# Patient Record
Sex: MALE | Race: WHITE | NOT HISPANIC OR LATINO | Employment: OTHER | URBAN - METROPOLITAN AREA
[De-identification: names, ages, dates, MRNs, and addresses within clinical notes are randomized per-mention and may not be internally consistent; named-entity substitution may affect disease eponyms.]

---

## 2017-01-26 ENCOUNTER — OFFICE VISIT (OUTPATIENT)
Dept: ENDOCRINOLOGY | Facility: CLINIC | Age: 67
End: 2017-01-26
Payer: COMMERCIAL

## 2017-01-26 VITALS
HEIGHT: 71 IN | BODY MASS INDEX: 26.48 KG/M2 | SYSTOLIC BLOOD PRESSURE: 118 MMHG | DIASTOLIC BLOOD PRESSURE: 73 MMHG | HEART RATE: 78 BPM | WEIGHT: 189.13 LBS

## 2017-01-26 DIAGNOSIS — M81.0 OSTEOPOROSIS, UNSPECIFIED: Primary | ICD-10-CM

## 2017-01-26 DIAGNOSIS — E78.00 PURE HYPERCHOLESTEROLEMIA: ICD-10-CM

## 2017-01-26 DIAGNOSIS — N52.9 ERECTILE DYSFUNCTION, UNSPECIFIED ERECTILE DYSFUNCTION TYPE: ICD-10-CM

## 2017-01-26 DIAGNOSIS — R82.994 HYPERCALCIURIA: ICD-10-CM

## 2017-01-26 PROCEDURE — 99214 OFFICE O/P EST MOD 30 MIN: CPT | Mod: S$GLB,,, | Performed by: INTERNAL MEDICINE

## 2017-01-26 PROCEDURE — 99999 PR PBB SHADOW E&M-EST. PATIENT-LVL III: CPT | Mod: PBBFAC,,, | Performed by: INTERNAL MEDICINE

## 2017-01-26 PROCEDURE — 1159F MED LIST DOCD IN RCRD: CPT | Mod: S$GLB,,, | Performed by: INTERNAL MEDICINE

## 2017-01-26 NOTE — PROGRESS NOTES
Subjective:       Patient ID: Jean Carlson is a 66 y.o. male.    Chief Complaint: Osteoporosis    HPI Comments: Mr. Carlson is presenting as follow-up for osteoporosis.  Last seen by Dr. Mejia in 2014.      Previous urine studies noted for hypercalciuria    Had been on Fosamax 2005- 2012    Boxer fracture in 2014 after list visit. Had been repairing light and fallen causing fracture. No other previous fractures  The patient exercises regularly lifting weights about 4-5 times a week and doing aerobic exercise as well with treadmill (3x per week)    No significant back pain or bone pain.      Dyslipidemia on lipitor 20mg. Lipid panel from 12/2016 at [facility]     ED, has gotten worse over the past several years.  Also endorses low libido Denies significant fatigue. Had tried viagra, provided some relief.         Review of Systems   Constitutional: Negative for unexpected weight change.   Eyes: Negative for visual disturbance.   Respiratory: Negative for shortness of breath.    Cardiovascular: Negative for chest pain.   Gastrointestinal: Negative for abdominal pain.   Genitourinary:        Erectile dysfunction   Musculoskeletal: Negative for myalgias.   Skin: Negative for wound.   Neurological: Negative for headaches.   Hematological: Does not bruise/bleed easily.   Psychiatric/Behavioral: Negative for sleep disturbance.       Objective:      Physical Exam   Constitutional: He is oriented to person, place, and time. He appears well-developed and well-nourished.   HENT:   Head: Normocephalic.   Eyes: EOM are normal.   Neck: Normal range of motion. No thyromegaly present.   Cardiovascular: Normal rate.    No murmur heard.  Pulmonary/Chest: Effort normal.   Abdominal: Soft.   Musculoskeletal: Normal range of motion.   Neurological: He is alert and oriented to person, place, and time.   Skin: Skin is warm and dry.   Psychiatric: He has a normal mood and affect.   Vitals reviewed.      Assessment:       1.  Osteoporosis, unspecified    2. Pure hypercholesterolemia    3. Hypercalciuria    4. Erectile dysfunction, unspecified erectile dysfunction type        Plan:       1. Osteoporosis likely etiology being idiopathic hypercalcuria. Previously on bisphosphonate for 7 years until 2012. Last DXA 2014 how 3% reduction in total hip but 18% increase in lumbar spine density. Plan to repeat DXA  2. Continue lipitor 20mg. Lipids at goal  3. Idiopathic, has previously tried thiazide. Will recheck 24 urine calcium, creatinine  4. Check total testosterone. Level within normal range in 2005    Discussed with Dr. Roberto Pollock MD

## 2017-01-26 NOTE — MR AVS SNAPSHOT
[facility]  [address]  [facility] [address]  Phone: [phone]  Fax: [phone]                  Jean Carlson   2017 8:00 AM   Office Visit    Description:  Male : 1950   Provider:  Pipe Pollock MD   Department:  [facility]           Reason for Visit     Osteoporosis           Diagnoses this Visit        Comments    Osteoporosis, unspecified    -  Primary     Pure hypercholesterolemia         Hypercalciuria         Erectile dysfunction, unspecified erectile dysfunction type                To Do List           Future Appointments        Provider Department Dept Phone    2017 9:30 AM LAB, APPOINTMENT [place] [facility] [phone]    2017 2:20 PM [facility], DEXA1 [facility] [phone]    3/17/2017 10:00 AM LAB, APPOINTMENT [facility] [phone]      Goals (5 Years of Data)     None      Follow-Up and Disposition     Follow-up and Disposition History      [facility]     [facility] -  Assistance  Registered nurses in the [facility] provide clinical advisement, health education, appointment booking, and other advisory services.  Call for this free service at [phone].             Medications           Message regarding Medications     Verify the changes and/or additions to your medication regime listed below are the same as discussed with your clinician today.  If any of these changes or additions are incorrect, please notify your healthcare provider.             Verify that the below list of medications is an accurate representation of the medications you are currently taking.  If none reported, the list may be blank. If incorrect, please contact your healthcare provider. Carry this list with you in case of emergency.           Current Medications     aspirin 81 MG Chew Take 81 mg by mouth once daily.    atorvastatin  "(LIPITOR) 20 MG tablet Take 1 tablet (20 mg total) by mouth once daily.    co-enzyme Q-10 30 mg capsule Take 30 mg by mouth once daily.     fish oil-omega-3 fatty acids 300-1,000 mg capsule Take 2 g by mouth once daily.    lutein-zeaxanthin 25-5 mg Cap Take by mouth once daily.     multivitamin capsule Take 1 capsule by mouth once daily.    omega-3 acid ethyl esters (LOVAZA) 1 gram capsule Take 1 capsule (1 g total) by mouth once daily.    resveratrol 100 mg Cap Take by mouth once daily.           Clinical Reference Information           Vital Signs - Last Recorded  Most recent update: 1/26/2017  8:22 AM by Nahomy Oh RN    BP Pulse Ht Wt BMI    118/73 78 5' 11" (1.803 m) 85.8 kg (189 lb 2.5 oz) 26.38 kg/m2      Blood Pressure          Most Recent Value    BP  118/73      Allergies as of 1/26/2017     No Known Allergies      Immunizations Administered on Date of Encounter - 1/26/2017     None      Orders Placed During Today's Visit     Future Labs/Procedures Expected by Expires    DXA Bone Density Spine And Hip_Axial Skeleton  1/26/2017 1/26/2018    Testosterone  1/26/2017 3/27/2018    CALCIUM, TIMED URINE  As directed 1/26/2018    Creatinine, urine, timed  As directed 1/26/2018      Instructions    Take Vit D 2000 IU daily       "

## 2017-01-27 ENCOUNTER — HOSPITAL ENCOUNTER (OUTPATIENT)
Dept: RADIOLOGY | Facility: CLINIC | Age: 67
Discharge: HOME | End: 2017-01-27
Attending: INTERNAL MEDICINE
Payer: COMMERCIAL

## 2017-01-27 DIAGNOSIS — M81.0 OSTEOPOROSIS, UNSPECIFIED: ICD-10-CM

## 2017-01-27 PROCEDURE — 77080 DXA BONE DENSITY AXIAL: CPT | Mod: 26,,, | Performed by: INTERNAL MEDICINE

## 2017-01-27 PROCEDURE — 77080 DXA BONE DENSITY AXIAL: CPT | Mod: TC

## 2017-01-28 NOTE — PROGRESS NOTES
I  Guerrero Mejia have personally taken the history and examined this patient and agree with the fellow's note.

## 2017-01-31 ENCOUNTER — LAB VISIT (OUTPATIENT)
Dept: LAB | Facility: HOSPITAL | Age: 67
End: 2017-01-31
Attending: INTERNAL MEDICINE
Payer: COMMERCIAL

## 2017-01-31 DIAGNOSIS — R82.994 HYPERCALCIURIA: ICD-10-CM

## 2017-01-31 PROCEDURE — 82570 ASSAY OF URINE CREATININE: CPT

## 2017-01-31 PROCEDURE — 82340 ASSAY OF CALCIUM IN URINE: CPT

## 2017-02-01 LAB
CALCIUM 24H UR-MRATE: 14 MG/HR
CALCIUM UR-MCNC: 13.7 MG/DL
CALCIUM URINE (MG/SPEC): 343 MG/SPEC
CREAT 24H UR-MRATE: 86.5 MG/HR
CREAT UR-MCNC: 83 MG/DL
CREATININE, URINE (MG/SPEC): 2075 MG/SPEC
URINE COLLECTION DURATION: 24 HR
URINE COLLECTION DURATION: 24 HR
URINE VOLUME: 2500 ML
URINE VOLUME: 2500 ML

## 2017-03-17 ENCOUNTER — LAB VISIT (OUTPATIENT)
Dept: LAB | Facility: HOSPITAL | Age: 67
End: 2017-03-17
Attending: FAMILY MEDICINE
Payer: COMMERCIAL

## 2017-03-17 DIAGNOSIS — E78.5 HYPERLIPIDEMIA: ICD-10-CM

## 2017-03-17 DIAGNOSIS — E78.5 HYPERLIPIDEMIA, UNSPECIFIED HYPERLIPIDEMIA TYPE: ICD-10-CM

## 2017-03-17 LAB
ALT SERPL W/O P-5'-P-CCNC: 39 U/L
AST SERPL-CCNC: 32 U/L
CHOLEST/HDLC SERPL: 2.6 {RATIO}
HDL/CHOLESTEROL RATIO: 38.2 %
HDLC SERPL-MCNC: 144 MG/DL
HDLC SERPL-MCNC: 55 MG/DL
LDLC SERPL CALC-MCNC: 72.2 MG/DL
NONHDLC SERPL-MCNC: 89 MG/DL
TRIGL SERPL-MCNC: 84 MG/DL

## 2017-03-17 PROCEDURE — 36415 COLL VENOUS BLD VENIPUNCTURE: CPT

## 2017-03-17 PROCEDURE — 84450 TRANSFERASE (AST) (SGOT): CPT

## 2017-03-17 PROCEDURE — 84460 ALANINE AMINO (ALT) (SGPT): CPT

## 2017-03-17 PROCEDURE — 80061 LIPID PANEL: CPT

## 2017-03-21 ENCOUNTER — LAB VISIT (OUTPATIENT)
Dept: LAB | Facility: HOSPITAL | Age: 67
End: 2017-03-21
Attending: FAMILY MEDICINE
Payer: COMMERCIAL

## 2017-03-21 ENCOUNTER — OFFICE VISIT (OUTPATIENT)
Dept: INTERNAL MEDICINE | Facility: CLINIC | Age: 67
End: 2017-03-21
Payer: COMMERCIAL

## 2017-03-21 VITALS
TEMPERATURE: 99 F | DIASTOLIC BLOOD PRESSURE: 60 MMHG | OXYGEN SATURATION: 99 % | HEIGHT: 71 IN | SYSTOLIC BLOOD PRESSURE: 110 MMHG | HEART RATE: 65 BPM | WEIGHT: 187.19 LBS | BODY MASS INDEX: 26.21 KG/M2

## 2017-03-21 DIAGNOSIS — T14.8XXA BRUISING: ICD-10-CM

## 2017-03-21 DIAGNOSIS — E78.5 HYPERLIPIDEMIA, UNSPECIFIED HYPERLIPIDEMIA TYPE: ICD-10-CM

## 2017-03-21 DIAGNOSIS — R22.32 PALMAR NODULE, LEFT: Primary | ICD-10-CM

## 2017-03-21 DIAGNOSIS — M81.0 OSTEOPOROSIS, UNSPECIFIED: ICD-10-CM

## 2017-03-21 DIAGNOSIS — Z82.49 FAMILY HISTORY OF CORONARY ARTERY DISEASE: ICD-10-CM

## 2017-03-21 DIAGNOSIS — N52.9 ERECTILE DYSFUNCTION, UNSPECIFIED ERECTILE DYSFUNCTION TYPE: ICD-10-CM

## 2017-03-21 DIAGNOSIS — L98.9 SKIN LESION: ICD-10-CM

## 2017-03-21 LAB
BASOPHILS # BLD AUTO: 0.01 K/UL
BASOPHILS NFR BLD: 0.2 %
DIFFERENTIAL METHOD: ABNORMAL
EOSINOPHIL # BLD AUTO: 0.1 K/UL
EOSINOPHIL NFR BLD: 2.4 %
ERYTHROCYTE [DISTWIDTH] IN BLOOD BY AUTOMATED COUNT: 12.4 %
HCT VFR BLD AUTO: 42.2 %
HGB BLD-MCNC: 14.2 G/DL
LYMPHOCYTES # BLD AUTO: 0.7 K/UL
LYMPHOCYTES NFR BLD: 17.7 %
MCH RBC QN AUTO: 31.1 PG
MCHC RBC AUTO-ENTMCNC: 33.6 %
MCV RBC AUTO: 92 FL
MONOCYTES # BLD AUTO: 0.4 K/UL
MONOCYTES NFR BLD: 9.7 %
NEUTROPHILS # BLD AUTO: 2.9 K/UL
NEUTROPHILS NFR BLD: 70 %
PLATELET # BLD AUTO: 139 K/UL
PMV BLD AUTO: 11.7 FL
RBC # BLD AUTO: 4.57 M/UL
WBC # BLD AUTO: 4.13 K/UL

## 2017-03-21 PROCEDURE — 99999 PR PBB SHADOW E&M-EST. PATIENT-LVL III: CPT | Mod: PBBFAC,,, | Performed by: FAMILY MEDICINE

## 2017-03-21 PROCEDURE — 1160F RVW MEDS BY RX/DR IN RCRD: CPT | Mod: S$GLB,,, | Performed by: FAMILY MEDICINE

## 2017-03-21 PROCEDURE — 99214 OFFICE O/P EST MOD 30 MIN: CPT | Mod: S$GLB,,, | Performed by: FAMILY MEDICINE

## 2017-03-21 PROCEDURE — 36415 COLL VENOUS BLD VENIPUNCTURE: CPT

## 2017-03-21 PROCEDURE — 85025 COMPLETE CBC W/AUTO DIFF WBC: CPT

## 2017-03-21 RX ORDER — TADALAFIL 20 MG/1
20 TABLET ORAL DAILY PRN
Qty: 6 TABLET | Refills: 11 | Status: SHIPPED | OUTPATIENT
Start: 2017-03-21 | End: 2018-01-02

## 2017-03-21 NOTE — PROGRESS NOTES
Subjective:       Patient ID: Jean Carlson is a 66 y.o. male.    Chief Complaint: Follow-up    HPI  Review of Systems   Constitutional: Negative for chills, fatigue and fever.   HENT: Negative for congestion and trouble swallowing.    Eyes: Negative for redness.   Respiratory: Negative for cough, chest tightness and shortness of breath.    Cardiovascular: Negative for chest pain, palpitations and leg swelling.   Gastrointestinal: Negative for abdominal pain and blood in stool.   Genitourinary: Negative for hematuria.   Musculoskeletal: Negative for arthralgias, back pain, gait problem, joint swelling, myalgias and neck pain.   Skin: Negative for color change and rash.        Lesion back   Neurological: Negative for tremors, speech difficulty, weakness, numbness and headaches.   Hematological: Negative for adenopathy. Bruises/bleeds easily.   Psychiatric/Behavioral: Negative for behavioral problems, confusion and sleep disturbance. The patient is not nervous/anxious.        Objective:      Physical Exam   Constitutional: He is oriented to person, place, and time. He appears well-developed and well-nourished. No distress.   Neck: Neck supple.   Pulmonary/Chest: Effort normal.   Musculoskeletal: He exhibits no edema.        Hands:       Right lower leg: He exhibits no edema.        Left lower leg: He exhibits no edema.   Neurological: He is alert and oriented to person, place, and time.   Skin: Skin is warm and dry. No rash noted.   Psychiatric: He has a normal mood and affect. His behavior is normal. Judgment and thought content normal.   Nursing note and vitals reviewed.      Assessment:       1. Palmar nodule, left    2. Skin lesion    3. Bruising    4. Family history of coronary artery disease    5. Erectile dysfunction, unspecified erectile dysfunction type    6. Hyperlipidemia, unspecified hyperlipidemia type    7. Osteoporosis, unspecified        Plan:   Jean was seen today for follow-up.    Diagnoses and  all orders for this visit:    Palmar nodule, left    Skin lesion  -     Ambulatory referral to Dermatology    Bruising  -     CBC auto differential; Future    Family history of coronary artery disease  Comments:  mom  at 64, dad at 67 - both of MI and within 3 hours of one another    Erectile dysfunction, unspecified erectile dysfunction type    Hyperlipidemia, unspecified hyperlipidemia type    Osteoporosis, unspecified    Other orders  -     Tdap Vaccine; Future  -     tadalafil (CIALIS) 20 MG Tab; Take 1 tablet (20 mg total) by mouth daily as needed.      See meds, orders, follow up, routing and instructions sections of encounter.  HISTORY OF PRESENT ILLNESS:  This is a gentleman in with multiple complaints   today.  He is in for followup.  He has a history of osteoporosis.  He did take a   bisphosphonate in the past.  He was seen recently by Endocrinology.    Reports a mole to the right infrascapular area and also some nodularity to the   left hand.    Varicose veins, lower extremity, left greater than right.    Reported some bruising to the forearms.    Discussed diet, weight loss, exercise.  He exercises on a very regular basis and   consistent.  Discussed solar keratosis and solar purpura.    He has a small lesion to the infrascapular area on the right and a larger lesion   to the left low back consistent with SKs, referred to Dermatology for   treatment.    Continue his excellent exercise program.    ED trial of Cialis.    Left hand, possible early palmar contracture.  No loss of range of motion.    Notify me for any progression.      BRIGETTE/HN  dd: 2017 14:37:32 (CDT)  td: 2017 06:15:23 (CDT)  Doc ID   #[id number]  Job ID #[id number]    CC:

## 2017-03-21 NOTE — MR AVS SNAPSHOT
[facility]  [address]  [facility] [address]  Phone: [phone]  Fax: [phone]                  Jean Carlson   3/21/2017 1:20 PM   Office Visit    Description:  Male : 1950   Provider:  Papi Gallardo MD   Department:  [facility]           Reason for Visit     Follow-up           Diagnoses this Visit        Comments    Palmar nodule, left    -  Primary     Skin lesion         Bruising         Family history of coronary artery disease     mom  at 64, dad at 67 - both of MI and within 3 hours of one another    Erectile dysfunction, unspecified erectile dysfunction type         Hyperlipidemia, unspecified hyperlipidemia type         Osteoporosis, unspecified                To Do List           Goals (5 Years of Data)     None      Follow-Up and Disposition     Return in about 6 months (around 2017) for annual physical exam.    Follow-up and Disposition History       These Medications        Disp Refills Start End    tadalafil (CIALIS) 20 MG Tab 6 tablet 11 3/21/2017     Take 1 tablet (20 mg total) by mouth daily as needed. - Oral    Pharmacy: [facility] [address] Ph #: [phone]         [facility]     [facility] Care [facility] -  Assistance  Registered nurses in the [facility] provide clinical advisement, health education, appointment booking, and other advisory services.  Call for this free service at [phone].             Medications           Message regarding Medications     Verify the changes and/or additions to your medication regime listed below are the same as discussed with your clinician today.  If any of these changes or additions are incorrect, please notify your healthcare provider.        START taking these NEW medications        Refills    tadalafil (CIALIS) 20 MG Tab 11    Sig: Take 1 tablet (20 mg total) by mouth daily as  "needed.    Class: Normal    Route: Oral           Verify that the below list of medications is an accurate representation of the medications you are currently taking.  If none reported, the list may be blank. If incorrect, please contact your healthcare provider. Carry this list with you in case of emergency.           Current Medications     aspirin 81 MG Chew Take 81 mg by mouth once daily.    atorvastatin (LIPITOR) 20 MG tablet Take 1 tablet (20 mg total) by mouth once daily.    co-enzyme Q-10 30 mg capsule Take 30 mg by mouth once daily.     fish oil-omega-3 fatty acids 300-1,000 mg capsule Take 2 g by mouth once daily.    lutein-zeaxanthin 25-5 mg Cap Take by mouth once daily.     multivitamin capsule Take 1 capsule by mouth once daily.    omega-3 acid ethyl esters (LOVAZA) 1 gram capsule Take 1 capsule (1 g total) by mouth once daily.    resveratrol 100 mg Cap Take by mouth once daily.    tadalafil (CIALIS) 20 MG Tab Take 1 tablet (20 mg total) by mouth daily as needed.           Clinical Reference Information           Your Vitals Were     BP Pulse Temp Height Weight SpO2    110/60 65 98.6 °F (37 °C) 5' 11" (1.803 m) 84.9 kg (187 lb 2.7 oz) 99%    BMI                26.11 kg/m2          Blood Pressure          Most Recent Value    BP  110/60      Allergies as of 3/21/2017     No Known Allergies      Immunizations Administered on Date of Encounter - 3/21/2017     None      Orders Placed During Today's Visit      Normal Orders This Visit    Ambulatory referral to Dermatology     Future Labs/Procedures Expected by Expires    CBC auto differential  3/21/2017 3/21/2018    Tdap Vaccine  As directed 3/21/2018      Language Assistance Services     ATTENTION: Language assistance services are available, free of charge. Please call [phone].      ATENCIÓN: Si habla vipin, tiene a yeung disposición servicios gratuitos de asistencia lingüística. Llame al [phone].     JULIA Ý: N?u b?n nói Ti?ng Vi?t, có các d?ch " v? h? tr? ngôn ng? mi?n phí dành cho b?n. G?i s? [phone].         [facility] complies with applicable Federal civil rights laws and does not discriminate on the basis of race, color, national origin, age, disability, or sex.

## 2017-03-29 ENCOUNTER — TELEPHONE (OUTPATIENT)
Dept: INTERNAL MEDICINE | Facility: CLINIC | Age: 67
End: 2017-03-29

## 2017-03-29 DIAGNOSIS — R79.89 ABNORMAL COMPLETE BLOOD COUNT: Primary | ICD-10-CM

## 2017-05-03 RX ORDER — ATORVASTATIN CALCIUM 20 MG/1
TABLET, FILM COATED ORAL
Qty: 90 TABLET | Refills: 3 | Status: SHIPPED | OUTPATIENT
Start: 2017-05-03 | End: 2018-05-18

## 2017-05-10 ENCOUNTER — OFFICE VISIT (OUTPATIENT)
Dept: FAMILY MEDICINE | Facility: CLINIC | Age: 67
End: 2017-05-10
Payer: MEDICARE

## 2017-05-10 VITALS
TEMPERATURE: 98 F | BODY MASS INDEX: 26.19 KG/M2 | SYSTOLIC BLOOD PRESSURE: 128 MMHG | DIASTOLIC BLOOD PRESSURE: 72 MMHG | RESPIRATION RATE: 18 BRPM | HEART RATE: 72 BPM | WEIGHT: 187.06 LBS | OXYGEN SATURATION: 97 % | HEIGHT: 71 IN

## 2017-05-10 DIAGNOSIS — N41.0 ACUTE PROSTATITIS: Primary | ICD-10-CM

## 2017-05-10 DIAGNOSIS — L98.9 SKIN LESION OF BACK: ICD-10-CM

## 2017-05-10 PROCEDURE — 99213 OFFICE O/P EST LOW 20 MIN: CPT | Mod: PBBFAC,PN | Performed by: FAMILY MEDICINE

## 2017-05-10 PROCEDURE — 1160F RVW MEDS BY RX/DR IN RCRD: CPT | Mod: ,,, | Performed by: FAMILY MEDICINE

## 2017-05-10 PROCEDURE — 99214 OFFICE O/P EST MOD 30 MIN: CPT | Mod: S$PBB,,, | Performed by: FAMILY MEDICINE

## 2017-05-10 PROCEDURE — 99999 PR PBB SHADOW E&M-EST. PATIENT-LVL III: CPT | Mod: PBBFAC,,, | Performed by: FAMILY MEDICINE

## 2017-05-10 RX ORDER — CIPROFLOXACIN 500 MG/1
500 TABLET ORAL 2 TIMES DAILY
Qty: 20 TABLET | Refills: 0 | Status: SHIPPED | OUTPATIENT
Start: 2017-05-10 | End: 2017-05-20

## 2017-05-10 NOTE — PROGRESS NOTES
HPI:  Jean Carlson is a 67 y.o. year old male that  presents with complaint of dysuria, urgency today, and fatigue since for 2 weeks.He occasionally has to apply pressure to his perinium to improve the pain. He denies any trauam relating to the pain. He has not had this pain before this.   Chief Complaint   Patient presents with    Urinary Tract Infection    Fatigue     2 weeks   .     HPI    Past Medical History:   Diagnosis Date    GERD (gastroesophageal reflux disease)     Osteoporosis     Prostate disease      Social History     Social History    Marital status:      Spouse name: N/A    Number of children: N/A    Years of education: N/A     Occupational History     [employer]     Social History Main Topics    Smoking status: Never Smoker    Smokeless tobacco: Never Used    Alcohol use 1.8 oz/week     3 Glasses of wine per week      Comment: 3, 5 oz glasses a week    Drug use: No    Sexual activity: Yes     Partners: Female     Other Topics Concern    Not on file     Social History Narrative     [employer]. 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     Past Surgical History:   Procedure Laterality Date    back sx      lower    PROSTATE SURGERY      SPINE SURGERY      TONSILLECTOMY      TRANSURETHRAL RESECTION OF PROSTATE       Family History   Problem Relation Age of Onset    Glaucoma Maternal Uncle     Retinitis pigmentosa Maternal Uncle     Heart disease Mother     Heart attack Mother     Skin cancer Mother     Heart disease Father     Heart attack Father     Glaucoma Maternal Aunt     No Known Problems Daughter     Celiac disease Neg Hx     Cirrhosis Neg Hx     Colon cancer Neg Hx     Colon polyps Neg Hx     Crohn's disease Neg Hx     Cystic fibrosis Neg Hx     Esophageal cancer Neg Hx     Hemochromatosis Neg Hx     Inflammatory bowel disease Neg Hx     Irritable bowel syndrome Neg Hx     Liver cancer Neg Hx     Liver disease Neg Hx     Rectal cancer Neg Hx      "Stomach cancer Neg Hx     Ulcerative colitis Neg Hx     Lane's disease Neg Hx     Amblyopia Neg Hx     Blindness Neg Hx     Cataracts Neg Hx     Macular degeneration Neg Hx     Retinal detachment Neg Hx     Strabismus Neg Hx            Review of Systems  General ROS: negative for chills, fever or weight loss  Psychological ROS: negative for hallucination, depression or suicidal ideation  Ophthalmic ROS: negative for blurry vision, photophobia or eye pain  ENT ROS: negative for epistaxis, sore throat or rhinorrhea  Respiratory ROS: no cough, shortness of breath, or wheezing  Cardiovascular ROS: no chest pain or dyspnea on exertion  Gastrointestinal ROS: no abdominal pain, change in bowel habits, or black/ bloody stools  Genito-Urinary ROS: no dysuria, trouble voiding, or hematuria, pos pain when he stands for long periods of time,   Musculoskeletal ROS: negative for gait disturbance or muscular weakness  Neurological ROS: no syncope or seizures; no ataxia  Dermatological ROS: negative for pruritis, rash and jaundice      Physical Exam:  /72 (BP Location: Left arm, Patient Position: Sitting, BP Method: Manual)   Pulse 72   Temp 98.2 °F (36.8 °C) (Oral)   Resp 18   Ht 5' 11" (1.803 m)   Wt 84.8 kg (187 lb 1 oz)   SpO2 97%   BMI 26.09 kg/m²   General appearance: alert, cooperative, no distress  Constitutional:Oriented to person, place, and time.appears well-developed and well-nourished.  HEENT: Normocephalic, atraumatic, neck symmetrical, no nasal discharge, TM - clear bilaterally   Eyes: conjunctivae/corneas clear, PERRL, EOM's intact  Lungs: clear to auscultation bilaterally, no dullness to percussion bilaterally  Heart: regular rate and rhythm without rub; no displacement of the PMI   Abdomen: soft, non-tender; bowel sounds normoactive; no organomegaly  : no erythema noted, pain with manipulation of the left spermatic cords , nno swelling  Extremities: extremities symmetric; no clubbing, " cyanosis, or edema  Integument: Skin color, texture, turgor normal; no rashes; hair distrubution normal  Neurologic: Alert and oriented X 3, normal strength, normal coordination and gait  Psychiatric: no pressured speech; normal affect; no evidence of impaired cognition   Physical Exam  LABS:    Complete Blood Count  Lab Results   Component Value Date    RBC 4.57 (L) 03/21/2017    HGB 14.2 03/21/2017    HCT 42.2 03/21/2017    MCV 92 03/21/2017    MCH 31.1 (H) 03/21/2017    MCHC 33.6 03/21/2017    RDW 12.4 03/21/2017     (L) 03/21/2017    MPV 11.7 03/21/2017    GRAN 2.9 03/21/2017    GRAN 70.0 03/21/2017    LYMPH 0.7 (L) 03/21/2017    LYMPH 17.7 (L) 03/21/2017    MONO 0.4 03/21/2017    MONO 9.7 03/21/2017    EOS 0.1 03/21/2017    BASO 0.01 03/21/2017    EOSINOPHIL 2.4 03/21/2017    BASOPHIL 0.2 03/21/2017    DIFFMETHOD Automated 03/21/2017       Comprehensive Metabolic Panel  Lab Results   Component Value Date    GLU 99 09/23/2016    BUN 17 09/23/2016    CREATININE 0.9 09/23/2016     09/23/2016    K 4.3 09/23/2016     09/23/2016    PROT 6.4 09/23/2016    ALBUMIN 3.8 09/23/2016    BILITOT 0.9 09/23/2016    AST 32 03/17/2017    ALKPHOS 95 09/23/2016    CO2 28 09/23/2016    ALT 39 03/17/2017    ANIONGAP 6 (L) 09/23/2016    EGFRNONAA >60.0 09/23/2016    ESTGFRAFRICA >60.0 09/23/2016       LIPID  No components found for: LIPIDPANEL    TSH  Lab Results   Component Value Date    TSH 3.718 09/23/2016         Assessment:    ICD-10-CM ICD-9-CM    1. Acute prostatitis N41.0 601.0 ciprofloxacin HCl (CIPRO) 500 MG tablet   2. Skin lesion of back L98.9 709.9          Plan:  Will refer to Dermatology for a skin assessment of skin lesions.  Return in about 1 week (around 5/17/2017).          Lili Bingham MD

## 2017-05-10 NOTE — MR AVS SNAPSHOT
[address]  [place] [address]  Phone: [phone]  Fax: [phone]                  Jean Carlson   5/10/2017 1:40 PM   Office Visit    Description:  Male : 1950   Provider:  Lili Bingham MD   Department:  [facility]           Reason for Visit     Urinary Tract Infection     Fatigue           Diagnoses this Visit        Comments    Acute prostatitis    -  Primary            To Do List           Future Appointments        Provider Department Dept Phone    2017 10:20 AM Lisa Harris MD [place] - Dermatology [phone]    2017 10:00 AM LAB, APPOINTMENT [facility] [phone]      Goals (5 Years of Data)     None       These Medications        Disp Refills Start End    ciprofloxacin HCl (CIPRO) 500 MG tablet 20 tablet 0 5/10/2017 2017    Take 1 tablet (500 mg total) by mouth 2 (two) times daily. - Oral    Pharmacy: [facility] #[id number] - [place] - [address] [facility] Ph #: [phone]         [facility] On Call     [facility] Care Line -  Assistance  Unless otherwise directed by your provider, please contact [facility], our nurse care line that is available for  assistance.     Registered nurses in the [facility] provide: appointment scheduling, clinical advisement, health education, and other advisory services.  Call: [phone] (toll free)               Medications           Message regarding Medications     Verify the changes and/or additions to your medication regime listed below are the same as discussed with your clinician today.  If any of these changes or additions are incorrect, please notify your healthcare provider.        START taking these NEW medications        Refills    ciprofloxacin HCl (CIPRO) 500 MG tablet 0    Sig: Take 1 tablet (500 mg total) by mouth 2 (two) times daily.    Class: Normal    Route:  "Oral           Verify that the below list of medications is an accurate representation of the medications you are currently taking.  If none reported, the list may be blank. If incorrect, please contact your healthcare provider. Carry this list with you in case of emergency.           Current Medications     aspirin 81 MG Chew Take 81 mg by mouth once daily.    atorvastatin (LIPITOR) 20 MG tablet TAKE 1 TABLET EVERY DAY    ciprofloxacin HCl (CIPRO) 500 MG tablet Take 1 tablet (500 mg total) by mouth 2 (two) times daily.    co-enzyme Q-10 30 mg capsule Take 30 mg by mouth once daily.     fish oil-omega-3 fatty acids 300-1,000 mg capsule Take 2 g by mouth once daily.    lutein-zeaxanthin 25-5 mg Cap Take by mouth once daily.     multivitamin capsule Take 1 capsule by mouth once daily.    omega-3 acid ethyl esters (LOVAZA) 1 gram capsule Take 1 capsule (1 g total) by mouth once daily.    resveratrol 100 mg Cap Take by mouth once daily.    tadalafil (CIALIS) 20 MG Tab Take 1 tablet (20 mg total) by mouth daily as needed.           Clinical Reference Information           Your Vitals Were     BP Pulse Temp Resp Height Weight    128/72 (BP Location: Left arm, Patient Position: Sitting, BP Method: Manual) 72 98.2 °F (36.8 °C) (Oral) 18 5' 11" (1.803 m) 84.8 kg (187 lb 1 oz)    SpO2 BMI             97% 26.09 kg/m2         Blood Pressure          Most Recent Value    BP  128/72      Allergies as of 5/10/2017     No Known Allergies      Immunizations Administered on Date of Encounter - 5/10/2017     None      Language Assistance Services     ATTENTION: Language assistance services are available, free of charge. Please call [phone].      ATENCIÓN: Si habla vipin, tiene a yeung disposición servicios gratuitos de asistencia lingüística. Llame al [phone].     CHÚ Ý: N?u b?n nói Ti?ng Vi?t, có các d?ch v? h? tr? ngôn ng? mi?n phí dành cho b?n. G?i s? [phone].         [facility] complies with " applicable Federal civil rights laws and does not discriminate on the basis of race, color, national origin, age, disability, or sex.

## 2017-05-11 ENCOUNTER — INITIAL CONSULT (OUTPATIENT)
Dept: DERMATOLOGY | Facility: CLINIC | Age: 67
End: 2017-05-11
Payer: MEDICARE

## 2017-05-11 VITALS — WEIGHT: 187 LBS | BODY MASS INDEX: 26.08 KG/M2

## 2017-05-11 DIAGNOSIS — D48.5 NEOPLASM OF UNCERTAIN BEHAVIOR OF SKIN: Primary | ICD-10-CM

## 2017-05-11 DIAGNOSIS — L85.3 XEROSIS CUTIS: ICD-10-CM

## 2017-05-11 DIAGNOSIS — D18.00 ANGIOMA: ICD-10-CM

## 2017-05-11 DIAGNOSIS — D22.9 NEVUS OF MULTIPLE SITES: ICD-10-CM

## 2017-05-11 DIAGNOSIS — L82.1 SK (SEBORRHEIC KERATOSIS): ICD-10-CM

## 2017-05-11 DIAGNOSIS — L82.1 SEBORRHEIC KERATOSES: ICD-10-CM

## 2017-05-11 PROCEDURE — 1160F RVW MEDS BY RX/DR IN RCRD: CPT | Mod: ,,, | Performed by: DERMATOLOGY

## 2017-05-11 PROCEDURE — 11301 SHAVE SKIN LESION 0.6-1.0 CM: CPT | Mod: S$PBB,,, | Performed by: DERMATOLOGY

## 2017-05-11 PROCEDURE — 11301 SHAVE SKIN LESION 0.6-1.0 CM: CPT | Mod: PBBFAC,PO | Performed by: DERMATOLOGY

## 2017-05-11 PROCEDURE — 99203 OFFICE O/P NEW LOW 30 MIN: CPT | Mod: 25,S$PBB,, | Performed by: DERMATOLOGY

## 2017-05-11 PROCEDURE — 99213 OFFICE O/P EST LOW 20 MIN: CPT | Mod: PBBFAC,25,PO | Performed by: DERMATOLOGY

## 2017-05-11 PROCEDURE — 88305 TISSUE EXAM BY PATHOLOGIST: CPT | Performed by: PATHOLOGY

## 2017-05-11 PROCEDURE — 99999 PR PBB SHADOW E&M-EST. PATIENT-LVL III: CPT | Mod: PBBFAC,,, | Performed by: DERMATOLOGY

## 2017-05-11 NOTE — PROGRESS NOTES
Subjective:       Patient ID:  Jean Carlson is a 67 y.o. male who presents for   Chief Complaint   Patient presents with    Mole     HPI Comments: History of Present Illness: The patient presents with chief complaint of lesion.  Location: back  Duration: months  Signs/Symptoms: irritated    Prior treatments: none      Mole         Review of Systems   Constitutional: Negative for fever.   Skin: Negative for itching and rash.   Hematologic/Lymphatic: Does not bruise/bleed easily.        Objective:    Physical Exam   Constitutional: He appears well-developed and well-nourished. No distress.   Neurological: He is alert and oriented to person, place, and time. He is not disoriented.   Psychiatric: He has a normal mood and affect.   Skin:   Areas Examined (abnormalities noted in diagram):   Scalp / Hair Palpated and Inspected  Head / Face Inspection Performed  Neck Inspection Performed  Chest / Axilla Inspection Performed  Abdomen Inspection Performed  Genitals / Buttocks / Groin Inspection Performed  Back Inspection Performed  RUE Inspected  LUE Inspection Performed  RLE Inspected  LLE Inspection Performed  Nails and Digits Inspection Performed              Diagram Legend        Pigmented verrucoid papule/plaque c/w seborrheic keratosis        Flesh colored to evenly pigmented papule c/w intradermal nevus       See annotation      Assessment / Plan:      Pathology Orders:      Normal Orders This Visit    Tissue Specimen To Pathology, Dermatology     Questions:    Directional Terms:  Other(comment)    Clinical information:  irritated sk    Specific Site:  right back        Neoplasm of uncertain behavior of skin  -     Tissue Specimen To Pathology, Dermatology  Shave removal procedure note:  Right back  Shave removal performed after verbal consent including risk of infection, scar, recurrence, need for additional treatment of site. Area prepped with alcohol, anesthetized 1% lidocaine with epinephrine. Lesional  "tissue shaved. Lesion defect size 8mm No complications. Dressing applied. Wound care explained.      Seborrheic keratoses  reassurance  Brochure provided      Nevus of multiple sites  The "ABCD" rules to observe pigmented lesions were reviewed.      Angiomas  reassurance      Xerosis cutis  Am lactin lotion after baths             Return in about 1 year (around 5/11/2018).  "

## 2017-05-11 NOTE — MR AVS SNAPSHOT
[place] [facility]   [facility]  [place] [address]  Phone: [phone]  Fax: [phone]                  Jean Carlson   2017 10:20 AM   Initial consult    Description:  Male : 1950   Provider:  Lisa Harris MD   Department:  [place] [facility]           Reason for Visit     Mole           Diagnoses this Visit        Comments    Neoplasm of uncertain behavior of skin    -  Primary     Seborrheic keratoses         Nevus of multiple sites         Angioma         Xerosis cutis                To Do List           Future Appointments        Provider Department Dept Phone    2017 10:00 AM LAB, APPOINTMENT [facility] [phone]      Goals (5 Years of Data)     None      Follow-Up and Disposition     Return in about 1 year (around 2018).      [facility]     [facility] Nurse Care Line -  Assistance  Unless otherwise directed by your provider, please contact [facility], our nurse care line that is available for  assistance.     Registered nurses in the [facility] provide: appointment scheduling, clinical advisement, health education, and other advisory services.  Call: [phone] (toll free)               Medications           Message regarding Medications     Verify the changes and/or additions to your medication regime listed below are the same as discussed with your clinician today.  If any of these changes or additions are incorrect, please notify your healthcare provider.             Verify that the below list of medications is an accurate representation of the medications you are currently taking.  If none reported, the list may be blank. If incorrect, please contact your healthcare provider. Carry this list with you in case of emergency.           Current Medications     aspirin 81 MG Chew Take 81 mg by mouth once daily.    atorvastatin (LIPITOR) 20 MG tablet TAKE 1 TABLET EVERY DAY     ciprofloxacin HCl (CIPRO) 500 MG tablet Take 1 tablet (500 mg total) by mouth 2 (two) times daily.    co-enzyme Q-10 30 mg capsule Take 30 mg by mouth once daily.     fish oil-omega-3 fatty acids 300-1,000 mg capsule Take 2 g by mouth once daily.    lutein-zeaxanthin 25-5 mg Cap Take by mouth once daily.     multivitamin capsule Take 1 capsule by mouth once daily.    omega-3 acid ethyl esters (LOVAZA) 1 gram capsule Take 1 capsule (1 g total) by mouth once daily.    resveratrol 100 mg Cap Take by mouth once daily.    tadalafil (CIALIS) 20 MG Tab Take 1 tablet (20 mg total) by mouth daily as needed.           Clinical Reference Information           Your Vitals Were     Weight BMI             84.8 kg (187 lb) 26.08 kg/m2         Allergies as of 5/11/2017     No Known Allergies      Immunizations Administered on Date of Encounter - 5/11/2017     None      Orders Placed During Today's Visit      Normal Orders This Visit    Tissue Specimen To Pathology, Dermatology       Language Assistance Services     ATTENTION: Language assistance services are available, free of charge. Please call [phone].      ATENCIÓN: Si feng lew, tiene a yeung disposición servicios gratuitos de asistencia lingüística. Llame al [phone].     JULIA Ý: N?u b?n nói Ti?ng Vi?t, có các d?ch v? h? tr? ngôn ng? mi?n phí dành cho b?n. G?i s? [phone].         [place] [facility] complies with applicable Federal civil rights laws and does not discriminate on the basis of race, color, national origin, age, disability, or sex.

## 2017-05-11 NOTE — LETTER
May 11, 2017      Papi Gallardo MD  [address]  [facility] [address]           [place] [facility]  [address] [facility]  [place] [address]  Phone: [phone]  Fax: [phone]          Patient: Jean Carlson   MR Number: [id number]   YOB: 1950   Date of Visit: 5/11/2017       Dear Dr. Papi Gallardo:    Thank you for referring Jean Carlson to me for evaluation. Attached you will find relevant portions of my assessment and plan of care.    If you have questions, please do not hesitate to call me. I look forward to following Jean Carlson along with you.    Sincerely,    Lisa Harris MD    Enclosure  CC:  No Recipients    If you would like to receive this communication electronically, please contact [email or web address] or [phone] to request more information on [facility] access.    For providers and/or their staff who would like to refer a patient to [facility], please contact us through our one-stop-shop provider referral line, [facility], at [phone].    If you feel you have received this communication in error or would no longer like to receive these types of communications, please e-mail [email or web address]

## 2017-09-26 ENCOUNTER — LAB VISIT (OUTPATIENT)
Dept: LAB | Facility: HOSPITAL | Age: 67
End: 2017-09-26
Attending: FAMILY MEDICINE
Payer: MEDICARE

## 2017-09-26 DIAGNOSIS — R79.89 ABNORMAL COMPLETE BLOOD COUNT: ICD-10-CM

## 2017-09-26 LAB
ERYTHROCYTE [DISTWIDTH] IN BLOOD BY AUTOMATED COUNT: 12.7 %
HCT VFR BLD AUTO: 43.9 %
HGB BLD-MCNC: 14.5 G/DL
MCH RBC QN AUTO: 30.6 PG
MCHC RBC AUTO-ENTMCNC: 33 G/DL
MCV RBC AUTO: 93 FL
PLATELET # BLD AUTO: 163 K/UL
PMV BLD AUTO: 11.6 FL
RBC # BLD AUTO: 4.74 M/UL
WBC # BLD AUTO: 4.55 K/UL

## 2017-09-26 PROCEDURE — 36415 COLL VENOUS BLD VENIPUNCTURE: CPT

## 2017-09-26 PROCEDURE — 85027 COMPLETE CBC AUTOMATED: CPT

## 2017-10-17 ENCOUNTER — OFFICE VISIT (OUTPATIENT)
Dept: OPTOMETRY | Facility: CLINIC | Age: 67
End: 2017-10-17
Payer: MEDICARE

## 2017-10-17 DIAGNOSIS — H25.13 NUCLEAR SCLEROSIS, BILATERAL: Primary | ICD-10-CM

## 2017-10-17 PROCEDURE — 99999 PR PBB SHADOW E&M-EST. PATIENT-LVL II: CPT | Mod: PBBFAC,,, | Performed by: OPTOMETRIST

## 2017-10-17 PROCEDURE — 92015 DETERMINE REFRACTIVE STATE: CPT | Mod: ,,, | Performed by: OPTOMETRIST

## 2017-10-17 PROCEDURE — 92014 COMPRE OPH EXAM EST PT 1/>: CPT | Mod: S$PBB,,, | Performed by: OPTOMETRIST

## 2017-10-17 PROCEDURE — 99212 OFFICE O/P EST SF 10 MIN: CPT | Mod: PBBFAC,PO | Performed by: OPTOMETRIST

## 2017-10-17 NOTE — PROGRESS NOTES
"HPI     DLS: 12/6/2016  Pt va in the right eye is not sharp, everything seems cloudy. Pt states at   night halos are around light along with "star burst" effect  Pt states harder time seeing at night.  +Floaters OS   Denies eye pain, migraines, flashes     No gtts     Hx CAT OU   Hx Ocular Migraines     Last edited by Ryan Archibald, OD on 10/17/2017  2:29 PM. (History)        ROS     Negative for: Constitutional, Gastrointestinal, Neurological, Skin,   Genitourinary, Musculoskeletal, HENT, Endocrine, Cardiovascular, Eyes,   Respiratory, Psychiatric, Allergic/Imm, Heme/Lymph    Last edited by Ryan Archibald, OD on 10/17/2017  2:29 PM. (History)        Assessment /Plan     For exam results, see Encounter Report.    Nuclear sclerosis, bilateral      1. Cat OU--pt wishes surgery  2. Ocular migraine hx    PLAN:    Surgical consult               "

## 2017-10-27 ENCOUNTER — IMMUNIZATION (OUTPATIENT)
Dept: INTERNAL MEDICINE | Facility: CLINIC | Age: 67
End: 2017-10-27
Payer: MEDICARE

## 2017-10-27 ENCOUNTER — LAB VISIT (OUTPATIENT)
Dept: LAB | Facility: HOSPITAL | Age: 67
End: 2017-10-27
Attending: FAMILY MEDICINE
Payer: MEDICARE

## 2017-10-27 ENCOUNTER — OFFICE VISIT (OUTPATIENT)
Dept: INTERNAL MEDICINE | Facility: CLINIC | Age: 67
End: 2017-10-27
Attending: FAMILY MEDICINE
Payer: MEDICARE

## 2017-10-27 VITALS
WEIGHT: 190.5 LBS | SYSTOLIC BLOOD PRESSURE: 120 MMHG | HEIGHT: 71 IN | DIASTOLIC BLOOD PRESSURE: 72 MMHG | HEART RATE: 66 BPM | BODY MASS INDEX: 26.67 KG/M2

## 2017-10-27 DIAGNOSIS — E78.5 HYPERLIPIDEMIA, UNSPECIFIED HYPERLIPIDEMIA TYPE: ICD-10-CM

## 2017-10-27 DIAGNOSIS — M81.0 OSTEOPOROSIS, UNSPECIFIED OSTEOPOROSIS TYPE, UNSPECIFIED PATHOLOGICAL FRACTURE PRESENCE: ICD-10-CM

## 2017-10-27 DIAGNOSIS — Z00.00 ANNUAL PHYSICAL EXAM: Primary | ICD-10-CM

## 2017-10-27 DIAGNOSIS — Z00.00 ANNUAL PHYSICAL EXAM: ICD-10-CM

## 2017-10-27 DIAGNOSIS — Z12.5 PROSTATE CANCER SCREENING: ICD-10-CM

## 2017-10-27 LAB
ALBUMIN SERPL BCP-MCNC: 3.8 G/DL
ALP SERPL-CCNC: 83 U/L
ALT SERPL W/O P-5'-P-CCNC: 51 U/L
ANION GAP SERPL CALC-SCNC: 7 MMOL/L
AST SERPL-CCNC: 34 U/L
BILIRUB SERPL-MCNC: 0.5 MG/DL
BUN SERPL-MCNC: 19 MG/DL
CALCIUM SERPL-MCNC: 9.6 MG/DL
CHLORIDE SERPL-SCNC: 106 MMOL/L
CO2 SERPL-SCNC: 28 MMOL/L
COMPLEXED PSA SERPL-MCNC: 2.1 NG/ML
CREAT SERPL-MCNC: 1 MG/DL
EST. GFR  (AFRICAN AMERICAN): >60 ML/MIN/1.73 M^2
EST. GFR  (NON AFRICAN AMERICAN): >60 ML/MIN/1.73 M^2
GLUCOSE SERPL-MCNC: 97 MG/DL
POTASSIUM SERPL-SCNC: 4.8 MMOL/L
PROT SERPL-MCNC: 6.8 G/DL
SODIUM SERPL-SCNC: 141 MMOL/L

## 2017-10-27 PROCEDURE — 99213 OFFICE O/P EST LOW 20 MIN: CPT | Mod: PBBFAC | Performed by: FAMILY MEDICINE

## 2017-10-27 PROCEDURE — 99214 OFFICE O/P EST MOD 30 MIN: CPT | Mod: S$PBB,,, | Performed by: FAMILY MEDICINE

## 2017-10-27 PROCEDURE — 36415 COLL VENOUS BLD VENIPUNCTURE: CPT

## 2017-10-27 PROCEDURE — G0009 ADMIN PNEUMOCOCCAL VACCINE: HCPCS | Mod: PBBFAC

## 2017-10-27 PROCEDURE — 80053 COMPREHEN METABOLIC PANEL: CPT

## 2017-10-27 PROCEDURE — 99999 PR PBB SHADOW E&M-EST. PATIENT-LVL III: CPT | Mod: PBBFAC,,, | Performed by: FAMILY MEDICINE

## 2017-10-27 PROCEDURE — 84153 ASSAY OF PSA TOTAL: CPT

## 2017-10-27 PROCEDURE — G0008 ADMIN INFLUENZA VIRUS VAC: HCPCS | Mod: PBBFAC

## 2017-10-27 PROCEDURE — 90670 PCV13 VACCINE IM: CPT | Mod: PBBFAC

## 2017-10-27 NOTE — PROGRESS NOTES
Subjective:       Patient ID: Jean Carlson is a 67 y.o. male.    Chief Complaint: Annual Exam    Established patient for an annual wellness check/physical exam. No specific complaints, please see ROS.  Established patient follows up for management of chronic medical illnesses without complaints today. Please see ROS for interval problems and complaints.        Review of Systems   Constitutional: Negative for chills, fatigue and fever.   HENT: Negative for congestion and trouble swallowing.    Eyes: Negative for redness.   Respiratory: Negative for cough, chest tightness and shortness of breath.    Cardiovascular: Negative for chest pain, palpitations and leg swelling.   Gastrointestinal: Negative for abdominal pain and blood in stool.   Genitourinary: Negative for hematuria.   Musculoskeletal: Negative for arthralgias, back pain, gait problem, joint swelling, myalgias and neck pain.   Skin: Negative for color change and rash.   Neurological: Negative for tremors, speech difficulty, weakness, numbness and headaches.   Hematological: Negative for adenopathy. Does not bruise/bleed easily.   Psychiatric/Behavioral: Negative for behavioral problems, confusion and sleep disturbance. The patient is not nervous/anxious.        Objective:      Physical Exam   Constitutional: He appears well-developed and well-nourished.   HENT:   Head: Normocephalic.   Right Ear: Tympanic membrane, external ear and ear canal normal.   Left Ear: Tympanic membrane, external ear and ear canal normal.   Mouth/Throat: Oropharynx is clear and moist.   Eyes: Pupils are equal, round, and reactive to light.   Neck: Normal range of motion. Neck supple. Carotid bruit is not present. No thyromegaly present.   Cardiovascular: Normal rate, regular rhythm, normal heart sounds and intact distal pulses.  Exam reveals no gallop and no friction rub.    No murmur heard.  Pulmonary/Chest: Effort normal and breath sounds normal.   Abdominal: Soft. Bowel  sounds are normal. He exhibits no distension and no mass. There is no tenderness. There is no rebound and no guarding.   Musculoskeletal: Normal range of motion. He exhibits no edema or tenderness.   Lymphadenopathy:     He has no cervical adenopathy.   Neurological: He is alert. He has normal strength. He displays normal reflexes. No cranial nerve deficit or sensory deficit. Coordination and gait normal.   Skin: Skin is warm and dry.   Psychiatric: He has a normal mood and affect. His behavior is normal. Judgment and thought content normal.   Nursing note and vitals reviewed.      Assessment:       1. Annual physical exam    2. Hyperlipidemia, unspecified hyperlipidemia type    3. Prostate cancer screening    4. Osteoporosis, unspecified osteoporosis type, unspecified pathological fracture presence        Plan:   Jean was seen today for annual exam.    Diagnoses and all orders for this visit:    Annual physical exam  -     Comprehensive metabolic panel; Future  -     PSA, Screening; Future    Hyperlipidemia, unspecified hyperlipidemia type  -     Comprehensive metabolic panel; Future    Prostate cancer screening  -     PSA, Screening; Future    Osteoporosis, unspecified osteoporosis type, unspecified pathological fracture presence    Other orders  -     Pneumococcal Conjugate Vaccine (13 Valent) (IM)      See meds, orders, follow up, routing and instructions sections of encounter.  Annual physical examination.  The patient is retired.  No longer get [employer]   annual physical examinations.  He does exercise on a regular basis.  Discussed   weight lifting.  His health maintenance was reviewed.  He has had some things   from the mid year.  He will be due for cholesterol in the next six months or so.    We will get a CMP, PSA today and his annual shot.      BRIGETTE/HN  dd: 10/27/2017 15:05:15 (CDT)  td: 10/28/2017 04:38:25 (CDT)  Doc ID   #[id number]  Job ID #[id number]    CC:

## 2017-11-09 ENCOUNTER — TELEPHONE (OUTPATIENT)
Dept: INTERNAL MEDICINE | Facility: CLINIC | Age: 67
End: 2017-11-09

## 2017-11-09 DIAGNOSIS — R94.5 ABNORMAL RESULTS OF LIVER FUNCTION STUDIES: ICD-10-CM

## 2017-11-09 DIAGNOSIS — E78.5 HYPERLIPIDEMIA, UNSPECIFIED HYPERLIPIDEMIA TYPE: Primary | ICD-10-CM

## 2017-11-09 NOTE — TELEPHONE ENCOUNTER
Called patient and discussed labs and or test results. Patient expressed understanding and had the opportunity to ask questions. Any questions were answered. See meds, orders, follow up and instructions sections of encounter.    Specific issues include:  ALT elevated  Has had mild transaminase elevation in the past - no ETOH, on lipitor, no sx    rechk in 3 months

## 2018-01-02 ENCOUNTER — OFFICE VISIT (OUTPATIENT)
Dept: OPHTHALMOLOGY | Facility: CLINIC | Age: 68
End: 2018-01-02
Attending: OPHTHALMOLOGY
Payer: MEDICARE

## 2018-01-02 DIAGNOSIS — H25.13 NUCLEAR SCLEROSIS, BILATERAL: Primary | ICD-10-CM

## 2018-01-02 PROCEDURE — 92136 OPHTHALMIC BIOMETRY: CPT | Mod: PBBFAC,RT | Performed by: OPHTHALMOLOGY

## 2018-01-02 PROCEDURE — 99212 OFFICE O/P EST SF 10 MIN: CPT | Mod: PBBFAC,25 | Performed by: OPHTHALMOLOGY

## 2018-01-02 PROCEDURE — 99999 PR PBB SHADOW E&M-EST. PATIENT-LVL II: CPT | Mod: PBBFAC,,, | Performed by: OPHTHALMOLOGY

## 2018-01-02 PROCEDURE — 92014 COMPRE OPH EXAM EST PT 1/>: CPT | Mod: S$PBB,,, | Performed by: OPHTHALMOLOGY

## 2018-01-02 RX ORDER — TETRACAINE HYDROCHLORIDE 5 MG/ML
1 SOLUTION OPHTHALMIC
Status: CANCELLED | OUTPATIENT
Start: 2018-01-02

## 2018-01-02 RX ORDER — PHENYLEPHRINE HYDROCHLORIDE 25 MG/ML
1 SOLUTION/ DROPS OPHTHALMIC
Status: CANCELLED | OUTPATIENT
Start: 2018-01-02

## 2018-01-02 RX ORDER — MOXIFLOXACIN 5 MG/ML
1 SOLUTION/ DROPS OPHTHALMIC
Status: CANCELLED | OUTPATIENT
Start: 2018-01-02

## 2018-01-02 RX ORDER — TROPICAMIDE 10 MG/ML
1 SOLUTION/ DROPS OPHTHALMIC
Status: CANCELLED | OUTPATIENT
Start: 2018-01-02

## 2018-01-02 NOTE — PROGRESS NOTES
HPI     Referred by Dr. Archibald    Patient states his vision has been getting progressively worse over the   past year. His is c/o halos and glare with ongoing. headlights.     Last edited by Clarita Webb on 1/2/2018  1:24 PM. (History)            Assessment /Plan     For exam results, see Encounter Report.    Nuclear sclerosis, bilateral  -     IOL Master - MOD 26 - OD- Right eye      Visually Significant Cataract: Patient reports decreased vision consistent with the clinical amount of lenticular opacity, which reaches the level of visual significance and affects activities of daily living. Risks, benefits, and alternatives to cataract surgery were discussed and the consent reviewed. IOL options were discussed, including ATIOLs and the associated side effects and additional patient cost associated with them.   IOL Selections:   Right eye  IOL: EAG788 16.0, 15.5, 16.5 at 90     Left eye  IOL: [id number] 15.5, 15.0, 16.0 vs 16.0 at 95    Pt wishes to have right eye done first.  Catalys Parameters:  Right Eye:   KINZA:  12mm   ?Need to shar patient sitting up?: Yes  Capsulotomy: Scanned Capsule   rdGrdrrdarddrderd:rd rd3rd Arcuate:  Toric Shar: at  Axis: 90   Incisions: OFF  Left Eye:   KINZA:  12mm   ?Need to shar patient sitting up?: Yes  Capsulotomy: Scanned Capsule  rdGrdrrdarddrderd:rd rd3rd Arcuate: Toric Shar: at  Axis: 95   Incisions:  OFF

## 2018-01-03 ENCOUNTER — TELEPHONE (OUTPATIENT)
Dept: OPHTHALMOLOGY | Facility: CLINIC | Age: 68
End: 2018-01-03

## 2018-01-05 ENCOUNTER — TELEPHONE (OUTPATIENT)
Dept: OPHTHALMOLOGY | Facility: CLINIC | Age: 68
End: 2018-01-05

## 2018-01-05 ENCOUNTER — PATIENT MESSAGE (OUTPATIENT)
Dept: OPHTHALMOLOGY | Facility: CLINIC | Age: 68
End: 2018-01-05

## 2018-01-10 ENCOUNTER — TELEPHONE (OUTPATIENT)
Dept: OPHTHALMOLOGY | Facility: CLINIC | Age: 68
End: 2018-01-10

## 2018-01-10 DIAGNOSIS — H25.11 NUCLEAR SCLEROTIC CATARACT OF RIGHT EYE: Primary | ICD-10-CM

## 2018-02-07 ENCOUNTER — TELEPHONE (OUTPATIENT)
Dept: OPHTHALMOLOGY | Facility: CLINIC | Age: 68
End: 2018-02-07

## 2018-02-07 DIAGNOSIS — H25.12 NUCLEAR SCLEROTIC CATARACT OF LEFT EYE: Primary | ICD-10-CM

## 2018-02-12 ENCOUNTER — LAB VISIT (OUTPATIENT)
Dept: LAB | Facility: HOSPITAL | Age: 68
End: 2018-02-12
Attending: FAMILY MEDICINE
Payer: MEDICARE

## 2018-02-12 DIAGNOSIS — R94.5 ABNORMAL RESULTS OF LIVER FUNCTION STUDIES: ICD-10-CM

## 2018-02-12 DIAGNOSIS — E78.5 HYPERLIPIDEMIA, UNSPECIFIED HYPERLIPIDEMIA TYPE: ICD-10-CM

## 2018-02-12 LAB
ALBUMIN SERPL BCP-MCNC: 3.7 G/DL
ALP SERPL-CCNC: 82 U/L
ALT SERPL W/O P-5'-P-CCNC: 27 U/L
AST SERPL-CCNC: 23 U/L
BILIRUB DIRECT SERPL-MCNC: 0.2 MG/DL
BILIRUB SERPL-MCNC: 0.6 MG/DL
PROT SERPL-MCNC: 6.8 G/DL

## 2018-02-12 PROCEDURE — 36415 COLL VENOUS BLD VENIPUNCTURE: CPT

## 2018-02-12 PROCEDURE — 80076 HEPATIC FUNCTION PANEL: CPT

## 2018-02-26 ENCOUNTER — TELEPHONE (OUTPATIENT)
Dept: OPHTHALMOLOGY | Facility: CLINIC | Age: 68
End: 2018-02-26

## 2018-02-26 NOTE — TELEPHONE ENCOUNTER
Spoke with Volodymyr and she stated she sent the script in a month ago. She states she sees patient in the email. She resent the script and instructed me to let patient know he can call them back in about an hour. Informed patient and patient understands.

## 2018-02-26 NOTE — TELEPHONE ENCOUNTER
----- Message from Dalia Merritt sent at 2/26/2018 10:34 AM CST -----  Contact: Jean  Mr. Carlson needs information about the drops that he needs for his surgery that is scheduled next week. He can be reached at [phone]

## 2018-02-26 NOTE — TELEPHONE ENCOUNTER
Spoke with Tevin ferreira [facility] and he stated he does have the script for the smart drops. The prescription will be filled.

## 2018-02-26 NOTE — TELEPHONE ENCOUNTER
----- Message from Karen Peralta sent at 2/26/2018 11:39 AM CST -----  Contact: Mare([facility])  Mare called to get Rx order for eye drops.     [facility]  Pharmacy in [place], [place]  Located in: [facility]  Address: [address] [place] [address]  Hours: Open · Closes 6:30PM  Phone: [phone]

## 2018-03-09 ENCOUNTER — TELEPHONE (OUTPATIENT)
Dept: OPHTHALMOLOGY | Facility: CLINIC | Age: 68
End: 2018-03-09

## 2018-03-09 NOTE — TELEPHONE ENCOUNTER
Spoke with the patient and gave surgery arrival time 7:30am, also do not eat anything after 9:00pm the night before surgery. Patient may have water, gatorade, or powerade from 9:00pm til you leave your home the morning of surgery.

## 2018-03-12 ENCOUNTER — ANESTHESIA EVENT (OUTPATIENT)
Dept: SURGERY | Facility: OTHER | Age: 68
End: 2018-03-12
Payer: MEDICARE

## 2018-03-12 ENCOUNTER — ANESTHESIA (OUTPATIENT)
Dept: SURGERY | Facility: OTHER | Age: 68
End: 2018-03-12
Payer: MEDICARE

## 2018-03-12 ENCOUNTER — HOSPITAL ENCOUNTER (OUTPATIENT)
Facility: OTHER | Age: 68
Discharge: HOME | End: 2018-03-12
Attending: OPHTHALMOLOGY | Admitting: OPHTHALMOLOGY
Payer: MEDICARE

## 2018-03-12 ENCOUNTER — SURGERY (OUTPATIENT)
Age: 68
End: 2018-03-12

## 2018-03-12 VITALS
OXYGEN SATURATION: 97 % | TEMPERATURE: 98 F | WEIGHT: 187 LBS | SYSTOLIC BLOOD PRESSURE: 115 MMHG | HEIGHT: 71 IN | HEART RATE: 69 BPM | BODY MASS INDEX: 26.18 KG/M2 | RESPIRATION RATE: 16 BRPM | DIASTOLIC BLOOD PRESSURE: 68 MMHG

## 2018-03-12 DIAGNOSIS — H25.13 NUCLEAR SCLEROSIS, BILATERAL: ICD-10-CM

## 2018-03-12 PROCEDURE — 36000707: Performed by: OPHTHALMOLOGY

## 2018-03-12 PROCEDURE — 66984 XCAPSL CTRC RMVL W/O ECP: CPT | Mod: RT,,, | Performed by: OPHTHALMOLOGY

## 2018-03-12 PROCEDURE — 66999 UNLISTED PX ANT SEGMENT EYE: CPT | Mod: ,,, | Performed by: OPHTHALMOLOGY

## 2018-03-12 PROCEDURE — 25000003 PHARM REV CODE 250: Performed by: NURSE ANESTHETIST, CERTIFIED REGISTERED

## 2018-03-12 PROCEDURE — 36000706: Performed by: OPHTHALMOLOGY

## 2018-03-12 PROCEDURE — 37000008 HC ANESTHESIA 1ST 15 MINUTES: Performed by: OPHTHALMOLOGY

## 2018-03-12 PROCEDURE — A4216 STERILE WATER/SALINE, 10 ML: HCPCS | Performed by: NURSE ANESTHETIST, CERTIFIED REGISTERED

## 2018-03-12 PROCEDURE — V2787 ASTIGMATISM-CORRECT FUNCTION: HCPCS | Performed by: OPHTHALMOLOGY

## 2018-03-12 PROCEDURE — 71000015 HC POSTOP RECOV 1ST HR: Performed by: OPHTHALMOLOGY

## 2018-03-12 PROCEDURE — 63600175 PHARM REV CODE 636 W HCPCS: Performed by: NURSE ANESTHETIST, CERTIFIED REGISTERED

## 2018-03-12 PROCEDURE — 25000003 PHARM REV CODE 250: Performed by: OPHTHALMOLOGY

## 2018-03-12 PROCEDURE — 37000009 HC ANESTHESIA EA ADD 15 MINS: Performed by: OPHTHALMOLOGY

## 2018-03-12 DEVICE — IMPLANTABLE DEVICE: Type: IMPLANTABLE DEVICE | Site: EYE | Status: FUNCTIONAL

## 2018-03-12 RX ORDER — PROPARACAINE HYDROCHLORIDE 5 MG/ML
1 SOLUTION/ DROPS OPHTHALMIC
Status: DISCONTINUED | OUTPATIENT
Start: 2018-03-12 | End: 2018-03-12 | Stop reason: HOSPADM

## 2018-03-12 RX ORDER — TETRACAINE HYDROCHLORIDE 5 MG/ML
SOLUTION OPHTHALMIC
Status: DISCONTINUED | OUTPATIENT
Start: 2018-03-12 | End: 2018-03-12 | Stop reason: HOSPADM

## 2018-03-12 RX ORDER — ACETAMINOPHEN 325 MG/1
650 TABLET ORAL EVERY 4 HOURS PRN
Status: DISCONTINUED | OUTPATIENT
Start: 2018-03-12 | End: 2018-03-12 | Stop reason: HOSPADM

## 2018-03-12 RX ORDER — SODIUM CHLORIDE 0.9 % (FLUSH) 0.9 %
SYRINGE (ML) INJECTION
Status: DISCONTINUED | OUTPATIENT
Start: 2018-03-12 | End: 2018-03-12

## 2018-03-12 RX ORDER — TETRACAINE HYDROCHLORIDE 5 MG/ML
1 SOLUTION OPHTHALMIC
Status: COMPLETED | OUTPATIENT
Start: 2018-03-12 | End: 2018-03-12

## 2018-03-12 RX ORDER — PHENYLEPHRINE HYDROCHLORIDE 100 MG/ML
SOLUTION/ DROPS OPHTHALMIC
Status: DISCONTINUED | OUTPATIENT
Start: 2018-03-12 | End: 2018-03-12 | Stop reason: HOSPADM

## 2018-03-12 RX ORDER — MOXIFLOXACIN 5 MG/ML
1 SOLUTION/ DROPS OPHTHALMIC
Status: COMPLETED | OUTPATIENT
Start: 2018-03-12 | End: 2018-03-12

## 2018-03-12 RX ORDER — TROPICAMIDE 10 MG/ML
1 SOLUTION/ DROPS OPHTHALMIC
Status: COMPLETED | OUTPATIENT
Start: 2018-03-12 | End: 2018-03-12

## 2018-03-12 RX ORDER — MOXIFLOXACIN 5 MG/ML
SOLUTION/ DROPS OPHTHALMIC
Status: DISCONTINUED | OUTPATIENT
Start: 2018-03-12 | End: 2018-03-12 | Stop reason: HOSPADM

## 2018-03-12 RX ORDER — MIDAZOLAM HYDROCHLORIDE 1 MG/ML
INJECTION INTRAMUSCULAR; INTRAVENOUS
Status: DISCONTINUED | OUTPATIENT
Start: 2018-03-12 | End: 2018-03-12

## 2018-03-12 RX ORDER — PHENYLEPHRINE HYDROCHLORIDE 25 MG/ML
1 SOLUTION/ DROPS OPHTHALMIC
Status: COMPLETED | OUTPATIENT
Start: 2018-03-12 | End: 2018-03-12

## 2018-03-12 RX ADMIN — MIDAZOLAM HYDROCHLORIDE 2 MG: 1 INJECTION, SOLUTION INTRAMUSCULAR; INTRAVENOUS at 09:03

## 2018-03-12 RX ADMIN — MOXIFLOXACIN HYDROCHLORIDE 1 DROP: 5 SOLUTION/ DROPS OPHTHALMIC at 07:03

## 2018-03-12 RX ADMIN — MOXIFLOXACIN HYDROCHLORIDE 1 DROP: 5 SOLUTION/ DROPS OPHTHALMIC at 09:03

## 2018-03-12 RX ADMIN — PHENYLEPHRINE HYDROCHLORIDE 1 DROP: 25 SOLUTION/ DROPS OPHTHALMIC at 08:03

## 2018-03-12 RX ADMIN — MOXIFLOXACIN HYDROCHLORIDE 1 DROP: 5 SOLUTION/ DROPS OPHTHALMIC at 08:03

## 2018-03-12 RX ADMIN — TETRACAINE HYDROCHLORIDE 1 DROP: 5 SOLUTION OPHTHALMIC at 07:03

## 2018-03-12 RX ADMIN — SODIUM CHONDROITIN SULFATE / SODIUM HYALURONATE 2 ML: 0.55-0.5 INJECTION INTRAOCULAR at 09:03

## 2018-03-12 RX ADMIN — TROPICAMIDE 1 DROP: 10 SOLUTION/ DROPS OPHTHALMIC at 07:03

## 2018-03-12 RX ADMIN — TETRACAINE HYDROCHLORIDE 1 DROP: 5 SOLUTION OPHTHALMIC at 08:03

## 2018-03-12 RX ADMIN — PHENYLEPHRINE HYDROCHLORIDE 1 DROP: 25 SOLUTION/ DROPS OPHTHALMIC at 07:03

## 2018-03-12 RX ADMIN — MOXIFLOXACIN HYDROCHLORIDE 1 DROP: 5 SOLUTION/ DROPS OPHTHALMIC at 10:03

## 2018-03-12 RX ADMIN — TROPICAMIDE 1 DROP: 10 SOLUTION/ DROPS OPHTHALMIC at 08:03

## 2018-03-12 RX ADMIN — SODIUM CHLORIDE 10 ML: 9 INJECTION, SOLUTION INTRAMUSCULAR; INTRAVENOUS; SUBCUTANEOUS at 09:03

## 2018-03-12 RX ADMIN — PHENYLEPHRINE HYDROCHLORIDE 1 DROP: 100 SOLUTION/ DROPS OPHTHALMIC at 08:03

## 2018-03-12 RX ADMIN — TETRACAINE HYDROCHLORIDE 1 DROP: 5 SOLUTION OPHTHALMIC at 09:03

## 2018-03-12 RX ADMIN — BALANCED SALT SOLUTION ENRICHED WITH BICARBONATE, DEXTROSE, AND GLUTATHIONE 5 ML: KIT at 08:03

## 2018-03-12 RX ADMIN — BALANCED SALT SOLUTION ENRICHED WITH BICARBONATE, DEXTROSE, AND GLUTATHIONE 500 ML: KIT at 09:03

## 2018-03-12 NOTE — TRANSFER OF CARE
"Anesthesia Transfer of Care Note    Patient: Jean Carlson    Procedure(s) Performed: Procedure(s) (LRB):  PHACOEMULSIFICATION-ASPIRATION-CATARACT (Right)  INSERTION-INTRAOCULAR LENS (IOL) (Right)    Patient location: [facility]    Anesthesia Type: MAC    Transport from OR: Transported from OR on room air with adequate spontaneous ventilation    Post pain: adequate analgesia    Post assessment: no apparent anesthetic complications    Post vital signs: stable    Level of consciousness: awake    Nausea/Vomiting: no nausea/vomiting    Complications: none    Transfer of care protocol was followed      Last vitals:   Visit Vitals  BP (!) 150/74 (BP Location: Right arm, Patient Position: Lying)   Pulse 65   Temp 36.6 °C (97.8 °F) (Oral)   Resp 16   Ht 5' 11" (1.803 m)   Wt 84.8 kg (187 lb)   SpO2 100%   BMI 26.08 kg/m²     "

## 2018-03-12 NOTE — OP NOTE
SURGEON:  Laurie Finn M.D.    PREOPERATIVE DIAGNOSIS:    Nuclear Sclerotic Cataract Right Eye    POSTOPERATIVE DIAGNOSIS:    Nuclear Sclerotic Cataract Right Eye    PROCEDURES:    Phacoemulsification with  intraocular lens, Right eye (97989)  With Femtosecond LASER assist    DATE OF SURGERY: 03/12/2018    IMPLANT: [id number] 16.0    ANESTHESIA:  MAC with topical Lidocaine    COMPLICATIONS:  None    ESTIMATED BLOOD LOSS: None    SPECIMENS: None    INDICATIONS:    The patient has a history of painless progressive visual loss and  difficulty with activities of daily living secondary to cataract formation.  After a thorough discussion of the risks, benefits, and alternatives to cataract surgery, including, but not limited to, the rare risks of infection, retinal detachment, hemorrhage, need for additional surgery, loss of vision, and even loss of the eye, the patient voices understanding and desires to proceed.    DESCRIPTION OF PROCEDURE:    After verification of consent and marking of the operative eye, the patient was positioned under the femtosecond LASER. Topical anesthetic drops were administered. A surgical timeout was initiated with verification of patient identifiers and the laser surgical plan. The eye was docked securely and the laser portion of the cataract procedure was carried out without complication.  The patient was returned to the pre-operative area to await the intraocular surgical portion of the cataract procedure.  The patients IOL calculations were reviewed, and the lens selection confirmed.   After verification and marking of the proper eye in the preop holding area, the patient was brought to the operating room in supine position where the eye was prepped and draped in standard sterile fashion with 5% Betadine and a lid speculum placed in the eye.   Topical 4% Lidocaine was used in addition to the preoperative anesthesia and the procedure was begun by the creation of a paracentesis incision through  which viscoelastic was used to fill the anterior chamber.  Next, a keratome blade was used to create a triplanar temporal clear corneal incision and a cystotome and Utrata forceps used to fashion a continuous curvilinear capsulorrhexis.  Hydrodissection was carried out using the Cummings hydrodissection cannula and the nucleus was found to be mobile.  Phacoemulsification of the nucleus was carried out using a quick chop technique, and all remaining epinuclear and cortical material was removed.  The eye was then reformed with Viscoelastic and the  intraocular lens was implanted into the capsular bag.  All remaining viscoelastics were removed from the eye and at the end of the case the pupil was round, the lens was well-centered within the capsular bag and all wounds were found to be water tight.  Drops of Vigamox and Pred Forte were instilled and a shield was placed over the eye. The patient will follow up with Dr. Finn in the morning.

## 2018-03-12 NOTE — DISCHARGE SUMMARY
Outcome: Successful outpatient ophthalmic surgical procedure  Preprinted Instructions given to patient.  Regular diet.  Activity: No restrictions  Meds: see Med Rec  Condition: stable  Follow up: 1 day with Dr Finn  Disposition: Home  Diagnosis: s/p eye surgery

## 2018-03-12 NOTE — ANESTHESIA PREPROCEDURE EVALUATION
03/12/2018  Jean Carlson is a 67 y.o., male.    Anesthesia Evaluation    I have reviewed the Patient Summary Reports.    I have reviewed the Nursing Notes.   I have reviewed the Medications.     Review of Systems  Anesthesia Hx:  No problems with previous Anesthesia    Social:  Social Alcohol Use, Non-Smoker    Hematology/Oncology:  Hematology Normal   Oncology Normal     EENT/Dental:EENT/Dental Normal   Cardiovascular:  Cardiovascular Normal Exercise tolerance: good     Pulmonary:  Pulmonary Normal    Renal/:  Renal/ Normal     Hepatic/GI:   GERD, well controlled    Musculoskeletal:  Musculoskeletal Normal    Neurological:   Headaches    Endocrine:  Endocrine Normal    Dermatological:  Skin Normal    Psych:  Psychiatric Normal           Physical Exam  General:  Well nourished    Airway/Jaw/Neck:  Airway Findings: Mouth Opening: Normal Tongue: Normal  General Airway Assessment: Adult, Good  Mallampati: I  TM Distance: Normal, at least 6 cm  Jaw/Neck Findings:  Neck ROM: Normal ROM      Dental:  Dental Findings: In tact, molar caps        Mental Status:  Mental Status Findings:  Cooperative, Alert and Oriented         Anesthesia Plan  Type of Anesthesia, risks & benefits discussed:  Anesthesia Type:  MAC  Patient's Preference:   Intra-op Monitoring Plan: standard ASA monitors  Intra-op Monitoring Plan Comments:   Post Op Pain Control Plan: per primary service following discharge from PACU  Post Op Pain Control Plan Comments:   Induction:    Beta Blocker:         Informed Consent: Patient understands risks and agrees with Anesthesia plan.  Questions answered. Anesthesia consent signed with patient.  ASA Score: 2     Day of Surgery Review of History & Physical:    H&P update referred to the surgeon.         Ready For Surgery From Anesthesia Perspective.

## 2018-03-12 NOTE — PLAN OF CARE
Jean ERIN Woodalbertina has met all discharge criteria from Phase II. Vital Signs are stable, ambulating  without difficulty. Discharge instructions given, patient verbalized understanding. Discharged from facility via wheelchair in stable condition.

## 2018-03-12 NOTE — ANESTHESIA POSTPROCEDURE EVALUATION
"Anesthesia Post Evaluation    Patient: Jean Carlson    Procedure(s) Performed: Procedure(s) (LRB):  PHACOEMULSIFICATION-ASPIRATION-CATARACT (Right)  INSERTION-INTRAOCULAR LENS (IOL) (Right)    Final Anesthesia Type: MAC  Patient location during evaluation: [facility]  Patient participation: Yes- Able to Participate  Level of consciousness: awake and alert  Post-procedure vital signs: reviewed and stable  Pain management: adequate  Airway patency: patent  PONV status at discharge: No PONV  Anesthetic complications: no      Cardiovascular status: blood pressure returned to baseline  Respiratory status: unassisted  Hydration status: euvolemic  Follow-up not needed.        Visit Vitals  BP (!) 150/74 (BP Location: Right arm, Patient Position: Lying)   Pulse 65   Temp 36.6 °C (97.8 °F) (Oral)   Resp 16   Ht 5' 11" (1.803 m)   Wt 84.8 kg (187 lb)   SpO2 100%   BMI 26.08 kg/m²       Pain/Alberto Score: Presence of Pain: denies (3/12/2018  7:52 AM)      "

## 2018-03-13 ENCOUNTER — OFFICE VISIT (OUTPATIENT)
Dept: OPHTHALMOLOGY | Facility: CLINIC | Age: 68
End: 2018-03-13
Attending: OPHTHALMOLOGY
Payer: MEDICARE

## 2018-03-13 DIAGNOSIS — H25.13 NUCLEAR SCLEROSIS, BILATERAL: ICD-10-CM

## 2018-03-13 DIAGNOSIS — Z98.890 POST-OPERATIVE STATE: Primary | ICD-10-CM

## 2018-03-13 PROCEDURE — 99024 POSTOP FOLLOW-UP VISIT: CPT | Mod: POP,,, | Performed by: OPHTHALMOLOGY

## 2018-03-13 PROCEDURE — 99212 OFFICE O/P EST SF 10 MIN: CPT | Mod: PBBFAC | Performed by: OPHTHALMOLOGY

## 2018-03-13 PROCEDURE — 99999 PR PBB SHADOW E&M-EST. PATIENT-LVL II: CPT | Mod: PBBFAC,,, | Performed by: OPHTHALMOLOGY

## 2018-03-13 NOTE — PROGRESS NOTES
HPI     1 day post op OD 3/12/18    PMB TID OD    Pt here for 1 day post op OD. Pt states some photophobia OD.      Last edited by Taylor De Leon MA on 3/13/2018  8:33 AM.   (History)            Assessment /Plan     For exam results, see Encounter Report.    Post-operative state    Nuclear sclerosis, bilateral      Slit lamp exam:  L/L: nl  K: clear, wound sealed  AC: 1+ cell  Lens: IOL centered and stable    POD1 s/p Phaco/IOL  Appropriate precautions and post op medications reviewed.  Patient instructed to call or come in if symptoms of redness, decreased vision, or pain are experienced.

## 2018-03-20 ENCOUNTER — OFFICE VISIT (OUTPATIENT)
Dept: OPHTHALMOLOGY | Facility: CLINIC | Age: 68
End: 2018-03-20
Attending: OPHTHALMOLOGY
Payer: MEDICARE

## 2018-03-20 DIAGNOSIS — H25.11 NUCLEAR SCLEROTIC CATARACT OF RIGHT EYE: ICD-10-CM

## 2018-03-20 DIAGNOSIS — H25.12 NUCLEAR SCLEROSIS OF LEFT EYE: ICD-10-CM

## 2018-03-20 DIAGNOSIS — Z98.890 POST-OPERATIVE STATE: Primary | ICD-10-CM

## 2018-03-20 PROCEDURE — 99212 OFFICE O/P EST SF 10 MIN: CPT | Mod: PBBFAC | Performed by: OPHTHALMOLOGY

## 2018-03-20 PROCEDURE — 92136 OPHTHALMIC BIOMETRY: CPT | Mod: PBBFAC,LT | Performed by: OPHTHALMOLOGY

## 2018-03-20 PROCEDURE — 99024 POSTOP FOLLOW-UP VISIT: CPT | Mod: POP,,, | Performed by: OPHTHALMOLOGY

## 2018-03-20 PROCEDURE — 99999 PR PBB SHADOW E&M-EST. PATIENT-LVL II: CPT | Mod: PBBFAC,,, | Performed by: OPHTHALMOLOGY

## 2018-03-20 RX ORDER — MOXIFLOXACIN 5 MG/ML
1 SOLUTION/ DROPS OPHTHALMIC
Status: CANCELLED | OUTPATIENT
Start: 2018-03-20

## 2018-03-20 RX ORDER — TETRACAINE HYDROCHLORIDE 5 MG/ML
1 SOLUTION OPHTHALMIC
Status: CANCELLED | OUTPATIENT
Start: 2018-03-20

## 2018-03-20 RX ORDER — PHENYLEPHRINE HYDROCHLORIDE 25 MG/ML
1 SOLUTION/ DROPS OPHTHALMIC
Status: CANCELLED | OUTPATIENT
Start: 2018-03-20

## 2018-03-20 RX ORDER — TROPICAMIDE 10 MG/ML
1 SOLUTION/ DROPS OPHTHALMIC
Status: CANCELLED | OUTPATIENT
Start: 2018-03-20

## 2018-03-20 NOTE — PROGRESS NOTES
HPI     1 week PO PC IOL  OD 3/12/18    PMB TID OD    Pt states seeing halos and streaks of light at certain times.       Last edited by Priscilla Padilla on 3/20/2018  9:52 AM. (History)            Assessment /Plan     For exam results, see Encounter Report.    Post-operative state    Nuclear sclerotic cataract of right eye    Nuclear sclerosis of left eye  -     IOL Master - MOD 26 - OS - Left eye      Slit lamp exam:  L/L: nl  K: clear, wound sealed  AC: 1+ cell  Lens: IOL centered and stable    POD1 s/p Phaco/IOL  Appropriate precautions and post op medications reviewed.  Patient instructed to call or come in if symptoms of redness, decreased vision, or pain are experienced.       Left eye  IOL: [id number] 16.0  at 95 (target -0.50)      Catalys Parameters:  Right Eye:    (Right eye  IOL: [id number] 16.0, 15.5, 16.5 at 90) 20/25    Left Eye:   KINZA:  12mm   ?Need to shar patient sitting up?: Yes  Capsulotomy: Scanned Capsule  rdGrdrrdarddrderd:rd rd3rd Arcuate: Toric Shar: at  Axis: 95   Incisions:  OFF

## 2018-04-12 ENCOUNTER — TELEPHONE (OUTPATIENT)
Dept: OPHTHALMOLOGY | Facility: CLINIC | Age: 68
End: 2018-04-12

## 2018-04-13 ENCOUNTER — TELEPHONE (OUTPATIENT)
Dept: OPHTHALMOLOGY | Facility: CLINIC | Age: 68
End: 2018-04-13

## 2018-04-16 ENCOUNTER — ANESTHESIA (OUTPATIENT)
Dept: SURGERY | Facility: OTHER | Age: 68
End: 2018-04-16
Payer: MEDICARE

## 2018-04-16 ENCOUNTER — HOSPITAL ENCOUNTER (OUTPATIENT)
Facility: OTHER | Age: 68
Discharge: HOME | End: 2018-04-16
Attending: OPHTHALMOLOGY | Admitting: OPHTHALMOLOGY
Payer: MEDICARE

## 2018-04-16 ENCOUNTER — SURGERY (OUTPATIENT)
Age: 68
End: 2018-04-16

## 2018-04-16 ENCOUNTER — ANESTHESIA EVENT (OUTPATIENT)
Dept: SURGERY | Facility: OTHER | Age: 68
End: 2018-04-16
Payer: MEDICARE

## 2018-04-16 VITALS
DIASTOLIC BLOOD PRESSURE: 71 MMHG | HEART RATE: 65 BPM | TEMPERATURE: 98 F | BODY MASS INDEX: 25.9 KG/M2 | WEIGHT: 185 LBS | OXYGEN SATURATION: 99 % | RESPIRATION RATE: 16 BRPM | HEIGHT: 71 IN | SYSTOLIC BLOOD PRESSURE: 140 MMHG

## 2018-04-16 DIAGNOSIS — Z98.890 POST-OPERATIVE STATE: ICD-10-CM

## 2018-04-16 DIAGNOSIS — H25.12 NUCLEAR SCLEROSIS OF LEFT EYE: ICD-10-CM

## 2018-04-16 DIAGNOSIS — H25.12 NUCLEAR SCLEROTIC CATARACT OF LEFT EYE: Primary | ICD-10-CM

## 2018-04-16 PROCEDURE — 37000009 HC ANESTHESIA EA ADD 15 MINS: Performed by: OPHTHALMOLOGY

## 2018-04-16 PROCEDURE — 71000015 HC POSTOP RECOV 1ST HR: Performed by: OPHTHALMOLOGY

## 2018-04-16 PROCEDURE — 63600175 PHARM REV CODE 636 W HCPCS: Performed by: NURSE ANESTHETIST, CERTIFIED REGISTERED

## 2018-04-16 PROCEDURE — V2787 ASTIGMATISM-CORRECT FUNCTION: HCPCS | Performed by: OPHTHALMOLOGY

## 2018-04-16 PROCEDURE — 36000706: Performed by: OPHTHALMOLOGY

## 2018-04-16 PROCEDURE — 66984 XCAPSL CTRC RMVL W/O ECP: CPT | Mod: 79,LT,, | Performed by: OPHTHALMOLOGY

## 2018-04-16 PROCEDURE — 66999 UNLISTED PX ANT SEGMENT EYE: CPT | Mod: ,,, | Performed by: OPHTHALMOLOGY

## 2018-04-16 PROCEDURE — 25000003 PHARM REV CODE 250: Performed by: OPHTHALMOLOGY

## 2018-04-16 PROCEDURE — 36000707: Performed by: OPHTHALMOLOGY

## 2018-04-16 PROCEDURE — 37000008 HC ANESTHESIA 1ST 15 MINUTES: Performed by: OPHTHALMOLOGY

## 2018-04-16 DEVICE — IMPLANTABLE DEVICE: Type: IMPLANTABLE DEVICE | Site: EYE | Status: FUNCTIONAL

## 2018-04-16 RX ORDER — ONDANSETRON 2 MG/ML
4 INJECTION INTRAMUSCULAR; INTRAVENOUS DAILY PRN
Status: DISCONTINUED | OUTPATIENT
Start: 2018-04-16 | End: 2018-04-16 | Stop reason: HOSPADM

## 2018-04-16 RX ORDER — TETRACAINE HYDROCHLORIDE 5 MG/ML
1 SOLUTION OPHTHALMIC
Status: DISCONTINUED | OUTPATIENT
Start: 2018-04-16 | End: 2018-04-16 | Stop reason: HOSPADM

## 2018-04-16 RX ORDER — ACETAMINOPHEN 325 MG/1
650 TABLET ORAL EVERY 4 HOURS PRN
Status: DISCONTINUED | OUTPATIENT
Start: 2018-04-16 | End: 2018-04-16 | Stop reason: HOSPADM

## 2018-04-16 RX ORDER — TETRACAINE HYDROCHLORIDE 5 MG/ML
SOLUTION OPHTHALMIC
Status: DISCONTINUED | OUTPATIENT
Start: 2018-04-16 | End: 2018-04-16 | Stop reason: HOSPADM

## 2018-04-16 RX ORDER — PHENYLEPHRINE HYDROCHLORIDE 25 MG/ML
1 SOLUTION/ DROPS OPHTHALMIC
Status: DISCONTINUED | OUTPATIENT
Start: 2018-04-16 | End: 2018-04-16 | Stop reason: HOSPADM

## 2018-04-16 RX ORDER — SODIUM CHLORIDE 0.9 % (FLUSH) 0.9 %
3 SYRINGE (ML) INJECTION
Status: DISCONTINUED | OUTPATIENT
Start: 2018-04-16 | End: 2018-04-16 | Stop reason: HOSPADM

## 2018-04-16 RX ORDER — LIDOCAINE HYDROCHLORIDE 40 MG/ML
INJECTION, SOLUTION RETROBULBAR
Status: DISCONTINUED | OUTPATIENT
Start: 2018-04-16 | End: 2018-04-16 | Stop reason: HOSPADM

## 2018-04-16 RX ORDER — MIDAZOLAM HYDROCHLORIDE 1 MG/ML
INJECTION INTRAMUSCULAR; INTRAVENOUS
Status: DISCONTINUED | OUTPATIENT
Start: 2018-04-16 | End: 2018-04-16

## 2018-04-16 RX ORDER — MOXIFLOXACIN 5 MG/ML
1 SOLUTION/ DROPS OPHTHALMIC
Status: COMPLETED | OUTPATIENT
Start: 2018-04-16 | End: 2018-04-16

## 2018-04-16 RX ORDER — MOXIFLOXACIN 5 MG/ML
SOLUTION/ DROPS OPHTHALMIC
Status: DISCONTINUED | OUTPATIENT
Start: 2018-04-16 | End: 2018-04-16 | Stop reason: HOSPADM

## 2018-04-16 RX ORDER — HYDROMORPHONE HYDROCHLORIDE 2 MG/ML
0.4 INJECTION, SOLUTION INTRAMUSCULAR; INTRAVENOUS; SUBCUTANEOUS EVERY 5 MIN PRN
Status: DISCONTINUED | OUTPATIENT
Start: 2018-04-16 | End: 2018-04-16 | Stop reason: HOSPADM

## 2018-04-16 RX ORDER — MEPERIDINE HYDROCHLORIDE 50 MG/ML
12.5 INJECTION INTRAMUSCULAR; INTRAVENOUS; SUBCUTANEOUS ONCE AS NEEDED
Status: DISCONTINUED | OUTPATIENT
Start: 2018-04-16 | End: 2018-04-16 | Stop reason: HOSPADM

## 2018-04-16 RX ORDER — FENTANYL CITRATE 50 UG/ML
25 INJECTION, SOLUTION INTRAMUSCULAR; INTRAVENOUS EVERY 5 MIN PRN
Status: DISCONTINUED | OUTPATIENT
Start: 2018-04-16 | End: 2018-04-16 | Stop reason: HOSPADM

## 2018-04-16 RX ORDER — PHENYLEPHRINE HYDROCHLORIDE 100 MG/ML
SOLUTION/ DROPS OPHTHALMIC
Status: DISCONTINUED | OUTPATIENT
Start: 2018-04-16 | End: 2018-04-16 | Stop reason: HOSPADM

## 2018-04-16 RX ORDER — OXYCODONE HYDROCHLORIDE 5 MG/1
5 TABLET ORAL
Status: DISCONTINUED | OUTPATIENT
Start: 2018-04-16 | End: 2018-04-16 | Stop reason: HOSPADM

## 2018-04-16 RX ORDER — PROPARACAINE HYDROCHLORIDE 5 MG/ML
1 SOLUTION/ DROPS OPHTHALMIC
Status: DISCONTINUED | OUTPATIENT
Start: 2018-04-16 | End: 2018-04-16 | Stop reason: HOSPADM

## 2018-04-16 RX ORDER — MOXIFLOXACIN 5 MG/ML
1 SOLUTION/ DROPS OPHTHALMIC
Status: DISCONTINUED | OUTPATIENT
Start: 2018-04-16 | End: 2018-04-16 | Stop reason: HOSPADM

## 2018-04-16 RX ORDER — TROPICAMIDE 10 MG/ML
1 SOLUTION/ DROPS OPHTHALMIC
Status: DISCONTINUED | OUTPATIENT
Start: 2018-04-16 | End: 2018-04-16 | Stop reason: HOSPADM

## 2018-04-16 RX ADMIN — MOXIFLOXACIN HYDROCHLORIDE 1 DROP: 5 SOLUTION/ DROPS OPHTHALMIC at 09:04

## 2018-04-16 RX ADMIN — MOXIFLOXACIN HYDROCHLORIDE 1 DROP: 5 SOLUTION/ DROPS OPHTHALMIC at 08:04

## 2018-04-16 RX ADMIN — PHENYLEPHRINE HYDROCHLORIDE 1 DROP: 100 SOLUTION/ DROPS OPHTHALMIC at 08:04

## 2018-04-16 RX ADMIN — TETRACAINE HYDROCHLORIDE 1 DROP: 5 SOLUTION OPHTHALMIC at 07:04

## 2018-04-16 RX ADMIN — BALANCED SALT SOLUTION ENRICHED WITH BICARBONATE, DEXTROSE, AND GLUTATHIONE 5 ML: KIT at 07:04

## 2018-04-16 RX ADMIN — MIDAZOLAM HYDROCHLORIDE 2 MG: 1 INJECTION, SOLUTION INTRAMUSCULAR; INTRAVENOUS at 08:04

## 2018-04-16 RX ADMIN — PHENYLEPHRINE HYDROCHLORIDE 1 DROP: 25 SOLUTION/ DROPS OPHTHALMIC at 07:04

## 2018-04-16 RX ADMIN — LIDOCAINE HYDROCHLORIDE 1 DROP: 40 INJECTION, SOLUTION RETROBULBAR; TOPICAL at 08:04

## 2018-04-16 RX ADMIN — TROPICAMIDE 1 DROP: 10 SOLUTION/ DROPS OPHTHALMIC at 07:04

## 2018-04-16 RX ADMIN — MIDAZOLAM HYDROCHLORIDE 1 MG: 1 INJECTION, SOLUTION INTRAMUSCULAR; INTRAVENOUS at 08:04

## 2018-04-16 RX ADMIN — SODIUM CHONDROITIN SULFATE / SODIUM HYALURONATE 2 ML: 0.55-0.5 INJECTION INTRAOCULAR at 08:04

## 2018-04-16 RX ADMIN — TETRACAINE HYDROCHLORIDE 1 DROP: 5 SOLUTION OPHTHALMIC at 08:04

## 2018-04-16 RX ADMIN — BALANCED SALT SOLUTION ENRICHED WITH BICARBONATE, DEXTROSE, AND GLUTATHIONE 500 ML: KIT at 08:04

## 2018-04-16 RX ADMIN — MOXIFLOXACIN HYDROCHLORIDE 1 DROP: 5 SOLUTION/ DROPS OPHTHALMIC at 07:04

## 2018-04-16 NOTE — ANESTHESIA POSTPROCEDURE EVALUATION
"Anesthesia Post Evaluation    Patient: Jean Carlson    Procedure(s) Performed: Procedure(s) (LRB):  PHACOEMULSIFICATION-ASPIRATION-CATARACT (Left)  INSERTION-INTRAOCULAR LENS (IOL) (Left)    Final Anesthesia Type: MAC  Patient location during evaluation: [facility]  Patient participation: Yes- Able to Participate  Level of consciousness: awake and alert  Post-procedure vital signs: reviewed and stable  Pain management: adequate  Airway patency: patent  PONV status at discharge: No PONV  Anesthetic complications: no      Cardiovascular status: blood pressure returned to baseline  Respiratory status: unassisted, spontaneous ventilation and room air  Hydration status: euvolemic  Follow-up not needed.        Visit Vitals  BP (!) 142/72 (BP Location: Left arm, Patient Position: Sitting)   Pulse (!) 56   Temp 36.5 °C (97.7 °F) (Oral)   Resp 16   Ht 5' 11" (1.803 m)   Wt 83.9 kg (185 lb)   SpO2 100%   BMI 25.80 kg/m²       Pain/Alberto Score: Presence of Pain: denismael (4/16/2018  7:09 AM)      "

## 2018-04-16 NOTE — ANESTHESIA PREPROCEDURE EVALUATION
04/16/2018  Jean Carlson is a 67 y.o., male.    Pre-op Assessment    I have reviewed the Patient Summary Reports.     I have reviewed the Nursing Notes.   I have reviewed the Medications.     Review of Systems  Anesthesia Hx:  No problems with previous Anesthesia    Social:  Social Alcohol Use, Non-Smoker    Hematology/Oncology:  Hematology Normal   Oncology Normal     EENT/Dental:EENT/Dental Normal   Cardiovascular:  Cardiovascular Normal Exercise tolerance: good     Pulmonary:  Pulmonary Normal    Renal/:  Renal/ Normal     Hepatic/GI:   GERD, well controlled    Musculoskeletal:  Musculoskeletal Normal    Neurological:   Headaches    Endocrine:  Endocrine Normal    Dermatological:  Skin Normal    Psych:  Psychiatric Normal           Physical Exam  General:  Well nourished    Airway/Jaw/Neck:  Airway Findings: Mouth Opening: Normal Tongue: Normal  General Airway Assessment: Adult, Good  Mallampati: I  TM Distance: Normal, at least 6 cm  Jaw/Neck Findings:  Neck ROM: Normal ROM      Dental:  Dental Findings: In tact, molar caps        Mental Status:  Mental Status Findings:  Cooperative, Alert and Oriented         Anesthesia Plan  Type of Anesthesia, risks & benefits discussed:  Anesthesia Type:  MAC  Patient's Preference:   Intra-op Monitoring Plan: standard ASA monitors  Intra-op Monitoring Plan Comments:   Post Op Pain Control Plan: per primary service following discharge from PACU  Post Op Pain Control Plan Comments:   Induction:    Beta Blocker:         Informed Consent: Patient understands risks and agrees with Anesthesia plan.  Questions answered. Anesthesia consent signed with patient.  ASA Score: 2     Day of Surgery Review of History & Physical:    H&P update referred to the surgeon.         Ready For Surgery From Anesthesia Perspective.

## 2018-04-16 NOTE — H&P (VIEW-ONLY)
HPI     1 week PO PC IOL  OD 3/12/18    PMB TID OD    Pt states seeing halos and streaks of light at certain times.       Last edited by Priscilla Padilla on 3/20/2018  9:52 AM. (History)            Assessment /Plan     For exam results, see Encounter Report.    Post-operative state    Nuclear sclerotic cataract of right eye    Nuclear sclerosis of left eye  -     IOL Master - MOD 26 - OS - Left eye      Slit lamp exam:  L/L: nl  K: clear, wound sealed  AC: 1+ cell  Lens: IOL centered and stable    POD1 s/p Phaco/IOL  Appropriate precautions and post op medications reviewed.  Patient instructed to call or come in if symptoms of redness, decreased vision, or pain are experienced.       Left eye  IOL: [id number] 16.0  at 95 (target -0.50)      Catalys Parameters:  Right Eye:    (Right eye  IOL: [id number] 16.0, 15.5, 16.5 at 90) 20/25    Left Eye:   KINZA:  12mm   ?Need to shar patient sitting up?: Yes  Capsulotomy: Scanned Capsule  stGstrstastdstest:st st1st Arcuate: Toric Shar: at  Axis: 95   Incisions:  OFF

## 2018-04-16 NOTE — DISCHARGE INSTRUCTIONS
Laurie Finn MD  [facility]  Department of Ophthalmology      AFTER: Cataract Surgery:  Relax at home and DO NOT exert yourself for the rest of the day.  Plan to see Dr. Finn tomorrow at the eye clinic:   [facility]  [place] [address]    Refer to attached eye drop instruction sheet     Precautions:  DO NOT rub your eye.  You may resume moderate activity the day after surgery.  Wear protective sunglasses during the day and a shield at night for 1(one) week.  If you have pain, redness and decreased vision, call Dr. Finn (or the on-call doctor after hours) @[phone].            Home Care Instructions for Eye Surgeries    1. ACTIVITY:  Limit your activity today. Relax at home and DO NOT exert yourself for the rest of the day. Increase activity gradually. You may return to work or school as directed by your physician.    2. DIET:  Drink plenty of fluids. Resume your normal diet unless instructed otherwise.    3. PAIN:  Expect a moderate amount of pain. If a prescription for pain is not sent home with you, you may take your commonly used pain reliever as directed. If this is not sufficient, call your physician. You may resume any other prescription medication unless otherwise directed by your physician.     Discuss any problem with your physician as soon as it arises. Do not Delay.      EMERGENCY- If you are unable to contact your physician, please go to the nearest Emergency Room.       Anesthesia: Monitored Anesthesia Care (MAC)    Anesthesia Safety  · Have an adult family member or friend drive you home after the procedure.  · For the first 24 hours after your surgery:  · Do not drive or use heavy equipment.  · Do not make important decisions or sign documents.  · Avoid alcohol.  · Have someone stay with you, if possible. They can watch for problems and help keep you safe.

## 2018-04-16 NOTE — OP NOTE
SURGEON:  Laurie Finn M.D.    PREOPERATIVE DIAGNOSIS:    Nuclear Sclerotic Cataract Left Eye    POSTOPERATIVE DIAGNOSIS:    Nuclear Sclerotic Cataract Left Eye    PROCEDURES:    Phacoemulsification with  intraocular lens, Left eye (00664)  With Femtosecond LASER assist    DATE OF SURGERY: 04/16/2018    IMPLANT: [id number] 16.0    ANESTHESIA:  MAC with topical Lidocaine    COMPLICATIONS:  None    ESTIMATED BLOOD LOSS: None    SPECIMENS: None    INDICATIONS:    The patient has a history of painless progressive visual loss and  difficulty with activities of daily living secondary to cataract formation.  After a thorough discussion of the risks, benefits, and alternatives to cataract surgery, including, but not limited to, the rare risks of infection, retinal detachment, hemorrhage, need for additional surgery, loss of vision, and even loss of the eye, the patient voices understanding and desires to proceed.    DESCRIPTION OF PROCEDURE:    After verification of consent and marking of the operative eye, the patient was positioned under the femtosecond LASER. Topical anesthetic drops were administered. A surgical timeout was initiated with verification of patient identifiers and the laser surgical plan. The eye was docked securely and the laser portion of the cataract procedure was carried out without complication.  The patient was returned to the pre-operative area to await the intraocular surgical portion of the cataract procedure.  The patients IOL calculations were reviewed, and the lens selection confirmed.   After verification and marking of the proper eye in the preop holding area, the patient was brought to the operating room in supine position where the eye was prepped and draped in standard sterile fashion with 5% Betadine and a lid speculum placed in the eye.   Topical 4% Lidocaine was used in addition to the preoperative anesthesia and the procedure was begun by the creation of a paracentesis incision through  which viscoelastic was used to fill the anterior chamber.  Next, a keratome blade was used to create a triplanar temporal clear corneal incision and a cystotome and Utrata forceps used to fashion a continuous curvilinear capsulorrhexis.  Hydrodissection was carried out using the Cummings hydrodissection cannula and the nucleus was found to be mobile.  Phacoemulsification of the nucleus was carried out using a quick chop technique, and all remaining epinuclear and cortical material was removed.  The eye was then reformed with Viscoelastic and the  intraocular lens was implanted into the capsular bag.  All remaining viscoelastics were removed from the eye and at the end of the case the pupil was round, the lens was well-centered within the capsular bag and all wounds were found to be water tight.  Drops of Vigamox and Pred Forte were instilled and a shield was placed over the eye. The patient will follow up with Dr. Finn in the morning.

## 2018-04-17 ENCOUNTER — OFFICE VISIT (OUTPATIENT)
Dept: OPHTHALMOLOGY | Facility: CLINIC | Age: 68
End: 2018-04-17
Attending: OPHTHALMOLOGY
Payer: MEDICARE

## 2018-04-17 DIAGNOSIS — H25.12 NUCLEAR SCLEROSIS OF LEFT EYE: ICD-10-CM

## 2018-04-17 DIAGNOSIS — Z98.890 POST-OPERATIVE STATE: Primary | ICD-10-CM

## 2018-04-17 PROCEDURE — 99024 POSTOP FOLLOW-UP VISIT: CPT | Mod: POP,,, | Performed by: OPHTHALMOLOGY

## 2018-04-17 PROCEDURE — 99212 OFFICE O/P EST SF 10 MIN: CPT | Mod: PBBFAC | Performed by: OPHTHALMOLOGY

## 2018-04-17 PROCEDURE — 99999 PR PBB SHADOW E&M-EST. PATIENT-LVL II: CPT | Mod: PBBFAC,,, | Performed by: OPHTHALMOLOGY

## 2018-04-17 NOTE — PROGRESS NOTES
HPI     Post-op Evaluation    Additional comments: 1 day check.            Comments   POD 1 CE with Symfony IOL OS 04/16/2018    Pt states he is doing well, but still unable to read clearly.  Arms length   ok.     PGB tid OS        Last edited by Joanne Eugene on 4/17/2018  8:59 AM. (History)            Assessment /Plan     For exam results, see Encounter Report.    Post-operative state    Nuclear sclerosis of left eye      Slit lamp exam:  L/L: nl  K: clear, wound sealed  AC: 1+ cell  Lens: IOL centered and stable    POD1 s/p Phaco/IOL  Appropriate precautions and post op medications reviewed.  Patient instructed to call or come in if symptoms of redness, decreased vision, or pain are experienced.

## 2018-05-18 ENCOUNTER — OFFICE VISIT (OUTPATIENT)
Dept: OPTOMETRY | Facility: CLINIC | Age: 68
End: 2018-05-18
Payer: MEDICARE

## 2018-05-18 DIAGNOSIS — Z98.42 S/P BILATERAL CATARACT EXTRACTION: Primary | ICD-10-CM

## 2018-05-18 DIAGNOSIS — Z98.41 S/P BILATERAL CATARACT EXTRACTION: Primary | ICD-10-CM

## 2018-05-18 PROCEDURE — 99999 PR PBB SHADOW E&M-EST. PATIENT-LVL II: CPT | Mod: PBBFAC,,, | Performed by: OPTOMETRIST

## 2018-05-18 PROCEDURE — 99212 OFFICE O/P EST SF 10 MIN: CPT | Mod: PBBFAC | Performed by: OPTOMETRIST

## 2018-05-18 PROCEDURE — 99024 POSTOP FOLLOW-UP VISIT: CPT | Mod: POP,,, | Performed by: OPTOMETRIST

## 2018-05-18 PROCEDURE — 92134 CPTRZ OPH DX IMG PST SGM RTA: CPT | Mod: PBBFAC | Performed by: OPTOMETRIST

## 2018-05-18 NOTE — PROGRESS NOTES
HPI     Post-op Evaluation    Additional comments: 1 month phaco OS           Comments   Last eye exam was 4/17/18 with Dr. Finn.  Patient states no vision complaints. Noticed a floater OD 1 month after   cataract sx.    03/12/2018 IMPLANT: [id number] 16.0 OD  04/16/2018 IMPLANT: [id number] 16.0 OS       Last edited by Leelee Ruiz on 5/18/2018 10:19 AM. (History)            Assessment /Plan     For exam results, see Encounter Report.    S/P bilateral cataract extraction  -     OCT- Retina            1.  Pt doing well.  Bifocal rx given--optional.  Eye health normal OU.  Return care to Dr. Archibald.

## 2018-08-21 ENCOUNTER — OFFICE VISIT (OUTPATIENT)
Dept: INTERNAL MEDICINE | Facility: CLINIC | Age: 68
End: 2018-08-21
Attending: FAMILY MEDICINE
Payer: MEDICARE

## 2018-08-21 VITALS
HEIGHT: 71 IN | DIASTOLIC BLOOD PRESSURE: 66 MMHG | HEART RATE: 76 BPM | SYSTOLIC BLOOD PRESSURE: 112 MMHG | BODY MASS INDEX: 26.3 KG/M2 | WEIGHT: 187.88 LBS | OXYGEN SATURATION: 98 %

## 2018-08-21 DIAGNOSIS — K21.9 GASTROESOPHAGEAL REFLUX DISEASE, ESOPHAGITIS PRESENCE NOT SPECIFIED: ICD-10-CM

## 2018-08-21 DIAGNOSIS — E78.5 HYPERLIPIDEMIA, UNSPECIFIED HYPERLIPIDEMIA TYPE: ICD-10-CM

## 2018-08-21 DIAGNOSIS — M79.671 RIGHT FOOT PAIN: ICD-10-CM

## 2018-08-21 DIAGNOSIS — M54.5 LOW BACK PAIN, UNSPECIFIED BACK PAIN LATERALITY, UNSPECIFIED CHRONICITY, WITH SCIATICA PRESENCE UNSPECIFIED: Primary | ICD-10-CM

## 2018-08-21 PROCEDURE — 99214 OFFICE O/P EST MOD 30 MIN: CPT | Mod: S$PBB,,, | Performed by: FAMILY MEDICINE

## 2018-08-21 PROCEDURE — 99999 PR PBB SHADOW E&M-EST. PATIENT-LVL III: CPT | Mod: PBBFAC,,, | Performed by: FAMILY MEDICINE

## 2018-08-21 PROCEDURE — 99213 OFFICE O/P EST LOW 20 MIN: CPT | Mod: PBBFAC | Performed by: FAMILY MEDICINE

## 2018-08-21 RX ORDER — CYCLOBENZAPRINE HCL 10 MG
10 TABLET ORAL 3 TIMES DAILY PRN
Qty: 45 TABLET | Refills: 0 | Status: SHIPPED | OUTPATIENT
Start: 2018-08-21 | End: 2018-10-25

## 2018-08-21 RX ORDER — METHYLPREDNISOLONE 4 MG/1
TABLET ORAL
Qty: 1 PACKAGE | Refills: 0 | Status: ON HOLD | OUTPATIENT
Start: 2018-08-21 | End: 2018-09-01 | Stop reason: HOSPADM

## 2018-08-21 NOTE — PROGRESS NOTES
Subjective:       Patient ID: Jean Carlson is a 68 y.o. male.    Chief Complaint: Low-back Pain    HPI  Review of Systems   Constitutional: Negative for chills, fatigue, fever and unexpected weight change.   HENT: Negative for congestion and trouble swallowing.    Eyes: Negative for redness and visual disturbance.   Respiratory: Negative for cough, chest tightness and shortness of breath.    Cardiovascular: Negative for chest pain, palpitations and leg swelling.   Gastrointestinal: Negative for abdominal pain and blood in stool.   Genitourinary: Negative for difficulty urinating and hematuria.   Musculoskeletal: Positive for arthralgias, back pain and gait problem. Negative for joint swelling, myalgias and neck pain.   Skin: Negative for color change and rash.   Neurological: Negative for tremors, speech difficulty, weakness, numbness and headaches.   Hematological: Negative for adenopathy. Does not bruise/bleed easily.   Psychiatric/Behavioral: Negative for behavioral problems, confusion and sleep disturbance. The patient is not nervous/anxious.        Objective:      Physical Exam   Constitutional: He is oriented to person, place, and time. He appears well-developed and well-nourished. No distress.   Neck: Neck supple.   Pulmonary/Chest: Effort normal.   Abdominal: There is no tenderness. There is no rebound and no CVA tenderness.   Musculoskeletal: He exhibits no edema.        Right hip: He exhibits normal range of motion and normal strength.        Left hip: He exhibits normal range of motion and normal strength.        Thoracic back: He exhibits normal range of motion and no tenderness.        Lumbar back: He exhibits pain. He exhibits normal range of motion, no tenderness and no spasm.        Right lower leg: He exhibits no edema.        Left lower leg: He exhibits no edema.        Right foot: There is tenderness. There is normal range of motion, normal capillary refill and no deformity.   Neurological:  He is alert and oriented to person, place, and time. He has normal strength. No sensory deficit. He displays a negative Romberg sign. Coordination and gait normal.   Reflex Scores:       Patellar reflexes are 0 on the right side and 0 on the left side.       Achilles reflexes are 0 on the right side and 0 on the left side.  Negative SLR.   Skin: Skin is warm and dry. No rash noted.   Psychiatric: He has a normal mood and affect. His behavior is normal. Judgment and thought content normal.   Nursing note and vitals reviewed.      Assessment:       1. Low back pain, unspecified back pain laterality, unspecified chronicity, with sciatica presence unspecified    2. Right foot pain    3. Gastroesophageal reflux disease, esophagitis presence not specified    4. Hyperlipidemia, unspecified hyperlipidemia type        Plan:   Jean was seen today for low-back pain.    Diagnoses and all orders for this visit:    Low back pain, unspecified back pain laterality, unspecified chronicity, with sciatica presence unspecified    Right foot pain    Gastroesophageal reflux disease, esophagitis presence not specified    Hyperlipidemia, unspecified hyperlipidemia type  Comments:  stopped meds    Other orders  -     methylPREDNISolone (MEDROL DOSEPACK) 4 mg tablet; use as directed  -     cyclobenzaprine (FLEXERIL) 10 MG tablet; Take 1 tablet (10 mg total) by mouth 3 (three) times daily as needed for Muscle spasms.  -     ranitidine (ZANTAC) 300 MG tablet; Take 1 tablet (300 mg total) by mouth every evening.      See meds, orders, follow up, routing and instructions sections of encounter.  A 68-year-old established male patient with multiple complaints.  He started   talking about his back, three weeks lifting something, it is right of middle.    No radiation.  He did have a prior laminectomy many years ago.  He is not having   numbness, tingling, weakness in the extremities, fever, chills, constitutional   complaints, incontinence.    He  also has foot pain, right mid foot, worse with jogging or walking and better   with rest, not present today.  No injuries noted.  He had a history of plantar   fasciitis in the distant past.  He does not relate this symptom to his back pain   as this is more chronic and the back pain is more acute.    He reports chest pain.  He describes this as some sort of a swallowing issue,   points to his esophagus.  He states this has been present long-term, occurs   randomly, but never with exercise or exertion.  He does not have any dysphagia   or odynophagia.  He had a cardiac workup in the past including an Agatston score   of 30 in 2014 and a stress echocardiogram that was unremarkable.  He saw Dr. Giraldo in 2016 when we had a discussion concerning lipids.  He states he has   subsequently discontinued those.    He did have a family history of coronary artery disease.  He does exercise on a   regular basis without difficulty.  He had an MRI scan of the back in January 2014.    RECOMMENDATIONS:  The patient declined my offer to get a dobutamine   echocardiogram (unable to walk at this time).  He stated he would follow up in   the next week and discuss this further.  We do not think that this is cardiac   related; however, I did explain to him that we never ignore these symptoms.    Medrol Dosepak, Flexeril and follow up in one week.  Further recommendations to   follow.      BRIGETTE/HN  dd: 08/21/2018 17:51:41 (CDT)  td: 08/22/2018 03:51:33 (CDT)  Doc ID   #[id number]  Job ID #[id number]    CC:

## 2018-08-28 ENCOUNTER — HOSPITAL ENCOUNTER (OUTPATIENT)
Facility: HOSPITAL | Age: 68
Discharge: HOME | End: 2018-09-01
Attending: EMERGENCY MEDICINE | Admitting: EMERGENCY MEDICINE
Payer: MEDICARE

## 2018-08-28 ENCOUNTER — NURSE TRIAGE (OUTPATIENT)
Dept: ADMINISTRATIVE | Facility: CLINIC | Age: 68
End: 2018-08-28

## 2018-08-28 ENCOUNTER — TELEPHONE (OUTPATIENT)
Dept: INTERNAL MEDICINE | Facility: CLINIC | Age: 68
End: 2018-08-28

## 2018-08-28 DIAGNOSIS — M48.062 SPINAL STENOSIS OF LUMBAR REGION WITH NEUROGENIC CLAUDICATION: ICD-10-CM

## 2018-08-28 DIAGNOSIS — S34.109A: ICD-10-CM

## 2018-08-28 DIAGNOSIS — M54.9 INTRACTABLE BACK PAIN: Primary | ICD-10-CM

## 2018-08-28 DIAGNOSIS — K21.9 GASTROESOPHAGEAL REFLUX DISEASE WITHOUT ESOPHAGITIS: ICD-10-CM

## 2018-08-28 PROBLEM — E78.5 HYPERLIPIDEMIA: Chronic | Status: ACTIVE | Noted: 2017-03-21

## 2018-08-28 LAB
ALBUMIN SERPL BCP-MCNC: 3.6 G/DL
ALP SERPL-CCNC: 76 U/L
ALT SERPL W/O P-5'-P-CCNC: 23 U/L
ANION GAP SERPL CALC-SCNC: 7 MMOL/L
AST SERPL-CCNC: 16 U/L
BASOPHILS # BLD AUTO: 0.01 K/UL
BASOPHILS NFR BLD: 0.2 %
BILIRUB SERPL-MCNC: 0.5 MG/DL
BUN SERPL-MCNC: 22 MG/DL
CALCIUM SERPL-MCNC: 9.5 MG/DL
CHLORIDE SERPL-SCNC: 104 MMOL/L
CO2 SERPL-SCNC: 27 MMOL/L
CREAT SERPL-MCNC: 1 MG/DL
CRP SERPL-MCNC: 0.8 MG/L
DIFFERENTIAL METHOD: ABNORMAL
EOSINOPHIL # BLD AUTO: 0.1 K/UL
EOSINOPHIL NFR BLD: 1.1 %
ERYTHROCYTE [DISTWIDTH] IN BLOOD BY AUTOMATED COUNT: 12.1 %
ERYTHROCYTE [SEDIMENTATION RATE] IN BLOOD BY WESTERGREN METHOD: 2 MM/HR
EST. GFR  (AFRICAN AMERICAN): >60 ML/MIN/1.73 M^2
EST. GFR  (NON AFRICAN AMERICAN): >60 ML/MIN/1.73 M^2
GLUCOSE SERPL-MCNC: 111 MG/DL
HCT VFR BLD AUTO: 47 %
HGB BLD-MCNC: 15.8 G/DL
IMM GRANULOCYTES # BLD AUTO: 0.03 K/UL
IMM GRANULOCYTES NFR BLD AUTO: 0.5 %
LYMPHOCYTES # BLD AUTO: 0.7 K/UL
LYMPHOCYTES NFR BLD: 11.1 %
MCH RBC QN AUTO: 31 PG
MCHC RBC AUTO-ENTMCNC: 33.6 G/DL
MCV RBC AUTO: 92 FL
MONOCYTES # BLD AUTO: 0.6 K/UL
MONOCYTES NFR BLD: 10.1 %
NEUTROPHILS # BLD AUTO: 4.8 K/UL
NEUTROPHILS NFR BLD: 77 %
NRBC BLD-RTO: 0 /100 WBC
PLATELET # BLD AUTO: 162 K/UL
PMV BLD AUTO: 10.6 FL
POTASSIUM SERPL-SCNC: 4.3 MMOL/L
PROT SERPL-MCNC: 6.3 G/DL
RBC # BLD AUTO: 5.09 M/UL
SODIUM SERPL-SCNC: 138 MMOL/L
WBC # BLD AUTO: 6.23 K/UL

## 2018-08-28 PROCEDURE — 85025 COMPLETE CBC W/AUTO DIFF WBC: CPT

## 2018-08-28 PROCEDURE — 96376 TX/PRO/DX INJ SAME DRUG ADON: CPT

## 2018-08-28 PROCEDURE — 25000003 PHARM REV CODE 250: Performed by: PHYSICIAN ASSISTANT

## 2018-08-28 PROCEDURE — 85652 RBC SED RATE AUTOMATED: CPT

## 2018-08-28 PROCEDURE — 99220 PR INITIAL OBSERVATION CARE,LEVL III: CPT | Mod: ,,, | Performed by: PHYSICIAN ASSISTANT

## 2018-08-28 PROCEDURE — G0378 HOSPITAL OBSERVATION PER HR: HCPCS

## 2018-08-28 PROCEDURE — 99285 EMERGENCY DEPT VISIT HI MDM: CPT | Mod: ,,, | Performed by: EMERGENCY MEDICINE

## 2018-08-28 PROCEDURE — 63600175 PHARM REV CODE 636 W HCPCS: Performed by: EMERGENCY MEDICINE

## 2018-08-28 PROCEDURE — 63600175 PHARM REV CODE 636 W HCPCS: Performed by: STUDENT IN AN ORGANIZED HEALTH CARE EDUCATION/TRAINING PROGRAM

## 2018-08-28 PROCEDURE — 96374 THER/PROPH/DIAG INJ IV PUSH: CPT

## 2018-08-28 PROCEDURE — 99285 EMERGENCY DEPT VISIT HI MDM: CPT | Mod: 25

## 2018-08-28 PROCEDURE — 80053 COMPREHEN METABOLIC PANEL: CPT

## 2018-08-28 PROCEDURE — 86140 C-REACTIVE PROTEIN: CPT

## 2018-08-28 PROCEDURE — 96375 TX/PRO/DX INJ NEW DRUG ADDON: CPT

## 2018-08-28 RX ORDER — ONDANSETRON 8 MG/1
8 TABLET, ORALLY DISINTEGRATING ORAL EVERY 8 HOURS PRN
Status: DISCONTINUED | OUTPATIENT
Start: 2018-08-28 | End: 2018-09-01 | Stop reason: HOSPADM

## 2018-08-28 RX ORDER — HYDROMORPHONE HYDROCHLORIDE 1 MG/ML
0.5 INJECTION, SOLUTION INTRAMUSCULAR; INTRAVENOUS; SUBCUTANEOUS
Status: COMPLETED | OUTPATIENT
Start: 2018-08-28 | End: 2018-08-28

## 2018-08-28 RX ORDER — HYDROMORPHONE HYDROCHLORIDE 1 MG/ML
0.5 INJECTION, SOLUTION INTRAMUSCULAR; INTRAVENOUS; SUBCUTANEOUS
Status: DISCONTINUED | OUTPATIENT
Start: 2018-08-28 | End: 2018-08-28

## 2018-08-28 RX ORDER — GLUCAGON 1 MG
1 KIT INJECTION
Status: DISCONTINUED | OUTPATIENT
Start: 2018-08-28 | End: 2018-09-01 | Stop reason: HOSPADM

## 2018-08-28 RX ORDER — FAMOTIDINE 20 MG/1
20 TABLET, FILM COATED ORAL 2 TIMES DAILY
Status: DISCONTINUED | OUTPATIENT
Start: 2018-08-28 | End: 2018-09-01 | Stop reason: HOSPADM

## 2018-08-28 RX ORDER — IBUPROFEN 200 MG
16 TABLET ORAL
Status: DISCONTINUED | OUTPATIENT
Start: 2018-08-28 | End: 2018-09-01 | Stop reason: HOSPADM

## 2018-08-28 RX ORDER — ACETAMINOPHEN 325 MG/1
650 TABLET ORAL EVERY 4 HOURS PRN
Status: DISCONTINUED | OUTPATIENT
Start: 2018-08-28 | End: 2018-09-01 | Stop reason: HOSPADM

## 2018-08-28 RX ORDER — RAMELTEON 8 MG/1
8 TABLET ORAL NIGHTLY PRN
Status: DISCONTINUED | OUTPATIENT
Start: 2018-08-28 | End: 2018-09-01 | Stop reason: HOSPADM

## 2018-08-28 RX ORDER — ONDANSETRON 2 MG/ML
4 INJECTION INTRAMUSCULAR; INTRAVENOUS
Status: COMPLETED | OUTPATIENT
Start: 2018-08-28 | End: 2018-08-28

## 2018-08-28 RX ORDER — KETOROLAC TROMETHAMINE 30 MG/ML
15 INJECTION, SOLUTION INTRAMUSCULAR; INTRAVENOUS ONCE
Status: COMPLETED | OUTPATIENT
Start: 2018-08-28 | End: 2018-08-28

## 2018-08-28 RX ORDER — PROMETHAZINE HYDROCHLORIDE 25 MG/1
25 TABLET ORAL EVERY 6 HOURS PRN
Status: DISCONTINUED | OUTPATIENT
Start: 2018-08-28 | End: 2018-09-01 | Stop reason: HOSPADM

## 2018-08-28 RX ORDER — IBUPROFEN 200 MG
24 TABLET ORAL
Status: DISCONTINUED | OUTPATIENT
Start: 2018-08-28 | End: 2018-09-01 | Stop reason: HOSPADM

## 2018-08-28 RX ORDER — MORPHINE SULFATE 4 MG/ML
4 INJECTION, SOLUTION INTRAMUSCULAR; INTRAVENOUS EVERY 4 HOURS PRN
Status: DISCONTINUED | OUTPATIENT
Start: 2018-08-28 | End: 2018-08-29

## 2018-08-28 RX ORDER — LIDOCAINE 50 MG/G
1 PATCH TOPICAL DAILY
Status: DISCONTINUED | OUTPATIENT
Start: 2018-08-29 | End: 2018-09-01 | Stop reason: HOSPADM

## 2018-08-28 RX ORDER — NAPROXEN SODIUM 220 MG/1
81 TABLET, FILM COATED ORAL DAILY
Status: DISCONTINUED | OUTPATIENT
Start: 2018-08-29 | End: 2018-09-01 | Stop reason: HOSPADM

## 2018-08-28 RX ORDER — SODIUM CHLORIDE 0.9 % (FLUSH) 0.9 %
5 SYRINGE (ML) INJECTION
Status: DISCONTINUED | OUTPATIENT
Start: 2018-08-28 | End: 2018-09-01 | Stop reason: HOSPADM

## 2018-08-28 RX ORDER — IPRATROPIUM BROMIDE AND ALBUTEROL SULFATE 2.5; .5 MG/3ML; MG/3ML
3 SOLUTION RESPIRATORY (INHALATION) EVERY 4 HOURS PRN
Status: DISCONTINUED | OUTPATIENT
Start: 2018-08-28 | End: 2018-09-01 | Stop reason: HOSPADM

## 2018-08-28 RX ORDER — KETOROLAC TROMETHAMINE 30 MG/ML
15 INJECTION, SOLUTION INTRAMUSCULAR; INTRAVENOUS EVERY 6 HOURS PRN
Status: DISPENSED | OUTPATIENT
Start: 2018-08-28 | End: 2018-08-31

## 2018-08-28 RX ORDER — CYCLOBENZAPRINE HCL 5 MG
10 TABLET ORAL 3 TIMES DAILY PRN
Status: DISCONTINUED | OUTPATIENT
Start: 2018-08-28 | End: 2018-09-01 | Stop reason: HOSPADM

## 2018-08-28 RX ORDER — AMOXICILLIN 250 MG
1 CAPSULE ORAL 2 TIMES DAILY
Status: DISCONTINUED | OUTPATIENT
Start: 2018-08-28 | End: 2018-08-29

## 2018-08-28 RX ADMIN — STANDARDIZED SENNA CONCENTRATE AND DOCUSATE SODIUM 1 TABLET: 8.6; 5 TABLET, FILM COATED ORAL at 11:08

## 2018-08-28 RX ADMIN — HYDROMORPHONE HYDROCHLORIDE 0.5 MG: 1 INJECTION, SOLUTION INTRAMUSCULAR; INTRAVENOUS; SUBCUTANEOUS at 08:08

## 2018-08-28 RX ADMIN — ONDANSETRON 4 MG: 2 INJECTION INTRAMUSCULAR; INTRAVENOUS at 11:08

## 2018-08-28 RX ADMIN — HYDROMORPHONE HYDROCHLORIDE 0.5 MG: 1 INJECTION, SOLUTION INTRAMUSCULAR; INTRAVENOUS; SUBCUTANEOUS at 02:08

## 2018-08-28 RX ADMIN — HYDROMORPHONE HYDROCHLORIDE 0.5 MG: 1 INJECTION, SOLUTION INTRAMUSCULAR; INTRAVENOUS; SUBCUTANEOUS at 11:08

## 2018-08-28 RX ADMIN — KETOROLAC TROMETHAMINE 15 MG: 30 INJECTION, SOLUTION INTRAMUSCULAR at 10:08

## 2018-08-28 NOTE — ED NOTES
Hourly rounding completed on patient. Plan of care discussed and patient has no complaints or questions. Pt is in bed awake and alert.    Respirations are even and unlabored. Pt denies chest pain or SOB. Pt has no complaints at this time.     The bed is in low, locked position with side rails upx2. Call light is in reach, and patient is oriented to call light use. Pt states they will call nurse if they need assistance.

## 2018-08-28 NOTE — ED NOTES
Hourly rounding completed on patient. Plan of care discussed and patient has no complaints or questions. Pt is in bed awake and alert. Pt is resting comfortably and is in no acute distress.     Respirations are even and unlabored. Pt denies chest pain or SOB. Pt has no complaints at this time.     The bed is in low, locked position with side rails upx2. Call light is in reach, and patient is oriented to call light use. Pt states they will call nurse if they need assistance.

## 2018-08-28 NOTE — ED PROVIDER NOTES
Encounter Date: 8/28/2018       History     Chief Complaint   Patient presents with    Back Pain     injured back 3 weeks ago; been on steroids and muscle relaxers; woke up this morning to go to the MD and was unable to ambulate without difficulty and extreme pain; denies numbness/tingling in BLE     This is a 69 yo Male presented to Ed with a severe lower back pain, he is not able to ambulate due to severity of pain while standing. He injured his back 3 weeks ago while he was moving some furniture. He started to take ibuprofen and cyclobenzaprine for two weeks but thinks it was not alleviating his pain. He saw his PCP and was prescribed Medrol. Patient states his symptoms got worse after starting the steroids and he had to use walker to ambulate. He denies numbness in LE, denies urinary/ rectal incontinence. No shooting pain down to legs. He has a localized pain over lowe back spine, mildly tender. No sensory or motor deficit was found in BLE, DTRs WNR. Afebril, no systemic symptoms present.   Bone densitometry from 2017 showed Osteopenia, approaching osteoporosis of the femoral neck and lumbar spine.          Review of patient's allergies indicates:  No Known Allergies  Past Medical History:   Diagnosis Date    GERD (gastroesophageal reflux disease)     Osteoporosis     Prostate disease      Past Surgical History:   Procedure Laterality Date    back sx      lower    CATARACT EXTRACTION W/  INTRAOCULAR LENS IMPLANT Bilateral     Dr Finn/Ana IOL     PROSTATE SURGERY      SPINE SURGERY      TONSILLECTOMY      TRANSURETHRAL RESECTION OF PROSTATE       Family History   Problem Relation Age of Onset    Glaucoma Maternal Uncle     Retinitis pigmentosa Maternal Uncle     Heart disease Mother     Heart attack Mother     Skin cancer Mother     Heart disease Father     Heart attack Father     Glaucoma Maternal Aunt     No Known Problems Daughter     Celiac disease Neg Hx     Cirrhosis Neg Hx      Colon cancer Neg Hx     Colon polyps Neg Hx     Crohn's disease Neg Hx     Cystic fibrosis Neg Hx     Esophageal cancer Neg Hx     Hemochromatosis Neg Hx     Inflammatory bowel disease Neg Hx     Irritable bowel syndrome Neg Hx     Liver cancer Neg Hx     Liver disease Neg Hx     Rectal cancer Neg Hx     Stomach cancer Neg Hx     Ulcerative colitis Neg Hx     Lane's disease Neg Hx     Amblyopia Neg Hx     Blindness Neg Hx     Cataracts Neg Hx     Macular degeneration Neg Hx     Retinal detachment Neg Hx     Strabismus Neg Hx      Social History     Tobacco Use    Smoking status: Never Smoker    Smokeless tobacco: Never Used   Substance Use Topics    Alcohol use: Yes     Alcohol/week: 1.8 oz     Types: 3 Glasses of wine per week     Comment: 3, 5 oz glasses a week    Drug use: No     Review of Systems   Constitutional: Positive for activity change. Negative for appetite change, chills, diaphoresis, fatigue and fever.   Respiratory: Negative for cough and shortness of breath.    Cardiovascular: Negative for chest pain and leg swelling.   Gastrointestinal: Negative for abdominal pain.   Genitourinary: Negative for difficulty urinating.   Musculoskeletal: Positive for back pain.   Skin: Negative for color change and wound.   Neurological: Negative for numbness.       Physical Exam     Initial Vitals [08/28/18 0935]   BP Pulse Resp Temp SpO2   (!) 144/75 90 18 98.2 °F (36.8 °C) 100 %      MAP       --         Physical Exam    Nursing note and vitals reviewed.  Constitutional: He appears well-developed and well-nourished. He is not diaphoretic. No distress.   Neck: Normal range of motion. No JVD present.   Cardiovascular: Normal rate, regular rhythm, normal heart sounds and intact distal pulses.   Pulmonary/Chest: Breath sounds normal. No respiratory distress.   Abdominal: Soft. He exhibits no distension. There is no tenderness.   Musculoskeletal: Normal range of motion. He exhibits no edema.         Lumbar back: He exhibits tenderness, bony tenderness and pain.   Local tenderness on lumbar spine along with a bulge lower part of tenderness site, no erythema   Neurological: He is alert and oriented to person, place, and time. He has normal strength. No cranial nerve deficit or sensory deficit. He displays no Babinski's sign on the right side. He displays no Babinski's sign on the left side.   Reflex Scores:       Patellar reflexes are 1+ on the right side and 1+ on the left side.       Achilles reflexes are 1+ on the right side and 1+ on the left side.  SLR test positive bilaterally, worse in Right leg         ED Course   Procedures  Labs Reviewed   CBC W/ AUTO DIFFERENTIAL - Abnormal; Notable for the following components:       Result Value    Lymph # 0.7 (*)     Gran% 77.0 (*)     Lymph% 11.1 (*)     All other components within normal limits   COMPREHENSIVE METABOLIC PANEL - Abnormal; Notable for the following components:    Glucose 111 (*)     Anion Gap 7 (*)     All other components within normal limits   C-REACTIVE PROTEIN   SEDIMENTATION RATE          Imaging Results          X-Ray Lumbar Spine Ap And Lateral (Final result)  Result time 08/28/18 16:43:33    Final result by Michaela White MD (08/28/18 16:43:33)                 Impression:      As above.      Electronically signed by: Michaela White MD  Date:    08/28/2018  Time:    16:43             Narrative:    EXAMINATION:  XR LUMBAR SPINE AP AND LATERAL    CLINICAL HISTORY:  standing films if possible, if not sitting, if not flat;    TECHNIQUE:  AP, lateral and spot images were performed of the lumbar spine.    COMPARISON:  None    FINDINGS:  The images are technically suboptimal due to overlying densities.  There appear to be 5 non rib-bearing thoracic vertebral bodies with a transitional lumbosacral segment.  There is a mild lumbar dextroscoliosis.  Lumbar vertebral body heights are satisfactorily maintained.  No obvious significant  spondylolisthesis is seen.  There is moderate disc space narrowing at L4-5 and at the disc space between L5 and the lumbosacral segment.  There are small marginal osteophytes throughout the lumbar spine.                               MRI Lumbar Spine Without Contrast (Final result)  Result time 08/28/18 17:13:57    Final result by Hans Elizalde MD (08/28/18 17:13:57)                 Impression:      Mild circumferential disc bulge at L3-L4 with new, superimposed right paracentral extrusion with superior migration resulting in severe spinal canal stenosis.  Additional degenerative changes detailed above.  Status post L4-L5 laminectomy.    Electronically signed by resident: Leopoldo Ching  Date:    08/28/2018  Time:    13:03    Electronically signed by: Hans Elizalde MD  Date:    08/28/2018  Time:    17:13             Narrative:    EXAMINATION:  MRI LUMBAR SPINE WITHOUT CONTRAST    CLINICAL HISTORY:  Low back pain, <6wks, no red flags, no prior management Unspecified injury to unspecified level of lumbar spinal cord, initial encounter    TECHNIQUE:  Multiplanar, multisequence MR images were acquired from the thoracolumbar junction to the sacrum without the administration of contrast.    COMPARISON:  MRI lumbar spine without contrast dated 01/17/2014    FINDINGS:  Lumbar spine alignment and vertebral body heights are maintained.  There is normal bone marrow signal.  The spinal cord terminus lies at L1.  The prevertebral and paraspinal soft tissues demonstrate no acute abnormality.    Degenerative findings will be discussed on a level by level basis.    L1-L2: There is no focal disc herniation, spinal canal stenosis or neural foraminal narrowing.    L2-L3: There is mild disc desiccation with posterior annular fissuring.  There is mild bilateral facet arthrosis with trace left effusion and ligamentum flavum buckling.  There is no significant spinal canal stenosis or neural foraminal narrowing.    L3-L4: There is disc  desiccation with mild circumferential disc bulge with new, superimposed right paracentral disc extrusion with superior migration.  This in combination with mild facet hypertrophy and flavum thickening results in severe spinal canal and right lateral recess stenosis.  There is mild bilateral neural foraminal narrowing.    L4-L5: There are postoperative changes of partial laminectomy.  There is disc desiccation and loss of vertebral body height with mild circumferential disc bulge.  This in combination with mild bilateral facet hypertrophy results in mild bilateral neural foraminal narrowing.  There is no significant spinal canal stenosis.    L5-S1: There is mild disc desiccation without significant focal herniation, spinal canal stenosis, or neural foraminal narrowing.                              X-Rays: Other Radiology Reports: Preliminary read on the MRI of the lumbar spine shows a protrusion of the disc between L3-L4 with retropulsion and narrowing of the spinal canal     Medical Decision Making:   Initial Assessment:   Recent injury to lower back, not responding to pain medication, muscle relaxant, and steroid, getting worse. No neurological deficit. Local tenderness and bulge over lumbar spine.  Differential Diagnosis:   Lmubar spine fracture  Lumbar discitis    Clinical Tests:   Lab Tests: Ordered and Reviewed  Radiological Study: Ordered and Reviewed              Attending Attestation:   Physician Attestation Statement for Resident:  As the supervising MD  I agree with the above history. -:   As the supervising MD I agree with the above PE.    As the supervising MD I agree with the above treatment, course, plan, and disposition.  I have reviewed and agree with the residents interpretation of the following: lab data, x-rays and CT scans.            Attending ED Notes:   Patient presenting to the ED because the complains of severe lower back pain that has been hurting him as the days have gone on despite  medication including what sounds like a Medrol Dosepak patient denies any neurologic changes just severe lower back pain there is some mild swelling in the lower portion of the lumbar area.  We have decided to get the blood work on this patient to control his discomfort and we are going to get an MRI.     Preliminary report of the MRI shows a probable disc retropulsion at L3-L4 with narrowing of the spinal canal will consult the spine consultants and in this case it is orthopedics in must be mentioned that the patient is neurologically intact at this time             Clinical Impression:   The primary encounter diagnosis was Intractable back pain. Diagnoses of Injury of lumbar spine and Gastroesophageal reflux disease without esophagitis were also pertinent to this visit.      Disposition:   Disposition: Placed in Observation  Condition: Serious                        Elissa Leiva MD  Resident  08/30/18 1436       Goldy Rodriguez MD  08/30/18 0942

## 2018-08-28 NOTE — TELEPHONE ENCOUNTER
Reason for Disposition   Sounds like a life-threatening emergency to the triager    Protocols used: ST BACK PAIN-A-[facility]        Pt states that he has been having back pain and saw PCP for this but this AM pt states he is physically unable to get out of his bed pain is so severe. ED advised via ambulance. Pt states he would like to know if Dr Fritz could order test for him, informed pt that ambulance is only able to bring him to ED. Pt still would like to speak to PCP, please call pt at [phone] or [phone] to advise.

## 2018-08-28 NOTE — TELEPHONE ENCOUNTER
----- Message from Faith Adorno sent at 8/28/2018  8:35 AM CDT -----  Contact: Spouse [phone]      ----- Message -----  From: Peter Rangel  Sent: 8/28/2018   8:06 AM  To: Paulina Turpin A Staff    Wife calling stating patient is in a lot of back pain can not walk, wants to know what the Dr recommend, does have an appt scheduled for today at 9:00 am and wants to still see Dr, requesting for a call back asap please. Spouse [phone]    Please call an advise  Thank you

## 2018-08-28 NOTE — TELEPHONE ENCOUNTER
Call returned - he is in ED now - pain worsened over the past 2 days, and just had MRI - awaiting disposition. MRI  Reviewed, appears to be an extruded disc, report pending.

## 2018-08-28 NOTE — ED NOTES
Hourly rounding completed on patient. Plan of care discussed and patient has no complaints or questions. Pt is in bed awake and alert.    Pt is resting comfortably and is in no acute distress. Respirations are even and unlabored. Pt denies chest pain or SOB.     The bed is in low, locked position with side rails upx2. Call light is in reach, and patient is oriented to call light use. Pt states they will call nurse if they need assistance.

## 2018-08-29 ENCOUNTER — TELEPHONE (OUTPATIENT)
Dept: INTERNAL MEDICINE | Facility: CLINIC | Age: 68
End: 2018-08-29

## 2018-08-29 LAB
ALBUMIN SERPL BCP-MCNC: 3.6 G/DL
ALP SERPL-CCNC: 76 U/L
ALT SERPL W/O P-5'-P-CCNC: 21 U/L
ANION GAP SERPL CALC-SCNC: 6 MMOL/L
AST SERPL-CCNC: 16 U/L
BASOPHILS # BLD AUTO: 0.01 K/UL
BASOPHILS NFR BLD: 0.2 %
BILIRUB SERPL-MCNC: 0.6 MG/DL
BUN SERPL-MCNC: 18 MG/DL
CALCIUM SERPL-MCNC: 9.1 MG/DL
CHLORIDE SERPL-SCNC: 104 MMOL/L
CO2 SERPL-SCNC: 29 MMOL/L
CREAT SERPL-MCNC: 0.9 MG/DL
DIFFERENTIAL METHOD: ABNORMAL
EOSINOPHIL # BLD AUTO: 0.1 K/UL
EOSINOPHIL NFR BLD: 2.6 %
ERYTHROCYTE [DISTWIDTH] IN BLOOD BY AUTOMATED COUNT: 12.3 %
EST. GFR  (AFRICAN AMERICAN): >60 ML/MIN/1.73 M^2
EST. GFR  (NON AFRICAN AMERICAN): >60 ML/MIN/1.73 M^2
ESTIMATED AVG GLUCOSE: 103 MG/DL
GLUCOSE SERPL-MCNC: 97 MG/DL
HBA1C MFR BLD HPLC: 5.2 %
HCT VFR BLD AUTO: 46.4 %
HGB BLD-MCNC: 15.5 G/DL
IMM GRANULOCYTES # BLD AUTO: 0.02 K/UL
IMM GRANULOCYTES NFR BLD AUTO: 0.4 %
LYMPHOCYTES # BLD AUTO: 0.9 K/UL
LYMPHOCYTES NFR BLD: 16.1 %
MAGNESIUM SERPL-MCNC: 2.4 MG/DL
MCH RBC QN AUTO: 31.1 PG
MCHC RBC AUTO-ENTMCNC: 33.4 G/DL
MCV RBC AUTO: 93 FL
MONOCYTES # BLD AUTO: 0.5 K/UL
MONOCYTES NFR BLD: 9.6 %
NEUTROPHILS # BLD AUTO: 3.8 K/UL
NEUTROPHILS NFR BLD: 71.1 %
NRBC BLD-RTO: 0 /100 WBC
PHOSPHATE SERPL-MCNC: 3.8 MG/DL
PLATELET # BLD AUTO: 168 K/UL
PMV BLD AUTO: 11.1 FL
POTASSIUM SERPL-SCNC: 4.6 MMOL/L
PROT SERPL-MCNC: 6.2 G/DL
RBC # BLD AUTO: 4.99 M/UL
SODIUM SERPL-SCNC: 139 MMOL/L
WBC # BLD AUTO: 5.39 K/UL

## 2018-08-29 PROCEDURE — 84100 ASSAY OF PHOSPHORUS: CPT

## 2018-08-29 PROCEDURE — G0378 HOSPITAL OBSERVATION PER HR: HCPCS

## 2018-08-29 PROCEDURE — 25000003 PHARM REV CODE 250: Performed by: PHYSICIAN ASSISTANT

## 2018-08-29 PROCEDURE — 36415 COLL VENOUS BLD VENIPUNCTURE: CPT

## 2018-08-29 PROCEDURE — G8979 MOBILITY GOAL STATUS: HCPCS | Mod: CH

## 2018-08-29 PROCEDURE — 97161 PT EVAL LOW COMPLEX 20 MIN: CPT

## 2018-08-29 PROCEDURE — 83036 HEMOGLOBIN GLYCOSYLATED A1C: CPT

## 2018-08-29 PROCEDURE — G8978 MOBILITY CURRENT STATUS: HCPCS | Mod: CH

## 2018-08-29 PROCEDURE — 83735 ASSAY OF MAGNESIUM: CPT

## 2018-08-29 PROCEDURE — 85025 COMPLETE CBC W/AUTO DIFF WBC: CPT

## 2018-08-29 PROCEDURE — G8980 MOBILITY D/C STATUS: HCPCS | Mod: CH

## 2018-08-29 PROCEDURE — 99226 PR SUBSEQUENT OBSERVATION CARE,LEVEL III: CPT | Mod: ,,, | Performed by: PHYSICIAN ASSISTANT

## 2018-08-29 PROCEDURE — 80053 COMPREHEN METABOLIC PANEL: CPT

## 2018-08-29 PROCEDURE — 63600175 PHARM REV CODE 636 W HCPCS: Performed by: PHYSICIAN ASSISTANT

## 2018-08-29 RX ORDER — OXYCODONE AND ACETAMINOPHEN 5; 325 MG/1; MG/1
1 TABLET ORAL EVERY 4 HOURS PRN
Status: DISCONTINUED | OUTPATIENT
Start: 2018-08-29 | End: 2018-09-01 | Stop reason: HOSPADM

## 2018-08-29 RX ORDER — POLYETHYLENE GLYCOL 3350 17 G/17G
17 POWDER, FOR SOLUTION ORAL 2 TIMES DAILY
Status: DISCONTINUED | OUTPATIENT
Start: 2018-08-29 | End: 2018-09-01 | Stop reason: HOSPADM

## 2018-08-29 RX ORDER — OXYCODONE AND ACETAMINOPHEN 10; 325 MG/1; MG/1
1 TABLET ORAL EVERY 4 HOURS PRN
Status: DISCONTINUED | OUTPATIENT
Start: 2018-08-29 | End: 2018-09-01 | Stop reason: HOSPADM

## 2018-08-29 RX ORDER — MORPHINE SULFATE 4 MG/ML
4 INJECTION, SOLUTION INTRAMUSCULAR; INTRAVENOUS EVERY 4 HOURS PRN
Status: DISCONTINUED | OUTPATIENT
Start: 2018-08-29 | End: 2018-08-30

## 2018-08-29 RX ORDER — GABAPENTIN 300 MG/1
300 CAPSULE ORAL NIGHTLY
Status: DISCONTINUED | OUTPATIENT
Start: 2018-08-29 | End: 2018-09-01 | Stop reason: HOSPADM

## 2018-08-29 RX ORDER — AMOXICILLIN 250 MG
2 CAPSULE ORAL 2 TIMES DAILY
Status: DISCONTINUED | OUTPATIENT
Start: 2018-08-29 | End: 2018-09-01 | Stop reason: HOSPADM

## 2018-08-29 RX ADMIN — FAMOTIDINE 20 MG: 20 TABLET ORAL at 09:08

## 2018-08-29 RX ADMIN — POLYETHYLENE GLYCOL 3350 17 G: 17 POWDER, FOR SOLUTION ORAL at 08:08

## 2018-08-29 RX ADMIN — MORPHINE SULFATE 4 MG: 4 INJECTION, SOLUTION INTRAMUSCULAR; INTRAVENOUS at 04:08

## 2018-08-29 RX ADMIN — Medication 1 CAPSULE: at 09:08

## 2018-08-29 RX ADMIN — FAMOTIDINE 20 MG: 20 TABLET ORAL at 12:08

## 2018-08-29 RX ADMIN — POLYETHYLENE GLYCOL 3350 17 G: 17 POWDER, FOR SOLUTION ORAL at 09:08

## 2018-08-29 RX ADMIN — CYCLOBENZAPRINE HYDROCHLORIDE 10 MG: 5 TABLET, FILM COATED ORAL at 08:08

## 2018-08-29 RX ADMIN — LIDOCAINE 1 PATCH: 50 PATCH CUTANEOUS at 09:08

## 2018-08-29 RX ADMIN — SENNOSIDES AND DOCUSATE SODIUM 2 TABLET: 8.6; 5 TABLET ORAL at 08:08

## 2018-08-29 RX ADMIN — GABAPENTIN 300 MG: 300 CAPSULE ORAL at 08:08

## 2018-08-29 RX ADMIN — SENNOSIDES AND DOCUSATE SODIUM 2 TABLET: 8.6; 5 TABLET ORAL at 09:08

## 2018-08-29 RX ADMIN — ASPIRIN 81 MG CHEWABLE TABLET 81 MG: 81 TABLET CHEWABLE at 09:08

## 2018-08-29 RX ADMIN — FAMOTIDINE 20 MG: 20 TABLET ORAL at 08:08

## 2018-08-29 RX ADMIN — KETOROLAC TROMETHAMINE 15 MG: 30 INJECTION, SOLUTION INTRAMUSCULAR at 09:08

## 2018-08-29 NOTE — HOSPITAL COURSE
Jean Carlson was placed in observation for intractable lower back pain. MRI lumbar spine with mild circumferential disc bulge at L3-L4 with new, superimposed right paracentral extrusion with superior migration resulting in severe spinal canal stenosis. Orthopedic surgery consulted and recommended outpatient follow-up/conservative therapy. PT/OT recommended outpatient. Patient requested re-consult from orthopedic surgery. Case discussed with Dr. Canseco and Dr. Beckham multiple times; pt to be scheduled for surgical intervention 9/6/18 as an outpatient. He is agreeable to discharge.

## 2018-08-29 NOTE — PLAN OF CARE
Problem: Physical Therapy Goal  Goal: Physical Therapy Goal  Outcome: Outcome(s) achieved Date Met: 08/29/18  PT evaluation completed. No further acute PT services needed; appropriate for OP PT services post completion of services.    Valerie Dos Santos, PT,  DPT  8/29/2018

## 2018-08-29 NOTE — SUBJECTIVE & OBJECTIVE
Interval History: Patient with changes since admission, currently reports new shooting pain that originates in back and radiates down into R thigh. Patient able to ambulate, but only for short distances. He reports pain is improved with lying flat but aggravated with with prolonged ambulation and sitting. Orthopedic surgery paged x2.      Review of Systems   Constitutional: Positive for activity change (unable to ambulate). Negative for chills, fatigue and fever.   HENT: Negative for congestion and rhinorrhea.    Eyes: Negative for pain and visual disturbance.   Respiratory: Negative for chest tightness and shortness of breath.    Cardiovascular: Negative for chest pain and leg swelling.   Gastrointestinal: Negative for abdominal pain, constipation, diarrhea and nausea.   Genitourinary: Negative for dysuria, flank pain, frequency and hematuria.   Musculoskeletal: Positive for back pain and gait problem (improved).   Skin: Negative for pallor.   Neurological: Negative for weakness and numbness.        Denies urinary/bowel incontinence, Denies saddle anesthesia   - shooting pain into right thigh      Objective:     Vital Signs (Most Recent):  Temp: 97.3 °F (36.3 °C) (08/29/18 1653)  Pulse: 66 (08/29/18 1653)  Resp: 18 (08/29/18 1653)  BP: (!) 166/77 (08/29/18 1653)  SpO2: 98 % (08/29/18 1653) Vital Signs (24h Range):  Temp:  [97.3 °F (36.3 °C)-98.6 °F (37 °C)] 97.3 °F (36.3 °C)  Pulse:  [66-85] 66  Resp:  [16-20] 18  SpO2:  [96 %-99 %] 98 %  BP: (119-168)/(63-79) 166/77     Weight: 81.9 kg (180 lb 8 oz)  Body mass index is 25.17 kg/m².  No intake or output data in the 24 hours ending 08/29/18 1733   Physical Exam   Constitutional: He is oriented to person, place, and time. He appears well-developed and well-nourished. No distress.   HENT:   Head: Normocephalic and atraumatic.   Eyes: Conjunctivae and EOM are normal. Pupils are equal, round, and reactive to light.   Neck: Normal range of motion. Neck supple.    Cardiovascular: Normal rate, regular rhythm, normal heart sounds and intact distal pulses.   No murmur heard.  Pulmonary/Chest: Effort normal and breath sounds normal. No respiratory distress.   Abdominal: Soft. Bowel sounds are normal. There is no tenderness. There is no guarding.   Musculoskeletal:   No lumbar TTP or perispinal tenderness, no bony step offs, no sensory deficits, no focal neuro deficits, straight leg raise neg BL, pt able to stand and ambulate, unable to flex or extend hips    Neurological: He is alert and oriented to person, place, and time. No sensory deficit.   Skin: Skin is warm and dry. Capillary refill takes less than 2 seconds.   Psychiatric: He has a normal mood and affect. His behavior is normal.   Nursing note and vitals reviewed.      Significant Labs: All pertinent labs within the past 24 hours have been reviewed.    Significant Imaging: I have reviewed all pertinent imaging results/findings within the past 24 hours.

## 2018-08-29 NOTE — TELEPHONE ENCOUNTER
----- Message from Kathy Ernandez sent at 8/29/2018  2:08 PM CDT -----  Contact: Patient [phone]  The patient is in the hospital and he wants to get a recommendation for a surgeon.    Thank you

## 2018-08-29 NOTE — SUBJECTIVE & OBJECTIVE
Past Medical History:   Diagnosis Date    GERD (gastroesophageal reflux disease)     Osteoporosis     Prostate disease        Past Surgical History:   Procedure Laterality Date    back sx      lower    CATARACT EXTRACTION W/  INTRAOCULAR LENS IMPLANT Bilateral     Dr Finn/Ana IOL     PROSTATE SURGERY      SPINE SURGERY      TONSILLECTOMY      TRANSURETHRAL RESECTION OF PROSTATE         Review of patient's allergies indicates:  No Known Allergies    No current facility-administered medications on file prior to encounter.      Current Outpatient Medications on File Prior to Encounter   Medication Sig    aspirin 81 MG Chew Take 81 mg by mouth once daily.    co-enzyme Q-10 30 mg capsule Take 30 mg by mouth once daily.     cyclobenzaprine (FLEXERIL) 10 MG tablet Take 1 tablet (10 mg total) by mouth 3 (three) times daily as needed for Muscle spasms.    fish oil-omega-3 fatty acids 300-1,000 mg capsule Take 2 g by mouth once daily.    lutein-zeaxanthin 25-5 mg Cap Take by mouth once daily.     methylPREDNISolone (MEDROL DOSEPACK) 4 mg tablet use as directed    multivitamin capsule Take 1 capsule by mouth once daily.    ranitidine (ZANTAC) 300 MG tablet Take 1 tablet (300 mg total) by mouth every evening.    resveratrol 100 mg Cap Take by mouth once daily.    omega-3 acid ethyl esters (LOVAZA) 1 gram capsule Take 1 capsule (1 g total) by mouth once daily.     Family History     Problem Relation (Age of Onset)    Glaucoma Maternal Uncle, Maternal Aunt    Heart attack Mother, Father    Heart disease Mother, Father    No Known Problems Daughter    Retinitis pigmentosa Maternal Uncle    Skin cancer Mother        Tobacco Use    Smoking status: Never Smoker    Smokeless tobacco: Never Used   Substance and Sexual Activity    Alcohol use: Yes     Alcohol/week: 1.8 oz     Types: 3 Glasses of wine per week     Comment: 3, 5 oz glasses a week    Drug use: No    Sexual activity: Yes     Partners: Female      Review of Systems   Constitutional: Positive for activity change (unable to ambulate). Negative for chills, fatigue and fever.   HENT: Negative for congestion and rhinorrhea.    Eyes: Negative for pain and visual disturbance.   Respiratory: Negative for chest tightness and shortness of breath.    Cardiovascular: Negative for chest pain and leg swelling.   Gastrointestinal: Negative for abdominal pain, constipation, diarrhea and nausea.   Genitourinary: Negative for dysuria, flank pain, frequency and hematuria.   Musculoskeletal: Positive for back pain and gait problem.   Skin: Negative for pallor.   Neurological: Negative for weakness and numbness.        Denies urinary/bowel incontinence, Denies saddle anesthesia      Objective:     Vital Signs (Most Recent):  Temp: 98.2 °F (36.8 °C) (08/28/18 0935)  Pulse: 79 (08/28/18 2202)  Resp: 18 (08/28/18 0935)  BP: 137/67 (08/28/18 2202)  SpO2: 96 % (08/28/18 2202) Vital Signs (24h Range):  Temp:  [98.2 °F (36.8 °C)] 98.2 °F (36.8 °C)  Pulse:  [66-93] 79  Resp:  [18] 18  SpO2:  [96 %-100 %] 96 %  BP: (127-170)/(63-83) 137/67     Weight: 82.6 kg (182 lb)  Body mass index is 25.38 kg/m².    Physical Exam   Constitutional: He is oriented to person, place, and time. He appears well-developed and well-nourished. No distress.   HENT:   Head: Normocephalic and atraumatic.   Eyes: Conjunctivae and EOM are normal. Pupils are equal, round, and reactive to light.   Neck: Normal range of motion. Neck supple.   Cardiovascular: Normal rate, regular rhythm, normal heart sounds and intact distal pulses.   No murmur heard.  Pulmonary/Chest: Effort normal and breath sounds normal. No respiratory distress.   Abdominal: Soft. Bowel sounds are normal. There is no tenderness. There is no guarding.   Musculoskeletal:   No lumbar TTP or perispinal tenderness, no bony step offs, no sensory deficits, no focal neuro deficits, straight leg raise neg BL, pt able to stand and ambulate, unable to  flex or extend hips    Neurological: He is alert and oriented to person, place, and time. No sensory deficit.   Skin: Skin is warm and dry. Capillary refill takes less than 2 seconds.   Psychiatric: He has a normal mood and affect. His behavior is normal.   Nursing note and vitals reviewed.        CRANIAL NERVES     CN III, IV, VI   Pupils are equal, round, and reactive to light.  Extraocular motions are normal.        Significant Labs:   BMP:   Recent Labs   Lab  08/28/18   1020   GLU  111*   NA  138   K  4.3   CL  104   CO2  27   BUN  22   CREATININE  1.0   CALCIUM  9.5     CBC:   Recent Labs   Lab  08/28/18   1020   WBC  6.23   HGB  15.8   HCT  47.0   PLT  162     CMP:   Recent Labs   Lab  08/28/18   1020   NA  138   K  4.3   CL  104   CO2  27   GLU  111*   BUN  22   CREATININE  1.0   CALCIUM  9.5   PROT  6.3   ALBUMIN  3.6   BILITOT  0.5   ALKPHOS  76   AST  16   ALT  23   ANIONGAP  7*   EGFRNONAA  >60.0     Urine Culture: No results for input(s): LABURIN in the last 48 hours.  Urine Studies: No results for input(s): COLORU, APPEARANCEUA, PHUR, SPECGRAV, PROTEINUA, GLUCUA, KETONESU, BILIRUBINUA, OCCULTUA, NITRITE, UROBILINOGEN, LEUKOCYTESUR, RBCUA, WBCUA, BACTERIA, SQUAMEPITHEL, HYALINECASTS in the last 48 hours.    Invalid input(s): WRIGHTSUR  All pertinent labs within the past 24 hours have been reviewed.    Significant Imaging: I have reviewed all pertinent imaging results/findings within the past 24 hours.

## 2018-08-29 NOTE — HPI
Jean Carlson is a 68 y.o. male retired  here for initial evaluation of severe low back pain. The pain has been present for 3 weeks. Did a lot of moving 3 weeks ago, then developed slowly progressive back pain. Back pain has been severe enough to start using walker over past 2 days. The patient describes the pain as sharp.  The pain is worse with activities and improved by lying. There is no associated numbness and tingling. There is no subjective weakness. Prior treatments have included ibuprofen, tylenol, and oral steroids by PCP, but no PT. Of note had L4-5 discectomy about 15 years ago for radiculopathy that improved postop.     The Patient denies myelopathic symptoms such as handwriting changes or difficulty with buttons/coins/keys. Denies perineal paresthesias, bowel/bladder dysfunction.

## 2018-08-29 NOTE — ASSESSMENT & PLAN NOTE
Mild circumferential disc bulge at L3-L4, severe spinal canal stenosis   Pt is a 68 y.o. M with osteopenia c/o x3 week Hx of intractable lower back pain, not responsive to NSAID'S, steroids, or flexeril, MRI study remarkable for severe spinal canal stenosis    Improved back pain however, pt now complains of R-sided shooting pains into thigh-- ortho paged  - Orthopaedics consulted in ER.  They do not recommend surgery at this time--- will discuss new findings  - Toradol 15 mg Q4H PRN, morphine 4 mg Q4H PRN, Lidocaine patch  - Continue home flexeril 10 mg TID  - PT consulted-- outpatient PT/OT  - MRI lumbar spine with mild circumferential disc bulge at L3-L4 with new, superimposed right paracentral extrusion with superior migration resulting in severe spinal canal stenosis  - neuro checks q4h

## 2018-08-29 NOTE — ASSESSMENT & PLAN NOTE
- Pt is a 68 y.o. M with osteopenia c/o x3 week Hx of irretractable lower back pain, not responsive to NSAID'S, steroids, or flexeril, MRI study remarkable for severe spinal canal stenosis  - Orthopaedics consulted in ER.  They do not recommend surgery at this time.  - Toradol 15 mg Q4H PRN, morphine 4 mg Q4H PRN, Lidocaine patch  - Continue home flexeril 10 mg TID  - PT consult

## 2018-08-29 NOTE — PROGRESS NOTES
Pt c/o sharp pain to lower back that radiate down the R hip to mid lateral thigh. Pt wanted me to inform MD of symptoms. MD notified and PRN medication administered as ordered. Will continue to monitor.

## 2018-08-29 NOTE — HPI
Mr.Kirk Carlson is a 68 y.o. M with osteopenia, laminectomy (15 yrs ago) who presents w/ x3 week Hx of lower back pain that has been progressively worsening. Onset was sudden after lifting furniture at home. He took muscle relaxants and NSAIDS for the first two weeks with some pain relief.  However, pain was not resolved so he went to his PCP's office last week.  He was started on steroids at that time.  He states that since starting the steroids, his pain has been worse.  He has had to borrow a walker from his mother-in-law to assist with ambulation.  Today his pain was so excruciating that he was unable to ambulate at all, which prompted he to come into the the ED for evaluation. He denies any other Sx; no radiation of pain, urinary/bowel incontinence, numbness/tingling, weakness, fever, CP, SOB.     ED workup remarkable for moderate disc space narrowing at L4-5 on plain films, MRI - mild circumferential disc bulge at L3-L4 with new superimposed right paracentral extrusion with superior migration resulting in severe spinal canal stenosis, CBC CMP Sed rate WNL.

## 2018-08-29 NOTE — PLAN OF CARE
Problem: Patient Care Overview  Goal: Plan of Care Review  Outcome: Ongoing (interventions implemented as appropriate)  Pt remains free of falls and injury. Pt ambulated from stretcher in hallway to bed in room without difficulty. Pt makes statement of no pain when he reached floor. Pt informed of medications prescribed to him for pain control. Pt has not as of yet asked for analgesic. Bed low and locked, Call light within reach.

## 2018-08-29 NOTE — PROGRESS NOTES
[facility] Hospital Medicine  Progress Note    Patient Name: Jean Carlson  MRN: [id number]  Patient Class: OP- Observation   Admission Date: 8/28/2018  Length of Stay: 0 days  Attending Physician: Leelee Canseco MD  Primary Care Provider: Papi Fritz MD    [facility] Medicine Team: [facility]OSP MED E Tara Josue PA-C    Subjective:     Principal Problem:Intractable back pain    HPI:  Mr.Kirk Carlson is a 68 y.o. M with osteopenia, laminectomy (15 yrs ago) who presents w/ x3 week Hx of lower back pain that has been progressively worsening. Onset was sudden after lifting furniture at home. He took muscle relaxants and NSAIDS for the first two weeks with some pain relief.  However, pain was not resolved so he went to his PCP's office last week.  He was started on steroids at that time.  He states that since starting the steroids, his pain has been worse.  He has had to borrow a walker from his mother-in-law to assist with ambulation.  Today his pain was so excruciating that he was unable to ambulate at all, which prompted he to come into the the ED for evaluation. He denies any other Sx; no radiation of pain, urinary/bowel incontinence, numbness/tingling, weakness, fever, CP, SOB.     ED workup remarkable for moderate disc space narrowing at L4-5 on plain films, MRI - mild circumferential disc bulge at L3-L4 with new superimposed right paracentral extrusion with superior migration resulting in severe spinal canal stenosis, CBC CMP Sed rate WNL.    Hospital Course:  Jean Carlson was placed in observation for intractable lower back pain. MRI lumbar spine with mild circumferential disc bulge at L3-L4 with new, superimposed right paracentral extrusion with superior migration resulting in severe spinal canal stenosis. Orthopedic surgery consulted and recommended outpatient follow-up. PT/OT consulted, awaiting recs.     Interval History: Patient with changes since admission, currently  reports new shooting pain that originates in back and radiates down into R thigh. Patient able to ambulate, but only for short distances. He reports pain is improved with lying flat but aggravated with with prolonged ambulation and sitting. Orthopedic surgery paged x2.      Review of Systems   Constitutional: Positive for activity change (unable to ambulate). Negative for chills, fatigue and fever.   HENT: Negative for congestion and rhinorrhea.    Eyes: Negative for pain and visual disturbance.   Respiratory: Negative for chest tightness and shortness of breath.    Cardiovascular: Negative for chest pain and leg swelling.   Gastrointestinal: Negative for abdominal pain, constipation, diarrhea and nausea.   Genitourinary: Negative for dysuria, flank pain, frequency and hematuria.   Musculoskeletal: Positive for back pain and gait problem (improved).   Skin: Negative for pallor.   Neurological: Negative for weakness and numbness.        Denies urinary/bowel incontinence, Denies saddle anesthesia   - shooting pain into right thigh      Objective:     Vital Signs (Most Recent):  Temp: 97.3 °F (36.3 °C) (08/29/18 1653)  Pulse: 66 (08/29/18 1653)  Resp: 18 (08/29/18 1653)  BP: (!) 166/77 (08/29/18 1653)  SpO2: 98 % (08/29/18 1653) Vital Signs (24h Range):  Temp:  [97.3 °F (36.3 °C)-98.6 °F (37 °C)] 97.3 °F (36.3 °C)  Pulse:  [66-85] 66  Resp:  [16-20] 18  SpO2:  [96 %-99 %] 98 %  BP: (119-168)/(63-79) 166/77     Weight: 81.9 kg (180 lb 8 oz)  Body mass index is 25.17 kg/m².  No intake or output data in the 24 hours ending 08/29/18 1733   Physical Exam   Constitutional: He is oriented to person, place, and time. He appears well-developed and well-nourished. No distress.   HENT:   Head: Normocephalic and atraumatic.   Eyes: Conjunctivae and EOM are normal. Pupils are equal, round, and reactive to light.   Neck: Normal range of motion. Neck supple.   Cardiovascular: Normal rate, regular rhythm, normal heart sounds and  intact distal pulses.   No murmur heard.  Pulmonary/Chest: Effort normal and breath sounds normal. No respiratory distress.   Abdominal: Soft. Bowel sounds are normal. There is no tenderness. There is no guarding.   Musculoskeletal:   No lumbar TTP or perispinal tenderness, no bony step offs, no sensory deficits, no focal neuro deficits, straight leg raise neg BL, pt able to stand and ambulate, unable to flex or extend hips    Neurological: He is alert and oriented to person, place, and time. No sensory deficit.   Skin: Skin is warm and dry. Capillary refill takes less than 2 seconds.   Psychiatric: He has a normal mood and affect. His behavior is normal.   Nursing note and vitals reviewed.      Significant Labs: All pertinent labs within the past 24 hours have been reviewed.    Significant Imaging: I have reviewed all pertinent imaging results/findings within the past 24 hours.    Assessment/Plan:      * Intractable back pain    Mild circumferential disc bulge at L3-L4, severe spinal canal stenosis   Pt is a 68 y.o. M with osteopenia c/o x3 week Hx of intractable lower back pain, not responsive to NSAID'S, steroids, or flexeril, MRI study remarkable for severe spinal canal stenosis    Improved back pain however, pt now complains of R-sided shooting pains into thigh-- ortho paged  - Orthopaedics consulted in ER.  They do not recommend surgery at this time--- will discuss new findings  - Toradol 15 mg Q4H PRN, morphine 4 mg Q4H PRN, Lidocaine patch  - Continue home flexeril 10 mg TID  - PT consulted-- outpatient PT/OT  - MRI lumbar spine with mild circumferential disc bulge at L3-L4 with new, superimposed right paracentral extrusion with superior migration resulting in severe spinal canal stenosis  - neuro checks q4h        GERD (gastroesophageal reflux disease)    - Chronic and stable  - Pepcid 20 mg BID        Hyperlipidemia    - Continue fish oil.          VTE Risk Mitigation (From admission, onward)         Ordered     Place DANIEL hose  Until discontinued      08/28/18 2036     Place sequential compression device  Until discontinued      08/28/18 2036     IP VTE LOW RISK PATIENT  Once      08/28/18 2036              Tara Josue PA-C  Department of Hospital Medicine   [facility]

## 2018-08-29 NOTE — PT/OT/SLP EVAL
"Physical Therapy Evaluation and Discharge Note    Patient Name:  Jean Carlson   MRN:  [id number]    Recommendations:     Discharge Recommendations:  outpatient PT   Discharge Equipment Recommendations: none   Barriers to discharge: None    Assessment:     Jean Carlson is a 68 y.o. male admitted with a medical diagnosis of Intractable back pain. During today's session, pt able to mobilize and ambulate safely community distance with minimal impairment; reports current pain regimen with positive effect.  At this time, patient is functioning at their prior level of function and does not require further acute PT services.     Recent Surgery: * No surgery found *      Plan:     During this hospitalization, patient does not require further acute PT services.  Please re-consult if situation changes.      Subjective     Chief Complaint: pain in lower back  Patient/Family Comments/goals: "I couldn't walk before coming in here"   Pain/Comfort:  · Pain Rating 1: 4/10  · Location - Orientation 1: lower  · Location 1: back  · Pain Addressed 1: Distraction, Reposition  · Pain Rating Post-Intervention 1: 4/10    Patients cultural, spiritual, Sabianism conflicts given the current situation:      Living Environment:  Patient History:  · Home environment: pt lives with wife in one story home c/ 3 KEVIN  · Assistance provided:  Wife; pt provides assistance for mother in law  · Bathroom set up:  Tub/shower    Previous Level of Mobilty  · Transfers: (I)  · Gait: (I) prior to current incident; immediately prior to entering hospital pt required RW for mobility    Additional Roles and Hx of Patient  · Working: retired  · Driving: yes  · Hobbies:being active  · Hearing or Vision Deficit: none  · Hx of Falls: none    DME: RW (from mother in law)  - Bold implies equipment being used    Objective:     Communicated with nsg prior to session.  Patient found supine in bed upon PT entry to room found with:       General Precautions: Standard,  "    Orthopedic Precautions:N/A   Braces: N/A       Exams:  Cognitive Exam  Patient is oriented to Person, Place, Time and Situation and follows 100% of multi-step commands    Fine Motor Coordination    -       Intact   Postural Exam Patient presented with the following abnormalities:    -       No postural abnormalities identified   Sensation    -       Intact   Skin Integrity/Edema     -       Skin integrity: Visible skin intact   R LE ROM WFL   R LE Strength  WFL   L LE ROM WFL   L LE Strength  WFL       Functional Mobility  Bed Mobility  Scooting towards EOB: supervision  Supine to Sit: supervision   Sit to Supine: supervision   Transfers Scooting along EOB:  Supervision  Sit to Stand:  supervision with no AD for 1 trials  *Slowed to perform   Gait Gait Distance: 400 ft  Assistive Device Used: none  Assistance Level: stand by assistance  Description: slowed allan with wide base of support and noticeable arm swing to assist with stability         Balance   Static Sitting stand by assistance   Dynamic Sitting stand by assistance   Static Standing stand by assistance   Dynamic Standing stand by assistance       AM-PAC 6 CLICK MOBILITY  Total Score:24       Therapeutic Activities and Exercises:   PT educated pt on the following  - role of PT  - PT POC (including frequency and duration while in hospital)  - discharge recommendation (OP PT) and equipment needs (none)  - level of assistance currently req (1 person supervision)and safety precautions with nsg staff )  All questions and concerns answered and addressed. White board updated with pertinent information. Nsg notified.       AM-PAC 6 CLICK MOBILITY  Total Score:24     Patient left supine with all lines intact, call button in reach and nsg notified.    GOALS:   Multidisciplinary Problems     Physical Therapy Goals     Not on file                History:     Past Medical History:   Diagnosis Date    GERD (gastroesophageal reflux disease)     Osteoporosis      Prostate disease        Past Surgical History:   Procedure Laterality Date    back sx      lower    CATARACT EXTRACTION W/  INTRAOCULAR LENS IMPLANT Bilateral     Dr Finn/Symfony IOL     PROSTATE SURGERY      SPINE SURGERY      TONSILLECTOMY      TRANSURETHRAL RESECTION OF PROSTATE         Clinical Decision Making:     History  Co-morbidities and personal factors that may impact the plan of care Examination  Body Structures and Functions, activity limitations and participation restrictions that may impact the plan of care Clinical Presentation   Decision Making/ Complexity Score   Co-morbidities:   [] Time since onset of injury / illness / exacerbation  [x] Status of current condition  []Patient's cognitive status and safety concerns    [] Multiple Medical Problems (see med hx)  Personal Factors:   [] Patient's age  [] Prior Level of function   [] Patient's home situation (environment and family support)  [] Patient's level of motivation  [] Expected progression of patient      HISTORY:(criteria)    [] 06317 - no personal factors/history    [x] 18415 - has 1-2 personal factor/comorbidity     [] 34181 - has >3 personal factor/comorbidity     Body Regions:  [] Objective examination findings  [] Head     []  Neck  [x] Trunk   [] Upper Extremity  [x] Lower Extremity    Body Systems:  [] For communication ability, affect, cognition, language, and learning style: the assessment of the ability to make needs known, consciousness, orientation (person, place, and time), expected emotional /behavioral responses, and learning preferences (eg, learning barriers, education  needs)  [x] For the neuromuscular system: a general assessment of gross coordinated movement (eg, balance, gait, locomotion, transfers, and transitions) and motor function  (motor control and motor learning)  [] For the musculoskeletal system: the assessment of gross symmetry, gross range of motion, gross strength, height, and weight  [] For the  integumentary system: the assessment of pliability(texture), presence of scar formation, skin color, and skin integrity  [] For cardiovascular/pulmonary system: the assessment of heart rate, respiratory rate, blood pressure, and edema     Activity limitations:    [] Patient's cognitive status and saf ety concerns          [x] Status of current condition      [] Weight bearing restriction  [] Cardiopulmunary Restriction    Participation Restrictions:   [] Goals and goal agreement with the patient     [] Rehab potential (prognosis) and probable outcome      Examination of Body System: (criteria)    [] 89379 - addressing 1-2 elements    [x] 73825 - addressing a total of 3 or more elements     [] 51531 -  Addressing a total of 4 or more elements         Clinical Presentation: (criteria)  Stable - 87641     On examination of body system using standardized tests and measures patient presents with 3 or more elements from any of the following: body structures and functions, activity limitations, and/or participation restrictions.  Leading to a clinical presentation that is considered stable and/or uncomplicated                              Clinical Decision Making  (Eval Complexity):  Low- 71845     Time Tracking:     PT Received On: 08/29/18  PT Start Time: 0830     PT Stop Time: 0845  PT Total Time (min): 15 min     Billable Minutes: Evaluation 15      Valerie Dos Santos, PT  08/29/2018

## 2018-08-29 NOTE — PLAN OF CARE
08/29/18 0910   Discharge Assessment   Assessment Type Discharge Planning Assessment   Confirmed/corrected address and phone number on facesheet? Yes   Assessment information obtained from? Patient   Expected Length of Stay (days) 1   Communicated expected length of stay with patient/caregiver yes   Prior to hospitilization cognitive status: Alert/Oriented   Prior to hospitalization functional status: Independent   Current cognitive status: Alert/Oriented   Current Functional Status: Independent   Lives With spouse  (Neha Yoon ([phone]))   Able to Return to Prior Arrangements yes   Is patient able to care for self after discharge? Yes   Patient's perception of discharge disposition home or selfcare   Readmission Within The Last 30 Days no previous admission in last 30 days   Patient currently being followed by outpatient case management? No   Patient currently receives any other outside agency services? No   Equipment Currently Used at Home none   Do you have any problems affording any of your prescribed medications? No   Is the patient taking medications as prescribed? yes   Does the patient have transportation home? Yes   Transportation Available family or friend will provide  (spouse)   Does the patient receive services at the [facility]? No   Discharge Plan A Home with family   Discharge Plan B Home Health   Patient/Family In Agreement With Plan yes     Dx: intractable back pain  Consult: ortho surg & PT/OT  Pharm: [facility]  Hosp f/u appt: Dr. Dustin Montesinos (neuro-surg) on 9/13/18 at 1500    Patient stated that he would like to started out patient rehab at [facility] in [place]. Patient's face sheet, ambulatory referral to out pt PT/OT, H&P, ortho note, & [facility] [phone].

## 2018-08-29 NOTE — H&P
[facility] Hospital Medicine  History & Physical    Patient Name: Jean Carlson  MRN: [id number]  Admission Date: 8/28/2018  Attending Physician: Leelee Canseco MD   Primary Care Provider: Papi Fritz MD    [facility] Medicine Team: [facility] [place] Leonila Nelson PA-C     Patient information was obtained from patient, past medical records and ER records.     Subjective:     Principal Problem:Intractable back pain    Chief Complaint:   Chief Complaint   Patient presents with    Back Pain     injured back 3 weeks ago; been on steroids and muscle relaxers; woke up this morning to go to the MD and was unable to ambulate without difficulty and extreme pain; denies numbness/tingling in BLE        HPI: Mr.Kirk Carlson is a 68 y.o. M with osteopenia, laminectomy (15 yrs ago) who presents w/ x3 week Hx of lower back pain that has been progressively worsening. Onset was sudden after lifting furniture at home. He took muscle relaxants and NSAIDS for the first two weeks with some pain relief.  However, pain was not resolved so he went to his PCP's office last week.  He was started on steroids at that time.  He states that since starting the steroids, his pain has been worse.  He has had to borrow a walker from his mother-in-law to assist with ambulation.  Today his pain was so excruciating that he was unable to ambulate at all, which prompted he to come into the the ED for evaluation. He denies any other Sx; no radiation of pain, urinary/bowel incontinence, numbness/tingling, weakness, fever, CP, SOB.     ED workup remarkable for moderate disc space narrowing at L4-5 on plain films, MRI - mild circumferential disc bulge at L3-L4 with new superimposed right paracentral extrusion with superior migration resulting in severe spinal canal stenosis, CBC CMP Sed rate WNL.    Past Medical History:   Diagnosis Date    GERD (gastroesophageal reflux disease)     Osteoporosis     Prostate disease         Past Surgical History:   Procedure Laterality Date    back sx      lower    CATARACT EXTRACTION W/  INTRAOCULAR LENS IMPLANT Bilateral     Dr Finn/Symfony IOL     PROSTATE SURGERY      SPINE SURGERY      TONSILLECTOMY      TRANSURETHRAL RESECTION OF PROSTATE         Review of patient's allergies indicates:  No Known Allergies    No current facility-administered medications on file prior to encounter.      Current Outpatient Medications on File Prior to Encounter   Medication Sig    aspirin 81 MG Chew Take 81 mg by mouth once daily.    co-enzyme Q-10 30 mg capsule Take 30 mg by mouth once daily.     cyclobenzaprine (FLEXERIL) 10 MG tablet Take 1 tablet (10 mg total) by mouth 3 (three) times daily as needed for Muscle spasms.    fish oil-omega-3 fatty acids 300-1,000 mg capsule Take 2 g by mouth once daily.    lutein-zeaxanthin 25-5 mg Cap Take by mouth once daily.     methylPREDNISolone (MEDROL DOSEPACK) 4 mg tablet use as directed    multivitamin capsule Take 1 capsule by mouth once daily.    ranitidine (ZANTAC) 300 MG tablet Take 1 tablet (300 mg total) by mouth every evening.    resveratrol 100 mg Cap Take by mouth once daily.    omega-3 acid ethyl esters (LOVAZA) 1 gram capsule Take 1 capsule (1 g total) by mouth once daily.     Family History     Problem Relation (Age of Onset)    Glaucoma Maternal Uncle, Maternal Aunt    Heart attack Mother, Father    Heart disease Mother, Father    No Known Problems Daughter    Retinitis pigmentosa Maternal Uncle    Skin cancer Mother        Tobacco Use    Smoking status: Never Smoker    Smokeless tobacco: Never Used   Substance and Sexual Activity    Alcohol use: Yes     Alcohol/week: 1.8 oz     Types: 3 Glasses of wine per week     Comment: 3, 5 oz glasses a week    Drug use: No    Sexual activity: Yes     Partners: Female     Review of Systems   Constitutional: Positive for activity change (unable to ambulate). Negative for chills, fatigue and  fever.   HENT: Negative for congestion and rhinorrhea.    Eyes: Negative for pain and visual disturbance.   Respiratory: Negative for chest tightness and shortness of breath.    Cardiovascular: Negative for chest pain and leg swelling.   Gastrointestinal: Negative for abdominal pain, constipation, diarrhea and nausea.   Genitourinary: Negative for dysuria, flank pain, frequency and hematuria.   Musculoskeletal: Positive for back pain and gait problem.   Skin: Negative for pallor.   Neurological: Negative for weakness and numbness.        Denies urinary/bowel incontinence, Denies saddle anesthesia      Objective:     Vital Signs (Most Recent):  Temp: 98.2 °F (36.8 °C) (08/28/18 0935)  Pulse: 79 (08/28/18 2202)  Resp: 18 (08/28/18 0935)  BP: 137/67 (08/28/18 2202)  SpO2: 96 % (08/28/18 2202) Vital Signs (24h Range):  Temp:  [98.2 °F (36.8 °C)] 98.2 °F (36.8 °C)  Pulse:  [66-93] 79  Resp:  [18] 18  SpO2:  [96 %-100 %] 96 %  BP: (127-170)/(63-83) 137/67     Weight: 82.6 kg (182 lb)  Body mass index is 25.38 kg/m².    Physical Exam   Constitutional: He is oriented to person, place, and time. He appears well-developed and well-nourished. No distress.   HENT:   Head: Normocephalic and atraumatic.   Eyes: Conjunctivae and EOM are normal. Pupils are equal, round, and reactive to light.   Neck: Normal range of motion. Neck supple.   Cardiovascular: Normal rate, regular rhythm, normal heart sounds and intact distal pulses.   No murmur heard.  Pulmonary/Chest: Effort normal and breath sounds normal. No respiratory distress.   Abdominal: Soft. Bowel sounds are normal. There is no tenderness. There is no guarding.   Musculoskeletal:   No lumbar TTP or perispinal tenderness, no bony step offs, no sensory deficits, no focal neuro deficits, straight leg raise neg BL, pt able to stand and ambulate, unable to flex or extend hips    Neurological: He is alert and oriented to person, place, and time. No sensory deficit.   Skin: Skin is  warm and dry. Capillary refill takes less than 2 seconds.   Psychiatric: He has a normal mood and affect. His behavior is normal.   Nursing note and vitals reviewed.        CRANIAL NERVES     CN III, IV, VI   Pupils are equal, round, and reactive to light.  Extraocular motions are normal.        Significant Labs:   BMP:   Recent Labs   Lab  08/28/18   1020   GLU  111*   NA  138   K  4.3   CL  104   CO2  27   BUN  22   CREATININE  1.0   CALCIUM  9.5     CBC:   Recent Labs   Lab  08/28/18   1020   WBC  6.23   HGB  15.8   HCT  47.0   PLT  162     CMP:   Recent Labs   Lab  08/28/18   1020   NA  138   K  4.3   CL  104   CO2  27   GLU  111*   BUN  22   CREATININE  1.0   CALCIUM  9.5   PROT  6.3   ALBUMIN  3.6   BILITOT  0.5   ALKPHOS  76   AST  16   ALT  23   ANIONGAP  7*   EGFRNONAA  >60.0     Urine Culture: No results for input(s): LABURIN in the last 48 hours.  Urine Studies: No results for input(s): COLORU, APPEARANCEUA, PHUR, SPECGRAV, PROTEINUA, GLUCUA, KETONESU, BILIRUBINUA, OCCULTUA, NITRITE, UROBILINOGEN, LEUKOCYTESUR, RBCUA, WBCUA, BACTERIA, SQUAMEPITHEL, HYALINECASTS in the last 48 hours.    Invalid input(s): WRIGHTSUR  All pertinent labs within the past 24 hours have been reviewed.    Significant Imaging: I have reviewed all pertinent imaging results/findings within the past 24 hours.    Assessment/Plan:     * Intractable back pain    - Pt is a 68 y.o. M with osteopenia c/o x3 week Hx of irretractable lower back pain, not responsive to NSAID'S, steroids, or flexeril, MRI study remarkable for severe spinal canal stenosis  - Orthopaedics consulted in ER.  They do not recommend surgery at this time.  - Toradol 15 mg Q4H PRN, morphine 4 mg Q4H PRN, Lidocaine patch  - Continue home flexeril 10 mg TID  - PT consult        GERD (gastroesophageal reflux disease)    - Chronic and stable  - Pepcid 20 mg BID        Hyperlipidemia    - Continue fish oil.          VTE Risk Mitigation (From admission, onward)        Ordered      Place DANIEL hose  Until discontinued      08/28/18 2036     Place sequential compression device  Until discontinued      08/28/18 2036     IP VTE LOW RISK PATIENT  Once      08/28/18 2036             Leonila Nelson PA-C  Department of Hospital Medicine   [facility]

## 2018-08-29 NOTE — SUBJECTIVE & OBJECTIVE
Past Medical History:   Diagnosis Date    GERD (gastroesophageal reflux disease)     Osteoporosis     Prostate disease        Past Surgical History:   Procedure Laterality Date    back sx      lower    CATARACT EXTRACTION W/  INTRAOCULAR LENS IMPLANT Bilateral     Dr Finn/Symjoyce IOL     PROSTATE SURGERY      SPINE SURGERY      TONSILLECTOMY      TRANSURETHRAL RESECTION OF PROSTATE         Review of patient's allergies indicates:  No Known Allergies    No current facility-administered medications for this encounter.      Current Outpatient Medications   Medication Sig    aspirin 81 MG Chew Take 81 mg by mouth once daily.    co-enzyme Q-10 30 mg capsule Take 30 mg by mouth once daily.     cyclobenzaprine (FLEXERIL) 10 MG tablet Take 1 tablet (10 mg total) by mouth 3 (three) times daily as needed for Muscle spasms.    fish oil-omega-3 fatty acids 300-1,000 mg capsule Take 2 g by mouth once daily.    lutein-zeaxanthin 25-5 mg Cap Take by mouth once daily.     methylPREDNISolone (MEDROL DOSEPACK) 4 mg tablet use as directed    multivitamin capsule Take 1 capsule by mouth once daily.    ranitidine (ZANTAC) 300 MG tablet Take 1 tablet (300 mg total) by mouth every evening.    resveratrol 100 mg Cap Take by mouth once daily.    omega-3 acid ethyl esters (LOVAZA) 1 gram capsule Take 1 capsule (1 g total) by mouth once daily.     Family History     Problem Relation (Age of Onset)    Glaucoma Maternal Uncle, Maternal Aunt    Heart attack Mother, Father    Heart disease Mother, Father    No Known Problems Daughter    Retinitis pigmentosa Maternal Uncle    Skin cancer Mother        Tobacco Use    Smoking status: Never Smoker    Smokeless tobacco: Never Used   Substance and Sexual Activity    Alcohol use: Yes     Alcohol/week: 1.8 oz     Types: 3 Glasses of wine per week     Comment: 3, 5 oz glasses a week    Drug use: No    Sexual activity: Yes     Partners: Female     ROS   ROS: denies chest pain,  shortness of breath, nausea, vomiting, numbness or tingling of extremities    Objective:     Vital Signs (Most Recent):  Temp: 98.2 °F (36.8 °C) (08/28/18 0935)  Pulse: 70 (08/28/18 2002)  Resp: 18 (08/28/18 0935)  BP: 133/69 (08/28/18 2002)  SpO2: 98 % (08/28/18 2002) Vital Signs (24h Range):  Temp:  [98.2 °F (36.8 °C)] 98.2 °F (36.8 °C)  Pulse:  [66-93] 70  Resp:  [18] 18  SpO2:  [97 %-100 %] 98 %  BP: (127-170)/(63-83) 133/69     Weight: 82.6 kg (182 lb)     Body mass index is 25.38 kg/m².    No intake or output data in the 24 hours ending 08/28/18 2102    Ortho/SPM Exam   General: The patient is a 68 y.o. male in no apparent distress, the patient is orientatied to person, place and time.   Psych: Normal mood and affect  HEENT: Vision grossly intact, hearing intact to the spoken word.  Lungs: Respirations unlabored.  Gait: Normal station and gait  Skin: Dorsal lumbar skin negative for rashes, lesions, hairy patches and surgical scars.  Range of motion: Lumbar range of motion is acceptable. There is no lumbar tenderness to palpation.  Spinal Balance: Global saggital and coronal spinal balance acceptable, no significant for scoliosis and kyphosis.  Musculoskeletal: No pain with the range of motion of the bilateral hips. No trochanteric tenderness to palpation.  Vascular: Bilateral lower extremities warm and well perfused, Dorsalis pedis pulses 2+ bilaterally.  Neurological: Normal strength and tone in all major motor groups in the bilateral upper and lower extremities. Normal sensation to light touch in the L2-S1 dermatomes bilaterally.  Deep tendon reflexes symmetric in the bilateral lower extremities.  Negative Babinski bilaterally.    Significant Labs:   CBC:   Recent Labs   Lab  08/28/18   1020   WBC  6.23   HGB  15.8   HCT  47.0   PLT  162     CMP:   Recent Labs   Lab  08/28/18   1020   NA  138   K  4.3   CL  104   CO2  27   GLU  111*   BUN  22   CREATININE  1.0   CALCIUM  9.5   PROT  6.3   ALBUMIN  3.6    BILITOT  0.5   ALKPHOS  76   AST  16   ALT  23   ANIONGAP  7*   EGFRNONAA  >60.0     All pertinent labs within the past 24 hours have been reviewed.    Significant Imaging:  X-ray lumbar spine: no acute fractures, dislocations or osseous lesions  MRI lumbar spine: right paracentral L3-4 disc herniation, loss of L4-5 disc height

## 2018-08-29 NOTE — ASSESSMENT & PLAN NOTE
Jean Carlson is a 69 yo male with low back pain    - Imaging revealing L3-4 right paracentral disk herniation which is unlikely to be source of pain given lack of neuro symptoms  - WBAT BLE   - PT/OT  - NSAIDs, consider muscle relaxants  - No acute orthopedic intervention  - Please call with questions  - Follow-up in clinic after discharge

## 2018-08-29 NOTE — CONSULTS
[facility]  Orthopedics  Consult Note    Patient Name: Jean Carlson  MRN: [id number]  Admission Date: 8/28/2018  Hospital Length of Stay: 0 days  Attending Provider: Leelee Canseco MD  Primary Care Provider: Papi Fritz MD  Inpatient consult to Orthopedics  Consult performed by: William Johnson MD  Consult ordered by: Elissa Leiva MD        Subjective:     Principal Problem:Intractable back pain    Chief Complaint:   Chief Complaint   Patient presents with    Back Pain     injured back 3 weeks ago; been on steroids and muscle relaxers; woke up this morning to go to the MD and was unable to ambulate without difficulty and extreme pain; denies numbness/tingling in BLE        HPI: Jean Carlson is a 68 y.o. male retired  here for initial evaluation of severe low back pain. The pain has been present for 3 weeks. Did a lot of moving 3 weeks ago, then developed slowly progressive back pain. Back pain has been severe enough to start using walker over past 2 days. The patient describes the pain as sharp.  The pain is worse with activities and improved by lying. There is no associated numbness and tingling. There is no subjective weakness. Prior treatments have included ibuprofen, tylenol, and oral steroids by PCP, but no PT. Of note had L4-5 discectomy about 15 years ago for radiculopathy that improved postop.     The Patient denies myelopathic symptoms such as handwriting changes or difficulty with buttons/coins/keys. Denies perineal paresthesias, bowel/bladder dysfunction.      Past Medical History:   Diagnosis Date    GERD (gastroesophageal reflux disease)     Osteoporosis     Prostate disease        Past Surgical History:   Procedure Laterality Date    back sx      lower    CATARACT EXTRACTION W/  INTRAOCULAR LENS IMPLANT Bilateral     Dr Finn/Symfony IOL     PROSTATE SURGERY      SPINE SURGERY      TONSILLECTOMY      TRANSURETHRAL RESECTION OF PROSTATE          Review of patient's allergies indicates:  No Known Allergies    No current facility-administered medications for this encounter.      Current Outpatient Medications   Medication Sig    aspirin 81 MG Chew Take 81 mg by mouth once daily.    co-enzyme Q-10 30 mg capsule Take 30 mg by mouth once daily.     cyclobenzaprine (FLEXERIL) 10 MG tablet Take 1 tablet (10 mg total) by mouth 3 (three) times daily as needed for Muscle spasms.    fish oil-omega-3 fatty acids 300-1,000 mg capsule Take 2 g by mouth once daily.    lutein-zeaxanthin 25-5 mg Cap Take by mouth once daily.     methylPREDNISolone (MEDROL DOSEPACK) 4 mg tablet use as directed    multivitamin capsule Take 1 capsule by mouth once daily.    ranitidine (ZANTAC) 300 MG tablet Take 1 tablet (300 mg total) by mouth every evening.    resveratrol 100 mg Cap Take by mouth once daily.    omega-3 acid ethyl esters (LOVAZA) 1 gram capsule Take 1 capsule (1 g total) by mouth once daily.     Family History     Problem Relation (Age of Onset)    Glaucoma Maternal Uncle, Maternal Aunt    Heart attack Mother, Father    Heart disease Mother, Father    No Known Problems Daughter    Retinitis pigmentosa Maternal Uncle    Skin cancer Mother        Tobacco Use    Smoking status: Never Smoker    Smokeless tobacco: Never Used   Substance and Sexual Activity    Alcohol use: Yes     Alcohol/week: 1.8 oz     Types: 3 Glasses of wine per week     Comment: 3, 5 oz glasses a week    Drug use: No    Sexual activity: Yes     Partners: Female     ROS   ROS: denies chest pain, shortness of breath, nausea, vomiting, numbness or tingling of extremities    Objective:     Vital Signs (Most Recent):  Temp: 98.2 °F (36.8 °C) (08/28/18 0935)  Pulse: 70 (08/28/18 2002)  Resp: 18 (08/28/18 0935)  BP: 133/69 (08/28/18 2002)  SpO2: 98 % (08/28/18 2002) Vital Signs (24h Range):  Temp:  [98.2 °F (36.8 °C)] 98.2 °F (36.8 °C)  Pulse:  [66-93] 70  Resp:  [18] 18  SpO2:  [97 %-100 %] 98  %  BP: (127-170)/(63-83) 133/69     Weight: 82.6 kg (182 lb)     Body mass index is 25.38 kg/m².    No intake or output data in the 24 hours ending 08/28/18 2102    Ortho/SPM Exam   General: The patient is a 68 y.o. male in no apparent distress, the patient is orientatied to person, place and time.   Psych: Normal mood and affect  HEENT: Vision grossly intact, hearing intact to the spoken word.  Lungs: Respirations unlabored.  Gait: Normal station and gait  Skin: Dorsal lumbar skin negative for rashes, lesions, hairy patches and surgical scars.  Range of motion: Lumbar range of motion is acceptable. There is no lumbar tenderness to palpation.  Spinal Balance: Global saggital and coronal spinal balance acceptable, no significant for scoliosis and kyphosis.  Musculoskeletal: No pain with the range of motion of the bilateral hips. No trochanteric tenderness to palpation.  Vascular: Bilateral lower extremities warm and well perfused, Dorsalis pedis pulses 2+ bilaterally.  Neurological: Normal strength and tone in all major motor groups in the bilateral upper and lower extremities. Normal sensation to light touch in the L2-S1 dermatomes bilaterally.  Deep tendon reflexes symmetric in the bilateral lower extremities.  Negative Babinski bilaterally.    Significant Labs:   CBC:   Recent Labs   Lab  08/28/18   1020   WBC  6.23   HGB  15.8   HCT  47.0   PLT  162     CMP:   Recent Labs   Lab  08/28/18   1020   NA  138   K  4.3   CL  104   CO2  27   GLU  111*   BUN  22   CREATININE  1.0   CALCIUM  9.5   PROT  6.3   ALBUMIN  3.6   BILITOT  0.5   ALKPHOS  76   AST  16   ALT  23   ANIONGAP  7*   EGFRNONAA  >60.0     All pertinent labs within the past 24 hours have been reviewed.    Significant Imaging:  X-ray lumbar spine: no acute fractures, dislocations or osseous lesions  MRI lumbar spine: right paracentral L3-4 disc herniation, loss of L4-5 disc height    Assessment/Plan:     * Intractable back pain    Jean Carlson is a  67 yo male with low back pain    - Imaging revealing L3-4 right paracentral disk herniation which is unlikely to be source of pain given lack of neuro symptoms  - WBAT BLE   - PT/OT  - NSAIDs, consider muscle relaxants  - No acute orthopedic intervention  - Please call with questions  - Follow-up in clinic after discharge            William Johnson MD  Orthopedics  [facility]

## 2018-08-30 DIAGNOSIS — M51.36 DDD (DEGENERATIVE DISC DISEASE), LUMBAR: Primary | ICD-10-CM

## 2018-08-30 LAB
ALBUMIN SERPL BCP-MCNC: 3.7 G/DL
ALP SERPL-CCNC: 81 U/L
ALT SERPL W/O P-5'-P-CCNC: 22 U/L
ANION GAP SERPL CALC-SCNC: 7 MMOL/L
AST SERPL-CCNC: 20 U/L
BASOPHILS # BLD AUTO: 0.02 K/UL
BASOPHILS NFR BLD: 0.3 %
BILIRUB SERPL-MCNC: 0.9 MG/DL
BUN SERPL-MCNC: 19 MG/DL
CALCIUM SERPL-MCNC: 9.4 MG/DL
CHLORIDE SERPL-SCNC: 103 MMOL/L
CO2 SERPL-SCNC: 29 MMOL/L
CREAT SERPL-MCNC: 0.9 MG/DL
DIFFERENTIAL METHOD: ABNORMAL
EOSINOPHIL # BLD AUTO: 0.2 K/UL
EOSINOPHIL NFR BLD: 1.9 %
ERYTHROCYTE [DISTWIDTH] IN BLOOD BY AUTOMATED COUNT: 12 %
EST. GFR  (AFRICAN AMERICAN): >60 ML/MIN/1.73 M^2
EST. GFR  (NON AFRICAN AMERICAN): >60 ML/MIN/1.73 M^2
GLUCOSE SERPL-MCNC: 93 MG/DL
HCT VFR BLD AUTO: 47.6 %
HGB BLD-MCNC: 15.7 G/DL
IMM GRANULOCYTES # BLD AUTO: 0.02 K/UL
IMM GRANULOCYTES NFR BLD AUTO: 0.3 %
LYMPHOCYTES # BLD AUTO: 1 K/UL
LYMPHOCYTES NFR BLD: 12.6 %
MCH RBC QN AUTO: 30.5 PG
MCHC RBC AUTO-ENTMCNC: 33 G/DL
MCV RBC AUTO: 93 FL
MONOCYTES # BLD AUTO: 0.7 K/UL
MONOCYTES NFR BLD: 9.1 %
NEUTROPHILS # BLD AUTO: 6 K/UL
NEUTROPHILS NFR BLD: 75.8 %
NRBC BLD-RTO: 0 /100 WBC
PLATELET # BLD AUTO: 176 K/UL
PMV BLD AUTO: 11.2 FL
POTASSIUM SERPL-SCNC: 4.8 MMOL/L
PROT SERPL-MCNC: 6.5 G/DL
RBC # BLD AUTO: 5.14 M/UL
SODIUM SERPL-SCNC: 139 MMOL/L
WBC # BLD AUTO: 7.84 K/UL

## 2018-08-30 PROCEDURE — 80053 COMPREHEN METABOLIC PANEL: CPT

## 2018-08-30 PROCEDURE — 97165 OT EVAL LOW COMPLEX 30 MIN: CPT

## 2018-08-30 PROCEDURE — 25000003 PHARM REV CODE 250: Performed by: PHYSICIAN ASSISTANT

## 2018-08-30 PROCEDURE — G8988 SELF CARE GOAL STATUS: HCPCS | Mod: CH

## 2018-08-30 PROCEDURE — G8987 SELF CARE CURRENT STATUS: HCPCS | Mod: CH

## 2018-08-30 PROCEDURE — 85025 COMPLETE CBC W/AUTO DIFF WBC: CPT

## 2018-08-30 PROCEDURE — 36415 COLL VENOUS BLD VENIPUNCTURE: CPT

## 2018-08-30 PROCEDURE — G0378 HOSPITAL OBSERVATION PER HR: HCPCS

## 2018-08-30 PROCEDURE — 99214 OFFICE O/P EST MOD 30 MIN: CPT | Mod: ,,, | Performed by: ORTHOPAEDIC SURGERY

## 2018-08-30 PROCEDURE — 99226 PR SUBSEQUENT OBSERVATION CARE,LEVEL III: CPT | Mod: ,,, | Performed by: PHYSICIAN ASSISTANT

## 2018-08-30 RX ORDER — MORPHINE SULFATE 4 MG/ML
4 INJECTION, SOLUTION INTRAMUSCULAR; INTRAVENOUS EVERY 6 HOURS PRN
Status: DISCONTINUED | OUTPATIENT
Start: 2018-08-30 | End: 2018-09-01 | Stop reason: HOSPADM

## 2018-08-30 RX ADMIN — FAMOTIDINE 20 MG: 20 TABLET ORAL at 10:08

## 2018-08-30 RX ADMIN — ASPIRIN 81 MG CHEWABLE TABLET 81 MG: 81 TABLET CHEWABLE at 10:08

## 2018-08-30 RX ADMIN — OXYCODONE HYDROCHLORIDE AND ACETAMINOPHEN 1 TABLET: 10; 325 TABLET ORAL at 10:08

## 2018-08-30 RX ADMIN — SENNOSIDES AND DOCUSATE SODIUM 2 TABLET: 8.6; 5 TABLET ORAL at 08:08

## 2018-08-30 RX ADMIN — Medication 1 CAPSULE: at 10:08

## 2018-08-30 RX ADMIN — SENNOSIDES AND DOCUSATE SODIUM 2 TABLET: 8.6; 5 TABLET ORAL at 10:08

## 2018-08-30 RX ADMIN — OXYCODONE HYDROCHLORIDE AND ACETAMINOPHEN 1 TABLET: 5; 325 TABLET ORAL at 08:08

## 2018-08-30 RX ADMIN — FAMOTIDINE 20 MG: 20 TABLET ORAL at 08:08

## 2018-08-30 RX ADMIN — POLYETHYLENE GLYCOL 3350 17 G: 17 POWDER, FOR SOLUTION ORAL at 08:08

## 2018-08-30 RX ADMIN — OXYCODONE HYDROCHLORIDE AND ACETAMINOPHEN 1 TABLET: 5; 325 TABLET ORAL at 03:08

## 2018-08-30 RX ADMIN — GABAPENTIN 300 MG: 300 CAPSULE ORAL at 08:08

## 2018-08-30 RX ADMIN — OXYCODONE HYDROCHLORIDE AND ACETAMINOPHEN 1 TABLET: 10; 325 TABLET ORAL at 12:08

## 2018-08-30 RX ADMIN — LIDOCAINE 1 PATCH: 50 PATCH CUTANEOUS at 10:08

## 2018-08-30 RX ADMIN — OXYCODONE HYDROCHLORIDE AND ACETAMINOPHEN 1 TABLET: 10; 325 TABLET ORAL at 04:08

## 2018-08-30 RX ADMIN — POLYETHYLENE GLYCOL 3350 17 G: 17 POWDER, FOR SOLUTION ORAL at 10:08

## 2018-08-30 NOTE — PROGRESS NOTES
"[facility]  Orthopedics  Progress Note    Patient Name: Jean Carlson  MRN: [id number]  Admission Date: 8/28/2018  Hospital Length of Stay: 0 days  Attending Provider: Leelee Canseco MD  Primary Care Provider: Papi Fritz MD    Subjective:     Principal Problem:Intractable back pain    Principal Orthopedic Problem:  Low back and right leg pain    Interval History: Continued right low back and leg pain. Consistent with radicular nerve pain from right L3/4 disc herniation.     Review of patient's allergies indicates:  No Known Allergies    Current Facility-Administered Medications   Medication    acetaminophen tablet 650 mg    albuterol-ipratropium 2.5 mg-0.5 mg/3 mL nebulizer solution 3 mL    aspirin chewable tablet 81 mg    cyclobenzaprine tablet 10 mg    dextrose 50% injection 12.5 g    dextrose 50% injection 25 g    famotidine tablet 20 mg    gabapentin capsule 300 mg    glucagon (human recombinant) injection 1 mg    glucose chewable tablet 16 g    glucose chewable tablet 24 g    ketorolac injection 15 mg    lidocaine 5 % patch 1 patch    morphine injection 4 mg    omega-3 fatty acids-fish oil 340-1,000 mg capsule 1 capsule    ondansetron disintegrating tablet 8 mg    oxyCODONE-acetaminophen  mg per tablet 1 tablet    oxyCODONE-acetaminophen 5-325 mg per tablet 1 tablet    polyethylene glycol packet 17 g    promethazine tablet 25 mg    ramelteon tablet 8 mg    senna-docusate 8.6-50 mg per tablet 2 tablet    sodium chloride 0.9% flush 5 mL     Objective:     Vital Signs (Most Recent):  Temp: 98.1 °F (36.7 °C) (08/30/18 1136)  Pulse: 79 (08/30/18 1136)  Resp: 17 (08/30/18 1136)  BP: 124/71 (08/30/18 1136)  SpO2: (!) 92 % (08/30/18 1136) Vital Signs (24h Range):  Temp:  [97.3 °F (36.3 °C)-98.7 °F (37.1 °C)] 98.1 °F (36.7 °C)  Pulse:  [66-88] 79  Resp:  [17-20] 17  SpO2:  [92 %-99 %] 92 %  BP: (124-176)/(70-80) 124/71     Weight: 83 kg (183 lb)  Height: 5' 11" " (180.3 cm)  Body mass index is 25.52 kg/m².    No intake or output data in the 24 hours ending 08/30/18 1621    Ortho/SPM Exam  Physical Exam:  NAD, A/O x 3.  No focal motor or sensory deficits noted.      Significant Labs: All pertinent labs within the past 24 hours have been reviewed.    Significant Imaging: I have reviewed and interpreted all pertinent imaging results/findings.    Assessment/Plan:     * Intractable back pain    Jean Carlson is a 69 yo male with low back pain consistent with L3/4 disc herniation.    - Will consult IR for possible right L3/4 TFESI. Continue pain control per primary. NPO midnight              Jared Donald MD  Orthopedics  [facility]

## 2018-08-30 NOTE — TELEPHONE ENCOUNTER
Gave him some recs - still in hospital awaiting injection trial. Under care of Dr. Beckham - good choice

## 2018-08-30 NOTE — PLAN OF CARE
Patient's face sheet, ambulatory referral to out pt PT/OT, H&P, ortho note, & MRI results faxed to Dr. Kayla Lowe (f) [phone] per patient's request. Dr. Lowe' nurse to call the patient with appointment date & time. Will continue to follow.

## 2018-08-30 NOTE — ASSESSMENT & PLAN NOTE
Jean Carlson is a 69 yo male with low back pain consistent with L3/4 disc herniation.    - Will consult IR for possible right L3/4 TFESI. Continue pain control per primary. NPO midnight

## 2018-08-30 NOTE — PLAN OF CARE
Problem: Patient Care Overview  Goal: Plan of Care Review  Outcome: Ongoing (interventions implemented as appropriate)  Pt remains free of falls and injury. Pt provided with PRN analgesic for back pain with minimal results. Bed low and locked, Call light within reach.

## 2018-08-30 NOTE — PLAN OF CARE
Problem: Occupational Therapy Goal  Goal: Occupational Therapy Goal  Outcome: Outcome(s) achieved Date Met: 08/30/18  Evaluation complete and pt without skilled OT services.  DC OT  Mckenna Felix, OT  8/30/2018

## 2018-08-30 NOTE — PLAN OF CARE
Patient off the floor having an x-ray done when CM rounded. CM was informed that records fax[facility] to [facility] was not received. Patient's face sheet, ambulatory referral to out pt PT/OT, H&P, ortho note, & MRI results faxed to [facility] (f) [phone]. Will continue to follow.

## 2018-08-30 NOTE — PLAN OF CARE
Problem: Patient Care Overview  Goal: Plan of Care Review  Outcome: Ongoing (interventions implemented as appropriate)   08/30/18 1634   Coping/Psychosocial   Plan Of Care Reviewed With patient       Problem: Infection, Risk/Actual (Adult)  Goal: Identify Related Risk Factors and Signs and Symptoms  Related risk factors and signs and symptoms are identified upon initiation of Human Response Clinical Practice Guideline (CPG)  Outcome: Ongoing (interventions implemented as appropriate)   08/30/18 1634   Infection, Risk/Actual   Related Risk Factors (Infection, Risk/Actual) prolonged hospitalization;exposure to microbes   Signs and Symptoms (Infection, Risk/Actual) body temperature changes;weakness;pain;cultures positive       Problem: Fall Risk (Adult)  Goal: Identify Related Risk Factors and Signs and Symptoms  Related risk factors and signs and symptoms are identified upon initiation of Human Response Clinical Practice Guideline (CPG)  Outcome: Ongoing (interventions implemented as appropriate)   08/30/18 1634   Fall Risk   Related Risk Factors (Fall Risk) gait/mobility problems;history of falls;neuro disease/injury   Signs and Symptoms (Fall Risk) presence of risk factors

## 2018-08-30 NOTE — SUBJECTIVE & OBJECTIVE
"Principal Problem:Intractable back pain    Principal Orthopedic Problem:  Low back and right leg pain    Interval History: Continued right low back and leg pain. Consistent with radicular nerve pain from right L3/4 disc herniation.     Review of patient's allergies indicates:  No Known Allergies    Current Facility-Administered Medications   Medication    acetaminophen tablet 650 mg    albuterol-ipratropium 2.5 mg-0.5 mg/3 mL nebulizer solution 3 mL    aspirin chewable tablet 81 mg    cyclobenzaprine tablet 10 mg    dextrose 50% injection 12.5 g    dextrose 50% injection 25 g    famotidine tablet 20 mg    gabapentin capsule 300 mg    glucagon (human recombinant) injection 1 mg    glucose chewable tablet 16 g    glucose chewable tablet 24 g    ketorolac injection 15 mg    lidocaine 5 % patch 1 patch    morphine injection 4 mg    omega-3 fatty acids-fish oil 340-1,000 mg capsule 1 capsule    ondansetron disintegrating tablet 8 mg    oxyCODONE-acetaminophen  mg per tablet 1 tablet    oxyCODONE-acetaminophen 5-325 mg per tablet 1 tablet    polyethylene glycol packet 17 g    promethazine tablet 25 mg    ramelteon tablet 8 mg    senna-docusate 8.6-50 mg per tablet 2 tablet    sodium chloride 0.9% flush 5 mL     Objective:     Vital Signs (Most Recent):  Temp: 98.1 °F (36.7 °C) (08/30/18 1136)  Pulse: 79 (08/30/18 1136)  Resp: 17 (08/30/18 1136)  BP: 124/71 (08/30/18 1136)  SpO2: (!) 92 % (08/30/18 1136) Vital Signs (24h Range):  Temp:  [97.3 °F (36.3 °C)-98.7 °F (37.1 °C)] 98.1 °F (36.7 °C)  Pulse:  [66-88] 79  Resp:  [17-20] 17  SpO2:  [92 %-99 %] 92 %  BP: (124-176)/(70-80) 124/71     Weight: 83 kg (183 lb)  Height: 5' 11" (180.3 cm)  Body mass index is 25.52 kg/m².    No intake or output data in the 24 hours ending 08/30/18 1621    Ortho/SPM Exam  Physical Exam:  NAD, A/O x 3.  No focal motor or sensory deficits noted.      Significant Labs: All pertinent labs within the past 24 hours have " been reviewed.    Significant Imaging: I have reviewed and interpreted all pertinent imaging results/findings.

## 2018-08-31 ENCOUNTER — TELEPHONE (OUTPATIENT)
Dept: ORTHOPEDICS | Facility: CLINIC | Age: 68
End: 2018-08-31

## 2018-08-31 PROBLEM — M48.061 SPINAL STENOSIS OF LUMBAR REGION: Status: ACTIVE | Noted: 2018-08-31

## 2018-08-31 LAB
ALBUMIN SERPL BCP-MCNC: 3.6 G/DL
ALP SERPL-CCNC: 79 U/L
ALT SERPL W/O P-5'-P-CCNC: 21 U/L
ANION GAP SERPL CALC-SCNC: 7 MMOL/L
AST SERPL-CCNC: 22 U/L
BASOPHILS # BLD AUTO: 0.01 K/UL
BASOPHILS NFR BLD: 0.2 %
BILIRUB SERPL-MCNC: 1.1 MG/DL
BUN SERPL-MCNC: 18 MG/DL
CALCIUM SERPL-MCNC: 9.5 MG/DL
CHLORIDE SERPL-SCNC: 103 MMOL/L
CO2 SERPL-SCNC: 29 MMOL/L
CREAT SERPL-MCNC: 0.9 MG/DL
DIFFERENTIAL METHOD: ABNORMAL
EOSINOPHIL # BLD AUTO: 0.3 K/UL
EOSINOPHIL NFR BLD: 5.1 %
ERYTHROCYTE [DISTWIDTH] IN BLOOD BY AUTOMATED COUNT: 12.4 %
EST. GFR  (AFRICAN AMERICAN): >60 ML/MIN/1.73 M^2
EST. GFR  (NON AFRICAN AMERICAN): >60 ML/MIN/1.73 M^2
GLUCOSE SERPL-MCNC: 105 MG/DL
HCT VFR BLD AUTO: 46.5 %
HGB BLD-MCNC: 15.6 G/DL
IMM GRANULOCYTES # BLD AUTO: 0.02 K/UL
IMM GRANULOCYTES NFR BLD AUTO: 0.4 %
LYMPHOCYTES # BLD AUTO: 0.7 K/UL
LYMPHOCYTES NFR BLD: 12.9 %
MCH RBC QN AUTO: 31 PG
MCHC RBC AUTO-ENTMCNC: 33.5 G/DL
MCV RBC AUTO: 92 FL
MONOCYTES # BLD AUTO: 0.7 K/UL
MONOCYTES NFR BLD: 11.8 %
NEUTROPHILS # BLD AUTO: 3.8 K/UL
NEUTROPHILS NFR BLD: 69.6 %
NRBC BLD-RTO: 0 /100 WBC
PLATELET # BLD AUTO: 167 K/UL
PMV BLD AUTO: 11.3 FL
POTASSIUM SERPL-SCNC: 4.7 MMOL/L
PROT SERPL-MCNC: 6.3 G/DL
RBC # BLD AUTO: 5.04 M/UL
SODIUM SERPL-SCNC: 139 MMOL/L
WBC # BLD AUTO: 5.5 K/UL

## 2018-08-31 PROCEDURE — 25500020 PHARM REV CODE 255: Performed by: HOSPITALIST

## 2018-08-31 PROCEDURE — 63600175 PHARM REV CODE 636 W HCPCS: Performed by: RADIOLOGY

## 2018-08-31 PROCEDURE — 85025 COMPLETE CBC W/AUTO DIFF WBC: CPT

## 2018-08-31 PROCEDURE — 25000003 PHARM REV CODE 250: Performed by: PHYSICIAN ASSISTANT

## 2018-08-31 PROCEDURE — G0378 HOSPITAL OBSERVATION PER HR: HCPCS

## 2018-08-31 PROCEDURE — 99226 PR SUBSEQUENT OBSERVATION CARE,LEVEL III: CPT | Mod: ,,, | Performed by: PHYSICIAN ASSISTANT

## 2018-08-31 PROCEDURE — 36415 COLL VENOUS BLD VENIPUNCTURE: CPT

## 2018-08-31 PROCEDURE — 80053 COMPREHEN METABOLIC PANEL: CPT

## 2018-08-31 RX ORDER — METHYLPREDNISOLONE ACETATE 40 MG/ML
80 INJECTION, SUSPENSION INTRA-ARTICULAR; INTRALESIONAL; INTRAMUSCULAR; SOFT TISSUE ONCE
Status: COMPLETED | OUTPATIENT
Start: 2018-08-31 | End: 2018-08-31

## 2018-08-31 RX ADMIN — LIDOCAINE 1 PATCH: 50 PATCH CUTANEOUS at 09:08

## 2018-08-31 RX ADMIN — FAMOTIDINE 20 MG: 20 TABLET ORAL at 09:08

## 2018-08-31 RX ADMIN — METHYLPREDNISOLONE ACETATE 80 MG: 40 INJECTION, SUSPENSION INTRA-ARTICULAR; INTRALESIONAL; INTRAMUSCULAR; SOFT TISSUE at 05:08

## 2018-08-31 RX ADMIN — OXYCODONE HYDROCHLORIDE AND ACETAMINOPHEN 1 TABLET: 5; 325 TABLET ORAL at 09:08

## 2018-08-31 RX ADMIN — OXYCODONE HYDROCHLORIDE AND ACETAMINOPHEN 1 TABLET: 10; 325 TABLET ORAL at 03:08

## 2018-08-31 RX ADMIN — GABAPENTIN 300 MG: 300 CAPSULE ORAL at 09:08

## 2018-08-31 RX ADMIN — IOHEXOL 2 ML: 180 INJECTION INTRAVENOUS at 05:08

## 2018-08-31 RX ADMIN — SENNOSIDES AND DOCUSATE SODIUM 2 TABLET: 8.6; 5 TABLET ORAL at 09:08

## 2018-08-31 NOTE — NURSING
Paged [facility] related to patient requesting to have a meal tray and stating that his test was cancelled for today. Awaiting return call.

## 2018-08-31 NOTE — PLAN OF CARE
Patient resting quietly in bed when CM rounded. DEON informed the patient of records sent to [facility] & Dr. Kayla Lowe. Patient stated that he has an appointment with Dr. Lowe on 9/10/18 at 1045 to get a second opinion regarding surgery. CM informed patient of appointment scheduled with Dr. Dustin Montesinos on 9/13/18 at 1500. Patient stated that he wanted to keep both appointments. Patient stated that he his scheduled to have steroid injections today. Patient verbalized understanding that surgery will not be done during this hospitalization. Will continue to follow.

## 2018-08-31 NOTE — PROGRESS NOTES
[facility] Hospital Medicine  Progress Note    Patient Name: Jean Carlson  MRN: [id number]  Patient Class: OP- Observation   Admission Date: 8/28/2018  Length of Stay: 0 days  Attending Physician: Leelee Canseco MD  Primary Care Provider: Papi Fritz MD    [facility] Medicine Team: [facility] Tara Josue PA-C    Subjective:     Principal Problem:Intractable back pain    HPI:  Mr.Kirk Carlson is a 68 y.o. M with osteopenia, laminectomy (15 yrs ago) who presents w/ x3 week Hx of lower back pain that has been progressively worsening. Onset was sudden after lifting furniture at home. He took muscle relaxants and NSAIDS for the first two weeks with some pain relief.  However, pain was not resolved so he went to his PCP's office last week.  He was started on steroids at that time.  He states that since starting the steroids, his pain has been worse.  He has had to borrow a walker from his mother-in-law to assist with ambulation.  Today his pain was so excruciating that he was unable to ambulate at all, which prompted he to come into the the ED for evaluation. He denies any other Sx; no radiation of pain, urinary/bowel incontinence, numbness/tingling, weakness, fever, CP, SOB.     ED workup remarkable for moderate disc space narrowing at L4-5 on plain films, MRI - mild circumferential disc bulge at L3-L4 with new superimposed right paracentral extrusion with superior migration resulting in severe spinal canal stenosis, CBC CMP Sed rate WNL.    Hospital Course:  Jean Carlson was placed in observation for intractable lower back pain. MRI lumbar spine with mild circumferential disc bulge at L3-L4 with new, superimposed right paracentral extrusion with superior migration resulting in severe spinal canal stenosis. Orthopedic surgery consulted and recommended outpatient follow-up/conservative therapy. PT/OT recommended outpatient PT/OT.     Interval History: Pt resting in bed with  family at bedside. This morning pt reported an unwitnessed fall to his right knee secondary to weakness, denies hitting his head. XRAY imaging ordered.    He reports having a lengthy discussion with orthopedic surgeon, Dr. Beckham. Pt states that he would NOT like to proceed with the injections and would like to have surgery completed prior to his discharge. He states that since his symptoms have changed (now reporting sharp pain radiating into thigh), he does not want to be discharged as he is a very active person who likes to hike, etc. He would be agreeable to the injection only if Dr. Beckham does not have room for his surgery. He is again requesting to speak to him. Staff made aware.    Review of Systems   Constitutional: Positive for activity change (unable to ambulate). Negative for chills, fatigue and fever.   HENT: Negative for congestion and rhinorrhea.    Eyes: Negative for pain and visual disturbance.   Respiratory: Negative for chest tightness and shortness of breath.    Cardiovascular: Negative for chest pain and leg swelling.   Gastrointestinal: Negative for abdominal pain, constipation, diarrhea and nausea.   Genitourinary: Negative for dysuria, flank pain, frequency and hematuria.   Musculoskeletal: Positive for back pain and gait problem (improved).   Skin: Negative for pallor.   Neurological: Negative for weakness and numbness.        Denies urinary/bowel incontinence, Denies saddle anesthesia   - shooting pain into right thigh      Objective:     Vital Signs (Most Recent):  Temp: 97.5 °F (36.4 °C) (08/30/18 2045)  Pulse: 87 (08/30/18 2045)  Resp: 16 (08/30/18 2045)  BP: (!) 142/76 (08/30/18 2045)  SpO2: 98 % (08/30/18 2045) Vital Signs (24h Range):  Temp:  [97.4 °F (36.3 °C)-98.7 °F (37.1 °C)] 97.5 °F (36.4 °C)  Pulse:  [66-88] 87  Resp:  [16-20] 16  SpO2:  [92 %-99 %] 98 %  BP: (124-176)/(71-80) 142/76     Weight: 83 kg (183 lb)  Body mass index is 25.52 kg/m².    Intake/Output Summary (Last  24 hours) at 8/30/2018 2127  Last data filed at 8/30/2018 1330  Gross per 24 hour   Intake 480 ml   Output --   Net 480 ml      Physical Exam   Constitutional: He is oriented to person, place, and time. He appears well-developed and well-nourished. No distress.   HENT:   Head: Normocephalic and atraumatic.   Eyes: EOM are normal.   Neck: Normal range of motion. Neck supple.   Cardiovascular: Normal rate.   No murmur heard.  Pulmonary/Chest: Effort normal and breath sounds normal. No respiratory distress.   Abdominal: Soft. He exhibits no distension.   Musculoskeletal:   Pt seen multiple times ambulating independently, without difficulty   Neurological: He is alert and oriented to person, place, and time. No sensory deficit.   Skin: Skin is warm and dry. Capillary refill takes less than 2 seconds.   Psychiatric: He has a normal mood and affect. His behavior is normal.   Nursing note and vitals reviewed.      Significant Labs: All pertinent labs within the past 24 hours have been reviewed.    Significant Imaging: I have reviewed all pertinent imaging results/findings within the past 24 hours.    Assessment/Plan:      * Intractable back pain    Mild circumferential disc bulge at L3-L4, severe spinal canal stenosis   Pt is a 68 y.o. M with osteopenia c/o x3 week Hx of intractable lower back pain, not responsive to NSAID'S, steroids, or flexeril, MRI study remarkable for severe spinal canal stenosis    Pt agreed to steroid injection with IR however later refused stating that he wants surgery completed prior to dc  - unwitnessed fall to R knee reported by pt this AM-- XRAY with no conventional radiographic evidence of recent fracture  - Improved back pain however, pt now complains of R-sided shooting pains into thigh  - Orthopaedics consulted in ER.  They do not recommend surgery at this time--- will discuss new findings  - Toradol 15 mg Q4H PRN, Lidocaine patch, PRN pain medications   - Continue home flexeril 10 mg  TID  - PT consulted-- outpatient PT/OT  - MRI lumbar spine with mild circumferential disc bulge at L3-L4 with new, superimposed right paracentral extrusion with superior migration resulting in severe spinal canal stenosis  - neuro checks q4h        GERD (gastroesophageal reflux disease)    - Chronic and stable  - Pepcid 20 mg BID        Hyperlipidemia    - Continue fish oil.          VTE Risk Mitigation (From admission, onward)        Ordered     Place DANIEL hose  Until discontinued      08/28/18 2036     Place sequential compression device  Until discontinued      08/28/18 2036     IP VTE LOW RISK PATIENT  Once      08/28/18 2036              Tara Josue PA-C  Department of Hospital Medicine   [facility]

## 2018-08-31 NOTE — TELEPHONE ENCOUNTER
----- Message from Tracey Avila sent at 8/31/2018  7:45 AM CDT -----  Contact: Pt  Name of Who is Calling:MALIKA PAPPAS [[id number]]    What is the request in detail: Patient states he would like a call back from the staff regards to injection in back and patient states his legs are getting worst Please contact to further discuss and advise    Can the clinic reply by [facility]:No    What Number to Call Back if not in [facility]: [phone]

## 2018-08-31 NOTE — PROCEDURES
Radiology Post-Procedure Note    Pre Op Diagnosis: L3-4 disc extrusion    Post Op Diagnosis: Same    Procedure: Lumbar interlaminal and TF SOLOMON    Procedure performed by: Dennis Slade MD; Ray Horner (resident)      Written Informed Consent Obtained: Yes    Specimen Removed: NO    Estimated Blood Loss: Minimal    Findings: Contrast injection and fluoroscopy demonstrating appropriate positioning of the needles within the epidural space    Level injected: L2-L3 interlaminar, right L4-5 transforaminal  Needle used: 22 gauge  Dose:  80 mg Dexamethasone   4 mL Lidocaine 1% MPF    Patient tolerated procedure well.    Ray Horner  Radiology PGY-5

## 2018-08-31 NOTE — ASSESSMENT & PLAN NOTE
Mild circumferential disc bulge at L3-L4, severe spinal canal stenosis   Pt is a 68 y.o. M with osteopenia c/o x3 week Hx of intractable lower back pain, not responsive to NSAID'S, steroids, or flexeril, MRI study remarkable for severe spinal canal stenosis    Pt agreed to steroid injection with IR however later refused stating that he wants surgery completed prior to dc--- ortho to discuss   - IR consulted   - unwitnessed fall to R knee reported by pt this AM-- XRAY with no conventional radiographic evidence of recent fracture  - Improved back pain however, pt now complains of R-sided shooting pains into thigh  - Orthopaedics consulted in ER.  They do not recommend surgery at this time--- will discuss new findings  - Toradol 15 mg Q4H PRN, Lidocaine patch, PRN pain medications   - Continue home flexeril 10 mg TID  - PT consulted-- outpatient PT/OT  - MRI lumbar spine with mild circumferential disc bulge at L3-L4 with new, superimposed right paracentral extrusion with superior migration resulting in severe spinal canal stenosis  - neuro checks q4h

## 2018-08-31 NOTE — PLAN OF CARE
Problem: Patient Care Overview  Goal: Plan of Care Review  Outcome: Ongoing (interventions implemented as appropriate)   08/31/18 1608   Coping/Psychosocial   Plan Of Care Reviewed With patient       Problem: Fall Risk (Adult)  Goal: Identify Related Risk Factors and Signs and Symptoms  Related risk factors and signs and symptoms are identified upon initiation of Human Response Clinical Practice Guideline (CPG)  Outcome: Ongoing (interventions implemented as appropriate)   08/31/18 1608   Fall Risk   Related Risk Factors (Fall Risk) history of falls;neuro disease/injury   Signs and Symptoms (Fall Risk) presence of risk factors

## 2018-08-31 NOTE — SUBJECTIVE & OBJECTIVE
"Interval History: Patient reporting not wanting to have injection done if that will prevent him from surgery. If he chooses to move forward with the injection, he states that he will stay in the hospital to "see if it works" and if it doesn't then he wants the surgery prior to discharge. Discussed outpatient follow-up with pt who does not seem agreeable. Notified staff of pt's requests.     He feels that his symptoms have rapidly progressed since admit.    Review of Systems   Constitutional: Positive for activity change (unable to ambulate). Negative for chills, fatigue and fever.   HENT: Negative for congestion and rhinorrhea.    Eyes: Negative for pain and visual disturbance.   Respiratory: Negative for chest tightness and shortness of breath.    Cardiovascular: Negative for chest pain and leg swelling.   Gastrointestinal: Negative for abdominal pain, constipation, diarrhea and nausea.   Genitourinary: Negative for dysuria, flank pain, frequency and hematuria.   Musculoskeletal: Positive for back pain and gait problem (improved).   Skin: Negative for pallor.   Neurological: Negative for weakness and numbness.        Denies urinary/bowel incontinence, Denies saddle anesthesia   - shooting pain into right thigh      Objective:     Vital Signs (Most Recent):  Temp: 97.2 °F (36.2 °C) (08/31/18 0831)  Pulse: 88 (08/31/18 0831)  Resp: 20 (08/31/18 0831)  BP: (!) 160/74 (08/31/18 0831)  SpO2: 97 % (08/31/18 0831) Vital Signs (24h Range):  Temp:  [97.2 °F (36.2 °C)-98.7 °F (37.1 °C)] 97.2 °F (36.2 °C)  Pulse:  [56-88] 88  Resp:  [16-20] 20  SpO2:  [92 %-98 %] 97 %  BP: (123-176)/(61-80) 160/74     Weight: 83 kg (183 lb)  Body mass index is 25.52 kg/m².    Intake/Output Summary (Last 24 hours) at 8/31/2018 0928  Last data filed at 8/30/2018 1330  Gross per 24 hour   Intake 480 ml   Output --   Net 480 ml      Physical Exam   Constitutional: He is oriented to person, place, and time. He appears well-developed and " well-nourished. No distress.   HENT:   Head: Normocephalic and atraumatic.   Eyes: EOM are normal.   Neck: Normal range of motion. Neck supple.   Cardiovascular: Normal rate.   No murmur heard.  Pulmonary/Chest: Effort normal and breath sounds normal. No respiratory distress.   Abdominal: Soft. He exhibits no distension.   Musculoskeletal:   MOTOR EXAM:   Strength:   RIGHT:  Shoulder: Abd: 5/5  Elbow: Flex 5/5; Ext 5/5  : equal bilaterally  Hip: Flex: 4/5  Knee: Ext: 4/5  Ankle: DF: 4/5, PF: 5/5, EHL: 4/5    LEFT:  Shoulder: Abd: 5/5  Elbow: Flex 5/5; Ext 5/5  Hip: Flex: 5/5  Knee: Ext: 5/5  Ankle: DF: 5/5; PF: 5/5, EHL: 5/5   Neurological: He is alert and oriented to person, place, and time. No sensory deficit.   Skin: Skin is warm and dry. Capillary refill takes less than 2 seconds.   Psychiatric: He has a normal mood and affect. His behavior is normal.   Nursing note and vitals reviewed.      Significant Labs: All pertinent labs within the past 24 hours have been reviewed.    Significant Imaging: I have reviewed all pertinent imaging results/findings within the past 24 hours.

## 2018-08-31 NOTE — CONSULTS
IR consult note    IR consulted for SOLOMON for right L3-4 paramedian disc extrusion in patient with intractable back pain.    Will try to perform SOLOMON today schedule permitting.    Plan would be for midline L3-4 and right TF L4-5 injections.    Order placed    Ray Horner  Radiology PGY-5

## 2018-08-31 NOTE — H&P
Inpatient Radiology Pre-procedure Note    History of Present Illness:  Jean Carlson is a 68 y.o. male with L3-4 disc herniation who presents for SOLOMON.  Admission H&P reviewed.  Past Medical History:   Diagnosis Date    GERD (gastroesophageal reflux disease)     Osteoporosis     Prostate disease      Past Surgical History:   Procedure Laterality Date    back sx      lower    CATARACT EXTRACTION W/  INTRAOCULAR LENS IMPLANT Bilateral     Dr Finn/Ana IOL     PROSTATE SURGERY      SPINE SURGERY      TONSILLECTOMY      TRANSURETHRAL RESECTION OF PROSTATE         Review of Systems:   As documented in primary team H&P    Home Meds:   Prior to Admission medications    Medication Sig Start Date End Date Taking? Authorizing Provider   aspirin 81 MG Chew Take 81 mg by mouth once daily.   Yes Historical Provider, MD   co-enzyme Q-10 30 mg capsule Take 30 mg by mouth once daily.    Yes Historical Provider, MD   cyclobenzaprine (FLEXERIL) 10 MG tablet Take 1 tablet (10 mg total) by mouth 3 (three) times daily as needed for Muscle spasms. 8/21/18  Yes Papi Gallardo MD   fish oil-omega-3 fatty acids 300-1,000 mg capsule Take 2 g by mouth once daily.   Yes Historical Provider, MD   lutein-zeaxanthin 25-5 mg Cap Take by mouth once daily.    Yes Historical Provider, MD   methylPREDNISolone (MEDROL DOSEPACK) 4 mg tablet use as directed 8/21/18  Yes Papi Gallardo MD   multivitamin capsule Take 1 capsule by mouth once daily.   Yes Historical Provider, MD   ranitidine (ZANTAC) 300 MG tablet Take 1 tablet (300 mg total) by mouth every evening. 8/21/18  Yes Papi Gallardo MD   resveratrol 100 mg Cap Take by mouth once daily.   Yes Historical Provider, MD   omega-3 acid ethyl esters (LOVAZA) 1 gram capsule Take 1 capsule (1 g total) by mouth once daily. 10/7/16 3/9/18  Homeyar NICKIE Giraldo MD     Scheduled Meds:    aspirin  81 mg Oral Daily    famotidine  20 mg Oral BID    gabapentin  300 mg Oral QHS     lidocaine  1 patch Transdermal Daily    omega-3 fatty acids-fish oil  1 capsule Oral Daily    polyethylene glycol  17 g Oral BID    senna-docusate 8.6-50 mg  2 tablet Oral BID     Continuous Infusions:   PRN Meds:acetaminophen, albuterol-ipratropium, cyclobenzaprine, dextrose 50%, dextrose 50%, glucagon (human recombinant), glucose, glucose, ketorolac, morphine, ondansetron, oxyCODONE-acetaminophen, oxyCODONE-acetaminophen, promethazine, ramelteon, sodium chloride 0.9%  Anticoagulants/Antiplatelets: aspirin    Allergies: Review of patient's allergies indicates:  No Known Allergies  Sedation Hx: have not been any systemic reactions    Labs:  No results for input(s): INR in the last 168 hours.    Invalid input(s):  PT,  PTT    Recent Labs   Lab  08/31/18   0533   WBC  5.50   HGB  15.6   HCT  46.5   MCV  92   PLT  167      Recent Labs   Lab  08/29/18   0411   08/31/18   0533   GLU  97   < >  105   NA  139   < >  139   K  4.6   < >  4.7   CL  104   < >  103   CO2  29   < >  29   BUN  18   < >  18   CREATININE  0.9   < >  0.9   CALCIUM  9.1   < >  9.5   MG  2.4   --    --    ALT  21   < >  21   AST  16   < >  22   ALBUMIN  3.6   < >  3.6   BILITOT  0.6   < >  1.1*    < > = values in this interval not displayed.         Vitals:  Temp: 98.4 °F (36.9 °C) (08/31/18 1551)  Pulse: 78 (08/31/18 1551)  Resp: 20 (08/31/18 1551)  BP: 129/68 (08/31/18 1551)  SpO2: 97 % (08/31/18 1551)     Physical Exam:  ASA: 2  Mallampati: na    General: no acute distress  Mental Status: alert and oriented to person, place and time  HEENT: normocephalic, atraumatic  Chest: unlabored breathing  Heart: regular heart rate  Abdomen: nondistended  Extremity: moves all extremities    Plan: L3-4 interlaminar SOLOMON, right L4-5 TF SOLOMON  Sedation Plan: local    .Ray Horner  Radiology PGY-5

## 2018-08-31 NOTE — ASSESSMENT & PLAN NOTE
Mild circumferential disc bulge at L3-L4, severe spinal canal stenosis   Pt is a 68 y.o. M with osteopenia c/o x3 week Hx of intractable lower back pain, not responsive to NSAID'S, steroids, or flexeril, MRI study remarkable for severe spinal canal stenosis    Pt agreed to steroid injection with IR however later refused stating that he wants surgery completed prior to dc  - unwitnessed fall to R knee reported by pt this AM-- XRAY with no conventional radiographic evidence of recent fracture  - Improved back pain however, pt now complains of R-sided shooting pains into thigh  - Orthopaedics consulted in ER.  They do not recommend surgery at this time--- will discuss new findings  - Toradol 15 mg Q4H PRN, Lidocaine patch, PRN pain medications   - Continue home flexeril 10 mg TID  - PT consulted-- outpatient PT/OT  - MRI lumbar spine with mild circumferential disc bulge at L3-L4 with new, superimposed right paracentral extrusion with superior migration resulting in severe spinal canal stenosis  - neuro checks q4h

## 2018-08-31 NOTE — SUBJECTIVE & OBJECTIVE
Interval History: Pt resting in bed with family at bedside. This morning pt reported an unwitnessed fall to his right knee secondary to weakness, denies hitting his head. XRAY imaging ordered.    He reports having a lengthy discussion with orthopedic surgeon, Dr. Beckham. Pt states that he would NOT like to proceed with the injections and would like to have surgery completed prior to his discharge. He states that since his symptoms have changed (now reporting sharp pain radiating into thigh), he does not want to be discharged as he is a very active person who likes to hike, etc. He would be agreeable to the injection only if Dr. Beckham does not have room for his surgery. He is again requesting to speak to him. Staff made aware.    Review of Systems   Constitutional: Positive for activity change (unable to ambulate). Negative for chills, fatigue and fever.   HENT: Negative for congestion and rhinorrhea.    Eyes: Negative for pain and visual disturbance.   Respiratory: Negative for chest tightness and shortness of breath.    Cardiovascular: Negative for chest pain and leg swelling.   Gastrointestinal: Negative for abdominal pain, constipation, diarrhea and nausea.   Genitourinary: Negative for dysuria, flank pain, frequency and hematuria.   Musculoskeletal: Positive for back pain and gait problem (improved).   Skin: Negative for pallor.   Neurological: Negative for weakness and numbness.        Denies urinary/bowel incontinence, Denies saddle anesthesia   - shooting pain into right thigh      Objective:     Vital Signs (Most Recent):  Temp: 97.5 °F (36.4 °C) (08/30/18 2045)  Pulse: 87 (08/30/18 2045)  Resp: 16 (08/30/18 2045)  BP: (!) 142/76 (08/30/18 2045)  SpO2: 98 % (08/30/18 2045) Vital Signs (24h Range):  Temp:  [97.4 °F (36.3 °C)-98.7 °F (37.1 °C)] 97.5 °F (36.4 °C)  Pulse:  [66-88] 87  Resp:  [16-20] 16  SpO2:  [92 %-99 %] 98 %  BP: (124-176)/(71-80) 142/76     Weight: 83 kg (183 lb)  Body mass index is  25.52 kg/m².    Intake/Output Summary (Last 24 hours) at 8/30/2018 2127  Last data filed at 8/30/2018 1330  Gross per 24 hour   Intake 480 ml   Output --   Net 480 ml      Physical Exam   Constitutional: He is oriented to person, place, and time. He appears well-developed and well-nourished. No distress.   HENT:   Head: Normocephalic and atraumatic.   Eyes: EOM are normal.   Neck: Normal range of motion. Neck supple.   Cardiovascular: Normal rate.   No murmur heard.  Pulmonary/Chest: Effort normal and breath sounds normal. No respiratory distress.   Abdominal: Soft. He exhibits no distension.   Musculoskeletal:   Pt seen multiple times ambulating independently, without difficulty   Neurological: He is alert and oriented to person, place, and time. No sensory deficit.   Skin: Skin is warm and dry. Capillary refill takes less than 2 seconds.   Psychiatric: He has a normal mood and affect. His behavior is normal.   Nursing note and vitals reviewed.      Significant Labs: All pertinent labs within the past 24 hours have been reviewed.    Significant Imaging: I have reviewed all pertinent imaging results/findings within the past 24 hours.

## 2018-08-31 NOTE — PROGRESS NOTES
[facility] Hospital Medicine  Progress Note    Patient Name: Jean Carlson  MRN: [id number]  Patient Class: OP- Observation   Admission Date: 8/28/2018  Length of Stay: 0 days  Attending Physician: Leelee Canseco MD  Primary Care Provider: Papi Fritz MD    [facility] Team: [facility] Tara Josue PA-C    Subjective:     Principal Problem:Intractable back pain    HPI:  Mr.Kirk Carlson is a 68 y.o. M with osteopenia, laminectomy (15 yrs ago) who presents w/ x3 week Hx of lower back pain that has been progressively worsening. Onset was sudden after lifting furniture at home. He took muscle relaxants and NSAIDS for the first two weeks with some pain relief.  However, pain was not resolved so he went to his PCP's office last week.  He was started on steroids at that time.  He states that since starting the steroids, his pain has been worse.  He has had to borrow a walker from his mother-in-law to assist with ambulation.  Today his pain was so excruciating that he was unable to ambulate at all, which prompted he to come into the the ED for evaluation. He denies any other Sx; no radiation of pain, urinary/bowel incontinence, numbness/tingling, weakness, fever, CP, SOB.     ED workup remarkable for moderate disc space narrowing at L4-5 on plain films, MRI - mild circumferential disc bulge at L3-L4 with new superimposed right paracentral extrusion with superior migration resulting in severe spinal canal stenosis, CBC CMP Sed rate WNL.    Hospital Course:  Jean Carlson was placed in observation for intractable lower back pain. MRI lumbar spine with mild circumferential disc bulge at L3-L4 with new, superimposed right paracentral extrusion with superior migration resulting in severe spinal canal stenosis. Orthopedic surgery consulted and recommended outpatient follow-up/conservative therapy. PT/OT recommended outpatient. Patient requested re-consult from orthopedic surgery,  "Dr. Beckham.     Interval History: Patient reporting not wanting to have injection done if that will prevent him from surgery. If he chooses to move forward with the injection, he states that he will stay in the hospital to "see if it works" and if it doesn't then he wants the surgery prior to discharge. Discussed outpatient follow-up with pt who does not seem agreeable. Notified staff of pt's requests.     He feels that his symptoms have rapidly progressed since admit.    Review of Systems   Constitutional: Positive for activity change (unable to ambulate). Negative for chills, fatigue and fever.   HENT: Negative for congestion and rhinorrhea.    Eyes: Negative for pain and visual disturbance.   Respiratory: Negative for chest tightness and shortness of breath.    Cardiovascular: Negative for chest pain and leg swelling.   Gastrointestinal: Negative for abdominal pain, constipation, diarrhea and nausea.   Genitourinary: Negative for dysuria, flank pain, frequency and hematuria.   Musculoskeletal: Positive for back pain and gait problem (improved).   Skin: Negative for pallor.   Neurological: Negative for weakness and numbness.        Denies urinary/bowel incontinence, Denies saddle anesthesia   - shooting pain into right thigh      Objective:     Vital Signs (Most Recent):  Temp: 97.2 °F (36.2 °C) (08/31/18 0831)  Pulse: 88 (08/31/18 0831)  Resp: 20 (08/31/18 0831)  BP: (!) 160/74 (08/31/18 0831)  SpO2: 97 % (08/31/18 0831) Vital Signs (24h Range):  Temp:  [97.2 °F (36.2 °C)-98.7 °F (37.1 °C)] 97.2 °F (36.2 °C)  Pulse:  [56-88] 88  Resp:  [16-20] 20  SpO2:  [92 %-98 %] 97 %  BP: (123-176)/(61-80) 160/74     Weight: 83 kg (183 lb)  Body mass index is 25.52 kg/m².    Intake/Output Summary (Last 24 hours) at 8/31/2018 0928  Last data filed at 8/30/2018 1330  Gross per 24 hour   Intake 480 ml   Output --   Net 480 ml      Physical Exam   Constitutional: He is oriented to person, place, and time. He appears " well-developed and well-nourished. No distress.   HENT:   Head: Normocephalic and atraumatic.   Eyes: EOM are normal.   Neck: Normal range of motion. Neck supple.   Cardiovascular: Normal rate.   No murmur heard.  Pulmonary/Chest: Effort normal and breath sounds normal. No respiratory distress.   Abdominal: Soft. He exhibits no distension.   Musculoskeletal:   MOTOR EXAM:   Strength:   RIGHT:  Shoulder: Abd: 5/5  Elbow: Flex 5/5; Ext 5/5  : equal bilaterally  Hip: Flex: 4/5  Knee: Ext: 4/5  Ankle: DF: 4/5, PF: 5/5, EHL: 4/5    LEFT:  Shoulder: Abd: 5/5  Elbow: Flex 5/5; Ext 5/5  Hip: Flex: 5/5  Knee: Ext: 5/5  Ankle: DF: 5/5; PF: 5/5, EHL: 5/5   Neurological: He is alert and oriented to person, place, and time. No sensory deficit.   Skin: Skin is warm and dry. Capillary refill takes less than 2 seconds.   Psychiatric: He has a normal mood and affect. His behavior is normal.   Nursing note and vitals reviewed.      Significant Labs: All pertinent labs within the past 24 hours have been reviewed.    Significant Imaging: I have reviewed all pertinent imaging results/findings within the past 24 hours.    Assessment/Plan:      * Intractable back pain    Mild circumferential disc bulge at L3-L4, severe spinal canal stenosis   Pt is a 68 y.o. M with osteopenia c/o x3 week Hx of intractable lower back pain, not responsive to NSAID'S, steroids, or flexeril, MRI study remarkable for severe spinal canal stenosis    Pt agreed to steroid injection with IR however later refused stating that he wants surgery completed prior to dc--- ortho to discuss   - IR consulted   - unwitnessed fall to R knee reported by pt this AM-- XRAY with no conventional radiographic evidence of recent fracture  - Improved back pain however, pt now complains of R-sided shooting pains into thigh  - Orthopaedics consulted in ER.  They do not recommend surgery at this time--- will discuss new findings  - Toradol 15 mg Q4H PRN, Lidocaine patch, PRN pain  medications   - Continue home flexeril 10 mg TID  - PT consulted-- outpatient PT/OT  - MRI lumbar spine with mild circumferential disc bulge at L3-L4 with new, superimposed right paracentral extrusion with superior migration resulting in severe spinal canal stenosis  - neuro checks q4h        GERD (gastroesophageal reflux disease)    - Chronic and stable  - Pepcid 20 mg BID        Hyperlipidemia    - Continue fish oil.          VTE Risk Mitigation (From admission, onward)        Ordered     Place DANIEL hose  Until discontinued      08/28/18 2036     Place sequential compression device  Until discontinued      08/28/18 2036     IP VTE LOW RISK PATIENT  Once      08/28/18 2036              Tara Josue PA-C  Department of Hospital Medicine   [facility]

## 2018-09-01 VITALS
WEIGHT: 183 LBS | RESPIRATION RATE: 20 BRPM | DIASTOLIC BLOOD PRESSURE: 73 MMHG | BODY MASS INDEX: 25.62 KG/M2 | HEART RATE: 94 BPM | SYSTOLIC BLOOD PRESSURE: 145 MMHG | HEIGHT: 71 IN | OXYGEN SATURATION: 98 % | TEMPERATURE: 99 F

## 2018-09-01 LAB
ALBUMIN SERPL BCP-MCNC: 3.6 G/DL
ALP SERPL-CCNC: 86 U/L
ALT SERPL W/O P-5'-P-CCNC: 20 U/L
ANION GAP SERPL CALC-SCNC: 6 MMOL/L
AST SERPL-CCNC: 18 U/L
BASOPHILS # BLD AUTO: 0.01 K/UL
BASOPHILS NFR BLD: 0.1 %
BILIRUB SERPL-MCNC: 1.2 MG/DL
BUN SERPL-MCNC: 20 MG/DL
CALCIUM SERPL-MCNC: 9.6 MG/DL
CHLORIDE SERPL-SCNC: 103 MMOL/L
CO2 SERPL-SCNC: 28 MMOL/L
CREAT SERPL-MCNC: 1 MG/DL
DIFFERENTIAL METHOD: ABNORMAL
EOSINOPHIL # BLD AUTO: 0.1 K/UL
EOSINOPHIL NFR BLD: 1 %
ERYTHROCYTE [DISTWIDTH] IN BLOOD BY AUTOMATED COUNT: 12.2 %
EST. GFR  (AFRICAN AMERICAN): >60 ML/MIN/1.73 M^2
EST. GFR  (NON AFRICAN AMERICAN): >60 ML/MIN/1.73 M^2
GLUCOSE SERPL-MCNC: 126 MG/DL
HCT VFR BLD AUTO: 47.7 %
HGB BLD-MCNC: 16 G/DL
IMM GRANULOCYTES # BLD AUTO: 0.03 K/UL
IMM GRANULOCYTES NFR BLD AUTO: 0.4 %
LYMPHOCYTES # BLD AUTO: 0.6 K/UL
LYMPHOCYTES NFR BLD: 7.5 %
MCH RBC QN AUTO: 30.8 PG
MCHC RBC AUTO-ENTMCNC: 33.5 G/DL
MCV RBC AUTO: 92 FL
MONOCYTES # BLD AUTO: 0.4 K/UL
MONOCYTES NFR BLD: 5.3 %
NEUTROPHILS # BLD AUTO: 7 K/UL
NEUTROPHILS NFR BLD: 85.7 %
NRBC BLD-RTO: 0 /100 WBC
PLATELET # BLD AUTO: 171 K/UL
PMV BLD AUTO: 10.8 FL
POTASSIUM SERPL-SCNC: 5.1 MMOL/L
PROT SERPL-MCNC: 6.4 G/DL
RBC # BLD AUTO: 5.19 M/UL
SODIUM SERPL-SCNC: 137 MMOL/L
WBC # BLD AUTO: 8.12 K/UL

## 2018-09-01 PROCEDURE — 25000003 PHARM REV CODE 250: Performed by: PHYSICIAN ASSISTANT

## 2018-09-01 PROCEDURE — 36415 COLL VENOUS BLD VENIPUNCTURE: CPT

## 2018-09-01 PROCEDURE — 85025 COMPLETE CBC W/AUTO DIFF WBC: CPT

## 2018-09-01 PROCEDURE — 99217 PR OBSERVATION CARE DISCHARGE: CPT | Mod: ,,, | Performed by: PHYSICIAN ASSISTANT

## 2018-09-01 PROCEDURE — 80053 COMPREHEN METABOLIC PANEL: CPT

## 2018-09-01 PROCEDURE — G0378 HOSPITAL OBSERVATION PER HR: HCPCS

## 2018-09-01 RX ORDER — POLYETHYLENE GLYCOL 3350 17 G/17G
17 POWDER, FOR SOLUTION ORAL 2 TIMES DAILY
Qty: 60 EACH | Refills: 0 | Status: SHIPPED | OUTPATIENT
Start: 2018-09-01 | End: 2018-10-01

## 2018-09-01 RX ORDER — GABAPENTIN 300 MG/1
300 CAPSULE ORAL NIGHTLY
Qty: 30 CAPSULE | Refills: 11 | Status: SHIPPED | OUTPATIENT
Start: 2018-09-01 | End: 2018-10-25

## 2018-09-01 RX ORDER — OXYCODONE AND ACETAMINOPHEN 10; 325 MG/1; MG/1
TABLET ORAL
Qty: 30 TABLET | Refills: 0 | Status: SHIPPED | OUTPATIENT
Start: 2018-09-01 | End: 2018-10-25

## 2018-09-01 RX ORDER — LIDOCAINE 50 MG/G
1 PATCH TOPICAL DAILY
Qty: 30 PATCH | Refills: 0 | Status: SHIPPED | OUTPATIENT
Start: 2018-09-02 | End: 2018-09-03 | Stop reason: SDUPTHER

## 2018-09-01 RX ADMIN — OXYCODONE HYDROCHLORIDE AND ACETAMINOPHEN 1 TABLET: 10; 325 TABLET ORAL at 11:09

## 2018-09-01 RX ADMIN — ASPIRIN 81 MG CHEWABLE TABLET 81 MG: 81 TABLET CHEWABLE at 09:09

## 2018-09-01 RX ADMIN — LIDOCAINE 1 PATCH: 50 PATCH CUTANEOUS at 09:09

## 2018-09-01 RX ADMIN — FAMOTIDINE 20 MG: 20 TABLET ORAL at 09:09

## 2018-09-01 RX ADMIN — SENNOSIDES AND DOCUSATE SODIUM 2 TABLET: 8.6; 5 TABLET ORAL at 09:09

## 2018-09-01 RX ADMIN — POLYETHYLENE GLYCOL 3350 17 G: 17 POWDER, FOR SOLUTION ORAL at 09:09

## 2018-09-01 RX ADMIN — Medication 1 CAPSULE: at 09:09

## 2018-09-01 NOTE — ASSESSMENT & PLAN NOTE
Mild circumferential disc bulge at L3-L4, severe spinal canal stenosis   Pt is a 68 y.o. M with osteopenia c/o x3 week Hx of intractable lower back pain, not responsive to NSAID'S, steroids, or flexeril, MRI study remarkable for severe spinal canal stenosis    Pt agreed to steroid injection with IR however later refused stating that he wants surgery completed prior to dc--- ortho to discuss   - IR consulted and completed   - unwitnessed fall to R knee reported by pt this AM-- XRAY with no conventional radiographic evidence of recent fracture  - Improved back pain however, pt now complains of R-sided shooting pains into thigh  - Orthopaedics consulted in ER.  They do not recommend surgery at this time--- will discuss new findings  - Toradol 15 mg Q4H PRN, Lidocaine patch, PRN pain medications   - Continue home flexeril 10 mg TID  - PT consulted-- outpatient PT/OT  - MRI lumbar spine with mild circumferential disc bulge at L3-L4 with new, superimposed right paracentral extrusion with superior migration resulting in severe spinal canal stenosis  - neuro checks q4h

## 2018-09-01 NOTE — PLAN OF CARE
Problem: Patient Care Overview  Goal: Plan of Care Review  Outcome: Ongoing (interventions implemented as appropriate)  POC reviewed with patient, denies pain at the moment, vital signs stable, fall and safety precautions maintained, call light within reach, will cont to monitor.

## 2018-09-02 ENCOUNTER — PATIENT MESSAGE (OUTPATIENT)
Dept: ORTHOPEDICS | Facility: CLINIC | Age: 68
End: 2018-09-02

## 2018-09-02 ENCOUNTER — PATIENT MESSAGE (OUTPATIENT)
Dept: INTERNAL MEDICINE | Facility: CLINIC | Age: 68
End: 2018-09-02

## 2018-09-03 ENCOUNTER — PATIENT MESSAGE (OUTPATIENT)
Dept: INTERNAL MEDICINE | Facility: CLINIC | Age: 68
End: 2018-09-03

## 2018-09-03 RX ORDER — LIDOCAINE 50 MG/G
1 PATCH TOPICAL DAILY
Qty: 30 PATCH | Refills: 0 | Status: SHIPPED | OUTPATIENT
Start: 2018-09-03 | End: 2018-10-03

## 2018-09-05 ENCOUNTER — OFFICE VISIT (OUTPATIENT)
Dept: ORTHOPEDICS | Facility: CLINIC | Age: 68
End: 2018-09-05
Payer: MEDICARE

## 2018-09-05 DIAGNOSIS — M54.16 LUMBAR RADICULOPATHY: Primary | ICD-10-CM

## 2018-09-05 PROCEDURE — 99212 OFFICE O/P EST SF 10 MIN: CPT | Mod: PBBFAC | Performed by: ORTHOPAEDIC SURGERY

## 2018-09-05 PROCEDURE — 99212 OFFICE O/P EST SF 10 MIN: CPT | Mod: S$PBB,,, | Performed by: ORTHOPAEDIC SURGERY

## 2018-09-05 PROCEDURE — 99999 PR PBB SHADOW E&M-EST. PATIENT-LVL II: CPT | Mod: PBBFAC,,, | Performed by: ORTHOPAEDIC SURGERY

## 2018-09-05 NOTE — PLAN OF CARE
09/05/18 0815   Final Note   Assessment Type Final Discharge Note     Patient discharged home with no needs on 9/1/18.

## 2018-09-05 NOTE — DISCHARGE SUMMARY
[facility] Hospital Medicine  Discharge Summary      Patient Name: Jean Carlson  MRN: [id number]  Admission Date: 8/28/2018  Hospital Length of Stay: 0 days  Discharge Date and Time: 9/1/2018 12:52 PM  Attending Physician: Leelee Canseco MD   Discharging Provider: Tara Josue PA-C  Primary Care Provider: Papi Fritz MD  [facility] Medicine Team: [facility] E Tara Josue PA-C    HPI:   Mr.Kirk Carlson is a 68 y.o. M with osteopenia, laminectomy (15 yrs ago) who presents w/ x3 week Hx of lower back pain that has been progressively worsening. Onset was sudden after lifting furniture at home. He took muscle relaxants and NSAIDS for the first two weeks with some pain relief.  However, pain was not resolved so he went to his PCP's office last week.  He was started on steroids at that time.  He states that since starting the steroids, his pain has been worse.  He has had to borrow a walker from his mother-in-law to assist with ambulation.  Today his pain was so excruciating that he was unable to ambulate at all, which prompted he to come into the the ED for evaluation. He denies any other Sx; no radiation of pain, urinary/bowel incontinence, numbness/tingling, weakness, fever, CP, SOB.     ED workup remarkable for moderate disc space narrowing at L4-5 on plain films, MRI - mild circumferential disc bulge at L3-L4 with new superimposed right paracentral extrusion with superior migration resulting in severe spinal canal stenosis, CBC CMP Sed rate WNL.    * No surgery found *      Hospital Course:   Jean Carlson was placed in observation for intractable lower back pain. MRI lumbar spine with mild circumferential disc bulge at L3-L4 with new, superimposed right paracentral extrusion with superior migration resulting in severe spinal canal stenosis. Orthopedic surgery consulted and recommended outpatient follow-up/conservative therapy. PT/OT recommended outpatient. Patient  requested re-consult from orthopedic surgery. Case discussed with Dr. Canseco and Dr. Beckham multiple times; pt to be scheduled for surgical intervention 9/6/18 as an outpatient. He is agreeable to discharge.     Consults:   Consults (From admission, onward)        Status Ordering Provider     Inpatient consult to Interventional Radiology  Once     Provider:  (Not yet assigned)    Completed SHARON GUTHRIE     Inpatient consult to Orthopedic Surgery  Once     Provider:  (Not yet assigned)    Completed DENIS VILLAVICENCIO     Inpatient consult to Orthopedics  Once     Provider:  (Not yet assigned)    Completed JONATHAN VILLAVICENCIO          * Intractable back pain    Mild circumferential disc bulge at L3-L4, severe spinal canal stenosis   Pt is a 68 y.o. M with osteopenia c/o x3 week Hx of intractable lower back pain, not responsive to NSAID'S, steroids, or flexeril, MRI study remarkable for severe spinal canal stenosis    Pt agreed to steroid injection with IR however later refused stating that he wants surgery completed prior to dc--- ortho to discuss   - IR consulted and completed   - unwitnessed fall to R knee reported by pt this AM-- XRAY with no conventional radiographic evidence of recent fracture  - Improved back pain however, pt now complains of R-sided shooting pains into thigh  - Orthopaedics consulted in ER.  They do not recommend surgery at this time--- will discuss new findings  - Toradol 15 mg Q4H PRN, Lidocaine patch, PRN pain medications   - Continue home flexeril 10 mg TID  - PT consulted-- outpatient PT/OT  - MRI lumbar spine with mild circumferential disc bulge at L3-L4 with new, superimposed right paracentral extrusion with superior migration resulting in severe spinal canal stenosis  - neuro checks q4h        Hyperlipidemia    - Continue fish oil.          Final Active Diagnoses:    Diagnosis Date Noted POA    PRINCIPAL PROBLEM:  Intractable back pain [M54.9] 08/28/2018 Yes    Spinal stenosis of  lumbar region [M48.061] 08/31/2018 Yes    GERD (gastroesophageal reflux disease) [K21.9]  Yes     Chronic    Hyperlipidemia [E78.5] 03/21/2017 Yes     Chronic      Problems Resolved During this Admission:       Discharged Condition: stable    Disposition: Home or Self Care    Follow Up:  Follow-up Information     Dustin Montesinos MD On 9/13/2018.    Specialty:  Neurosurgery  Why:  at 3:00pm; neuro-surgery  Contact information:  [address]  [facility] [address]  [phone]             Kayla Lowe MD On 9/10/2018.    Specialty:  Neurosurgery  Why:  at 10:45 AM  Contact information:  [address]  [address]  [place] [address]  [phone]                 Patient Instructions:      Ambulatory Referral to Physical/Occupational Therapy   Referral Priority: Routine Referral Type: Physical Medicine   Referral Reason: Specialty Services Required   Requested Specialty: Physical Therapy   Number of Visits Requested: 1     Diet Adult Regular     Activity as tolerated       Significant Diagnostic Studies: Labs: All labs within the past 24 hours have been reviewed    Pending Diagnostic Studies:     None         Medications:  Reconciled Home Medications:      Medication List      START taking these medications    gabapentin 300 MG capsule  Commonly known as:  NEURONTIN  Take 1 capsule (300 mg total) by mouth every evening.     oxyCODONE-acetaminophen  mg per tablet  Commonly known as:  PERCOCET  Take 0.5 or 1 tablet every 4-6 hours for pain     polyethylene glycol 17 gram Pwpk  Commonly known as:  GLYCOLAX  Take 17 g by mouth 2 (two) times daily.        CONTINUE taking these medications    aspirin 81 MG Chew  Take 81 mg by mouth once daily.     co-enzyme Q-10 30 mg capsule  Take 30 mg by mouth once daily.     cyclobenzaprine 10 MG tablet  Commonly known as:  FLEXERIL  Take 1 tablet (10 mg total) by mouth 3 (three) times daily as needed for Muscle spasms.     fish  oil-omega-3 fatty acids 300-1,000 mg capsule  Take 2 g by mouth once daily.     lutein-zeaxanthin 25-5 mg Cap  Take by mouth once daily.     multivitamin capsule  Take 1 capsule by mouth once daily.     omega-3 acid ethyl esters 1 gram capsule  Commonly known as:  LOVAZA  Take 1 capsule (1 g total) by mouth once daily.     ranitidine 300 MG tablet  Commonly known as:  ZANTAC  Take 1 tablet (300 mg total) by mouth every evening.     resveratrol 100 mg Cap  Take by mouth once daily.        STOP taking these medications    methylPREDNISolone 4 mg tablet  Commonly known as:  MEDROL DOSEPACK            Indwelling Lines/Drains at time of discharge:   Lines/Drains/Airways          None          Time spent on the discharge of patient: >30 minutes  Patient was seen and examined on the date of discharge and determined to be suitable for discharge.         Tara Josue PA-C  Department of Hospital Medicine  [facility]

## 2018-09-07 NOTE — PROGRESS NOTES
The patient returns for follow up.    He has lumbar radiculopathy and we had been discussing surgery.    He has decided to have surgery with Dr. Ko.    I wish him the best.    I spent 15 minutes with the patient of which greater than 1/2 the time was devoted to counciling the patient regarding treatment options.

## 2018-09-10 ENCOUNTER — DOCUMENTATION ONLY (OUTPATIENT)
Dept: INTERNAL MEDICINE | Facility: CLINIC | Age: 68
End: 2018-09-10

## 2018-10-15 DIAGNOSIS — E78.5 HYPERLIPIDEMIA, UNSPECIFIED HYPERLIPIDEMIA TYPE: Primary | ICD-10-CM

## 2018-10-18 ENCOUNTER — IMMUNIZATION (OUTPATIENT)
Dept: INTERNAL MEDICINE | Facility: CLINIC | Age: 68
End: 2018-10-18
Payer: MEDICARE

## 2018-10-18 ENCOUNTER — HOSPITAL ENCOUNTER (OUTPATIENT)
Dept: RADIOLOGY | Facility: HOSPITAL | Age: 68
Discharge: HOME | End: 2018-10-18
Attending: FAMILY MEDICINE
Payer: MEDICARE

## 2018-10-18 ENCOUNTER — OFFICE VISIT (OUTPATIENT)
Dept: INTERNAL MEDICINE | Facility: CLINIC | Age: 68
End: 2018-10-18
Attending: FAMILY MEDICINE
Payer: MEDICARE

## 2018-10-18 VITALS
TEMPERATURE: 98 F | BODY MASS INDEX: 25.85 KG/M2 | SYSTOLIC BLOOD PRESSURE: 120 MMHG | WEIGHT: 184.63 LBS | DIASTOLIC BLOOD PRESSURE: 78 MMHG | OXYGEN SATURATION: 99 % | HEIGHT: 71 IN | HEART RATE: 65 BPM

## 2018-10-18 DIAGNOSIS — M76.811 ANTERIOR TIBIALIS TENDINITIS OF RIGHT LOWER EXTREMITY: ICD-10-CM

## 2018-10-18 DIAGNOSIS — M79.671 RIGHT FOOT PAIN: ICD-10-CM

## 2018-10-18 DIAGNOSIS — M51.26 HNP (HERNIATED NUCLEUS PULPOSUS), LUMBAR: Primary | ICD-10-CM

## 2018-10-18 DIAGNOSIS — Z82.49 FAMILY HISTORY OF CORONARY ARTERY DISEASE: ICD-10-CM

## 2018-10-18 DIAGNOSIS — K21.9 GASTROESOPHAGEAL REFLUX DISEASE WITHOUT ESOPHAGITIS: Chronic | ICD-10-CM

## 2018-10-18 DIAGNOSIS — Z12.5 PROSTATE CANCER SCREENING: ICD-10-CM

## 2018-10-18 DIAGNOSIS — Z98.890 H/O LUMBOSACRAL SPINE SURGERY: ICD-10-CM

## 2018-10-18 DIAGNOSIS — E78.5 HYPERLIPIDEMIA, UNSPECIFIED HYPERLIPIDEMIA TYPE: ICD-10-CM

## 2018-10-18 PROBLEM — H25.12 NUCLEAR SCLEROTIC CATARACT OF LEFT EYE: Status: RESOLVED | Noted: 2018-04-16 | Resolved: 2018-10-18

## 2018-10-18 PROCEDURE — 99215 OFFICE O/P EST HI 40 MIN: CPT | Mod: PBBFAC | Performed by: FAMILY MEDICINE

## 2018-10-18 PROCEDURE — 73630 X-RAY EXAM OF FOOT: CPT | Mod: TC,RT

## 2018-10-18 PROCEDURE — 90662 IIV NO PRSV INCREASED AG IM: CPT | Mod: PBBFAC

## 2018-10-18 PROCEDURE — 73610 X-RAY EXAM OF ANKLE: CPT | Mod: TC,RT

## 2018-10-18 PROCEDURE — 73610 X-RAY EXAM OF ANKLE: CPT | Mod: 26,RT,, | Performed by: RADIOLOGY

## 2018-10-18 PROCEDURE — 99999 PR PBB SHADOW E&M-EST. PATIENT-LVL V: CPT | Mod: PBBFAC,,, | Performed by: FAMILY MEDICINE

## 2018-10-18 PROCEDURE — G0009 ADMIN PNEUMOCOCCAL VACCINE: HCPCS | Mod: PBBFAC

## 2018-10-18 PROCEDURE — 73630 X-RAY EXAM OF FOOT: CPT | Mod: 26,RT,, | Performed by: RADIOLOGY

## 2018-10-18 PROCEDURE — 99214 OFFICE O/P EST MOD 30 MIN: CPT | Mod: S$PBB,,, | Performed by: FAMILY MEDICINE

## 2018-10-18 NOTE — ADDENDUM NOTE
Addended by: RAMBO MARTINEZ on: 10/18/2018 11:56 AM     Modules accepted: Orders, Level of Service

## 2018-10-18 NOTE — PROGRESS NOTES
Subjective:       Patient ID: Jean Carlson is a 68 y.o. male.    Chief Complaint: Ankle Pain (right foot and leg pain) and Abdominal Pain (right below breast bone)    HPI  Review of Systems   Constitutional: Negative for chills, fatigue, fever and unexpected weight change.   HENT: Negative for congestion and trouble swallowing.    Eyes: Negative for redness and visual disturbance.   Respiratory: Negative for cough, chest tightness and shortness of breath.    Cardiovascular: Negative for chest pain, palpitations and leg swelling.   Gastrointestinal: Negative for abdominal pain and blood in stool.   Genitourinary: Negative for difficulty urinating and hematuria.   Musculoskeletal: Positive for arthralgias and gait problem. Negative for back pain, joint swelling, myalgias and neck pain.   Skin: Negative for color change and rash.   Neurological: Positive for weakness. Negative for tremors, speech difficulty, numbness and headaches.   Hematological: Negative for adenopathy. Does not bruise/bleed easily.   Psychiatric/Behavioral: Negative for behavioral problems, confusion and sleep disturbance. The patient is not nervous/anxious.        Objective:      Physical Exam   Constitutional: He is oriented to person, place, and time. He appears well-developed and well-nourished. No distress.   Neck: Neck supple.   Pulmonary/Chest: Effort normal.   Musculoskeletal: He exhibits no edema.        Right ankle: No tenderness. No lateral malleolus and no medial malleolus tenderness found. Achilles tendon normal.        Right lower leg: He exhibits no edema.        Left lower leg: He exhibits no edema.        Right foot: There is tenderness. There is normal range of motion, no bony tenderness, no swelling and no deformity.        Feet:    Neurological: He is alert and oriented to person, place, and time. He exhibits normal muscle tone. GCS eye subscore is 4. GCS verbal subscore is 5. GCS motor subscore is 6.   Neg SLR   Skin: Skin  is warm and dry. No rash noted.   Psychiatric: He has a normal mood and affect. His behavior is normal. Judgment and thought content normal.   Nursing note and vitals reviewed.      Assessment:       1. HNP (herniated nucleus pulposus), lumbar    2. H/O lumbosacral spine surgery    3. Anterior tibialis tendinitis of right lower extremity    4. Right foot pain    5. Hyperlipidemia, unspecified hyperlipidemia type    6. Gastroesophageal reflux disease without esophagitis    7. Prostate cancer screening    8. Family history of coronary artery disease        Plan:   Jean was seen today for ankle pain and abdominal pain.    Diagnoses and all orders for this visit:    HNP (herniated nucleus pulposus), lumbar    H/O lumbosacral spine surgery  Comments:   and again recently due to recurrent HNP    Anterior tibialis tendinitis of right lower extremity  -     X-Ray Foot Complete Right; Future  -     X-Ray Ankle Complete Right; Future  -     Cancel: Ambulatory Referral to Physical/Occupational Therapy  -     Ambulatory Referral to Physical/Occupational Therapy  -     MRI Foot (Hindfoot) Right Without Contrast; Future    Right foot pain  -     MRI Foot (Hindfoot) Right Without Contrast; Future    Hyperlipidemia, unspecified hyperlipidemia type    Gastroesophageal reflux disease without esophagitis  -     Ambulatory referral to Gastroenterology    Prostate cancer screening  -     PSA, Screening; Future    Family history of coronary artery disease  Comments:  Mom age 64, Dad age 67 -  on same day. Pat Aunt early 70's.      See meds, orders, follow up, routing and instructions sections of encounter.  A 68-year-old established male patient with multiple complaints, primarily   recovering from back surgery.  States his leg is still weak to the thigh.  He is   going to physical therapy at [facility].    He has indigestion chronically.  No definite dysphagia.  Symptoms are worse at   night.  He has no exertional chest pain,  dyspnea, syncope or near syncope.  He   had a stress echocardiogram in 2014, which was negative and an Agatston score of   30, same year.  He had been on lipid medication until recently, states that he   felt that that was causing some side effects --? GI.  He discontinued as of   several months ago.  He states a family history of coronary artery disease.  He   does exercise on a regular basis up to the point of his recent back surgery.    Foot pain, right, insertion of anterior tibialis.  It is worse nocturnally.    This was not prohibiting exercise in the past.  It does not seem to correlate   with his back issue.    PHYSICAL EXAMINATION:  He has no abnormality noted to the foot, possible   tenderness at the distal anterior tibialis.  There is no deformity.  Range of   motion is full.  No erythema.    He has no history of gout, podagra or previous uric acid.  This comment was   prompted on his question about gout as a possibility.    RECOMMENDATIONS:  His foot pain has been present for four or five months,   although it occurs nocturnally, it has been persistent.  I did suggest an MRI,   x-rays, PT consult, 4-6 week followup.    GI consult.  His symptoms do not sound cardiac.  Should his symptoms change then   I would suggest further evaluation.    Keep physical therapy and followup appointments with his neurosurgical staff.      BRIGETTE/HN  dd: 10/18/2018 13:10:54 (CDT)  td: 10/19/2018 06:12:25 (CDT)  Doc ID   #[id number]  Job ID #[id number]    CC:

## 2018-10-22 ENCOUNTER — HOSPITAL ENCOUNTER (OUTPATIENT)
Dept: RADIOLOGY | Facility: HOSPITAL | Age: 68
Discharge: HOME | End: 2018-10-22
Attending: FAMILY MEDICINE
Payer: MEDICARE

## 2018-10-22 ENCOUNTER — TELEPHONE (OUTPATIENT)
Dept: INTERNAL MEDICINE | Facility: CLINIC | Age: 68
End: 2018-10-22

## 2018-10-22 DIAGNOSIS — M79.671 RIGHT FOOT PAIN: ICD-10-CM

## 2018-10-22 DIAGNOSIS — M76.811 ANTERIOR TIBIALIS TENDINITIS OF RIGHT LOWER EXTREMITY: ICD-10-CM

## 2018-10-22 PROCEDURE — 73718 MRI LOWER EXTREMITY W/O DYE: CPT | Mod: TC,RT

## 2018-10-22 PROCEDURE — 73718 MRI LOWER EXTREMITY W/O DYE: CPT | Mod: 26,RT,, | Performed by: RADIOLOGY

## 2018-10-24 ENCOUNTER — TELEPHONE (OUTPATIENT)
Dept: INTERNAL MEDICINE | Facility: CLINIC | Age: 68
End: 2018-10-24

## 2018-10-24 DIAGNOSIS — M76.811 ANTERIOR TIBIALIS TENDINITIS OF RIGHT LOWER EXTREMITY: Primary | ICD-10-CM

## 2018-10-24 DIAGNOSIS — E78.5 HYPERLIPIDEMIA, UNSPECIFIED HYPERLIPIDEMIA TYPE: ICD-10-CM

## 2018-10-24 DIAGNOSIS — R97.20 RISING PSA LEVEL: ICD-10-CM

## 2018-10-24 RX ORDER — ATORVASTATIN CALCIUM 20 MG/1
20 TABLET, FILM COATED ORAL DAILY
Qty: 90 TABLET | Refills: 3 | Status: SHIPPED | OUTPATIENT
Start: 2018-10-24 | End: 2019-11-12 | Stop reason: SDUPTHER

## 2018-10-24 RX ORDER — OMEGA-3-ACID ETHYL ESTERS 1 G/1
1 CAPSULE, LIQUID FILLED ORAL DAILY
Qty: 90 CAPSULE | Refills: 3 | Status: SHIPPED | OUTPATIENT
Start: 2018-10-24 | End: 2019-05-14 | Stop reason: SDUPTHER

## 2018-10-24 NOTE — TELEPHONE ENCOUNTER
Called patient and discussed labs and or test results. Patient expressed understanding and had the opportunity to ask questions. Any questions were answered. See meds, orders, follow up and instructions sections of encounter.    Specific issues include:  MRI -   Lipids - resume med  PSA -  referral

## 2018-10-25 ENCOUNTER — OFFICE VISIT (OUTPATIENT)
Dept: UROLOGY | Facility: CLINIC | Age: 68
End: 2018-10-25
Attending: FAMILY MEDICINE
Payer: MEDICARE

## 2018-10-25 VITALS
DIASTOLIC BLOOD PRESSURE: 77 MMHG | WEIGHT: 183 LBS | BODY MASS INDEX: 25.62 KG/M2 | HEIGHT: 71 IN | SYSTOLIC BLOOD PRESSURE: 112 MMHG | HEART RATE: 79 BPM

## 2018-10-25 DIAGNOSIS — E78.00 PURE HYPERCHOLESTEROLEMIA: Chronic | ICD-10-CM

## 2018-10-25 DIAGNOSIS — N52.01 ERECTILE DYSFUNCTION DUE TO ARTERIAL INSUFFICIENCY: ICD-10-CM

## 2018-10-25 DIAGNOSIS — N13.8 BPH WITH URINARY OBSTRUCTION: Primary | ICD-10-CM

## 2018-10-25 DIAGNOSIS — N40.1 BPH WITH URINARY OBSTRUCTION: Primary | ICD-10-CM

## 2018-10-25 PROBLEM — Z98.890 H/O LUMBOSACRAL SPINE SURGERY: Status: RESOLVED | Noted: 2018-10-18 | Resolved: 2018-10-25

## 2018-10-25 PROCEDURE — 99999 PR PBB SHADOW E&M-EST. PATIENT-LVL III: CPT | Mod: PBBFAC,,, | Performed by: UROLOGY

## 2018-10-25 PROCEDURE — 99213 OFFICE O/P EST LOW 20 MIN: CPT | Mod: PBBFAC | Performed by: UROLOGY

## 2018-10-25 PROCEDURE — 99204 OFFICE O/P NEW MOD 45 MIN: CPT | Mod: S$PBB,,, | Performed by: UROLOGY

## 2018-10-25 RX ORDER — TAMSULOSIN HYDROCHLORIDE 0.4 MG/1
0.4 CAPSULE ORAL DAILY
Qty: 30 CAPSULE | Refills: 11 | Status: SHIPPED | OUTPATIENT
Start: 2018-10-25 | End: 2019-10-28 | Stop reason: SDUPTHER

## 2018-10-25 NOTE — PROGRESS NOTES
CHIEF COMPLAINT:    Mr. Carlson is a 68 y.o. male presenting for a consultation at the request of Dr. Gallardo. Patient presents with an elevated PSA velocity.    PRESENTING ILLNESS:    Jean Carlson is a 68 y.o. male who had a PSA drawn by his PCP.  There was concern about the velocity.    He is s/p a TUIP at [employer].  He has nocturia x 1.  No hematuria.  No dysuria.  + frequency.  He'd like to try an alpha blocker.    He also c/o ED.  This has been present for > 1 year.  He's tried and failed Viagra.  His  T is normal.  He has an Rx for Cialis at home.    REVIEW OF SYSTEMS:    Jean Carlson denies headache, blurred vision, fever, nausea, vomiting, chills, abdominal pain, bleeding per rectum, cough, SOB, recent loss of consciousness, recent mental status changes, seizures, dizziness, or upper or lower extremity weakness.    JUAN M  1. 1  2. 2  3. 3  4. 3  5. 3      PATIENT HISTORY:    Past Medical History:   Diagnosis Date    Family history of coronary artery disease 10/7/2016    Mom age 64, Dad age 67 -  on same day. Pat Aunt early 70's.   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    Osteoporosis     Prostate disease        Past Surgical History:   Procedure Laterality Date    back sx      lower    CATARACT EXTRACTION W/  INTRAOCULAR LENS IMPLANT Bilateral     Dr Finn/[facility] IOL     EGD (ESOPHAGOGASTRODUODENOSCOPY) N/A 2013    Performed [facility] Miguel [facility] (4TH FLR)    INSERTION-INTRAOCULAR LENS (IOL) Left 2018    [facility]    INSERTION-INTRAOCULAR LENS (IOL) Right 3/12/2018    [facility] at [facility] OR    PHACOEMULSIFICATION-ASPIRATION-CATARACT Left 2018    [facility] OR    PHACOEMULSIFICATION-ASPIRATION-CATARACT Right 3/12/2018    [facility] OR    PROSTATE SURGERY       REPAIR-HERNIA-UMBILICAL N/A 10/1/2015    Performed by Dennis Lawson Jr., [facility] at [facility] OR [address] [place] [address]    SPINE SURGERY      TONSILLECTOMY      TRANSURETHRAL RESECTION OF PROSTATE         Family History   Problem Relation Age of Onset    Glaucoma Maternal Uncle     Retinitis pigmentosa Maternal Uncle     Heart disease Mother     Heart attack Mother     Skin cancer Mother     Heart disease Father     Heart attack Father     Glaucoma Maternal Aunt     No Known Problems Daughter     Celiac disease Neg Hx     Cirrhosis Neg Hx     Colon cancer Neg Hx     Colon polyps Neg Hx     Crohn's disease Neg Hx     Cystic fibrosis Neg Hx     Esophageal cancer Neg Hx     Hemochromatosis Neg Hx     Inflammatory bowel disease Neg Hx     Irritable bowel syndrome Neg Hx     Liver cancer Neg Hx     Liver disease Neg Hx     Rectal cancer Neg Hx     Stomach cancer Neg Hx     Ulcerative colitis Neg Hx     Lane's disease Neg Hx     Amblyopia Neg Hx     Blindness Neg Hx     Cataracts Neg Hx     Macular degeneration Neg Hx     Retinal detachment Neg Hx     Strabismus Neg Hx        Social History     Socioeconomic History    Marital status:      Spouse name: Not on file    Number of children: Not on file    Years of education: Not on file    Highest education level: Not on file   Social Needs    Financial resource strain: Not on file    Food insecurity - worry: Not on file    Food insecurity - inability: Not on file    Transportation needs - medical: Not on file    Transportation needs - non-medical: Not on file   Occupational History     Employer: [employer]   Tobacco Use    Smoking status: Never Smoker    Smokeless tobacco: Never Used   Substance and Sexual Activity    Alcohol use: Yes     Alcohol/week: 1.8 oz     Types: 3 Glasses of wine per week     Comment: 3, 5 oz glasses a week    Drug use: No    Sexual activity: Yes     Partners: Female   Other Topics Concern    Not on file   Social  History Narrative     [employer]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    Medications:    Current Outpatient Medications:     aspirin 81 MG Chew, Take 81 mg by mouth once daily., Disp: , Rfl:     atorvastatin (LIPITOR) 20 MG tablet, Take 1 tablet (20 mg total) by mouth once daily., Disp: 90 tablet, Rfl: 3    co-enzyme Q-10 30 mg capsule, Take 30 mg by mouth once daily. , Disp: , Rfl:     fish oil-omega-3 fatty acids 300-1,000 mg capsule, Take 2 g by mouth once daily., Disp: , Rfl:     lutein-zeaxanthin 25-5 mg Cap, Take by mouth once daily. , Disp: , Rfl:     multivitamin capsule, Take 1 capsule by mouth once daily., Disp: , Rfl:     omega-3 acid ethyl esters (LOVAZA) 1 gram capsule, Take 1 capsule (1 g total) by mouth once daily., Disp: 90 capsule, Rfl: 3    ranitidine (ZANTAC) 300 MG tablet, Take 1 tablet (300 mg total) by mouth every evening., Disp: 90 tablet, Rfl: 3    resveratrol 100 mg Cap, Take by mouth once daily., Disp: , Rfl:     PHYSICAL EXAMINATION:    The patient generally appears in good health, is appropriately interactive, and is in no apparent distress.     Eyes: anicteric sclerae, moist conjunctivae; no lid-lag; PERRLA     HENT: Atraumatic; oropharynx clear with moist mucous membranes and no mucosal ulcerations;normal hard and soft palate.  No evidence of lymphadenopathy.    Neck: Trachea midline.  No thyromegaly.    Musculoskeletal: No abnormal gait.    Skin: No lesions.    Mental: Cooperative with normal affect.  Is oriented to time, place, and person.    Neuro: Grossly intact.    Chest: Normal inspiratory effort.   No accessory muscles.  No audible wheezes.  Respirations symmetric on inspiration and expiration.    Heart: Regular rhythm.      Abdomen:  Soft, non-tender. No masses or organomegaly. Bladder is not palpable. No evidence of flank discomfort. No evidence of inguinal hernia.    Genitourinary: The penis is circumcised with no  evidence of plaques or induration. The urethral meatus is normal. The testes, epididymides, and cord structures are normal in size and contour bilaterally. The scrotum is normal in size and contour.    Normal anal sphincter tone. No rectal mass.    The prostate is 30 g. Normal landmarks. Lateral sulci. Median furrow intact.  No nodularity or induration. Seminal vesicles are normal.    Extremities: No clubbing, cyanosis, or edema      LABS:      Lab Results   Component Value Date    PSA 3.0 10/18/2018    PSA 2.1 10/27/2017    PSA 2.2 09/23/2016       IMPRESSION:    Encounter Diagnoses   Name Primary?    BPH with urinary obstruction Yes    Erectile dysfunction due to arterial insufficiency     Pure hypercholesterolemia          PLAN:    1. Discussed options for his ED.  He'd like to try the Cialis he has at home.  2. Discussed options for his LUTS.  He'd like flomax. Side effects discussed.  A new Rx was given   3. Went over his PSA.  Recommend we recheck it in 6 months to calculate a PSA velocity.    4. RTC 6 months.    Copy to: Paulina

## 2018-10-25 NOTE — LETTER
October 25, 2018      Papi Gallardo MD  [address]  [facility] [address]           [facility] 4th [facility]  [address]  [facility] [address]  Phone: [phone]          Patient: Jean Carlson   MR Number: [id number]   YOB: 1950   Date of Visit: 10/25/2018       Dear Dr. Papi Gallardo:    Thank you for referring Jean Carlson to me for evaluation. Attached you will find relevant portions of my assessment and plan of care.    If you have questions, please do not hesitate to call me. I look forward to following Jean Carlson along with you.    Sincerely,    Manan Ny MD    Enclosure  CC:  No Recipients    If you would like to receive this communication electronically, please contact [email or web address] or [phone] to request more information on [employer] access.    For providers and/or their staff who would like to refer a patient to [facility], please contact us through our one-stop-shop provider referral line, [facility], at [phone].    If you feel you have received this communication in error or would no longer like to receive these types of communications, please e-mail [email or web address]

## 2018-10-26 ENCOUNTER — OFFICE VISIT (OUTPATIENT)
Dept: PODIATRY | Facility: CLINIC | Age: 68
End: 2018-10-26
Payer: MEDICARE

## 2018-10-26 VITALS
WEIGHT: 187.19 LBS | HEART RATE: 61 BPM | DIASTOLIC BLOOD PRESSURE: 77 MMHG | HEIGHT: 71 IN | SYSTOLIC BLOOD PRESSURE: 123 MMHG | BODY MASS INDEX: 26.21 KG/M2

## 2018-10-26 DIAGNOSIS — M66.869 NONTRAUMATIC TEAR OF TENDON OF TIBIALIS ANTERIOR: Primary | ICD-10-CM

## 2018-10-26 PROCEDURE — 99203 OFFICE O/P NEW LOW 30 MIN: CPT | Mod: S$PBB,,, | Performed by: PODIATRIST

## 2018-10-26 PROCEDURE — 99214 OFFICE O/P EST MOD 30 MIN: CPT | Mod: PBBFAC,PN | Performed by: PODIATRIST

## 2018-10-26 PROCEDURE — 99999 PR PBB SHADOW E&M-EST. PATIENT-LVL IV: CPT | Mod: PBBFAC,,, | Performed by: PODIATRIST

## 2018-10-26 NOTE — PROGRESS NOTES
Subjective:      Patient ID: Jean Carlson is a 68 y.o. male.    Chief Complaint: Foot Pain    Presenting with right foot pain. It started about 4 months ago. He is retired and does work out a lot. He tells me it might be coming from overused and working out too much. He is complaining of pain in the morning specially between 4 to 6am. Very mild pain upon ambulation. No pain at this moment. He was seen by PCP who ordered Xray and MRI. MRI says 1.  Partial tear/tendinosis of the tibialis anterior tendon, corresponding to the region of the patient's pain. 2.  Mild peroneal longus and brevis tendinosis.    He is also recovering back surgery. He is going through physical therapy for both his foot and back. He started physical therapy for the foot 10/24th. Tells me he had mild pain upon ambulation and he is pointing level of ankle where TA tendon courses. Denies other symptoms.     Review of Systems   Constitution: Negative for decreased appetite, fever, weakness and malaise/fatigue.   HENT: Negative for congestion.    Cardiovascular: Negative for chest pain and leg swelling.   Respiratory: Negative for cough and shortness of breath.    Skin: Negative for color change, nail changes and rash.   Musculoskeletal: Positive for myalgias and stiffness. Negative for arthritis, joint pain, joint swelling and muscle weakness.   Gastrointestinal: Negative for bloating, abdominal pain, nausea and vomiting.   Neurological: Negative for headaches and numbness.   Psychiatric/Behavioral: Negative for altered mental status.     Vitals:    10/26/18 1010   BP: 123/77   Pulse: 61           Objective:      Physical Exam   Constitutional: He is oriented to person, place, and time. He appears well-developed and well-nourished. No distress.   Musculoskeletal: He exhibits edema and tenderness. He exhibits no deformity.   Neurological: He is alert and oriented to person, place, and time.   Skin: Skin is warm. Capillary refill takes 2 to 3  seconds.   Psychiatric: He has a normal mood and affect.     Vascular: Distal DP/PT pulses palpable 2/4 b/l. CRT < 3 sec to tips of toes. No edema noted to b/l LE. No vericosities noted to b/l LEs. Hair growth present b/l LE, warm to touch b/l LE    Dermatologic: No open lesions, lacerations or wounds. Nails are normal R 1-5 and L 1-5. Interdigital spaces clean, dry and intact b/l. No erythema, rubor, calor noted to b/l LE    Musculoskeletal: No equinus noted b/l ankles with < 10 deg DF noted b/l. MMT 5/5 in DF/PF/Inv/Ev resistance with no reproduction of pain in any direction. Passive range of motion of ankle and pedal joints is painless b/l. No calf tenderness b/l LE, Compartments soft/compressible b/l.   R foot/ankle: tenderness at the level of ankle joint along the TA tendon. No pain at the insertion (MC), no pain upon inversion, eversion, supination, pronation. 5/5 MMT, TA tendon intact. No defect of the tendon.    Neurological: Light touch, proprioception, and sharp/dull sensation are all intact b/l. Protective threshold with the [place]-Wienstein monofilament is intact b/l. Vibratory sensation intact b/l.         Assessment:       Encounter Diagnosis   Name Primary?    Nontraumatic tear of tendon of tibialis anterior - Right Foot Yes         Plan:       Jean was seen today for foot pain.    Diagnoses and all orders for this visit:    Nontraumatic tear of tendon of tibialis anterior - Right Foot      I counseled the patient on his conditions, their implications and medical management.    -67 y/o male with R TA partial tear/TA tendonosis    -MRI/xray reviewed with pt  -c/w physical therapy  -no complete tear, it is longitudinal partial tear, will start with physical therapy  -WBAT  -avoid any strenuous exercise  -The nature of the condition, options for management, as well as potential risks and complications were discussed in detail with patient. Patient was amenable to my recommendations and left my office  fully informed and will follow up as instructed or sooner if necessary.    -f/u 1 month

## 2018-11-07 ENCOUNTER — PATIENT MESSAGE (OUTPATIENT)
Dept: INTERNAL MEDICINE | Facility: CLINIC | Age: 68
End: 2018-11-07

## 2018-11-07 ENCOUNTER — PES CALL (OUTPATIENT)
Dept: ADMINISTRATIVE | Facility: CLINIC | Age: 68
End: 2018-11-07

## 2018-11-13 ENCOUNTER — TELEPHONE (OUTPATIENT)
Dept: PODIATRY | Facility: CLINIC | Age: 68
End: 2018-11-13

## 2018-11-29 ENCOUNTER — OFFICE VISIT (OUTPATIENT)
Dept: ORTHOPEDICS | Facility: CLINIC | Age: 68
End: 2018-11-29
Payer: MEDICARE

## 2018-11-29 VITALS
SYSTOLIC BLOOD PRESSURE: 149 MMHG | HEIGHT: 71 IN | WEIGHT: 194 LBS | DIASTOLIC BLOOD PRESSURE: 79 MMHG | HEART RATE: 65 BPM | BODY MASS INDEX: 27.16 KG/M2

## 2018-11-29 DIAGNOSIS — M76.811 ANTERIOR TIBIAL TENDONITIS, RIGHT: Primary | ICD-10-CM

## 2018-11-29 PROCEDURE — 99213 OFFICE O/P EST LOW 20 MIN: CPT | Mod: S$PBB,,, | Performed by: ORTHOPAEDIC SURGERY

## 2018-11-29 PROCEDURE — 99213 OFFICE O/P EST LOW 20 MIN: CPT | Mod: PBBFAC | Performed by: ORTHOPAEDIC SURGERY

## 2018-11-29 PROCEDURE — 99999 PR PBB SHADOW E&M-EST. PATIENT-LVL III: CPT | Mod: PBBFAC,,, | Performed by: ORTHOPAEDIC SURGERY

## 2018-11-29 NOTE — PROGRESS NOTES
DATE: 2018  PATIENT: Jean Carlson    CHIEF COMPLAINT: R foot pain    HPI:  68M with h/o recent lumbar discectomy presents for evaluation of R foot pain.  Located anterior and medial midfoot.  4 months duration.  No injury.  Thinks it may have started from doing too much incline on treadmill.  Described as shooting, stabbing, ache.  5/10 severity.  Slightly improved in severity since onset.  Worsened by walking.  Improved by rest.  No history of similar symptoms.  Some paresthesias on bottom of foot.  Pain wakes him up at night.  Prior treatment has included PT, shoe inserts.    PAST MEDICAL/SURGICAL HISTORY:  Past Medical History:   Diagnosis Date    Family history of coronary artery disease 10/7/2016    Mom age 64, Dad age 67 -  on same day. Pat Aunt early 70's.   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    Osteoporosis     Prostate disease      Past Surgical History:   Procedure Laterality Date    back sx      lower    CATARACT EXTRACTION W/  INTRAOCULAR LENS IMPLANT Bilateral     Dr Finn/[facility]OL     EGD (ESOPHAGOGASTRODUODENOSCOPY) N/A 2013    [facility])    INSERTION-INTRAOCULAR LENS (IOL) Left 2018    [facility]    INSERTION-INTRAOCULAR LENS (IOL) Right 3/12/2018    [facility]    PHACOEMULSIFICATION-ASPIRATION-CATARACT Left 2018    [facility]    PHACOEMULSIFICATION-ASPIRATION-CATARACT Right 3/12/2018    [facility]    PROSTATE SURGERY      REPAIR-HERNIA-UMBILICAL N/A 10/1/2015    [facility]    SPINE SURGERY      TONSILLECTOMY      TRANSURETHRAL RESECTION OF PROSTATE         Family History:   Family History   Problem Relation Age of Onset    Glaucoma Maternal Uncle      Retinitis pigmentosa Maternal Uncle     Heart disease Mother     Heart attack Mother     Skin cancer Mother     Heart disease Father     Heart attack Father     Glaucoma Maternal Aunt     No Known Problems Daughter     Celiac disease Neg Hx     Cirrhosis Neg Hx     Colon cancer Neg Hx     Colon polyps Neg Hx     Crohn's disease Neg Hx     Cystic fibrosis Neg Hx     Esophageal cancer Neg Hx     Hemochromatosis Neg Hx     Inflammatory bowel disease Neg Hx     Irritable bowel syndrome Neg Hx     Liver cancer Neg Hx     Liver disease Neg Hx     Rectal cancer Neg Hx     Stomach cancer Neg Hx     Ulcerative colitis Neg Hx     Lane's disease Neg Hx     Amblyopia Neg Hx     Blindness Neg Hx     Cataracts Neg Hx     Macular degeneration Neg Hx     Retinal detachment Neg Hx     Strabismus Neg Hx        Social History:   Social History     Socioeconomic History    Marital status:      Spouse name: Not on file    Number of children: Not on file    Years of education: Not on file    Highest education level: Not on file   Social Needs    Financial resource strain: Not on file    Food insecurity - worry: Not on file    Food insecurity - inability: Not on file    Transportation needs - medical: Not on file    Transportation needs - non-medical: Not on file   Occupational History     Employer: [employer]   Tobacco Use    Smoking status: Never Smoker    Smokeless tobacco: Never Used   Substance and Sexual Activity    Alcohol use: Yes     Alcohol/week: 1.8 oz     Types: 3 Glasses of wine per week     Comment: 3, 5 oz glasses a week    Drug use: No    Sexual activity: Yes     Partners: Female   Other Topics Concern    Not on file   Social History Narrative     [employer]. 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 , Disp: , Rfl:     fish oil-omega-3 fatty acids 300-1,000 mg capsule, Take 2 g by mouth once daily., Disp: , Rfl:     lutein-zeaxanthin 25-5 mg Cap, Take by mouth once daily. , Disp: , Rfl:     multivitamin capsule, Take 1 capsule by mouth once daily., Disp: , Rfl:     omega-3 acid ethyl esters (LOVAZA) 1 gram capsule, Take 1 capsule (1 g total) by mouth once daily., Disp: 90 capsule, Rfl: 3    ranitidine (ZANTAC) 300 MG tablet, Take 1 tablet (300 mg total) by mouth every evening., Disp: 90 tablet, Rfl: 3    resveratrol 100 mg Cap, Take by mouth once daily., Disp: , Rfl:     tamsulosin (FLOMAX) 0.4 mg Cap, Take 1 capsule (0.4 mg total) by mouth once daily., Disp: 30 capsule, Rfl: 11    Allergies: Review of patient's allergies indicates:  No Known Allergies    REVIEW OF SYSTEMS:  Constitutional: negative for fevers  Musculoskeletal: negative for paresthesias    PHYSICAL EXAMINATION:    BP (!) 149/79   Pulse 65   Ht 5' 11" (1.803 m)   Wt 88 kg (194 lb 0.1 oz)   BMI 27.06 kg/m²     Vitals:  Vital signs stable.  General: No acute distress.  Cardio: Regular rate.  Chest: No increased work of breathing.     MSK:  RLE:   Skin intact  No deformity  No ecchymoses  Mild swelling at distal tib ant  TTP mild over distal tib ant  Dorsiflexes to neutral  Some pain with resisted dorsiflexion and inversion  SILT T/SP/DP/[place]/Sa  Motor intact T/SP/DP  Brisk cap refill  Warm well perfused extremities  DP palpable    IMAGING:     XR R ankle negative for fx, dislocation, obvious malalignment noting NWB films.    MRI R ankle showing  tendinosis of distal tib ant tendon.    ASSESSMENT/PLAN:    1. Anterior tibial tendonitis, right         I have personally taken the history and examined this patient and agree with the residents note as stated above.    At this point I recommend a period of " immobilization in a fracture boot over the next 6 weeks or so.  If his symptoms cool down then I would recommend resuming physical therapy focused on his right anterior tibial tendon. He may continue with the physical therapy for his low back and right leg weakness.    Follow-up in 6 weeks

## 2018-12-04 ENCOUNTER — DOCUMENTATION ONLY (OUTPATIENT)
Dept: INTERNAL MEDICINE | Facility: CLINIC | Age: 68
End: 2018-12-04

## 2019-01-10 ENCOUNTER — OFFICE VISIT (OUTPATIENT)
Dept: ORTHOPEDICS | Facility: CLINIC | Age: 69
End: 2019-01-10
Payer: MEDICARE

## 2019-01-10 VITALS
HEART RATE: 60 BPM | HEIGHT: 71 IN | WEIGHT: 194 LBS | BODY MASS INDEX: 27.16 KG/M2 | DIASTOLIC BLOOD PRESSURE: 69 MMHG | SYSTOLIC BLOOD PRESSURE: 110 MMHG

## 2019-01-10 DIAGNOSIS — M76.811 ANTERIOR TIBIAL TENDONITIS, RIGHT: Primary | ICD-10-CM

## 2019-01-10 PROCEDURE — 99213 PR OFFICE/OUTPT VISIT, EST, LEVL III, 20-29 MIN: ICD-10-PCS | Mod: S$PBB,,, | Performed by: ORTHOPAEDIC SURGERY

## 2019-01-10 PROCEDURE — 99999 PR PBB SHADOW E&M-EST. PATIENT-LVL III: ICD-10-PCS | Mod: PBBFAC,,, | Performed by: ORTHOPAEDIC SURGERY

## 2019-01-10 PROCEDURE — 99999 PR PBB SHADOW E&M-EST. PATIENT-LVL III: CPT | Mod: PBBFAC,,, | Performed by: ORTHOPAEDIC SURGERY

## 2019-01-10 PROCEDURE — 99213 OFFICE O/P EST LOW 20 MIN: CPT | Mod: S$PBB,,, | Performed by: ORTHOPAEDIC SURGERY

## 2019-01-10 PROCEDURE — 99213 OFFICE O/P EST LOW 20 MIN: CPT | Mod: PBBFAC | Performed by: ORTHOPAEDIC SURGERY

## 2019-04-23 ENCOUNTER — PES CALL (OUTPATIENT)
Dept: ADMINISTRATIVE | Facility: CLINIC | Age: 69
End: 2019-04-23

## 2019-04-30 ENCOUNTER — OFFICE VISIT (OUTPATIENT)
Dept: INTERNAL MEDICINE | Facility: CLINIC | Age: 69
End: 2019-04-30
Payer: MEDICARE

## 2019-04-30 VITALS
SYSTOLIC BLOOD PRESSURE: 116 MMHG | HEART RATE: 80 BPM | HEIGHT: 70 IN | BODY MASS INDEX: 26.99 KG/M2 | DIASTOLIC BLOOD PRESSURE: 60 MMHG | WEIGHT: 188.5 LBS

## 2019-04-30 DIAGNOSIS — N13.8 BPH WITH URINARY OBSTRUCTION: ICD-10-CM

## 2019-04-30 DIAGNOSIS — N40.1 BPH WITH URINARY OBSTRUCTION: ICD-10-CM

## 2019-04-30 DIAGNOSIS — Z00.00 ENCOUNTER FOR PREVENTIVE HEALTH EXAMINATION: Primary | ICD-10-CM

## 2019-04-30 DIAGNOSIS — E78.00 PURE HYPERCHOLESTEROLEMIA: Chronic | ICD-10-CM

## 2019-04-30 DIAGNOSIS — I70.0 ATHEROSCLEROSIS OF AORTA: ICD-10-CM

## 2019-04-30 DIAGNOSIS — K21.9 GASTROESOPHAGEAL REFLUX DISEASE WITHOUT ESOPHAGITIS: Chronic | ICD-10-CM

## 2019-04-30 DIAGNOSIS — M81.0 OSTEOPOROSIS, UNSPECIFIED OSTEOPOROSIS TYPE, UNSPECIFIED PATHOLOGICAL FRACTURE PRESENCE: ICD-10-CM

## 2019-04-30 DIAGNOSIS — M48.062 SPINAL STENOSIS OF LUMBAR REGION WITH NEUROGENIC CLAUDICATION: ICD-10-CM

## 2019-04-30 DIAGNOSIS — Z12.11 SCREENING FOR COLON CANCER: ICD-10-CM

## 2019-04-30 DIAGNOSIS — R91.1 PULMONARY NODULE: ICD-10-CM

## 2019-04-30 PROCEDURE — G0439 PR MEDICARE ANNUAL WELLNESS SUBSEQUENT VISIT: ICD-10-PCS | Mod: S$GLB,,, | Performed by: NURSE PRACTITIONER

## 2019-04-30 PROCEDURE — 99214 OFFICE O/P EST MOD 30 MIN: CPT | Mod: PBBFAC | Performed by: NURSE PRACTITIONER

## 2019-04-30 PROCEDURE — G0439 PPPS, SUBSEQ VISIT: HCPCS | Mod: S$GLB,,, | Performed by: NURSE PRACTITIONER

## 2019-04-30 PROCEDURE — 99999 PR PBB SHADOW E&M-EST. PATIENT-LVL IV: CPT | Mod: PBBFAC,,, | Performed by: NURSE PRACTITIONER

## 2019-04-30 PROCEDURE — 99999 PR PBB SHADOW E&M-EST. PATIENT-LVL IV: ICD-10-PCS | Mod: PBBFAC,,, | Performed by: NURSE PRACTITIONER

## 2019-04-30 NOTE — PATIENT INSTRUCTIONS
Colonoscopy ordered  New shingles vaccine (shingrix) at pharmacy        Counseling and Referral of Other Preventative  (Italic type indicates deductible and co-insurance are waived)    Patient Name: Jean Carlson  Today's Date: 4/30/2019    Health Maintenance       Date Due Completion Date    Colonoscopy 12/29/2018 12/29/2008    Lipid Panel 10/18/2023 10/18/2018    TETANUS VACCINE 03/21/2027 3/21/2017        No orders of the defined types were placed in this encounter.    The following information is provided to all patients.  This information is to help you find resources for any of the problems found today that may be affecting your health:                Living healthy guide: www.[facility].[place].[facility]      Understanding Diabetes: www.diabetes.org      Eating healthy: www.cdc.gov/healthyweight      CDC home safety checklist: www.cdc.gov/steadi/patient.html      Agency on Aging: www.goea.[place].[facility]      Alcoholics anonymous (AA): www.aa.org      Physical Activity: www.[place].nih.gov/oj1azut      Tobacco use: www.quitwithusla.org

## 2019-04-30 NOTE — PROGRESS NOTES
I offered to discuss end of life issues, including information on how to make advance directives that the patient could use to name someone who would make medical decisions on their behalf if they became too ill to make themselves.    ___Patient declined  _X_Patient has advance directives in order.  Encouraged patient to bring in a copy to place on file.

## 2019-04-30 NOTE — PROGRESS NOTES
"Jean Carlson presented for a  Medicare AWV and comprehensive Health Risk Assessment today. The following components were reviewed and updated:    · Medical history  · Family History  · Social history  · Allergies and Current Medications  · Health Risk Assessment  · Health Maintenance  · Care Team     ** See Completed Assessments for Annual Wellness Visit within the encounter summary.**       The following assessments were completed:  · Living Situation  · CAGE  · Depression Screening  · Timed Get Up and Go  · Whisper Test  · Cognitive Function Screening  · Nutrition Screening  · ADL Screening  · PAQ Screening        Vitals:    04/30/19 1306   BP: 116/60   BP Location: Right arm   Patient Position: Sitting   BP Method: Large (Manual)   Pulse: 80   Weight: 85.5 kg (188 lb 7.9 oz)   Height: 5' 10" (1.778 m)     Body mass index is 27.05 kg/m².  Physical Exam   Constitutional: He is oriented to person, place, and time. He appears well-developed and well-nourished. No distress.   HENT:   Head: Normocephalic and atraumatic.   Eyes: No scleral icterus.   Neck: Normal range of motion. Neck supple.   Cardiovascular: Normal rate, regular rhythm, normal heart sounds and intact distal pulses.   Pulmonary/Chest: Effort normal and breath sounds normal. No respiratory distress.   Abdominal: He exhibits no distension.   Musculoskeletal: Normal range of motion. He exhibits no edema.   Neurological: He is alert and oriented to person, place, and time.   Skin: Skin is warm and dry. He is not diaphoretic.   Psychiatric: He has a normal mood and affect. His behavior is normal.       Diagnoses and health risks identified today and associated recommendations/orders:    1. Encounter for preventive health examination  Assessments completed  Preventive health recommendations reviewed  Colonoscopy orders placed  Card given to patient to bring to pharmacy for shingrix    2. Atherosclerosis of aorta  Stable. Seen on CT from " 05/06/16  Controlled with current medical therapy: atorvastatin and asa 81mg and fish oil  Followed by PCP.     3. Pure hypercholesterolemia  Stable.   Controlled with current medical therapy: atorvastatin, asa 81mg, and fish oil  Followed by PCP.     4. Pulmonary nodule  Stable.   0.4 cm pulmonary nodule adjacent to the left major fissure is stable compared to 9/2014. Per Fleischner Society guidelines such stability this period of time for nodule of this size favors a benign etiology and no followup is necessary.    Followed by PCP.     5. Spinal stenosis of lumbar region with neurogenic claudication  Stable.   Hx of back surgeries x2  Followed by PCP.     6. Gastroesophageal reflux disease without esophagitis  Stable.   Controlled with current medical therapy: zantac 300mg nightly  Followed by PCP.     7. BPH with urinary obstruction  Stable.   Controlled with current medical therapy: tamsulosin 0.4mg  Followed by PCP and urology.     8. Osteoporosis, unspecified osteoporosis type, unspecified pathological fracture presence  Stable.   Per patient on a holiday from medications  Followed by PCP.     9. Screening for colon cancer  - Case request GI: COLONOSCOPY      Provided Jean with a 5-10 year written screening schedule and personal prevention plan. Recommendations were developed using the USPSTF age appropriate recommendations. Education, counseling, and referrals were provided as needed. After Visit Summary printed and given to patient which includes a list of additional screenings\tests needed.    Follow up in about 6 months (around 10/30/2019) for a routine visit with your primary care provider or sooner if problems arise. .    Lucero Gonzalez NP

## 2019-05-14 RX ORDER — OMEGA-3-ACID ETHYL ESTERS 1 G/1
1 CAPSULE, LIQUID FILLED ORAL DAILY
Qty: 90 CAPSULE | Refills: 0 | Status: SHIPPED | OUTPATIENT
Start: 2019-05-14 | End: 2019-05-16 | Stop reason: SDUPTHER

## 2019-05-16 ENCOUNTER — OFFICE VISIT (OUTPATIENT)
Dept: INTERNAL MEDICINE | Facility: CLINIC | Age: 69
End: 2019-05-16
Payer: MEDICARE

## 2019-05-16 VITALS
BODY MASS INDEX: 26.74 KG/M2 | WEIGHT: 186.75 LBS | SYSTOLIC BLOOD PRESSURE: 110 MMHG | DIASTOLIC BLOOD PRESSURE: 68 MMHG | HEART RATE: 74 BPM | HEIGHT: 70 IN

## 2019-05-16 DIAGNOSIS — K40.90 LEFT INGUINAL HERNIA: Primary | ICD-10-CM

## 2019-05-16 PROBLEM — I70.0 ATHEROSCLEROSIS OF AORTA: Chronic | Status: ACTIVE | Noted: 2019-04-30

## 2019-05-16 PROBLEM — M76.811 ANTERIOR TIBIALIS TENDINITIS OF RIGHT LOWER EXTREMITY: Status: RESOLVED | Noted: 2018-10-18 | Resolved: 2019-05-16

## 2019-05-16 PROBLEM — H25.13 NUCLEAR SCLEROSIS, BILATERAL: Status: RESOLVED | Noted: 2018-03-12 | Resolved: 2019-05-16

## 2019-05-16 PROBLEM — N40.1 BPH WITH URINARY OBSTRUCTION: Chronic | Status: ACTIVE | Noted: 2018-10-25

## 2019-05-16 PROBLEM — N13.8 BPH WITH URINARY OBSTRUCTION: Chronic | Status: ACTIVE | Noted: 2018-10-25

## 2019-05-16 PROBLEM — M54.9 INTRACTABLE BACK PAIN: Status: RESOLVED | Noted: 2018-08-28 | Resolved: 2019-05-16

## 2019-05-16 PROCEDURE — 99999 PR PBB SHADOW E&M-EST. PATIENT-LVL III: ICD-10-PCS | Mod: PBBFAC,,, | Performed by: INTERNAL MEDICINE

## 2019-05-16 PROCEDURE — 99999 PR PBB SHADOW E&M-EST. PATIENT-LVL III: CPT | Mod: PBBFAC,,, | Performed by: INTERNAL MEDICINE

## 2019-05-16 PROCEDURE — 99213 OFFICE O/P EST LOW 20 MIN: CPT | Mod: PBBFAC | Performed by: INTERNAL MEDICINE

## 2019-05-16 PROCEDURE — 99213 OFFICE O/P EST LOW 20 MIN: CPT | Mod: S$PBB,,, | Performed by: INTERNAL MEDICINE

## 2019-05-16 PROCEDURE — 99213 PR OFFICE/OUTPT VISIT, EST, LEVL III, 20-29 MIN: ICD-10-PCS | Mod: S$PBB,,, | Performed by: INTERNAL MEDICINE

## 2019-05-16 RX ORDER — OMEGA-3-ACID ETHYL ESTERS 1 G/1
2 CAPSULE, LIQUID FILLED ORAL 2 TIMES DAILY
COMMUNITY
End: 2019-08-02

## 2019-05-16 NOTE — PROGRESS NOTES
"Jean Carlson presents today to urgent care for:  Abdominal Pain (L lower quad Hernia ? x wednesday )      Abdominal Pain   This is a new problem. The current episode started in the past 7 days. The onset quality is sudden. The problem occurs daily. The problem has been waxing and waning. The pain is located in the LLQ. The pain is mild. The quality of the pain is aching. The abdominal pain does not radiate. Pertinent negatives include no constipation, nausea or vomiting. Associated symptoms comments: Noticing a "lump" in left inguinal area after working out.  . The pain is aggravated by certain positions. Relieved by: it goes away on it's on. His past medical history is significant for abdominal surgery (umbilical hernia repair 2015).       Past medical, social, family and surgical history was reviewed and updated today as needed. See encounter for details.     Review of Systems   Constitutional: Negative.    Respiratory: Negative.    Cardiovascular: Negative.    Gastrointestinal: Positive for abdominal pain. Negative for constipation, nausea and vomiting.   Genitourinary: Negative.        Vitals:    05/16/19 1016   BP: 110/68   Pulse: 74   Body mass index is 26.79 kg/m².   Physical Exam   Constitutional: He appears well-developed and well-nourished.   Cardiovascular: Normal rate and regular rhythm.   Pulmonary/Chest: Effort normal and breath sounds normal.   Abdominal: Soft. Bowel sounds are normal. He exhibits no distension and no mass. There is no tenderness. There is no rebound and no guarding. A hernia (left direct inquinal area. easily palpable and reduceable. ) is present.        Assessment/plan:   1. Left inguinal hernia  - Ambulatory Referral to General Surgery  Pt. Information provided.   "

## 2019-05-16 NOTE — PATIENT INSTRUCTIONS
Hernia (Adult)    A hernia can happen when there is a weakness or defect in the wall of the abdomen or groin. Intestines or nearby tissues may move from their usual location and push through the weakness in the wall. This can cause a hernia (bulge) you may see or feel.  Causes and Risk Factors   A hernia may be present at birth. Or it may be caused by the wear and tear of daily living. Certain factors can make a hernia more likely. These can include:  · Heavy lifting  · Straining, whether from lifting, movement, or constipation  · Chronic cough  · Injury to the abdominal wall  · Excess weight  · Pregnancy  · Prior surgery  · Older age  · Family history of hernia  Symptoms  Symptoms of a hernia may come on suddenly. Or they may appear slowly over time. Some common symptoms include:  · Bulge in the groin area, around the navel, or in the scrotum (the bulge may get bigger when you stand and go away when you lie down)  · Pain or pressure around the bulge  · Pain during activities such as lifting, coughing, or sneezing  · A feeling of weakness or pressure in the groin  · Pain or swelling in the scrotum  Types of hernias  There are different types of hernia. The type you have depends on its location:  · Inguinal: This type is in the groin or scrotum. It is more common in men.  · Femoral: This type is in the groin, upper thigh (where the leg bends), or labia. It is more common in women.  · Ventral: This type is in the abdominal wall.  · Umbilical: This type occurs around the navel (belly button).  · Incisional: This type occurs at the site of a previous surgery.  The condition of the hernia can help determine how urgently it needs to be treated.  · Reducible: It goes back in by itself, or it can be pushed back in.  · Irreducible: It cant be pushed back in.  · Incarcerated/Strangulated: The intestine is trapped (incarcerated). If this happens, you wont be able to push the bulge back in. If the incarcerated hernia isnt  treated, it may become strangulated. This means the area loses blood supply and the tissue may die. This requires emergency surgery! Treatment is needed right away!  In most cases, a hernia will not heal on its own. Surgery is usually needed to repair the defect in the abdominal wall or groin. Youll be told more about surgery, if needed.  If your symptoms are not severe, treatment may sometimes be delayed. In such cases, regular follow-up visits with the provider will be needed. Youll be asked to keep track of your symptoms and to watch for signs of more serious problems. You may also be given guidelines similar to the home care instructions below.  Home Care  To help keep a hernia from getting worse, you may be advised to:  · Avoid heavy lifting and straining as directed.  · Take steps to prevent constipation, such as eating more fiber and drinking more water. This may help reduce straining that can occur when having a bowel movement. Reducing straining may help keep your symptoms from getting worse.  · Maintain a healthy weight or lose excess weight. This can help reduce strain on abdominal muscles and tissues.  · Stop smoking. This can help prevent coughing that may also strain abdominal muscles and tissues.  Follow-up care  Follow up with your healthcare provider, or as directed. If imaging tests were done, they will be reviewed a doctor. You will be told the results and any new findings that may affect your care.  When to seek medical advice  Call your healthcare provider right away if any of these occur:  · Hernia hardens, swells, or grows larger  · Hernia can no longer be pushed back in  · Pain moves to the lower right abdomen (just below the waistline), or spreads to the back  Call 911  Call 911 right away if any of these occur:  · Nausea and vomiting  · Severe pain, redness, or tenderness in the area near the hernia  · Pain worsens quickly and doesnt get better  · Inability to have a bowel movement or  pass gas  · Fever of 100.4°F (38°C) or higher  · Trouble breathing  · Fainting  · Rapid heart rate  · Vomiting blood  · Large amounts of blood in stool  Date Last Reviewed: 6/9/2015  © 6392-7347 [employer]. [address] [place] [address], [place] [address]. All rights reserved. This information is not intended as a substitute for professional medical care. Always follow your healthcare professional's instructions.

## 2019-05-20 ENCOUNTER — HOSPITAL ENCOUNTER (OUTPATIENT)
Dept: CARDIOLOGY | Facility: CLINIC | Age: 69
Discharge: HOME | End: 2019-05-20
Payer: MEDICARE

## 2019-05-20 ENCOUNTER — OFFICE VISIT (OUTPATIENT)
Dept: SURGERY | Facility: CLINIC | Age: 69
End: 2019-05-20
Payer: MEDICARE

## 2019-05-20 VITALS
SYSTOLIC BLOOD PRESSURE: 125 MMHG | HEIGHT: 70 IN | BODY MASS INDEX: 27.27 KG/M2 | TEMPERATURE: 98 F | DIASTOLIC BLOOD PRESSURE: 68 MMHG | HEART RATE: 73 BPM | WEIGHT: 190.5 LBS

## 2019-05-20 DIAGNOSIS — K40.90 LEFT INGUINAL HERNIA: ICD-10-CM

## 2019-05-20 DIAGNOSIS — K40.90 LEFT INGUINAL HERNIA: Primary | ICD-10-CM

## 2019-05-20 PROCEDURE — 93010 ELECTROCARDIOGRAM REPORT: CPT | Mod: S$PBB,,, | Performed by: INTERNAL MEDICINE

## 2019-05-20 PROCEDURE — 99213 OFFICE O/P EST LOW 20 MIN: CPT | Mod: PBBFAC,25 | Performed by: SURGERY

## 2019-05-20 PROCEDURE — 93010 EKG 12-LEAD: ICD-10-PCS | Mod: S$PBB,,, | Performed by: INTERNAL MEDICINE

## 2019-05-20 PROCEDURE — 93005 ELECTROCARDIOGRAM TRACING: CPT | Mod: PBBFAC | Performed by: INTERNAL MEDICINE

## 2019-05-20 PROCEDURE — 99999 PR PBB SHADOW E&M-EST. PATIENT-LVL III: ICD-10-PCS | Mod: PBBFAC,,, | Performed by: SURGERY

## 2019-05-20 PROCEDURE — 99203 OFFICE O/P NEW LOW 30 MIN: CPT | Mod: S$PBB,,, | Performed by: SURGERY

## 2019-05-20 PROCEDURE — 99203 PR OFFICE/OUTPT VISIT, NEW, LEVL III, 30-44 MIN: ICD-10-PCS | Mod: S$PBB,,, | Performed by: SURGERY

## 2019-05-20 PROCEDURE — 99999 PR PBB SHADOW E&M-EST. PATIENT-LVL III: CPT | Mod: PBBFAC,,, | Performed by: SURGERY

## 2019-05-20 RX ORDER — LIDOCAINE HYDROCHLORIDE 10 MG/ML
1 INJECTION, SOLUTION EPIDURAL; INFILTRATION; INTRACAUDAL; PERINEURAL ONCE
Status: CANCELLED | OUTPATIENT
Start: 2019-05-20 | End: 2019-05-20

## 2019-05-20 NOTE — LETTER
May 20, 2019      LEON Ramos II, MD  [address]  [place] [address]           [facility] General Surgery  [address]  [facility] [address]  Phone: [phone]          Patient: Jean Carlson   MR Number: [id number]   YOB: 1950   Date of Visit: 5/20/2019       Dear Dr. LEON Ramos II:    Thank you for referring Jean Carlson to me for evaluation. Attached you will find relevant portions of my assessment and plan of care.    If you have questions, please do not hesitate to call me. I look forward to following Jean Carlson along with you.    Sincerely,    Dennis Lawson Jr., MD    Enclosure  CC:  No Recipients    If you would like to receive this communication electronically, please contact [email or web address] or [phone] to request more information on [employer] access.    For providers and/or their staff who would like to refer a patient to [facility], please contact us through our one-stop-shop provider referral line, [facility], at [phone].    If you feel you have received this communication in error or would no longer like to receive these types of communications, please e-mail [email or web address]

## 2019-05-20 NOTE — PROGRESS NOTES
History & Physical    SUBJECTIVE:     History of Present Illness:  Patient is a 69 y.o. male healthy, presents with symptomatic LIH. Noticed a few weeks ago at the gym, getting worse while at the gym. Had an umbo fixed with mesh by Dr. Lawson several years ago, did very well.     No chief complaint on file.      Review of patient's allergies indicates:  No Known Allergies    Current Outpatient Medications   Medication Sig Dispense Refill    aspirin 81 MG Chew Take 81 mg by mouth once daily.      atorvastatin (LIPITOR) 20 MG tablet Take 1 tablet (20 mg total) by mouth once daily. 90 tablet 3    multivitamin capsule Take 1 capsule by mouth once daily.      omega-3 acid ethyl esters (LOVAZA) 1 gram capsule Take 2 g by mouth 2 (two) times daily.      ranitidine (ZANTAC) 300 MG tablet Take 1 tablet (300 mg total) by mouth every evening. 90 tablet 3    tamsulosin (FLOMAX) 0.4 mg Cap Take 1 capsule (0.4 mg total) by mouth once daily. 30 capsule 11     No current facility-administered medications for this visit.        Past Medical History:   Diagnosis Date    Anterior tibialis tendinitis of right lower extremity 10/18/2018    Family history of coronary artery disease 10/7/2016    Mom age 64, Dad age 67 -  on same day. Pat Aunt early 70's.   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    Intractable back pain 2018    Nuclear sclerosis, bilateral 3/12/2018    Ocular migraine 2012    Osteoporosis     Prostate disease     Transient vision disturbance of both eyes 2012    Umbilical hernia without obstruction and without gangrene 10/1/2015     Past Surgical History:   Procedure Laterality Date    back sx      lower    CATARACT EXTRACTION W/  INTRAOCULAR LENS IMPLANT Bilateral     Dr Finn/Symfony IOL     EGD (ESOPHAGOGASTRODUODENOSCOPY) N/A 2013    [facility] ([facility]    INSERTION-INTRAOCULAR LENS (IOL) Left 4/16/2018    [facility] OR    INSERTION-INTRAOCULAR LENS (IOL) Right 3/12/2018    [facility]    PHACOEMULSIFICATION-ASPIRATION-CATARACT Left 4/16/2018    [facility]    PHACOEMULSIFICATION-ASPIRATION-CATARACT Right 3/12/2018    [facility] OR    PROSTATE SURGERY      REPAIR-HERNIA-UMBILICAL N/A 10/1/2015    [facility] OR 2ND FLR    SPINE SURGERY      TONSILLECTOMY      TRANSURETHRAL RESECTION OF PROSTATE       Family History   Problem Relation Age of Onset    Glaucoma Maternal Uncle     Retinitis pigmentosa Maternal Uncle     Heart disease Mother     Heart attack Mother     Skin cancer Mother     Heart disease Father     Heart attack Father     Glaucoma Maternal Aunt     No Known Problems Daughter      Social History     Tobacco Use    Smoking status: Never Smoker    Smokeless tobacco: Never Used   Substance Use Topics    Alcohol use: Yes     Alcohol/week: 1.8 oz     Types: 3 Glasses of wine per week    Drug use: No        Review of Systems:  Review of Systems   Constitutional: Negative for activity change, appetite change, chills and fever.   HENT: Negative for congestion and trouble swallowing.    Eyes: Negative for visual disturbance.   Respiratory: Negative for cough and shortness of breath.    Cardiovascular: Negative for chest pain and leg swelling.   Gastrointestinal: Negative for abdominal distention, abdominal pain, constipation, diarrhea, nausea and vomiting.   Endocrine: Negative for cold intolerance and heat intolerance.   Genitourinary: Negative for difficulty urinating and dysuria.   Musculoskeletal: Negative for arthralgias and back pain.   Skin: Negative for rash and wound.   Neurological: Negative for dizziness and headaches.   Psychiatric/Behavioral: Negative for agitation and  "behavioral problems.       OBJECTIVE:     Vital Signs (Most Recent)  Temp: 98.1 °F (36.7 °C) (05/20/19 1449)  Pulse: 73 (05/20/19 1449)  BP: 125/68 (05/20/19 1449)  5' 10" (1.778 m)  86.4 kg (190 lb 7.6 oz)     Physical Exam:  Physical Exam   Constitutional: He is oriented to person, place, and time. He appears well-developed and well-nourished. No distress.   Cardiovascular: Normal rate and regular rhythm. Exam reveals no gallop and no friction rub.   No murmur heard.  Pulmonary/Chest: Effort normal and breath sounds normal. No respiratory distress. He has no wheezes. He has no rales.   Abdominal: Soft. Bowel sounds are normal. He exhibits no distension. There is no tenderness. There is no rebound.   Small LIH   Musculoskeletal: Normal range of motion. He exhibits no edema.   Neurological: He is alert and oriented to person, place, and time.   Skin: Skin is warm and dry.   Psychiatric: He has a normal mood and affect. His behavior is normal.           ASSESSMENT/PLAN:     68yo male with symptomatic LIH    -Going to [place] June 27th    PLAN:Plan     Lap repair left inguinal hernia with mesh, eval right  Consent obtained  Will proceed after his trip    I have personally taken the history and examined this patient and agree with the resident's note as stated above.         Dennis Lawson MD           "

## 2019-06-02 ENCOUNTER — PATIENT MESSAGE (OUTPATIENT)
Dept: ENDOSCOPY | Facility: HOSPITAL | Age: 69
End: 2019-06-02

## 2019-06-03 DIAGNOSIS — Z12.11 SPECIAL SCREENING FOR MALIGNANT NEOPLASMS, COLON: Primary | ICD-10-CM

## 2019-06-03 RX ORDER — POLYETHYLENE GLYCOL 3350, SODIUM SULFATE ANHYDROUS, SODIUM BICARBONATE, SODIUM CHLORIDE, POTASSIUM CHLORIDE 236; 22.74; 6.74; 5.86; 2.97 G/4L; G/4L; G/4L; G/4L; G/4L
4 POWDER, FOR SOLUTION ORAL ONCE
Qty: 4000 ML | Refills: 0 | Status: SHIPPED | OUTPATIENT
Start: 2019-06-03 | End: 2019-06-03

## 2019-06-05 ENCOUNTER — ANESTHESIA (OUTPATIENT)
Dept: ENDOSCOPY | Facility: HOSPITAL | Age: 69
End: 2019-06-05
Payer: MEDICARE

## 2019-06-05 ENCOUNTER — ANESTHESIA EVENT (OUTPATIENT)
Dept: ENDOSCOPY | Facility: HOSPITAL | Age: 69
End: 2019-06-05
Payer: MEDICARE

## 2019-06-05 ENCOUNTER — HOSPITAL ENCOUNTER (OUTPATIENT)
Facility: HOSPITAL | Age: 69
Discharge: HOME | End: 2019-06-05
Attending: COLON & RECTAL SURGERY | Admitting: COLON & RECTAL SURGERY
Payer: MEDICARE

## 2019-06-05 VITALS
SYSTOLIC BLOOD PRESSURE: 119 MMHG | HEART RATE: 66 BPM | TEMPERATURE: 98 F | WEIGHT: 181 LBS | HEIGHT: 71 IN | OXYGEN SATURATION: 98 % | DIASTOLIC BLOOD PRESSURE: 56 MMHG | RESPIRATION RATE: 16 BRPM | BODY MASS INDEX: 25.34 KG/M2

## 2019-06-05 DIAGNOSIS — K40.90 LEFT INGUINAL HERNIA: ICD-10-CM

## 2019-06-05 DIAGNOSIS — Z12.11 SCREENING FOR COLON CANCER: ICD-10-CM

## 2019-06-05 PROCEDURE — G0121 COLON CA SCRN NOT HI RSK IND: ICD-10-PCS | Mod: ,,, | Performed by: COLON & RECTAL SURGERY

## 2019-06-05 PROCEDURE — 25000003 PHARM REV CODE 250: Performed by: NURSE ANESTHETIST, CERTIFIED REGISTERED

## 2019-06-05 PROCEDURE — E9220 PRA ENDO ANESTHESIA: HCPCS | Mod: ,,, | Performed by: NURSE ANESTHETIST, CERTIFIED REGISTERED

## 2019-06-05 PROCEDURE — 37000009 HC ANESTHESIA EA ADD 15 MINS: Performed by: COLON & RECTAL SURGERY

## 2019-06-05 PROCEDURE — G0121 COLON CA SCRN NOT HI RSK IND: HCPCS | Mod: ,,, | Performed by: COLON & RECTAL SURGERY

## 2019-06-05 PROCEDURE — G0121 COLON CA SCRN NOT HI RSK IND: HCPCS | Performed by: COLON & RECTAL SURGERY

## 2019-06-05 PROCEDURE — E9220 PRA ENDO ANESTHESIA: ICD-10-PCS | Mod: ,,, | Performed by: NURSE ANESTHETIST, CERTIFIED REGISTERED

## 2019-06-05 PROCEDURE — 37000008 HC ANESTHESIA 1ST 15 MINUTES: Performed by: COLON & RECTAL SURGERY

## 2019-06-05 PROCEDURE — 25000003 PHARM REV CODE 250: Performed by: NURSE PRACTITIONER

## 2019-06-05 PROCEDURE — 63600175 PHARM REV CODE 636 W HCPCS: Performed by: NURSE ANESTHETIST, CERTIFIED REGISTERED

## 2019-06-05 RX ORDER — LIDOCAINE HYDROCHLORIDE 10 MG/ML
1 INJECTION, SOLUTION EPIDURAL; INFILTRATION; INTRACAUDAL; PERINEURAL ONCE
Status: DISCONTINUED | OUTPATIENT
Start: 2019-06-05 | End: 2019-06-05 | Stop reason: HOSPADM

## 2019-06-05 RX ORDER — SODIUM CHLORIDE 9 MG/ML
INJECTION, SOLUTION INTRAVENOUS CONTINUOUS
Status: DISCONTINUED | OUTPATIENT
Start: 2019-06-05 | End: 2020-06-18

## 2019-06-05 RX ORDER — SODIUM CHLORIDE 0.9 % (FLUSH) 0.9 %
10 SYRINGE (ML) INJECTION
Status: DISCONTINUED | OUTPATIENT
Start: 2019-06-05 | End: 2020-06-18

## 2019-06-05 RX ORDER — CEFAZOLIN SODIUM 1 G/3ML
2 INJECTION, POWDER, FOR SOLUTION INTRAMUSCULAR; INTRAVENOUS
Status: DISCONTINUED | OUTPATIENT
Start: 2019-06-05 | End: 2019-06-05 | Stop reason: HOSPADM

## 2019-06-05 RX ORDER — PROPOFOL 10 MG/ML
VIAL (ML) INTRAVENOUS CONTINUOUS PRN
Status: DISCONTINUED | OUTPATIENT
Start: 2019-06-05 | End: 2019-06-05

## 2019-06-05 RX ORDER — PROPOFOL 10 MG/ML
VIAL (ML) INTRAVENOUS
Status: DISCONTINUED | OUTPATIENT
Start: 2019-06-05 | End: 2019-06-05

## 2019-06-05 RX ORDER — GLYCOPYRROLATE 0.2 MG/ML
INJECTION INTRAMUSCULAR; INTRAVENOUS
Status: DISCONTINUED | OUTPATIENT
Start: 2019-06-05 | End: 2019-06-05

## 2019-06-05 RX ORDER — LIDOCAINE HCL/PF 100 MG/5ML
SYRINGE (ML) INTRAVENOUS
Status: DISCONTINUED | OUTPATIENT
Start: 2019-06-05 | End: 2019-06-05

## 2019-06-05 RX ADMIN — PROPOFOL 100 MG: 10 INJECTION, EMULSION INTRAVENOUS at 11:06

## 2019-06-05 RX ADMIN — GLYCOPYRROLATE 0.2 MG: 0.2 INJECTION, SOLUTION INTRAMUSCULAR; INTRAVENOUS at 11:06

## 2019-06-05 RX ADMIN — SODIUM CHLORIDE: 0.9 INJECTION, SOLUTION INTRAVENOUS at 11:06

## 2019-06-05 RX ADMIN — PROPOFOL 150 MCG/KG/MIN: 10 INJECTION, EMULSION INTRAVENOUS at 11:06

## 2019-06-05 RX ADMIN — LIDOCAINE HYDROCHLORIDE 50 MG: 20 INJECTION, SOLUTION INTRAVENOUS at 11:06

## 2019-06-05 NOTE — TRANSFER OF CARE
"Anesthesia Transfer of Care Note    Patient: Jean Carlson    Procedure(s) Performed: Procedure(s) (LRB):  COLONOSCOPY (N/A)    Patient location: PACU    Anesthesia Type: general    Transport from OR: Transported from OR on room air with adequate spontaneous ventilation    Post pain: adequate analgesia    Post assessment: no apparent anesthetic complications and tolerated procedure well    Post vital signs: stable    Level of consciousness: sedated and responds to stimulation    Nausea/Vomiting: no nausea/vomiting    Complications: none    Transfer of care protocol was followed      Last vitals:   Visit Vitals  BP (!) 100/55   Pulse 75   Temp 36.6 °C (97.9 °F)   Resp 16   Ht 5' 11" (1.803 m)   Wt 82.1 kg (181 lb)   SpO2 98%   BMI 25.24 kg/m²     "

## 2019-06-05 NOTE — H&P
Endoscopy H&P    Procedure : Colonoscopy      asymptomatic screening exam      Past Medical History:   Diagnosis Date    Anterior tibialis tendinitis of right lower extremity 10/18/2018    Family history of coronary artery disease 10/7/2016    Mom age 64, Dad age 67 -  on same day. Pat Aunt early 70's.   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    Intractable back pain 2018    Nuclear sclerosis, bilateral 3/12/2018    Ocular migraine 2012    Osteoporosis     Prostate disease     Transient vision disturbance of both eyes 2012    Umbilical hernia without obstruction and without gangrene 10/1/2015       Family History   Problem Relation Age of Onset    Glaucoma Maternal Uncle     Retinitis pigmentosa Maternal Uncle     Heart disease Mother     Heart attack Mother     Skin cancer Mother     Heart disease Father     Heart attack Father     Glaucoma Maternal Aunt     No Known Problems Daughter        Social History     Socioeconomic History    Marital status:      Spouse name: Not on file    Number of children: Not on file    Years of education: Not on file    Highest education level: Not on file   Occupational History     Employer: [employer]   Social Needs    Financial resource strain: Not on file    Food insecurity:     Worry: Not on file     Inability: Not on file    Transportation needs:     Medical: Not on file     Non-medical: Not on file   Tobacco Use    Smoking status: Never Smoker    Smokeless tobacco: Never Used   Substance and Sexual Activity    Alcohol use: Yes     Alcohol/week: 1.8 oz     Types: 3 Glasses of wine per week    Drug use: No    Sexual activity: Yes     Partners: Female   Lifestyle    Physical activity:     Days per week: Not on file     Minutes per session: Not on file     Stress: Not on file   Relationships    Social connections:     Talks on phone: Not on file     Gets together: Not on file     Attends Hinduism service: Not on file     Active member of club or organization: Not on file     Attends meetings of clubs or organizations: Not on file     Relationship status: Not on file   Other Topics Concern    Not on file   Social History Narrative     [employer]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       Review of Systems:  Respiratory ROS: no cough, shortness of breath, or wheezing  Cardiovascular ROS: no chest pain or dyspnea on exertion  Gastrointestinal ROS: no abdominal pain, change in bowel habits, or black or bloody stools  Musculoskeletal ROS: negative  Neurological ROS: no TIA or stroke symptoms        Physical Exam:  General: no distress  Head: normocephalic  Neck: supple, symmetrical, trachea midline  Lungs:  clear to auscultation bilaterally and normal respiratory effort  Heart: regular rate and rhythm, S1, S2 normal, no murmur, rub or gallop  Abdomen: soft, non-tender non-distented; bowel sounds normal; no masses,  no organomegaly  Extremities: no cyanosis or edema, or clubbing       Deep Sedation: Mallampati Score II (hard and soft palate, upper portion of tonsils anduvula visible)    II    Assessment and Plan:  Proceed with Colonoscopy

## 2019-06-05 NOTE — ANESTHESIA PREPROCEDURE EVALUATION
06/05/2019  Jean Carlson is a 69 y.o., male.    Anesthesia Evaluation    I have reviewed the Patient Summary Reports.     I have reviewed the Medications.     Review of Systems  Anesthesia Hx:  No problems with previous Anesthesia  History of prior surgery of interest to airway management or planning: Denies Family Hx of Anesthesia complications.   Denies Personal Hx of Anesthesia complications.   Social:  Non-Smoker    Hematology/Oncology:  Hematology Normal   Oncology Normal     EENT/Dental:EENT/Dental Normal   Cardiovascular:  Cardiovascular Normal Exercise tolerance: good     Pulmonary:  Pulmonary Normal    Renal/:  Renal/ Normal     Hepatic/GI:   GERD    Musculoskeletal:  Musculoskeletal Normal    Neurological:   Headaches    Endocrine:  Endocrine Normal    Dermatological:  Skin Normal    Psych:  Psychiatric Normal           Physical Exam  General:  Well nourished    Airway/Jaw/Neck:  AIRWAY FINDINGS: Normal       Dental:  DENTAL FINDINGS: Normal   Chest/Lungs:  Chest/Lungs Clear    Heart/Vascular:  Heart Findings: Normal       Mental Status:  Mental Status Findings: Normal        Anesthesia Plan  Type of Anesthesia, risks & benefits discussed:  Anesthesia Type:  general  Patient's Preference:   Intra-op Monitoring Plan: standard ASA monitors  Intra-op Monitoring Plan Comments:   Post Op Pain Control Plan:   Post Op Pain Control Plan Comments:   Induction:   IV  Beta Blocker:  Patient is not currently on a Beta-Blocker (No further documentation required).       Informed Consent: Patient understands risks and agrees with Anesthesia plan.  Questions answered. Anesthesia consent signed with patient.  ASA Score: 2     Day of Surgery Review of History & Physical:    H&P update referred to the provider.         Ready For Surgery From Anesthesia Perspective.

## 2019-06-05 NOTE — [employer] PATIENT INSTRUCTIONS
Discharge Summary/Instructions after an Endoscopic Procedure  Patient Name: Jean Carlson  Patient MRN: [id number]  Patient YOB: 1950 Wednesday, June 05, 2019  Mauro Smallwood MD  RESTRICTIONS:  During your procedure today, you received medications for sedation.  These   medications may affect your judgment, balance and coordination.  Therefore,   for 24 hours, you have the following restrictions:   - DO NOT drive a car, operate machinery, make legal/financial decisions,   sign important papers or drink alcohol.    ACTIVITY:  Today: no heavy lifting, straining or running due to procedural   sedation/anesthesia.  The following day: return to full activity including work.  DIET:  Eat and drink normally unless instructed otherwise.     TREATMENT FOR COMMON SIDE EFFECTS:  - Mild abdominal pain, nausea, belching, bloating or excessive gas:  rest,   eat lightly and use a heating pad.  - Sore Throat: treat with throat lozenges and/or gargle with warm salt   water.  - Because air was used during the procedure, expelling large amounts of air   from your rectum or belching is normal.  - If a bowel prep was taken, you may not have a bowel movement for 1-3 days.    This is normal.  SYMPTOMS TO WATCH FOR AND REPORT TO YOUR PHYSICIAN:  1. Abdominal pain or bloating, other than gas cramps.  2. Chest pain.  3. Back pain.  4. Signs of infection such as: chills or fever occurring within 24 hours   after the procedure.  5. Rectal bleeding, which would show as bright red, maroon, or black stools.   (A tablespoon of blood from the rectum is not serious, especially if   hemorrhoids are present.)  6. Vomiting.  7. Weakness or dizziness.  GO DIRECTLY TO THE NEAREST EMERGENCY ROOM IF YOU HAVE ANY OF THE FOLLOWING:      Difficulty breathing              Chills and/or fever over 101 F   Persistent vomiting and/or vomiting blood   Severe abdominal pain   Severe chest pain   Black, tarry stools   Bleeding- more than one  tablespoon   Any other symptom or condition that you feel may need urgent attention  Your doctor recommends these additional instructions:  If any biopsies were taken, your doctors clinic will contact you in 1 to 2   weeks with any results.  - Discharge patient to home (ambulatory).   - Resume previous diet indefinitely.   - Continue present medications.   - Repeat colonoscopy in 8 years for screening purposes.  For questions, problems or results please call your physician - Mauro Smallwood MD at Work:  [phone].  [facility] [place], EMERGENCY ROOM PHONE NUMBER: [phone]  IF A COMPLICATION OR EMERGENCY SITUATION ARISES AND YOU ARE UNABLE TO REACH   YOUR PHYSICIAN - GO DIRECTLY TO THE EMERGENCY ROOM.  Mauro Smallwood MD  6/5/2019 12:26:41 PM  This report has been verified and signed electronically.  [employer]

## 2019-06-07 NOTE — ANESTHESIA POSTPROCEDURE EVALUATION
Anesthesia Post Evaluation    Patient: Jean Carlson    Procedure(s) Performed: Procedure(s) (LRB):  COLONOSCOPY (N/A)    Final Anesthesia Type: general  Patient location during evaluation: GI PACU  Patient participation: Yes- Able to Participate  Level of consciousness: awake and alert  Post-procedure vital signs: reviewed and stable  Pain management: adequate  Airway patency: patent  PONV status at discharge: No PONV  Anesthetic complications: no      Cardiovascular status: stable  Respiratory status: unassisted and spontaneous ventilation  Hydration status: euvolemic  Follow-up not needed.          Vitals Value Taken Time   /56 6/5/2019 12:54 PM   Temp 36.6 °C (97.9 °F) 6/5/2019 12:27 PM   Pulse 66 6/5/2019 12:54 PM   Resp 16 6/5/2019 12:54 PM   SpO2 98 % 6/5/2019 12:54 PM         Event Time     Out of Recovery 13:02:42          Pain/Alberto Score: No data recorded

## 2019-06-12 ENCOUNTER — TELEPHONE (OUTPATIENT)
Dept: ENDOSCOPY | Facility: HOSPITAL | Age: 69
End: 2019-06-12

## 2019-06-21 ENCOUNTER — PATIENT MESSAGE (OUTPATIENT)
Dept: INTERNAL MEDICINE | Facility: CLINIC | Age: 69
End: 2019-06-21

## 2019-06-21 RX ORDER — ONDANSETRON 4 MG/1
4-8 TABLET, FILM COATED ORAL EVERY 8 HOURS PRN
Qty: 30 TABLET | Refills: 0 | Status: SHIPPED | OUTPATIENT
Start: 2019-06-21 | End: 2019-07-11

## 2019-06-21 RX ORDER — SCOLOPAMINE TRANSDERMAL SYSTEM 1 MG/1
1 PATCH, EXTENDED RELEASE TRANSDERMAL
Qty: 8 PATCH | Refills: 1 | Status: SHIPPED | OUTPATIENT
Start: 2019-06-21 | End: 2019-08-02

## 2019-07-08 ENCOUNTER — TELEPHONE (OUTPATIENT)
Dept: SURGERY | Facility: CLINIC | Age: 69
End: 2019-07-08

## 2019-07-08 ENCOUNTER — ANESTHESIA EVENT (OUTPATIENT)
Dept: SURGERY | Facility: HOSPITAL | Age: 69
End: 2019-07-08
Payer: MEDICARE

## 2019-07-08 NOTE — ANESTHESIA PREPROCEDURE EVALUATION
"[facility]  Anesthesia Pre-Operative Evaluation         Patient Name: Jean aCrlson  YOB: 1950  MRN: [id number]    SUBJECTIVE:     Pre-operative evaluation for Procedure(s) (LRB):  REPAIR, HERNIA, INGUINAL, LAPAROSCOPIC, LEFT w/ mesh (Left)     07/08/2019    Jean Carlson is a 69 y.o. male w/ a significant PMHx of osteoporosis, ED, GERD, BPH, spinal stenosis of the lumbar region. He had an eyelid abrassion during a recent anesthesia event at [facility]. Recently while working out, pt noticing a "lump" in left inguinal area after working out. Of note, he had an umbilical hernia repair in 2015.     Patient now presents for the above procedure(s).      LDA: None documented.       Prev airway:   Placement Date: 10/01/15; Placement Time: 0758; Method of Intubation: Direct laryngoscopy; Inserted by: Anesthesia Resident; Airway Device: Endotracheal Tube; Mask Ventilation: Easy; Intubated: Postinduction; Blade: Radha #3; Airway Device Size: 8.0; Style: Cuffed; Cuff Inflation: Minimal occlusive pressure; Placement Verified By: Auscultation, Capnometry; Grade: Grade II; Complicating Factors: None; Intubation Findings: Positive EtCO2, Bilateral breath sounds, Atraumatic/Condition of teeth unchanged;  Depth of Insertion: 24; Securment: Lips; Complications: None; Breath Sounds: Equal Bilateral; Insertion Attempts: 1; Removal Date: 10/01/15;  Removal Time: 0913    Drips: None documented.      Patient Active Problem List   Diagnosis    Osteoporosis    ED (erectile dysfunction)    Hyperlipidemia    GERD (gastroesophageal reflux disease)    Spinal stenosis of lumbar region    BPH with urinary obstruction    Atherosclerosis of aorta    Pulmonary nodule    Screening for colon cancer    Left inguinal hernia       Review of patient's allergies indicates:  No Known Allergies    Current Inpatient Medications:      Current Facility-Administered Medications on File Prior to Encounter "   Medication Dose Route Frequency Provider Last Rate Last Dose    0.9%  NaCl infusion   Intravenous Continuous Jennifer Boston NP        sodium chloride 0.9% flush 10 mL  10 mL Intravenous PRN Jennifer Boston NP         Current Outpatient Medications on File Prior to Encounter   Medication Sig Dispense Refill    aspirin 81 MG Chew Take 81 mg by mouth once daily.      atorvastatin (LIPITOR) 20 MG tablet Take 1 tablet (20 mg total) by mouth once daily. 90 tablet 3    multivitamin capsule Take 1 capsule by mouth once daily.      omega-3 acid ethyl esters (LOVAZA) 1 gram capsule Take 2 g by mouth 2 (two) times daily.      ranitidine (ZANTAC) 300 MG tablet Take 1 tablet (300 mg total) by mouth every evening. 90 tablet 3    tamsulosin (FLOMAX) 0.4 mg Cap Take 1 capsule (0.4 mg total) by mouth once daily. 30 capsule 11       Past Surgical History:   Procedure Laterality Date    back sx      lower    CATARACT EXTRACTION W/  INTRAOCULAR LENS IMPLANT Bilateral     Dr Finn/[facility] IOL     COLONOSCOPY N/A 6/5/2019    [facility] by [facility]wood [facility] (4TH FLR)    EGD (ESOPHAGOGASTRODUODENOSCOPY) N/A 4/12/2013    Performed by Miguel Harris MD at [facility] (4TH FLR)    INSERTION-INTRAOCULAR LENS (IOL) Left 4/16/2018    [facility]    INSERTION-INTRAOCULAR LENS (IOL) Right 3/12/2018    [facility] OR    PHACOEMULSIFICATION-ASPIRATION-CATARACT Left 4/16/2018    [facility] OR    PHACOEMULSIFICATION-ASPIRATION-CATARACT Right 3/12/2018    [facility] OR    PROSTATE SURGERY      REPAIR-HERNIA-UMBILICAL N/A 10/1/2015    Performed by Dennis Lawson Jr[facility] at [facility] OR 2ND FLR    SPINE SURGERY      TONSILLECTOMY      TRANSURETHRAL RESECTION OF PROSTATE         Social History     Socioeconomic History    Marital status:      Spouse name: Not on file    Number of children: Not on  file    Years of education: Not on file    Highest education level: Not on file   Occupational History     Employer: [employer]   Social Needs    Financial resource strain: Not on file    Food insecurity:     Worry: Not on file     Inability: Not on file    Transportation needs:     Medical: Not on file     Non-medical: Not on file   Tobacco Use    Smoking status: Never Smoker    Smokeless tobacco: Never Used   Substance and Sexual Activity    Alcohol use: Yes     Alcohol/week: 1.8 oz     Types: 3 Glasses of wine per week    Drug use: No    Sexual activity: Yes     Partners: Female   Lifestyle    Physical activity:     Days per week: Not on file     Minutes per session: Not on file    Stress: Not on file   Relationships    Social connections:     Talks on phone: Not on file     Gets together: Not on file     Attends Jain service: Not on file     Active member of club or organization: Not on file     Attends meetings of clubs or organizations: Not on file     Relationship status: Not on file   Other Topics Concern    Not on file   Social History Narrative     [employer]. 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    EKG:     Vent. Rate : 060 BPM     Atrial Rate : 060 BPM     P-R Int : 166 ms          QRS Dur : 098 ms      QT Int : 400 ms       P-R-T Axes : 055 025 042 degrees     QTc Int : 400 ms    Normal sinus rhythm  Normal ECG  When compared  with ECG of 07-OCT-2016 08:28,  No significant change was found  Confirmed by KONG TYLER MD ([id number]) on 5/21/2019 8:54:10 AM    Referred By: ANDER ORDONEZ           Confirmed By:KONG TYLER MD    2D ECHO:  8/15/14 stress echo    EKG Conclusions:    1. The EKG portion of this study is negative for ischemia at a moderate workload, and peak heart rate of 153 bpm (97% of predicted).   2. Exercise capacity is above average.   3. Blood pressure response to exercise was normal (Presenting BP: 124/72 Peak BP: 160/59).   4. No significant arrhythmias were present.   5. There were no symptoms of chest discomfort or significant dyspnea throughout the protocol.   6. The Duke treadmill score was 8 suggesting a low probability for future cardiovascular events.    CONCLUSIONS     1 - Concentric remodeling.     2 - Normal left ventricular systolic function (EF 60-65%).     3 - Normal left ventricular diastolic function.     4 - Normal right ventricular systolic function .     No evidence of stress induced myocardial ischemia.     This document has been electronically    SIGNED BY: Amanda Sanders MD On: 08/15/2014 11:37    ASSESSMENT/PLAN:                                                                                                                      07/08/2019  Jean Carlson is a 69 y.o., male.    Anesthesia Evaluation    I have reviewed the Patient Summary Reports.    I have reviewed the Nursing Notes.   I have reviewed the Medications.     Review of Systems  Anesthesia Hx:  History of prior surgery of interest to airway management or planning: Denies Family Hx of Anesthesia complications.   Denies Personal Hx of Anesthesia complications.          Anesthesia Plan  Type of Anesthesia, risks & benefits discussed:  Anesthesia Type:  general  Patient's Preference:   Intra-op Monitoring Plan: standard ASA monitors  Intra-op Monitoring Plan Comments:   Post Op Pain Control Plan: per primary service following discharge  from PACU and multimodal analgesia  Post Op Pain Control Plan Comments:   Induction:   IV  Beta Blocker:  Patient is not currently on a Beta-Blocker (No further documentation required).       Informed Consent: Patient understands risks and agrees with Anesthesia plan.  Questions answered. Anesthesia consent signed with patient.  ASA Score: 2     Day of Surgery Review of History & Physical:    H&P update referred to the surgeon.         Ready For Surgery From Anesthesia Perspective.

## 2019-07-09 ENCOUNTER — HOSPITAL ENCOUNTER (OUTPATIENT)
Facility: HOSPITAL | Age: 69
Discharge: HOME | End: 2019-07-09
Attending: SURGERY | Admitting: SURGERY
Payer: MEDICARE

## 2019-07-09 ENCOUNTER — ANESTHESIA (OUTPATIENT)
Dept: SURGERY | Facility: HOSPITAL | Age: 69
End: 2019-07-09
Payer: MEDICARE

## 2019-07-09 VITALS
WEIGHT: 183 LBS | BODY MASS INDEX: 24.79 KG/M2 | DIASTOLIC BLOOD PRESSURE: 78 MMHG | SYSTOLIC BLOOD PRESSURE: 150 MMHG | HEART RATE: 72 BPM | RESPIRATION RATE: 25 BRPM | HEIGHT: 72 IN | OXYGEN SATURATION: 100 % | TEMPERATURE: 98 F

## 2019-07-09 DIAGNOSIS — K40.90 INGUINAL HERNIA OF LEFT SIDE WITHOUT OBSTRUCTION OR GANGRENE: Primary | ICD-10-CM

## 2019-07-09 PROCEDURE — 27201423 OPTIME MED/SURG SUP & DEVICES STERILE SUPPLY: Performed by: SURGERY

## 2019-07-09 PROCEDURE — 25000003 PHARM REV CODE 250: Performed by: STUDENT IN AN ORGANIZED HEALTH CARE EDUCATION/TRAINING PROGRAM

## 2019-07-09 PROCEDURE — C1781 MESH (IMPLANTABLE): HCPCS | Performed by: SURGERY

## 2019-07-09 PROCEDURE — C1727 CATH, BAL TIS DIS, NON-VAS: HCPCS | Performed by: SURGERY

## 2019-07-09 PROCEDURE — 63600175 PHARM REV CODE 636 W HCPCS: Performed by: GENERAL PRACTICE

## 2019-07-09 PROCEDURE — 36000708 HC OR TIME LEV III 1ST 15 MIN: Performed by: SURGERY

## 2019-07-09 PROCEDURE — 37000009 HC ANESTHESIA EA ADD 15 MINS: Performed by: SURGERY

## 2019-07-09 PROCEDURE — 71000015 HC POSTOP RECOV 1ST HR: Performed by: SURGERY

## 2019-07-09 PROCEDURE — 71000016 HC POSTOP RECOV ADDL HR: Performed by: SURGERY

## 2019-07-09 PROCEDURE — 37000008 HC ANESTHESIA 1ST 15 MINUTES: Performed by: SURGERY

## 2019-07-09 PROCEDURE — 63600175 PHARM REV CODE 636 W HCPCS: Performed by: STUDENT IN AN ORGANIZED HEALTH CARE EDUCATION/TRAINING PROGRAM

## 2019-07-09 PROCEDURE — 49650 PR LAP,INGUINAL HERNIA REPR,INITIAL: ICD-10-PCS | Mod: LT,GC,, | Performed by: SURGERY

## 2019-07-09 PROCEDURE — 49650 LAP ING HERNIA REPAIR INIT: CPT | Mod: LT,GC,, | Performed by: SURGERY

## 2019-07-09 PROCEDURE — 36000709 HC OR TIME LEV III EA ADD 15 MIN: Performed by: SURGERY

## 2019-07-09 PROCEDURE — D9220A PRA ANESTHESIA: ICD-10-PCS | Mod: ,,, | Performed by: ANESTHESIOLOGY

## 2019-07-09 PROCEDURE — 71000044 HC DOSC ROUTINE RECOVERY FIRST HOUR: Performed by: SURGERY

## 2019-07-09 PROCEDURE — 25000003 PHARM REV CODE 250: Performed by: NURSE PRACTITIONER

## 2019-07-09 PROCEDURE — D9220A PRA ANESTHESIA: Mod: ,,, | Performed by: ANESTHESIOLOGY

## 2019-07-09 PROCEDURE — 25000003 PHARM REV CODE 250: Performed by: SURGERY

## 2019-07-09 PROCEDURE — 25000003 PHARM REV CODE 250: Performed by: GENERAL PRACTICE

## 2019-07-09 DEVICE — MESH 3DMAX LT L LG 10.3X15.7CM: Type: IMPLANTABLE DEVICE | Site: GROIN | Status: FUNCTIONAL

## 2019-07-09 RX ORDER — SUCCINYLCHOLINE CHLORIDE 20 MG/ML
INJECTION INTRAMUSCULAR; INTRAVENOUS
Status: DISCONTINUED | OUTPATIENT
Start: 2019-07-09 | End: 2019-07-09

## 2019-07-09 RX ORDER — SODIUM CHLORIDE 9 MG/ML
INJECTION, SOLUTION INTRAVENOUS CONTINUOUS
Status: DISCONTINUED | OUTPATIENT
Start: 2019-07-09 | End: 2019-07-09 | Stop reason: HOSPADM

## 2019-07-09 RX ORDER — SODIUM CHLORIDE 0.9 % (FLUSH) 0.9 %
10 SYRINGE (ML) INJECTION
Status: DISCONTINUED | OUTPATIENT
Start: 2019-07-09 | End: 2019-07-09 | Stop reason: HOSPADM

## 2019-07-09 RX ORDER — CEFAZOLIN SODIUM 1 G/3ML
2 INJECTION, POWDER, FOR SOLUTION INTRAMUSCULAR; INTRAVENOUS
Status: COMPLETED | OUTPATIENT
Start: 2019-07-09 | End: 2019-07-09

## 2019-07-09 RX ORDER — OXYCODONE AND ACETAMINOPHEN 5; 325 MG/1; MG/1
1 TABLET ORAL ONCE
Status: COMPLETED | OUTPATIENT
Start: 2019-07-09 | End: 2019-07-09

## 2019-07-09 RX ORDER — BACITRACIN 50000 [IU]/1
INJECTION, POWDER, FOR SOLUTION INTRAMUSCULAR
Status: DISCONTINUED | OUTPATIENT
Start: 2019-07-09 | End: 2019-07-09 | Stop reason: HOSPADM

## 2019-07-09 RX ORDER — MIDAZOLAM HYDROCHLORIDE 1 MG/ML
INJECTION, SOLUTION INTRAMUSCULAR; INTRAVENOUS
Status: DISCONTINUED | OUTPATIENT
Start: 2019-07-09 | End: 2019-07-09

## 2019-07-09 RX ORDER — OXYCODONE AND ACETAMINOPHEN 5; 325 MG/1; MG/1
1 TABLET ORAL EVERY 4 HOURS PRN
Qty: 31 TABLET | Refills: 0 | Status: SHIPPED | OUTPATIENT
Start: 2019-07-09 | End: 2019-07-11

## 2019-07-09 RX ORDER — PROPOFOL 10 MG/ML
VIAL (ML) INTRAVENOUS
Status: DISCONTINUED | OUTPATIENT
Start: 2019-07-09 | End: 2019-07-09

## 2019-07-09 RX ORDER — ONDANSETRON 2 MG/ML
INJECTION INTRAMUSCULAR; INTRAVENOUS
Status: DISCONTINUED | OUTPATIENT
Start: 2019-07-09 | End: 2019-07-09

## 2019-07-09 RX ORDER — PHENYLEPHRINE HYDROCHLORIDE 10 MG/ML
INJECTION INTRAVENOUS
Status: DISCONTINUED | OUTPATIENT
Start: 2019-07-09 | End: 2019-07-09

## 2019-07-09 RX ORDER — ROCURONIUM BROMIDE 10 MG/ML
INJECTION, SOLUTION INTRAVENOUS
Status: DISCONTINUED | OUTPATIENT
Start: 2019-07-09 | End: 2019-07-09

## 2019-07-09 RX ORDER — GLYCOPYRROLATE 0.2 MG/ML
INJECTION INTRAMUSCULAR; INTRAVENOUS
Status: DISCONTINUED | OUTPATIENT
Start: 2019-07-09 | End: 2019-07-09

## 2019-07-09 RX ORDER — LIDOCAINE HYDROCHLORIDE AND EPINEPHRINE 10; 10 MG/ML; UG/ML
INJECTION, SOLUTION INFILTRATION; PERINEURAL
Status: DISCONTINUED | OUTPATIENT
Start: 2019-07-09 | End: 2019-07-09 | Stop reason: HOSPADM

## 2019-07-09 RX ORDER — KETAMINE HYDROCHLORIDE 10 MG/ML
INJECTION, SOLUTION INTRAMUSCULAR; INTRAVENOUS
Status: DISCONTINUED | OUTPATIENT
Start: 2019-07-09 | End: 2019-07-09

## 2019-07-09 RX ORDER — KETAMINE HCL IN 0.9 % NACL 50 MG/5 ML
SYRINGE (ML) INTRAVENOUS
Status: DISCONTINUED | OUTPATIENT
Start: 2019-07-09 | End: 2019-07-09

## 2019-07-09 RX ORDER — BUPIVACAINE HYDROCHLORIDE 2.5 MG/ML
INJECTION, SOLUTION EPIDURAL; INFILTRATION; INTRACAUDAL
Status: DISCONTINUED | OUTPATIENT
Start: 2019-07-09 | End: 2019-07-09 | Stop reason: HOSPADM

## 2019-07-09 RX ORDER — LIDOCAINE HCL/PF 100 MG/5ML
SYRINGE (ML) INTRAVENOUS
Status: DISCONTINUED | OUTPATIENT
Start: 2019-07-09 | End: 2019-07-09

## 2019-07-09 RX ORDER — FENTANYL CITRATE 50 UG/ML
INJECTION, SOLUTION INTRAMUSCULAR; INTRAVENOUS
Status: DISCONTINUED | OUTPATIENT
Start: 2019-07-09 | End: 2019-07-09

## 2019-07-09 RX ORDER — FENTANYL CITRATE 50 UG/ML
25 INJECTION, SOLUTION INTRAMUSCULAR; INTRAVENOUS EVERY 5 MIN PRN
Status: DISCONTINUED | OUTPATIENT
Start: 2019-07-09 | End: 2019-07-09 | Stop reason: HOSPADM

## 2019-07-09 RX ORDER — NEOSTIGMINE METHYLSULFATE 1 MG/ML
INJECTION, SOLUTION INTRAVENOUS
Status: DISCONTINUED | OUTPATIENT
Start: 2019-07-09 | End: 2019-07-09

## 2019-07-09 RX ORDER — ONDANSETRON 2 MG/ML
4 INJECTION INTRAMUSCULAR; INTRAVENOUS ONCE AS NEEDED
Status: DISCONTINUED | OUTPATIENT
Start: 2019-07-09 | End: 2019-07-09 | Stop reason: HOSPADM

## 2019-07-09 RX ORDER — OXYCODONE AND ACETAMINOPHEN 5; 325 MG/1; MG/1
1 TABLET ORAL ONCE AS NEEDED
Status: COMPLETED | OUTPATIENT
Start: 2019-07-09 | End: 2019-07-09

## 2019-07-09 RX ADMIN — ROCURONIUM BROMIDE 10 MG: 10 INJECTION, SOLUTION INTRAVENOUS at 11:07

## 2019-07-09 RX ADMIN — PROPOFOL 50 MG: 10 INJECTION, EMULSION INTRAVENOUS at 12:07

## 2019-07-09 RX ADMIN — GLYCOPYRROLATE 0.5 MG: 0.2 INJECTION, SOLUTION INTRAMUSCULAR; INTRAVENOUS at 12:07

## 2019-07-09 RX ADMIN — SODIUM CHLORIDE, SODIUM GLUCONATE, SODIUM ACETATE, POTASSIUM CHLORIDE, MAGNESIUM CHLORIDE, SODIUM PHOSPHATE, DIBASIC, AND POTASSIUM PHOSPHATE: .53; .5; .37; .037; .03; .012; .00082 INJECTION, SOLUTION INTRAVENOUS at 12:07

## 2019-07-09 RX ADMIN — Medication 20 MG: at 11:07

## 2019-07-09 RX ADMIN — ROCURONIUM BROMIDE 40 MG: 10 INJECTION, SOLUTION INTRAVENOUS at 11:07

## 2019-07-09 RX ADMIN — PHENYLEPHRINE HYDROCHLORIDE 0.1 MG: 10 INJECTION INTRAVENOUS at 11:07

## 2019-07-09 RX ADMIN — ONDANSETRON 4 MG: 2 INJECTION INTRAMUSCULAR; INTRAVENOUS at 12:07

## 2019-07-09 RX ADMIN — FENTANYL CITRATE 100 MCG: 50 INJECTION, SOLUTION INTRAMUSCULAR; INTRAVENOUS at 11:07

## 2019-07-09 RX ADMIN — SUCCINYLCHOLINE CHLORIDE 120 MG: 20 INJECTION, SOLUTION INTRAMUSCULAR; INTRAVENOUS at 11:07

## 2019-07-09 RX ADMIN — OXYCODONE HYDROCHLORIDE AND ACETAMINOPHEN 1 TABLET: 5; 325 TABLET ORAL at 06:07

## 2019-07-09 RX ADMIN — MIDAZOLAM HYDROCHLORIDE 2 MG: 1 INJECTION, SOLUTION INTRAMUSCULAR; INTRAVENOUS at 11:07

## 2019-07-09 RX ADMIN — SODIUM CHLORIDE: 0.9 INJECTION, SOLUTION INTRAVENOUS at 10:07

## 2019-07-09 RX ADMIN — KETAMINE HYDROCHLORIDE 20 MG: 10 INJECTION, SOLUTION INTRAMUSCULAR; INTRAVENOUS at 12:07

## 2019-07-09 RX ADMIN — ROCURONIUM BROMIDE 20 MG: 10 INJECTION, SOLUTION INTRAVENOUS at 11:07

## 2019-07-09 RX ADMIN — PROPOFOL 150 MG: 10 INJECTION, EMULSION INTRAVENOUS at 11:07

## 2019-07-09 RX ADMIN — OXYCODONE HYDROCHLORIDE AND ACETAMINOPHEN 1 TABLET: 5; 325 TABLET ORAL at 01:07

## 2019-07-09 RX ADMIN — NEOSTIGMINE METHYLSULFATE 5 MG: 1 INJECTION INTRAVENOUS at 12:07

## 2019-07-09 RX ADMIN — LIDOCAINE HYDROCHLORIDE 100 MG: 20 INJECTION, SOLUTION INTRAVENOUS at 11:07

## 2019-07-09 RX ADMIN — SODIUM CHLORIDE 1000 ML: 0.9 INJECTION, SOLUTION INTRAVENOUS at 02:07

## 2019-07-09 RX ADMIN — CEFAZOLIN 2 G: 330 INJECTION, POWDER, FOR SOLUTION INTRAMUSCULAR; INTRAVENOUS at 11:07

## 2019-07-09 NOTE — PROGRESS NOTES
Dr. Bella aware pt voided ~150ml clear bloody urine/no clots noted, pt w c/o incisional pain. Tolerated apple juice, turkey sandwich. Percocet given.

## 2019-07-09 NOTE — BRIEF OP NOTE
[facility]  Brief Operative Note    SUMMARY     Surgery Date: 7/9/2019     Surgeon(s) and Role:     * Dennis Lawson Jr., MD - Primary     * Bobby Bella MD - Resident - Assisting    Pre-op Diagnosis:  Left inguinal hernia [K40.90]    Post-op Diagnosis:  Post-Op Diagnosis Codes:     * Left inguinal hernia [K40.90]    Procedure(s) (LRB):  REPAIR, HERNIA, INGUINAL, LAPAROSCOPIC, LEFT w/ mesh (Left)    Anesthesia: General    Description of the findings of the procedure: Laparoscopic LIH repair. Direct hernia identified. No contralateral defect.    Estimated Blood Loss: 5 mL    Total IV Fluids: Per Anesthesia         Specimens: None

## 2019-07-09 NOTE — H&P
History & Physical    SUBJECTIVE:     History of Present Illness:  Patient is a 69 y.o. male healthy, presents with symptomatic LIH. Noticed a few weeks ago at the gym, getting worse while at the gym. Had an umbo fixed with mesh by Dr. Lawson several years ago, did very well.     No interval change    No chief complaint on file.      Review of patient's allergies indicates:  No Known Allergies    No current facility-administered medications for this encounter.      Facility-Administered Medications Ordered in Other Encounters   Medication Dose Route Frequency Provider Last Rate Last Dose    0.9%  NaCl infusion   Intravenous Continuous Jennifer Boston NP        sodium chloride 0.9% flush 10 mL  10 mL Intravenous PRN Jennifer Boston NP           Past Medical History:   Diagnosis Date    Anterior tibialis tendinitis of right lower extremity 10/18/2018    Family history of coronary artery disease 10/7/2016    Mom age 64, Dad age 67 -  on same day. Pat Aunt early 70's.   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    Intractable back pain 2018    Nuclear sclerosis, bilateral 3/12/2018    Ocular migraine 2012    Osteoporosis     Prostate disease     Transient vision disturbance of both eyes 2012    Umbilical hernia without obstruction and without gangrene 10/1/2015     Past Surgical History:   Procedure Laterality Date    back sx      lower    CATARACT EXTRACTION W/  INTRAOCULAR LENS IMPLANT Bilateral     Dr Finn/Symfony IOL     COLONOSCOPY N/A 2019    Performed by [facility] at [facility] (4TH FLR)    EGD (ESOPHAGOGASTRODUODENOSCOPY) N/A 2013    Performed by Miguel Harris MD at [facility] ENDO (4TH FLR)    HERNIA REPAIR      INSERTION-INTRAOCULAR LENS (IOL) Left 2018    [facility]    INSERTION-INTRAOCULAR LENS (IOL) Right 3/12/2018     [facility]    PHACOEMULSIFICATION-ASPIRATION-CATARACT Left 4/16/2018    [facility]formed by [facility] at [facility]    PHACOEMULSIFICATION-ASPIRATION-CATARACT Right 3/12/2018    [facility] by [facility] at [facility] OR    PROSTATE SURGERY      REPAIR-HERNIA-UMBILICAL N/A 10/1/2015    [facility] 2ND FLR    SPINE SURGERY      TONSILLECTOMY      TRANSURETHRAL RESECTION OF PROSTATE       Family History   Problem Relation Age of Onset    Glaucoma Maternal Uncle     Retinitis pigmentosa Maternal Uncle     Heart disease Mother     Heart attack Mother     Skin cancer Mother     Heart disease Father     Heart attack Father     Glaucoma Maternal Aunt     No Known Problems Daughter      Social History     Tobacco Use    Smoking status: Never Smoker    Smokeless tobacco: Never Used   Substance Use Topics    Alcohol use: Yes     Alcohol/week: 1.8 oz     Types: 3 Glasses of wine per week     Comment: rarely    Drug use: No        Review of Systems:  Review of Systems   Constitutional: Negative for activity change, appetite change, chills and fever.   HENT: Negative for congestion and trouble swallowing.    Eyes: Negative for visual disturbance.   Respiratory: Negative for cough and shortness of breath.    Cardiovascular: Negative for chest pain and leg swelling.   Gastrointestinal: Negative for abdominal distention, abdominal pain, constipation, diarrhea, nausea and vomiting.   Endocrine: Negative for cold intolerance and heat intolerance.   Genitourinary: Negative for difficulty urinating and dysuria.   Musculoskeletal: Negative for arthralgias and back pain.   Skin: Negative for rash and wound.   Neurological: Negative for dizziness and headaches.   Psychiatric/Behavioral: Negative for agitation and behavioral problems.       OBJECTIVE:     Vital Signs (Most Recent)  Temp: 98 °F (36.7 °C) (07/09/19 0759)  Pulse: 76 (07/09/19 0759)  Resp:  "18 (07/09/19 0759)  BP: 125/65 (07/09/19 0759)  SpO2: 100 % (07/09/19 0759)  5' 11.5" (1.816 m)  83 kg (183 lb)     Physical Exam:  Physical Exam   Constitutional: He is oriented to person, place, and time. He appears well-developed and well-nourished. No distress.   Cardiovascular: Normal rate and regular rhythm. Exam reveals no gallop and no friction rub.   No murmur heard.  Pulmonary/Chest: Effort normal and breath sounds normal. No respiratory distress. He has no wheezes. He has no rales.   Abdominal: Soft. Bowel sounds are normal. He exhibits no distension. There is no tenderness. There is no rebound.   Small LIH   Musculoskeletal: Normal range of motion. He exhibits no edema.   Neurological: He is alert and oriented to person, place, and time.   Skin: Skin is warm and dry.   Psychiatric: He has a normal mood and affect. His behavior is normal.           ASSESSMENT/PLAN:     68yo male with symptomatic LIH    -Going to [place] June 27th    PLAN:Plan     Lap repair left inguinal hernia with mesh, eval right  Consent obtained  Will proceed after his trip    I have personally taken the history and examined this patient and agree with the resident's note as stated above.         Dennis Lawson MD           "

## 2019-07-09 NOTE — TRANSFER OF CARE
"Anesthesia Transfer of Care Note    Patient: Jean Carlson    Procedure(s) Performed: Procedure(s) (LRB):  REPAIR, HERNIA, INGUINAL, LAPAROSCOPIC, LEFT w/ mesh (Left)    Patient location: PACU    Anesthesia Type: general    Transport from OR: Transported from OR on 6-10 L/min O2 by face mask with adequate spontaneous ventilation    Post pain: adequate analgesia    Post assessment: no apparent anesthetic complications    Post vital signs: stable    Level of consciousness: awake    Nausea/Vomiting: no nausea/vomiting    Complications: none    Transfer of care protocol was followed      Last vitals:   Visit Vitals  /65 (BP Location: Left arm, Patient Position: Lying)   Pulse 76   Temp 36.7 °C (98 °F) (Oral)   Resp 18   Ht 5' 11.5" (1.816 m)   Wt 83 kg (183 lb)   SpO2 100%   BMI 25.17 kg/m²     "

## 2019-07-09 NOTE — PROGRESS NOTES
Voided again-clear bloody urine w/clots, pt c/o pain with voiding, pt shivering. Dr. Bella aware-will assess asap.

## 2019-07-09 NOTE — OP NOTE
DATE OF PROCEDURE: 07/09/2019    SERVICE: General Surgery.     Surgeon(s) and Role:     * Dennis Lawson Jr., MD - Primary     * Bobby Bella MD - Resident - Assisting    PREOPERATIVE DIAGNOSIS: Left inguinal hernia.     POSTOPERATIVE DIAGNOSIS: Left direct inguinal hernia.     PROCEDURE: Laparoscopic repair of left direct inguinal hernia with mesh and   evaluation of the right side.     ANESTHESIA: General endotracheal and local.     DESCRIPTION OF PROCEDURE: The patient was taken to the Operating Room, placed under general anesthesia, and prepped and draped in sterile fashion. At this time, an infraumbilical incision was made after infiltrating with local anesthetic. This was carried down to the linea alba with electrocautery and blunt dissection. An incision was made in the anterior fascia, just to the right  of the linea alba. At this time, the rectus muscle was then swept laterally and elevated, and a dissecting balloon trocar was placed in the retrorectus space. At this time, under direct visualization, the balloon was insufflated, creating the retrorectus space without difficulty. The trocar was then inserted into this retrorectus space, and the balloon was insufflated and the dissecting balloon was removed. Once this was complete, further ports were placed in the midline, 5 mm in nature, one 1 fingerbreadth above the pubic symphysis and one between the suprapubic port and the infraumbilical port. These were placed under direct visualization, after infiltrating with local anesthetic. Once the ports were placed, we turned our attention to the left side.  We swept the peritoneum down laterally, beginning at the anterior superior iliac spine and continuing this dissection medially towards the cord. The cord was elevated. There was no indirect hernia present. The cord itself was skeletonized and inspected, no signs of any abnormalities.  At this time, we continued dissection medially towards the direct  space. There was a small-to-moderate sized direct hernia present. This was reduced, and we cleared Jeremiah's ligament medially as well. Once we had completed dissection on the left side, we turned our attention to the right side, evaluated this, and again we swept the peritoneum down laterally and inspected the cord. The peritoneal reflection was at the base of the cord. There were no signs of an indirect hernia. We inspected the direct space. Again, no signs of a direct hernia. Once we had determined there was no hernia on the right side, we proceeded with placement of mesh on the left. A piece of large 3DMAX mesh was placed into the retrorectus space on the left side. Medially, it was brought to midline and anteriorly to the first port. It was then tacked with two tacks to Jeremiah's ligament medially, one to the rectus muscle anteriorly and then two tacks laterally above the ASIS. At this time, the mesh was inspected, and it was in very good position covering all defects. The sac had been dissected back to the umbilicus and was well out of the way of the mesh repair. At this time under direct visualization, the air was evacuated from the retrorectus space. The ports were then removed. The retrorectus space was then infiltrated with further 20 mL of local anesthetic and at this time, the infraumbilical port was removed. The anterior fascia was closed with 0 Vicryl suture in figure-of-eight fashion, closing the fascia of this incision; and the port sites were closed with 4-0 Monocryl in subcuticular fashion. Mastisol and Steri-Strips were then placed. The patient was allowed to awake from general anesthesia and transferred to bed for transport to Recovery.     COMPLICATIONS: None.     SPONGE COUNT: Correct.     BLOOD LOSS: 15 mL.     FLUIDS: Per Anesthesia.     BLOOD GIVEN: None.     DRAINS: None.     SPECIMENS: None.     CONDITION OF PATIENT: Good.     Dr. Lawson was present for the entire procedure.

## 2019-07-10 ENCOUNTER — TELEPHONE (OUTPATIENT)
Dept: SURGERY | Facility: CLINIC | Age: 69
End: 2019-07-10

## 2019-07-10 NOTE — ANESTHESIA POSTPROCEDURE EVALUATION
Anesthesia Post Evaluation    Patient: Jean Carlson    Procedure(s) Performed: Procedure(s) (LRB):  REPAIR, HERNIA, INGUINAL, LAPAROSCOPIC, LEFT w/ mesh (Left)    Final Anesthesia Type: general  Patient location during evaluation: PACU  Patient participation: Yes- Able to Participate  Level of consciousness: awake and alert and oriented  Post-procedure vital signs: reviewed and stable  Pain management: adequate  Airway patency: patent  PONV status at discharge: No PONV  Anesthetic complications: no      Cardiovascular status: hemodynamically stable  Respiratory status: unassisted  Hydration status: euvolemic  Follow-up not needed.          Vitals Value Taken Time   /78 7/9/2019  6:32 PM   Temp 36.9 °C (98.4 °F) 7/9/2019  2:30 PM   Pulse 78 7/9/2019  6:51 PM   Resp 20 7/9/2019  6:51 PM   SpO2 98 % 7/9/2019  6:51 PM   Vitals shown include unvalidated device data.      No case tracking events are documented in the log.      Pain/Alberto Score: Pain Rating Prior to Med Admin: 7 (7/9/2019  6:22 PM)  Pain Rating Post Med Admin: 3 (7/9/2019  2:30 PM)

## 2019-07-10 NOTE — TELEPHONE ENCOUNTER
----- Message from Jose Rashid sent at 7/10/2019 10:32 AM CDT -----  Contact: pt  Needs Advice    Reason for call: Pt calling to give Dr. Lawson an update. The pt states his condition has gotten a lot better. Pt states the blood has decreased quite a bit.        Communication Preference: [phone]     Additional Information:

## 2019-07-10 NOTE — TELEPHONE ENCOUNTER
Returned pt phone call.  He states he is doing better with the blood decreasing.    Pt will call if not clear by Friday.

## 2019-07-10 NOTE — TELEPHONE ENCOUNTER
Pt calling with an update stating that the blood clots have become larger, almost having trouble urinating.      Discussed with Dr. Lawson and he recommends pt following up with urology.  Pt informed of urology appt tomorrow 7/11 @ 1293.   Pt agrees with date/time.  Also stressed the importance of fluid intake.  Pt to report to the ED if symptoms worsen and if he has trouble urinating.

## 2019-07-11 ENCOUNTER — HOSPITAL ENCOUNTER (OUTPATIENT)
Dept: UROLOGY | Facility: HOSPITAL | Age: 69
Discharge: HOME | End: 2019-07-11
Attending: UROLOGY
Payer: MEDICARE

## 2019-07-11 ENCOUNTER — OFFICE VISIT (OUTPATIENT)
Dept: UROLOGY | Facility: CLINIC | Age: 69
End: 2019-07-11
Payer: MEDICARE

## 2019-07-11 VITALS
DIASTOLIC BLOOD PRESSURE: 81 MMHG | TEMPERATURE: 98 F | HEART RATE: 73 BPM | BODY MASS INDEX: 25.5 KG/M2 | RESPIRATION RATE: 18 BRPM | HEIGHT: 71 IN | SYSTOLIC BLOOD PRESSURE: 150 MMHG | WEIGHT: 182.13 LBS

## 2019-07-11 VITALS
HEART RATE: 75 BPM | BODY MASS INDEX: 25.74 KG/M2 | HEIGHT: 71 IN | SYSTOLIC BLOOD PRESSURE: 117 MMHG | WEIGHT: 183.88 LBS | DIASTOLIC BLOOD PRESSURE: 71 MMHG

## 2019-07-11 DIAGNOSIS — R39.198 DIFFICULTY URINATING: ICD-10-CM

## 2019-07-11 DIAGNOSIS — I86.8 PROSTATE VARICES: Primary | ICD-10-CM

## 2019-07-11 DIAGNOSIS — R31.0 GROSS HEMATURIA: ICD-10-CM

## 2019-07-11 DIAGNOSIS — R31.0 HEMATURIA, GROSS: ICD-10-CM

## 2019-07-11 DIAGNOSIS — N13.8 BPH WITH OBSTRUCTION/LOWER URINARY TRACT SYMPTOMS: ICD-10-CM

## 2019-07-11 DIAGNOSIS — N40.1 BPH WITH OBSTRUCTION/LOWER URINARY TRACT SYMPTOMS: ICD-10-CM

## 2019-07-11 DIAGNOSIS — R31.0 GROSS HEMATURIA: Primary | ICD-10-CM

## 2019-07-11 PROCEDURE — 99214 PR OFFICE/OUTPT VISIT, EST, LEVL IV, 30-39 MIN: ICD-10-PCS | Mod: S$PBB,25,, | Performed by: PHYSICIAN ASSISTANT

## 2019-07-11 PROCEDURE — 99213 OFFICE O/P EST LOW 20 MIN: CPT | Mod: PBBFAC,25 | Performed by: PHYSICIAN ASSISTANT

## 2019-07-11 PROCEDURE — 52000 CYSTOURETHROSCOPY: CPT

## 2019-07-11 PROCEDURE — 52000 CYSTOURETHROSCOPY: CPT | Mod: ,,, | Performed by: UROLOGY

## 2019-07-11 PROCEDURE — 99999 PR PBB SHADOW E&M-EST. PATIENT-LVL III: ICD-10-PCS | Mod: PBBFAC,,, | Performed by: PHYSICIAN ASSISTANT

## 2019-07-11 PROCEDURE — 99999 PR PBB SHADOW E&M-EST. PATIENT-LVL III: CPT | Mod: PBBFAC,,, | Performed by: PHYSICIAN ASSISTANT

## 2019-07-11 PROCEDURE — 99214 OFFICE O/P EST MOD 30 MIN: CPT | Mod: S$PBB,25,, | Performed by: PHYSICIAN ASSISTANT

## 2019-07-11 PROCEDURE — 51798 US URINE CAPACITY MEASURE: CPT | Mod: PBBFAC | Performed by: PHYSICIAN ASSISTANT

## 2019-07-11 PROCEDURE — 52000 PR CYSTOURETHROSCOPY: ICD-10-PCS | Mod: ,,, | Performed by: UROLOGY

## 2019-07-11 RX ORDER — DUTASTERIDE 0.5 MG/1
0.5 CAPSULE, LIQUID FILLED ORAL DAILY
Qty: 30 CAPSULE | Refills: 11 | Status: SHIPPED | OUTPATIENT
Start: 2019-07-11 | End: 2020-06-18

## 2019-07-11 RX ORDER — CIPROFLOXACIN 500 MG/1
500 TABLET ORAL 2 TIMES DAILY
Qty: 14 TABLET | Refills: 0 | Status: SHIPPED | OUTPATIENT
Start: 2019-07-11 | End: 2019-07-18 | Stop reason: SDUPTHER

## 2019-07-11 RX ORDER — DOXYCYCLINE HYCLATE 100 MG
100 TABLET ORAL ONCE
Status: CANCELLED | OUTPATIENT
Start: 2019-07-11 | End: 2019-07-11

## 2019-07-11 RX ORDER — LIDOCAINE HYDROCHLORIDE 20 MG/ML
JELLY TOPICAL ONCE
Status: COMPLETED | OUTPATIENT
Start: 2019-07-11 | End: 2019-07-11

## 2019-07-11 RX ORDER — DOXYCYCLINE HYCLATE 100 MG
100 TABLET ORAL ONCE
Status: COMPLETED | OUTPATIENT
Start: 2019-07-11 | End: 2019-07-11

## 2019-07-11 RX ORDER — LIDOCAINE HYDROCHLORIDE 20 MG/ML
JELLY TOPICAL ONCE
Status: CANCELLED | OUTPATIENT
Start: 2019-07-11 | End: 2019-07-11

## 2019-07-11 RX ADMIN — LIDOCAINE HYDROCHLORIDE: 20 JELLY TOPICAL at 11:07

## 2019-07-11 RX ADMIN — Medication 100 MG: at 11:07

## 2019-07-11 NOTE — PATIENT INSTRUCTIONS
What to Expect After a Cystoscopy  For the next 24-48 hours, you may feel a mild burning when you urinate. This burning is normal and expected. Drink plenty of water to dilute the urine to help relieve the burning sensation. You may also see a small amount of blood in your urine after the procedure.    Unless you are already taking antibiotics, you may be given an antibiotic after the test to prevent infection.    Signs and Symptoms to Report  Call the [facility] at [phone] if you develop any of the following:  · Fever of 101 degrees or higher  · Chills or persistent bleeding  · Inability to urinate

## 2019-07-11 NOTE — PROGRESS NOTES
CHIEF COMPLAINT:    Mr. Carlson is a 69 y.o. male presenting for gross hematuria.    PRESENTING ILLNESS:    Jean Carlson is a 69 y.o. male with a PMH of BPH who presents for gross hematuria.    Patient had the following procedure on 7/9/19: Laparoscopic repair of left direct inguinal hernia with mesh and evaluation of the right side.      After the procedure patient was noted urinating bloody urine with clots.  Also had dysuria.  Patient reports receiving a lot of fluids to attempt to clear his hematuria prior to his discarge.    He reports bright red blood since the operation.  It reports that he is passing clots which have become progressively larger.  It is getting to the point where he is having difficulty to urinate.  He has never experienced gross hematuria prior to his operation.    He denies dysuria.  He reports straining to void.  He believes he is emptying his bladder.    He is having to constantly drink water in order to void.  When he gets up in the morning he has to strain to void.  He denies abnormal urinary frequency.  He denies bladder pressure or pain.      He is taking flomax.      He reports having a TUIP over 20 years ago here at [facility].  He is taking flomax for BPH. Also has a history of ED managed with [facility].  He has seen Dr. Ny in the past.       He is a nonsmoker. No personal or family history of  malignancy. No history of radiation or chemo.      REVIEW OF SYSTEMS:    Constitutional: Negative for fever and chills.   HENT: Negative for hearing loss.   Eyes: Negative for visual disturbance.   Respiratory: Negative for shortness of breath.   Cardiovascular: Negative for chest pain.   Gastrointestinal: Negative for vomiting, and constipation.   Genitourinary: See HPI  Neurological: Negative for dizziness.   Hematological: Does not bruise/bleed easily.   Psychiatric/Behavioral: Negative for confusion.       PATIENT HISTORY:    Past Medical History:   Diagnosis Date    Anterior  tibialis tendinitis of right lower extremity 10/18/2018    Family history of coronary artery disease 10/7/2016    Mom age 64, Dad age 67 -  on same day. Pat Aunt early 70's.   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    Intractable back pain 2018    Nuclear sclerosis, bilateral 3/12/2018    Ocular migraine 2012    Osteoporosis     Prostate disease     Transient vision disturbance of both eyes 2012    Umbilical hernia without obstruction and without gangrene 10/1/2015       Past Surgical History:   Procedure Laterality Date    back sx      lower    CATARACT EXTRACTION W/  INTRAOCULAR LENS IMPLANT Bilateral     Dr Finn/Symfony IOL     COLONOSCOPY N/A 2019    Performed by [facility]wood [facility] at [facility] ENDO (4TH FLR)    EGD (ESOPHAGOGASTRODUODENOSCOPY) N/A 2013    Performed by Miguel Harris MD at [facility] ENDO (4TH FLR)    HERNIA REPAIR      INSERTION-INTRAOCULAR LENS (IOL) Left 2018    [facility] at [facility] OR    INSERTION-INTRAOCULAR LENS (IOL) Right 3/12/2018    [facility]formed by [facility] at [facility] OR    PHACOEMULSIFICATION-ASPIRATION-CATARACT Left 2018    [facility]formed by [facility] at [facility] OR    PHACOEMULSIFICATION-ASPIRATION-CATARACT Right 3/12/2018    [facility]formed by [facility] at [facility] OR    PROSTATE SURGERY      REPAIR, HERNIA, INGUINAL, LAPAROSCOPIC, LEFT w/ mesh Left 2019    Performed by Dennis Lawson Jr., MD at [facility] OR 2ND FLR    REPAIR-HERNIA-UMBILICAL N/A 10/1/2015    Performed by Dennis Lawson Jr., MD at [facility] OR 2ND FLR    SPINE SURGERY      TONSILLECTOMY      TRANSURETHRAL RESECTION OF PROSTATE         Family History   Problem Relation Age of Onset    Glaucoma Maternal Uncle     Retinitis pigmentosa Maternal Uncle     Heart disease Mother     Heart attack Mother     Skin cancer Mother     Heart disease Father      Heart attack Father     Glaucoma Maternal Aunt     No Known Problems Daughter        Social History     Socioeconomic History    Marital status:      Spouse name: Not on file    Number of children: Not on file    Years of education: Not on file    Highest education level: Not on file   Occupational History     Employer: [employer]   Social Needs    Financial resource strain: Not on file    Food insecurity:     Worry: Not on file     Inability: Not on file    Transportation needs:     Medical: Not on file     Non-medical: Not on file   Tobacco Use    Smoking status: Never Smoker    Smokeless tobacco: Never Used   Substance and Sexual Activity    Alcohol use: Yes     Alcohol/week: 1.8 oz     Types: 3 Glasses of wine per week     Comment: rarely    Drug use: No    Sexual activity: Yes     Partners: Female   Lifestyle    Physical activity:     Days per week: Not on file     Minutes per session: Not on file    Stress: Not on file   Relationships    Social connections:     Talks on phone: Not on file     Gets together: Not on file     Attends Restorationist service: Not on file     Active member of club or organization: Not on file     Attends meetings of clubs or organizations: Not on file     Relationship status: Not on file   Other Topics Concern    Not on file   Social History Narrative     [employer]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    Medications:    Current Outpatient Medications:     aspirin 81 MG Chew, Take 81 mg by mouth once daily., Disp: , Rfl:     atorvastatin (LIPITOR) 20 MG tablet, Take 1 tablet (20 mg total) by mouth once daily., Disp: 90 tablet, Rfl: 3    multivitamin capsule, Take 1 capsule by mouth once daily., Disp: , Rfl:     omega-3 acid ethyl esters (LOVAZA) 1 gram capsule, Take 2 g by mouth 2 (two) times daily., Disp: , Rfl:     ranitidine (ZANTAC) 300 MG tablet, Take 1 tablet (300 mg total) by mouth every evening., Disp:  90 tablet, Rfl: 3    tamsulosin (FLOMAX) 0.4 mg Cap, Take 1 capsule (0.4 mg total) by mouth once daily., Disp: 30 capsule, Rfl: 11    scopolamine (TRANSDERM-SCOP) 1.3-1.5 mg (1 mg over 3 days), Place 1 patch onto the skin every 72 hours., Disp: 8 patch, Rfl: 1  No current facility-administered medications for this visit.     Facility-Administered Medications Ordered in Other Visits:     0.9%  NaCl infusion, , Intravenous, Continuous, Jennifer Boston NP, Stopped at 07/09/19 1216    sodium chloride 0.9% flush 10 mL, 10 mL, Intravenous, PRN, Jennifer Boston NP    PHYSICAL EXAMINATION:    Constitutional: He appears well-developed and well-nourished.  He is in no apparent distress.    Eyes: No scleral icterus noted bilaterally. No discharge bilaterally.    Nose: No rhinorrhea    Cardiovascular: Normal rate.  No pitting edema noted in lower extremities bilaterally    Pulmonary/Chest: Effort normal. No respiratory distress.     Lymphadenopathy:        Right: No supraclavicular adenopathy present.        Left: No supraclavicular adenopathy present.     Neurological: He is alert and oriented to person, place, and time.     Skin: Skin is warm and dry.     Psych: Cooperative with normal affect.    Genitourinary:     External hemorrhoid noted.  Normal anal sphincter tone. No rectal mass.    The prostate is 30 g.  Prostate is smooth, non tender with no nodules noted.    Physical Exam   Abdominal:       There is no CVA tenderness.     Bladder scan performed by Nurse Jennings.  PVR 192ml    LABS:    U/a: red urine.  1.000, pH 8, + 30 protein,  250 blood otherwise negative.    Lab Results   Component Value Date    PSA 3.0 10/18/2018    PSA 2.1 10/27/2017    PSA 2.2 09/23/2016    PSA 2.0 09/03/2015    PSA 1.9 09/19/2014    PSA 1.81 08/15/2013    PSA 2.2 04/01/2011    PSA 1.4 08/25/2009    PSA 1.0 09/07/2007    PSADIAG 2.9 04/25/2019       IMPRESSION:    Encounter Diagnoses   Name Primary?    Gross hematuria Yes     Difficulty urinating        PLAN:    Given gross hematuria following recent inguinal hernia repair surgery, cystoscopy scheduled for today for further evaluation of bladder to rule out injury.      Discussed placement of romero catheter given difficulty urinating due to passing clots. Patient is able to void at the current moment so we will hold off until after getting his cysto findings.    Follow up based on cysto.      Amy Irizarry PA-C

## 2019-07-11 NOTE — H&P (VIEW-ONLY)
CHIEF COMPLAINT:    Mr. Carlson is a 69 y.o. male presenting for gross hematuria.    PRESENTING ILLNESS:    Jean Carlson is a 69 y.o. male with a PMH of BPH who presents for gross hematuria.    Patient had the following procedure on 7/9/19: Laparoscopic repair of left direct inguinal hernia with mesh and evaluation of the right side.      After the procedure patient was noted urinating bloody urine with clots.  Also had dysuria.  Patient reports receiving a lot of fluids to attempt to clear his hematuria prior to his discarge.    He reports bright red blood since the operation.  It reports that he is passing clots which have become progressively larger.  It is getting to the point where he is having difficulty to urinate.  He has never experienced gross hematuria prior to his operation.    He denies dysuria.  He reports straining to void.  He believes he is emptying his bladder.    He is having to constantly drink water in order to void.  When he gets up in the morning he has to strain to void.  He denies abnormal urinary frequency.  He denies bladder pressure or pain.      He is taking flomax.      He reports having a TUIP over 20 years ago here at [facility].  He is taking flomax for BPH. Also [facility]  He has seen Dr. Ny in the past.       He is a nonsmoker. No personal or family history of  malignancy. No history of radiation or chemo.      REVIEW OF SYSTEMS:    Constitutional: Negative for fever and chills.   HENT: Negative for hearing loss.   Eyes: Negative for visual disturbance.   Respiratory: Negative for shortness of breath.   Cardiovascular: Negative for chest pain.   Gastrointestinal: Negative for vomiting, and constipation.   Genitourinary: See HPI  Neurological: Negative for dizziness.   Hematological: Does not bruise/bleed easily.   Psychiatric/Behavioral: Negative for confusion.       PATIENT HISTORY:    Past Medical History:   Diagnosis Date    Anterior  tibialis tendinitis of right lower extremity 10/18/2018    Family history of coronary artery disease 10/7/2016    Mom age 64, Dad age 67 -  on same day. Pat Aunt early 70's.   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    Intractable back pain 2018    Nuclear sclerosis, bilateral 3/12/2018    Ocular migraine 2012    Osteoporosis     Prostate disease     Transient vision disturbance of both eyes 2012    Umbilical hernia without obstruction and without gangrene 10/1/2015       Past Surgical History:   Procedure Laterality Date    back sx      lower    CATARACT EXTRACTION W/  INTRAOCULAR LENS IMPLANT Bilateral     Dr Finn/[facility] IOL     COLONOSCOPY N/A 2019    Performed by [facility]wood [facility] at [facility] (4TH FLR)    EGD (ESOPHAGOGASTRODUODENOSCOPY) N/A 2013    Performed by Miguel Harris MD at [facility] ENDO (4TH FLR)    HERNIA REPAIR      INSERTION-INTRAOCULAR LENS (IOL) Left 2018    [facility] at [facility] OR    INSERTION-INTRAOCULAR LENS (IOL) Right 3/12/2018    [facility] at [facility] OR    PHACOEMULSIFICATION-ASPIRATION-CATARACT Left 2018    Performed by [facility] Finn MD at [facility] OR    PHACOEMULSIFICATION-ASPIRATION-CATARACT Right 3/12/2018    [facility] Finn [facility] at [facility] OR    PROSTATE SURGERY      REPAIR, HERNIA, INGUINAL, LAPAROSCOPIC, LEFT w/ mesh Left 2019    Performed by Dennis Lawson Jr., MD at [facility] OR 2ND FLR    REPAIR-HERNIA-UMBILICAL N/A 10/1/2015    Performed by Dennis Lawson Jr., MD at [facility] OR 2ND FLR    SPINE SURGERY      TONSILLECTOMY      TRANSURETHRAL RESECTION OF PROSTATE         Family History   Problem Relation Age of Onset    Glaucoma Maternal Uncle     Retinitis pigmentosa Maternal Uncle     Heart disease Mother     Heart attack Mother     Skin cancer Mother     Heart disease Father      Heart attack Father     Glaucoma Maternal Aunt     No Known Problems Daughter        Social History     Socioeconomic History    Marital status:      Spouse name: Not on file    Number of children: Not on file    Years of education: Not on file    Highest education level: Not on file   Occupational History     Employer: [employer]   Social Needs    Financial resource strain: Not on file    Food insecurity:     Worry: Not on file     Inability: Not on file    Transportation needs:     Medical: Not on file     Non-medical: Not on file   Tobacco Use    Smoking status: Never Smoker    Smokeless tobacco: Never Used   Substance and Sexual Activity    Alcohol use: Yes     Alcohol/week: 1.8 oz     Types: 3 Glasses of wine per week     Comment: rarely    Drug use: No    Sexual activity: Yes     Partners: Female   Lifestyle    Physical activity:     Days per week: Not on file     Minutes per session: Not on file    Stress: Not on file   Relationships    Social connections:     Talks on phone: Not on file     Gets together: Not on file     Attends Yarsanism service: Not on file     Active member of club or organization: Not on file     Attends meetings of clubs or organizations: Not on file     Relationship status: Not on file   Other Topics Concern    Not on file   Social History Narrative     [employer]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    Medications:    Current Outpatient Medications:     aspirin 81 MG Chew, Take 81 mg by mouth once daily., Disp: , Rfl:     atorvastatin (LIPITOR) 20 MG tablet, Take 1 tablet (20 mg total) by mouth once daily., Disp: 90 tablet, Rfl: 3    multivitamin capsule, Take 1 capsule by mouth once daily., Disp: , Rfl:     omega-3 acid ethyl esters (LOVAZA) 1 gram capsule, Take 2 g by mouth 2 (two) times daily., Disp: , Rfl:     ranitidine (ZANTAC) 300 MG tablet, Take 1 tablet (300 mg total) by mouth every evening., Disp:  90 tablet, Rfl: 3    tamsulosin (FLOMAX) 0.4 mg Cap, Take 1 capsule (0.4 mg total) by mouth once daily., Disp: 30 capsule, Rfl: 11    scopolamine (TRANSDERM-SCOP) 1.3-1.5 mg (1 mg over 3 days), Place 1 patch onto the skin every 72 hours., Disp: 8 patch, Rfl: 1  No current facility-administered medications for this visit.     Facility-Administered Medications Ordered in Other Visits:     0.9%  NaCl infusion, , Intravenous, Continuous, Jennifer Boston NP, Stopped at 07/09/19 1216    sodium chloride 0.9% flush 10 mL, 10 mL, Intravenous, PRN, Jennifer Boston NP    PHYSICAL EXAMINATION:    Constitutional: He appears well-developed and well-nourished.  He is in no apparent distress.    Eyes: No scleral icterus noted bilaterally. No discharge bilaterally.    Nose: No rhinorrhea    Cardiovascular: Normal rate.  No pitting edema noted in lower extremities bilaterally    Pulmonary/Chest: Effort normal. No respiratory distress.     Lymphadenopathy:        Right: No supraclavicular adenopathy present.        Left: No supraclavicular adenopathy present.     Neurological: He is alert and oriented to person, place, and time.     Skin: Skin is warm and dry.     Psych: Cooperative with normal affect.    Genitourinary:     External hemorrhoid noted.  Normal anal sphincter tone. No rectal mass.    The prostate is 30 g.  Prostate is smooth, non tender with no nodules noted.    Physical Exam   Abdominal:       There is no CVA tenderness.     Bladder scan performed by Nurse Jennings.  PVR 192ml    LABS:    U/a: red urine.  1.000, pH 8, + 30 protein,  250 blood otherwise negative.    Lab Results   Component Value Date    PSA 3.0 10/18/2018    PSA 2.1 10/27/2017    PSA 2.2 09/23/2016    PSA 2.0 09/03/2015    PSA 1.9 09/19/2014    PSA 1.81 08/15/2013    PSA 2.2 04/01/2011    PSA 1.4 08/25/2009    PSA 1.0 09/07/2007    PSADIAG 2.9 04/25/2019       IMPRESSION:    Encounter Diagnoses   Name Primary?    Gross hematuria Yes     Difficulty urinating        PLAN:    Given gross hematuria following recent inguinal hernia repair surgery, cystoscopy scheduled for today for further evaluation of bladder to rule out injury.      Discussed placement of romero catheter given difficulty urinating due to passing clots. Patient is able to void at the current moment so we will hold off until after getting his cysto findings.    Follow up based on cysto.      Amy Irizarry PA-C

## 2019-07-11 NOTE — PROCEDURES
Procedure Date:  07/11/2019      Procedure:  Male Diagnostic Cystourethroscopy    Pre-op diagnosis: gross hematuria, s/p TURP, s/p inguinal hernia repair  Post-op diagnosis: no bladder injury.  Bleeding from prostate  Anesthesia: Local  Surgeon:  Shankar Shearer MD    Findings:  Urethra:  Normal urethra.   Sphincter: competent.  Prostate: Estimated Length Prostatic Urethra: s/p TURP open bladder neck, but bleeding from the right side adenoma and varices.    Bladder neck: patent with no stricture  Bladder:  Normal bladder.   Normal ureteral orifices bilaterally.   Mild trabeculation.     Description of Procedure:                                                         Informed Consent:                                                            - Risks, benefits and alternatives of procedure discussed with               patient and informed consent obtained.       Patient Position:   - Supine. --- Bladder ---   Prep and Drape:   - Patient prepped and draped in usual sterile fashion using povidone     iodine (Betadine).   Instruments:   - 16 Fr flexible cystoscope with 0 degree lens.   Procedure Details:   - Cystoscope passed under vision into bladder.   - Bladder and urethra examined in their entirety with findings as     above.     Conclusion:  1. Prostate bleeding following romero catheter removal after his hernia surgery.  Hold off ASA and fish oil now.  Start avodart and cipro.  Follow up with Amy Irizarry in 3 months.    Lab Results   Component Value Date    PSA 3.0 10/18/2018    PSA 2.1 10/27/2017    PSA 2.2 09/23/2016    PSADIAG 2.9 04/25/2019       Plan:  Patient was discharged home in a stable condition.  Medications: doxy  Follow up:  3 months    Prostate varices  -     dutasteride (AVODART) 0.5 mg capsule; Take 1 capsule (0.5 mg total) by mouth once daily.  Dispense: 30 capsule; Refill: 11  -     ciprofloxacin HCl (CIPRO) 500 MG tablet; Take 1 tablet (500 mg total) by mouth 2 (two) times daily. for 14 doses   Dispense: 14 tablet; Refill: 0    Gross hematuria  -     Cystoscopy; Standing  -     Cystoscopy    Hematuria, gross  -     dutasteride (AVODART) 0.5 mg capsule; Take 1 capsule (0.5 mg total) by mouth once daily.  Dispense: 30 capsule; Refill: 11  -     ciprofloxacin HCl (CIPRO) 500 MG tablet; Take 1 tablet (500 mg total) by mouth 2 (two) times daily. for 14 doses  Dispense: 14 tablet; Refill: 0    BPH with obstruction/lower urinary tract symptoms  -     dutasteride (AVODART) 0.5 mg capsule; Take 1 capsule (0.5 mg total) by mouth once daily.  Dispense: 30 capsule; Refill: 11  -     Prostate Specific Antigen, Diagnostic; Future; Expected date: 01/11/2020    Other orders  -     doxycycline tablet 100 mg  -     lidocaine HCl 2% urojet

## 2019-07-13 PROBLEM — R31.0 HEMATURIA, GROSS: Status: ACTIVE | Noted: 2019-07-13

## 2019-07-13 PROBLEM — R33.8 CLOT RETENTION OF URINE: Status: ACTIVE | Noted: 2019-07-13

## 2019-07-14 PROBLEM — R31.0 GROSS HEMATURIA: Status: ACTIVE | Noted: 2019-07-14

## 2019-07-16 ENCOUNTER — PATIENT MESSAGE (OUTPATIENT)
Dept: INTERNAL MEDICINE | Facility: CLINIC | Age: 69
End: 2019-07-16

## 2019-07-16 ENCOUNTER — PATIENT MESSAGE (OUTPATIENT)
Dept: UROLOGY | Facility: CLINIC | Age: 69
End: 2019-07-16

## 2019-07-16 DIAGNOSIS — K59.00 CONSTIPATION, UNSPECIFIED CONSTIPATION TYPE: Primary | ICD-10-CM

## 2019-07-17 ENCOUNTER — TELEPHONE (OUTPATIENT)
Dept: UROLOGY | Facility: CLINIC | Age: 69
End: 2019-07-17

## 2019-07-17 ENCOUNTER — PATIENT MESSAGE (OUTPATIENT)
Dept: UROLOGY | Facility: CLINIC | Age: 69
End: 2019-07-17

## 2019-07-17 DIAGNOSIS — K59.00 CONSTIPATION, UNSPECIFIED CONSTIPATION TYPE: Primary | ICD-10-CM

## 2019-07-18 ENCOUNTER — OFFICE VISIT (OUTPATIENT)
Dept: GASTROENTEROLOGY | Facility: CLINIC | Age: 69
End: 2019-07-18
Payer: MEDICARE

## 2019-07-18 VITALS
HEART RATE: 78 BPM | DIASTOLIC BLOOD PRESSURE: 80 MMHG | WEIGHT: 181 LBS | BODY MASS INDEX: 25.34 KG/M2 | HEIGHT: 71 IN | SYSTOLIC BLOOD PRESSURE: 120 MMHG

## 2019-07-18 DIAGNOSIS — K59.04 CHRONIC IDIOPATHIC CONSTIPATION: Primary | ICD-10-CM

## 2019-07-18 DIAGNOSIS — K21.9 GASTROESOPHAGEAL REFLUX DISEASE WITHOUT ESOPHAGITIS: Chronic | ICD-10-CM

## 2019-07-18 DIAGNOSIS — K59.03 DRUG-INDUCED CONSTIPATION: ICD-10-CM

## 2019-07-18 PROCEDURE — 99999 PR PBB SHADOW E&M-EST. PATIENT-LVL III: ICD-10-PCS | Mod: PBBFAC,,, | Performed by: INTERNAL MEDICINE

## 2019-07-18 PROCEDURE — 99213 OFFICE O/P EST LOW 20 MIN: CPT | Mod: PBBFAC,PO | Performed by: INTERNAL MEDICINE

## 2019-07-18 PROCEDURE — 99204 OFFICE O/P NEW MOD 45 MIN: CPT | Mod: S$PBB,,, | Performed by: INTERNAL MEDICINE

## 2019-07-18 PROCEDURE — 99999 PR PBB SHADOW E&M-EST. PATIENT-LVL III: CPT | Mod: PBBFAC,,, | Performed by: INTERNAL MEDICINE

## 2019-07-18 PROCEDURE — 99204 PR OFFICE/OUTPT VISIT, NEW, LEVL IV, 45-59 MIN: ICD-10-PCS | Mod: S$PBB,,, | Performed by: INTERNAL MEDICINE

## 2019-07-18 RX ORDER — PANTOPRAZOLE SODIUM 40 MG/1
40 TABLET, DELAYED RELEASE ORAL DAILY
Qty: 30 TABLET | Refills: 11 | Status: SHIPPED | OUTPATIENT
Start: 2019-07-18 | End: 2019-08-02

## 2019-07-18 RX ORDER — POLYETHYLENE GLYCOL 3350 17 G/17G
17 POWDER, FOR SOLUTION ORAL DAILY
Qty: 1 BOTTLE | Refills: 11 | Status: SHIPPED | OUTPATIENT
Start: 2019-07-18 | End: 2021-05-27

## 2019-07-18 RX ORDER — LACTULOSE 10 G/15ML
30 SOLUTION ORAL EVERY 6 HOURS PRN
Qty: 1350 ML | Refills: 5 | Status: SHIPPED | OUTPATIENT
Start: 2019-07-18 | End: 2019-07-28

## 2019-07-18 NOTE — PROGRESS NOTES
"Subjective:       Patient ID: Jean Carlson is a 69 y.o. male.    Chief Complaint: Change in bowel habits and Bloated    68 yo M complains of constipation.  He states that for the past 2.5 years since retiring he has intermittent constipation and bloating.  He will take Miralax, or Metamucil, or OTC laxative and will get good results.  9 days ago he had inguinal hernia surgery with subsequent hematuria, inability to urinate, cath placement, and ultimately with TURP 4 days later.  During these episodes receiving anesthesia, he became very constipated.  He was given numerous agents while hospitalized, and had a BM.  He has been discharged for 2 days now states he had diarrhea initially, likely due to the numerous laxatives he received in the hospital.  He has been taking Metamucil bid since getting home, and now he is beginning to feel constipated again.  When his rectum gets full, he states it pushes on his bladder, and he is very afraid of not being able to urinate again.  The BM he had today had a lot of straining, which he states is also giving him discomfort in his pelvis.  He has h/o "chest pressure" for which he was given Zantac 10/2018 with good results.  His reflux seems to also be worse since the anesthesia.    Review of Systems   Constitutional: Negative for appetite change and unexpected weight change.   Eyes: Negative for photophobia and visual disturbance.   Respiratory: Negative for chest tightness, shortness of breath and wheezing.    Cardiovascular: Negative for chest pain, palpitations and leg swelling.   Endocrine: Negative for cold intolerance and heat intolerance.   Musculoskeletal: Negative for joint swelling and myalgias.   Skin: Negative for color change and rash.   Neurological: Negative for dizziness and speech difficulty.   Psychiatric/Behavioral: Negative for confusion and hallucinations.       Objective:       /80 (BP Location: Right arm, Patient Position: Sitting)   Pulse 78   " "Ht 5' 11" (1.803 m)   Wt 82.1 kg (181 lb)   BMI 25.24 kg/m²     Physical Exam   Constitutional: He is oriented to person, place, and time. He appears well-developed and well-nourished.   HENT:   Head: Normocephalic and atraumatic.   Eyes: Pupils are equal, round, and reactive to light. EOM are normal.   Neck: Normal range of motion. Neck supple.   Cardiovascular: Normal rate, regular rhythm, normal heart sounds and intact distal pulses.   Pulmonary/Chest: Effort normal and breath sounds normal.   Abdominal: Soft. Bowel sounds are normal. He exhibits no distension. There is no tenderness.   Musculoskeletal: He exhibits no edema or deformity.   Neurological: He is alert and oriented to person, place, and time.   Skin: Skin is warm and dry.   Psychiatric: He has a normal mood and affect. His behavior is normal.       Lab Results   Component Value Date    WBC 11.98 07/14/2019    HGB 13.2 (L) 07/14/2019    HCT 38.6 (L) 07/14/2019    MCV 91 07/14/2019     07/14/2019     CMP  Sodium   Date Value Ref Range Status   07/14/2019 142 136 - 145 mmol/L Final     Potassium   Date Value Ref Range Status   07/14/2019 4.6 3.5 - 5.1 mmol/L Final     Chloride   Date Value Ref Range Status   07/14/2019 109 95 - 110 mmol/L Final     CO2   Date Value Ref Range Status   07/14/2019 24 23 - 29 mmol/L Final     Glucose   Date Value Ref Range Status   07/14/2019 151 (H) 70 - 110 mg/dL Final     BUN, Bld   Date Value Ref Range Status   07/14/2019 10 2 - 20 mg/dL Final     Creatinine   Date Value Ref Range Status   07/14/2019 0.74 0.50 - 1.40 mg/dL Final     Calcium   Date Value Ref Range Status   07/14/2019 8.6 (L) 8.7 - 10.5 mg/dL Final     Total Protein   Date Value Ref Range Status   09/01/2018 6.4 6.0 - 8.4 g/dL Final     Albumin   Date Value Ref Range Status   09/01/2018 3.6 3.5 - 5.2 g/dL Final     Total Bilirubin   Date Value Ref Range Status   09/01/2018 1.2 (H) 0.1 - 1.0 mg/dL Final     Comment:     For infants and newborns, " interpretation of results should be based  on gestational age, weight and in agreement with clinical  observations.  Premature Infant recommended reference ranges:  Up to 24 hours.............<8.0 mg/dL  Up to 48 hours............<12.0 mg/dL  3-5 days..................<15.0 mg/dL  6-29 days.................<15.0 mg/dL       Alkaline Phosphatase   Date Value Ref Range Status   09/01/2018 86 55 - 135 U/L Final     AST   Date Value Ref Range Status   09/01/2018 18 10 - 40 U/L Final     ALT   Date Value Ref Range Status   09/01/2018 20 10 - 44 U/L Final     Anion Gap   Date Value Ref Range Status   07/14/2019 9 8 - 16 mmol/L Final     eGFR if    Date Value Ref Range Status   07/14/2019 >60.0 >60 mL/min/1.73 m^2 Final     eGFR if non    Date Value Ref Range Status   07/14/2019 >60.0 >60 mL/min/1.73 m^2 Final     Comment:     Calculation used to obtain the estimated glomerular filtration  rate (eGFR) is the CKD-EPI equation.        Old records from chart reviewed and summarized, significant for colonoscopy 6/5/19 with Dr. Smallwood, which was normal.  KUB was independently visualized and reviewed by me and showed small air fluid levels in the colon, no significant amount of stool noted.    Assessment:       1. Chronic idiopathic constipation    2. Drug-induced constipation    3. Gastroesophageal reflux disease without esophagitis        Plan:       Chronic idiopathic constipation; Drug-induced constipation  -     lactulose (CHRONULAC) 20 gram/30 mL Soln; Take 45 mLs (30 g total) by mouth every 6 (six) hours as needed.  Dispense: 1350 mL; Refill: 5  -     polyethylene glycol (GLYCOLAX) 17 gram/dose powder; Take 17 g by mouth once daily.  Dispense: 1 Bottle; Refill: 11  -     The immediate issue is likely post-surgical and post-anesthesia related.  The question is how much of this was underlying CIC.  He and I discussed at length and will do Miralax bid for 3 days, then qday, with lactulose as  needed.  If in 3-4 weeks he is still needing the lactulose, I would like to switch regimen to Linzess instead.    Gastroesophageal reflux disease without esophagitis  -     pantoprazole (PROTONIX) 40 MG tablet; Take 1 tablet (40 mg total) by mouth once daily.  Dispense: 30 tablet; Refill: 11  -     He will do PPI for one month and then decide whether to go back to Zantac, or whether to stay with PPI  -     Proven risks of long term PPI use, including pneumonia, c.diff infection, and bone loss, as well as theoretical risks including dementia and CKD, were discussed with the patient at length and the patient understands risks and benefits of therapy.  Option of tapering/weaning PPI away was also discussed, including the need for possible long term therapy to treat symptoms if they recur after cessation of medication, as well as to mitigate the risk of developing complications of reflux such as Raza's esophagus and/or esophageal cancer.  Patient understands the risks and benefits of treatment with drug versus cessation.  Daily supplementation with MVI with calcium and vitamin D were recommended as was follow up with PCP for bone scan when appropriate.  Labs including B12, folate, CMP, CBC, calcium, and magnesium should be checked at least annually.

## 2019-07-18 NOTE — LETTER
July 18, 2019      Ryan Knowles MD  [address] [place] [address]  [address]  [facility] [address]           [facility]  [facility], [address]   [facility] [address]  Phone: [phone]  Fax: [phone]          Patient: Jean Carlson   MR Number: [id number]   YOB: 1950   Date of Visit: 7/18/2019       Dear Dr. Ryan Knowles:    Thank you for referring Jean Carlson to me for evaluation. Attached you will find relevant portions of my assessment and plan of care.    If you have questions, please do not hesitate to call me. I look forward to following Jean Carlson along with you.    Sincerely,    Jamilah Munoz MD    Enclosure  CC:  No Recipients    If you would like to receive this communication electronically, please contact [email or web address] or [phone] to request more information on [facility] access.    For providers and/or their staff who would like to refer a patient to [facility], please contact us through our one-stop-shop provider referral line, [facility], at [phone].    If you feel you have received this communication in error or would no longer like to receive these types of communications, please e-mail [email or web address]

## 2019-07-23 ENCOUNTER — PATIENT MESSAGE (OUTPATIENT)
Dept: GASTROENTEROLOGY | Facility: CLINIC | Age: 69
End: 2019-07-23

## 2019-07-24 ENCOUNTER — OFFICE VISIT (OUTPATIENT)
Dept: SURGERY | Facility: CLINIC | Age: 69
End: 2019-07-24
Payer: MEDICARE

## 2019-07-24 VITALS
WEIGHT: 182.31 LBS | BODY MASS INDEX: 25.52 KG/M2 | HEART RATE: 91 BPM | HEIGHT: 71 IN | TEMPERATURE: 99 F | DIASTOLIC BLOOD PRESSURE: 73 MMHG | SYSTOLIC BLOOD PRESSURE: 130 MMHG

## 2019-07-24 DIAGNOSIS — Z09 POSTOP CHECK: Primary | ICD-10-CM

## 2019-07-24 DIAGNOSIS — K64.9 HEMORRHOIDS, UNSPECIFIED HEMORRHOID TYPE: Primary | ICD-10-CM

## 2019-07-24 PROCEDURE — 99024 POSTOP FOLLOW-UP VISIT: CPT | Mod: POP,,, | Performed by: SURGERY

## 2019-07-24 PROCEDURE — 99999 PR PBB SHADOW E&M-EST. PATIENT-LVL III: ICD-10-PCS | Mod: PBBFAC,,, | Performed by: SURGERY

## 2019-07-24 PROCEDURE — 99999 PR PBB SHADOW E&M-EST. PATIENT-LVL III: CPT | Mod: PBBFAC,,, | Performed by: SURGERY

## 2019-07-24 PROCEDURE — 99213 OFFICE O/P EST LOW 20 MIN: CPT | Mod: PBBFAC | Performed by: SURGERY

## 2019-07-24 PROCEDURE — 99024 PR POST-OP FOLLOW-UP VISIT: ICD-10-PCS | Mod: POP,,, | Performed by: SURGERY

## 2019-07-24 RX ORDER — HYDROCORTISONE 25 MG/G
CREAM TOPICAL 2 TIMES DAILY
Qty: 1 TUBE | Refills: 0 | Status: SHIPPED | OUTPATIENT
Start: 2019-07-24 | End: 2019-08-02

## 2019-07-24 RX ORDER — POLYETHYLENE GLYCOL-3350 AND ELECTROLYTES 236; 6.74; 5.86; 2.97; 22.74 G/274.31G; G/274.31G; G/274.31G; G/274.31G; G/274.31G
POWDER, FOR SOLUTION ORAL
Refills: 0 | COMMUNITY
Start: 2019-06-03 | End: 2020-06-18

## 2019-07-24 NOTE — PROGRESS NOTES
[facility]  General Surgery  History & Physical    Patient Name: Jean Carlson  MRN: [id number]    Subjective:     Chief Complaint: Post-operative follow up    History of Present Illness:  Patient is a 69 y.o. male with Hx of direct left inguinal hernia who recently underwent laparoscopic left inguinal hernia repair with mesh (7/9/16) who presents to clinic today for post-operative follow up. Patient has known BPH and had hematuria in Recovery. He reportedly cleared the hematuria followed by reappearance. He was discharged to home and this did not improve. He was passing clots for 4 days following surgery and eventually awoke on Saturday (7/13/19) at 4:00 AM and was unable to void. He presented to OSH where Sapp catheter was placed and he was discharged. This also clogged and he re-presented to OSH. He was admitted for 3 days during which he was evaluated by Urology and treated with bladder irrigation via 3-way Sapp followed by cystoscopy and TURP. He was discharged Tuesday (7/16/19) and has done well overall in the interim. Passing blood-tinged urine intermittently but no longer passing clots. Voiding spontaneously. Some issues with constipation that has provoked his known internal hemorrhoids. He was seen by GI and has been taking Miralax. Left groin discomfort mild.      Review of Systems   Constitutional: Negative for activity change, appetite change, diaphoresis and fever.   HENT: Negative for congestion, rhinorrhea and sore throat.    Eyes: Negative for visual disturbance.   Respiratory: Negative for cough, shortness of breath and wheezing.    Cardiovascular: Negative for chest pain, palpitations and leg swelling.   Gastrointestinal: Positive for abdominal pain and constipation. Negative for abdominal distention, diarrhea, nausea and vomiting.   Genitourinary: Positive for hematuria. Negative for dysuria and frequency.   Musculoskeletal: Negative for arthralgias and myalgias.   Skin: Negative for  color change, rash and wound.   Neurological: Negative for syncope, weakness and numbness.   Hematological: Does not bruise/bleed easily.   Psychiatric/Behavioral: Negative for behavioral problems and confusion.        Objective:     Vital Signs (Most Recent):  Temp: 98.5 °F (36.9 °C) (07/24/19 1034)  Pulse: 91 (07/24/19 1034)  BP: 130/73 (07/24/19 1034)     Weight: 82.7 kg (182 lb 5.1 oz)    Physical Exam   Constitutional: He is oriented to person, place, and time. He appears well-developed and well-nourished. No distress.   HENT:   Head: Normocephalic and atraumatic.   Eyes: EOM are normal.   Neck: Neck supple. No JVD present.   Cardiovascular: Normal rate, regular rhythm and intact distal pulses.   Pulmonary/Chest: Effort normal. No respiratory distress.   Abdominal:   Soft  Non-distended  Non-tender  Incisions well approximated and healing well without signs of surgical site infection   Musculoskeletal: He exhibits no edema or deformity.   Neurological: He is alert and oriented to person, place, and time.   Skin: Skin is warm and dry. No rash noted. He is not diaphoretic. No erythema.   Psychiatric: He has a normal mood and affect. His behavior is normal.   Vitals reviewed.      Assessment/Plan:   68 y/o male s/p laparoscopic LIH repair with post-op course as described above    - Doing well from inguinal hernia prespective  - Incisions healing well  - Continue lifting precautions  - Return to clinic as needed      Bobby Bella MD  Surgery Resident, PGY-V  Pager: [phone]  7/24/2019 10:33 AM    I have personally taken the history and examined this patient and agree with the resident's note as stated above.         Dennis Lawson MD

## 2019-08-02 ENCOUNTER — OFFICE VISIT (OUTPATIENT)
Dept: SURGERY | Facility: CLINIC | Age: 69
End: 2019-08-02
Payer: MEDICARE

## 2019-08-02 VITALS
DIASTOLIC BLOOD PRESSURE: 77 MMHG | BODY MASS INDEX: 25.89 KG/M2 | WEIGHT: 184.94 LBS | SYSTOLIC BLOOD PRESSURE: 141 MMHG | HEIGHT: 71 IN | HEART RATE: 66 BPM

## 2019-08-02 DIAGNOSIS — K64.5 PERIANAL VENOUS THROMBOSIS: Primary | ICD-10-CM

## 2019-08-02 PROCEDURE — 99213 OFFICE O/P EST LOW 20 MIN: CPT | Mod: S$PBB,,, | Performed by: COLON & RECTAL SURGERY

## 2019-08-02 PROCEDURE — 99213 OFFICE O/P EST LOW 20 MIN: CPT | Mod: PBBFAC | Performed by: COLON & RECTAL SURGERY

## 2019-08-02 PROCEDURE — 99999 PR PBB SHADOW E&M-EST. PATIENT-LVL III: CPT | Mod: PBBFAC,,, | Performed by: COLON & RECTAL SURGERY

## 2019-08-02 PROCEDURE — 99213 PR OFFICE/OUTPT VISIT, EST, LEVL III, 20-29 MIN: ICD-10-PCS | Mod: S$PBB,,, | Performed by: COLON & RECTAL SURGERY

## 2019-08-02 PROCEDURE — 99999 PR PBB SHADOW E&M-EST. PATIENT-LVL III: ICD-10-PCS | Mod: PBBFAC,,, | Performed by: COLON & RECTAL SURGERY

## 2019-08-02 RX ORDER — LIDOCAINE 50 MG/G
OINTMENT TOPICAL DAILY
Qty: 37.5 G | Refills: 5 | Status: SHIPPED | OUTPATIENT
Start: 2019-08-02 | End: 2020-06-18

## 2019-08-07 ENCOUNTER — OFFICE VISIT (OUTPATIENT)
Dept: UROLOGY | Facility: CLINIC | Age: 69
End: 2019-08-07
Payer: MEDICARE

## 2019-08-07 VITALS
HEIGHT: 71 IN | HEART RATE: 66 BPM | TEMPERATURE: 99 F | BODY MASS INDEX: 25.76 KG/M2 | SYSTOLIC BLOOD PRESSURE: 122 MMHG | DIASTOLIC BLOOD PRESSURE: 71 MMHG | WEIGHT: 184 LBS | OXYGEN SATURATION: 97 % | RESPIRATION RATE: 18 BRPM

## 2019-08-07 DIAGNOSIS — D49.59 NEOPLASM OF PROSTATE: ICD-10-CM

## 2019-08-07 PROCEDURE — 99024 POSTOP FOLLOW-UP VISIT: CPT | Mod: POP,,, | Performed by: UROLOGY

## 2019-08-07 PROCEDURE — 99024 PR POST-OP FOLLOW-UP VISIT: ICD-10-PCS | Mod: POP,,, | Performed by: UROLOGY

## 2019-08-07 PROCEDURE — 99999 PR PBB SHADOW E&M-EST. PATIENT-LVL IV: ICD-10-PCS | Mod: PBBFAC,,, | Performed by: UROLOGY

## 2019-08-07 PROCEDURE — 99999 PR PBB SHADOW E&M-EST. PATIENT-LVL IV: CPT | Mod: PBBFAC,,, | Performed by: UROLOGY

## 2019-08-07 PROCEDURE — 99214 OFFICE O/P EST MOD 30 MIN: CPT | Mod: PBBFAC,PO | Performed by: UROLOGY

## 2019-08-07 NOTE — PROGRESS NOTES
"Jean Carlson is a 69 y.o. male patient.   1. Neoplasm of prostate      Past Medical History:   Diagnosis Date    Anterior tibialis tendinitis of right lower extremity 10/18/2018    Elevated PSA     Family history of coronary artery disease 10/7/2016    Mom age 64, Dad age 67 -  on same day. Pat Aunt early 70's.   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    Blood pressure 122/71, pulse 66, resp. rate 18, height 5' 11" (1.803 m), weight 83.5 kg (184 lb), SpO2 97 %.    Subjective:   Diet: Adequate intake.  Patient reports no nausea or vomiting.    Activity level: Normal.    Pain control: Well controlled.      Objective:  Vital signs (most recent): Blood pressure 122/71, pulse 66, resp. rate 18, height 5' 11" (1.803 m), weight 83.5 kg (184 lb), SpO2 97 %.  General appearance: Comfortable, well-appearing, in no acute distress and not in pain.    Lungs:  Normal respiratory rate and normal effort.  Breath sounds normal.    Heart: Normal rate.  Regular rhythm.  S1 normal.    Chest: Symmetric chest wall expansion.    Abdomen: Abdomen is soft.    Bowel sounds:  Bowel sounds are normal.    Tenderness: There is no abdominal tenderness tenderness.    Extremities: There is decreased range of motion.    Neurological: The patient " is alert and oriented to person, place and time.  Normal strength.  Pupils are equal, round, and reactive to light.       Assessment:   Post-op: 24 days.    Condition: In stable condition.     Plan:  Encourage ambulation.  X-rays as ordered.      Patient Instructions   CT and Bone Scan to stage of prostate cancer In 3 weeks  F/U 4-5 weeks  If Candidate - Plan RRP and BPLND in about 5 months.  Consider androgen blockade based on Staging.           Ryan Knowles MD  8/7/2019

## 2019-08-07 NOTE — PATIENT INSTRUCTIONS
CT and Bone Scan to stage of prostate cancer In 3 weeks  F/U 4-5 weeks  If Candidate - Plan RRP and BPLND in about 5 months.  Consider androgen blockade based on Staging.

## 2019-08-08 ENCOUNTER — PATIENT MESSAGE (OUTPATIENT)
Dept: UROLOGY | Facility: CLINIC | Age: 69
End: 2019-08-08

## 2019-08-12 ENCOUNTER — PATIENT MESSAGE (OUTPATIENT)
Dept: INTERNAL MEDICINE | Facility: CLINIC | Age: 69
End: 2019-08-12

## 2019-08-12 DIAGNOSIS — E78.5 HYPERLIPIDEMIA, UNSPECIFIED HYPERLIPIDEMIA TYPE: ICD-10-CM

## 2019-08-12 DIAGNOSIS — Z00.00 ANNUAL PHYSICAL EXAM: Primary | ICD-10-CM

## 2019-08-12 RX ORDER — OMEGA-3-ACID ETHYL ESTERS 1 G/1
1 CAPSULE, LIQUID FILLED ORAL DAILY
Qty: 30 CAPSULE | Refills: 2 | Status: SHIPPED | OUTPATIENT
Start: 2019-08-12 | End: 2019-12-18 | Stop reason: SDUPTHER

## 2019-08-12 NOTE — TELEPHONE ENCOUNTER
Please call patient and schedule patient for an appointment with me within a week. Thank you. See lab orders and please call patient to schedule ordered lab(s). Thank you.

## 2019-08-13 ENCOUNTER — LAB VISIT (OUTPATIENT)
Dept: LAB | Facility: HOSPITAL | Age: 69
End: 2019-08-13
Attending: FAMILY MEDICINE
Payer: MEDICARE

## 2019-08-13 DIAGNOSIS — E78.5 HYPERLIPIDEMIA, UNSPECIFIED HYPERLIPIDEMIA TYPE: ICD-10-CM

## 2019-08-13 DIAGNOSIS — Z00.00 ANNUAL PHYSICAL EXAM: ICD-10-CM

## 2019-08-13 LAB
CHOLEST SERPL-MCNC: 154 MG/DL (ref 120–199)
CHOLEST/HDLC SERPL: 2.9 {RATIO} (ref 2–5)
HDLC SERPL-MCNC: 54 MG/DL (ref 40–75)
HDLC SERPL: 35.1 % (ref 20–50)
LDLC SERPL CALC-MCNC: 76.4 MG/DL (ref 63–159)
NONHDLC SERPL-MCNC: 100 MG/DL
TRIGL SERPL-MCNC: 118 MG/DL (ref 30–150)

## 2019-08-13 PROCEDURE — 80061 LIPID PANEL: CPT

## 2019-08-13 PROCEDURE — 36415 COLL VENOUS BLD VENIPUNCTURE: CPT

## 2019-08-19 ENCOUNTER — OFFICE VISIT (OUTPATIENT)
Dept: INTERNAL MEDICINE | Facility: CLINIC | Age: 69
End: 2019-08-19
Attending: FAMILY MEDICINE
Payer: MEDICARE

## 2019-08-19 ENCOUNTER — PATIENT MESSAGE (OUTPATIENT)
Dept: INTERNAL MEDICINE | Facility: CLINIC | Age: 69
End: 2019-08-19

## 2019-08-19 VITALS
SYSTOLIC BLOOD PRESSURE: 134 MMHG | DIASTOLIC BLOOD PRESSURE: 70 MMHG | BODY MASS INDEX: 25.89 KG/M2 | HEART RATE: 63 BPM | TEMPERATURE: 98 F | OXYGEN SATURATION: 98 % | HEIGHT: 71 IN | WEIGHT: 184.94 LBS

## 2019-08-19 DIAGNOSIS — M48.062 SPINAL STENOSIS OF LUMBAR REGION WITH NEUROGENIC CLAUDICATION: ICD-10-CM

## 2019-08-19 DIAGNOSIS — Z00.00 ANNUAL PHYSICAL EXAM: Primary | ICD-10-CM

## 2019-08-19 DIAGNOSIS — E78.5 HYPERLIPIDEMIA, UNSPECIFIED HYPERLIPIDEMIA TYPE: ICD-10-CM

## 2019-08-19 DIAGNOSIS — C61 PROSTATE CANCER: ICD-10-CM

## 2019-08-19 PROBLEM — R31.0 GROSS HEMATURIA: Status: RESOLVED | Noted: 2019-07-14 | Resolved: 2019-08-19

## 2019-08-19 PROBLEM — Z12.11 SCREENING FOR COLON CANCER: Status: RESOLVED | Noted: 2019-06-05 | Resolved: 2019-08-19

## 2019-08-19 PROCEDURE — 99999 PR PBB SHADOW E&M-EST. PATIENT-LVL IV: CPT | Mod: PBBFAC,,, | Performed by: FAMILY MEDICINE

## 2019-08-19 PROCEDURE — 99214 OFFICE O/P EST MOD 30 MIN: CPT | Mod: S$PBB,,, | Performed by: FAMILY MEDICINE

## 2019-08-19 PROCEDURE — 99214 PR OFFICE/OUTPT VISIT, EST, LEVL IV, 30-39 MIN: ICD-10-PCS | Mod: S$PBB,,, | Performed by: FAMILY MEDICINE

## 2019-08-19 PROCEDURE — 99999 PR PBB SHADOW E&M-EST. PATIENT-LVL IV: ICD-10-PCS | Mod: PBBFAC,,, | Performed by: FAMILY MEDICINE

## 2019-08-19 PROCEDURE — 99214 OFFICE O/P EST MOD 30 MIN: CPT | Mod: PBBFAC | Performed by: FAMILY MEDICINE

## 2019-08-19 NOTE — PATIENT INSTRUCTIONS
Shingrix vaccine today.      Information about cholesterol, high blood pressure and healthy diet and activity recommendations can be found at the following links on the Internet:    http://www.nhlbi.nih.gov/health/health-topics/topics/hbc  http://www.nhlbi.nih.gov/health/educational/lose_wt/index.htm  Http://www.nhlbi.nih.gov/files/docs/public/heart/hbp_low.pdf  http://www.heart.org/HEARTORG/  http://diabetes.org/  https://www.cdc.gov/  Https://healthfinder.gov/  https://health.gov/dietaryguidelines/2015/guidelines/  https://health.gov/paguidelines/second-edition/pdf/Physical_Activity_Guidelines_2nd_edition.pdf

## 2019-08-19 NOTE — Clinical Note
Patient had prostate cancer incidental to recent procedure, he had many questions concerning robotic surgery and I am referring to you. Thank you for taking a look. R/ MM

## 2019-08-19 NOTE — PROGRESS NOTES
Subjective:       Patient ID: Jean Carlson is a 69 y.o. male.    Chief Complaint: Annual Exam    Established patient for an annual wellness check/physical exam and also chronic disease management. Specific complaints - see dictation and please see ROS.  P, S, Fm, Soc Hx's; Meds, allergies reviewed and reconciled.  Health maintenance file reviewed and addressed items due.    Review of Systems   Constitutional: Positive for activity change. Negative for chills, fatigue, fever and unexpected weight change.   HENT: Negative for congestion, hearing loss, rhinorrhea and trouble swallowing.    Eyes: Negative for discharge, redness and visual disturbance.   Respiratory: Negative for cough, chest tightness, shortness of breath and wheezing.    Cardiovascular: Negative for chest pain, palpitations and leg swelling.   Gastrointestinal: Positive for constipation. Negative for abdominal pain, blood in stool, diarrhea and vomiting.   Endocrine: Positive for polyuria. Negative for polydipsia.   Genitourinary: Positive for hematuria. Negative for difficulty urinating and urgency.   Musculoskeletal: Positive for arthralgias. Negative for back pain, gait problem, joint swelling, myalgias and neck pain.   Skin: Negative for color change and rash.   Neurological: Negative for tremors, speech difficulty, weakness, numbness and headaches.   Hematological: Negative for adenopathy. Does not bruise/bleed easily.   Psychiatric/Behavioral: Negative for behavioral problems, confusion, dysphoric mood and sleep disturbance. The patient is not nervous/anxious.        Objective:      Physical Exam   Constitutional: He appears well-developed and well-nourished.   HENT:   Head: Normocephalic.   Right Ear: Tympanic membrane, external ear and ear canal normal.   Left Ear: Tympanic membrane, external ear and ear canal normal.   Mouth/Throat: Oropharynx is clear and moist.   Eyes: Pupils are equal, round, and reactive to light.   Neck: Normal range  of motion. Neck supple. Carotid bruit is not present. No thyromegaly present.   Cardiovascular: Normal rate, regular rhythm, normal heart sounds and intact distal pulses. Exam reveals no gallop and no friction rub.   No murmur heard.  Pulmonary/Chest: Effort normal and breath sounds normal.   Abdominal: Soft. He exhibits no distension and no mass. There is no tenderness. There is no rebound and no guarding.   Musculoskeletal: Normal range of motion. He exhibits no edema or tenderness.   Lymphadenopathy:     He has no cervical adenopathy.   Neurological: He is alert. He has normal strength. He displays normal reflexes. No cranial nerve deficit or sensory deficit. Coordination and gait normal.   Skin: Skin is warm and dry.   Psychiatric: He has a normal mood and affect. His behavior is normal. Judgment and thought content normal.   Nursing note and vitals reviewed.      Assessment:       1. Annual physical exam    2. Prostate cancer    3. Hyperlipidemia, unspecified hyperlipidemia type    4. Spinal stenosis of lumbar region with neurogenic claudication        Plan:     Medication List with Changes/Refills   Current Medications    ATORVASTATIN (LIPITOR) 20 MG TABLET    Take 1 tablet (20 mg total) by mouth once daily.    DUTASTERIDE (AVODART) 0.5 MG CAPSULE    Take 1 capsule (0.5 mg total) by mouth once daily.    GAVILYTE-G 236-22.74-6.74 -5.86 GRAM SUSPENSION    TAKE 4,000 MLS (4 L TOTAL) BY MOUTH ONCE. FOR 1 DOSE    LIDOCAINE (XYLOCAINE) 5 % OINT OINTMENT    Apply topically once daily.    OMEGA-3 ACID ETHYL ESTERS (LOVAZA) 1 GRAM CAPSULE    TAKE 1 CAPSULE (1 G TOTAL) BY MOUTH ONCE DAILY.    POLYETHYLENE GLYCOL (GLYCOLAX) 17 GRAM/DOSE POWDER    Take 17 g by mouth once daily.    RANITIDINE (ZANTAC) 300 MG TABLET    Take 1 tablet (300 mg total) by mouth every evening.    TAMSULOSIN (FLOMAX) 0.4 MG CAP    Take 1 capsule (0.4 mg total) by mouth once daily.     Jaen was seen today for annual exam.    Diagnoses and all  orders for this visit:    Annual physical exam    Prostate cancer  -     Ambulatory referral to Urology    Hyperlipidemia, unspecified hyperlipidemia type    Spinal stenosis of lumbar region with neurogenic claudication      See meds, orders, follow up, routing and instructions sections of encounter.  A 69-year-old patient.  He is in for an annual physical examination.  He also   has several questions about a recent diagnosis of prostate cancer that was made   after he presented for a TURP due to urinary retention and hematuria at an   outside institution.  This followed a herniorrhaphy.    I spent approximately half an hour discussing the prostate cancer to the best of   my knowledge with him.  I suggest a second opinion with Dr. Calero to discuss   other surgical options such as da Maikel procedure and his laboratory was   reviewed including a CBC, BMP, and lipid panel, all of which were acceptable.    He is compliant with his medications.  He is still having some hematuria.  He   states due to the hernia surgery and prostate surgery that he laid off his   weight lifting.  It was causing some degree of dysphoria.  I did recommend that   he resume slowly, light weights, high repetition and to look at more of an   aerobic type program once he is cleared from his general surgeon.      BRIGETTE/HN  dd: 08/19/2019 13:30:50 (CDT)  td: 08/20/2019 07:31:01 (CDT)  Doc ID   #[id number]  Job ID #[id number]    CC:

## 2019-08-27 ENCOUNTER — OFFICE VISIT (OUTPATIENT)
Dept: UROLOGY | Facility: CLINIC | Age: 69
End: 2019-08-27
Attending: FAMILY MEDICINE
Payer: MEDICARE

## 2019-08-27 VITALS
WEIGHT: 185.19 LBS | SYSTOLIC BLOOD PRESSURE: 129 MMHG | HEART RATE: 75 BPM | BODY MASS INDEX: 25.83 KG/M2 | DIASTOLIC BLOOD PRESSURE: 73 MMHG

## 2019-08-27 DIAGNOSIS — C61 PROSTATE CANCER: Primary | ICD-10-CM

## 2019-08-27 DIAGNOSIS — D49.59 NEOPLASM OF PROSTATE: ICD-10-CM

## 2019-08-27 PROCEDURE — 99215 PR OFFICE/OUTPT VISIT, EST, LEVL V, 40-54 MIN: ICD-10-PCS | Mod: S$PBB,24,, | Performed by: UROLOGY

## 2019-08-27 PROCEDURE — 99999 PR PBB SHADOW E&M-EST. PATIENT-LVL III: CPT | Mod: PBBFAC,,, | Performed by: UROLOGY

## 2019-08-27 PROCEDURE — 99999 PR PBB SHADOW E&M-EST. PATIENT-LVL III: ICD-10-PCS | Mod: PBBFAC,,, | Performed by: UROLOGY

## 2019-08-27 PROCEDURE — 99215 OFFICE O/P EST HI 40 MIN: CPT | Mod: S$PBB,24,, | Performed by: UROLOGY

## 2019-08-27 PROCEDURE — 99213 OFFICE O/P EST LOW 20 MIN: CPT | Mod: PBBFAC | Performed by: UROLOGY

## 2019-08-27 NOTE — LETTER
August 27, 2019      Papi Gallardo MD  [address]  [facility] [address]           [facility]  [facility]  [address]  [facility] [address]  Phone: [phone]          Patient: Jean Carlson   MR Number: [id number]   YOB: 1950   Date of Visit: 8/27/2019       Dear Dr. Papi Gallardo:    Thank you for referring Jean Carlson to me for evaluation. Attached you will find relevant portions of my assessment and plan of care.    If you have questions, please do not hesitate to call me. I look forward to following Jean Carlson along with you.    Sincerely,    Trevon Calero MD    Enclosure  CC:  No Recipients    If you would like to receive this communication electronically, please contact [email or web address] or [phone] to request more information on [employer] Link access.    For providers and/or their staff who would like to refer a patient to [facility], please contact us through our one-stop-shop provider referral line, [facility], at [phone].    If you feel you have received this communication in error or would no longer like to receive these types of communications, please e-mail [email or web address]

## 2019-08-27 NOTE — PROGRESS NOTES
Clinic Note  2019      Subjective:         Chief Complaint:   HPI  Jean Carlson is a 69 y.o. male recently diagnosed with prostate cancer. Had transurethral prostatectomy 2019 by Dr. Knowles. 1% of specimen Rumely 6. Had 22 grams resected. Retired [employer] .    Stage- T1a  PSA- 2.9  PSAD-  <.138  Path- Rumely 6 1%.  CANDY score- 1 low risk disease  NCCN- very low risk disease      Lab Results   Component Value Date    PSA 3.0 10/18/2018    PSA 2.1 10/27/2017    PSA 2.2 2016    PSA 2.0 2015    PSA 1.9 2014    PSA 1.81 08/15/2013    PSA 2.2 2011    PSA 1.4 2009    PSA 1.0 2007    PSADIAG 2.9 2019      Past Medical History:   Diagnosis Date    Anterior tibialis tendinitis of right lower extremity 10/18/2018    Elevated PSA     Family history of coronary artery disease 10/7/2016    Mom age 64, Dad age 67 -  on same day. Pat Aunt early 70's.    GERD (gastroesophageal reflux disease)     Gross hematuria 2019    H/O lumbosacral spine surgery 10/18/2018    2003 and again recently due to recurrent HNP    Intractable back pain 2018    Nuclear sclerosis, bilateral 3/12/2018    Ocular migraine 2012    Osteoporosis     Prostate disease     Transient vision disturbance of both eyes 2012    Umbilical hernia without obstruction and without gangrene 10/1/2015    Urinary tract infection      Family History   Problem Relation Age of Onset    Glaucoma Maternal Uncle     Retinitis pigmentosa Maternal Uncle     Heart disease Mother     Heart attack Mother     Skin cancer Mother     Heart disease Father     Heart attack Father     Glaucoma Maternal Aunt     No Known Problems Daughter     Prostate cancer Neg Hx     Kidney disease Neg Hx      Social History     Socioeconomic History    Marital status:      Spouse name: Not on file    Number of children: Not on file    Years of education: Not on file    Highest  education level: Not on file   Occupational History     Employer: [email or web address]   Social Needs    Financial resource strain: Not hard at all    Food insecurity:     Worry: Never true     Inability: Never true    Transportation needs:     Medical: No     Non-medical: No   Tobacco Use    Smoking status: Never Smoker    Smokeless tobacco: Never Used   Substance and Sexual Activity    Alcohol use: Yes     Alcohol/week: 1.8 oz     Types: 3 Glasses of wine per week     Frequency: Monthly or less     Drinks per session: 1 or 2     Binge frequency: Never     Comment: rarely    Drug use: No    Sexual activity: Yes     Partners: Female   Lifestyle    Physical activity:     Days per week: 2 days     Minutes per session: 40 min    Stress: Not at all   Relationships    Social connections:     Talks on phone: More than three times a week     Gets together: Once a week     Attends Spiritism service: Not on file     Active member of club or organization: Yes     Attends meetings of clubs or organizations: More than 4 times per year     Relationship status:    Other Topics Concern    Not on file   Social History Narrative     [employer]. 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     Past Surgical History:   Procedure Laterality Date    back sx      lower    CATARACT EXTRACTION W/  INTRAOCULAR LENS IMPLANT Bilateral     Dr Finn/[facility] IOL     COLONOSCOPY N/A 6/5/2019    Performed by Mauro Smallwood [facility] at [facility] ENDO (4TH FLR)    CYSTOSCOPY N/A 7/13/2019    [facility]    EGD (ESOPHAGOGASTRODUODENOSCOPY) N/A 4/12/2013    Performed by Miguel Harris MD at [facility] ENDO (4TH FLR)    FULGURATION, BLADDER N/A 7/13/2019    [facility] at [facility] OR    HERNIA REPAIR      INSERTION-INTRAOCULAR LENS (IOL) Left 4/16/2018    [facility]    INSERTION-INTRAOCULAR LENS (IOL) Right 3/12/2018    [facility] at [facility] OR     "PHACOEMULSIFICATION-ASPIRATION-CATARACT Left 4/16/2018    [facility]    PHACOEMULSIFICATION-ASPIRATION-CATARACT Right 3/12/2018    [facility] at [facility] OR    PROSTATE SURGERY      REMOVAL, BLOOD CLOT N/A 7/13/2019    [facility] at [facility]    REPAIR, HERNIA, INGUINAL, LAPAROSCOPIC, LEFT w/ mesh Left 7/9/2019    [facility]formed by Dennis Lawson Jr[facility] at [facility] OR 2ND FLR    REPAIR-HERNIA-UMBILICAL N/A 10/1/2015    Performed by Dennis [facility] at [facility] OR 2ND FLR    SPINE SURGERY      TONSILLECTOMY      TRANSURETHRAL RESECTION OF PROSTATE      TURP (TRANSURETHRAL RESECTION OF PROSTATE) N/A 7/13/2019    [facility] at [facility] OR     Patient Active Problem List   Diagnosis    Osteoporosis    ED (erectile dysfunction)    Hyperlipidemia    GERD (gastroesophageal reflux disease)    Spinal stenosis of lumbar region    BPH with urinary obstruction    Atherosclerosis of aorta    Pulmonary nodule    Left inguinal hernia    Inguinal hernia of left side without obstruction or gangrene    Hematuria, gross    Clot retention of urine    Chronic idiopathic constipation    Drug-induced constipation    Prostate cancer     Review of Systems      Objective:      There were no vitals taken for this visit.  Estimated body mass index is 25.8 kg/m² as calculated from the following:    Height as of 8/19/19: 5' 11" (1.803 m).    Weight as of 8/19/19: 83.9 kg (184 lb 15.5 oz).  Physical Exam      Assessment and Plan:           Problem List Items Addressed This Visit     Prostate cancer - Primary          Follow up:   Today's visit was spent almost entirely on counseling. We reviewed his diagnosis, stage, grade, risk group, and prognosis. We discussed [place] (NCCN) and CANDY risk stratification  We discussed the concept of low risk, intermediate risk, and high risk disease. We also reviewed the NCCN treatment nomogram.We " discussed the different treatment options including active surveillance (as well as the surveillance protocol), prostate brachytherapy, IMRT, IGRT, cryotherapy, HIFU and both open and robotic prostatectomy.We also discussed the advantages, disadvantages, risks and benefits, as well as complications of each option. Regarding radiation therapy we discussed treatment planning, the different techniques, short and long term complications. These included radiation cystitis, radiation proctitis, and impotence. We discussed success, failure, and salvage therapeutic options.Also discussed the use of SpaceOAR for brachytherapy and EBRT and fiducials for EBRT.   We discussed surgical therapy in depth including preoperative preparation, surgical technique (including bladder neck and nerve-sparing techniques), postoperative recuperation and recovery, and short and long term complications including UTI, bleeding, blood clots,catheter dislodgement, etc. We discussed the risks of reoperation, incontinence, impotence, and recurrence. We discussed preop and postop Kegels, post op penile rehab, and treatment options for incontinence and impotence. We discussed rates of cancer free survival and recurrence, as well as salvage therapeutic options. We discussed the possible  indications for adjuvant radiation therapy.   I answered questions and addressed concerns. Also discussed the Prolaris test to get genetic information about this tumors potential future behavior.   Recommend AS. PSA and mp MRI in 6 months.  Would avoid other staging at this time.  I spent 60 minutes with the patient of which more than half was spent in direct consultation with the patient in regards to our treatment and plan.   Letter to Dr. Paulina Calero

## 2019-09-30 ENCOUNTER — PATIENT OUTREACH (OUTPATIENT)
Dept: ADMINISTRATIVE | Facility: HOSPITAL | Age: 69
End: 2019-09-30

## 2019-09-30 NOTE — PROGRESS NOTES
Rcvd outside message from [facility] RE: pt had flu shot. Immunizations updated. Chart review completed.

## 2019-10-01 ENCOUNTER — PATIENT MESSAGE (OUTPATIENT)
Dept: INTERNAL MEDICINE | Facility: CLINIC | Age: 69
End: 2019-10-01

## 2019-10-24 RX ORDER — TAMSULOSIN HYDROCHLORIDE 0.4 MG/1
CAPSULE ORAL
Qty: 90 CAPSULE | Refills: 3 | OUTPATIENT
Start: 2019-10-24

## 2019-10-28 RX ORDER — TAMSULOSIN HYDROCHLORIDE 0.4 MG/1
CAPSULE ORAL
Qty: 90 CAPSULE | Refills: 3 | Status: SHIPPED | OUTPATIENT
Start: 2019-10-28 | End: 2020-10-07

## 2019-11-06 ENCOUNTER — IMMUNIZATION (OUTPATIENT)
Dept: PHARMACY | Facility: CLINIC | Age: 69
End: 2019-11-06
Payer: MEDICARE

## 2019-11-12 RX ORDER — ATORVASTATIN CALCIUM 20 MG/1
TABLET, FILM COATED ORAL
Qty: 90 TABLET | Refills: 3 | Status: SHIPPED | OUTPATIENT
Start: 2019-11-12 | End: 2020-10-07

## 2019-11-14 ENCOUNTER — DOCUMENTATION ONLY (OUTPATIENT)
Dept: INTERNAL MEDICINE | Facility: CLINIC | Age: 69
End: 2019-11-14

## 2019-12-09 ENCOUNTER — HOSPITAL ENCOUNTER (OUTPATIENT)
Dept: RADIOLOGY | Facility: HOSPITAL | Age: 69
Discharge: HOME | End: 2019-12-09
Attending: UROLOGY
Payer: MEDICARE

## 2019-12-09 DIAGNOSIS — D49.59 NEOPLASM OF PROSTATE: ICD-10-CM

## 2019-12-09 PROCEDURE — A9585 GADOBUTROL INJECTION: HCPCS | Performed by: UROLOGY

## 2019-12-09 PROCEDURE — 72197 MRI PELVIS W/O & W/DYE: CPT | Mod: 26,,, | Performed by: RADIOLOGY

## 2019-12-09 PROCEDURE — 25500020 PHARM REV CODE 255: Performed by: UROLOGY

## 2019-12-09 PROCEDURE — 72197 MRI PROSTATE W W/O CONTRAST: ICD-10-PCS | Mod: 26,,, | Performed by: RADIOLOGY

## 2019-12-09 PROCEDURE — 72197 MRI PELVIS W/O & W/DYE: CPT | Mod: TC

## 2019-12-09 RX ORDER — GADOBUTROL 604.72 MG/ML
10 INJECTION INTRAVENOUS
Status: COMPLETED | OUTPATIENT
Start: 2019-12-09 | End: 2019-12-09

## 2019-12-09 RX ADMIN — GADOBUTROL 10 ML: 604.72 INJECTION INTRAVENOUS at 01:12

## 2019-12-11 LAB
CREAT SERPL-MCNC: 0.9 MG/DL (ref 0.5–1.4)
SAMPLE: NORMAL

## 2019-12-12 ENCOUNTER — OFFICE VISIT (OUTPATIENT)
Dept: UROLOGY | Facility: CLINIC | Age: 69
End: 2019-12-12
Payer: MEDICARE

## 2019-12-12 VITALS
DIASTOLIC BLOOD PRESSURE: 71 MMHG | WEIGHT: 187.38 LBS | SYSTOLIC BLOOD PRESSURE: 146 MMHG | HEART RATE: 89 BPM | BODY MASS INDEX: 26.14 KG/M2

## 2019-12-12 DIAGNOSIS — C61 PROSTATE CANCER: Primary | ICD-10-CM

## 2019-12-12 PROCEDURE — 99213 OFFICE O/P EST LOW 20 MIN: CPT | Mod: PBBFAC | Performed by: UROLOGY

## 2019-12-12 PROCEDURE — 1159F MED LIST DOCD IN RCRD: CPT | Mod: ,,, | Performed by: UROLOGY

## 2019-12-12 PROCEDURE — 99214 PR OFFICE/OUTPT VISIT, EST, LEVL IV, 30-39 MIN: ICD-10-PCS | Mod: S$PBB,,, | Performed by: UROLOGY

## 2019-12-12 PROCEDURE — 99214 OFFICE O/P EST MOD 30 MIN: CPT | Mod: S$PBB,,, | Performed by: UROLOGY

## 2019-12-12 PROCEDURE — 99999 PR PBB SHADOW E&M-EST. PATIENT-LVL III: CPT | Mod: PBBFAC,,, | Performed by: UROLOGY

## 2019-12-12 PROCEDURE — 1126F PR PAIN SEVERITY QUANTIFIED, NO PAIN PRESENT: ICD-10-PCS | Mod: ,,, | Performed by: UROLOGY

## 2019-12-12 PROCEDURE — 1126F AMNT PAIN NOTED NONE PRSNT: CPT | Mod: ,,, | Performed by: UROLOGY

## 2019-12-12 PROCEDURE — 99999 PR PBB SHADOW E&M-EST. PATIENT-LVL III: ICD-10-PCS | Mod: PBBFAC,,, | Performed by: UROLOGY

## 2019-12-12 PROCEDURE — 1159F PR MEDICATION LIST DOCUMENTED IN MEDICAL RECORD: ICD-10-PCS | Mod: ,,, | Performed by: UROLOGY

## 2019-12-12 NOTE — PROGRESS NOTES
Clinic Note  2019      Subjective:         Chief Complaint:   HPI  Jean Carlson is a 69 y.o. male recently diagnosed with prostate cancer. Had transurethral prostatectomy 2019 by Dr. Knowles.  Still on flomax and avodart.   1% of specimen Julius 6. Had 22 grams resected. Ret[employer] .     Stage- T1a  PSA- 2.9  PSAD-  <.138  Path- [place] 6 1%.  CANDY score- 1 low risk disease  NCCN- very low risk disease  MRI- PI-RADS 1, 2.6 ccs!      Lab Results   Component Value Date    PSA 3.0 10/18/2018    PSA 2.1 10/27/2017    PSA 2.2 2016    PSA 2.0 2015    PSA 1.9 2014    PSA 1.81 08/15/2013    PSA 2.2 2011    PSA 1.4 2009    PSA 1.0 2007    PSADIAG 0.24 2019    PSADIAG 2.9 2019      Past Medical History:   Diagnosis Date    Anterior tibialis tendinitis of right lower extremity 10/18/2018    Elevated PSA     Family history of coronary artery disease 10/7/2016    Mom age 64, Dad age 67 -  on same day. Pat Aunt early 70's.    GERD (gastroesophageal reflux disease)     Gross hematuria 2019    H/O lumbosacral spine surgery 10/18/2018    2003 and again recently due to recurrent HNP    Intractable back pain 2018    Nuclear sclerosis, bilateral 3/12/2018    Ocular migraine 2012    Osteoporosis     Prostate disease     Transient vision disturbance of both eyes 2012    Umbilical hernia without obstruction and without gangrene 10/1/2015    Urinary tract infection      Family History   Problem Relation Age of Onset    Glaucoma Maternal Uncle     Retinitis pigmentosa Maternal Uncle     Heart disease Mother     Heart attack Mother     Skin cancer Mother     Heart disease Father     Heart attack Father     Glaucoma Maternal Aunt     No Known Problems Daughter     Prostate cancer Neg Hx     Kidney disease Neg Hx      Social History     Socioeconomic History    Marital status:      Spouse name: Not on file     Number of children: Not on file    Years of education: Not on file    Highest education level: Not on file   Occupational History     Employer: [employer]   Social Needs    Financial resource strain: Not hard at all    Food insecurity:     Worry: Never true     Inability: Never true    Transportation needs:     Medical: No     Non-medical: No   Tobacco Use    Smoking status: Never Smoker    Smokeless tobacco: Never Used   Substance and Sexual Activity    Alcohol use: Yes     Alcohol/week: 3.0 standard drinks     Types: 3 Glasses of wine per week     Frequency: Monthly or less     Drinks per session: 1 or 2     Binge frequency: Never     Comment: rarely    Drug use: No    Sexual activity: Yes     Partners: Female   Lifestyle    Physical activity:     Days per week: 2 days     Minutes per session: 40 min    Stress: Not at all   Relationships    Social connections:     Talks on phone: More than three times a week     Gets together: Once a week     Attends Religion service: Not on file     Active member of club or organization: Yes     Attends meetings of clubs or organizations: More than 4 times per year     Relationship status:    Other Topics Concern    Not on file   Social History Narrative     [employer]. 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     Past Surgical History:   Procedure Laterality Date    back sx      lower    BLADDER FULGURATION N/A 7/13/2019    Procedure: FULGURATION, BLADDER;  Surgeon: Ryan Knowles MD;  Location: [facility] OR;  Service: Urology;  Laterality: N/A;  bleeding tissue at bladder neck    CATARACT EXTRACTION W/  INTRAOCULAR LENS IMPLANT Bilateral     Dr Finn/Symfony IOL     COLONOSCOPY N/A 6/5/2019    Procedure: COLONOSCOPY;  Surgeon: Mauro Smallwood MD;  Location: [address];  Service: Endoscopy;  Laterality: N/A;    CYSTOSCOPY N/A 7/13/2019    Procedure: CYSTOSCOPY;  Surgeon: Ryan Knowles MD;  Location: [facility] OR;  Service: Urology;  Laterality: N/A;     "HERNIA REPAIR      PROSTATE SURGERY      REMOVAL OF BLOOD CLOT N/A 7/13/2019    Procedure: REMOVAL, BLOOD CLOT;  Surgeon: Ryan Knowles MD;  Location: [facility] OR;  Service: Urology;  Laterality: N/A;  prostate    SPINE SURGERY      TONSILLECTOMY      TRANSURETHRAL RESECTION OF PROSTATE      TRANSURETHRAL RESECTION OF PROSTATE N/A 7/13/2019    Procedure: TURP (TRANSURETHRAL RESECTION OF PROSTATE);  Surgeon: Ryan Knowles MD;  Location: [facility] OR;  Service: Urology;  Laterality: N/A;     Patient Active Problem List   Diagnosis    Osteoporosis    ED (erectile dysfunction)    Hyperlipidemia    GERD (gastroesophageal reflux disease)    Spinal stenosis of lumbar region    BPH with urinary obstruction    Atherosclerosis of aorta    Pulmonary nodule    Left inguinal hernia    Inguinal hernia of left side without obstruction or gangrene    Hematuria, gross    Clot retention of urine    Chronic idiopathic constipation    Drug-induced constipation    Prostate cancer     Review of Systems   Constitutional: Positive for appetite change. Negative for chills, fatigue, fever and unexpected weight change.   HENT: Negative for nosebleeds.    Respiratory: Negative for shortness of breath and wheezing.    Cardiovascular: Negative for chest pain, palpitations and leg swelling.   Gastrointestinal: Negative for abdominal distention, abdominal pain, constipation, diarrhea, nausea and vomiting.   Genitourinary: Positive for frequency. Negative for dysuria, hematuria and nocturia.   Musculoskeletal: Negative for arthralgias and back pain.   Skin: Negative for pallor.   Neurological: Negative for dizziness, seizures and syncope.   Hematological: Negative for adenopathy.   Psychiatric/Behavioral: Negative for dysphoric mood.         Objective:      There were no vitals taken for this visit.  Estimated body mass index is 25.83 kg/m² as calculated from the following:    Height as of 8/19/19: 5' 11" (1.803 m).    Weight as " of 8/27/19: 84 kg (185 lb 3 oz).  Physical Exam   Constitutional: He is oriented to person, place, and time. He appears well-developed and well-nourished. No distress.   HENT:   Head: Atraumatic.   Neck: No tracheal deviation present.   Cardiovascular: Normal rate.    Pulmonary/Chest: Effort normal. No respiratory distress. He has no wheezes.   Abdominal: Soft. Bowel sounds are normal. He exhibits no distension and no mass. There is no tenderness. There is no rebound and no guarding.   Neurological: He is alert and oriented to person, place, and time.   Skin: Skin is warm and dry. He is not diaphoretic.     Psychiatric: He has a normal mood and affect. His behavior is normal. Judgment and thought content normal.         Assessment and Plan:           Problem List Items Addressed This Visit     Prostate cancer - Primary          Follow up:   Recommend he try and wean off the flomax, avodart.  6 months PSA.     Trevon Calero

## 2019-12-18 RX ORDER — OMEGA-3-ACID ETHYL ESTERS 1 G/1
1 CAPSULE, LIQUID FILLED ORAL DAILY
Qty: 90 CAPSULE | Refills: 2 | Status: SHIPPED | OUTPATIENT
Start: 2019-12-18 | End: 2020-10-16

## 2019-12-18 NOTE — PROGRESS NOTES
Refill Authorization Note     is requesting a refill authorization.    Brief assessment and rationale for refill: APPROVE: prr                                         Comments:   Requested Prescriptions   Pending Prescriptions Disp Refills    omega-3 acid ethyl esters (LOVAZA) 1 gram capsule [Pharmacy Med Name: OMEGA-3 ETHYL ESTERS 1 GM CAP] 90 capsule 2     Sig: TAKE 1 CAPSULE (1 G TOTAL) BY MOUTH ONCE DAILY.       Endocrinology: Nutritional Agents - omega-3 Passed - 12/18/2019  2:10 AM        Passed - Patient is at least 18 years old        Passed - Office visit in past 12 months or future 90 days     Recent Outpatient Visits            6 days ago Prostate cancer    [facility] Carlito Calero MD    3 months ago Prostate cancer    [facility] Carlito Calero MD    4 months ago Annual physical exam    [facility] Papi Gallardo MD    4 months ago Neoplasm of prostate    [facility] Ryan Knowles MD    4 months ago Perianal venous thrombosis    [facility] Chong Baldwin MD          Future Appointments              In 5 months [facility]ITAL [facility], [facility], [facility]    In 6 months [facility]                Passed - Lipid Panel completed in last 360 days     Lab Results   Component Value Date    CHOL 154 08/13/2019    HDL 54 08/13/2019    LDLCALC 76.4 08/13/2019    TRIG 118 08/13/2019

## 2020-03-06 ENCOUNTER — IMMUNIZATION (OUTPATIENT)
Dept: PHARMACY | Facility: CLINIC | Age: 70
End: 2020-03-06
Payer: MEDICARE

## 2020-03-11 ENCOUNTER — OFFICE VISIT (OUTPATIENT)
Dept: OPTOMETRY | Facility: CLINIC | Age: 70
End: 2020-03-11
Payer: MEDICARE

## 2020-03-11 DIAGNOSIS — H52.4 PRESBYOPIA: ICD-10-CM

## 2020-03-11 DIAGNOSIS — H26.493 CLOUDY POSTERIOR CAPSULE, BILATERAL: Primary | ICD-10-CM

## 2020-03-11 DIAGNOSIS — Z13.5 GLAUCOMA SCREENING: ICD-10-CM

## 2020-03-11 PROCEDURE — 92014 COMPRE OPH EXAM EST PT 1/>: CPT | Mod: S$PBB,,, | Performed by: OPTOMETRIST

## 2020-03-11 PROCEDURE — 99212 OFFICE O/P EST SF 10 MIN: CPT | Mod: PBBFAC,PO | Performed by: OPTOMETRIST

## 2020-03-11 PROCEDURE — 92015 DETERMINE REFRACTIVE STATE: CPT | Mod: ,,, | Performed by: OPTOMETRIST

## 2020-03-11 PROCEDURE — 92014 PR EYE EXAM, EST PATIENT,COMPREHESV: ICD-10-PCS | Mod: S$PBB,,, | Performed by: OPTOMETRIST

## 2020-03-11 PROCEDURE — 99999 PR PBB SHADOW E&M-EST. PATIENT-LVL II: CPT | Mod: PBBFAC,,, | Performed by: OPTOMETRIST

## 2020-03-11 PROCEDURE — 92015 PR REFRACTION: ICD-10-PCS | Mod: ,,, | Performed by: OPTOMETRIST

## 2020-03-11 PROCEDURE — 99999 PR PBB SHADOW E&M-EST. PATIENT-LVL II: ICD-10-PCS | Mod: PBBFAC,,, | Performed by: OPTOMETRIST

## 2020-03-11 NOTE — PROGRESS NOTES
HPI     DLS: 5/18/18 milena   Pt states his dist is not as sharp as it once was when he got the Cat sx   done. Also having very bad glare issues every since the Cat sx was done,   with night driving. Has RX glasses but only uses them for reading.   Occ. Floaters, no flashes   No gtts   03/12/2018 IMPLANT: [id number] 16.0 OD   04/16/2018 IMPLANT: [id number] 16.0 OS    Last edited by Maricruz Bingham MA on 3/11/2020  9:28 AM. (History)        ROS     Positive for: Eyes (cat surgery)    Negative for: Constitutional, Gastrointestinal, Neurological, Skin,   Genitourinary, Musculoskeletal, HENT, Endocrine, Cardiovascular,   Respiratory, Psychiatric, Allergic/Imm, Heme/Lymph    Last edited by Ryan Archibald, OD on 3/11/2020  9:42 AM. (History)        Assessment /Plan     For exam results, see Encounter Report.    Cloudy posterior capsule, bilateral    Glaucoma screening    Presbyopia      Mild pco sp MFIOL OU.  Discussed w pt not ready for YAG.  He reports problems w glare at night--discussed CRIZAL, or adding POLARIZED lenses/light tint if CRIZAL alone not effective    PLAN:    rtc 1 yr

## 2020-06-17 ENCOUNTER — PATIENT OUTREACH (OUTPATIENT)
Dept: ADMINISTRATIVE | Facility: OTHER | Age: 70
End: 2020-06-17

## 2020-06-18 ENCOUNTER — OFFICE VISIT (OUTPATIENT)
Dept: UROLOGY | Facility: CLINIC | Age: 70
End: 2020-06-18
Payer: MEDICARE

## 2020-06-18 VITALS
DIASTOLIC BLOOD PRESSURE: 71 MMHG | BODY MASS INDEX: 24.79 KG/M2 | HEART RATE: 71 BPM | WEIGHT: 183 LBS | HEIGHT: 72 IN | SYSTOLIC BLOOD PRESSURE: 111 MMHG

## 2020-06-18 DIAGNOSIS — N52.01 ERECTILE DYSFUNCTION DUE TO ARTERIAL INSUFFICIENCY: ICD-10-CM

## 2020-06-18 DIAGNOSIS — C61 PROSTATE CANCER: Primary | ICD-10-CM

## 2020-06-18 DIAGNOSIS — Z90.79 S/P TURP: ICD-10-CM

## 2020-06-18 DIAGNOSIS — N13.8 BPH WITH URINARY OBSTRUCTION: ICD-10-CM

## 2020-06-18 DIAGNOSIS — N40.1 BPH WITH URINARY OBSTRUCTION: ICD-10-CM

## 2020-06-18 PROCEDURE — 99214 OFFICE O/P EST MOD 30 MIN: CPT | Mod: S$PBB,,, | Performed by: NURSE PRACTITIONER

## 2020-06-18 PROCEDURE — 99999 PR PBB SHADOW E&M-EST. PATIENT-LVL IV: CPT | Mod: PBBFAC,,, | Performed by: NURSE PRACTITIONER

## 2020-06-18 PROCEDURE — 99214 OFFICE O/P EST MOD 30 MIN: CPT | Mod: PBBFAC | Performed by: NURSE PRACTITIONER

## 2020-06-18 PROCEDURE — 99999 PR PBB SHADOW E&M-EST. PATIENT-LVL IV: ICD-10-PCS | Mod: PBBFAC,,, | Performed by: NURSE PRACTITIONER

## 2020-06-18 PROCEDURE — 99214 PR OFFICE/OUTPT VISIT, EST, LEVL IV, 30-39 MIN: ICD-10-PCS | Mod: S$PBB,,, | Performed by: NURSE PRACTITIONER

## 2020-06-18 NOTE — PROGRESS NOTES
Subjective:       Patient ID: Jean Carlson is a 70 y.o. male.    Chief Complaint: Follow-up and Prostate Cancer    Jean Carlson is a 70 y.o. male who had been diagnosed with Prostate Cancer during a transurethral prostatectomy 07/13/2019 by Dr. Knowles.  Still on flomax and avodart.   1% of specimen Julius 6. Had 22 grams resected. Retired [employer] .     Stage- T1a  PSA- 2.9  PSAD-  <.138  Path- [place] 6 1%.  CANDY score- 1 low risk disease  NCCN- very low risk disease  MRI- PI-RADS 1, 2.6 ccs!    Last clinic visit 12/12/2019 with Dr. Calero.  Recommend he try and wean off the flomax, avodart.      He is here today for 6 month f/u visit.  He still taking the Flomax, but stopped the Dutasteride after last visit.  FOS good;   Nocturia can vary              PSA                  0.62                06/11/2020                 PSA                  0.24                12/09/2019                 PSA                  2.9                 04/25/2019               PSA                      3.0                 10/18/2018                 PSA                      2.1                 10/27/2017                 PSA                      2.2                 09/23/2016                 PSA                      2.0                 09/03/2015                 PSA                      1.9                 09/19/2014                 PSA                      1.81                08/15/2013                 PSA                      2.2                 04/01/2011                 PSA                      1.4                 08/25/2009                 PSA                      1.0                 09/07/2007                            Review of Systems   Constitutional: Negative for activity change, appetite change, chills and fever.   HENT: Negative for facial swelling and trouble swallowing.    Eyes: Negative for visual disturbance.   Respiratory: Negative for chest tightness and shortness of breath.    Cardiovascular: Negative for  chest pain and palpitations.   Gastrointestinal: Negative.  Negative for abdominal pain, constipation, diarrhea, nausea and vomiting.   Genitourinary: Positive for nocturia. Negative for difficulty urinating, dysuria, flank pain, hematuria, penile pain, penile swelling, scrotal swelling and testicular pain.        Ok with how he urinates  Nocturia varies     Musculoskeletal: Negative for back pain, gait problem, myalgias and neck stiffness.   Skin: Negative for rash.   Neurological: Negative for dizziness and speech difficulty.   Hematological: Does not bruise/bleed easily.   Psychiatric/Behavioral: Negative for behavioral problems.       Objective:      Physical Exam   Nursing note and vitals reviewed.  Constitutional: He is oriented to person, place, and time. He appears well-developed. He is cooperative.  Non-toxic appearance.   HENT:   Head: Normocephalic and atraumatic.   Right Ear: External ear normal.   Left Ear: External ear normal.   Nose: Nose normal.   Eyes: Conjunctivae and lids are normal. No scleral icterus.   Neck: Trachea normal, normal range of motion and full passive range of motion without pain. Neck supple. No JVD present. No tracheal deviation present.   Cardiovascular: Normal rate, S1 normal and S2 normal.    Pulmonary/Chest: Effort normal. No respiratory distress. He exhibits no tenderness.   Abdominal: Soft. Normal appearance. There is no abdominal tenderness.   Genitourinary:    Testes, penis and rectum normal.   Rectum:      No rectal mass, tenderness or external hemorrhoid.   Prostate is enlarged. Prostate is not tender.       Musculoskeletal: Normal range of motion.   Neurological: He is alert and oriented to person, place, and time.   Skin: Skin is warm and dry.     Psychiatric: His behavior is normal. Judgment and thought content normal.       Assessment:       1. Prostate cancer    2. S/P TURP    3. BPH with urinary obstruction    4. Erectile dysfunction due to arterial insufficiency   "      Plan:         I spent 30 minutes with the patient of which more than half was spent in direct consultation with the patient in regards to our treatment and plan.    Education and recommendations of today's plan of care including home remedies.  We discussed his diagnosis and monitoring.  Discussed his PSA results and contributory factors for changes; especially since no longer taking dutasteride.   We discussed expectations with active surveillance.   Reassurance given  In 6 months will get new psa; MRI of Prostate and possible "Re-Staging" Uro Ender TRUS bx with Dr. Calero.  Discussed ED and contributory factors; ok with how things are;  Will notify of f/u visit plan.    "

## 2020-06-18 NOTE — PATIENT INSTRUCTIONS
What Is Prostate Cancer?    Cancer is when cells in the body change and grow out of control. Cancer cells can form lumps of tissue called tumors. Cancer that starts in the prostate is called prostate cancer. It can grow and spread beyond the prostate. Cancer that spreads is harder to treat.  Understanding the prostate  The prostate is a gland in men about the size and shape of a walnut. It surrounds the upper part of the urethra. This is the tube that carries urine from the bladder. The prostate makes some of the fluid thats part of semen. During orgasm, semen leaves the body through the urethra.  When prostate cancer forms  As a man ages, the cells of his prostate may change to form tumors or other growths. The types of growths include:  · Noncancerous growths. As a man ages, the prostate may grow larger. This is called benign prostatic hyperplasia (BPH). With BPH, extra prostate tissue often squeezes the urethra, causing symptoms such as trouble urinating. But BPH is not cancer and does not lead to cancer.  · Atypical cells. Sometimes prostate cells dont look like normal (typical) prostate cells. One type of abnormal growth is called prostatic intraepithelial neoplasia or PIN. Although PIN cells are not cancer cells, they may be a sign that cancer is likely to form.  · Cancer. When abnormal prostate cells grow out of control and start to invade other tissues, they are called cancer cells. These cells may or may not lead to symptoms. Some tumors can be felt during a physical exam, and some cant. Prostate cancer may grow into nearby organs or spread to nearby lymph nodes. Lymph nodes are small organs around the body that are part of the immune system. In some cases, the cancer spreads to bones or organs in distant parts of the body. This is called metastasis.  Diagnosing prostate cancer  Prostate cancer may not cause symptoms at first. Urinary problems are often not a sign of cancer, but of another condition,  such as BPH. To find out if you have prostate cancer, your healthcare provider must examine you and order tests. The tests help confirm a diagnosis of cancer. They also help give more information about a cancerous tumor. Tests might include:  · Prostate specific antigen (PSA) testing. PSA is a chemical made by prostate tissue. The amount of PSA in the blood (PSA level) is tested to check a mans risk for prostate cancer. In general, a high or rising PSA level may mean an increased cancer risk. A PSA test by itself cannot show if a man has prostate cancer. PSA testing is also used to check the success of cancer treatments.  · Core needle biopsy. This test is done to determine if a man has prostate cancer. A hollow needle is used to take small pieces of tissue from the prostate. This helps give more information about the cells. Before the test, pain medicine may be given to prevent pain. During the test, a small probe is inserted into the rectum. The probe sends an image of the prostate to a video monitor. With this image as a guide, the healthcare provider uses a thin, hollow needle to remove tiny tissue samples from the prostate. These are sent to a lab where they are looked at for cancer cells.  Date Last Reviewed: 5/1/2017  © 5242-3215 [employer]. [address] [place] [address], [place] [address]. All rights reserved. This information is not intended as a substitute for professional medical care. Always follow your healthcare professional's instructions.        Prostate Needle Biopsy    Prostate needle biopsy is a test to look for prostate cancer. During the test, a thin, hollow needle is used to take small samples of tissue from the prostate. The samples are then tested in a lab.  Getting ready for the procedure  Prepare as you have been told. In addition to the following:  · Tell your healthcare provider about all medicines you take. This includes herbs and other supplements. It also includes any blood  thinners, such as warfarin, clopidogrel, or daily aspirin. You may need to stop taking some or all of them before the procedure.  · You may be told to use a laxative, enemas, or both before the biopsy. This is to empty the colon and rectum of stool. Follow the instructions you are given.  · Your healthcare provider may prescribe antibiotics before the procedure. If so, take these as directed.  Risks and possible complications   All procedures have risks. The risks of this procedure include:  · Discomfort in the prostate area  · Infection in the urinary tract or prostate  · Infection in the bloodstream  · Rectal or urinary bleeding   The day of the procedure  The procedure is done in a healthcare providers office or a hospital. It takes about 45 minutes. You will be able to go home the same day. Transrectal ultrasound is often used during the procedure. This test uses sound waves to make images on a computer screen. The images help the healthcare provider insert the needle in the correct place. During the biopsy:  · If ultrasound will be used, you may be asked to drink water to fill your bladder.    · You may lie on your side on an exam table.   · The ultrasound transducer, which is about the size of a finger, is lubricated. It is then inserted into the rectum. This will feel like a prostate exam. The transducer is moved until the prostate can be seen in the ultrasound images.    · To numb the biopsy area, local anesthetics may be injected. You might also be given medicine to make you sleepy.  · Using the ultrasound images as a guide, the biopsy needle is inserted. It may be inserted through the rectum or through the skin between the scrotum and the anus (perineum).  · The needle is used to take tissue samples from the prostate. About 12 samples are taken from different areas of the prostate. These samples are sent to a lab to be tested for cancer.  After the biopsy  At first you may feel a little lightheaded,  especially if you had medicine to make you sleepy. You can lie on the table until you feel able to stand. You can go home once you are feeling better. You can go back to your normal activities. You may have some blood in your urine or stool that day. This is normal. You may also notice blood in your semen for weeks after the biopsy. This is normal and not dangerous. Your healthcare provider can tell you more about what to expect.  Follow-up care  You will see your healthcare provider for a follow-up visit. Depending on the biopsy results, you may be scheduled for more tests. If signs of cancer are found, you and your healthcare provider can discuss options for further testing.  When to call your healthcare provider  Call your healthcare provider if you have any of the following:  · Blood clots in your urine  · Bloody diarrhea  · Blood in the urine or stool that doesnt go away after 48 hours  · Chest pain or trouble breathing (call 911)  · Chills  · Feeling of weakness  · Fever of 100.4°F (38°C) or higher, or as directed by your healthcare provider  · Inability to urinate   Date Last Reviewed: 5/1/2017  © 7350-7891 [employer]. [address] [place], [place] [id number]. All rights reserved. This information is not intended as a substitute for professional medical care. Always follow your healthcare professional's instructions.        Prostate Biopsy    Cancer occurs when abnormal cells form a tumor. A tumor is a lump of cells that grow uncontrolled. Initial tests that may indicate cancer of the prostate include a digital rectal exam, a PSA (prostate specific antigen blood test), ultrasound, and others. A core needle biopsy will be done if your healthcare provider thinks you have prostate cancer. A thin needle is used to remove small samples of prostate tissue. Usually multiple biopsies are taken. These samples are checked for cancer.  Taking tissue samples  A biopsy takes about 15 to 20 minutes. You  may be given an enema or suppository before the biopsy to clear the bowels. Antibiotics are given at least an hour before the biopsy. During the procedure:  · You will be given antibiotics to prevent infection.  · You may be given a sedative, local pain killer, or pain medicine.  · A small, ultrasound  probe is put into the rectum as you lie on your side. A picture of your prostate can then be seen on a monitor. This is called a transrectal ultrasound (TRUS).  · Your healthcare provider will use the TRUS picture as a guide. He or she will use a thin needle to remove tiny tissue samples from some sites in the prostate.  · These tissue samples are sent to the pathology department. They are looked at under a microscope so a diagnosis can be made.   Risks and complications of core needle biopsy  · Infection  · Blood in urine, stool, or semen  · Pain  · The biopsy misses the tumor   Home care  You may have had the biopsy done through your rectum, your urethra, or through the skin between your scrotum and rectum. Your healthcare provider will tell you what to do after the biopsy. These instructions are based on your health condition, the type of biopsy, and your providers practices.  · Your provider may give you pain medicine such as acetaminophen or ibuprofen for discomfort or pain. Follow your providers instructions for taking these medicines. Dont take aspirin or ibuprofen after the procedure. If you have a chronic liver or kidney disease, talk with your provider before taking these medicines. Also talk with your provider if youve had a stomach ulcer or gastrointestinal bleeding. Let your provider know all the medicines you currently take.  · You may need to take antibiotics for 1 to 2 days. This will help prevent an infection. Signs of an infection include chills, pain, or fever.  · You may be told to drink 8 ounces of water every 30 minutes for 2 hours. This will help ease any discomfort. You can also take a warm  bath or put a warm, damp washcloth over your urethra to help ease the pain.  · You may see minor bleeding after the procedure. This is normal and usually needs no treatment. You may see blood in your urine or semen (rust color). You may also have light bleeding from your rectum if you have hemorrhoids.  · Your provider will tell you when you can go back to your normal activities. This includes sex, exercise, and straining physically. Discuss these with your provider.  When to seek medical advice  Call your healthcare provider right away if any of these occur:  · You arent able to urinate, or see that you have less flow of urine  · Chills and fever of 100.4°F (38°C)    · Severe pain  · Signs of an infection that is getting worse. These include worsening pain, pain in your side under the rib cage or in the low back, or foul-smelling urine.  · Blood clots or bright red blood in your stool or urine  · You feel confused or very tired  Date Last Reviewed: 1/1/2017  © 9297-3998 [employer]. [address] [place] [address], [place] [address]. All rights reserved. This information is not intended as a substitute for professional medical care. Always follow your healthcare professional's instructions.

## 2020-08-19 ENCOUNTER — OFFICE VISIT (OUTPATIENT)
Dept: GASTROENTEROLOGY | Facility: CLINIC | Age: 70
End: 2020-08-19
Payer: MEDICARE

## 2020-08-19 VITALS
WEIGHT: 190.38 LBS | HEART RATE: 68 BPM | SYSTOLIC BLOOD PRESSURE: 118 MMHG | BODY MASS INDEX: 25.82 KG/M2 | DIASTOLIC BLOOD PRESSURE: 76 MMHG

## 2020-08-19 DIAGNOSIS — R10.13 EPIGASTRIC PAIN: ICD-10-CM

## 2020-08-19 DIAGNOSIS — Z01.818 PREOP EXAMINATION: ICD-10-CM

## 2020-08-19 DIAGNOSIS — K59.04 CHRONIC IDIOPATHIC CONSTIPATION: ICD-10-CM

## 2020-08-19 DIAGNOSIS — K21.9 GASTROESOPHAGEAL REFLUX DISEASE WITHOUT ESOPHAGITIS: Primary | Chronic | ICD-10-CM

## 2020-08-19 DIAGNOSIS — M81.0 OSTEOPOROSIS, UNSPECIFIED OSTEOPOROSIS TYPE, UNSPECIFIED PATHOLOGICAL FRACTURE PRESENCE: ICD-10-CM

## 2020-08-19 PROBLEM — K59.03 DRUG-INDUCED CONSTIPATION: Status: RESOLVED | Noted: 2019-07-18 | Resolved: 2020-08-19

## 2020-08-19 PROCEDURE — 99999 PR PBB SHADOW E&M-EST. PATIENT-LVL IV: CPT | Mod: PBBFAC,,, | Performed by: INTERNAL MEDICINE

## 2020-08-19 PROCEDURE — 99214 OFFICE O/P EST MOD 30 MIN: CPT | Mod: PBBFAC,PO | Performed by: INTERNAL MEDICINE

## 2020-08-19 PROCEDURE — 99214 PR OFFICE/OUTPT VISIT, EST, LEVL IV, 30-39 MIN: ICD-10-PCS | Mod: S$PBB,,, | Performed by: INTERNAL MEDICINE

## 2020-08-19 PROCEDURE — 99214 OFFICE O/P EST MOD 30 MIN: CPT | Mod: S$PBB,,, | Performed by: INTERNAL MEDICINE

## 2020-08-19 PROCEDURE — 99999 PR PBB SHADOW E&M-EST. PATIENT-LVL IV: ICD-10-PCS | Mod: PBBFAC,,, | Performed by: INTERNAL MEDICINE

## 2020-08-19 RX ORDER — PANTOPRAZOLE SODIUM 40 MG/1
40 TABLET, DELAYED RELEASE ORAL DAILY
Qty: 90 TABLET | Refills: 3 | Status: SHIPPED | OUTPATIENT
Start: 2020-08-19 | End: 2021-02-18 | Stop reason: SDUPTHER

## 2020-08-19 NOTE — PATIENT INSTRUCTIONS
"EGD can be done on a Tuesday, Thursday, or Friday with 3 weeks notice    GERD (Adult)    The esophagus is a tube that carries food from the mouth to the stomach. A valve at the lower end of the esophagus prevents stomach acid from flowing upward. When this valve doesn't work properly, stomach contents may repeatedly flow back up (reflux) into the esophagus. This is called gastroesophageal reflux disease (GERD). GERD can irritate the esophagus. It can cause problems with swallowing or breathing. In severe cases, GERD can cause recurrent pneumonia or other serious problems.  Symptoms of reflux include burning, pressure or sharp pain in the upper abdomen or mid to lower chest. The pain can spread to the neck, back, or shoulder. There may be belching, an acid taste in the back of the throat, chronic cough, or sore throat or hoarseness. GERD symptoms often occur during the day after a big meal. They can also occur at night when lying down.   Home care  Lifestyle changes can help reduce symptoms. If needed, medicines may be prescribed. Symptoms often improve with treatment, but if treatment is stopped, the symptoms often return after a few months. So most persons with GERD will need to continue treatment.  Lifestyle changes  · Limit or avoid fatty, fried, and spicy foods, as well as coffee, chocolate, mint, and foods with high acid content such as tomatoes and citrus fruit and juices (orange, grapefruit, lemon).  · Dont eat large meals, especially at night. Frequent, smaller meals are best. Do not lie down right after eating. And dont eat anything 3 hours before going to bed.  · Avoid drinking alcohol and smoking. As much as possible, stay away from second hand smoke.  · If you are overweight, losing weight will reduce symptoms.   · Avoid wearing tight clothing around your stomach area.  · If your symptoms occur during sleep, use a foam wedge to elevate your upper body (not just your head.) Or, place 4" blocks under the " head of your bed.  Medicines  If needed, medicines can help relieve the symptoms of GERD and prevent damage to the esophagus. Discuss a medicine plan with your healthcare provider. This may include one or more of the following medicines:  · Antacids to help neutralize the normal acids in your stomach.  · Acid blockers (H2 blockers) to decrease acid production.  · Acid inhibitors (PPIs) to decrease acid production in a different way than the blockers. They may work better, but can take a little longer to take effect.  Take an antacid 30-60 minutes after eating and at bedtime, but not at the same time as an acid blocker.  Try not to take medicines such as ibuprofen and aspirin. If you are taking aspirin for your heart or other medical reasons, talk to your healthcare provider about stopping it.  Follow-up care  Follow up with your healthcare provider or as advised by our staff.  When to seek medical advice  Call your healthcare provider if any of the following occur:  · Stomach pain gets worse or moves to the lower right abdomen (appendix area)  · Chest pain appears or gets worse, or spreads to the back, neck, shoulder, or arm  · Frequent vomiting (cant keep down liquids)  · Blood in the stool or vomit (red or black in color)  · Feeling weak or dizzy  · Fever of 100.4ºF (38ºC) or higher, or as directed by your healthcare provider  Date Last Reviewed: 6/23/2015  © 1207-9363 [employer]. [address] [place] [address], [place] [address]. All rights reserved. This information is not intended as a substitute for professional medical care. Always follow your healthcare professional's instructions.

## 2020-08-19 NOTE — PROGRESS NOTES
"Subjective:       Patient ID: Jean Carlson is a 70 y.o. male.    Chief Complaint: Abdominal Pain and Gastroesophageal Reflux    71 yo M here for f/u.  He was last seen for constipation and is doing very well on daily Miralax.  He had been on PPI in the past, but has been maintained on Zantac for several years for GERD.  He had to stop the Zantac due to recall and has not been on any acid medication.  He complains of subxiphoid pain and pain at the suprasternal notch which is described as "sharp" and radiates to his left arm.  He states this is not his heart b/c it has been present for 20 years intermittently and previous cardiac workup was ok.  He takes an OTC anti-acid liquid that "puts the fire out" but only temporarily.  He feels that the epigastric pain is improved with eating, and thus feels that he is eating more and gaining weight.  He was active at the gym prior to [facility] but now has only been able to go for sporadic walks in the neighborhood.    He has h/o osteoporosis and is currently on a "Fosamax holiday".  He states that his previous endocrinologist (non [facility]) took him off the bisphosphonate a few years ago b/c his density score had improved, but he was still osteoporotic.  That endocrinologist then retired so he has not seen Endo in several years.  He is a  and is concerned about PPI effects on bone density, but also feels that the H2 blockers were not working as well.    Review of Systems   Constitutional: Negative for appetite change.   Respiratory: Negative for chest tightness, shortness of breath and wheezing.    Cardiovascular: Positive for chest pain. Negative for palpitations and leg swelling.   Gastrointestinal: Positive for abdominal pain and reflux.     Objective:       /76 (BP Location: Left arm, Patient Position: Sitting, BP Method: Large (Manual))   Pulse 68   Wt 86.4 kg (190 lb 6.4 oz)   BMI 25.82 kg/m²     Physical Exam  Constitutional:       Appearance: Normal " appearance.   HENT:      Head: Normocephalic and atraumatic.   Eyes:      Extraocular Movements: Extraocular movements intact.      Pupils: Pupils are equal, round, and reactive to light.   Neurological:      General: No focal deficit present.      Mental Status: He is alert and oriented to person, place, and time.   Psychiatric:         Mood and Affect: Mood normal.         Behavior: Behavior normal.       Lab Results   Component Value Date    WBC 11.98 07/14/2019    HGB 13.2 (L) 07/14/2019    HCT 38.6 (L) 07/14/2019    MCV 91 07/14/2019     07/14/2019     Old records from chart reviewed and summarized, significant for:   Colonoscopy 6/5/19 with Dr. Smallwood, which was normal.   EGD 4/2013:  Normal, EoE (-)    Assessment:       1. Gastroesophageal reflux disease without esophagitis    2. Epigastric pain    3. Osteoporosis, unspecified osteoporosis type, unspecified pathological fracture presence    4. Chronic idiopathic constipation    5. Preop examination        Plan:       Gastroesophageal reflux disease without esophagitis  -     Case request GI: EGD (ESOPHAGOGASTRODUODENOSCOPY)  -     pantoprazole (PROTONIX) 40 MG tablet; Take 1 tablet (40 mg total) by mouth once daily.  Dispense: 90 tablet; Refill: 3  -     The pt does not want to schedule EGD at this time due to COVID, will let me know when he is ready to schedule  -     Proven risks of long term PPI use, including pneumonia, c.diff infection, and bone loss, as well as theoretical risks including dementia and CKD, were discussed with the patient at length and the patient understands risks and benefits of therapy.  Option of tapering/weaning PPI away was also discussed, including the need for possible long term therapy to treat symptoms if they recur after cessation of medication, as well as to mitigate the risk of developing complications of reflux such as Raza's esophagus and/or esophageal cancer.  Patient understands the risks and benefits of  treatment with drug versus cessation.  Daily supplementation with MVI with calcium and vitamin D were recommended as was follow up with PCP for bone scan when appropriate.  Labs including B12, folate, CMP, CBC, calcium, and magnesium should be checked at least annually.    Epigastric pain  -     US Abdomen Complete; Future; Expected date: 08/19/2020  -     pantoprazole (PROTONIX) 40 MG tablet; Take 1 tablet (40 mg total) by mouth once daily.  Dispense: 90 tablet; Refill: 3    Osteoporosis, unspecified osteoporosis type, unspecified pathological fracture presence  -     Ambulatory referral/consult to Endocrinology; Future; Expected date: 08/26/2020  -     Start Ca with Vit D  -     He has a lot of good questions regarding his osteoporosis and I feel that [facility] endocrinology is an excellent fit for him.    Chronic idiopathic constipation        -     Cont Miralax, can safely be used indefinitely    Preop examination        -     We will get COVID testing once EGD date determined

## 2020-09-04 ENCOUNTER — OFFICE VISIT (OUTPATIENT)
Dept: ENDOCRINOLOGY | Facility: CLINIC | Age: 70
End: 2020-09-04
Payer: MEDICARE

## 2020-09-04 VITALS
SYSTOLIC BLOOD PRESSURE: 126 MMHG | RESPIRATION RATE: 16 BRPM | DIASTOLIC BLOOD PRESSURE: 82 MMHG | HEART RATE: 60 BPM | BODY MASS INDEX: 25.68 KG/M2 | HEIGHT: 72 IN | WEIGHT: 189.63 LBS

## 2020-09-04 DIAGNOSIS — R82.994 HYPERCALCIURIA: ICD-10-CM

## 2020-09-04 DIAGNOSIS — M81.0 OSTEOPOROSIS, UNSPECIFIED OSTEOPOROSIS TYPE, UNSPECIFIED PATHOLOGICAL FRACTURE PRESENCE: Primary | ICD-10-CM

## 2020-09-04 PROCEDURE — 99204 OFFICE O/P NEW MOD 45 MIN: CPT | Mod: S$PBB,,, | Performed by: INTERNAL MEDICINE

## 2020-09-04 PROCEDURE — 99204 PR OFFICE/OUTPT VISIT, NEW, LEVL IV, 45-59 MIN: ICD-10-PCS | Mod: S$PBB,,, | Performed by: INTERNAL MEDICINE

## 2020-09-04 PROCEDURE — 99999 PR PBB SHADOW E&M-EST. PATIENT-LVL V: CPT | Mod: PBBFAC,,, | Performed by: INTERNAL MEDICINE

## 2020-09-04 PROCEDURE — 99215 OFFICE O/P EST HI 40 MIN: CPT | Mod: PBBFAC | Performed by: INTERNAL MEDICINE

## 2020-09-04 PROCEDURE — 99999 PR PBB SHADOW E&M-EST. PATIENT-LVL V: ICD-10-PCS | Mod: PBBFAC,,, | Performed by: INTERNAL MEDICINE

## 2020-09-04 NOTE — PROGRESS NOTES
ENDOCRINOLOGY CLINIC NEW PATIENT NOTE  09/04/2020     Subjective:      Reason for referal: referred by Jamilah Munoz MD for management of osteoporosis.    HPI:   Jean Carlson is a 70 y.o. male with dyslipidemia, and osteoporosis thought to be due to idiopathic hypercalciuria, and history of prostate cancer who presents for evaluation of osteoporosis.    He was previously being seen in the endocrinology department by Dr. Mejia.  Per chart review, he was noted to have hypercalciuria with normal serum calcium and normal PTH.  He was treated with Fosamax from 2005 through 2012 with significant improvement in bone density and stable alkaline phosphatase levels.  He sustained a boxer fracture in 2014 after a fall from ladder but denies any other fractures.  Based on DEXA, bone density remained stable in the osteopenic range from 4728-6944.  He is reestablishing care with us today.    He was diagnosed with prostate cancer and was treated with TURP in 7/2019.  He has had issues with erectile dysfunction and low libido however however testosterone was normal in 2005 in 2017.      Currently taking OTC Ca 540 mg w/ 50 mcg vitD daily + 25 mcg from multivitamin.     DEXA 01/2017:  Stable osteopenia since 2014  Lumbar Spine: Lumbar bone mineral density L1-L4 is 0.840g/cm2, which is a t-score of -2.3. The z-score is -1.5.    Total Hip: The total hip bone mineral density is 0.877g/cm2.  The t-score is -1.0, and the z-score is -0.5.      Femoral neck BMD is 0.638g/cm2 and the t-score is -2.1.     COMPARISONS:  Date                                Location                          BMD                  T-score  02/14/14                         L-spine               0.835     -2.3                                         Total Hip                         0.860     -1.1  IMPRESSION:   Osteopenia, approaching osteoporosis of the femoral neck and lumbar spine. FRAX calculation does not support treatment for osteoporosis. Total  hip and lumbar spine BMD unchanged since     Secondary osteoporosis workup:   idiopathic hypercalciuria  Lab Results   Component Value Date    CALCIUM 8.6 (L) 2019    ALBUMIN 3.6 2018    ESTGFRAFRICA >60.0 2019    EGFRNONAA >60.0 2019    PHOS 3.8 2018    ALKPHOS 86 2018    BMQNDPSH94ED 28 (L) 2014    PTH 48.1 2005     Lab Results   Component Value Date    TSH 3.718 2016       Previous Treatment:   alendronate (FOSAMAX)   History of previous fracture: No - had fracture after trauma  Parent with fractured hip: No  Smoking status: no  Glucocorticoid use: No  History of RA: No  Alcohol 3 or more/day: No  Nephrolithiasis: Yes stone seen on imaging, patient was asymptomatic  Dental up to date: yes, wife is dentist  Height loss:  Feels that he has gradually decreased about 1 in    Past Medical History:   Diagnosis Date    Anterior tibialis tendinitis of right lower extremity 10/18/2018    Elevated PSA     Family history of coronary artery disease 10/7/2016    Mom age 64, Dad age 67 -  on same day. Pat Aunt early 70's.    GERD (gastroesophageal reflux disease)     Gross hematuria 2019    H/O lumbosacral spine surgery 10/18/2018    2003 and again recently due to recurrent HNP    Intractable back pain 2018    Nuclear sclerosis, bilateral 3/12/2018    Ocular migraine 2012    Osteoporosis     Prostate disease     Transient vision disturbance of both eyes 2012    Umbilical hernia without obstruction and without gangrene 10/1/2015    Urinary tract infection        Past Surgical History:   Procedure Laterality Date    back sx      lower    BLADDER FULGURATION N/A 2019    Procedure: FULGURATION, BLADDER;  Surgeon: Ryan Knowles MD;  Location: [facility] OR;  Service: Urology;  Laterality: N/A;  bleeding tissue at bladder neck    CATARACT EXTRACTION W/  INTRAOCULAR LENS IMPLANT Bilateral     Dr Finn/Ana IOL     COLONOSCOPY N/A  6/5/2019    Procedure: COLONOSCOPY;  Surgeon: Mauro Smallwood MD;  [facility]  Service: Endoscopy;  Laterality: N/A;    CYSTOSCOPY N/A 7/13/2019    Procedure: CYSTOSCOPY;  Surgeon: Ryna Knowles MD;  Location: [facility];  Service: Urology;  Laterality: N/A;    HERNIA REPAIR      PROSTATE SURGERY      REMOVAL OF BLOOD CLOT N/A 7/13/2019    Procedure: REMOVAL, BLOOD CLOT;  Surgeon: Ryan Knowles MD;  Location: [facility] OR;  Service: Urology;  Laterality: N/A;  prostate    SPINE SURGERY      TONSILLECTOMY      TRANSURETHRAL RESECTION OF PROSTATE      TRANSURETHRAL RESECTION OF PROSTATE N/A 7/13/2019    Procedure: TURP (TRANSURETHRAL RESECTION OF PROSTATE);  Surgeon: Ryan Knowles MD;  Location: [facility];  Service: Urology;  Laterality: N/A;       Review of patient's allergies indicates:  No Known Allergies      Current Outpatient Medications:     aspirin (ASPIR-81 ORAL), , Disp: , Rfl:     atorvastatin (LIPITOR) 20 MG tablet, TAKE 1 TABLET BY MOUTH EVERY DAY, Disp: 90 tablet, Rfl: 3    multivitamin capsule, Take 1 capsule by mouth once daily., Disp: , Rfl:     omega-3 acid ethyl esters (LOVAZA) 1 gram capsule, TAKE 1 CAPSULE (1 G TOTAL) BY MOUTH ONCE DAILY., Disp: 90 capsule, Rfl: 2    pantoprazole (PROTONIX) 40 MG tablet, Take 1 tablet (40 mg total) by mouth once daily., Disp: 90 tablet, Rfl: 3    polyethylene glycol (GLYCOLAX) 17 gram/dose powder, Take 17 g by mouth once daily., Disp: 1 Bottle, Rfl: 11    tamsulosin (FLOMAX) 0.4 mg Cap, TAKE 1 CAPSULE BY MOUTH EVERY DAY, Disp: 90 capsule, Rfl: 3    Social History     Socioeconomic History    Marital status:      Spouse name: Not on file    Number of children: Not on file    Years of education: Not on file    Highest education level: Not on file   Occupational History     Employer: [employer]   Social Needs    Financial resource strain: Not hard at all    Food insecurity     Worry: Never true     Inability: Never true     Transportation needs     Medical: No     Non-medical: No   Tobacco Use    Smoking status: Never Smoker    Smokeless tobacco: Never Used   Substance and Sexual Activity    Alcohol use: Yes     Alcohol/week: 3.0 standard drinks     Types: 3 Glasses of wine per week     Frequency: Monthly or less     Drinks per session: 1 or 2     Binge frequency: Never     Comment: rarely    Drug use: No    Sexual activity: Yes     Partners: Female   Lifestyle    Physical activity     Days per week: 2 days     Minutes per session: 40 min    Stress: Not at all   Relationships    Social connections     Talks on phone: More than three times a week     Gets together: Once a week     Attends Gnosticist service: Not on file     Active member of club or organization: Yes     Attends meetings of clubs or organizations: More than 4 times per year     Relationship status:    Other Topics Concern    Not on file   Social History Narrative     [employer]. 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  Pertinent positives and negatives per HPI as well as positive for numbness in his feet.  All others negative    Objective:   Physical Exam   There were no vitals taken for this visit.  Wt Readings from Last 3 Encounters:   08/19/20 86.4 kg (190 lb 6.4 oz)   06/18/20 83 kg (182 lb 15.7 oz)   12/12/19 85 kg (187 lb 6.3 oz)   ]    Constitutional:  Pleasant,  in no acute distress.   HENT:   Head:    Normocephalic and atraumatic.   Mouth/Throat:   Oropharynx is clear and moist. No oropharyngeal  exudate.   Eyes:    EOMI. No scleral icterus.   Neck:    No thyromegaly or palpable thyroid nodules, no cervical LAD  Cardiovascular:  Normal rate, regular rhythm, 2+ radial pulses bilaterally  Respiratory:   Effort normal and breath sounds normal.   Gastrointestinal: Soft, nontender  Neurological:  No tremor, normal speech  Skin:    Skin is warm, dry  Psych:   Normal mood and affect.   Extremity:  No edema or wounds, no deformity, no tenderness to palpation over spine    LABORATORY REVIEW:    Chemistry        Component Value Date/Time     07/14/2019 0607    K 4.6 07/14/2019 0607     07/14/2019 0607    CO2 24 07/14/2019 0607    BUN 10 07/14/2019 0607    CREATININE 0.74 07/14/2019 0607     (H) 07/14/2019 0607        Component Value Date/Time    CALCIUM 8.6 (L) 07/14/2019 0607    ALKPHOS 86 09/01/2018 0525    AST 18 09/01/2018 0525    ALT 20 09/01/2018 0525    BILITOT 1.2 (H) 09/01/2018 0525    ESTGFRAFRICA >60.0 07/14/2019 0607    EGFRNONAA >60.0 07/14/2019 0607          Lab Results   Component Value Date    HGBA1C 5.2 08/29/2018    HGBA1C 5.5 09/23/2016    HGBA1C 5.6 09/03/2015         Assessment/Plan:     Problem List Items Addressed This Visit        1 - High    Osteoporosis - Primary     History of osteoporosis thought to be due to idiopathic hypercalciuria, currently not on a thiazide.  Last bone density showed stable T-scores in osteopenia range in 2017, grossly unchanged from study in 2014.  Will recheck bone density as well as secondary workup for osteoporosis.         Relevant Orders    Vitamin D    PTH, intact    Comprehensive metabolic panel    Phosphorus    DXA Bone Density Spine And Hip       2     Hypercalciuria     Will check 24 hr urine calcium as well as vitamin-D and PTH.  Of note, he is taking calcium and vitamin-D supplementation at this time.  Will determine if we need to increase or decrease is doses.  Denies history of symptomatic nephrolithiasis but reports that stone was  noted on imaging several years ago.         Relevant Orders    Stone risk profile        Schedule labs in 1-2 wks.  Needs dexa at main campus.  F/u 1 year.      Sylwia Cyr MD

## 2020-09-04 NOTE — LETTER
September 4, 2020      Jamilah Munoz MD  [address]  [address]   [facility] [address]           [facility] 6th Fl  [address]  [facility] [address]  Phone: [phone]  Fax: [phone]          Patient: Jean Carlson   MR Number: [id number]   YOB: 1950   Date of Visit: 9/4/2020       Dear Dr. Jamilah Munoz:    Thank you for referring Jean Carlson to me for evaluation. Attached you will find relevant portions of my assessment and plan of care.    If you have questions, please do not hesitate to call me. I look forward to following Jean Carlson along with you.    Sincerely,    Sylwia Cyr MD    Enclosure  CC:  No Recipients    If you would like to receive this communication electronically, please contact [email or web address] or [phone] to request more information on [employer] access.    For providers and/or their staff who would like to refer a patient to [facility], please contact us through our one-stop-shop provider referral line, [facility], at [phone].    If you feel you have received this communication in error or would no longer like to receive these types of communications, please e-mail [email or web address]

## 2020-09-04 NOTE — ASSESSMENT & PLAN NOTE
Will check 24 hr urine calcium as well as vitamin-D and PTH.  Of note, he is taking calcium and vitamin-D supplementation at this time.  Will determine if we need to increase or decrease is doses.  Denies history of symptomatic nephrolithiasis but reports that stone was noted on imaging several years ago.

## 2020-09-04 NOTE — ASSESSMENT & PLAN NOTE
History of osteoporosis thought to be due to idiopathic hypercalciuria, currently not on a thiazide.  Last bone density showed stable T-scores in osteopenia range in 2017, grossly unchanged from study in 2014.  Will recheck bone density as well as secondary workup for osteoporosis.

## 2020-09-04 NOTE — PATIENT INSTRUCTIONS
HOW TO COLLECT AN ACCURATE   24 HOUR URINE SPECIMEN     I want you to collect EVERY drop of your urine during a 24 hour period. I do not care what the volume of the urine is as long as it represents every drop that you pass during that 24 hour period. If you need to have a bowel movement, you may separate the urine but I want every drop.     The morning you start the urine collection when you wake up in the morning, pee and flush it down the toilet and note the exact time. Now you have an empty bladder and empty bottle. That starts the collection. Collect every drop after that all during the day and night until exactly the same time the next morning, when you should pass your urine and add it to the bottle.    Bring the closed container back to the laboratory with the provided order slip.

## 2020-09-09 ENCOUNTER — HOSPITAL ENCOUNTER (OUTPATIENT)
Dept: RADIOLOGY | Facility: CLINIC | Age: 70
Discharge: HOME | End: 2020-09-09
Attending: INTERNAL MEDICINE
Payer: MEDICARE

## 2020-09-09 DIAGNOSIS — M81.0 OSTEOPOROSIS, UNSPECIFIED OSTEOPOROSIS TYPE, UNSPECIFIED PATHOLOGICAL FRACTURE PRESENCE: ICD-10-CM

## 2020-09-09 PROCEDURE — 77080 DXA BONE DENSITY AXIAL: CPT | Mod: 26,,, | Performed by: INTERNAL MEDICINE

## 2020-09-09 PROCEDURE — 77080 DXA BONE DENSITY AXIAL: CPT | Mod: TC

## 2020-09-09 PROCEDURE — 77080 DEXA BONE DENSITY SPINE HIP: ICD-10-PCS | Mod: 26,,, | Performed by: INTERNAL MEDICINE

## 2020-09-23 ENCOUNTER — PATIENT MESSAGE (OUTPATIENT)
Dept: UROLOGY | Facility: CLINIC | Age: 70
End: 2020-09-23

## 2020-09-30 ENCOUNTER — TELEPHONE (OUTPATIENT)
Dept: UROLOGY | Facility: CLINIC | Age: 70
End: 2020-09-30

## 2020-09-30 ENCOUNTER — PATIENT MESSAGE (OUTPATIENT)
Dept: UROLOGY | Facility: CLINIC | Age: 70
End: 2020-09-30

## 2020-09-30 NOTE — TELEPHONE ENCOUNTER
----- Message from Lamar Encinas LPN sent at 9/23/2020  2:31 PM CDT -----  Contact: Pateint Called [phone] or [phone]  I already scheduled MRI and lab he need to talk to you on another issue.  ----- Message -----  From: Crys Brumfield  Sent: 9/23/2020  12:09 PM CDT  To: Ivan SLADE Staff    Patient is requesting a call back from Danae Love in regards to some new symptoms he is stating that he is having.  Patient states that he did send an email in regards to new symptoms .

## 2020-10-05 ENCOUNTER — PATIENT MESSAGE (OUTPATIENT)
Dept: ADMINISTRATIVE | Facility: HOSPITAL | Age: 70
End: 2020-10-05

## 2020-10-21 ENCOUNTER — PES CALL (OUTPATIENT)
Dept: ADMINISTRATIVE | Facility: CLINIC | Age: 70
End: 2020-10-21

## 2020-10-22 ENCOUNTER — TELEPHONE (OUTPATIENT)
Dept: GASTROENTEROLOGY | Facility: CLINIC | Age: 70
End: 2020-10-22

## 2020-10-22 NOTE — TELEPHONE ENCOUNTER
Spoke to patient about scheduling his EGD that was ordered by Dr. Munoz. Patient does not want to schedule at this time due to COVID 19. Would like to wait until next year to schedule.

## 2020-11-10 ENCOUNTER — HOSPITAL ENCOUNTER (OUTPATIENT)
Dept: RADIOLOGY | Facility: HOSPITAL | Age: 70
Discharge: HOME | End: 2020-11-10
Attending: NURSE PRACTITIONER
Payer: MEDICARE

## 2020-11-10 DIAGNOSIS — C61 PROSTATE CANCER: ICD-10-CM

## 2020-11-10 LAB
CREAT SERPL-MCNC: 1 MG/DL (ref 0.5–1.4)
SAMPLE: NORMAL

## 2020-11-10 PROCEDURE — 72197 MRI PELVIS W/O & W/DYE: CPT | Mod: TC

## 2020-11-10 PROCEDURE — 25500020 PHARM REV CODE 255: Performed by: NURSE PRACTITIONER

## 2020-11-10 PROCEDURE — A9585 GADOBUTROL INJECTION: HCPCS | Performed by: NURSE PRACTITIONER

## 2020-11-10 PROCEDURE — 72197 MRI PELVIS W/O & W/DYE: CPT | Mod: 26,,, | Performed by: RADIOLOGY

## 2020-11-10 PROCEDURE — 72197 MRI PROSTATE W W/O CONTRAST: ICD-10-PCS | Mod: 26,,, | Performed by: RADIOLOGY

## 2020-11-10 RX ORDER — GADOBUTROL 604.72 MG/ML
10 INJECTION INTRAVENOUS
Status: COMPLETED | OUTPATIENT
Start: 2020-11-10 | End: 2020-11-10

## 2020-11-10 RX ADMIN — GADOBUTROL 10 ML: 604.72 INJECTION INTRAVENOUS at 12:11

## 2020-11-13 ENCOUNTER — PES CALL (OUTPATIENT)
Dept: ADMINISTRATIVE | Facility: CLINIC | Age: 70
End: 2020-11-13

## 2021-01-01 NOTE — PROGRESS NOTES
Intensive Care Unit   Advanced Practice Exam & Daily Communication Note    Patient Active Problem List   Diagnosis     Respiratory failure in       di-di- twin born by  section, birth weight 790 grams, with 26 completed weeks of gestation, with liveborn mate     Feeding problem of      Apnea of prematurity     Anemia of prematurity       Vital Signs:  Temp:  [97.4  F (36.3  C)-98.4  F (36.9  C)] 98.4  F (36.9  C)  Pulse:  [114-163] 114  Resp:  [37-58] 48  BP: ()/(54-63) 101/63  Cuff Mean (mmHg):  [60-75] 73  FiO2 (%):  [21 %] 21 %  SpO2:  [92 %-97 %] 97 %    Weight:  Wt Readings from Last 1 Encounters:   21 2.25 kg (4 lb 15.4 oz) (<1 %, Z= -6.04)*     * Growth percentiles are based on WHO (Girls, 0-2 years) data.       Physical Exam:  General: Sleepy, responds to exam. In no acute distress.  HEENT: Normocephalic. Anterior fontanelle soft, flat. Scalp intact.  Sutures approximated and mobile. Eyes clear of drainage.   Cardiovascular: Regular rate and rhythm. No murmur.  Normal S1 & S2.  Peripheral/femoral pulses present, normal and symmetric. Extremities warm. Capillary refill <3 seconds peripherally and centrally.    Respiratory: Breath sounds clear with good aeration bilaterally.  No retractions or nasal flaring noted. High flow nasal cannula in place.  Gastrointestinal: Abdomen full, soft. Active bowel sounds.   : Normal female genitalia.    Musculoskeletal: Extremities normal. No gross deformities noted, normal muscle tone for gestation.  Skin: Warm, pink. No jaundice or skin breakdown.    Neurologic: Tone and reflexes symmetric and normal for gestation. No focal deficits. Small hemangiomas noted on right groin, right neck fold, and pinpoint on left shoulder and back of neck.    Plan changes:  Discontinue Vit D. Mom ok with bottles will plan 72hr protective breastfeeding once FRS 5/8.    Parent Communication:  Updated Mom by phone after rounds.  Jean Carlson  Returns today for follow-up.  This is a 68-year-old active gentleman who presented previously with a 4 month history of anteromedial ankle pain with MRI and physical exam findings consistent with anterior tibial tendinosis.  I recommended a course of boot immobilization and time. He had recent back surgery and I told him he can continue his therapy for his low back surgery. He states that he really did not tolerate the boot but found a lace-up ankle brace which she has been using and reports that he has had significant improvement of his anterior tibial tendon symptoms.    Examination:  Right ankle reveals no significant swelling. He has some slight enlargement of the right distal anterior tibial tendon compared to the left with minimal tenderness. He has good active dorsiflexion of his ankle with no pain on resistance to dorsiflexion at the insertion of the anterior tibial tendon.    Impression:  Resolving right anterior tibial tendinitis    Recommendation:  He can begin to progress his low-impact exercise activity.  He should avoid any inclined treadmill activity until his symptoms completely resolved.    Follow-up as needed         Angie Moscoso, APRN, CNP-BC 2021 1:51 PM

## 2021-01-04 ENCOUNTER — PATIENT MESSAGE (OUTPATIENT)
Dept: ADMINISTRATIVE | Facility: HOSPITAL | Age: 71
End: 2021-01-04

## 2021-01-22 ENCOUNTER — PATIENT MESSAGE (OUTPATIENT)
Dept: ADMINISTRATIVE | Facility: OTHER | Age: 71
End: 2021-01-22

## 2021-01-30 RX ORDER — ATORVASTATIN CALCIUM 20 MG/1
TABLET, FILM COATED ORAL
Qty: 90 TABLET | Refills: 0 | Status: SHIPPED | OUTPATIENT
Start: 2021-01-30 | End: 2021-04-28

## 2021-01-30 RX ORDER — TAMSULOSIN HYDROCHLORIDE 0.4 MG/1
CAPSULE ORAL
Qty: 90 CAPSULE | Refills: 0 | Status: SHIPPED | OUTPATIENT
Start: 2021-01-30 | End: 2021-04-28

## 2021-01-30 RX ORDER — OMEGA-3-ACID ETHYL ESTERS 1 G/1
1 CAPSULE, LIQUID FILLED ORAL DAILY
Qty: 90 CAPSULE | Refills: 0 | Status: SHIPPED | OUTPATIENT
Start: 2021-01-30 | End: 2021-04-28

## 2021-02-05 ENCOUNTER — LAB VISIT (OUTPATIENT)
Dept: LAB | Facility: HOSPITAL | Age: 71
End: 2021-02-05
Attending: FAMILY MEDICINE
Payer: MEDICARE

## 2021-02-05 ENCOUNTER — OFFICE VISIT (OUTPATIENT)
Dept: INTERNAL MEDICINE | Facility: CLINIC | Age: 71
End: 2021-02-05
Attending: FAMILY MEDICINE
Payer: MEDICARE

## 2021-02-05 VITALS
WEIGHT: 188.38 LBS | HEIGHT: 72 IN | OXYGEN SATURATION: 96 % | TEMPERATURE: 97 F | BODY MASS INDEX: 25.52 KG/M2 | HEART RATE: 72 BPM | DIASTOLIC BLOOD PRESSURE: 70 MMHG | SYSTOLIC BLOOD PRESSURE: 114 MMHG | RESPIRATION RATE: 18 BRPM

## 2021-02-05 DIAGNOSIS — R73.9 ELEVATED BLOOD SUGAR: ICD-10-CM

## 2021-02-05 DIAGNOSIS — Z00.00 ANNUAL PHYSICAL EXAM: Primary | ICD-10-CM

## 2021-02-05 DIAGNOSIS — E78.5 HYPERLIPIDEMIA, UNSPECIFIED HYPERLIPIDEMIA TYPE: Chronic | ICD-10-CM

## 2021-02-05 DIAGNOSIS — R20.0 NUMBNESS AND TINGLING OF BOTH FEET: ICD-10-CM

## 2021-02-05 DIAGNOSIS — M48.062 SPINAL STENOSIS OF LUMBAR REGION WITH NEUROGENIC CLAUDICATION: ICD-10-CM

## 2021-02-05 DIAGNOSIS — R20.2 NUMBNESS AND TINGLING OF BOTH FEET: ICD-10-CM

## 2021-02-05 DIAGNOSIS — K21.9 GASTROESOPHAGEAL REFLUX DISEASE WITHOUT ESOPHAGITIS: ICD-10-CM

## 2021-02-05 DIAGNOSIS — Z82.49 FAMILY HISTORY OF ASCVD: ICD-10-CM

## 2021-02-05 DIAGNOSIS — R07.9 CHEST PAIN, UNSPECIFIED TYPE: ICD-10-CM

## 2021-02-05 DIAGNOSIS — H91.90 HEARING LOSS, UNSPECIFIED HEARING LOSS TYPE, UNSPECIFIED LATERALITY: ICD-10-CM

## 2021-02-05 DIAGNOSIS — Z00.00 ANNUAL PHYSICAL EXAM: ICD-10-CM

## 2021-02-05 DIAGNOSIS — C61 PROSTATE CANCER: ICD-10-CM

## 2021-02-05 PROCEDURE — 83036 HEMOGLOBIN GLYCOSYLATED A1C: CPT

## 2021-02-05 PROCEDURE — 80053 COMPREHEN METABOLIC PANEL: CPT

## 2021-02-05 PROCEDURE — 80061 LIPID PANEL: CPT

## 2021-02-05 PROCEDURE — 36415 COLL VENOUS BLD VENIPUNCTURE: CPT

## 2021-02-05 PROCEDURE — 99215 OFFICE O/P EST HI 40 MIN: CPT | Mod: PBBFAC | Performed by: FAMILY MEDICINE

## 2021-02-05 PROCEDURE — 99999 PR PBB SHADOW E&M-EST. PATIENT-LVL V: CPT | Mod: PBBFAC,,, | Performed by: FAMILY MEDICINE

## 2021-02-05 PROCEDURE — 99397 PER PM REEVAL EST PAT 65+ YR: CPT | Mod: S$PBB,,, | Performed by: FAMILY MEDICINE

## 2021-02-05 PROCEDURE — 99397 PR PREVENTIVE VISIT,EST,65 & OVER: ICD-10-PCS | Mod: S$PBB,,, | Performed by: FAMILY MEDICINE

## 2021-02-05 PROCEDURE — 99999 PR PBB SHADOW E&M-EST. PATIENT-LVL V: ICD-10-PCS | Mod: PBBFAC,,, | Performed by: FAMILY MEDICINE

## 2021-02-06 LAB
ALBUMIN SERPL BCP-MCNC: 4.2 G/DL (ref 3.5–5.2)
ALP SERPL-CCNC: 81 U/L (ref 55–135)
ALT SERPL W/O P-5'-P-CCNC: 33 U/L (ref 10–44)
ANION GAP SERPL CALC-SCNC: 11 MMOL/L (ref 8–16)
AST SERPL-CCNC: 27 U/L (ref 10–40)
BILIRUB SERPL-MCNC: 0.9 MG/DL (ref 0.1–1)
BUN SERPL-MCNC: 19 MG/DL (ref 8–23)
CALCIUM SERPL-MCNC: 9.7 MG/DL (ref 8.7–10.5)
CHLORIDE SERPL-SCNC: 105 MMOL/L (ref 95–110)
CHOLEST SERPL-MCNC: 144 MG/DL (ref 120–199)
CHOLEST/HDLC SERPL: 2.8 {RATIO} (ref 2–5)
CO2 SERPL-SCNC: 27 MMOL/L (ref 23–29)
CREAT SERPL-MCNC: 0.9 MG/DL (ref 0.5–1.4)
EST. GFR  (AFRICAN AMERICAN): >60 ML/MIN/1.73 M^2
EST. GFR  (NON AFRICAN AMERICAN): >60 ML/MIN/1.73 M^2
ESTIMATED AVG GLUCOSE: 111 MG/DL (ref 68–131)
GLUCOSE SERPL-MCNC: 97 MG/DL (ref 70–110)
HBA1C MFR BLD: 5.5 % (ref 4–5.6)
HDLC SERPL-MCNC: 52 MG/DL (ref 40–75)
HDLC SERPL: 36.1 % (ref 20–50)
LDLC SERPL CALC-MCNC: 69.2 MG/DL (ref 63–159)
NONHDLC SERPL-MCNC: 92 MG/DL
POTASSIUM SERPL-SCNC: 4.8 MMOL/L (ref 3.5–5.1)
PROT SERPL-MCNC: 6.7 G/DL (ref 6–8.4)
SODIUM SERPL-SCNC: 143 MMOL/L (ref 136–145)
TRIGL SERPL-MCNC: 114 MG/DL (ref 30–150)

## 2021-02-09 ENCOUNTER — IMMUNIZATION (OUTPATIENT)
Dept: PHARMACY | Facility: CLINIC | Age: 71
End: 2021-02-09
Payer: MEDICARE

## 2021-02-09 DIAGNOSIS — Z23 NEED FOR VACCINATION: Primary | ICD-10-CM

## 2021-02-18 DIAGNOSIS — R10.13 EPIGASTRIC PAIN: ICD-10-CM

## 2021-02-18 DIAGNOSIS — K21.9 GASTROESOPHAGEAL REFLUX DISEASE WITHOUT ESOPHAGITIS: Chronic | ICD-10-CM

## 2021-02-18 RX ORDER — PANTOPRAZOLE SODIUM 40 MG/1
40 TABLET, DELAYED RELEASE ORAL DAILY
Qty: 30 TABLET | Refills: 0 | Status: SHIPPED | OUTPATIENT
Start: 2021-02-18 | End: 2021-03-02 | Stop reason: SDUPTHER

## 2021-03-02 ENCOUNTER — OFFICE VISIT (OUTPATIENT)
Dept: GASTROENTEROLOGY | Facility: CLINIC | Age: 71
End: 2021-03-02
Payer: MEDICARE

## 2021-03-02 ENCOUNTER — PATIENT MESSAGE (OUTPATIENT)
Dept: GASTROENTEROLOGY | Facility: CLINIC | Age: 71
End: 2021-03-02

## 2021-03-02 VITALS
BODY MASS INDEX: 25.37 KG/M2 | RESPIRATION RATE: 18 BRPM | OXYGEN SATURATION: 98 % | SYSTOLIC BLOOD PRESSURE: 120 MMHG | HEIGHT: 72 IN | WEIGHT: 187.31 LBS | DIASTOLIC BLOOD PRESSURE: 70 MMHG | HEART RATE: 74 BPM

## 2021-03-02 DIAGNOSIS — R10.13 EPIGASTRIC PAIN: ICD-10-CM

## 2021-03-02 DIAGNOSIS — R82.994 HYPERCALCIURIA: ICD-10-CM

## 2021-03-02 DIAGNOSIS — K29.50 OTHER CHRONIC GASTRITIS WITHOUT HEMORRHAGE: ICD-10-CM

## 2021-03-02 DIAGNOSIS — M81.8 OTHER OSTEOPOROSIS WITHOUT CURRENT PATHOLOGICAL FRACTURE: ICD-10-CM

## 2021-03-02 DIAGNOSIS — K21.9 GASTROESOPHAGEAL REFLUX DISEASE WITHOUT ESOPHAGITIS: Primary | Chronic | ICD-10-CM

## 2021-03-02 DIAGNOSIS — Z01.818 PREOP EXAMINATION: ICD-10-CM

## 2021-03-02 PROCEDURE — 99214 OFFICE O/P EST MOD 30 MIN: CPT | Mod: PBBFAC,PO | Performed by: INTERNAL MEDICINE

## 2021-03-02 PROCEDURE — 99999 PR PBB SHADOW E&M-EST. PATIENT-LVL IV: CPT | Mod: PBBFAC,,, | Performed by: INTERNAL MEDICINE

## 2021-03-02 PROCEDURE — 99214 PR OFFICE/OUTPT VISIT, EST, LEVL IV, 30-39 MIN: ICD-10-PCS | Mod: S$PBB,,, | Performed by: INTERNAL MEDICINE

## 2021-03-02 PROCEDURE — 99999 PR PBB SHADOW E&M-EST. PATIENT-LVL IV: ICD-10-PCS | Mod: PBBFAC,,, | Performed by: INTERNAL MEDICINE

## 2021-03-02 PROCEDURE — 99214 OFFICE O/P EST MOD 30 MIN: CPT | Mod: S$PBB,,, | Performed by: INTERNAL MEDICINE

## 2021-03-02 RX ORDER — PANTOPRAZOLE SODIUM 40 MG/1
40 TABLET, DELAYED RELEASE ORAL DAILY
Qty: 90 TABLET | Refills: 3 | Status: SHIPPED | OUTPATIENT
Start: 2021-03-02 | End: 2022-03-11 | Stop reason: SDUPTHER

## 2021-03-09 ENCOUNTER — IMMUNIZATION (OUTPATIENT)
Dept: PHARMACY | Facility: CLINIC | Age: 71
End: 2021-03-09
Payer: MEDICARE

## 2021-03-09 DIAGNOSIS — Z23 NEED FOR VACCINATION: Primary | ICD-10-CM

## 2021-03-12 ENCOUNTER — CLINICAL SUPPORT (OUTPATIENT)
Dept: AUDIOLOGY | Facility: CLINIC | Age: 71
End: 2021-03-12
Attending: FAMILY MEDICINE
Payer: MEDICARE

## 2021-03-12 ENCOUNTER — OFFICE VISIT (OUTPATIENT)
Dept: OTOLARYNGOLOGY | Facility: CLINIC | Age: 71
End: 2021-03-12
Attending: FAMILY MEDICINE
Payer: MEDICARE

## 2021-03-12 VITALS
WEIGHT: 187 LBS | DIASTOLIC BLOOD PRESSURE: 75 MMHG | SYSTOLIC BLOOD PRESSURE: 129 MMHG | BODY MASS INDEX: 25.36 KG/M2 | HEART RATE: 65 BPM

## 2021-03-12 DIAGNOSIS — H93.13 TINNITUS OF BOTH EARS: ICD-10-CM

## 2021-03-12 DIAGNOSIS — H91.90 HEARING LOSS, UNSPECIFIED HEARING LOSS TYPE, UNSPECIFIED LATERALITY: ICD-10-CM

## 2021-03-12 DIAGNOSIS — H90.3 SENSORINEURAL HEARING LOSS, BILATERAL: Primary | ICD-10-CM

## 2021-03-12 DIAGNOSIS — Z77.122 EXPOSURE TO NOISE: ICD-10-CM

## 2021-03-12 DIAGNOSIS — H90.3 SENSORINEURAL HEARING LOSS (SNHL) OF BOTH EARS: Primary | ICD-10-CM

## 2021-03-12 PROCEDURE — 99204 OFFICE O/P NEW MOD 45 MIN: CPT | Mod: S$PBB,,, | Performed by: OTOLARYNGOLOGY

## 2021-03-12 PROCEDURE — 99999 PR PBB SHADOW E&M-EST. PATIENT-LVL III: CPT | Mod: PBBFAC,,, | Performed by: OTOLARYNGOLOGY

## 2021-03-12 PROCEDURE — 92567 TYMPANOMETRY: CPT | Mod: PBBFAC | Performed by: AUDIOLOGIST

## 2021-03-12 PROCEDURE — 99999 PR PBB SHADOW E&M-EST. PATIENT-LVL II: ICD-10-PCS | Mod: PBBFAC,,, | Performed by: AUDIOLOGIST

## 2021-03-12 PROCEDURE — 92504 PR EAR MICROSCOPY EXAMINATION: ICD-10-PCS | Mod: S$PBB,,, | Performed by: OTOLARYNGOLOGY

## 2021-03-12 PROCEDURE — 99999 PR PBB SHADOW E&M-EST. PATIENT-LVL II: CPT | Mod: PBBFAC,,, | Performed by: AUDIOLOGIST

## 2021-03-12 PROCEDURE — 92504 EAR MICROSCOPY EXAMINATION: CPT | Mod: PBBFAC | Performed by: OTOLARYNGOLOGY

## 2021-03-12 PROCEDURE — 99204 PR OFFICE/OUTPT VISIT, NEW, LEVL IV, 45-59 MIN: ICD-10-PCS | Mod: S$PBB,,, | Performed by: OTOLARYNGOLOGY

## 2021-03-12 PROCEDURE — 99213 OFFICE O/P EST LOW 20 MIN: CPT | Mod: PBBFAC,27,25 | Performed by: OTOLARYNGOLOGY

## 2021-03-12 PROCEDURE — 92504 EAR MICROSCOPY EXAMINATION: CPT | Mod: S$PBB,,, | Performed by: OTOLARYNGOLOGY

## 2021-03-12 PROCEDURE — 92557 COMPREHENSIVE HEARING TEST: CPT | Mod: PBBFAC | Performed by: AUDIOLOGIST

## 2021-03-12 PROCEDURE — 99999 PR PBB SHADOW E&M-EST. PATIENT-LVL III: ICD-10-PCS | Mod: PBBFAC,,, | Performed by: OTOLARYNGOLOGY

## 2021-03-12 PROCEDURE — 99212 OFFICE O/P EST SF 10 MIN: CPT | Mod: PBBFAC | Performed by: AUDIOLOGIST

## 2021-03-23 ENCOUNTER — PATIENT MESSAGE (OUTPATIENT)
Dept: AUDIOLOGY | Facility: CLINIC | Age: 71
End: 2021-03-23

## 2021-03-23 ENCOUNTER — CLINICAL SUPPORT (OUTPATIENT)
Dept: AUDIOLOGY | Facility: CLINIC | Age: 71
End: 2021-03-23
Payer: MEDICARE

## 2021-03-23 DIAGNOSIS — H90.3 SENSORINEURAL HEARING LOSS OF BOTH EARS: Primary | ICD-10-CM

## 2021-03-23 PROCEDURE — 99499 UNLISTED E&M SERVICE: CPT | Mod: S$PBB,,, | Performed by: AUDIOLOGIST

## 2021-03-23 PROCEDURE — 99499 NO LOS: ICD-10-PCS | Mod: S$PBB,,, | Performed by: AUDIOLOGIST

## 2021-03-29 ENCOUNTER — PATIENT MESSAGE (OUTPATIENT)
Dept: RADIOLOGY | Facility: CLINIC | Age: 71
End: 2021-03-29

## 2021-03-29 ENCOUNTER — LAB VISIT (OUTPATIENT)
Dept: FAMILY MEDICINE | Facility: CLINIC | Age: 71
End: 2021-03-29
Payer: MEDICARE

## 2021-03-29 DIAGNOSIS — Z01.818 PREOP EXAMINATION: ICD-10-CM

## 2021-03-29 PROCEDURE — U0003 INFECTIOUS AGENT DETECTION BY NUCLEIC ACID (DNA OR RNA); SEVERE ACUTE RESPIRATORY SYNDROME CORONAVIRUS 2 (SARS-COV-2) (CORONAVIRUS DISEASE [COVID-19]), AMPLIFIED PROBE TECHNIQUE, MAKING USE OF HIGH THROUGHPUT TECHNOLOGIES AS DESCRIBED BY CMS-2020-01-R: HCPCS | Performed by: INTERNAL MEDICINE

## 2021-03-29 PROCEDURE — U0005 INFEC AGEN DETEC AMPLI PROBE: HCPCS | Performed by: INTERNAL MEDICINE

## 2021-03-30 LAB — SARS-COV-2 RNA RESP QL NAA+PROBE: NOT DETECTED

## 2021-03-31 PROBLEM — K21.9 GERD (GASTROESOPHAGEAL REFLUX DISEASE): Status: ACTIVE | Noted: 2021-03-31

## 2021-04-01 ENCOUNTER — TELEPHONE (OUTPATIENT)
Dept: GASTROENTEROLOGY | Facility: CLINIC | Age: 71
End: 2021-04-01

## 2021-04-05 ENCOUNTER — TELEPHONE (OUTPATIENT)
Dept: GASTROENTEROLOGY | Facility: CLINIC | Age: 71
End: 2021-04-05

## 2021-04-07 ENCOUNTER — TELEPHONE (OUTPATIENT)
Dept: GASTROENTEROLOGY | Facility: CLINIC | Age: 71
End: 2021-04-07

## 2021-04-28 RX ORDER — TAMSULOSIN HYDROCHLORIDE 0.4 MG/1
CAPSULE ORAL
Qty: 90 CAPSULE | Refills: 3 | Status: SHIPPED | OUTPATIENT
Start: 2021-04-28 | End: 2021-10-18

## 2021-04-28 RX ORDER — OMEGA-3-ACID ETHYL ESTERS 1 G/1
1 CAPSULE, LIQUID FILLED ORAL DAILY
Qty: 90 CAPSULE | Refills: 3 | Status: SHIPPED | OUTPATIENT
Start: 2021-04-28 | End: 2022-05-16

## 2021-04-28 RX ORDER — ATORVASTATIN CALCIUM 20 MG/1
TABLET, FILM COATED ORAL
Qty: 90 TABLET | Refills: 3 | Status: SHIPPED | OUTPATIENT
Start: 2021-04-28 | End: 2022-05-16 | Stop reason: SDUPTHER

## 2021-05-05 ENCOUNTER — OFFICE VISIT (OUTPATIENT)
Dept: OPTOMETRY | Facility: CLINIC | Age: 71
End: 2021-05-05
Payer: MEDICARE

## 2021-05-05 DIAGNOSIS — H26.493 CLOUDY POSTERIOR CAPSULE, BILATERAL: Primary | ICD-10-CM

## 2021-05-05 DIAGNOSIS — H52.4 PRESBYOPIA: ICD-10-CM

## 2021-05-05 DIAGNOSIS — Z13.5 GLAUCOMA SCREENING: ICD-10-CM

## 2021-05-05 PROCEDURE — 99999 PR PBB SHADOW E&M-EST. PATIENT-LVL III: ICD-10-PCS | Mod: PBBFAC,,, | Performed by: OPTOMETRIST

## 2021-05-05 PROCEDURE — 92014 PR EYE EXAM, EST PATIENT,COMPREHESV: ICD-10-PCS | Mod: S$PBB,,, | Performed by: OPTOMETRIST

## 2021-05-05 PROCEDURE — 92015 DETERMINE REFRACTIVE STATE: CPT | Mod: ,,, | Performed by: OPTOMETRIST

## 2021-05-05 PROCEDURE — 92014 COMPRE OPH EXAM EST PT 1/>: CPT | Mod: S$PBB,,, | Performed by: OPTOMETRIST

## 2021-05-05 PROCEDURE — 92015 PR REFRACTION: ICD-10-PCS | Mod: ,,, | Performed by: OPTOMETRIST

## 2021-05-05 PROCEDURE — 99999 PR PBB SHADOW E&M-EST. PATIENT-LVL III: CPT | Mod: PBBFAC,,, | Performed by: OPTOMETRIST

## 2021-05-05 PROCEDURE — 99213 OFFICE O/P EST LOW 20 MIN: CPT | Mod: PBBFAC,PO | Performed by: OPTOMETRIST

## 2021-05-07 ENCOUNTER — PES CALL (OUTPATIENT)
Dept: ADMINISTRATIVE | Facility: CLINIC | Age: 71
End: 2021-05-07

## 2021-05-12 DIAGNOSIS — C61 PROSTATE CANCER: Primary | ICD-10-CM

## 2021-05-25 ENCOUNTER — LAB VISIT (OUTPATIENT)
Dept: LAB | Facility: HOSPITAL | Age: 71
End: 2021-05-25
Attending: FAMILY MEDICINE
Payer: MEDICARE

## 2021-05-25 DIAGNOSIS — C61 PROSTATE CANCER: ICD-10-CM

## 2021-05-25 LAB — COMPLEXED PSA SERPL-MCNC: 0.9 NG/ML (ref 0–4)

## 2021-05-25 PROCEDURE — 84153 ASSAY OF PSA TOTAL: CPT | Performed by: NURSE PRACTITIONER

## 2021-05-25 PROCEDURE — 36415 COLL VENOUS BLD VENIPUNCTURE: CPT | Performed by: NURSE PRACTITIONER

## 2021-05-27 ENCOUNTER — OFFICE VISIT (OUTPATIENT)
Dept: UROLOGY | Facility: CLINIC | Age: 71
End: 2021-05-27
Payer: MEDICARE

## 2021-05-27 VITALS
SYSTOLIC BLOOD PRESSURE: 133 MMHG | BODY MASS INDEX: 26.33 KG/M2 | DIASTOLIC BLOOD PRESSURE: 73 MMHG | HEART RATE: 70 BPM | WEIGHT: 188.06 LBS | HEIGHT: 71 IN

## 2021-05-27 DIAGNOSIS — Z90.79 S/P TURP: ICD-10-CM

## 2021-05-27 DIAGNOSIS — N40.1 BPH WITH URINARY OBSTRUCTION: ICD-10-CM

## 2021-05-27 DIAGNOSIS — C61 PROSTATE CANCER: Primary | ICD-10-CM

## 2021-05-27 DIAGNOSIS — N48.6 PEYRONIE'S DISEASE: ICD-10-CM

## 2021-05-27 DIAGNOSIS — N13.8 BPH WITH URINARY OBSTRUCTION: ICD-10-CM

## 2021-05-27 PROCEDURE — 99999 PR PBB SHADOW E&M-EST. PATIENT-LVL III: CPT | Mod: PBBFAC,,, | Performed by: NURSE PRACTITIONER

## 2021-05-27 PROCEDURE — 99214 OFFICE O/P EST MOD 30 MIN: CPT | Mod: S$PBB,,, | Performed by: NURSE PRACTITIONER

## 2021-05-27 PROCEDURE — 99214 PR OFFICE/OUTPT VISIT, EST, LEVL IV, 30-39 MIN: ICD-10-PCS | Mod: S$PBB,,, | Performed by: NURSE PRACTITIONER

## 2021-05-27 PROCEDURE — 99213 OFFICE O/P EST LOW 20 MIN: CPT | Mod: PBBFAC | Performed by: NURSE PRACTITIONER

## 2021-05-27 PROCEDURE — 99999 PR PBB SHADOW E&M-EST. PATIENT-LVL III: ICD-10-PCS | Mod: PBBFAC,,, | Performed by: NURSE PRACTITIONER

## 2021-06-11 ENCOUNTER — OFFICE VISIT (OUTPATIENT)
Dept: UROLOGY | Facility: CLINIC | Age: 71
End: 2021-06-11
Payer: MEDICARE

## 2021-06-11 ENCOUNTER — PATIENT MESSAGE (OUTPATIENT)
Dept: UROLOGY | Facility: CLINIC | Age: 71
End: 2021-06-11

## 2021-06-11 VITALS
SYSTOLIC BLOOD PRESSURE: 146 MMHG | DIASTOLIC BLOOD PRESSURE: 77 MMHG | HEART RATE: 73 BPM | WEIGHT: 189.63 LBS | BODY MASS INDEX: 26.55 KG/M2 | HEIGHT: 71 IN

## 2021-06-11 DIAGNOSIS — R39.12 WEAK URINARY STREAM: ICD-10-CM

## 2021-06-11 DIAGNOSIS — N13.8 BPH WITH URINARY OBSTRUCTION: ICD-10-CM

## 2021-06-11 DIAGNOSIS — N42.81 PROSTADYNIA: Primary | ICD-10-CM

## 2021-06-11 DIAGNOSIS — R39.198 DIFFICULTY URINATING: ICD-10-CM

## 2021-06-11 DIAGNOSIS — N40.1 BPH WITH URINARY OBSTRUCTION: ICD-10-CM

## 2021-06-11 LAB
BILIRUB SERPL-MCNC: NORMAL MG/DL
BLOOD URINE, POC: NORMAL
CLARITY, POC UA: CLEAR
COLOR, POC UA: YELLOW
GLUCOSE UR QL STRIP: NORMAL
KETONES UR QL STRIP: NORMAL
LEUKOCYTE ESTERASE URINE, POC: NORMAL
NITRITE, POC UA: NORMAL
PH, POC UA: 6
POC RESIDUAL URINE VOLUME: 93 ML (ref 0–100)
PROTEIN, POC: NORMAL
SPECIFIC GRAVITY, POC UA: 1.01
UROBILINOGEN, POC UA: NORMAL

## 2021-06-11 PROCEDURE — 51798 US URINE CAPACITY MEASURE: CPT | Mod: PBBFAC | Performed by: NURSE PRACTITIONER

## 2021-06-11 PROCEDURE — 99999 PR PBB SHADOW E&M-EST. PATIENT-LVL III: ICD-10-PCS | Mod: PBBFAC,,, | Performed by: NURSE PRACTITIONER

## 2021-06-11 PROCEDURE — 99214 OFFICE O/P EST MOD 30 MIN: CPT | Mod: S$PBB,,, | Performed by: NURSE PRACTITIONER

## 2021-06-11 PROCEDURE — 81002 URINALYSIS NONAUTO W/O SCOPE: CPT | Mod: PBBFAC | Performed by: NURSE PRACTITIONER

## 2021-06-11 PROCEDURE — 99213 OFFICE O/P EST LOW 20 MIN: CPT | Mod: PBBFAC | Performed by: NURSE PRACTITIONER

## 2021-06-11 PROCEDURE — 99214 PR OFFICE/OUTPT VISIT, EST, LEVL IV, 30-39 MIN: ICD-10-PCS | Mod: S$PBB,,, | Performed by: NURSE PRACTITIONER

## 2021-06-11 PROCEDURE — 99999 PR PBB SHADOW E&M-EST. PATIENT-LVL III: CPT | Mod: PBBFAC,,, | Performed by: NURSE PRACTITIONER

## 2021-06-11 RX ORDER — OXAPROZIN 600 MG/1
600 TABLET, FILM COATED ORAL EVERY 12 HOURS
Qty: 30 TABLET | Refills: 1 | Status: SHIPPED | OUTPATIENT
Start: 2021-06-11 | End: 2021-06-26

## 2021-06-22 ENCOUNTER — PATIENT MESSAGE (OUTPATIENT)
Dept: UROLOGY | Facility: CLINIC | Age: 71
End: 2021-06-22

## 2021-06-23 ENCOUNTER — PATIENT MESSAGE (OUTPATIENT)
Dept: UROLOGY | Facility: CLINIC | Age: 71
End: 2021-06-23

## 2021-06-24 DIAGNOSIS — R39.198 DIFFICULTY URINATING: ICD-10-CM

## 2021-06-24 DIAGNOSIS — N40.1 BPH WITH URINARY OBSTRUCTION: Primary | ICD-10-CM

## 2021-06-24 DIAGNOSIS — N13.8 BPH WITH URINARY OBSTRUCTION: Primary | ICD-10-CM

## 2021-06-24 RX ORDER — CIPROFLOXACIN 500 MG/1
500 TABLET ORAL ONCE
Status: CANCELLED | OUTPATIENT
Start: 2021-06-24 | End: 2021-06-24

## 2021-06-24 RX ORDER — LIDOCAINE HYDROCHLORIDE 20 MG/ML
JELLY TOPICAL ONCE
Status: CANCELLED | OUTPATIENT
Start: 2021-06-24 | End: 2021-06-24

## 2021-06-25 ENCOUNTER — OFFICE VISIT (OUTPATIENT)
Dept: UROLOGY | Facility: CLINIC | Age: 71
End: 2021-06-25
Payer: MEDICARE

## 2021-06-25 ENCOUNTER — PATIENT MESSAGE (OUTPATIENT)
Dept: UROLOGY | Facility: CLINIC | Age: 71
End: 2021-06-25

## 2021-06-25 VITALS — WEIGHT: 186.75 LBS | BODY MASS INDEX: 26.04 KG/M2

## 2021-06-25 DIAGNOSIS — R33.9 INCOMPLETE EMPTYING OF BLADDER: ICD-10-CM

## 2021-06-25 DIAGNOSIS — N13.8 BPH WITH URINARY OBSTRUCTION: Primary | ICD-10-CM

## 2021-06-25 DIAGNOSIS — R39.12 WEAK URINARY STREAM: ICD-10-CM

## 2021-06-25 DIAGNOSIS — N40.1 BPH WITH URINARY OBSTRUCTION: Primary | ICD-10-CM

## 2021-06-25 PROCEDURE — 99214 PR OFFICE/OUTPT VISIT, EST, LEVL IV, 30-39 MIN: ICD-10-PCS | Mod: S$PBB,,, | Performed by: NURSE PRACTITIONER

## 2021-06-25 PROCEDURE — 99999 PR PBB SHADOW E&M-EST. PATIENT-LVL III: ICD-10-PCS | Mod: PBBFAC,,, | Performed by: NURSE PRACTITIONER

## 2021-06-25 PROCEDURE — 99214 OFFICE O/P EST MOD 30 MIN: CPT | Mod: S$PBB,,, | Performed by: NURSE PRACTITIONER

## 2021-06-25 PROCEDURE — 99999 PR PBB SHADOW E&M-EST. PATIENT-LVL III: CPT | Mod: PBBFAC,,, | Performed by: NURSE PRACTITIONER

## 2021-06-25 PROCEDURE — 99213 OFFICE O/P EST LOW 20 MIN: CPT | Mod: PBBFAC | Performed by: NURSE PRACTITIONER

## 2021-07-09 ENCOUNTER — PROCEDURE VISIT (OUTPATIENT)
Dept: UROLOGY | Facility: CLINIC | Age: 71
End: 2021-07-09
Payer: MEDICARE

## 2021-07-09 ENCOUNTER — TELEPHONE (OUTPATIENT)
Dept: UROLOGY | Facility: CLINIC | Age: 71
End: 2021-07-09

## 2021-07-09 VITALS
SYSTOLIC BLOOD PRESSURE: 143 MMHG | HEART RATE: 70 BPM | HEIGHT: 71 IN | WEIGHT: 190.19 LBS | TEMPERATURE: 98 F | DIASTOLIC BLOOD PRESSURE: 79 MMHG | RESPIRATION RATE: 16 BRPM | BODY MASS INDEX: 26.63 KG/M2

## 2021-07-09 DIAGNOSIS — N40.1 BPH WITH URINARY OBSTRUCTION: ICD-10-CM

## 2021-07-09 DIAGNOSIS — R39.198 DIFFICULTY URINATING: ICD-10-CM

## 2021-07-09 DIAGNOSIS — N32.0 BLADDER NECK CONTRACTURE: Primary | ICD-10-CM

## 2021-07-09 DIAGNOSIS — N13.8 BPH WITH URINARY OBSTRUCTION: ICD-10-CM

## 2021-07-09 PROCEDURE — 52000 CYSTOURETHROSCOPY: CPT | Mod: PBBFAC | Performed by: UROLOGY

## 2021-07-09 PROCEDURE — 52000 CYSTOURETHROSCOPY: CPT | Mod: S$PBB,,, | Performed by: UROLOGY

## 2021-07-09 PROCEDURE — 52000 PR CYSTOURETHROSCOPY: ICD-10-PCS | Mod: S$PBB,,, | Performed by: UROLOGY

## 2021-07-09 RX ORDER — LIDOCAINE HYDROCHLORIDE 20 MG/ML
JELLY TOPICAL ONCE
Status: COMPLETED | OUTPATIENT
Start: 2021-07-09 | End: 2021-07-09

## 2021-07-09 RX ADMIN — LIDOCAINE HYDROCHLORIDE: 20 JELLY TOPICAL at 08:07

## 2021-07-20 ENCOUNTER — PATIENT MESSAGE (OUTPATIENT)
Dept: SURGERY | Facility: HOSPITAL | Age: 71
End: 2021-07-20

## 2021-07-20 ENCOUNTER — PATIENT MESSAGE (OUTPATIENT)
Dept: UROLOGY | Facility: CLINIC | Age: 71
End: 2021-07-20

## 2021-07-21 ENCOUNTER — TELEPHONE (OUTPATIENT)
Dept: PREADMISSION TESTING | Facility: HOSPITAL | Age: 71
End: 2021-07-21

## 2021-07-21 ENCOUNTER — TELEPHONE (OUTPATIENT)
Dept: INTERNAL MEDICINE | Facility: CLINIC | Age: 71
End: 2021-07-21

## 2021-07-21 ENCOUNTER — TELEPHONE (OUTPATIENT)
Dept: UROLOGY | Facility: CLINIC | Age: 71
End: 2021-07-21

## 2021-07-22 ENCOUNTER — PATIENT MESSAGE (OUTPATIENT)
Dept: ENDOCRINOLOGY | Facility: CLINIC | Age: 71
End: 2021-07-22

## 2021-07-26 ENCOUNTER — OFFICE VISIT (OUTPATIENT)
Dept: UROLOGY | Facility: CLINIC | Age: 71
End: 2021-07-26
Payer: MEDICARE

## 2021-07-26 ENCOUNTER — LAB VISIT (OUTPATIENT)
Dept: LAB | Facility: HOSPITAL | Age: 71
End: 2021-07-26
Attending: ANESTHESIOLOGY
Payer: MEDICARE

## 2021-07-26 ENCOUNTER — OFFICE VISIT (OUTPATIENT)
Dept: ENDOCRINOLOGY | Facility: CLINIC | Age: 71
End: 2021-07-26
Attending: INTERNAL MEDICINE
Payer: MEDICARE

## 2021-07-26 ENCOUNTER — TELEPHONE (OUTPATIENT)
Dept: UROLOGY | Facility: CLINIC | Age: 71
End: 2021-07-26

## 2021-07-26 VITALS
SYSTOLIC BLOOD PRESSURE: 128 MMHG | HEIGHT: 71 IN | DIASTOLIC BLOOD PRESSURE: 80 MMHG | WEIGHT: 188.81 LBS | BODY MASS INDEX: 26.43 KG/M2

## 2021-07-26 VITALS
WEIGHT: 187.38 LBS | SYSTOLIC BLOOD PRESSURE: 116 MMHG | BODY MASS INDEX: 26.23 KG/M2 | HEIGHT: 71 IN | DIASTOLIC BLOOD PRESSURE: 71 MMHG | HEART RATE: 74 BPM

## 2021-07-26 DIAGNOSIS — M81.0 OSTEOPOROSIS, UNSPECIFIED OSTEOPOROSIS TYPE, UNSPECIFIED PATHOLOGICAL FRACTURE PRESENCE: Primary | ICD-10-CM

## 2021-07-26 DIAGNOSIS — M81.8 OTHER OSTEOPOROSIS WITHOUT CURRENT PATHOLOGICAL FRACTURE: ICD-10-CM

## 2021-07-26 DIAGNOSIS — Z01.818 PREOP TESTING: ICD-10-CM

## 2021-07-26 DIAGNOSIS — R82.994 HYPERCALCIURIA: ICD-10-CM

## 2021-07-26 DIAGNOSIS — N13.8 BPH WITH URINARY OBSTRUCTION: Chronic | ICD-10-CM

## 2021-07-26 DIAGNOSIS — E55.9 VITAMIN D DEFICIENCY: ICD-10-CM

## 2021-07-26 DIAGNOSIS — N40.1 BPH WITH URINARY OBSTRUCTION: Chronic | ICD-10-CM

## 2021-07-26 DIAGNOSIS — N32.0 BLADDER NECK CONTRACTURE: Primary | ICD-10-CM

## 2021-07-26 PROBLEM — R33.8 CLOT RETENTION OF URINE: Status: RESOLVED | Noted: 2019-07-13 | Resolved: 2021-07-26

## 2021-07-26 LAB
ANION GAP SERPL CALC-SCNC: 7 MMOL/L (ref 8–16)
BUN SERPL-MCNC: 21 MG/DL (ref 8–23)
CALCIUM SERPL-MCNC: 9.8 MG/DL (ref 8.7–10.5)
CHLORIDE SERPL-SCNC: 107 MMOL/L (ref 95–110)
CO2 SERPL-SCNC: 29 MMOL/L (ref 23–29)
CREAT SERPL-MCNC: 0.9 MG/DL (ref 0.5–1.4)
EST. GFR  (AFRICAN AMERICAN): >60 ML/MIN/1.73 M^2
EST. GFR  (NON AFRICAN AMERICAN): >60 ML/MIN/1.73 M^2
GLUCOSE SERPL-MCNC: 113 MG/DL (ref 70–110)
POTASSIUM SERPL-SCNC: 4.7 MMOL/L (ref 3.5–5.1)
SODIUM SERPL-SCNC: 143 MMOL/L (ref 136–145)

## 2021-07-26 PROCEDURE — 99215 OFFICE O/P EST HI 40 MIN: CPT | Mod: PBBFAC,27 | Performed by: INTERNAL MEDICINE

## 2021-07-26 PROCEDURE — 99999 PR PBB SHADOW E&M-EST. PATIENT-LVL III: CPT | Mod: PBBFAC,,, | Performed by: UROLOGY

## 2021-07-26 PROCEDURE — 80048 BASIC METABOLIC PNL TOTAL CA: CPT | Performed by: ANESTHESIOLOGY

## 2021-07-26 PROCEDURE — 99214 PR OFFICE/OUTPT VISIT, EST, LEVL IV, 30-39 MIN: ICD-10-PCS | Mod: S$PBB,,, | Performed by: UROLOGY

## 2021-07-26 PROCEDURE — 99213 OFFICE O/P EST LOW 20 MIN: CPT | Mod: PBBFAC | Performed by: UROLOGY

## 2021-07-26 PROCEDURE — 99999 PR PBB SHADOW E&M-EST. PATIENT-LVL V: CPT | Mod: PBBFAC,,, | Performed by: INTERNAL MEDICINE

## 2021-07-26 PROCEDURE — 99999 PR PBB SHADOW E&M-EST. PATIENT-LVL III: ICD-10-PCS | Mod: PBBFAC,,, | Performed by: UROLOGY

## 2021-07-26 PROCEDURE — 99213 PR OFFICE/OUTPT VISIT, EST, LEVL III, 20-29 MIN: ICD-10-PCS | Mod: S$PBB,,, | Performed by: INTERNAL MEDICINE

## 2021-07-26 PROCEDURE — 99999 PR PBB SHADOW E&M-EST. PATIENT-LVL V: ICD-10-PCS | Mod: PBBFAC,,, | Performed by: INTERNAL MEDICINE

## 2021-07-26 PROCEDURE — 36415 COLL VENOUS BLD VENIPUNCTURE: CPT | Performed by: ANESTHESIOLOGY

## 2021-07-26 PROCEDURE — 99213 OFFICE O/P EST LOW 20 MIN: CPT | Mod: S$PBB,,, | Performed by: INTERNAL MEDICINE

## 2021-07-26 PROCEDURE — 99214 OFFICE O/P EST MOD 30 MIN: CPT | Mod: S$PBB,,, | Performed by: UROLOGY

## 2021-07-26 RX ORDER — OXAPROZIN 600 MG/1
600 TABLET, FILM COATED ORAL NIGHTLY
COMMUNITY
Start: 2021-07-09 | End: 2021-10-18

## 2021-07-27 ENCOUNTER — PATIENT MESSAGE (OUTPATIENT)
Dept: SURGERY | Facility: HOSPITAL | Age: 71
End: 2021-07-27

## 2021-07-27 ENCOUNTER — HOSPITAL ENCOUNTER (OUTPATIENT)
Facility: HOSPITAL | Age: 71
Discharge: HOME | End: 2021-07-27
Attending: UROLOGY | Admitting: UROLOGY
Payer: MEDICARE

## 2021-07-27 ENCOUNTER — ANESTHESIA EVENT (OUTPATIENT)
Dept: SURGERY | Facility: HOSPITAL | Age: 71
End: 2021-07-27
Payer: MEDICARE

## 2021-07-27 ENCOUNTER — ANESTHESIA (OUTPATIENT)
Dept: SURGERY | Facility: HOSPITAL | Age: 71
End: 2021-07-27
Payer: MEDICARE

## 2021-07-27 VITALS
OXYGEN SATURATION: 100 % | WEIGHT: 190 LBS | SYSTOLIC BLOOD PRESSURE: 143 MMHG | RESPIRATION RATE: 14 BRPM | HEART RATE: 68 BPM | HEIGHT: 71 IN | DIASTOLIC BLOOD PRESSURE: 75 MMHG | TEMPERATURE: 98 F | BODY MASS INDEX: 26.6 KG/M2

## 2021-07-27 DIAGNOSIS — N32.0 BLADDER NECK CONTRACTURE: ICD-10-CM

## 2021-07-27 LAB
ANION GAP SERPL CALC-SCNC: 6 MMOL/L (ref 8–16)
BUN SERPL-MCNC: 19 MG/DL (ref 8–23)
CALCIUM SERPL-MCNC: 9.3 MG/DL (ref 8.7–10.5)
CHLORIDE SERPL-SCNC: 111 MMOL/L (ref 95–110)
CO2 SERPL-SCNC: 25 MMOL/L (ref 23–29)
CREAT SERPL-MCNC: 0.9 MG/DL (ref 0.5–1.4)
EST. GFR  (AFRICAN AMERICAN): >60 ML/MIN/1.73 M^2
EST. GFR  (NON AFRICAN AMERICAN): >60 ML/MIN/1.73 M^2
GLUCOSE SERPL-MCNC: 101 MG/DL (ref 70–110)
POTASSIUM SERPL-SCNC: 4.9 MMOL/L (ref 3.5–5.1)
SODIUM SERPL-SCNC: 142 MMOL/L (ref 136–145)

## 2021-07-27 PROCEDURE — D9220A PRA ANESTHESIA: Mod: ANES,,, | Performed by: ANESTHESIOLOGY

## 2021-07-27 PROCEDURE — 27201423 OPTIME MED/SURG SUP & DEVICES STERILE SUPPLY: Performed by: UROLOGY

## 2021-07-27 PROCEDURE — 71000016 HC POSTOP RECOV ADDL HR: Performed by: UROLOGY

## 2021-07-27 PROCEDURE — 63600175 PHARM REV CODE 636 W HCPCS: Performed by: REGISTERED NURSE

## 2021-07-27 PROCEDURE — 36000706: Performed by: UROLOGY

## 2021-07-27 PROCEDURE — 36000707: Performed by: UROLOGY

## 2021-07-27 PROCEDURE — 25000003 PHARM REV CODE 250: Performed by: REGISTERED NURSE

## 2021-07-27 PROCEDURE — 80048 BASIC METABOLIC PNL TOTAL CA: CPT | Performed by: STUDENT IN AN ORGANIZED HEALTH CARE EDUCATION/TRAINING PROGRAM

## 2021-07-27 PROCEDURE — 52276 PR CYSTOSCOPY,DIR VIS INT URETHROTOMY: ICD-10-PCS | Mod: ,,, | Performed by: UROLOGY

## 2021-07-27 PROCEDURE — 37000009 HC ANESTHESIA EA ADD 15 MINS: Performed by: UROLOGY

## 2021-07-27 PROCEDURE — D9220A PRA ANESTHESIA: Mod: CRNA,,, | Performed by: REGISTERED NURSE

## 2021-07-27 PROCEDURE — 71000015 HC POSTOP RECOV 1ST HR: Performed by: UROLOGY

## 2021-07-27 PROCEDURE — D9220A PRA ANESTHESIA: ICD-10-PCS | Mod: ANES,,, | Performed by: ANESTHESIOLOGY

## 2021-07-27 PROCEDURE — 63600175 PHARM REV CODE 636 W HCPCS: Performed by: STUDENT IN AN ORGANIZED HEALTH CARE EDUCATION/TRAINING PROGRAM

## 2021-07-27 PROCEDURE — 71000044 HC DOSC ROUTINE RECOVERY FIRST HOUR: Performed by: UROLOGY

## 2021-07-27 PROCEDURE — C1769 GUIDE WIRE: HCPCS | Performed by: UROLOGY

## 2021-07-27 PROCEDURE — 37000008 HC ANESTHESIA 1ST 15 MINUTES: Performed by: UROLOGY

## 2021-07-27 PROCEDURE — 25000003 PHARM REV CODE 250: Performed by: STUDENT IN AN ORGANIZED HEALTH CARE EDUCATION/TRAINING PROGRAM

## 2021-07-27 PROCEDURE — 52276 CYSTOSCOPY AND TREATMENT: CPT | Mod: ,,, | Performed by: UROLOGY

## 2021-07-27 PROCEDURE — D9220A PRA ANESTHESIA: ICD-10-PCS | Mod: CRNA,,, | Performed by: REGISTERED NURSE

## 2021-07-27 RX ORDER — PROPOFOL 10 MG/ML
VIAL (ML) INTRAVENOUS
Status: DISCONTINUED | OUTPATIENT
Start: 2021-07-27 | End: 2021-07-27

## 2021-07-27 RX ORDER — CIPROFLOXACIN 500 MG/1
500 TABLET ORAL ONCE
Status: DISCONTINUED | OUTPATIENT
Start: 2021-07-27 | End: 2022-05-16

## 2021-07-27 RX ORDER — ROCURONIUM BROMIDE 10 MG/ML
INJECTION, SOLUTION INTRAVENOUS
Status: DISCONTINUED | OUTPATIENT
Start: 2021-07-27 | End: 2021-07-27

## 2021-07-27 RX ORDER — TRAMADOL HYDROCHLORIDE 50 MG/1
50 TABLET ORAL EVERY 6 HOURS PRN
Qty: 5 TABLET | Refills: 0 | Status: SHIPPED | OUTPATIENT
Start: 2021-07-27 | End: 2021-10-18

## 2021-07-27 RX ORDER — KETOROLAC TROMETHAMINE 30 MG/ML
INJECTION, SOLUTION INTRAMUSCULAR; INTRAVENOUS
Status: DISCONTINUED | OUTPATIENT
Start: 2021-07-27 | End: 2021-07-27

## 2021-07-27 RX ORDER — LIDOCAINE HYDROCHLORIDE 20 MG/ML
INJECTION INTRAVENOUS
Status: DISCONTINUED | OUTPATIENT
Start: 2021-07-27 | End: 2021-07-27

## 2021-07-27 RX ORDER — ONDANSETRON 2 MG/ML
INJECTION INTRAMUSCULAR; INTRAVENOUS
Status: DISCONTINUED | OUTPATIENT
Start: 2021-07-27 | End: 2021-07-27

## 2021-07-27 RX ORDER — LIDOCAINE HYDROCHLORIDE 10 MG/ML
1 INJECTION, SOLUTION EPIDURAL; INFILTRATION; INTRACAUDAL; PERINEURAL ONCE
Status: COMPLETED | OUTPATIENT
Start: 2021-07-27 | End: 2021-07-27

## 2021-07-27 RX ORDER — CEFAZOLIN SODIUM 1 G/3ML
2 INJECTION, POWDER, FOR SOLUTION INTRAMUSCULAR; INTRAVENOUS
Status: COMPLETED | OUTPATIENT
Start: 2021-07-27 | End: 2021-07-27

## 2021-07-27 RX ORDER — DEXAMETHASONE SODIUM PHOSPHATE 4 MG/ML
INJECTION, SOLUTION INTRA-ARTICULAR; INTRALESIONAL; INTRAMUSCULAR; INTRAVENOUS; SOFT TISSUE
Status: DISCONTINUED | OUTPATIENT
Start: 2021-07-27 | End: 2021-07-27

## 2021-07-27 RX ORDER — MIDAZOLAM HYDROCHLORIDE 1 MG/ML
INJECTION, SOLUTION INTRAMUSCULAR; INTRAVENOUS
Status: DISCONTINUED | OUTPATIENT
Start: 2021-07-27 | End: 2021-07-27

## 2021-07-27 RX ORDER — SUCCINYLCHOLINE CHLORIDE 20 MG/ML
INJECTION INTRAMUSCULAR; INTRAVENOUS
Status: DISCONTINUED | OUTPATIENT
Start: 2021-07-27 | End: 2021-07-27

## 2021-07-27 RX ORDER — SODIUM CHLORIDE 9 MG/ML
INJECTION, SOLUTION INTRAVENOUS CONTINUOUS
Status: DISCONTINUED | OUTPATIENT
Start: 2021-07-27 | End: 2021-07-27 | Stop reason: HOSPADM

## 2021-07-27 RX ORDER — FENTANYL CITRATE 50 UG/ML
INJECTION, SOLUTION INTRAMUSCULAR; INTRAVENOUS
Status: DISCONTINUED | OUTPATIENT
Start: 2021-07-27 | End: 2021-07-27

## 2021-07-27 RX ORDER — PHENYLEPHRINE HYDROCHLORIDE 10 MG/ML
INJECTION INTRAVENOUS
Status: DISCONTINUED | OUTPATIENT
Start: 2021-07-27 | End: 2021-07-27

## 2021-07-27 RX ORDER — SODIUM CHLORIDE 0.9 % (FLUSH) 0.9 %
3 SYRINGE (ML) INJECTION
Status: DISCONTINUED | OUTPATIENT
Start: 2021-07-27 | End: 2021-07-27 | Stop reason: HOSPADM

## 2021-07-27 RX ORDER — OXYBUTYNIN CHLORIDE 5 MG/1
5 TABLET ORAL 3 TIMES DAILY PRN
Qty: 30 TABLET | Refills: 0 | Status: SHIPPED | OUTPATIENT
Start: 2021-07-27 | End: 2021-10-18

## 2021-07-27 RX ADMIN — DEXAMETHASONE SODIUM PHOSPHATE 4 MG: 4 INJECTION, SOLUTION INTRAMUSCULAR; INTRAVENOUS at 09:07

## 2021-07-27 RX ADMIN — LIDOCAINE HYDROCHLORIDE 1 MG: 10 INJECTION, SOLUTION EPIDURAL; INFILTRATION; INTRACAUDAL at 08:07

## 2021-07-27 RX ADMIN — PHENYLEPHRINE HYDROCHLORIDE 100 MCG: 10 INJECTION INTRAVENOUS at 09:07

## 2021-07-27 RX ADMIN — ONDANSETRON 4 MG: 2 INJECTION INTRAMUSCULAR; INTRAVENOUS at 09:07

## 2021-07-27 RX ADMIN — CEFAZOLIN 2 G: 330 INJECTION, POWDER, FOR SOLUTION INTRAMUSCULAR; INTRAVENOUS at 09:07

## 2021-07-27 RX ADMIN — MIDAZOLAM HYDROCHLORIDE 2 MG: 1 INJECTION, SOLUTION INTRAMUSCULAR; INTRAVENOUS at 09:07

## 2021-07-27 RX ADMIN — KETOROLAC TROMETHAMINE 30 MG: 30 INJECTION, SOLUTION INTRAMUSCULAR at 09:07

## 2021-07-27 RX ADMIN — GLYCOPYRROLATE 0.2 MG: 0.2 INJECTION, SOLUTION INTRAMUSCULAR; INTRAVITREAL at 09:07

## 2021-07-27 RX ADMIN — LIDOCAINE HYDROCHLORIDE 75 MG: 20 INJECTION, SOLUTION INTRAVENOUS at 09:07

## 2021-07-27 RX ADMIN — ROCURONIUM BROMIDE 7 MG: 10 INJECTION, SOLUTION INTRAVENOUS at 09:07

## 2021-07-27 RX ADMIN — SUCCINYLCHOLINE CHLORIDE 100 MG: 20 INJECTION, SOLUTION INTRAMUSCULAR; INTRAVENOUS at 09:07

## 2021-07-27 RX ADMIN — FENTANYL CITRATE 50 MCG: 50 INJECTION, SOLUTION INTRAMUSCULAR; INTRAVENOUS at 09:07

## 2021-07-27 RX ADMIN — PROPOFOL 150 MG: 10 INJECTION, EMULSION INTRAVENOUS at 09:07

## 2021-07-27 RX ADMIN — SODIUM CHLORIDE: 0.9 INJECTION, SOLUTION INTRAVENOUS at 08:07

## 2021-08-03 ENCOUNTER — TELEPHONE (OUTPATIENT)
Dept: UROLOGY | Facility: CLINIC | Age: 71
End: 2021-08-03

## 2021-08-03 ENCOUNTER — OFFICE VISIT (OUTPATIENT)
Dept: UROLOGY | Facility: CLINIC | Age: 71
End: 2021-08-03
Payer: MEDICARE

## 2021-08-03 VITALS — DIASTOLIC BLOOD PRESSURE: 80 MMHG | HEART RATE: 92 BPM | SYSTOLIC BLOOD PRESSURE: 147 MMHG

## 2021-08-03 DIAGNOSIS — Z46.6 ENCOUNTER FOR FOLEY CATHETER REMOVAL: Primary | ICD-10-CM

## 2021-08-03 DIAGNOSIS — Z98.890 POST-OPERATIVE STATE: ICD-10-CM

## 2021-08-03 PROCEDURE — 51700 IRRIGATION OF BLADDER: CPT | Mod: S$PBB,,, | Performed by: PHYSICIAN ASSISTANT

## 2021-08-03 PROCEDURE — 51700 IRRIGATION OF BLADDER: CPT | Mod: PBBFAC | Performed by: PHYSICIAN ASSISTANT

## 2021-08-03 PROCEDURE — 99024 PR POST-OP FOLLOW-UP VISIT: ICD-10-PCS | Mod: POP,,, | Performed by: PHYSICIAN ASSISTANT

## 2021-08-03 PROCEDURE — 99213 OFFICE O/P EST LOW 20 MIN: CPT | Mod: PBBFAC | Performed by: PHYSICIAN ASSISTANT

## 2021-08-03 PROCEDURE — 99999 PR PBB SHADOW E&M-EST. PATIENT-LVL III: ICD-10-PCS | Mod: PBBFAC,,, | Performed by: PHYSICIAN ASSISTANT

## 2021-08-03 PROCEDURE — 51700 PR IRRIGATION, BLADDER: ICD-10-PCS | Mod: S$PBB,,, | Performed by: PHYSICIAN ASSISTANT

## 2021-08-03 PROCEDURE — 99999 PR PBB SHADOW E&M-EST. PATIENT-LVL III: CPT | Mod: PBBFAC,,, | Performed by: PHYSICIAN ASSISTANT

## 2021-08-03 PROCEDURE — 99024 POSTOP FOLLOW-UP VISIT: CPT | Mod: POP,,, | Performed by: PHYSICIAN ASSISTANT

## 2021-08-04 ENCOUNTER — PATIENT MESSAGE (OUTPATIENT)
Dept: GASTROENTEROLOGY | Facility: CLINIC | Age: 71
End: 2021-08-04

## 2021-08-04 DIAGNOSIS — K59.03 DRUG-INDUCED CONSTIPATION: Primary | ICD-10-CM

## 2021-08-04 RX ORDER — LACTULOSE 10 G/15ML
30 SOLUTION ORAL 2 TIMES DAILY
Qty: 1350 ML | Refills: 5 | Status: SHIPPED | OUTPATIENT
Start: 2021-08-04 | End: 2021-08-19

## 2021-08-13 ENCOUNTER — PATIENT MESSAGE (OUTPATIENT)
Dept: UROLOGY | Facility: CLINIC | Age: 71
End: 2021-08-13

## 2021-09-29 ENCOUNTER — LAB VISIT (OUTPATIENT)
Dept: LAB | Facility: HOSPITAL | Age: 71
End: 2021-09-29
Attending: INTERNAL MEDICINE
Payer: MEDICARE

## 2021-09-29 DIAGNOSIS — E55.9 VITAMIN D DEFICIENCY: ICD-10-CM

## 2021-09-29 DIAGNOSIS — R82.994 HYPERCALCIURIA: ICD-10-CM

## 2021-09-29 LAB
25(OH)D3+25(OH)D2 SERPL-MCNC: 55 NG/ML (ref 30–96)
ALBUMIN SERPL BCP-MCNC: 3.9 G/DL (ref 3.5–5.2)
ANION GAP SERPL CALC-SCNC: 7 MMOL/L (ref 8–16)
BUN SERPL-MCNC: 15 MG/DL (ref 8–23)
CALCIUM SERPL-MCNC: 10 MG/DL (ref 8.7–10.5)
CHLORIDE SERPL-SCNC: 104 MMOL/L (ref 95–110)
CO2 SERPL-SCNC: 28 MMOL/L (ref 23–29)
CREAT SERPL-MCNC: 0.9 MG/DL (ref 0.5–1.4)
EST. GFR  (AFRICAN AMERICAN): >60 ML/MIN/1.73 M^2
EST. GFR  (NON AFRICAN AMERICAN): >60 ML/MIN/1.73 M^2
GLUCOSE SERPL-MCNC: 103 MG/DL (ref 70–110)
PHOSPHATE SERPL-MCNC: 2.1 MG/DL (ref 2.7–4.5)
POTASSIUM SERPL-SCNC: 4.4 MMOL/L (ref 3.5–5.1)
SODIUM SERPL-SCNC: 139 MMOL/L (ref 136–145)

## 2021-09-29 PROCEDURE — 36415 COLL VENOUS BLD VENIPUNCTURE: CPT | Performed by: INTERNAL MEDICINE

## 2021-09-29 PROCEDURE — 82306 VITAMIN D 25 HYDROXY: CPT | Performed by: INTERNAL MEDICINE

## 2021-09-29 PROCEDURE — 80069 RENAL FUNCTION PANEL: CPT | Performed by: INTERNAL MEDICINE

## 2021-09-30 ENCOUNTER — PATIENT MESSAGE (OUTPATIENT)
Dept: ENDOCRINOLOGY | Facility: CLINIC | Age: 71
End: 2021-09-30

## 2021-10-15 ENCOUNTER — HOSPITAL ENCOUNTER (EMERGENCY)
Facility: HOSPITAL | Age: 71
Discharge: HOME | End: 2021-10-15
Attending: EMERGENCY MEDICINE
Payer: MEDICARE

## 2021-10-15 ENCOUNTER — OFFICE VISIT (OUTPATIENT)
Dept: INTERNAL MEDICINE | Facility: CLINIC | Age: 71
End: 2021-10-15
Payer: MEDICARE

## 2021-10-15 ENCOUNTER — TELEPHONE (OUTPATIENT)
Dept: ORTHOPEDICS | Facility: CLINIC | Age: 71
End: 2021-10-15

## 2021-10-15 VITALS
WEIGHT: 190 LBS | BODY MASS INDEX: 26.6 KG/M2 | TEMPERATURE: 98 F | HEIGHT: 71 IN | DIASTOLIC BLOOD PRESSURE: 70 MMHG | OXYGEN SATURATION: 99 % | SYSTOLIC BLOOD PRESSURE: 159 MMHG | HEART RATE: 75 BPM | RESPIRATION RATE: 16 BRPM

## 2021-10-15 VITALS
SYSTOLIC BLOOD PRESSURE: 117 MMHG | DIASTOLIC BLOOD PRESSURE: 75 MMHG | OXYGEN SATURATION: 97 % | BODY MASS INDEX: 24.96 KG/M2 | HEIGHT: 72 IN | HEART RATE: 66 BPM | WEIGHT: 184.31 LBS

## 2021-10-15 DIAGNOSIS — S52.502A CLOSED FRACTURE OF DISTAL END OF LEFT RADIUS, UNSPECIFIED FRACTURE MORPHOLOGY, INITIAL ENCOUNTER: ICD-10-CM

## 2021-10-15 DIAGNOSIS — S52.502D CLOSED FRACTURE OF DISTAL END OF LEFT RADIUS WITH ROUTINE HEALING, UNSPECIFIED FRACTURE MORPHOLOGY, SUBSEQUENT ENCOUNTER: Primary | ICD-10-CM

## 2021-10-15 DIAGNOSIS — S32.020A COMPRESSION FRACTURE OF L2 VERTEBRA, INITIAL ENCOUNTER: Primary | ICD-10-CM
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
## 2021-10-15 DIAGNOSIS — S32.020D COMPRESSION FRACTURE OF L2 VERTEBRA WITH ROUTINE HEALING, SUBSEQUENT ENCOUNTER: ICD-10-CM

## 2021-10-15 PROCEDURE — 99214 OFFICE O/P EST MOD 30 MIN: CPT | Mod: PBBFAC | Performed by: INTERNAL MEDICINE

## 2021-10-15 PROCEDURE — 99284 EMERGENCY DEPT VISIT MOD MDM: CPT | Mod: 25,27

## 2021-10-15 PROCEDURE — 99212 OFFICE O/P EST SF 10 MIN: CPT | Mod: S$PBB,,, | Performed by: INTERNAL MEDICINE

## 2021-10-15 PROCEDURE — 99999 PR PBB SHADOW E&M-EST. PATIENT-LVL IV: CPT | Mod: PBBFAC,,, | Performed by: INTERNAL MEDICINE

## 2021-10-15 PROCEDURE — 99999 PR PBB SHADOW E&M-EST. PATIENT-LVL IV: ICD-10-PCS | Mod: PBBFAC,,, | Performed by: INTERNAL MEDICINE

## 2021-10-15 PROCEDURE — 99212 PR OFFICE/OUTPT VISIT, EST, LEVL II, 10-19 MIN: ICD-10-PCS | Mod: S$PBB,,, | Performed by: INTERNAL MEDICINE

## 2021-10-18 ENCOUNTER — OFFICE VISIT (OUTPATIENT)
Dept: ORTHOPEDICS | Facility: CLINIC | Age: 71
End: 2021-10-18
Payer: MEDICARE

## 2021-10-18 VITALS — BODY MASS INDEX: 24.92 KG/M2 | HEIGHT: 72 IN | WEIGHT: 184 LBS

## 2021-10-18 DIAGNOSIS — S62.102A CLOSED FRACTURE OF LEFT WRIST, INITIAL ENCOUNTER: ICD-10-CM

## 2021-10-18 DIAGNOSIS — S32.020A COMPRESSION FRACTURE OF L2 VERTEBRA, INITIAL ENCOUNTER: ICD-10-CM

## 2021-10-18 DIAGNOSIS — S52.502A CLOSED FRACTURE OF DISTAL END OF LEFT RADIUS, UNSPECIFIED FRACTURE MORPHOLOGY, INITIAL ENCOUNTER: ICD-10-CM

## 2021-10-18 PROCEDURE — 29075 APPL CST ELBW FNGR SHORT ARM: CPT | Mod: S$PBB,LT,, | Performed by: ORTHOPAEDIC SURGERY

## 2021-10-18 PROCEDURE — 99213 OFFICE O/P EST LOW 20 MIN: CPT | Mod: PBBFAC,PN,25 | Performed by: ORTHOPAEDIC SURGERY

## 2021-10-18 PROCEDURE — 99203 PR OFFICE/OUTPT VISIT, NEW, LEVL III, 30-44 MIN: ICD-10-PCS | Mod: S$PBB,25,, | Performed by: ORTHOPAEDIC SURGERY

## 2021-10-18 PROCEDURE — 29075 PR APPLY FOREARM CAST: ICD-10-PCS | Mod: S$PBB,LT,, | Performed by: ORTHOPAEDIC SURGERY

## 2021-10-18 PROCEDURE — 99999 PR PBB SHADOW E&M-EST. PATIENT-LVL III: ICD-10-PCS | Mod: PBBFAC,,, | Performed by: ORTHOPAEDIC SURGERY

## 2021-10-18 PROCEDURE — 29075 APPL CST ELBW FNGR SHORT ARM: CPT | Mod: PBBFAC,PN | Performed by: ORTHOPAEDIC SURGERY

## 2021-10-18 PROCEDURE — 99999 PR PBB SHADOW E&M-EST. PATIENT-LVL III: CPT | Mod: PBBFAC,,, | Performed by: ORTHOPAEDIC SURGERY

## 2021-10-18 PROCEDURE — 99203 OFFICE O/P NEW LOW 30 MIN: CPT | Mod: S$PBB,25,, | Performed by: ORTHOPAEDIC SURGERY

## 2021-10-18 RX ORDER — LACTULOSE 10 G/15ML
SOLUTION ORAL; RECTAL
COMMUNITY
Start: 2021-08-04 | End: 2022-03-28

## 2021-10-22 ENCOUNTER — TELEPHONE (OUTPATIENT)
Dept: NEUROSURGERY | Facility: CLINIC | Age: 71
End: 2021-10-22
Payer: MEDICARE

## 2021-10-22 DIAGNOSIS — M54.9 BACK PAIN, UNSPECIFIED BACK LOCATION, UNSPECIFIED BACK PAIN LATERALITY, UNSPECIFIED CHRONICITY: Primary | ICD-10-CM

## 2021-10-26 ENCOUNTER — OFFICE VISIT (OUTPATIENT)
Dept: NEUROSURGERY | Facility: CLINIC | Age: 71
End: 2021-10-26
Payer: MEDICARE

## 2021-10-26 ENCOUNTER — TELEPHONE (OUTPATIENT)
Dept: NEUROSURGERY | Facility: CLINIC | Age: 71
End: 2021-10-26

## 2021-10-26 ENCOUNTER — HOSPITAL ENCOUNTER (OUTPATIENT)
Dept: RADIOLOGY | Facility: HOSPITAL | Age: 71
Discharge: HOME | End: 2021-10-26
Attending: PHYSICIAN ASSISTANT
Payer: MEDICARE

## 2021-10-26 VITALS — WEIGHT: 184.06 LBS | HEIGHT: 72 IN | BODY MASS INDEX: 24.93 KG/M2

## 2021-10-26 DIAGNOSIS — S32.020A CLOSED COMPRESSION FRACTURE OF L2 VERTEBRA, INITIAL ENCOUNTER: Primary | ICD-10-CM

## 2021-10-26 DIAGNOSIS — M54.9 BACK PAIN, UNSPECIFIED BACK LOCATION, UNSPECIFIED BACK PAIN LATERALITY, UNSPECIFIED CHRONICITY: ICD-10-CM

## 2021-10-26 DIAGNOSIS — S32.020A COMPRESSION FRACTURE OF L2 VERTEBRA, INITIAL ENCOUNTER: ICD-10-CM

## 2021-10-26 PROCEDURE — 72120 XR LUMBAR SPINE FLEXION AND EXTENSION ONLY: ICD-10-PCS | Mod: 26,,, | Performed by: RADIOLOGY

## 2021-10-26 PROCEDURE — 99204 PR OFFICE/OUTPT VISIT, NEW, LEVL IV, 45-59 MIN: ICD-10-PCS | Mod: S$PBB,,, | Performed by: PHYSICIAN ASSISTANT

## 2021-10-26 PROCEDURE — 72120 X-RAY BEND ONLY L-S SPINE: CPT | Mod: TC,PN

## 2021-10-26 PROCEDURE — 99999 PR PBB SHADOW E&M-EST. PATIENT-LVL III: CPT | Mod: PBBFAC,,, | Performed by: PHYSICIAN ASSISTANT

## 2021-10-26 PROCEDURE — 99213 OFFICE O/P EST LOW 20 MIN: CPT | Mod: PBBFAC,PN | Performed by: PHYSICIAN ASSISTANT

## 2021-10-26 PROCEDURE — 72120 X-RAY BEND ONLY L-S SPINE: CPT | Mod: 26,,, | Performed by: RADIOLOGY

## 2021-10-26 PROCEDURE — 99204 OFFICE O/P NEW MOD 45 MIN: CPT | Mod: S$PBB,,, | Performed by: PHYSICIAN ASSISTANT

## 2021-10-26 PROCEDURE — 99999 PR PBB SHADOW E&M-EST. PATIENT-LVL III: ICD-10-PCS | Mod: PBBFAC,,, | Performed by: PHYSICIAN ASSISTANT

## 2021-11-01 ENCOUNTER — OFFICE VISIT (OUTPATIENT)
Dept: UROLOGY | Facility: CLINIC | Age: 71
End: 2021-11-01
Payer: MEDICARE

## 2021-11-01 VITALS
DIASTOLIC BLOOD PRESSURE: 69 MMHG | SYSTOLIC BLOOD PRESSURE: 138 MMHG | HEIGHT: 71 IN | HEART RATE: 69 BPM | BODY MASS INDEX: 26.35 KG/M2 | WEIGHT: 188.25 LBS

## 2021-11-01 DIAGNOSIS — N32.0 BLADDER NECK CONTRACTURE: Primary | ICD-10-CM

## 2021-11-01 DIAGNOSIS — C61 PROSTATE CANCER: ICD-10-CM

## 2021-11-01 PROBLEM — N13.8 BPH WITH URINARY OBSTRUCTION: Chronic | Status: RESOLVED | Noted: 2018-10-25 | Resolved: 2021-11-01

## 2021-11-01 PROBLEM — N40.1 BPH WITH URINARY OBSTRUCTION: Chronic | Status: RESOLVED | Noted: 2018-10-25 | Resolved: 2021-11-01

## 2021-11-01 PROCEDURE — 99213 PR OFFICE/OUTPT VISIT, EST, LEVL III, 20-29 MIN: ICD-10-PCS | Mod: S$PBB,,, | Performed by: UROLOGY

## 2021-11-01 PROCEDURE — 99213 OFFICE O/P EST LOW 20 MIN: CPT | Mod: S$PBB,,, | Performed by: UROLOGY

## 2021-11-01 PROCEDURE — 99999 PR PBB SHADOW E&M-EST. PATIENT-LVL III: CPT | Mod: PBBFAC,,, | Performed by: UROLOGY

## 2021-11-01 PROCEDURE — 99999 PR PBB SHADOW E&M-EST. PATIENT-LVL III: ICD-10-PCS | Mod: PBBFAC,,, | Performed by: UROLOGY

## 2021-11-01 PROCEDURE — 99213 OFFICE O/P EST LOW 20 MIN: CPT | Mod: PBBFAC | Performed by: UROLOGY

## 2021-11-10 ENCOUNTER — TELEPHONE (OUTPATIENT)
Dept: ORTHOPEDICS | Facility: CLINIC | Age: 71
End: 2021-11-10
Payer: MEDICARE

## 2021-11-10 DIAGNOSIS — S52.502A CLOSED FRACTURE OF DISTAL END OF LEFT RADIUS, UNSPECIFIED FRACTURE MORPHOLOGY, INITIAL ENCOUNTER: Primary | ICD-10-CM

## 2021-11-11 ENCOUNTER — OFFICE VISIT (OUTPATIENT)
Dept: ORTHOPEDICS | Facility: CLINIC | Age: 71
End: 2021-11-11
Payer: MEDICARE

## 2021-11-11 ENCOUNTER — HOSPITAL ENCOUNTER (OUTPATIENT)
Dept: RADIOLOGY | Facility: HOSPITAL | Age: 71
Discharge: HOME | End: 2021-11-11
Attending: ORTHOPAEDIC SURGERY
Payer: MEDICARE

## 2021-11-11 VITALS — BODY MASS INDEX: 26.32 KG/M2 | HEIGHT: 71 IN | WEIGHT: 188 LBS

## 2021-11-11 DIAGNOSIS — S52.502A CLOSED FRACTURE OF DISTAL END OF LEFT RADIUS, UNSPECIFIED FRACTURE MORPHOLOGY, INITIAL ENCOUNTER: ICD-10-CM

## 2021-11-11 DIAGNOSIS — S62.102D CLOSED FRACTURE OF LEFT WRIST WITH ROUTINE HEALING, SUBSEQUENT ENCOUNTER: Primary | ICD-10-CM

## 2021-11-11 PROCEDURE — 99213 OFFICE O/P EST LOW 20 MIN: CPT | Mod: S$PBB,,, | Performed by: ORTHOPAEDIC SURGERY

## 2021-11-11 PROCEDURE — 73110 X-RAY EXAM OF WRIST: CPT | Mod: 26,LT,, | Performed by: RADIOLOGY

## 2021-11-11 PROCEDURE — 99213 OFFICE O/P EST LOW 20 MIN: CPT | Mod: PBBFAC,PN | Performed by: ORTHOPAEDIC SURGERY

## 2021-11-11 PROCEDURE — 73110 XR WRIST COMPLETE 3 VIEWS LEFT: ICD-10-PCS | Mod: 26,LT,, | Performed by: RADIOLOGY

## 2021-11-11 PROCEDURE — 99213 PR OFFICE/OUTPT VISIT, EST, LEVL III, 20-29 MIN: ICD-10-PCS | Mod: S$PBB,,, | Performed by: ORTHOPAEDIC SURGERY

## 2021-11-11 PROCEDURE — 73110 X-RAY EXAM OF WRIST: CPT | Mod: TC,PN,LT

## 2021-11-11 PROCEDURE — 99999 PR PBB SHADOW E&M-EST. PATIENT-LVL III: ICD-10-PCS | Mod: PBBFAC,,, | Performed by: ORTHOPAEDIC SURGERY

## 2021-11-11 PROCEDURE — 99999 PR PBB SHADOW E&M-EST. PATIENT-LVL III: CPT | Mod: PBBFAC,,, | Performed by: ORTHOPAEDIC SURGERY

## 2021-11-29 ENCOUNTER — OFFICE VISIT (OUTPATIENT)
Dept: NEUROSURGERY | Facility: CLINIC | Age: 71
End: 2021-11-29
Payer: MEDICARE

## 2021-11-29 ENCOUNTER — HOSPITAL ENCOUNTER (OUTPATIENT)
Dept: RADIOLOGY | Facility: HOSPITAL | Age: 71
Discharge: HOME | End: 2021-11-29
Attending: PHYSICIAN ASSISTANT
Payer: MEDICARE

## 2021-11-29 VITALS
DIASTOLIC BLOOD PRESSURE: 76 MMHG | WEIGHT: 188.06 LBS | HEIGHT: 71 IN | BODY MASS INDEX: 26.33 KG/M2 | SYSTOLIC BLOOD PRESSURE: 124 MMHG | HEART RATE: 69 BPM

## 2021-11-29 DIAGNOSIS — S32.020A CLOSED COMPRESSION FRACTURE OF L2 VERTEBRA, INITIAL ENCOUNTER: ICD-10-CM

## 2021-11-29 DIAGNOSIS — S32.020D COMPRESSION FRACTURE OF L2 VERTEBRA WITH ROUTINE HEALING, SUBSEQUENT ENCOUNTER: Primary | ICD-10-CM

## 2021-11-29 PROCEDURE — 99214 OFFICE O/P EST MOD 30 MIN: CPT | Mod: S$PBB,,, | Performed by: PHYSICIAN ASSISTANT

## 2021-11-29 PROCEDURE — 99214 PR OFFICE/OUTPT VISIT, EST, LEVL IV, 30-39 MIN: ICD-10-PCS | Mod: S$PBB,,, | Performed by: PHYSICIAN ASSISTANT

## 2021-11-29 PROCEDURE — 99999 PR PBB SHADOW E&M-EST. PATIENT-LVL IV: ICD-10-PCS | Mod: PBBFAC,,, | Performed by: PHYSICIAN ASSISTANT

## 2021-11-29 PROCEDURE — 99999 PR PBB SHADOW E&M-EST. PATIENT-LVL IV: CPT | Mod: PBBFAC,,, | Performed by: PHYSICIAN ASSISTANT

## 2021-11-29 PROCEDURE — 99214 OFFICE O/P EST MOD 30 MIN: CPT | Mod: PBBFAC,PN | Performed by: PHYSICIAN ASSISTANT

## 2021-11-29 PROCEDURE — 72100 XR LUMBAR SPINE AP AND LATERAL: ICD-10-PCS | Mod: 26,,, | Performed by: RADIOLOGY

## 2021-11-29 PROCEDURE — 72100 X-RAY EXAM L-S SPINE 2/3 VWS: CPT | Mod: 26,,, | Performed by: RADIOLOGY

## 2021-11-29 PROCEDURE — 72100 X-RAY EXAM L-S SPINE 2/3 VWS: CPT | Mod: TC,PN

## 2021-12-08 ENCOUNTER — TELEPHONE (OUTPATIENT)
Dept: ORTHOPEDICS | Facility: CLINIC | Age: 71
End: 2021-12-08
Payer: MEDICARE

## 2021-12-08 DIAGNOSIS — S52.502A CLOSED FRACTURE OF DISTAL END OF LEFT RADIUS, UNSPECIFIED FRACTURE MORPHOLOGY, INITIAL ENCOUNTER: Primary | ICD-10-CM

## 2021-12-09 ENCOUNTER — OFFICE VISIT (OUTPATIENT)
Dept: ORTHOPEDICS | Facility: CLINIC | Age: 71
End: 2021-12-09
Payer: MEDICARE

## 2021-12-09 ENCOUNTER — HOSPITAL ENCOUNTER (OUTPATIENT)
Dept: RADIOLOGY | Facility: HOSPITAL | Age: 71
Discharge: HOME | End: 2021-12-09
Attending: ORTHOPAEDIC SURGERY
Payer: MEDICARE

## 2021-12-09 VITALS — HEIGHT: 71 IN | BODY MASS INDEX: 26.33 KG/M2 | WEIGHT: 188.06 LBS

## 2021-12-09 DIAGNOSIS — S52.502A CLOSED FRACTURE OF DISTAL END OF LEFT RADIUS, UNSPECIFIED FRACTURE MORPHOLOGY, INITIAL ENCOUNTER: ICD-10-CM

## 2021-12-09 DIAGNOSIS — S62.102D CLOSED FRACTURE OF LEFT WRIST WITH ROUTINE HEALING, SUBSEQUENT ENCOUNTER: Primary | ICD-10-CM

## 2021-12-09 PROCEDURE — 99999 PR PBB SHADOW E&M-EST. PATIENT-LVL III: CPT | Mod: PBBFAC,,, | Performed by: ORTHOPAEDIC SURGERY

## 2021-12-09 PROCEDURE — 99213 OFFICE O/P EST LOW 20 MIN: CPT | Mod: PBBFAC,PN | Performed by: ORTHOPAEDIC SURGERY

## 2021-12-09 PROCEDURE — 99213 PR OFFICE/OUTPT VISIT, EST, LEVL III, 20-29 MIN: ICD-10-PCS | Mod: S$PBB,,, | Performed by: ORTHOPAEDIC SURGERY

## 2021-12-09 PROCEDURE — 73110 XR WRIST COMPLETE 3 VIEWS LEFT: ICD-10-PCS | Mod: 26,LT,, | Performed by: RADIOLOGY

## 2021-12-09 PROCEDURE — 99999 PR PBB SHADOW E&M-EST. PATIENT-LVL III: ICD-10-PCS | Mod: PBBFAC,,, | Performed by: ORTHOPAEDIC SURGERY

## 2021-12-09 PROCEDURE — 73110 X-RAY EXAM OF WRIST: CPT | Mod: TC,PN,LT

## 2021-12-09 PROCEDURE — 99213 OFFICE O/P EST LOW 20 MIN: CPT | Mod: S$PBB,,, | Performed by: ORTHOPAEDIC SURGERY

## 2021-12-09 PROCEDURE — 73110 X-RAY EXAM OF WRIST: CPT | Mod: 26,LT,, | Performed by: RADIOLOGY

## 2021-12-09 RX ORDER — IBUPROFEN 600 MG/1
600 TABLET ORAL 2 TIMES DAILY WITH MEALS
Qty: 60 TABLET | Refills: 1 | Status: SHIPPED | OUTPATIENT
Start: 2021-12-09 | End: 2022-03-28

## 2021-12-15 PROBLEM — M25.532 LEFT WRIST PAIN: Status: ACTIVE | Noted: 2021-12-15

## 2021-12-15 PROBLEM — Z74.09 IMPAIRED MOBILITY AND ADLS: Status: ACTIVE | Noted: 2021-12-15

## 2021-12-15 PROBLEM — Z78.9 IMPAIRED MOBILITY AND ADLS: Status: ACTIVE | Noted: 2021-12-15

## 2021-12-30 ENCOUNTER — LAB VISIT (OUTPATIENT)
Dept: PRIMARY CARE CLINIC | Facility: OTHER | Age: 71
End: 2021-12-30
Payer: MEDICARE

## 2021-12-30 DIAGNOSIS — Z11.52 ENCOUNTER FOR SCREENING FOR COVID-19: Primary | ICD-10-CM

## 2021-12-30 DIAGNOSIS — R05.9 COUGH: ICD-10-CM

## 2021-12-30 PROCEDURE — U0003 INFECTIOUS AGENT DETECTION BY NUCLEIC ACID (DNA OR RNA); SEVERE ACUTE RESPIRATORY SYNDROME CORONAVIRUS 2 (SARS-COV-2) (CORONAVIRUS DISEASE [COVID-19]), AMPLIFIED PROBE TECHNIQUE, MAKING USE OF HIGH THROUGHPUT TECHNOLOGIES AS DESCRIBED BY CMS-2020-01-R: HCPCS | Mod: ST72 | Performed by: FAMILY MEDICINE

## 2022-01-04 LAB
SARS-COV-2 RNA RESP QL NAA+PROBE: NOT DETECTED
SARS-COV-2- CYCLE NUMBER: NORMAL

## 2022-01-11 ENCOUNTER — PES CALL (OUTPATIENT)
Dept: ADMINISTRATIVE | Facility: CLINIC | Age: 72
End: 2022-01-11
Payer: MEDICARE

## 2022-01-13 ENCOUNTER — OFFICE VISIT (OUTPATIENT)
Dept: NEUROSURGERY | Facility: CLINIC | Age: 72
End: 2022-01-13
Payer: MEDICARE

## 2022-01-13 ENCOUNTER — HOSPITAL ENCOUNTER (OUTPATIENT)
Dept: RADIOLOGY | Facility: HOSPITAL | Age: 72
Discharge: HOME | End: 2022-01-13
Attending: PHYSICIAN ASSISTANT
Payer: MEDICARE

## 2022-01-13 ENCOUNTER — OFFICE VISIT (OUTPATIENT)
Dept: ORTHOPEDICS | Facility: CLINIC | Age: 72
End: 2022-01-13
Payer: MEDICARE

## 2022-01-13 VITALS — HEIGHT: 71 IN | WEIGHT: 188.06 LBS | BODY MASS INDEX: 26.33 KG/M2

## 2022-01-13 VITALS — WEIGHT: 188 LBS | BODY MASS INDEX: 26.32 KG/M2 | HEIGHT: 71 IN

## 2022-01-13 DIAGNOSIS — S32.020A CLOSED WEDGE COMPRESSION FRACTURE OF L2 VERTEBRA, INITIAL ENCOUNTER: ICD-10-CM

## 2022-01-13 DIAGNOSIS — S62.102D CLOSED FRACTURE OF LEFT WRIST WITH ROUTINE HEALING, SUBSEQUENT ENCOUNTER: Primary | ICD-10-CM

## 2022-01-13 DIAGNOSIS — S32.020D COMPRESSION FRACTURE OF L2 VERTEBRA WITH ROUTINE HEALING, SUBSEQUENT ENCOUNTER: ICD-10-CM

## 2022-01-13 DIAGNOSIS — M41.50 DEGENERATIVE SCOLIOSIS: Primary | ICD-10-CM

## 2022-01-13 DIAGNOSIS — Q76.49 BERTOLOTTI'S SYNDROME: ICD-10-CM

## 2022-01-13 PROCEDURE — 72100 XR LUMBAR SPINE AP AND LATERAL: ICD-10-PCS | Mod: 26,,, | Performed by: RADIOLOGY

## 2022-01-13 PROCEDURE — 99213 OFFICE O/P EST LOW 20 MIN: CPT | Mod: S$PBB,,, | Performed by: NEUROLOGICAL SURGERY

## 2022-01-13 PROCEDURE — 72100 X-RAY EXAM L-S SPINE 2/3 VWS: CPT | Mod: 26,,, | Performed by: RADIOLOGY

## 2022-01-13 PROCEDURE — 99213 OFFICE O/P EST LOW 20 MIN: CPT | Mod: PBBFAC,27,PN | Performed by: NEUROLOGICAL SURGERY

## 2022-01-13 PROCEDURE — 99999 PR PBB SHADOW E&M-EST. PATIENT-LVL III: CPT | Mod: PBBFAC,,, | Performed by: ORTHOPAEDIC SURGERY

## 2022-01-13 PROCEDURE — 99213 PR OFFICE/OUTPT VISIT, EST, LEVL III, 20-29 MIN: ICD-10-PCS | Mod: S$PBB,,, | Performed by: NEUROLOGICAL SURGERY

## 2022-01-13 PROCEDURE — 99999 PR PBB SHADOW E&M-EST. PATIENT-LVL III: CPT | Mod: PBBFAC,,, | Performed by: NEUROLOGICAL SURGERY

## 2022-01-13 PROCEDURE — 99999 PR PBB SHADOW E&M-EST. PATIENT-LVL III: ICD-10-PCS | Mod: PBBFAC,,, | Performed by: NEUROLOGICAL SURGERY

## 2022-01-13 PROCEDURE — 99213 OFFICE O/P EST LOW 20 MIN: CPT | Mod: S$PBB,,, | Performed by: ORTHOPAEDIC SURGERY

## 2022-01-13 PROCEDURE — 99213 PR OFFICE/OUTPT VISIT, EST, LEVL III, 20-29 MIN: ICD-10-PCS | Mod: S$PBB,,, | Performed by: ORTHOPAEDIC SURGERY

## 2022-01-13 PROCEDURE — 99213 OFFICE O/P EST LOW 20 MIN: CPT | Mod: PBBFAC,PN | Performed by: ORTHOPAEDIC SURGERY

## 2022-01-13 PROCEDURE — 72100 X-RAY EXAM L-S SPINE 2/3 VWS: CPT | Mod: TC,FY

## 2022-01-13 PROCEDURE — 99999 PR PBB SHADOW E&M-EST. PATIENT-LVL III: ICD-10-PCS | Mod: PBBFAC,,, | Performed by: ORTHOPAEDIC SURGERY

## 2022-01-13 NOTE — PROGRESS NOTES
Subjective:      Patient ID: Jean Carlson is a 71 y.o. male.  Chief Complaint: Follow-up (6 week follow up left wrist fracture.)      HPI  Jean Carlson is a  71 y.o. male presenting today for follow up of left distal radius fracture.  He reports that he is doing well currently in therapy he is now about 2 and half months out from injury  No pain reported  Therapy is going well  .    Review of patient's allergies indicates:  No Known Allergies      Current Outpatient Medications   Medication Sig Dispense Refill    acetaminophen (TYLENOL ORAL) Take by mouth.      aspirin (ASPIR-81 ORAL) Take 1 tablet by mouth once daily.       atorvastatin (LIPITOR) 20 MG tablet TAKE 1 TABLET BY MOUTH EVERY DAY 90 tablet 3    calcium phosphate trib/vit D3 (CITRACAL + D3, CALCIUM PHOS, ORAL) Take 1 tablet by mouth once daily.      cholecalciferol, vitamin D3, (VITAMIN D3) 50 mcg (2,000 unit) Cap Take 1 capsule by mouth once daily.      ibuprofen (ADVIL,MOTRIN) 600 MG tablet Take 1 tablet (600 mg total) by mouth 2 (two) times daily with meals. 60 tablet 1    ibuprofen (MOTRIN ORAL) Take by mouth.      multivitamin capsule Take 1 capsule by mouth once daily.      omega-3 acid ethyl esters (LOVAZA) 1 gram capsule TAKE 1 CAPSULE (1 G TOTAL) BY MOUTH ONCE DAILY. 90 capsule 3    pantoprazole (PROTONIX) 40 MG tablet Take 1 tablet (40 mg total) by mouth once daily. 90 tablet 3    lactulose (CHRONULAC) 10 gram/15 mL solution SMARTSI Milliliter(s) By Mouth Twice Daily       Current Facility-Administered Medications   Medication Dose Route Frequency Provider Last Rate Last Admin    ciprofloxacin HCl tablet 500 mg  500 mg Oral Once Danae Love NP           Past Medical History:   Diagnosis Date    Anterior tibialis tendinitis of right lower extremity 10/18/2018    Cancer     Elevated PSA     Family history of ASCVD 10/7/2016    Mom age 64, Dad age 67 -  on same day. Pat Aunt early 70's.    Family history of  coronary artery disease 10/7/2016    Mom age 64, Dad age 67 -  on same day. Pat Aunt early 70's.    GERD (gastroesophageal reflux disease)     Gross hematuria 2019    H/O lumbosacral spine surgery 10/18/2018    2003 and again recently due to recurrent HNP    Intractable back pain 2018    Nuclear sclerosis, bilateral 3/12/2018    Ocular migraine 2012    Osteoporosis     Prostate disease     Transient vision disturbance of both eyes 2012    Umbilical hernia without obstruction and without gangrene 10/1/2015    Urinary tract infection        Past Surgical History:   Procedure Laterality Date    back sx      lower    BLADDER FULGURATION N/A 2019    Procedure: FULGURATION, BLADDER;  Surgeon: Ryan Knowles MD;  Location: [facility];  Service: Urology;  Laterality: N/A;  bleeding tissue at bladder neck    CATARACT EXTRACTION W/  INTRAOCULAR LENS IMPLANT Bilateral     Dr Finn/Symfony IOL     COLONOSCOPY N/A 2019    Procedure: COLONOSCOPY;  Surgeon: Mauro Smallwood MD;  Location: [facility] (4TH FLR);  Service: Endoscopy;  Laterality: N/A;    CYSTOSCOPY N/A 2019    Procedure: CYSTOSCOPY;  Surgeon: Ryan Knowles MD;  Location: [facility];  Service: Urology;  Laterality: N/A;    CYSTOSCOPY      CYSTOSCOPY  2021    Procedure: CYSTOSCOPY;  Surgeon: Manan Ny MD;  Location: [facility] 1ST FLR;  Service: Urology;;    ESOPHAGOGASTRODUODENOSCOPY N/A 3/31/2021    Procedure: EGD (ESOPHAGOGASTRODUODENOSCOPY);  Surgeon: Jamilah Munoz MD;  Location: [facility];  Service: Endoscopy;  Laterality: N/A;    HERNIA REPAIR      PROSTATE SURGERY      REMOVAL OF BLOOD CLOT N/A 2019    Procedure: REMOVAL, BLOOD CLOT;  Surgeon: Ryan Knowles MD;  Location: [facility];  Service: Urology;  Laterality: N/A;  prostate    SPINE SURGERY      TONSILLECTOMY      TRANSURETHRAL RESECTION OF PROSTATE      TRANSURETHRAL RESECTION OF PROSTATE N/A 2019    Procedure: TURP  "(TRANSURETHRAL RESECTION OF PROSTATE);  Surgeon: Ryan Knowles MD;  Location: [facility] OR;  Service: Urology;  Laterality: N/A;    TRANSURETHRAL SURGICAL REMOVAL OF STRICTURE OF BLADDER NECK  7/27/2021    Procedure: EXCISION, CONTRACTURE, BLADDER NECK, TRANSURETHRAL;  Surgeon: Manan Ny MD;  Location: [address];  Service: Urology;;       OBJECTIVE:   PHYSICAL EXAM:  Height: 5' 11" (180.3 cm) Weight: 85.3 kg (188 lb)  Vitals:    01/13/22 1049   Weight: 85.3 kg (188 lb)   Height: 5' 11" (1.803 m)   PainSc: 0-No pain     Ortho/SPM Exam  Examination left wrist fracture site nontender no swelling range of motion almost full  strength slightly decreased    RADIOGRAPHS:  None  Comments: I have personally reviewed the imaging and I agree with the above radiologist's report.    ASSESSMENT/PLAN:     IMPRESSION:  Healed fracture left wrist    PLAN:  Continue therapy  I have given him a squeeze ball to work on strengthening  Discontinue the wrist splint  Increase activities as tolerated  Voltaren gel topical as needed      FOLLOW UP:  4-6 weeks    Disclaimer: This note has been generated using voice-recognition software. There may be typographical errors that have been missed during proof-reading.    "

## 2022-01-13 NOTE — PROGRESS NOTES
NEUROSURGICAL PROGRESS NOTE    DATE OF SERVICE:  01/13/2022    ATTENDING PHYSICIAN:  Nii Hickman MD    SUBJECTIVE:  Patient-Entered Questionnaire                 How are you working to achieve these goals? : (P) OT left arm, then PT left upper body.  Please describe any specific goals/questions/concerns you have regarding this visit. : (P) Determine best modalities to return left upperbody to prior strenght before  breaking arm and  fracturing L2.        Physical Therapy : (P) 0  Stretching: (P) 0  Weight Lifting: (P) 2  Range of Motion (Flexibility): (P) 0  Core Strengthening (Stability): (P) 0  Aerobic Exercises (Cardio): (P) 0  Chiropractic Treatment: (P) 0              Neck Pain : (P) 0  Right Arm Pain : (P) 0  Left Arm Pain : (P) 0     Right Leg Pain: (P) 0  Left Leg Pain: (P) 0    Neck Disability Index                                     Oswestry  Pain Intensity: (P) I have no pain at the moment.  Personal Care : (P) I can look after myself normally without causing extra pain.  Lifting: (P) I can only lift very light weights.  Walking: (P) Pain does not prevent me from walking any distance.  Sitting: (P) I can sit in any chair as long as I like.  Standing: (P) I can stand as long as I want without extra pain.  Sleeping: (P) Pain does not prevent me from sleeping well.  Employment/Homemaking : (P) My normal homemaking/job activities do not cause pain.  Social Life: (P) My social life is normal, and give me no extra pain.  Traveling : (P) I can travel anywhere without extra pain.  OWESTRY DISABILITY INDEX SCORE: (P) 8    EQ-5D-3L  Mobility: (P) I have no problems walking about.  Self-Care: (P) I have no problems with self-care.     Pain/Discomfort: (P) I have no pain or discomfort.  Anxiety/Depression: (P) I am not anxious or depressed.       Jean Carlson is a 71 y.o. male who presents with the above CC.  Patient states on 10/14/12 he was standing on a chair and fell off onto the cement on the ground.   He had immediate back pain and went to the [place] ED.  There he was diagnosed with a L2 fracture and a left wrist fracture.  He is here today for fu.     He did not get a brace in the ED because it was taking too long.      He has constant low back pain more towards the right rated 7/10 that is worse in the morning when getting out of bed and when bending and no position makes it better.  He has some numbness in the right posterior calf and the bottom of the right foot.  No leg pain or paresthesias.     He has had two back surgeries with Dr. Lowe in the past.  One 15 years ago and one 3 years ago at L3-4 and L4-5.  He is taking tylenol 500 mg and motrin 600mg BID.  He also has been using CBD cream.     Patient denies any recent accidents or trauma, no saddle anesthesias, and no bowel or bladder incontinence.    INTERIM HISTORY:    He reports no back pain at this time.  He is doing physical therapy for his left wrist fracture.  Denies having significant leg pain.  Has persistent feet numbness from prior lumbar radiculopathy.  Has been compliant with activity restrictions.  He will follow up with his endocrinologist for his known osteoporosis.  He is taking calcium vitamin-D every         PAST MEDICAL HISTORY:  Active Ambulatory Problems     Diagnosis Date Noted    Osteoporosis 03/06/2013    ED (erectile dysfunction) 02/21/2014    Hyperlipidemia 03/21/2017    Gastroesophageal reflux disease without esophagitis     Spinal stenosis of lumbar region 08/31/2018    Atherosclerosis of aorta 04/30/2019    Pulmonary nodule 04/30/2019    Left inguinal hernia 06/05/2019    Inguinal hernia of left side without obstruction or gangrene 07/09/2019    Chronic idiopathic constipation 07/18/2019    Prostate cancer 08/19/2019    Epigastric pain 08/19/2020    Hypercalciuria 09/04/2020    Chronic gastritis 03/02/2021    GERD (gastroesophageal reflux disease) 03/31/2021    Closed fracture of left wrist 10/18/2021     Left wrist pain 12/15/2021    Impaired mobility and ADLs 12/15/2021     Resolved Ambulatory Problems     Diagnosis Date Noted    Transient vision disturbance of both eyes 12/20/2012    Ocular migraine 12/20/2012    Nuclear sclerosis - Both Eyes 12/20/2012    Heartburn 03/15/2013    Sciatica 01/10/2014    Pure hypercholesterolemia 01/10/2014    Radiculopathy 01/10/2014    Umbilical hernia without obstruction and without gangrene 10/01/2015    Family history of ASCVD 10/07/2016    Erectile dysfunction 01/26/2017    Nuclear sclerosis, bilateral 03/12/2018    Post-operative state 04/16/2018    Nuclear sclerotic cataract of left eye 04/16/2018    Intractable back pain 08/28/2018    H/O lumbosacral spine surgery 10/18/2018    Anterior tibialis tendinitis of right lower extremity 10/18/2018    BPH with urinary obstruction 10/25/2018    Screening for colon cancer 06/05/2019    Clot retention of urine 07/13/2019    Gross hematuria 07/14/2019    Drug-induced constipation 07/18/2019    Bladder neck contracture 07/09/2021     Past Medical History:   Diagnosis Date    Cancer     Elevated PSA     Family history of coronary artery disease 10/7/2016    Osteoporosis     Prostate disease     Urinary tract infection        PAST SURGICAL HISTORY:  Past Surgical History:   Procedure Laterality Date    back sx      lower    BLADDER FULGURATION N/A 7/13/2019    Procedure: FULGURATION, BLADDER;  Surgeon: Ryan Knowles MD;  Location: [facility] OR;  Service: Urology;  Laterality: N/A;  bleeding tissue at bladder neck    CATARACT EXTRACTION W/  INTRAOCULAR LENS IMPLANT Bilateral     Dr Finn/Symfony IOL     COLONOSCOPY N/A 6/5/2019    Procedure: COLONOSCOPY;  Surgeon: Mauro Smallwood MD;  Location: [facility] ENDO [address]);  Service: Endoscopy;  Laterality: N/A;    CYSTOSCOPY N/A 7/13/2019    Procedure: CYSTOSCOPY;  Surgeon: Ryan Knowles MD;  Location: [facility] OR;  Service: Urology;  Laterality: N/A;     CYSTOSCOPY      CYSTOSCOPY  7/27/2021    Procedure: CYSTOSCOPY;  Surgeon: Manan Ny MD;  Location: [facility] OR [address] [place] [address];  Service: Urology;;    ESOPHAGOGASTRODUODENOSCOPY N/A 3/31/2021    Procedure: EGD (ESOPHAGOGASTRODUODENOSCOPY);  Surgeon: Jamilah Munoz MD;  Location: [facility];  Service: Endoscopy;  Laterality: N/A;    HERNIA REPAIR      PROSTATE SURGERY      REMOVAL OF BLOOD CLOT N/A 7/13/2019    Procedure: REMOVAL, BLOOD CLOT;  Surgeon: Ryan Knowles MD;  Location: [facility] OR;  Service: Urology;  Laterality: N/A;  prostate    SPINE SURGERY      TONSILLECTOMY      TRANSURETHRAL RESECTION OF PROSTATE      TRANSURETHRAL RESECTION OF PROSTATE N/A 7/13/2019    Procedure: TURP (TRANSURETHRAL RESECTION OF PROSTATE);  Surgeon: Ryan Knowles MD;  Location: [facility] OR;  Service: Urology;  Laterality: N/A;    TRANSURETHRAL SURGICAL REMOVAL OF STRICTURE OF BLADDER NECK  7/27/2021    Procedure: EXCISION, CONTRACTURE, BLADDER NECK, TRANSURETHRAL;  Surgeon: Manan Ny MD;  Location: [facility] OR [address] [place] [address];  Service: Urology;;       SOCIAL HISTORY:   Social History     Socioeconomic History    Marital status:    Occupational History     Employer: [employer]   Tobacco Use    Smoking status: Never Smoker    Smokeless tobacco: Never Used   Substance and Sexual Activity    Alcohol use: Yes     Alcohol/week: 3.0 standard drinks     Types: 3 Glasses of wine per week     Comment: rarely    Drug use: No    Sexual activity: Not Currently     Partners: Female   Social History Narrative     [employer]. 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     Social Determinants of Health     Financial Resource Strain: Low Risk     Difficulty of Paying Living Expenses: Not hard at all   Food Insecurity: No Food Insecurity    Worried About Running Out of Food in the Last Year: Never true    Ran Out of Food in the Last Year: Never true   Transportation Needs: No Transportation Needs    Lack of Transportation (Medical): No     Lack of Transportation (Non-Medical): No   Physical Activity: Unknown    Days of Exercise per Week: Patient refused    Minutes of Exercise per Session: 150+ min   Stress: Unknown    Feeling of Stress : Patient refused   Social Connections: Unknown    Frequency of Communication with Friends and Family: More than three times a week    Frequency of Social Gatherings with Friends and Family: Once a week    Active Member of Clubs or Organizations: Yes    Attends Club or Organization Meetings: 1 to 4 times per year    Marital Status:    Housing Stability: Low Risk     Unable to Pay for Housing in the Last Year: No    Number of Places Lived in the Last Year: 1    Unstable Housing in the Last Year: No       FAMILY HISTORY:  Family History   Problem Relation Age of Onset    Glaucoma Maternal Uncle     Retinitis pigmentosa Maternal Uncle     Heart disease Mother     Heart attack Mother     Skin cancer Mother     Heart disease Father     Heart attack Father     Glaucoma Maternal Aunt     No Known Problems Daughter     Prostate cancer Neg Hx     Kidney disease Neg Hx        CURRENTS MEDICATIONS:  Current Outpatient Medications on File Prior to Visit   Medication Sig Dispense Refill    acetaminophen (TYLENOL ORAL) Take by mouth.      aspirin (ASPIR-81 ORAL) Take 1 tablet by mouth once daily.       atorvastatin (LIPITOR) 20 MG tablet TAKE 1 TABLET BY MOUTH EVERY DAY 90 tablet 3    calcium phosphate trib/vit D3 (CITRACAL + D3, CALCIUM PHOS, ORAL) Take 1 tablet by mouth once daily.      cholecalciferol, vitamin D3, (VITAMIN D3) 50 mcg (2,000 unit) Cap Take 1 capsule by mouth once daily.      ibuprofen (ADVIL,MOTRIN) 600 MG tablet Take 1 tablet (600 mg total) by mouth 2 (two) times daily with meals. 60 tablet 1    ibuprofen (MOTRIN ORAL) Take by mouth.      multivitamin capsule Take 1 capsule by mouth once daily.      omega-3 acid ethyl esters (LOVAZA) 1 gram capsule TAKE 1 CAPSULE (1 G TOTAL)  BY MOUTH ONCE DAILY. 90 capsule 3    pantoprazole (PROTONIX) 40 MG tablet Take 1 tablet (40 mg total) by mouth once daily. 90 tablet 3    lactulose (CHRONULAC) 10 gram/15 mL solution SMARTSI Milliliter(s) By Mouth Twice Daily       Current Facility-Administered Medications on File Prior to Visit   Medication Dose Route Frequency Provider Last Rate Last Admin    ciprofloxacin HCl tablet 500 mg  500 mg Oral Once Danae Love NP           ALLERGIES:  Review of patient's allergies indicates:  No Known Allergies    REVIEW OF SYSTEMS:  Review of Systems   Constitutional: Negative for diaphoresis, fever and weight loss.   Respiratory: Negative for shortness of breath.    Cardiovascular: Negative for chest pain.   Gastrointestinal: Negative for blood in stool.   Genitourinary: Negative for hematuria.   Endo/Heme/Allergies: Bruises/bleeds easily.   All other systems reviewed and are negative.        OBJECTIVE:    PHYSICAL EXAMINATION:   There were no vitals filed for this visit.    Physical Exam:  Vitals reviewed.    Constitutional: He appears well-developed and well-nourished.     Eyes: Pupils are equal, round, and reactive to light. Conjunctivae and EOM are normal.     Cardiovascular: Normal distal pulses and no edema.     Abdominal: Soft.     Skin: Skin displays no rash on trunk and no rash on extremities. Skin displays no lesions on trunk and no lesions on extremities.     Psych/Behavior: He is alert. He is oriented to person, place, and time. He has a normal mood and affect.     Musculoskeletal:        Neck: Range of motion is full.     Neurological:        DTRs: Tricep reflexes are 2+ on the right side and 2+ on the left side. Bicep reflexes are 2+ on the right side and 2+ on the left side. Brachioradialis reflexes are 2+ on the right side and 2+ on the left side. Patellar reflexes are 2+ on the right side and 2+ on the left side. Achilles reflexes are 2+ on the right side and 2+ on the left side.        Back Exam     Tenderness   The patient is experiencing no tenderness.     Range of Motion   Extension: normal   Flexion: normal   Lateral bend right: normal   Lateral bend left: normal   Rotation right: normal   Rotation left: normal     Muscle Strength   Right Quadriceps:  5/5   Left Quadriceps:  5/5   Right Hamstrings:  5/5   Left Hamstrings:  5/5     Tests   Straight leg raise right: negative  Straight leg raise left: negative    Other   Toe walk: normal  Heel walk: normal            SI joint:   Palpation at the right and left SI joints not painful  JUSTIN test is negative bilaterally  Gaenslen test is negative bilaterally  Thigh thrust test is negative bilaterally    Neurologic Exam     Mental Status   Oriented to person, place, and time.   Speech: speech is normal   Level of consciousness: alert    Cranial Nerves   Cranial nerves II through XII intact.     CN III, IV, VI   Pupils are equal, round, and reactive to light.  Extraocular motions are normal.     Motor Exam   Muscle bulk: normal  Overall muscle tone: normal    Strength   Right deltoid: 5/5  Left deltoid: 5/5  Right biceps: 5/5  Left biceps: 5/5  Right triceps: 5/5  Left triceps: 5/5  Right wrist flexion: 5/5  Left wrist flexion: 5/5  Right wrist extension: 5/5  Left wrist extension: 5/5  Right interossei: 5/5  Left interossei: 5/5  Right iliopsoas: 5/5  Left iliopsoas: 5/5  Right quadriceps: 5/5  Left quadriceps: 5/5  Right hamstrin/5  Left hamstrin/5  Right anterior tibial: 5/5  Left anterior tibial: 5/5  Right posterior tibial: 5/5  Left posterior tibial: 5/5  Right peroneal: 5/5  Left peroneal: 5/5  Right gastroc: 5/5  Left gastroc: 5/5    Sensory Exam   Light touch normal.   Pinprick normal.     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 male with     Problem List Items Addressed This Visit    None     Visit Diagnoses     Degenerative scoliosis    -  Primary    Relevant Orders    X-Ray Scoliosis Complete Including Supine And Erect    Bertolotti's syndrome        Relevant Orders    X-Ray Scoliosis Complete Including Supine And Erect    Closed wedge compression fracture of L2 vertebra, initial encounter        Relevant Orders    X-Ray Scoliosis Complete Including Supine And Erect            PLAN:  Okay to resume light lifting, core strengthening exercises, gym exercises.  Avoid overhead lifting, repetitive impact exercises or axial loading exercises  Follow-up in 1 year with scoliosis film  All questions answered  Will follow-up with endocrinology for osteoporosis, he might be a good candidate for Prolia treatment        Nii Hickman MD  Cell:[phone]

## 2022-02-24 ENCOUNTER — OFFICE VISIT (OUTPATIENT)
Dept: ORTHOPEDICS | Facility: CLINIC | Age: 72
End: 2022-02-24
Payer: MEDICARE

## 2022-02-24 VITALS — BODY MASS INDEX: 26.32 KG/M2 | HEIGHT: 71 IN | WEIGHT: 188 LBS

## 2022-02-24 DIAGNOSIS — S62.102D CLOSED FRACTURE OF LEFT WRIST WITH ROUTINE HEALING, SUBSEQUENT ENCOUNTER: Primary | ICD-10-CM

## 2022-02-24 PROCEDURE — 99213 OFFICE O/P EST LOW 20 MIN: CPT | Mod: S$PBB,,, | Performed by: ORTHOPAEDIC SURGERY

## 2022-02-24 PROCEDURE — 99999 PR PBB SHADOW E&M-EST. PATIENT-LVL III: CPT | Mod: PBBFAC,,, | Performed by: ORTHOPAEDIC SURGERY

## 2022-02-24 PROCEDURE — 99213 PR OFFICE/OUTPT VISIT, EST, LEVL III, 20-29 MIN: ICD-10-PCS | Mod: S$PBB,,, | Performed by: ORTHOPAEDIC SURGERY

## 2022-02-24 PROCEDURE — 99999 PR PBB SHADOW E&M-EST. PATIENT-LVL III: ICD-10-PCS | Mod: PBBFAC,,, | Performed by: ORTHOPAEDIC SURGERY

## 2022-02-24 PROCEDURE — 99213 OFFICE O/P EST LOW 20 MIN: CPT | Mod: PBBFAC,PN | Performed by: ORTHOPAEDIC SURGERY

## 2022-02-24 NOTE — PROGRESS NOTES
Subjective:      Patient ID: Jean Carlson is a 71 y.o. male.  Chief Complaint: Follow-up of the Left Wrist      HPI  Jean Carlson is a  71 y.o. male presenting today for follow up of left distal radius fracture.  He reports that he is doing much better minimal pain finished up physical therapy with good results  He is back regular activities now.    Review of patient's allergies indicates:  No Known Allergies      Current Outpatient Medications   Medication Sig Dispense Refill    aspirin (ASPIR-81 ORAL) Take 1 tablet by mouth once daily.       atorvastatin (LIPITOR) 20 MG tablet TAKE 1 TABLET BY MOUTH EVERY DAY 90 tablet 3    calcium phosphate trib/vit D3 (CITRACAL + D3, CALCIUM PHOS, ORAL) Take 1 tablet by mouth once daily.      cholecalciferol, vitamin D3, (VITAMIN D3) 50 mcg (2,000 unit) Cap Take 1 capsule by mouth once daily.      multivitamin capsule Take 1 capsule by mouth once daily.      omega-3 acid ethyl esters (LOVAZA) 1 gram capsule TAKE 1 CAPSULE (1 G TOTAL) BY MOUTH ONCE DAILY. 90 capsule 3    pantoprazole (PROTONIX) 40 MG tablet Take 1 tablet (40 mg total) by mouth once daily. 90 tablet 3    acetaminophen (TYLENOL ORAL) Take by mouth.      ibuprofen (ADVIL,MOTRIN) 600 MG tablet Take 1 tablet (600 mg total) by mouth 2 (two) times daily with meals. 60 tablet 1    ibuprofen (MOTRIN ORAL) Take by mouth.      lactulose (CHRONULAC) 10 gram/15 mL solution SMARTSI Milliliter(s) By Mouth Twice Daily       Current Facility-Administered Medications   Medication Dose Route Frequency Provider Last Rate Last Admin    ciprofloxacin HCl tablet 500 mg  500 mg Oral Once Danae Love NP           Past Medical History:   Diagnosis Date    Anterior tibialis tendinitis of right lower extremity 10/18/2018    Cancer     Elevated PSA     Family history of ASCVD 10/7/2016    Mom age 64, Dad age 67 -  on same day. Pat Aunt early 70's.    Family history of coronary artery disease 10/7/2016     Mom age 64, Dad age 67 -  on same day. Pat Aunt early 70's.   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;  Service: Urology;  Laterality: N/A;  bleeding tissue at bladder neck    CATARACT EXTRACTION W/  INTRAOCULAR LENS IMPLANT Bilateral     Dr Finn/Symfony IOL     COLONOSCOPY N/A 2019    Procedure: COLONOSCOPY;  Surgeon: Mauro Smallwood MD;  Location: [facility] (4TH FLR);  Service: Endoscopy;  Laterality: N/A;    CYSTOSCOPY N/A 2019    Procedure: CYSTOSCOPY;  Surgeon: Ryan Knowles MD;  Location: [facility];  Service: Urology;  Laterality: N/A;    CYSTOSCOPY      CYSTOSCOPY  2021    Procedure: CYSTOSCOPY;  Surgeon: Manan Ny MD;  Location: [facility] 1ST FLR;  Service: Urology;;    ESOPHAGOGASTRODUODENOSCOPY N/A 3/31/2021    Procedure: EGD (ESOPHAGOGASTRODUODENOSCOPY);  Surgeon: Jamilah Munoz MD;  Location: [facility];  Service: Endoscopy;  Laterality: N/A;    HERNIA REPAIR      PROSTATE SURGERY      REMOVAL OF BLOOD CLOT N/A 2019    Procedure: REMOVAL, BLOOD CLOT;  Surgeon: Ryan Knowles MD;  Location: [facility];  Service: Urology;  Laterality: N/A;  prostate    SPINE SURGERY      TONSILLECTOMY      TRANSURETHRAL RESECTION OF PROSTATE      TRANSURETHRAL RESECTION OF PROSTATE N/A 2019    Procedure: TURP (TRANSURETHRAL RESECTION OF  "PROSTATE);  Surgeon: Ryan Knowles MD;  Location[facility] OR;  Service: Urology;  Laterality: N/A;    TRANSURETHRAL SURGICAL REMOVAL OF STRICTURE OF BLADDER NECK  7/27/2021    Procedure: EXCISION, CONTRACTURE, BLADDER NECK, TRANSURETHRAL;  Surgeon: Manan Ny MD;  Location: [facility] OR [address] [place] [address];  Service: Urology;;       OBJECTIVE:   PHYSICAL EXAM:  Height: 5' 11" (180.3 cm) Weight: 85.3 kg (188 lb)  Vitals:    02/24/22 1052   Weight: 85.3 kg (188 lb)   Height: 5' 11" (1.803 m)   PainSc: 0-No pain     Ortho/SPM Exam site nontender no swelling range of motion basically full  Good strength  No tenderness    Examination left wrist fracture    RADIOGRAPHS:  None  Comments: I have personally reviewed the imaging and I agree with the above radiologist's report.    ASSESSMENT/PLAN:     IMPRESSION:  Status post left wrist fracture healed    PLAN:  Continue home exercise program  He can return to full activities  Advil or Motrin as needed      FOLLOW UP:  As needed    Disclaimer: This note has been generated using voice-recognition software. There may be typographical errors that have been missed during proof-reading.    "

## 2022-03-11 DIAGNOSIS — R10.13 EPIGASTRIC PAIN: ICD-10-CM

## 2022-03-11 DIAGNOSIS — K21.9 GASTROESOPHAGEAL REFLUX DISEASE WITHOUT ESOPHAGITIS: Chronic | ICD-10-CM

## 2022-03-11 RX ORDER — PANTOPRAZOLE SODIUM 40 MG/1
40 TABLET, DELAYED RELEASE ORAL DAILY
Qty: 90 TABLET | Refills: 3 | Status: SHIPPED | OUTPATIENT
Start: 2022-03-11 | End: 2023-04-19 | Stop reason: SDUPTHER

## 2022-03-28 ENCOUNTER — TELEPHONE (OUTPATIENT)
Dept: INTERNAL MEDICINE | Facility: CLINIC | Age: 72
End: 2022-03-28
Payer: MEDICARE

## 2022-03-28 DIAGNOSIS — E78.5 HYPERLIPIDEMIA, UNSPECIFIED HYPERLIPIDEMIA TYPE: ICD-10-CM

## 2022-03-28 DIAGNOSIS — Z00.00 ANNUAL PHYSICAL EXAM: Primary | ICD-10-CM

## 2022-03-28 NOTE — TELEPHONE ENCOUNTER
----- Message from Keyonna Early sent at 3/28/2022 12:44 PM CDT -----  Regarding: order needed  Contact: pateint [phone]  Doctor appointment and lab have been scheduled.  Please link lab orders to the lab appointment.  Date of doctor appointment:  5-  Date of lab appointment:  5-9-2022  Physical or F/U: annual  Comments: Please check PSA and any coronary test

## 2022-03-28 NOTE — PROGRESS NOTES
Subjective:       Patient ID: Jean Carlson is a 71 y.o. male.    Chief Complaint: Sinusitis and Nasal Congestion (Yellow mucus x1day)    Pt of Dr. Gallardo, here for sinus infection. Seen in  on 3-3-22. Given Cortisone shot, z-pack, and promethazine with codeine syrup. No longer coughing now but wokr up 2 days ago with yellow mucus out of right nostril and facial pain right cheek.    Sinus Problem  This is a new problem. The current episode started in the past 7 days (2 days ago). The problem is unchanged. There has been no fever. His pain is at a severity of 0/10. He is experiencing no pain. Associated symptoms include congestion, sinus pressure and sneezing. Pertinent negatives include no chills, coughing, diaphoresis, ear pain, headaches, hoarse voice, neck pain, shortness of breath, sore throat or swollen glands. Past treatments include spray decongestants and antibiotics (took flonase nasal spray recently. Was on Zpack 3-3 with cough syrup). The treatment provided moderate relief.     Review of Systems   Constitutional: Negative for activity change, appetite change, chills, diaphoresis, fatigue, fever and unexpected weight change.   HENT: Positive for nasal congestion, postnasal drip, rhinorrhea, sinus pressure/congestion and sneezing. Negative for ear pain, facial swelling, hoarse voice, sore throat, trouble swallowing and voice change.    Respiratory: Negative for cough, chest tightness and shortness of breath.    Cardiovascular: Negative for chest pain.   Gastrointestinal: Negative for abdominal pain, diarrhea, nausea and vomiting.   Musculoskeletal: Negative for neck pain.   Allergic/Immunologic: Positive for environmental allergies.        Sleeps with ceiling fan  Shedding dog in home that gets in bed with pt   Neurological: Negative for dizziness, weakness, numbness and headaches.   Hematological: Negative for adenopathy. Does not bruise/bleed easily.   Psychiatric/Behavioral: Negative for suicidal  ideas.        Review of patient's allergies indicates:  No Known Allergies      Current Outpatient Medications:     aspirin (ASPIR-81 ORAL), Take 1 tablet by mouth once daily. , Disp: , Rfl:     atorvastatin (LIPITOR) 20 MG tablet, TAKE 1 TABLET BY MOUTH EVERY DAY, Disp: 90 tablet, Rfl: 3    calcium phosphate trib/vit D3 (CITRACAL + D3, CALCIUM PHOS, ORAL), Take 1 tablet by mouth once daily., Disp: , Rfl:     cholecalciferol, vitamin D3, (VITAMIN D3) 50 mcg (2,000 unit) Cap, Take 1 capsule by mouth once daily., Disp: , Rfl:     multivitamin capsule, Take 1 capsule by mouth once daily., Disp: , Rfl:     omega-3 acid ethyl esters (LOVAZA) 1 gram capsule, TAKE 1 CAPSULE (1 G TOTAL) BY MOUTH ONCE DAILY., Disp: 90 capsule, Rfl: 3    pantoprazole (PROTONIX) 40 MG tablet, Take 1 tablet (40 mg total) by mouth once daily., Disp: 90 tablet, Rfl: 3    Current Facility-Administered Medications:     ciprofloxacin HCl tablet 500 mg, 500 mg, Oral, Once, Danae Love NP    Patient Active Problem List   Diagnosis    Osteoporosis    ED (erectile dysfunction)    Hyperlipidemia    Gastroesophageal reflux disease without esophagitis    Spinal stenosis of lumbar region    Atherosclerosis of aorta    Pulmonary nodule    Left inguinal hernia    Inguinal hernia of left side without obstruction or gangrene    Chronic idiopathic constipation    Prostate cancer    Epigastric pain    Hypercalciuria    Chronic gastritis    GERD (gastroesophageal reflux disease)    Closed fracture of left wrist    Left wrist pain    Impaired mobility and ADLs     Past Medical History:   Diagnosis Date    Anterior tibialis tendinitis of right lower extremity 10/18/2018    Cancer     Elevated PSA     Family history of ASCVD 10/7/2016    Mom age 64, Dad age 67 -  on same day. Pat Aunt early 70's.    Family history of coronary artery disease 10/7/2016    Mom age 64, Dad age 67 -  on same day. Pat Aunt early 70's.    GERD  (gastroesophageal reflux disease)     Gross hematuria 7/14/2019    H/O lumbosacral spine surgery 10/18/2018    2003 and again recently due to recurrent HNP    Intractable back pain 8/28/2018    Nuclear sclerosis, bilateral 3/12/2018    Ocular migraine 12/20/2012    Osteoporosis     Prostate disease     Transient vision disturbance of both eyes 12/20/2012    Umbilical hernia without obstruction and without gangrene 10/1/2015    Urinary tract infection      Past Surgical History:   Procedure Laterality Date    back sx      lower    BLADDER FULGURATION N/A 7/13/2019    Procedure: FULGURATION, BLADDER;  Surgeon: Ryan Knowles MD;  Location: [facility];  Service: Urology;  Laterality: N/A;  bleeding tissue at bladder neck    CATARACT EXTRACTION W/  INTRAOCULAR LENS IMPLANT Bilateral     Dr Finn/Ana IOL     COLONOSCOPY N/A 6/5/2019    Procedure: COLONOSCOPY;  Surgeon: Mauro Smallwood MD;  Location: [facility] (4TH FLR);  Service: Endoscopy;  Laterality: N/A;    CYSTOSCOPY N/A 7/13/2019    Procedure: CYSTOSCOPY;  Surgeon: Ryan Knowles MD;  Location: [facility];  Service: Urology;  Laterality: N/A;    CYSTOSCOPY      CYSTOSCOPY  7/27/2021    Procedure: CYSTOSCOPY;  Surgeon: Manan Ny MD;  Location: [facility] 1ST FLR;  Service: Urology;;    ESOPHAGOGASTRODUODENOSCOPY N/A 3/31/2021    Procedure: EGD (ESOPHAGOGASTRODUODENOSCOPY);  Surgeon: Jamilah Munoz MD;  Location: [facility];  Service: Endoscopy;  Laterality: N/A;    HERNIA REPAIR      PROSTATE SURGERY      REMOVAL OF BLOOD CLOT N/A 7/13/2019    Procedure: REMOVAL, BLOOD CLOT;  Surgeon: Ryan Knowles MD;  Location: [facility];  Service: Urology;  Laterality: N/A;  prostate    SPINE SURGERY      TONSILLECTOMY      TRANSURETHRAL RESECTION OF PROSTATE      TRANSURETHRAL RESECTION OF PROSTATE N/A 7/13/2019    Procedure: TURP (TRANSURETHRAL RESECTION OF PROSTATE);  Surgeon: Ryan Knowles MD;  Location: [facility];  Service: Urology;   Laterality: N/A;    TRANSURETHRAL SURGICAL REMOVAL OF STRICTURE OF BLADDER NECK  7/27/2021    Procedure: EXCISION, CONTRACTURE, BLADDER NECK, TRANSURETHRAL;  Surgeon: Manan Ny MD;  Location: [facility] OR [address] [place] [address];  Service: Urology;;     Social History     Socioeconomic History    Marital status:    Occupational History     Employer: [employer]   Tobacco Use    Smoking status: Never Smoker    Smokeless tobacco: Never Used   Substance and Sexual Activity    Alcohol use: Yes     Alcohol/week: 3.0 standard drinks     Types: 3 Glasses of wine per week     Comment: rarely    Drug use: No    Sexual activity: Not Currently     Partners: Female   Social History Narrative     [employer]. 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     Social Determinants of Health     Financial Resource Strain: Low Risk     Difficulty of Paying Living Expenses: Not hard at all   Food Insecurity: No Food Insecurity    Worried About Running Out of Food in the Last Year: Never true    Ran Out of Food in the Last Year: Never true   Transportation Needs: No Transportation Needs    Lack of Transportation (Medical): No    Lack of Transportation (Non-Medical): No   Physical Activity: Unknown    Days of Exercise per Week: Patient refused    Minutes of Exercise per Session: 150+ min   Stress: Unknown    Feeling of Stress : Patient refused   Social Connections: Unknown    Frequency of Communication with Friends and Family: More than three times a week    Frequency of Social Gatherings with Friends and Family: Once a week    Active Member of Clubs or Organizations: Yes    Attends Club or Organization Meetings: 1 to 4 times per year    Marital Status:    Housing Stability: Low Risk     Unable to Pay for Housing in the Last Year: No    Number of Places Lived in the Last Year: 1    Unstable Housing in the Last Year: No     Family History   Problem Relation Age of Onset    Glaucoma Maternal Uncle     Retinitis pigmentosa Maternal  Uncle     Heart disease Mother     Heart attack Mother     Skin cancer Mother     Heart disease Father     Heart attack Father     Glaucoma Maternal Aunt     No Known Problems Daughter     Prostate cancer Neg Hx     Kidney disease Neg Hx            Objective:       Vitals:    03/29/22 0835   BP: 132/72   Pulse: 65   Weight: 87.3 kg (192 lb 7.4 oz)   Height: 6' (1.829 m)   PainSc:   1     Body mass index is 26.1 kg/m².    Physical Exam  Vitals and nursing note reviewed.   Constitutional:       General: He is not in acute distress.     Appearance: Normal appearance. He is not ill-appearing, toxic-appearing or diaphoretic.   HENT:      Head: Normocephalic.      Right Ear: Tympanic membrane, ear canal and external ear normal. There is no impacted cerumen.      Left Ear: Tympanic membrane, ear canal and external ear normal. There is no impacted cerumen.      Nose: Congestion and rhinorrhea present.      Right Sinus: Maxillary sinus tenderness present.      Mouth/Throat:      Mouth: Mucous membranes are moist.      Pharynx: Oropharynx is clear. No oropharyngeal exudate or posterior oropharyngeal erythema.      Comments: Clear PND noted on exam    Eyes:      Extraocular Movements: Extraocular movements intact.      Conjunctiva/sclera: Conjunctivae normal.      Pupils: Pupils are equal, round, and reactive to light.   Cardiovascular:      Rate and Rhythm: Normal rate and regular rhythm.      Pulses: Normal pulses.      Heart sounds: Normal heart sounds.   Pulmonary:      Effort: Pulmonary effort is normal.      Breath sounds: Normal breath sounds.   Musculoskeletal:         General: Normal range of motion.      Cervical back: Normal range of motion and neck supple.   Lymphadenopathy:      Cervical: No cervical adenopathy.   Skin:     General: Skin is warm and dry.   Neurological:      General: No focal deficit present.      Mental Status: He is alert and oriented to person, place, and time.   Psychiatric:          Mood and Affect: Mood normal.         Behavior: Behavior normal.         Thought Content: Thought content normal.         Judgment: Judgment normal.         Assessment:       Problem List Items Addressed This Visit    None     Visit Diagnoses     Acute non-recurrent maxillary sinusitis    -  Primary    Relevant Medications    fluticasone propionate (FLONASE) 50 mcg/actuation nasal spray    levocetirizine (XYZAL) 5 MG tablet    Postnasal drip        Relevant Medications    fluticasone propionate (FLONASE) 50 mcg/actuation nasal spray    levocetirizine (XYZAL) 5 MG tablet    BMI 26.0-26.9,adult        Overweight (BMI 25.0-29.9)              Plan:       Jean was seen today for sinusitis and nasal congestion.    Diagnoses and all orders for this visit:    Acute non-recurrent maxillary sinusitis  -     fluticasone propionate (FLONASE) 50 mcg/actuation nasal spray; 1 spray (50 mcg total) by Each Nostril route 2 (two) times daily.  -     levocetirizine (XYZAL) 5 MG tablet; Take 1 tablet (5 mg total) by mouth every evening.    Postnasal drip  -     fluticasone propionate (FLONASE) 50 mcg/actuation nasal spray; 1 spray (50 mcg total) by Each Nostril route 2 (two) times daily.  -     levocetirizine (XYZAL) 5 MG tablet; Take 1 tablet (5 mg total) by mouth every evening.    BMI 26.0-26.9,adult  BMI reviewed    Overweight (BMI 25.0-29.9)  BMI reviewed.    Diet and exercise to lose weight.    Meds as prescribed    Rest and Fluids, Tylenol or Motrin otc as needed for pain and or fever    Warm liquids to thin mucus    Allergen avoidance discussed, humidified air recommended    Warm salt water gargles as needed for throat pain    Nasal spray, taught how to correctly use    Self care instructions provided in AVS    Follow up if symptoms worsen or fail to improve.

## 2022-03-29 ENCOUNTER — OFFICE VISIT (OUTPATIENT)
Dept: INTERNAL MEDICINE | Facility: CLINIC | Age: 72
End: 2022-03-29
Payer: MEDICARE

## 2022-03-29 VITALS
WEIGHT: 192.44 LBS | HEIGHT: 72 IN | BODY MASS INDEX: 26.07 KG/M2 | DIASTOLIC BLOOD PRESSURE: 72 MMHG | SYSTOLIC BLOOD PRESSURE: 132 MMHG | HEART RATE: 65 BPM

## 2022-03-29 DIAGNOSIS — J01.00 ACUTE NON-RECURRENT MAXILLARY SINUSITIS: Primary | ICD-10-CM

## 2022-03-29 DIAGNOSIS — E66.3 OVERWEIGHT (BMI 25.0-29.9): ICD-10-CM

## 2022-03-29 DIAGNOSIS — R09.82 POSTNASAL DRIP: ICD-10-CM

## 2022-03-29 PROCEDURE — 99999 PR PBB SHADOW E&M-EST. PATIENT-LVL III: CPT | Mod: PBBFAC,,, | Performed by: NURSE PRACTITIONER

## 2022-03-29 PROCEDURE — 99213 OFFICE O/P EST LOW 20 MIN: CPT | Mod: PBBFAC | Performed by: NURSE PRACTITIONER

## 2022-03-29 PROCEDURE — 99999 PR PBB SHADOW E&M-EST. PATIENT-LVL III: ICD-10-PCS | Mod: PBBFAC,,, | Performed by: NURSE PRACTITIONER

## 2022-03-29 PROCEDURE — 99214 PR OFFICE/OUTPT VISIT, EST, LEVL IV, 30-39 MIN: ICD-10-PCS | Mod: S$PBB,,, | Performed by: NURSE PRACTITIONER

## 2022-03-29 PROCEDURE — 99214 OFFICE O/P EST MOD 30 MIN: CPT | Mod: S$PBB,,, | Performed by: NURSE PRACTITIONER

## 2022-03-29 RX ORDER — FLUTICASONE PROPIONATE 50 MCG
1 SPRAY, SUSPENSION (ML) NASAL 2 TIMES DAILY
Qty: 16 G | Refills: 0 | Status: SHIPPED | OUTPATIENT
Start: 2022-03-29 | End: 2022-06-02

## 2022-03-29 RX ORDER — LEVOCETIRIZINE DIHYDROCHLORIDE 5 MG/1
5 TABLET, FILM COATED ORAL NIGHTLY
Qty: 30 TABLET | Refills: 0 | Status: SHIPPED | OUTPATIENT
Start: 2022-03-29 | End: 2022-05-16

## 2022-03-29 NOTE — PATIENT INSTRUCTIONS
Meds as prescribed    Rest and Fluids, Tylenol or Motrin otc as needed for pain and or fever    Warm liquids to thin mucus    Allergen avoidance discussed, humidified air recommended    Warm salt water gargles as needed for throat pain    Nasal spray, taught how to correctly use

## 2022-05-09 ENCOUNTER — LAB VISIT (OUTPATIENT)
Dept: LAB | Facility: HOSPITAL | Age: 72
End: 2022-05-09
Payer: MEDICARE

## 2022-05-09 DIAGNOSIS — N13.8 BPH WITH URINARY OBSTRUCTION: ICD-10-CM

## 2022-05-09 DIAGNOSIS — Z90.79 S/P TURP: ICD-10-CM

## 2022-05-09 DIAGNOSIS — Z00.00 ANNUAL PHYSICAL EXAM: ICD-10-CM

## 2022-05-09 DIAGNOSIS — N48.6 PEYRONIE'S DISEASE: ICD-10-CM

## 2022-05-09 DIAGNOSIS — N40.1 BPH WITH URINARY OBSTRUCTION: ICD-10-CM

## 2022-05-09 DIAGNOSIS — E78.5 HYPERLIPIDEMIA, UNSPECIFIED HYPERLIPIDEMIA TYPE: ICD-10-CM

## 2022-05-09 DIAGNOSIS — C61 PROSTATE CANCER: ICD-10-CM

## 2022-05-09 LAB
ALBUMIN SERPL BCP-MCNC: 4 G/DL (ref 3.5–5.2)
ALP SERPL-CCNC: 78 U/L (ref 55–135)
ALT SERPL W/O P-5'-P-CCNC: 24 U/L (ref 10–44)
ANION GAP SERPL CALC-SCNC: 6 MMOL/L (ref 8–16)
AST SERPL-CCNC: 24 U/L (ref 10–40)
BILIRUB SERPL-MCNC: 0.7 MG/DL (ref 0.1–1)
BUN SERPL-MCNC: 19 MG/DL (ref 8–23)
CALCIUM SERPL-MCNC: 9.7 MG/DL (ref 8.7–10.5)
CHLORIDE SERPL-SCNC: 107 MMOL/L (ref 95–110)
CHOLEST SERPL-MCNC: 134 MG/DL (ref 120–199)
CHOLEST/HDLC SERPL: 3.2 {RATIO} (ref 2–5)
CO2 SERPL-SCNC: 28 MMOL/L (ref 23–29)
COMPLEXED PSA SERPL-MCNC: 1.2 NG/ML (ref 0–4)
CREAT SERPL-MCNC: 0.9 MG/DL (ref 0.5–1.4)
EST. GFR  (AFRICAN AMERICAN): >60 ML/MIN/1.73 M^2
EST. GFR  (NON AFRICAN AMERICAN): >60 ML/MIN/1.73 M^2
GLUCOSE SERPL-MCNC: 111 MG/DL (ref 70–110)
HDLC SERPL-MCNC: 42 MG/DL (ref 40–75)
HDLC SERPL: 31.3 % (ref 20–50)
LDLC SERPL CALC-MCNC: 66.6 MG/DL (ref 63–159)
NONHDLC SERPL-MCNC: 92 MG/DL
POTASSIUM SERPL-SCNC: 4.5 MMOL/L (ref 3.5–5.1)
PROT SERPL-MCNC: 6.5 G/DL (ref 6–8.4)
SODIUM SERPL-SCNC: 141 MMOL/L (ref 136–145)
TRIGL SERPL-MCNC: 127 MG/DL (ref 30–150)

## 2022-05-09 PROCEDURE — 84153 ASSAY OF PSA TOTAL: CPT | Performed by: NURSE PRACTITIONER

## 2022-05-09 PROCEDURE — 80053 COMPREHEN METABOLIC PANEL: CPT | Performed by: FAMILY MEDICINE

## 2022-05-09 PROCEDURE — 80061 LIPID PANEL: CPT | Performed by: FAMILY MEDICINE

## 2022-05-09 PROCEDURE — 36415 COLL VENOUS BLD VENIPUNCTURE: CPT | Performed by: NURSE PRACTITIONER

## 2022-05-14 NOTE — TELEPHONE ENCOUNTER
Refill Routing Note   Medication(s) are not appropriate for processing by [facility] for the following reason(s):      - Patient has been seen in the ED/Hospital since the last PCP visit    [facility] action(s):  Defer          Medication reconciliation completed: No     Appointments  past 12m or future 3m with PCP    Date Provider   Last Visit   2/5/2021 Papi Gallardo MD   Next Visit   5/16/2022 Papi Gallardo MD   ED visits in past 90 days: 0        Note composed:4:17 PM 05/14/2022

## 2022-05-14 NOTE — TELEPHONE ENCOUNTER
No new care gaps identified.  [facility] Embedded Care Gaps. Reference number: [id number]. 5/14/2022   7:04:22 AM CDT

## 2022-05-16 ENCOUNTER — TELEPHONE (OUTPATIENT)
Dept: ORTHOPEDICS | Facility: CLINIC | Age: 72
End: 2022-05-16
Payer: MEDICARE

## 2022-05-16 ENCOUNTER — OFFICE VISIT (OUTPATIENT)
Dept: INTERNAL MEDICINE | Facility: CLINIC | Age: 72
End: 2022-05-16
Attending: FAMILY MEDICINE
Payer: MEDICARE

## 2022-05-16 ENCOUNTER — LAB VISIT (OUTPATIENT)
Dept: LAB | Facility: HOSPITAL | Age: 72
End: 2022-05-16
Attending: FAMILY MEDICINE
Payer: MEDICARE

## 2022-05-16 VITALS
HEIGHT: 72 IN | OXYGEN SATURATION: 95 % | HEART RATE: 71 BPM | SYSTOLIC BLOOD PRESSURE: 132 MMHG | BODY MASS INDEX: 25.92 KG/M2 | WEIGHT: 191.38 LBS | DIASTOLIC BLOOD PRESSURE: 74 MMHG

## 2022-05-16 DIAGNOSIS — C61 PROSTATE CANCER: ICD-10-CM

## 2022-05-16 DIAGNOSIS — S52.502D CLOSED FRACTURE OF DISTAL END OF LEFT RADIUS WITH ROUTINE HEALING, UNSPECIFIED FRACTURE MORPHOLOGY, SUBSEQUENT ENCOUNTER: ICD-10-CM

## 2022-05-16 DIAGNOSIS — M51.36 DDD (DEGENERATIVE DISC DISEASE), LUMBAR: Primary | ICD-10-CM

## 2022-05-16 DIAGNOSIS — E78.5 HYPERLIPIDEMIA, UNSPECIFIED HYPERLIPIDEMIA TYPE: Primary | Chronic | ICD-10-CM

## 2022-05-16 DIAGNOSIS — K21.9 GASTROESOPHAGEAL REFLUX DISEASE WITHOUT ESOPHAGITIS: ICD-10-CM

## 2022-05-16 DIAGNOSIS — K21.9 GASTROESOPHAGEAL REFLUX DISEASE, UNSPECIFIED WHETHER ESOPHAGITIS PRESENT: ICD-10-CM

## 2022-05-16 DIAGNOSIS — R73.9 ELEVATED BLOOD SUGAR: ICD-10-CM

## 2022-05-16 DIAGNOSIS — G62.9 NEUROPATHY: ICD-10-CM

## 2022-05-16 DIAGNOSIS — R20.2 NUMBNESS AND TINGLING OF FOOT: ICD-10-CM

## 2022-05-16 DIAGNOSIS — R20.0 NUMBNESS AND TINGLING OF FOOT: ICD-10-CM

## 2022-05-16 DIAGNOSIS — S32.020D COMPRESSION FRACTURE OF L2 VERTEBRA WITH ROUTINE HEALING, SUBSEQUENT ENCOUNTER: ICD-10-CM

## 2022-05-16 DIAGNOSIS — I70.0 ATHEROSCLEROSIS OF AORTA: Chronic | ICD-10-CM

## 2022-05-16 DIAGNOSIS — M81.0 OSTEOPOROSIS, UNSPECIFIED OSTEOPOROSIS TYPE, UNSPECIFIED PATHOLOGICAL FRACTURE PRESENCE: ICD-10-CM

## 2022-05-16 PROBLEM — M25.532 LEFT WRIST PAIN: Status: RESOLVED | Noted: 2021-12-15 | Resolved: 2022-05-16

## 2022-05-16 LAB
ESTIMATED AVG GLUCOSE: 114 MG/DL (ref 68–131)
HBA1C MFR BLD: 5.6 % (ref 4–5.6)

## 2022-05-16 PROCEDURE — 99214 OFFICE O/P EST MOD 30 MIN: CPT | Mod: S$PBB,,, | Performed by: FAMILY MEDICINE

## 2022-05-16 PROCEDURE — 99214 PR OFFICE/OUTPT VISIT, EST, LEVL IV, 30-39 MIN: ICD-10-PCS | Mod: S$PBB,,, | Performed by: FAMILY MEDICINE

## 2022-05-16 PROCEDURE — 99999 PR PBB SHADOW E&M-EST. PATIENT-LVL V: ICD-10-PCS | Mod: PBBFAC,,, | Performed by: FAMILY MEDICINE

## 2022-05-16 PROCEDURE — 83036 HEMOGLOBIN GLYCOSYLATED A1C: CPT | Performed by: FAMILY MEDICINE

## 2022-05-16 PROCEDURE — 99999 PR PBB SHADOW E&M-EST. PATIENT-LVL V: CPT | Mod: PBBFAC,,, | Performed by: FAMILY MEDICINE

## 2022-05-16 PROCEDURE — 99215 OFFICE O/P EST HI 40 MIN: CPT | Mod: PBBFAC | Performed by: FAMILY MEDICINE

## 2022-05-16 PROCEDURE — 36415 COLL VENOUS BLD VENIPUNCTURE: CPT | Performed by: FAMILY MEDICINE

## 2022-05-16 RX ORDER — ATORVASTATIN CALCIUM 20 MG/1
20 TABLET, FILM COATED ORAL DAILY
Qty: 90 TABLET | Refills: 3 | Status: SHIPPED | OUTPATIENT
Start: 2022-05-16 | End: 2022-06-02 | Stop reason: SDUPTHER

## 2022-05-16 RX ORDER — ATORVASTATIN CALCIUM 20 MG/1
TABLET, FILM COATED ORAL
Qty: 90 TABLET | Refills: 1 | Status: SHIPPED | OUTPATIENT
Start: 2022-05-16 | End: 2023-01-12 | Stop reason: SDUPTHER

## 2022-05-16 RX ORDER — OMEGA-3-ACID ETHYL ESTERS 1 G/1
1 CAPSULE, LIQUID FILLED ORAL DAILY
Qty: 90 CAPSULE | Refills: 1 | Status: SHIPPED | OUTPATIENT
Start: 2022-05-16 | End: 2022-10-21

## 2022-05-16 NOTE — PROGRESS NOTES
Subjective:       Patient ID: Jean Carlson is a 72 y.o. male.    Chief Complaint: Annual Exam    Established patient follows up for management of chronic medical illnesses with complaints today. Please see dictation and ROS for interval problems, specific complaints and disease management discussion.    P, S, Fm, Soc Hx's; Meds, allergies reviewed and reconciled.  Health maintenance file reviewed and addressed items due. Recent applicable lab, imaging and cardiovascular results reviewed.  Problem list items reviewed and modified or added entries (in the overview section) may not be transcribed into this encounter note due to note writer format.    Interval history of fall from a not great height with wrist fracture and compression fracture back.  Continued back problems.  Pain in the lower back on occasion.  Difficulty maintaining exercise program.    Long history osteoporosis, unclear etiology.  Followed in Endocrinology.    Prior history of prostate cancer discovered on TUR.  Slight elevation in diagnostic PSA noted.  Followed in urology clinic.    Burning in feet.  Looked back through his records, had an EMG nerve conduction study several years ago consistent with mild polyneuropathy.  No history of diabetes, dyspnea progressed.  History of previous back surgery (2) out of system.    He is concerned about taking aspirin having a brain bleed.  Calcium score 30 in 2014. Saw cardiologist in 2016. Recommended statin, fish oil and baby aspirin.  Patient concerned about continuing to take the baby aspirin however a stated it is consistent with the cardiologist recommendation at the time.  He does have chronic GI symptoms.  I recommend continuation but he has not seen the cardiologist about 6 years.      Review of Systems   Constitutional: Negative for appetite change, chills, diaphoresis, fatigue and fever.   HENT: Negative for congestion, postnasal drip, rhinorrhea, sore throat and trouble swallowing.    Eyes:  Negative for visual disturbance.   Respiratory: Negative for cough, choking, chest tightness, shortness of breath and wheezing.    Cardiovascular: Negative for chest pain and leg swelling.   Gastrointestinal: Negative for abdominal distention, abdominal pain, diarrhea, nausea and vomiting.   Genitourinary: Positive for difficulty urinating. Negative for hematuria.   Musculoskeletal: Positive for back pain. Negative for arthralgias and myalgias.   Skin: Negative for rash.   Neurological: Positive for weakness and numbness. Negative for light-headedness and headaches.   Psychiatric/Behavioral: Negative for confusion and dysphoric mood.       Objective:      Physical Exam  Vitals and nursing note reviewed.   Constitutional:       Appearance: He is well-developed. He is not diaphoretic.   Eyes:      General: No scleral icterus.  Neck:      Thyroid: No thyromegaly.      Vascular: No carotid bruit or JVD.      Trachea: No tracheal deviation.   Cardiovascular:      Rate and Rhythm: Normal rate and regular rhythm.      Heart sounds: Normal heart sounds. No murmur heard.    No friction rub. No gallop.   Pulmonary:      Effort: Pulmonary effort is normal. No respiratory distress.      Breath sounds: Normal breath sounds. No wheezing or rales.   Abdominal:      General: There is no distension.      Palpations: Abdomen is soft. There is no mass.      Tenderness: There is no abdominal tenderness. There is no guarding or rebound.   Musculoskeletal:      Cervical back: Normal range of motion and neck supple.   Lymphadenopathy:      Cervical: No cervical adenopathy.   Skin:     General: Skin is warm and dry.      Findings: No erythema or rash.   Neurological:      Mental Status: He is alert and oriented to person, place, and time.      Cranial Nerves: No cranial nerve deficit.      Motor: No tremor.      Coordination: Coordination normal.      Gait: Gait normal.   Psychiatric:         Behavior: Behavior normal.         Thought  Content: Thought content normal.         Judgment: Judgment normal.         Assessment:       1. Hyperlipidemia, unspecified hyperlipidemia type    2. Atherosclerosis of aorta    3. Prostate cancer    4. Gastroesophageal reflux disease, unspecified whether esophagitis present    5. Gastroesophageal reflux disease without esophagitis    6. Osteoporosis, unspecified osteoporosis type, unspecified pathological fracture presence    7. Closed fracture of distal end of left radius with routine healing, unspecified fracture morphology, subsequent encounter    8. Compression fracture of L2 vertebra with routine healing, subsequent encounter    9. Neuropathy    10. Elevated blood sugar    11. Numbness and tingling of foot        Plan:     Medication List with Changes/Refills   Current Medications    ASPIRIN (ASPIR-81 ORAL)    Take 1 tablet by mouth once daily.     CALCIUM PHOSPHATE TRIB/VIT D3 (CITRACAL + D3, CALCIUM PHOS, ORAL)    Take 1 tablet by mouth once daily.    CHOLECALCIFEROL, VITAMIN D3, (VITAMIN D3) 50 MCG (2,000 UNIT) CAP    Take 1 capsule by mouth once daily.    FLUTICASONE PROPIONATE (FLONASE) 50 MCG/ACTUATION NASAL SPRAY    1 spray (50 mcg total) by Each Nostril route 2 (two) times daily.    MULTIVITAMIN CAPSULE    Take 1 capsule by mouth once daily.    OMEGA-3 ACID ETHYL ESTERS (LOVAZA) 1 GRAM CAPSULE    TAKE 1 CAPSULE (1 G TOTAL) BY MOUTH ONCE DAILY.    PANTOPRAZOLE (PROTONIX) 40 MG TABLET    Take 1 tablet (40 mg total) by mouth once daily.   Changed and/or Refilled Medications    Modified Medication Previous Medication    ATORVASTATIN (LIPITOR) 20 MG TABLET atorvastatin (LIPITOR) 20 MG tablet       Take 1 tablet (20 mg total) by mouth once daily.    TAKE 1 TABLET BY MOUTH EVERY DAY   Discontinued Medications    LEVOCETIRIZINE (XYZAL) 5 MG TABLET    Take 1 tablet (5 mg total) by mouth every evening.     Jean was seen today for annual exam.    Diagnoses and all orders for this visit:    Hyperlipidemia,  unspecified hyperlipidemia type  -     atorvastatin (LIPITOR) 20 MG tablet; Take 1 tablet (20 mg total) by mouth once daily.    Atherosclerosis of aorta  -     Ambulatory referral/consult to Cardiology; Future    Prostate cancer  Comments:  Copy of chart will be sent to Dr. Foster concerning slight elevation in diagnostic PSA.    Gastroesophageal reflux disease, unspecified whether esophagitis present    Gastroesophageal reflux disease without esophagitis    Osteoporosis, unspecified osteoporosis type, unspecified pathological fracture presence  -     Ambulatory referral/consult to Endocrinology; Future    Closed fracture of distal end of left radius with routine healing, unspecified fracture morphology, subsequent encounter  -     Ambulatory referral/consult to Endocrinology; Future    Compression fracture of L2 vertebra with routine healing, subsequent encounter  -     Ambulatory referral/consult to Endocrinology; Future  -     Ambulatory referral/consult to [facility]; Future    Neuropathy  -     Hemoglobin A1C; Future  -     EMG W/ ULTRASOUND AND NERVE CONDUCTION TEST 2 Extremities; Future  -     Urinalysis; Future  -     Urinalysis Microscopic; Future    Elevated blood sugar  -     Hemoglobin A1C; Future  -     EMG W/ ULTRASOUND AND NERVE CONDUCTION TEST 2 Extremities; Future    Numbness and tingling of foot  -     Hemoglobin A1C; Future  -     EMG W/ ULTRASOUND AND NERVE CONDUCTION TEST 2 Extremities; Future      See meds, orders, follow up, routing and instructions sections of encounter and AVS. Discussed with patient and provided on AVS.    Lab Results   Component Value Date     05/09/2022    K 4.5 05/09/2022     05/09/2022    BUN 19 05/09/2022    CREATININE 0.9 05/09/2022     (H) 05/09/2022    HGBA1C 5.5 02/05/2021    MG 2.4 08/29/2018    AST 24 05/09/2022    ALT 24 05/09/2022    ALBUMIN 4.0 05/09/2022    PROT 6.5 05/09/2022    BILITOT 0.7 05/09/2022    CHOL 134  05/09/2022    HDL 42 05/09/2022    LDLCALC 66.6 05/09/2022    TRIG 127 05/09/2022    WBC 11.98 07/14/2019    HGB 13.2 (L) 07/14/2019    HCT 38.6 (L) 07/14/2019     07/14/2019    PSA 3.0 10/18/2018    PSADIAG 1.2 05/09/2022    TSH 3.718 09/23/2016    URICACID 4.7 08/15/2013

## 2022-05-16 NOTE — Clinical Note
Manan Garcia - this is a patient who had an incidental prostate cancer discovered on TUR.  I noticed that his diagnostic PSA went up a little bit recently.  Please advise, thank you

## 2022-05-17 ENCOUNTER — PATIENT MESSAGE (OUTPATIENT)
Dept: ENDOCRINOLOGY | Facility: CLINIC | Age: 72
End: 2022-05-17

## 2022-05-17 ENCOUNTER — OFFICE VISIT (OUTPATIENT)
Dept: ORTHOPEDICS | Facility: CLINIC | Age: 72
End: 2022-05-17
Attending: FAMILY MEDICINE
Payer: MEDICARE

## 2022-05-17 ENCOUNTER — OFFICE VISIT (OUTPATIENT)
Dept: ENDOCRINOLOGY | Facility: CLINIC | Age: 72
End: 2022-05-17
Attending: FAMILY MEDICINE
Payer: MEDICARE

## 2022-05-17 ENCOUNTER — HOSPITAL ENCOUNTER (OUTPATIENT)
Dept: RADIOLOGY | Facility: HOSPITAL | Age: 72
Discharge: HOME | End: 2022-05-17
Attending: REGISTERED NURSE
Payer: MEDICARE

## 2022-05-17 VITALS
WEIGHT: 191.69 LBS | HEART RATE: 68 BPM | RESPIRATION RATE: 16 BRPM | DIASTOLIC BLOOD PRESSURE: 70 MMHG | BODY MASS INDEX: 26.74 KG/M2 | SYSTOLIC BLOOD PRESSURE: 122 MMHG | OXYGEN SATURATION: 99 %

## 2022-05-17 VITALS — WEIGHT: 192 LBS | BODY MASS INDEX: 26.88 KG/M2 | HEIGHT: 71 IN

## 2022-05-17 DIAGNOSIS — S52.502D CLOSED FRACTURE OF DISTAL END OF LEFT RADIUS WITH ROUTINE HEALING, UNSPECIFIED FRACTURE MORPHOLOGY, SUBSEQUENT ENCOUNTER: ICD-10-CM

## 2022-05-17 DIAGNOSIS — K21.9 GASTROESOPHAGEAL REFLUX DISEASE WITHOUT ESOPHAGITIS: ICD-10-CM

## 2022-05-17 DIAGNOSIS — S32.020D COMPRESSION FRACTURE OF L2 VERTEBRA WITH ROUTINE HEALING, SUBSEQUENT ENCOUNTER: ICD-10-CM

## 2022-05-17 DIAGNOSIS — M41.9 SCOLIOSIS, UNSPECIFIED SCOLIOSIS TYPE, UNSPECIFIED SPINAL REGION: Primary | ICD-10-CM

## 2022-05-17 DIAGNOSIS — M51.36 DDD (DEGENERATIVE DISC DISEASE), LUMBAR: ICD-10-CM

## 2022-05-17 DIAGNOSIS — M81.0 OSTEOPOROSIS, UNSPECIFIED OSTEOPOROSIS TYPE, UNSPECIFIED PATHOLOGICAL FRACTURE PRESENCE: ICD-10-CM

## 2022-05-17 DIAGNOSIS — D64.9 ANEMIA, UNSPECIFIED TYPE: Primary | ICD-10-CM

## 2022-05-17 DIAGNOSIS — R82.994 IDIOPATHIC HYPERCALCIURIA: ICD-10-CM

## 2022-05-17 DIAGNOSIS — M54.16 LUMBAR RADICULOPATHY: ICD-10-CM

## 2022-05-17 DIAGNOSIS — E29.1 TESTICULAR HYPOFUNCTION: ICD-10-CM

## 2022-05-17 PROCEDURE — 99214 PR OFFICE/OUTPT VISIT, EST, LEVL IV, 30-39 MIN: ICD-10-PCS | Mod: S$PBB,,, | Performed by: INTERNAL MEDICINE

## 2022-05-17 PROCEDURE — 99999 PR PBB SHADOW E&M-EST. PATIENT-LVL IV: ICD-10-PCS | Mod: PBBFAC,,, | Performed by: INTERNAL MEDICINE

## 2022-05-17 PROCEDURE — 99214 OFFICE O/P EST MOD 30 MIN: CPT | Mod: S$PBB,,, | Performed by: INTERNAL MEDICINE

## 2022-05-17 PROCEDURE — 99214 PR OFFICE/OUTPT VISIT, EST, LEVL IV, 30-39 MIN: ICD-10-PCS | Mod: S$PBB,,, | Performed by: REGISTERED NURSE

## 2022-05-17 PROCEDURE — 72110 X-RAY EXAM L-2 SPINE 4/>VWS: CPT | Mod: 26,,, | Performed by: RADIOLOGY

## 2022-05-17 PROCEDURE — 99999 PR PBB SHADOW E&M-EST. PATIENT-LVL IV: CPT | Mod: PBBFAC,,, | Performed by: REGISTERED NURSE

## 2022-05-17 PROCEDURE — 99999 PR PBB SHADOW E&M-EST. PATIENT-LVL IV: ICD-10-PCS | Mod: PBBFAC,,, | Performed by: REGISTERED NURSE

## 2022-05-17 PROCEDURE — 99999 PR PBB SHADOW E&M-EST. PATIENT-LVL IV: CPT | Mod: PBBFAC,,, | Performed by: INTERNAL MEDICINE

## 2022-05-17 PROCEDURE — 72110 X-RAY EXAM L-2 SPINE 4/>VWS: CPT | Mod: TC

## 2022-05-17 PROCEDURE — 99214 OFFICE O/P EST MOD 30 MIN: CPT | Mod: S$PBB,,, | Performed by: REGISTERED NURSE

## 2022-05-17 PROCEDURE — 99214 OFFICE O/P EST MOD 30 MIN: CPT | Mod: PBBFAC,27 | Performed by: INTERNAL MEDICINE

## 2022-05-17 PROCEDURE — 99214 OFFICE O/P EST MOD 30 MIN: CPT | Mod: PBBFAC | Performed by: REGISTERED NURSE

## 2022-05-17 PROCEDURE — 72110 XR LUMBAR SPINE AP AND LAT WITH FLEX/EXT: ICD-10-PCS | Mod: 26,,, | Performed by: RADIOLOGY

## 2022-05-17 RX ORDER — GABAPENTIN 300 MG/1
300 CAPSULE ORAL NIGHTLY
Qty: 30 CAPSULE | Refills: 11 | Status: SHIPPED | OUTPATIENT
Start: 2022-05-17 | End: 2022-06-02

## 2022-05-17 NOTE — PROGRESS NOTES
DATE: 2022  PATIENT: Jean Carlson    Supervising Physician: Mark Beckham M.D.    CHIEF COMPLAINT: low back pain    HISTORY:  Jean Carlson is a 72 y.o. male with PMHx of osteoporosis (took Fosamax) and L2 compression fracture here for initial evaluation of low back pain (Back - 2). The pain has been present for about 7 months. He states he had a fall off of a 2 foot ladder onto his back 7 months ago. The patient states he has had similar pain in the past, which he had two discectomies for in 2018 outside of [facility]. The patient describes the pain as constant aching in his back and stiffness in the morning.  The pain is worse with bending forward, heavy lifting and moving to the standing position and improved by nothing in particular. There is associated numbness and tingling in his bilateral feet. There is subjective weakness. Prior treatments have included medrol dose pack, advil, tylenol, but no PT or ESIs.    The patient denies myelopathic symptoms such as handwriting changes or difficulty with buttons/coins/keys. Denies perineal paresthesias, bowel/bladder dysfunction.    PAST MEDICAL/SURGICAL HISTORY:  Past Medical History:   Diagnosis Date    Anterior tibialis tendinitis of right lower extremity 10/18/2018    Cancer     Elevated PSA     Family history of ASCVD 10/7/2016    Mom age 64, Dad age 67 -  on same day. Pat Aunt early 70's.    Family history of coronary artery disease 10/7/2016    Mom age 64, Dad age 67 -  on same day. Pat Aunt early 70's.    GERD (gastroesophageal reflux disease)     Gross hematuria 2019    H/O lumbosacral spine surgery 10/18/2018    2003 and again recently due to recurrent HNP    Intractable back pain 2018    Nuclear sclerosis, bilateral 3/12/2018    Ocular migraine 2012    Osteoporosis     Prostate disease     Transient vision disturbance of both eyes 2012    Umbilical hernia without obstruction and without gangrene  10/1/2015    Urinary tract infection      Past Surgical History:   Procedure Laterality Date    back sx      lower    BLADDER FULGURATION N/A 7/13/2019    Procedure: FULGURATION, BLADDER;  Surgeon: Ryan Knowles MD;  Location: [facility];  Service: Urology;  Laterality: N/A;  bleeding tissue at bladder neck    CATARACT EXTRACTION W/  INTRAOCULAR LENS IMPLANT Bilateral     Dr Finn/Symfony IOL     COLONOSCOPY N/A 6/5/2019    Procedure: COLONOSCOPY;  Surgeon: Mauro Smallwood MD;  Location: [facility] (4TH FLR);  Service: Endoscopy;  Laterality: N/A;    CYSTOSCOPY N/A 7/13/2019    Procedure: CYSTOSCOPY;  Surgeon: Ryan Knowles MD;  Location: [facility];  Service: Urology;  Laterality: N/A;    CYSTOSCOPY      CYSTOSCOPY  7/27/2021    Procedure: CYSTOSCOPY;  Surgeon: Manan Ny MD;  Location: [facility] 1ST FLR;  Service: Urology;;    ESOPHAGOGASTRODUODENOSCOPY N/A 3/31/2021    Procedure: EGD (ESOPHAGOGASTRODUODENOSCOPY);  Surgeon: Jamilah Munoz MD;  Location: [facility];  Service: Endoscopy;  Laterality: N/A;    HERNIA REPAIR      PROSTATE SURGERY      REMOVAL OF BLOOD CLOT N/A 7/13/2019    Procedure: REMOVAL, BLOOD CLOT;  Surgeon: Ryan Knowles MD;  Location: [facility];  Service: Urology;  Laterality: N/A;  prostate    SPINE SURGERY      TONSILLECTOMY      TRANSURETHRAL RESECTION OF PROSTATE      TRANSURETHRAL RESECTION OF PROSTATE N/A 7/13/2019    Procedure: TURP (TRANSURETHRAL RESECTION OF PROSTATE);  Surgeon: Ryan Knowles MD;  Location: [facility];  Service: Urology;  Laterality: N/A;    TRANSURETHRAL SURGICAL REMOVAL OF STRICTURE OF BLADDER NECK  7/27/2021    Procedure: EXCISION, CONTRACTURE, BLADDER NECK, TRANSURETHRAL;  Surgeon: Manan Ny MD;  Location: [facility] 1ST FLR;  Service: Urology;;       Current Medications:   Current Outpatient Medications:     atorvastatin (LIPITOR) 20 MG tablet, TAKE 1 TABLET BY MOUTH EVERY DAY, Disp: 90 tablet, Rfl: 1    atorvastatin (LIPITOR) 20 MG  tablet, Take 1 tablet (20 mg total) by mouth once daily., Disp: 90 tablet, Rfl: 3    calcium phosphate trib/vit D3 (CITRACAL + D3, CALCIUM PHOS, ORAL), Take 1 tablet by mouth once daily., Disp: , Rfl:     cholecalciferol, vitamin D3, (VITAMIN D3) 50 mcg (2,000 unit) Cap, Take 1 capsule by mouth once daily., Disp: , Rfl:     fluticasone propionate (FLONASE) 50 mcg/actuation nasal spray, 1 spray (50 mcg total) by Each Nostril route 2 (two) times daily., Disp: 16 g, Rfl: 0    multivitamin capsule, Take 1 capsule by mouth once daily., Disp: , Rfl:     omega-3 acid ethyl esters (LOVAZA) 1 gram capsule, TAKE 1 CAPSULE (1 G TOTAL) BY MOUTH ONCE DAILY., Disp: 90 capsule, Rfl: 1    pantoprazole (PROTONIX) 40 MG tablet, Take 1 tablet (40 mg total) by mouth once daily., Disp: 90 tablet, Rfl: 3    aspirin (ASPIR-81 ORAL), Take 1 tablet by mouth once daily. , Disp: , Rfl:     gabapentin (NEURONTIN) 300 MG capsule, Take 1 capsule (300 mg total) by mouth every evening., Disp: 30 capsule, Rfl: 11  No current facility-administered medications for this visit.    Social History:   Social History     Socioeconomic History    Marital status:    Occupational History     Employer: [employer]   Tobacco Use    Smoking status: Never Smoker    Smokeless tobacco: Never Used   Substance and Sexual Activity    Alcohol use: Yes     Alcohol/week: 3.0 standard drinks     Types: 3 Glasses of wine per week     Comment: rarely    Drug use: No    Sexual activity: Not Currently     Partners: Female   Social History Narrative     [employer]. 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     Social Determinants of Health     Financial Resource Strain: Low Risk     Difficulty of Paying Living Expenses: Not hard at all   Food Insecurity: No Food Insecurity    Worried About Running Out of Food in the Last Year: Never true    Ran Out of Food in the Last Year: Never true   Transportation Needs: No Transportation Needs    Lack of Transportation  "(Medical): No    Lack of Transportation (Non-Medical): No   Physical Activity: Insufficiently Active    Days of Exercise per Week: 2 days    Minutes of Exercise per Session: 60 min   Stress: No Stress Concern Present    Feeling of Stress : Not at all   Social Connections: Unknown    Frequency of Communication with Friends and Family: Once a week    Frequency of Social Gatherings with Friends and Family: Once a week    Active Member of Clubs or Organizations: Yes    Attends Club or Organization Meetings: More than 4 times per year    Marital Status:    Housing Stability: Low Risk     Unable to Pay for Housing in the Last Year: No    Number of Places Lived in the Last Year: 1    Unstable Housing in the Last Year: No       REVIEW OF SYSTEMS:  Constitution: Negative. Negative for chills, fever and night sweats.   Cardiovascular: Negative for chest pain and syncope.   Respiratory: Negative for cough and shortness of breath.   Gastrointestinal: See HPI. Negative for nausea/vomiting. Negative for abdominal pain.  Genitourinary: See HPI. Negative for discoloration or dysuria.  Skin: Negative for dry skin, itching and rash.   Hematologic/Lymphatic: Negative for bleeding problem. Does not bruise/bleed easily.   Musculoskeletal: Negative for falls and muscle weakness.   Neurological: See HPI. No seizures.   Endocrine: Negative for polydipsia, polyphagia and polyuria.   Allergic/Immunologic: Negative for hives and persistent infections.    PHYSICAL EXAMINATION:    Ht 5' 11" (1.803 m)   Wt 87.1 kg (192 lb 0.3 oz)   BMI 26.78 kg/m²     General: The patient is a very pleasant 72 y.o. male in no apparent distress, the patient is oriented to person, place and time.   Psych: Normal mood and affect  HEENT: Vision grossly intact, hearing intact to the spoken word.  Lungs: Respirations unlabored.  Gait: Normal station and gait, no difficulty with toe or heel walk.   Skin: Dorsal lumbar skin negative for rashes, " lesions, hairy patches.  Range of motion: Lumbar range of motion is acceptable. There is antalgic lumbar tenderness to palpation.  Spinal Balance: Global saggital and coronal spinal balance acceptable, no significant for scoliosis and kyphosis.  Musculoskeletal: No pain with the range of motion of the bilateral hips. No trochanteric tenderness to palpation.  Vascular: Bilateral lower extremities warm and well perfused, Dorsalis pedis pulses 2+ bilaterally.  Neurological: decreased strength and tone in all major motor groups in the bilateral lower extremities. Normal sensation to light touch in the L2-S1 dermatomes bilaterally.  Deep tendon reflexes symmetric 2+ in the bilateral lower extremities.  Negative Babinski bilaterally.  Straight leg raise positive bilaterally.     IMAGING:   Today I personally reviewed AP, Lat and Flex/Ex upright L-spine films that demonstrate moderate DDD with grade 1 L2/L3 retrolisthesis. Old L2 compression fracture.     Body mass index is 26.78 kg/m².    Hemoglobin A1C   Date Value Ref Range Status   05/16/2022 5.6 4.0 - 5.6 % Final     Comment:     ADA Screening Guidelines:  5.7-6.4%  Consistent with prediabetes  >or=6.5%  Consistent with diabetes    High levels of fetal hemoglobin interfere with the HbA1C  assay. Heterozygous hemoglobin variants (HbS, HgC, etc)do  not significantly interfere with this assay.   However, presence of multiple variants may affect accuracy.     02/05/2021 5.5 4.0 - 5.6 % Final     Comment:     ADA Screening Guidelines:  5.7-6.4%  Consistent with prediabetes  >or=6.5%  Consistent with diabetes    High levels of fetal hemoglobin interfere with the HbA1C  assay. Heterozygous hemoglobin variants (HbS, HgC, etc)do  not significantly interfere with this assay.   However, presence of multiple variants may affect accuracy.     08/29/2018 5.2 4.0 - 5.6 % Final     Comment:     ADA Screening Guidelines:  5.7-6.4%  Consistent with prediabetes  >or=6.5%  Consistent  with diabetes  High levels of fetal hemoglobin interfere with the HbA1C  assay. Heterozygous hemoglobin variants (HbS, HgC, etc)do  not significantly interfere with this assay.   However, presence of multiple variants may affect accuracy.           ASSESSMENT/PLAN:    Jean was seen today for establish care and pain.    Diagnoses and all orders for this visit:    Scoliosis, unspecified scoliosis type, unspecified spinal region  -     Ambulatory referral/consult to Physical/Occupational Therapy; Future    Compression fracture of L2 vertebra with routine healing, subsequent encounter  -     Ambulatory referral[facility]    Lumbar radiculopathy  -     MRI Lumbar Spine Without Contrast; Future  -     gabapentin (NEURONTIN) 300 MG capsule; Take 1 capsule (300 mg total) by mouth every evening.    DDD (degenerative disc disease), lumbar  -     Ambulatory referral/consult to Physical/Occupational Therapy; Future      Today we discussed at length all of the different treatment options including anti-inflammatories, acetaminophen, rest, ice, heat, physical therapy including strengthening and stretching exercises, home exercises, ROM, aerobic conditioning, aqua therapy, other modalities including ultrasound, massage, and dry needling, epidural steroid injections and finally surgical intervention.      Gabapentin sent to pharmacy. PT orders placed. Lumbar MRI ordered, I will call with results and refer to pain management at that time.

## 2022-05-17 NOTE — PATIENT INSTRUCTIONS
HOW TO COLLECT AN ACCURATE   24 HOUR URINE SPECIMEN     I want you to collect EVERY drop of your urine during a 24 hour period. I do not care what the volume of the urine is as long as it represents every drop that you pass during that 24 hour period. If you need to have a bowel movement, you may separate the urine but I want every drop.     Begin the collection on a morning which is convenient for you. At that time, pass your urine, flush it down the toilet and note the exact time. Now you have an empty bladder and empty bottle. That starts the collection. Collect every drop all during the day and night until exactly the same time the next morning, when you should pass your urine and add it to the bottle.     Bring the closed container back to the same laboratory that issued the empty container.     Estimated Calcium Content of Foods:  Produce  Serving Size Estimated Calcium*    Farrukh greens, frozen 8 oz 360 mg   Broccoli saeid 8 oz 200 mg   Kale, frozen 8 oz 180 mg   Soy Beans, green, boiled 8 oz 175 mg   Bok Ángela, cooked, boiled 8 oz 160 mg   Figs, dried 2 figs 65 mg   Broccoli, fresh, cooked 8 oz 60 mg   Oranges 1 whole 55 mg   Seafood Serving Size Estimated Calcium*    Sardines, canned with bones 3 oz 325 mg   [place], canned with bones 3 oz 180 mg   Shrimp, canned 3 oz 125 mg   Dairy Serving Size Estimated Calcium*    Ricotta, part-skim 4 oz 335 mg   Yogurt, plain, low-fat 6 oz 310 mg   Milk, skim, low-fat, whole 8 oz 300 mg   Yogurt with fruit, low-fat 6 oz 260 mg   Mozzarella, part-skim 1 oz 210 mg   Cheddar 1 oz 205 mg   Yogurt, Greek 6 oz 200 mg   American Cheese 1 oz 195 mg   Feta Cheese 4 oz 140 mg   Cottage Cheese, 2% 4 oz 105 mg   Frozen yogurt, vanilla 8 oz 105 mg   Ice Cream, vanilla 8 oz 85 mg   Parmesan 1 tbsp 55 mg   Fortified Food Serving Size Estimated Calcium*   [place] milk, rice milk or soy milk, fortified 8 oz 300 mg   Orange juice and other fruit juices, fortified 8 oz 300 mg   Tofu,  prepared with calcium 4 oz 205 mg   Waffle, frozen, fortified 2 pieces 200 mg   Oatmeal, fortified 1 packet 140 mg   English muffin, fortified 1 muffin 100 mg   Cereal, fortified 8 oz 100-1,000 mg   Other Serving Size Estimated Calcium*   Mac & cheese, frozen 1 package 325 mg   Pizza, cheese, frozen 1 serving 115 mg   Pudding, chocolate, prepared with 2% milk 4 oz 160 mg   Beans, baked, canned 4 oz 160 mg   *The calcium content listed for most foods is estimated and can vary due to multiple factors. Check the food label to determine how much calcium is in a particular product.  If you read the nutrition label for a food source, it lists the % calcium in that food.  For an 8 oz glass of milk, for example, the label states calcium 30%.  This is equivalent to 300 mg of calcium (multiply the listed number by 10).   **Table from the National Osteoporosis Foundation

## 2022-05-17 NOTE — ASSESSMENT & PLAN NOTE
Discussed treatments in detail   Prefers IV or SQ  Will start with prolia for 5 - 10 years and once ready to stop consider reclast to avoid vertebral fractures   Have discussed rebound vertebral fractures     Plan:  Ferritin, iron and tibc, vitamin D, TTG, cbc  dxa scan 9/2022

## 2022-05-17 NOTE — PROGRESS NOTES
Established Patient - Audio Only Telehealth Visit     The patient location is: home  The chief complaint leading to consultation is: MRI results  Visit type: Virtual visit with audio only (telephone)  Total time spent with patient: 10min     The reason for the audio only service rather than synchronous audio and video virtual visit was related to technical difficulties or patient preference/necessity.     Each patient to whom I provide medical services by telemedicine is:  (1) informed of the relationship between the physician and patient and the respective role of any other health care provider with respect to management of the patient; and (2) notified that they may decline to receive medical services by telemedicine and may withdraw from such care at any time. Patient verbally consented to receive this service via voice-only telephone call.    DATE: 5/19/2022  PATIENT: Jena Carlson    Attending Physician: Mark Beckham M.D.    HISTORY:  Jean Carlson is a 72 y.o. male who returns to me today for MRI results.  He was last seen by me 5/17/2022.  Today he is doing well but notes continued low back pain.    The Patient denies myelopathic symptoms such as handwriting changes or difficulty with buttons/coins/keys. Denies perineal paresthesias, bowel/bladder dysfunction.    PMH/PSH/FamHx/SocHx:  Unchanged from prior visit    ROS:  REVIEW OF SYSTEMS:  Constitution: Negative. Negative for chills, fever and night sweats.   HENT: Negative for congestion and headaches.    Eyes: Negative for blurred vision, left vision loss and right vision loss.   Cardiovascular: Negative for chest pain and syncope.   Respiratory: Negative for cough and shortness of breath.    Endocrine: Negative for polydipsia, polyphagia and polyuria.   Hematologic/Lymphatic: Negative for bleeding problem. Does not bruise/bleed easily.   Skin: Negative for dry skin, itching and rash.   Musculoskeletal: Negative for falls and muscle weakness.    Gastrointestinal: Negative for abdominal pain and bowel incontinence.   Allergic/Immunologic: Negative for hives and persistent infections.  Genitourinary: Negative for urinary retention/incontinence and nocturia.   Neurological: negative for disturbances in coordination, no myelopathic symptoms such as handwriting changes or difficulty with buttons, coins, keys or small objects. No loss of balance and seizures.   Psychiatric/Behavioral: Negative for depression. The patient does not have insomnia.   Denies perineal paresthesias, bowel or bladder incontinence    EXAM:  There were no vitals taken for this visit.    Neuro and physical exam stable.     IMAGING:    Today I personally re- reviewed AP, Lat and Flex/Ex  upright L-spine that demonstrate moderate DDD with grade 1 L2/L3 retrolisthesis. Old L2 compression fracture.     Lumbar MRI shows chronic compression fracture of the L2. Mild neural foraminal narrowing noted at L2-S1. Mild spinal canal stenosis noted at L2-L3.    There is no height or weight on file to calculate BMI.    Hemoglobin A1C   Date Value Ref Range Status   05/16/2022 5.6 4.0 - 5.6 % Final     Comment:     ADA Screening Guidelines:  5.7-6.4%  Consistent with prediabetes  >or=6.5%  Consistent with diabetes    High levels of fetal hemoglobin interfere with the HbA1C  assay. Heterozygous hemoglobin variants (HbS, HgC, etc)do  not significantly interfere with this assay.   However, presence of multiple variants may affect accuracy.     02/05/2021 5.5 4.0 - 5.6 % Final     Comment:     ADA Screening Guidelines:  5.7-6.4%  Consistent with prediabetes  >or=6.5%  Consistent with diabetes    High levels of fetal hemoglobin interfere with the HbA1C  assay. Heterozygous hemoglobin variants (HbS, HgC, etc)do  not significantly interfere with this assay.   However, presence of multiple variants may affect accuracy.     08/29/2018 5.2 4.0 - 5.6 % Final     Comment:     ADA Screening Guidelines:  5.7-6.4%   Consistent with prediabetes  >or=6.5%  Consistent with diabetes  High levels of fetal hemoglobin interfere with the HbA1C  assay. Heterozygous hemoglobin variants (HbS, HgC, etc)do  not significantly interfere with this assay.   However, presence of multiple variants may affect accuracy.           ASSESSMENT/PLAN:    Diagnoses and all orders for this visit:    Lumbar radiculopathy    DDD (degenerative disc disease), lumbar      Today we discussed at length all of the different treatment options including anti-inflammatories, acetaminophen, rest, ice, heat, physical therapy including strengthening and stretching exercises, home exercises, ROM, aerobic conditioning, aqua therapy, other modalities including ultrasound, massage, and dry needling, epidural steroid injections and finally surgical intervention.      Patient scheduled with pain management to discuss facet injections vs MBB. He will follow up as needed.      This service was not originating from a related E/M service provided within the previous 7 days nor will  to an E/M service or procedure within the next 24 hours or my soonest available appointment.  Prevailing standard of care was able to be met in this audio-only visit.

## 2022-05-17 NOTE — PROGRESS NOTES
Subjective:      Patient ID: Jean Carlson is a 72 y.o. male.    Chief Complaint:  Osteoporosis      History of Present Illness  Jean Carlson is a 72 y.o. 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   Last visit with Dr. Slade in 2021, Dr. Cyr in 2020 and Dr. Mejia in the past.      He was previously followed by Dr. Mejia.  Per chart review, he was noted to have hypercalciuria with normal serum calcium and normal PTH.  He was treated with Fosamax from  through  with significant improvement in bone density and stable alkaline phosphatase levels.      Fracture:  Compression fracture   Left arm distal radial fracture (fall from a chair)  Boxer fracture in  after a fall from ladder    Briefly used a thiazide in the past for hypercalciuria but couldn't tolerate it due to polyuria.     Supplements:    Currently taking OTC Ca 540 mg w/ 50 mcg   Vitamin D daily + 25 mcg from multivitamin.      Previous Treatment: alendronate (FOSAMAX) [id number]    History of previous fracture: had fracture of hand after trauma, but no history of fragility fracture  Parent with fractured hip: No  Smoking status: no  Glucocorticoid use: No  History of RA: No  Alcohol 3 or more/day: No  Nephrolithiasis: Yes stone seen on imaging in the past, patient was asymptomatic  Dental up to date: yes, wife is dentist  Height loss:  Feels that he has gradually decreased about 1 in     Recently started protonix 40 mg daily for significant GERD.     Mother and father  following MI ( three hours apart)    Review of Systems  No recent illness     Objective:   Physical Exam  Vitals:    22 1319   BP: 122/70   Pulse: 68   Resp: 16   SpO2: 99%   Weight: 87 kg (191 lb 11 oz)       BP Readings from Last 3 Encounters:   22 122/70   22 132/74   22 132/72      Wt Readings from Last 1 Encounters:   05/17/22 1319 87 kg (191 lb 11 oz)         Body mass index is 26.74 kg/m².    Lab Review:   Lab Results   Component Value Date    HGBA1C 5.6 05/16/2022     Lab Results   Component Value Date    CHOL 134 05/09/2022    HDL 42 05/09/2022    LDLCALC 66.6 05/09/2022    TRIG 127 05/09/2022    CHOLHDL 31.3 05/09/2022     Lab Results   Component Value Date     05/09/2022    K 4.5 05/09/2022     05/09/2022    CO2 28 05/09/2022     (H) 05/09/2022    BUN 19 05/09/2022    CREATININE 0.9 05/09/2022    CALCIUM 9.7 05/09/2022    PROT 6.5 05/09/2022    ALBUMIN 4.0 05/09/2022    BILITOT 0.7 05/09/2022    ALKPHOS 78 05/09/2022    AST 24 05/09/2022    ALT 24 05/09/2022    ANIONGAP 6 (L) 05/09/2022    ESTGFRAFRICA >60.0 05/09/2022    EGFRNONAA >60.0 05/09/2022    TSH 3.718 09/23/2016      Latest Reference Range & Units 01/31/17 15:31 09/29/21 11:58   Calcium, Urine 0.0 - 15.0 mg/dL 13.7 12.2   Calcium, 24 Hr Urine 4 - 12 mg/Hr 14 (H) 11   Calcium, Urine (mg/spec) mg/Spec 343 275   Creatinine, Urinr (mg/spec) mg/Spec 2075.0 1552.5   Creatinine, Timed Urine 40.0 - 75.0 mg/Hr 86.5 (H) 64.7      Latest Reference Range & Units 09/29/21 12:14   Vit D, 25-Hydroxy 30 - 96 ng/mL 55 [1]   [1] Vitamin D deficiency.........<10 ng/mL                              Vitamin D insufficiency......10-29 ng/mL       Vitamin D sufficiency........> or equal to 30 ng/mL  Vitamin D toxicity............>100 ng/mL  Assessment and Plan     Osteoporosis  Discussed treatments in detail   Prefers IV or SQ  Will start with prolia for 5 - 10 years and once ready to stop consider reclast to avoid vertebral fractures   Have discussed rebound vertebral fractures     Plan:  Ferritin, iron and tibc, vitamin D, TTG, cbc  dxa scan 9/2022    Gastroesophageal reflux disease without esophagitis  Avoid oral agents

## 2022-05-19 ENCOUNTER — PATIENT MESSAGE (OUTPATIENT)
Dept: ENDOCRINOLOGY | Facility: CLINIC | Age: 72
End: 2022-05-19
Payer: MEDICARE

## 2022-05-19 ENCOUNTER — OFFICE VISIT (OUTPATIENT)
Dept: ORTHOPEDICS | Facility: CLINIC | Age: 72
End: 2022-05-19
Payer: MEDICARE

## 2022-05-19 ENCOUNTER — TELEPHONE (OUTPATIENT)
Dept: ORTHOPEDICS | Facility: CLINIC | Age: 72
End: 2022-05-19

## 2022-05-19 DIAGNOSIS — M51.36 DDD (DEGENERATIVE DISC DISEASE), LUMBAR: ICD-10-CM

## 2022-05-19 DIAGNOSIS — M54.16 LUMBAR RADICULOPATHY: Primary | ICD-10-CM

## 2022-05-19 PROCEDURE — 99499 UNLISTED E&M SERVICE: CPT | Mod: 95,,, | Performed by: REGISTERED NURSE

## 2022-05-19 PROCEDURE — 99499 NO LOS: ICD-10-PCS | Mod: 95,,, | Performed by: REGISTERED NURSE

## 2022-05-19 NOTE — TELEPHONE ENCOUNTER
----- Message from FRANKY Casillas NP sent at 5/19/2022  8:30 AM CDT -----  Can you please schedule this patient in pain management at [place]?

## 2022-05-19 NOTE — TELEPHONE ENCOUNTER
Results for orders placed or performed in visit on 05/17/22   Vitamin D   Result Value Ref Range    Vit D, 25-Hydroxy 47 30 - 96 ng/mL   IRON AND TIBC   Result Value Ref Range    Iron 101 45 - 160 ug/dL    Transferrin 252 200 - 375 mg/dL    TIBC 373 250 - 450 ug/dL    Saturated Iron 27 20 - 50 %   CBC Auto Differential   Result Value Ref Range    WBC 5.20 3.90 - 12.70 K/uL    RBC 4.97 4.60 - 6.20 M/uL    Hemoglobin 15.4 14.0 - 18.0 g/dL    Hematocrit 45.9 40.0 - 54.0 %    MCV 92 82 - 98 fL    MCH 31.0 27.0 - 31.0 pg    MCHC 33.6 32.0 - 36.0 g/dL    RDW 12.3 11.5 - 14.5 %    Platelets 162 150 - 450 K/uL    MPV 11.4 9.2 - 12.9 fL    Immature Granulocytes 0.2 0.0 - 0.5 %    Gran # (ANC) 4.1 1.8 - 7.7 K/uL    Immature Grans (Abs) 0.01 0.00 - 0.04 K/uL    Lymph # 0.7 (L) 1.0 - 4.8 K/uL    Mono # 0.4 0.3 - 1.0 K/uL    Eos # 0.0 0.0 - 0.5 K/uL    Baso # 0.02 0.00 - 0.20 K/uL    nRBC 0 0 /100 WBC    Gran % 78.6 (H) 38.0 - 73.0 %    Lymph % 12.9 (L) 18.0 - 48.0 %    Mono % 7.1 4.0 - 15.0 %    Eosinophil % 0.8 0.0 - 8.0 %    Basophil % 0.4 0.0 - 1.9 %    Differential Method Automated    FERRITIN   Result Value Ref Range    Ferritin 76 20.0 - 300.0 ng/mL

## 2022-05-20 ENCOUNTER — PATIENT MESSAGE (OUTPATIENT)
Dept: ENDOCRINOLOGY | Facility: CLINIC | Age: 72
End: 2022-05-20
Payer: MEDICARE

## 2022-05-23 ENCOUNTER — PATIENT OUTREACH (OUTPATIENT)
Dept: ADMINISTRATIVE | Facility: OTHER | Age: 72
End: 2022-05-23
Payer: MEDICARE

## 2022-05-24 PROBLEM — R29.898 IMPAIRED FLEXIBILITY OF LOWER EXTREMITY: Status: ACTIVE | Noted: 2022-05-24

## 2022-05-24 PROBLEM — R29.898 LEG WEAKNESS, BILATERAL: Status: ACTIVE | Noted: 2022-05-24

## 2022-05-28 ENCOUNTER — TELEPHONE (OUTPATIENT)
Dept: INTERNAL MEDICINE | Facility: CLINIC | Age: 72
End: 2022-05-28
Payer: MEDICARE

## 2022-05-28 DIAGNOSIS — C61 PROSTATE CANCER: Primary | ICD-10-CM

## 2022-05-28 NOTE — TELEPHONE ENCOUNTER
Please call patient and explain that the tests show PSA test went up a little. I messaged his urology team and he is overdue for a follow up.    I would like to refer patient to the urology department for further evaluation and treatment.    Please see referral orders and please call patient to schedule.     Thank you.

## 2022-05-28 NOTE — TELEPHONE ENCOUNTER
----- Message from Manan Ny MD sent at 5/16/2022  3:50 PM CDT -----  He's past due to follow up with urology.  He was seeing Dr. Knowles for his prostate cancer, but it doesn't look like he's seen him recently.  He should make an appointment for his surveillance.    Manan    ----- Message -----  From: Papi Gallardo MD  Sent: 5/16/2022   3:40 PM CDT  To: Manan Ny MD, MD Jose Boykin Eric - this is a patient who had an incidental prostate cancer discovered on TUR.  I noticed that his diagnostic PSA went up a little bit recently.  Please advise, thank you

## 2022-05-30 ENCOUNTER — PATIENT MESSAGE (OUTPATIENT)
Dept: UROLOGY | Facility: CLINIC | Age: 72
End: 2022-05-30
Payer: MEDICARE

## 2022-06-02 ENCOUNTER — OFFICE VISIT (OUTPATIENT)
Dept: PAIN MEDICINE | Facility: CLINIC | Age: 72
End: 2022-06-02
Payer: MEDICARE

## 2022-06-02 ENCOUNTER — OFFICE VISIT (OUTPATIENT)
Dept: CARDIOLOGY | Facility: CLINIC | Age: 72
End: 2022-06-02
Attending: FAMILY MEDICINE
Payer: MEDICARE

## 2022-06-02 ENCOUNTER — LAB VISIT (OUTPATIENT)
Dept: LAB | Facility: HOSPITAL | Age: 72
End: 2022-06-02
Attending: STUDENT IN AN ORGANIZED HEALTH CARE EDUCATION/TRAINING PROGRAM
Payer: MEDICARE

## 2022-06-02 VITALS
HEIGHT: 71 IN | WEIGHT: 190.13 LBS | SYSTOLIC BLOOD PRESSURE: 126 MMHG | BODY MASS INDEX: 26.62 KG/M2 | HEART RATE: 64 BPM | DIASTOLIC BLOOD PRESSURE: 72 MMHG

## 2022-06-02 VITALS — HEART RATE: 74 BPM | DIASTOLIC BLOOD PRESSURE: 76 MMHG | SYSTOLIC BLOOD PRESSURE: 121 MMHG

## 2022-06-02 DIAGNOSIS — Z82.49 FAMILY HISTORY OF CORONARY ARTERY DISEASE: Primary | ICD-10-CM

## 2022-06-02 DIAGNOSIS — M47.816 LUMBAR SPONDYLOSIS: ICD-10-CM

## 2022-06-02 DIAGNOSIS — M54.50 CHRONIC BILATERAL LOW BACK PAIN WITHOUT SCIATICA: ICD-10-CM

## 2022-06-02 DIAGNOSIS — E78.00 PURE HYPERCHOLESTEROLEMIA: ICD-10-CM

## 2022-06-02 DIAGNOSIS — I70.0 ATHEROSCLEROSIS OF AORTA: Chronic | ICD-10-CM

## 2022-06-02 DIAGNOSIS — G62.9 PERIPHERAL POLYNEUROPATHY: ICD-10-CM

## 2022-06-02 DIAGNOSIS — G62.9 PERIPHERAL POLYNEUROPATHY: Primary | ICD-10-CM

## 2022-06-02 DIAGNOSIS — G89.29 CHRONIC BILATERAL LOW BACK PAIN WITHOUT SCIATICA: ICD-10-CM

## 2022-06-02 DIAGNOSIS — R26.9 ABNORMALITY OF GAIT AND MOBILITY: ICD-10-CM

## 2022-06-02 DIAGNOSIS — R26.9 UNSPECIFIED ABNORMALITIES OF GAIT AND MOBILITY: ICD-10-CM

## 2022-06-02 DIAGNOSIS — E53.8 DEFICIENCY OF OTHER SPECIFIED B GROUP VITAMINS: ICD-10-CM

## 2022-06-02 LAB — VIT B12 SERPL-MCNC: 679 PG/ML (ref 210–950)

## 2022-06-02 PROCEDURE — 93010 ELECTROCARDIOGRAM REPORT: CPT | Mod: S$PBB,,, | Performed by: INTERNAL MEDICINE

## 2022-06-02 PROCEDURE — 99204 PR OFFICE/OUTPT VISIT, NEW, LEVL IV, 45-59 MIN: ICD-10-PCS | Mod: S$PBB,,, | Performed by: PHYSICAL MEDICINE & REHABILITATION

## 2022-06-02 PROCEDURE — 99213 OFFICE O/P EST LOW 20 MIN: CPT | Mod: PBBFAC,PO | Performed by: INTERNAL MEDICINE

## 2022-06-02 PROCEDURE — 99213 OFFICE O/P EST LOW 20 MIN: CPT | Mod: PBBFAC,27,PO | Performed by: PHYSICAL MEDICINE & REHABILITATION

## 2022-06-02 PROCEDURE — 99999 PR PBB SHADOW E&M-EST. PATIENT-LVL III: CPT | Mod: PBBFAC,,, | Performed by: PHYSICAL MEDICINE & REHABILITATION

## 2022-06-02 PROCEDURE — 99999 PR PBB SHADOW E&M-EST. PATIENT-LVL III: CPT | Mod: PBBFAC,,, | Performed by: INTERNAL MEDICINE

## 2022-06-02 PROCEDURE — 99204 PR OFFICE/OUTPT VISIT, NEW, LEVL IV, 45-59 MIN: ICD-10-PCS | Mod: S$PBB,,, | Performed by: INTERNAL MEDICINE

## 2022-06-02 PROCEDURE — 36415 COLL VENOUS BLD VENIPUNCTURE: CPT | Performed by: STUDENT IN AN ORGANIZED HEALTH CARE EDUCATION/TRAINING PROGRAM

## 2022-06-02 PROCEDURE — 83921 ORGANIC ACID SINGLE QUANT: CPT | Performed by: STUDENT IN AN ORGANIZED HEALTH CARE EDUCATION/TRAINING PROGRAM

## 2022-06-02 PROCEDURE — 82607 VITAMIN B-12: CPT | Performed by: STUDENT IN AN ORGANIZED HEALTH CARE EDUCATION/TRAINING PROGRAM

## 2022-06-02 PROCEDURE — 93010 EKG 12-LEAD: ICD-10-PCS | Mod: S$PBB,,, | Performed by: INTERNAL MEDICINE

## 2022-06-02 PROCEDURE — 99999 PR PBB SHADOW E&M-EST. PATIENT-LVL III: ICD-10-PCS | Mod: PBBFAC,,, | Performed by: PHYSICAL MEDICINE & REHABILITATION

## 2022-06-02 PROCEDURE — 99999 PR PBB SHADOW E&M-EST. PATIENT-LVL III: ICD-10-PCS | Mod: PBBFAC,,, | Performed by: INTERNAL MEDICINE

## 2022-06-02 PROCEDURE — 93005 ELECTROCARDIOGRAM TRACING: CPT | Mod: PBBFAC,PO | Performed by: INTERNAL MEDICINE

## 2022-06-02 PROCEDURE — 99204 OFFICE O/P NEW MOD 45 MIN: CPT | Mod: S$PBB,,, | Performed by: INTERNAL MEDICINE

## 2022-06-02 PROCEDURE — 99204 OFFICE O/P NEW MOD 45 MIN: CPT | Mod: S$PBB,,, | Performed by: PHYSICAL MEDICINE & REHABILITATION

## 2022-06-02 RX ORDER — ASPIRIN 81 MG/1
81 TABLET ORAL
COMMUNITY
End: 2022-10-21

## 2022-06-02 RX ORDER — ICOSAPENT ETHYL 1000 MG/1
1 CAPSULE ORAL 2 TIMES DAILY
Qty: 60 CAPSULE | Refills: 11 | Status: SHIPPED | OUTPATIENT
Start: 2022-06-02 | End: 2024-01-04 | Stop reason: SDUPTHER
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
## 2022-06-02 NOTE — PATIENT INSTRUCTIONS
Recommend that you switch Lovaza to Vascepa    The only fish oil only that appears to reduce the risk of a heart attack is a highly purified form of EPA consisting of icosa pent ethyl the active ingredient in Vascepa.    You do not have to resume aspirin.  If of in the future Vascepa is not available any switch back to Lovaza then it is reasonable to resume aspirin 2 days a week
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
## 2022-06-07 LAB — METHYLMALONATE SERPL-SCNC: 0.25 UMOL/L

## 2022-06-27 ENCOUNTER — INFUSION (OUTPATIENT)
Dept: INFECTIOUS DISEASES | Facility: HOSPITAL | Age: 72
End: 2022-06-27
Attending: INTERNAL MEDICINE
Payer: MEDICARE

## 2022-06-27 VITALS
TEMPERATURE: 98 F | OXYGEN SATURATION: 94 % | HEART RATE: 83 BPM | SYSTOLIC BLOOD PRESSURE: 151 MMHG | DIASTOLIC BLOOD PRESSURE: 72 MMHG

## 2022-06-27 DIAGNOSIS — M81.0 OSTEOPOROSIS, UNSPECIFIED OSTEOPOROSIS TYPE, UNSPECIFIED PATHOLOGICAL FRACTURE PRESENCE: ICD-10-CM

## 2022-06-27 DIAGNOSIS — K21.9 GASTROESOPHAGEAL REFLUX DISEASE WITHOUT ESOPHAGITIS: Primary | ICD-10-CM

## 2022-06-27 PROCEDURE — 63600175 PHARM REV CODE 636 W HCPCS: Mod: JG | Performed by: INTERNAL MEDICINE

## 2022-06-27 PROCEDURE — 96372 THER/PROPH/DIAG INJ SC/IM: CPT

## 2022-06-27 RX ADMIN — DENOSUMAB 60 MG: 60 INJECTION SUBCUTANEOUS at 02:06

## 2022-06-27 NOTE — PROGRESS NOTES
Arrived to clinic for 1st prolia injection. Pt taking vitamin D and observed for 15 minutes. Discharged in NAD.

## 2022-06-29 ENCOUNTER — OFFICE VISIT (OUTPATIENT)
Dept: UROLOGY | Facility: CLINIC | Age: 72
End: 2022-06-29
Attending: FAMILY MEDICINE
Payer: MEDICARE

## 2022-06-29 VITALS
RESPIRATION RATE: 15 BRPM | WEIGHT: 186 LBS | BODY MASS INDEX: 26.04 KG/M2 | HEIGHT: 71 IN | HEART RATE: 78 BPM | SYSTOLIC BLOOD PRESSURE: 140 MMHG | DIASTOLIC BLOOD PRESSURE: 72 MMHG

## 2022-06-29 DIAGNOSIS — C61 PROSTATE CANCER: Primary | ICD-10-CM

## 2022-06-29 DIAGNOSIS — R39.198 DIFFICULTY URINATING: ICD-10-CM

## 2022-06-29 DIAGNOSIS — N32.0 BNC (BLADDER NECK CONTRACTURE): ICD-10-CM

## 2022-06-29 DIAGNOSIS — Z90.79 S/P TURP: ICD-10-CM

## 2022-06-29 PROCEDURE — 99999 PR PBB SHADOW E&M-EST. PATIENT-LVL IV: ICD-10-PCS | Mod: PBBFAC,,, | Performed by: NURSE PRACTITIONER

## 2022-06-29 PROCEDURE — 99999 PR PBB SHADOW E&M-EST. PATIENT-LVL IV: CPT | Mod: PBBFAC,,, | Performed by: NURSE PRACTITIONER

## 2022-06-29 PROCEDURE — 99214 OFFICE O/P EST MOD 30 MIN: CPT | Mod: PBBFAC | Performed by: NURSE PRACTITIONER

## 2022-06-29 PROCEDURE — 99214 OFFICE O/P EST MOD 30 MIN: CPT | Mod: S$PBB,,, | Performed by: NURSE PRACTITIONER

## 2022-06-29 PROCEDURE — 99214 PR OFFICE/OUTPT VISIT, EST, LEVL IV, 30-39 MIN: ICD-10-PCS | Mod: S$PBB,,, | Performed by: NURSE PRACTITIONER

## 2022-06-29 NOTE — PROGRESS NOTES
CHIEF COMPLAINT:    Jean Carlson is a 72 y.o. male presents today for Prostate Cancer.     HISTORY OF PRESENTING ILLINESS:     Jean Carlson is a 72 y.o. male who had been diagnosed with Prostate Cancer during a transurethral prostatectomy 2019 by Dr. Knowles.  Was on flomax and avodart.  1% of specimen [place] 6. Had 22 grams resected.   Retired [employer] .   meet with Dr. Calero for 2nd Opinion and decided on AS.     Stage- T1a  PSA- 2.9  PSAD-  <.138  Path- Julius 6 1%.  CANDY score- 1 low risk disease  NCCN- very low risk disease  MRI- PI-RADS 1, 2.6 ccs!    2021 reported worsening LUTS.    Underwent a cysto with Dr. Ny showing a BNC.   He is s/p incision of the BNC on 2021.  Since surgery he states he's voiding very well.  Good FOS    Here today for f/u visit; with new PSA of 1.2 on 2022      REVIEW OF SYSTEMS:  Review of Systems   Constitutional: Negative.  Negative for chills and fever.   Eyes: Negative for double vision.   Respiratory: Negative for cough and shortness of breath.    Cardiovascular: Negative for chest pain and palpitations.   Gastrointestinal: Negative for nausea and vomiting.   Genitourinary: Negative.  Negative for dysuria, flank pain and hematuria.        Pleased with how he urinates.  FOS is great during the day.  Occasional can be a slow start when a wakens at night to urinate.      Neurological: Negative for dizziness.         PATIENT HISTORY:    Past Medical History:   Diagnosis Date    Anterior tibialis tendinitis of right lower extremity 10/18/2018    Cancer     Elevated PSA     Family history of ASCVD 10/7/2016    Mom age 64, Dad age 67 -  on same day. Pat Aunt early 70's.    Family history of coronary artery disease 10/7/2016    Mom age 64, Dad age 67 -  on same day. Pat Aunt early 70's.    GERD (gastroesophageal reflux disease)     Gross hematuria 2019    H/O lumbosacral spine surgery 10/18/2018    2003 and  again recently due to recurrent HNP    Intractable back pain 8/28/2018    Nuclear sclerosis, bilateral 3/12/2018    Ocular migraine 12/20/2012    Osteoporosis     Prostate disease     Transient vision disturbance of both eyes 12/20/2012    Umbilical hernia without obstruction and without gangrene 10/1/2015    Urinary tract infection        Past Surgical History:   Procedure Laterality Date    back sx      lower    BLADDER FULGURATION N/A 7/13/2019    Procedure: FULGURATION, BLADDER;  Surgeon: Ryan Knowles MD;  Location: [facility];  Service: Urology;  Laterality: N/A;  bleeding tissue at bladder neck    CATARACT EXTRACTION W/  INTRAOCULAR LENS IMPLANT Bilateral     Dr Finn/Symfony IOL     COLONOSCOPY N/A 6/5/2019    Procedure: COLONOSCOPY;  Surgeon: Mauro Smallwood MD;  Location: [facility] (4TH FLR);  Service: Endoscopy;  Laterality: N/A;    CYSTOSCOPY N/A 7/13/2019    Procedure: CYSTOSCOPY;  Surgeon: Ryan Knowles MD;  Location: [facility];  Service: Urology;  Laterality: N/A;    CYSTOSCOPY      CYSTOSCOPY  7/27/2021    Procedure: CYSTOSCOPY;  Surgeon: Manan Ny MD;  Location: [facility] 1ST FLR;  Service: Urology;;    ESOPHAGOGASTRODUODENOSCOPY N/A 3/31/2021    Procedure: EGD (ESOPHAGOGASTRODUODENOSCOPY);  Surgeon: Jamilah Munoz MD;  Location: [facility];  Service: Endoscopy;  Laterality: N/A;    HERNIA REPAIR      PROSTATE SURGERY      REMOVAL OF BLOOD CLOT N/A 7/13/2019    Procedure: REMOVAL, BLOOD CLOT;  Surgeon: Ryan Knowles MD;  Location: [facility];  Service: Urology;  Laterality: N/A;  prostate    SPINE SURGERY      TONSILLECTOMY      TRANSURETHRAL RESECTION OF PROSTATE      TRANSURETHRAL RESECTION OF PROSTATE N/A 7/13/2019    Procedure: TURP (TRANSURETHRAL RESECTION OF PROSTATE);  Surgeon: Ryan Knowles MD;  Location: [facility];  Service: Urology;  Laterality: N/A;    TRANSURETHRAL SURGICAL REMOVAL OF STRICTURE OF BLADDER NECK  7/27/2021    Procedure: EXCISION,  CONTRACTURE, BLADDER NECK, TRANSURETHRAL;  Surgeon: Manan Ny MD;  Location: [facility] OR [address] [place] [address];  Service: Urology;;       Family History   Problem Relation Age of Onset    Glaucoma Maternal Uncle     Retinitis pigmentosa Maternal Uncle     Heart disease Mother     Heart attack Mother     Skin cancer Mother     Heart disease Father     Heart attack Father     Glaucoma Maternal Aunt     No Known Problems Daughter     Prostate cancer Neg Hx     Kidney disease Neg Hx        Social History     Socioeconomic History    Marital status:    Occupational History     Employer: [employer]   Tobacco Use    Smoking status: Never Smoker    Smokeless tobacco: Never Used   Substance and Sexual Activity    Alcohol use: Yes     Alcohol/week: 3.0 standard drinks     Types: 3 Glasses of wine per week     Comment: rarely    Drug use: No    Sexual activity: Not Currently     Partners: Female   Social History Narrative     [employer]. 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     Social Determinants of Health     Financial Resource Strain: Low Risk     Difficulty of Paying Living Expenses: Not hard at all   Food Insecurity: No Food Insecurity    Worried About Running Out of Food in the Last Year: Never true    Ran Out of Food in the Last Year: Never true   Transportation Needs: No Transportation Needs    Lack of Transportation (Medical): No    Lack of Transportation (Non-Medical): No   Physical Activity: Insufficiently Active    Days of Exercise per Week: 2 days    Minutes of Exercise per Session: 60 min   Stress: No Stress Concern Present    Feeling of Stress : Not at all   Social Connections: Unknown    Frequency of Communication with Friends and Family: Once a week    Frequency of Social Gatherings with Friends and Family: Once a week    Active Member of Clubs or Organizations: Yes    Attends Club or Organization Meetings: More than 4 times per year    Marital Status:    Housing Stability: Low Risk      Unable to Pay for Housing in the Last Year: No    Number of Places Lived in the Last Year: 1    Unstable Housing in the Last Year: No       Allergies:  Patient has no known allergies.    Medications:    Current Outpatient Medications:     aspirin (ECOTRIN) 81 MG EC tablet, Take 81 mg by mouth twice a week., Disp: , Rfl:     atorvastatin (LIPITOR) 20 MG tablet, TAKE 1 TABLET BY MOUTH EVERY DAY, Disp: 90 tablet, Rfl: 1    calcium phosphate trib/vit D3 (CITRACAL + D3, CALCIUM PHOS, ORAL), Take 1 tablet by mouth once daily., Disp: , Rfl:     cholecalciferol, vitamin D3, (VITAMIN D3) 50 mcg (2,000 unit) Cap, Take 1 capsule by mouth once daily., Disp: , Rfl:     icosapent ethyL (VASCEPA) 1 gram Cap, Take 1 capsule (1,000 mg total) by mouth 2 (two) times a day., Disp: 60 capsule, Rfl: 11    multivitamin capsule, Take 1 capsule by mouth once daily., Disp: , Rfl:     omega-3 acid ethyl esters (LOVAZA) 1 gram capsule, TAKE 1 CAPSULE (1 G TOTAL) BY MOUTH ONCE DAILY., Disp: 90 capsule, Rfl: 1    pantoprazole (PROTONIX) 40 MG tablet, Take 1 tablet (40 mg total) by mouth once daily., Disp: 90 tablet, Rfl: 3    PHYSICAL EXAMINATION:  Physical Exam  Vitals and nursing note reviewed.   Constitutional:       General: He is awake.      Appearance: Normal appearance.   HENT:      Head: Normocephalic.      Right Ear: External ear normal.      Left Ear: External ear normal.      Nose: Nose normal.   Cardiovascular:      Rate and Rhythm: Normal rate.   Pulmonary:      Effort: Pulmonary effort is normal. No respiratory distress.   Abdominal:      Tenderness: There is no abdominal tenderness. There is no right CVA tenderness or left CVA tenderness.   Musculoskeletal:         General: Normal range of motion.      Cervical back: Normal range of motion.   Skin:     General: Skin is warm and dry.   Neurological:      General: No focal deficit present.      Mental Status: He is alert and oriented to person, place, and time.    Psychiatric:         Mood and Affect: Mood normal.         Behavior: Behavior is cooperative.           LABS:          Lab Results   Component Value Date    PSA 3.0 10/18/2018    PSA 2.1 10/27/2017    PSA 2.2 09/23/2016    PSADIAG 1.2 05/09/2022    PSADIAG 0.90 05/25/2021    PSADIAG 0.65 11/10/2020             IMPRESSION:    Encounter Diagnoses   Name Primary?    Prostate cancer Yes    Difficulty urinating     S/P TURP     BNC (bladder neck contracture)          Assessment:       1. Prostate cancer    2. Difficulty urinating    3. S/P TURP    4. BNC (bladder neck contracture)        Plan:         I spent 30 minutes with the patient of which more than half was spent in direct consultation with the patient in regards to our treatment and plan.  We addressed the office findings and recent labs.   Reassurance based on his PSA results.  Discussed expectations with PSA with BPH but also with Prostate Cancer.   PSA has remained very low and stable.  No indication of a active Hg aggressive prostate cancer.   Actually seems the Pca was removed during the TURP.  At this time, no indications for the need of MRI or prostate biopsy.  Do recommend keeping and eye on him with PSA every 6 months.  This can be done here or with local PCP  Will send him a reminder.  Education and recommendations of today's plan of care including home remedies and needed follow up with PCP.   We discussed the chief complaint/LUTS and the possible contributory factors.   Recommended lifestyle modifications with proper, healthy diet, good hydration if no fluid restrictions; reducing bladder irritants.   Benefits of regular exercise approved by PCP.

## 2022-09-02 ENCOUNTER — PES CALL (OUTPATIENT)
Dept: ADMINISTRATIVE | Facility: OTHER | Age: 72
End: 2022-09-02
Payer: COMMERCIAL

## 2022-09-13 ENCOUNTER — PATIENT MESSAGE (OUTPATIENT)
Dept: ENDOCRINOLOGY | Facility: CLINIC | Age: 72
End: 2022-09-13
Payer: COMMERCIAL

## 2022-10-05 ENCOUNTER — OFFICE VISIT (OUTPATIENT)
Dept: UROLOGY | Facility: CLINIC | Age: 72
End: 2022-10-05
Payer: MEDICARE

## 2022-10-05 ENCOUNTER — PATIENT MESSAGE (OUTPATIENT)
Dept: UROLOGY | Facility: CLINIC | Age: 72
End: 2022-10-05

## 2022-10-05 VITALS
WEIGHT: 182.75 LBS | BODY MASS INDEX: 25.58 KG/M2 | DIASTOLIC BLOOD PRESSURE: 71 MMHG | HEIGHT: 71 IN | SYSTOLIC BLOOD PRESSURE: 126 MMHG | HEART RATE: 73 BPM

## 2022-10-05 DIAGNOSIS — C61 PROSTATE CANCER: ICD-10-CM

## 2022-10-05 DIAGNOSIS — N40.1 BPH WITH URINARY OBSTRUCTION: Primary | ICD-10-CM

## 2022-10-05 DIAGNOSIS — N13.8 BPH WITH URINARY OBSTRUCTION: Primary | ICD-10-CM

## 2022-10-05 PROCEDURE — 99213 OFFICE O/P EST LOW 20 MIN: CPT | Mod: PBBFAC | Performed by: UROLOGY

## 2022-10-05 PROCEDURE — 99214 PR OFFICE/OUTPT VISIT, EST, LEVL IV, 30-39 MIN: ICD-10-PCS | Mod: S$PBB,,, | Performed by: UROLOGY

## 2022-10-05 PROCEDURE — 99999 PR PBB SHADOW E&M-EST. PATIENT-LVL III: ICD-10-PCS | Mod: PBBFAC,,, | Performed by: UROLOGY

## 2022-10-05 PROCEDURE — 99999 PR PBB SHADOW E&M-EST. PATIENT-LVL III: CPT | Mod: PBBFAC,,, | Performed by: UROLOGY

## 2022-10-05 PROCEDURE — 99214 OFFICE O/P EST MOD 30 MIN: CPT | Mod: S$PBB,,, | Performed by: UROLOGY

## 2022-10-05 RX ORDER — TAMSULOSIN HYDROCHLORIDE 0.4 MG/1
0.4 CAPSULE ORAL DAILY
Qty: 30 CAPSULE | Refills: 11 | Status: SHIPPED | OUTPATIENT
Start: 2022-10-05 | End: 2023-07-27 | Stop reason: SDUPTHER

## 2022-10-05 RX ORDER — LIDOCAINE HYDROCHLORIDE 20 MG/ML
JELLY TOPICAL ONCE
Status: CANCELLED | OUTPATIENT
Start: 2022-10-05 | End: 2022-10-05

## 2022-10-05 NOTE — PROGRESS NOTES
CHIEF COMPLAINT:    Mr. Carlson is a 72 y.o. male presenting with LUTS.    PRESENTING ILLNESS:    Jean Carlson is a 72 y.o. male diagnosed with Prostate Cancer during a TURP 2019 by Dr. Knowles.  1% of specimen [place] 6.  On AS.    He had worsening LUTS.  Underwent a cysto showing a BNC.  Is s/p incision of the BNC on 21.  After surgery he was voiding well.  However, recently, his FOS has worsened in the AM.  He has to strain to void in the AM.  In the PM, he voids well.    REVIEW OF SYSTEMS:    Jean Carlson denies headache, blurred vision, fever, nausea, vomiting, chills, abdominal pain, chest pain, sore throat, bleeding per rectum, cough, SOB, recent loss of consciousness, recent mental status changes, seizures, dizziness, or upper or lower extremity weakness.    JUAN M  1. 2  2. 0  3. 0  4. 0  5. 0       PATIENT HISTORY:    Past Medical History:   Diagnosis Date    Anterior tibialis tendinitis of right lower extremity 10/18/2018    Cancer     Elevated PSA     Family history of ASCVD 10/7/2016    Mom age 64, Dad age 67 -  on same day. Pat Aunt early 70's.    Family history of coronary artery disease 10/7/2016    Mom age 64, Dad age 67 -  on same day. Pat Aunt early 70's.    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    Intractable back pain 2018    Nuclear sclerosis, bilateral 3/12/2018    Ocular migraine 2012    Osteoporosis     Prostate disease     Transient vision disturbance of both eyes 2012    Umbilical hernia without obstruction and without gangrene 10/1/2015    Urinary tract infection        Past Surgical History:   Procedure Laterality Date    back sx      lower    BLADDER FULGURATION N/A 2019    Procedure: FULGURATION, BLADDER;  Surgeon: Ryan Knowles MD;  Location: [facility];  Service: Urology;  Laterality: N/A;  bleeding tissue at  bladder neck    CATARACT EXTRACTION W/  INTRAOCULAR LENS IMPLANT Bilateral     Dr Finn/Symfony IOL     COLONOSCOPY N/A 6/5/2019    Procedure: COLONOSCOPY;  Surgeon: Mauro Smallwood MD;  Location: [facility] (4TH FLR);  Service: Endoscopy;  Laterality: N/A;    CYSTOSCOPY N/A 7/13/2019    Procedure: CYSTOSCOPY;  Surgeon: Ryan Knowles MD;  Location[facility] OR;  Service: Urology;  Laterality: N/A;    CYSTOSCOPY      CYSTOSCOPY  7/27/2021    Procedure: CYSTOSCOPY;  Surgeon: Manan Ny MD;  Location: [facility] 1ST FLR;  Service: Urology;;    ESOPHAGOGASTRODUODENOSCOPY N/A 3/31/2021    Procedure: EGD (ESOPHAGOGASTRODUODENOSCOPY);  Surgeon: Jamilah Munoz MD;  Location: [facility];  Service: Endoscopy;  Laterality: N/A;    HERNIA REPAIR      PROSTATE SURGERY      REMOVAL OF BLOOD CLOT N/A 7/13/2019    Procedure: REMOVAL, BLOOD CLOT;  Surgeon: Ryan Knowles MD;  Location: [facility];  Service: Urology;  Laterality: N/A;  prostate    SPINE SURGERY      TONSILLECTOMY      TRANSURETHRAL RESECTION OF PROSTATE      TRANSURETHRAL RESECTION OF PROSTATE N/A 7/13/2019    Procedure: TURP (TRANSURETHRAL RESECTION OF PROSTATE);  Surgeon: Ryan Knowles MD;  Location: [facility];  Service: Urology;  Laterality: N/A;    TRANSURETHRAL SURGICAL REMOVAL OF STRICTURE OF BLADDER NECK  7/27/2021    Procedure: EXCISION, CONTRACTURE, BLADDER NECK, TRANSURETHRAL;  Surgeon: Manan Ny MD;  Location: [facility] OR 1ST FLR;  Service: Urology;;       Family History   Problem Relation Age of Onset    Glaucoma Maternal Uncle     Retinitis pigmentosa Maternal Uncle     Heart disease Mother     Heart attack Mother     Skin cancer Mother     Heart disease Father     Heart attack Father     Glaucoma Maternal Aunt     No Known Problems Daughter     Prostate cancer Neg Hx     Kidney disease Neg Hx        Social History     Socioeconomic History    Marital status:    Occupational History     Employer: [employer]   Tobacco Use    Smoking status:  Never    Smokeless tobacco: Never   Substance and Sexual Activity    Alcohol use: Yes     Alcohol/week: 3.0 standard drinks     Types: 3 Glasses of wine per week     Comment: rarely    Drug use: No    Sexual activity: Not Currently     Partners: Female   Social History Narrative     [employer]. 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     Social Determinants of Health     Financial Resource Strain: Low Risk     Difficulty of Paying Living Expenses: Not hard at all   Food Insecurity: No Food Insecurity    Worried About Running Out of Food in the Last Year: Never true    Ran Out of Food in the Last Year: Never true   Transportation Needs: No Transportation Needs    Lack of Transportation (Medical): No    Lack of Transportation (Non-Medical): No   Physical Activity: Insufficiently Active    Days of Exercise per Week: 2 days    Minutes of Exercise per Session: 60 min   Stress: No Stress Concern Present    Feeling of Stress : Not at all   Social Connections: Unknown    Frequency of Communication with Friends and Family: Once a week    Frequency of Social Gatherings with Friends and Family: Once a week    Active Member of Clubs or Organizations: Yes    Attends Club or Organization Meetings: More than 4 times per year    Marital Status:    Housing Stability: Low Risk     Unable to Pay for Housing in the Last Year: No    Number of Places Lived in the Last Year: 1    Unstable Housing in the Last Year: No       Allergies:  Patient has no known allergies.    Medications:    Current Outpatient Medications:     albuterol (PROVENTIL/VENTOLIN HFA) 90 mcg/actuation inhaler, Inhale 1 puff into the lungs every 4 (four) hours as needed for cough., Disp: 8.5 g, Rfl: 0    aspirin (ECOTRIN) 81 MG EC tablet, Take 81 mg by mouth twice a week., Disp: , Rfl:     atorvastatin (LIPITOR) 20 MG tablet, TAKE 1 TABLET BY MOUTH EVERY DAY, Disp: 90 tablet, Rfl: 1    benzonatate (TESSALON) 200 MG capsule, Take 1 capsule (200 mg total) by mouth 2 to  3 times daily as needed for cough., Disp: 30 capsule, Rfl: 0    calcium phosphate trib/vit D3 (CITRACAL + D3, CALCIUM PHOS, ORAL), Take 1 tablet by mouth once daily., Disp: , Rfl:     cholecalciferol, vitamin D3, (VITAMIN D3) 50 mcg (2,000 unit) Cap, Take 1 capsule by mouth once daily., Disp: , Rfl:     icosapent ethyL (VASCEPA) 1 gram Cap, Take 1 capsule (1,000 mg total) by mouth 2 (two) times a day., Disp: 60 capsule, Rfl: 11    multivitamin capsule, Take 1 capsule by mouth once daily., Disp: , Rfl:     omega-3 acid ethyl esters (LOVAZA) 1 gram capsule, TAKE 1 CAPSULE (1 G TOTAL) BY MOUTH ONCE DAILY., Disp: 90 capsule, Rfl: 1    pantoprazole (PROTONIX) 40 MG tablet, Take 1 tablet (40 mg total) by mouth once daily., Disp: 90 tablet, Rfl: 3    PHYSICAL EXAMINATION:    The patient generally appears in good health, is appropriately interactive, and is in no apparent distress.     Eyes: anicteric sclerae, moist conjunctivae; no lid-lag; PERRLA     HENT: Atraumatic; oropharynx clear with moist mucous membranes and no mucosal ulcerations;normal hard and soft palate.  No evidence of lymphadenopathy.    Neck: Trachea midline.  No thyromegaly.    Skin: No lesions.    Mental: Cooperative with normal affect.  Is oriented to time, place, and person.    Neuro: Grossly intact.    Chest: Normal inspiratory effort.   No accessory muscles.  No audible wheezes.  Respirations symmetric on inspiration and expiration.    Heart: Regular rhythm.      Abdomen:  Soft, non-tender. No masses or organomegaly. Bladder is not palpable. No evidence of flank discomfort. No evidence of inguinal hernia.    Extremities: No clubbing, cyanosis, or edema      LABS:    PVR done immediately after voiding by my nurse is 0 cc.  UA dipped negative today  Lab Results   Component Value Date    PSA 3.0 10/18/2018    PSA 2.1 10/27/2017    PSA 2.2 09/23/2016    PSADIAG 1.2 05/09/2022    PSADIAG 0.90 05/25/2021    PSADIAG 0.65 11/10/2020        IMPRESSION:    Encounter Diagnoses   Name Primary?    BPH with urinary obstruction Yes    Erectile dysfunction due to arterial insufficiency     Other hyperlipidemia          PLAN:    1. Will restart flomax for his LUTS. Side effects discussed.  A new Rx was given  2. Will cysto to rule out recurrent BNC.  3. Continue to see Dr. Calero for his PCa.      Copy to:

## 2022-10-20 ENCOUNTER — TELEPHONE (OUTPATIENT)
Dept: UROLOGY | Facility: CLINIC | Age: 72
End: 2022-10-20
Payer: MEDICARE

## 2022-10-20 NOTE — TELEPHONE ENCOUNTER
Call placed to patient. Name and date of birth verified. Patient confirmed procedure appointment. Instructions given for procedure on Friday. Patient informed of arrival time for 8:15am. Pt verbalized understanding, all questions answered.

## 2022-10-21 ENCOUNTER — PATIENT MESSAGE (OUTPATIENT)
Dept: UROLOGY | Facility: CLINIC | Age: 72
End: 2022-10-21
Payer: MEDICARE

## 2022-10-21 ENCOUNTER — PROCEDURE VISIT (OUTPATIENT)
Dept: UROLOGY | Facility: CLINIC | Age: 72
End: 2022-10-21
Payer: MEDICARE

## 2022-10-21 VITALS
RESPIRATION RATE: 17 BRPM | HEIGHT: 71 IN | BODY MASS INDEX: 25.91 KG/M2 | TEMPERATURE: 98 F | SYSTOLIC BLOOD PRESSURE: 140 MMHG | WEIGHT: 185.06 LBS | HEART RATE: 70 BPM | DIASTOLIC BLOOD PRESSURE: 77 MMHG

## 2022-10-21 DIAGNOSIS — N13.8 BPH WITH URINARY OBSTRUCTION: ICD-10-CM

## 2022-10-21 DIAGNOSIS — N40.1 BPH WITH URINARY OBSTRUCTION: ICD-10-CM

## 2022-10-21 PROCEDURE — 52000 CYSTOURETHROSCOPY: CPT | Mod: PBBFAC | Performed by: UROLOGY

## 2022-10-21 PROCEDURE — 52000 CYSTOURETHROSCOPY: CPT | Mod: S$PBB,,, | Performed by: UROLOGY

## 2022-10-21 PROCEDURE — 52000 PR CYSTOURETHROSCOPY: ICD-10-PCS | Mod: S$PBB,,, | Performed by: UROLOGY

## 2022-10-21 RX ORDER — LIDOCAINE HYDROCHLORIDE 20 MG/ML
JELLY TOPICAL ONCE
Status: COMPLETED | OUTPATIENT
Start: 2022-10-21 | End: 2022-10-21

## 2022-10-21 RX ADMIN — LIDOCAINE HYDROCHLORIDE: 20 JELLY TOPICAL at 08:10

## 2022-10-21 NOTE — PROCEDURES
Procedures Date: 10/21/2022     Pre procedure diagnosis:  Encounter Diagnosis   Name Primary?    BPH with urinary obstruction         Post procedure diagnosis:same    Surgeon: Yanely    Assistant: None    Procedure performed: cystoscopy    Blood loss: None    Specimen: None    Procedure in detail: Consent was obtained.  Using standard sterile technique, the flexible cystoscope was assembled and passed into the patient's bladder.  Cystoscopic evaluation of the bladder revealed no abnormalities.  The estimated prostatic length was 2.5 cm.

## 2022-10-21 NOTE — PATIENT INSTRUCTIONS
What to Expect After a Cystoscopy  For the next 24-48 hours, you may feel a mild burning when you urinate. This burning is normal and expected. Drink plenty of water to dilute the urine to help relieve the burning sensation. You may also see a small amount of blood in your urine after the procedure.    Unless you are already taking antibiotics, you may be given an antibiotic after the test to prevent infection.    Signs and Symptoms to Report  Call the [facility] at [phone] if you develop any of the following:  Fever of 101 degrees or higher  Chills or persistent bleeding  Inability to urinate

## 2022-10-24 ENCOUNTER — IMMUNIZATION (OUTPATIENT)
Dept: PHARMACY | Facility: CLINIC | Age: 72
End: 2022-10-24
Payer: MEDICARE

## 2022-10-28 RX ORDER — ATORVASTATIN CALCIUM 20 MG/1
20 TABLET, FILM COATED ORAL DAILY
Qty: 90 TABLET | Refills: 1 | Status: CANCELLED | OUTPATIENT
Start: 2022-10-28

## 2022-10-28 NOTE — TELEPHONE ENCOUNTER
No new care gaps identified.  [facility] Embedded Care Gaps. Reference number: [id number]. 10/28/2022   10:28:31 AM BATSHEVA

## 2022-10-28 NOTE — TELEPHONE ENCOUNTER
No new care gaps identified.  [facility] Embedded Care Gaps. Reference number: [id number]. 10/28/2022   10:38:18 AM [email or web address]   no

## 2022-11-02 ENCOUNTER — OFFICE VISIT (OUTPATIENT)
Dept: OPTOMETRY | Facility: CLINIC | Age: 72
End: 2022-11-02
Payer: MEDICARE

## 2022-11-02 ENCOUNTER — TELEPHONE (OUTPATIENT)
Dept: OPHTHALMOLOGY | Facility: CLINIC | Age: 72
End: 2022-11-02
Payer: MEDICARE

## 2022-11-02 DIAGNOSIS — Z13.5 GLAUCOMA SCREENING: ICD-10-CM

## 2022-11-02 DIAGNOSIS — H26.493 CLOUDY POSTERIOR CAPSULE, BILATERAL: Primary | ICD-10-CM

## 2022-11-02 PROCEDURE — 99999 PR PBB SHADOW E&M-EST. PATIENT-LVL III: CPT | Mod: PBBFAC,,, | Performed by: OPTOMETRIST

## 2022-11-02 PROCEDURE — 92014 PR EYE EXAM, EST PATIENT,COMPREHESV: ICD-10-PCS | Mod: S$PBB,,, | Performed by: OPTOMETRIST

## 2022-11-02 PROCEDURE — 99999 PR PBB SHADOW E&M-EST. PATIENT-LVL III: ICD-10-PCS | Mod: PBBFAC,,, | Performed by: OPTOMETRIST

## 2022-11-02 PROCEDURE — 99213 OFFICE O/P EST LOW 20 MIN: CPT | Mod: PBBFAC,PO | Performed by: OPTOMETRIST

## 2022-11-02 PROCEDURE — 92014 COMPRE OPH EXAM EST PT 1/>: CPT | Mod: S$PBB,,, | Performed by: OPTOMETRIST

## 2022-11-02 NOTE — PROGRESS NOTES
HPI    DEJON: 5/5/2021    Present today for routine eye exam.  Pt reports decrease in dist vision--wear prescription spex for reading   only.  Pt denies eye pain and flashes, reports occ floaters.  No gtts  S/P MFIOL OU  Last edited by Lois Dos Santos MA on 11/2/2022  9:30 AM.        ROS    Positive for: Eyes (cat surgery)  Negative for: Constitutional, Gastrointestinal, Neurological, Skin,   Genitourinary, Musculoskeletal, HENT, Endocrine, Cardiovascular,   Respiratory, Psychiatric, Allergic/Imm, Heme/Lymph  Last edited by Ryan Archibald, OD on 11/2/2022  9:41 AM.        Assessment /Plan     For exam results, see Encounter Report.    Cloudy posterior capsule, bilateral    Glaucoma screening        Mod pco OS/Mild pco OD sp MFIOL OU.      PLAN:    Ophth consult--YAG OS (OD?)

## 2022-11-02 NOTE — TELEPHONE ENCOUNTER
----- Message from Ronen Lopez sent at 11/2/2022 10:02 AM CDT -----  Regarding: YAG  Pt needs an appt scheduled for YAG per Dr. Archibald. Slots are private. Thanks.

## 2022-11-29 ENCOUNTER — PATIENT MESSAGE (OUTPATIENT)
Dept: UROLOGY | Facility: CLINIC | Age: 72
End: 2022-11-29
Payer: MEDICARE

## 2022-12-26 ENCOUNTER — PATIENT MESSAGE (OUTPATIENT)
Dept: UROLOGY | Facility: CLINIC | Age: 72
End: 2022-12-26
Payer: MEDICARE

## 2022-12-27 DIAGNOSIS — C61 PROSTATE CANCER: Primary | ICD-10-CM

## 2022-12-28 ENCOUNTER — INFUSION (OUTPATIENT)
Dept: INFECTIOUS DISEASES | Facility: HOSPITAL | Age: 72
End: 2022-12-28
Attending: INTERNAL MEDICINE
Payer: MEDICARE

## 2022-12-28 VITALS
RESPIRATION RATE: 18 BRPM | WEIGHT: 187.63 LBS | TEMPERATURE: 98 F | DIASTOLIC BLOOD PRESSURE: 75 MMHG | SYSTOLIC BLOOD PRESSURE: 150 MMHG | BODY MASS INDEX: 26.17 KG/M2 | OXYGEN SATURATION: 99 % | HEART RATE: 64 BPM

## 2022-12-28 DIAGNOSIS — M81.0 OSTEOPOROSIS, UNSPECIFIED OSTEOPOROSIS TYPE, UNSPECIFIED PATHOLOGICAL FRACTURE PRESENCE: ICD-10-CM

## 2022-12-28 DIAGNOSIS — K21.9 GASTROESOPHAGEAL REFLUX DISEASE WITHOUT ESOPHAGITIS: Primary | ICD-10-CM

## 2022-12-28 PROCEDURE — 63600175 PHARM REV CODE 636 W HCPCS: Mod: JG | Performed by: INTERNAL MEDICINE

## 2022-12-28 PROCEDURE — 96372 THER/PROPH/DIAG INJ SC/IM: CPT

## 2022-12-28 RX ADMIN — DENOSUMAB 60 MG: 60 INJECTION SUBCUTANEOUS at 02:12

## 2022-12-28 NOTE — PROGRESS NOTES
Arrived to clinic for prolia injection. Pt denies any major dental work in the last three months, taking calcium and vitamin D supplements. Tolerated injection well and left unit in NAD.

## 2023-01-03 ENCOUNTER — TELEPHONE (OUTPATIENT)
Dept: INTERNAL MEDICINE | Facility: CLINIC | Age: 73
End: 2023-01-03
Payer: MEDICARE

## 2023-01-03 NOTE — TELEPHONE ENCOUNTER
----- Message from Angeline Urbano sent at 1/3/2023 10:17 AM CST -----  Contact: MALIKA PAPPAS [[id number]]@ [phone]  Patient need to be seen today or this week with Dr Gallardo only in person for increased b/p. Please call him today.

## 2023-01-03 NOTE — TELEPHONE ENCOUNTER
Called and spoke to pt. Pt concerned about his BP being a little higher than usual. Pt scheduled for appt with PCP. Pt denies any symptoms such as CP, HA, SOB. Pt states BP has been in the 140s/70s.

## 2023-01-11 ENCOUNTER — OFFICE VISIT (OUTPATIENT)
Dept: OPHTHALMOLOGY | Facility: CLINIC | Age: 73
End: 2023-01-11
Payer: COMMERCIAL

## 2023-01-11 DIAGNOSIS — H26.493 POSTERIOR CAPSULAR OPACIFICATION, BILATERAL: Primary | ICD-10-CM

## 2023-01-11 PROCEDURE — 99999 PR PBB SHADOW E&M-EST. PATIENT-LVL III: ICD-10-PCS | Mod: PBBFAC,,, | Performed by: OPHTHALMOLOGY

## 2023-01-11 PROCEDURE — 66821 PR DISCISSION,2ND CATARACT,LASER: ICD-10-PCS | Mod: 50,S$GLB,, | Performed by: OPHTHALMOLOGY

## 2023-01-11 PROCEDURE — 99999 PR PBB SHADOW E&M-EST. PATIENT-LVL III: CPT | Mod: PBBFAC,,, | Performed by: OPHTHALMOLOGY

## 2023-01-11 PROCEDURE — 92014 PR EYE EXAM, EST PATIENT,COMPREHESV: ICD-10-PCS | Mod: 57,S$GLB,, | Performed by: OPHTHALMOLOGY

## 2023-01-11 PROCEDURE — 66821 AFTER CATARACT LASER SURGERY: CPT | Mod: 50,S$GLB,, | Performed by: OPHTHALMOLOGY

## 2023-01-11 PROCEDURE — 99213 OFFICE O/P EST LOW 20 MIN: CPT | Mod: PBBFAC,25 | Performed by: OPHTHALMOLOGY

## 2023-01-11 PROCEDURE — 92014 COMPRE OPH EXAM EST PT 1/>: CPT | Mod: 57,S$GLB,, | Performed by: OPHTHALMOLOGY

## 2023-01-11 PROCEDURE — 66821 AFTER CATARACT LASER SURGERY: CPT | Mod: PBBFAC | Performed by: OPHTHALMOLOGY

## 2023-01-11 NOTE — PROGRESS NOTES
HPI    Pt was referred by Dr. Archibald for YAG Cap OU.  He states over the past 6 months he has noticed his vision OU has been   very foggy. It is not as sharp as it was after the Cataract surgery.    No current eye meds.  Last edited by Stephen Allan on 1/11/2023  2:17 PM.            Assessment /Plan     For exam results, see Encounter Report.    Posterior capsular opacification, bilateral      Visually significant posterior capsular opacity present.  Discussed risks, benefits, and alternatives to laser surgery.  YAG laser capsulotomy Procedure Note:   Informed consent obtained and correct eye(s) verified with patient.  1 drop of topical Proparacaine and Iopidine instilled, and eye(s) dilated with 1% Tropicamide 2.5% Phenylephrine.  YAG laser applied to posterior capsule in cruciate pattern OU  Patient tolerated procedure well. No complications. Follow up in 1 month/PRN.

## 2023-01-12 ENCOUNTER — OFFICE VISIT (OUTPATIENT)
Dept: INTERNAL MEDICINE | Facility: CLINIC | Age: 73
End: 2023-01-12
Attending: FAMILY MEDICINE
Payer: MEDICARE

## 2023-01-12 ENCOUNTER — LAB VISIT (OUTPATIENT)
Dept: LAB | Facility: HOSPITAL | Age: 73
End: 2023-01-12
Attending: FAMILY MEDICINE
Payer: MEDICARE

## 2023-01-12 VITALS
HEIGHT: 71 IN | SYSTOLIC BLOOD PRESSURE: 130 MMHG | DIASTOLIC BLOOD PRESSURE: 62 MMHG | WEIGHT: 187 LBS | BODY MASS INDEX: 26.18 KG/M2

## 2023-01-12 DIAGNOSIS — N40.1 BPH WITH URINARY OBSTRUCTION: Chronic | ICD-10-CM

## 2023-01-12 DIAGNOSIS — E78.5 HYPERLIPIDEMIA, UNSPECIFIED HYPERLIPIDEMIA TYPE: Primary | ICD-10-CM

## 2023-01-12 DIAGNOSIS — E78.5 HYPERLIPIDEMIA, UNSPECIFIED HYPERLIPIDEMIA TYPE: ICD-10-CM

## 2023-01-12 DIAGNOSIS — S32.020D COMPRESSION FRACTURE OF L2 VERTEBRA WITH ROUTINE HEALING: ICD-10-CM

## 2023-01-12 DIAGNOSIS — N13.8 BPH WITH URINARY OBSTRUCTION: Chronic | ICD-10-CM

## 2023-01-12 DIAGNOSIS — R91.1 PULMONARY NODULE: ICD-10-CM

## 2023-01-12 DIAGNOSIS — M81.0 OSTEOPOROSIS, UNSPECIFIED OSTEOPOROSIS TYPE, UNSPECIFIED PATHOLOGICAL FRACTURE PRESENCE: ICD-10-CM

## 2023-01-12 DIAGNOSIS — C61 PROSTATE CANCER: ICD-10-CM

## 2023-01-12 DIAGNOSIS — I70.0 ATHEROSCLEROSIS OF AORTA: ICD-10-CM

## 2023-01-12 PROBLEM — K21.9 GERD (GASTROESOPHAGEAL REFLUX DISEASE): Status: RESOLVED | Noted: 2021-03-31 | Resolved: 2023-01-12

## 2023-01-12 PROBLEM — S52.502D CLOSED FRACTURE OF LOWER END OF LEFT RADIUS WITH ROUTINE HEALING: Status: RESOLVED | Noted: 2022-05-16 | Resolved: 2023-01-12

## 2023-01-12 PROBLEM — R10.13 EPIGASTRIC PAIN: Status: RESOLVED | Noted: 2020-08-19 | Resolved: 2023-01-12

## 2023-01-12 PROBLEM — K40.90 LEFT INGUINAL HERNIA: Status: RESOLVED | Noted: 2019-06-05 | Resolved: 2023-01-12

## 2023-01-12 PROBLEM — S62.102A CLOSED FRACTURE OF LEFT WRIST: Status: RESOLVED | Noted: 2021-10-18 | Resolved: 2023-01-12

## 2023-01-12 LAB
ALBUMIN SERPL BCP-MCNC: 4.1 G/DL (ref 3.5–5.2)
ALP SERPL-CCNC: 63 U/L (ref 55–135)
ALT SERPL W/O P-5'-P-CCNC: 27 U/L (ref 10–44)
ANION GAP SERPL CALC-SCNC: 9 MMOL/L (ref 8–16)
AST SERPL-CCNC: 25 U/L (ref 10–40)
BILIRUB SERPL-MCNC: 0.6 MG/DL (ref 0.1–1)
BUN SERPL-MCNC: 16 MG/DL (ref 8–23)
CALCIUM SERPL-MCNC: 9.4 MG/DL (ref 8.7–10.5)
CHLORIDE SERPL-SCNC: 106 MMOL/L (ref 95–110)
CHOLEST SERPL-MCNC: 161 MG/DL (ref 120–199)
CHOLEST/HDLC SERPL: 2.9 {RATIO} (ref 2–5)
CO2 SERPL-SCNC: 24 MMOL/L (ref 23–29)
CREAT SERPL-MCNC: 0.9 MG/DL (ref 0.5–1.4)
EST. GFR  (NO RACE VARIABLE): >60 ML/MIN/1.73 M^2
GLUCOSE SERPL-MCNC: 96 MG/DL (ref 70–110)
HDLC SERPL-MCNC: 55 MG/DL (ref 40–75)
HDLC SERPL: 34.2 % (ref 20–50)
LDLC SERPL CALC-MCNC: 86.4 MG/DL (ref 63–159)
NONHDLC SERPL-MCNC: 106 MG/DL
POTASSIUM SERPL-SCNC: 4.7 MMOL/L (ref 3.5–5.1)
PROT SERPL-MCNC: 6.8 G/DL (ref 6–8.4)
SODIUM SERPL-SCNC: 139 MMOL/L (ref 136–145)
TRIGL SERPL-MCNC: 98 MG/DL (ref 30–150)

## 2023-01-12 PROCEDURE — 80061 LIPID PANEL: CPT | Performed by: FAMILY MEDICINE

## 2023-01-12 PROCEDURE — 99999 PR PBB SHADOW E&M-EST. PATIENT-LVL III: CPT | Mod: PBBFAC,,, | Performed by: FAMILY MEDICINE

## 2023-01-12 PROCEDURE — 36415 COLL VENOUS BLD VENIPUNCTURE: CPT | Performed by: FAMILY MEDICINE

## 2023-01-12 PROCEDURE — 99214 OFFICE O/P EST MOD 30 MIN: CPT | Mod: S$GLB,,, | Performed by: FAMILY MEDICINE

## 2023-01-12 PROCEDURE — 99999 PR PBB SHADOW E&M-EST. PATIENT-LVL III: ICD-10-PCS | Mod: PBBFAC,,, | Performed by: FAMILY MEDICINE

## 2023-01-12 PROCEDURE — 99213 OFFICE O/P EST LOW 20 MIN: CPT | Mod: PBBFAC | Performed by: FAMILY MEDICINE

## 2023-01-12 PROCEDURE — 99214 PR OFFICE/OUTPT VISIT, EST, LEVL IV, 30-39 MIN: ICD-10-PCS | Mod: S$GLB,,, | Performed by: FAMILY MEDICINE

## 2023-01-12 PROCEDURE — 80053 COMPREHEN METABOLIC PANEL: CPT | Performed by: FAMILY MEDICINE

## 2023-01-12 RX ORDER — ATORVASTATIN CALCIUM 20 MG/1
20 TABLET, FILM COATED ORAL DAILY
Qty: 90 TABLET | Refills: 3 | Status: SHIPPED | OUTPATIENT
Start: 2023-01-12 | End: 2024-02-23 | Stop reason: SDUPTHER

## 2023-01-12 NOTE — PROGRESS NOTES
Subjective:       Patient ID: Jean Carlson is a 72 y.o. male.    Chief Complaint: Follow-up    Established patient for an annual wellness check/physical exam and also chronic disease management. Specific complaints - see dictation, M*model entries and please see ROS.  Past, Surgical, Family, Social Histories; Medications, Allergies reviewed and reconciled.  Health maintenance file reviewed and addressed items due. Recent applicable lab, imaging and cardiovascular results reviewed.  Problem list items reviewed and modified or added entries (in the overview section) may not be transcribed into this encounter note due to note writer format.        Established patient follows up for management of chronic medical illnesses with complaints today. Please see dictation and ROS for interval problems, specific complaints and disease management discussion.    Past, Surgical, Family, Social, Histories; Medications, allergies reviewed and reconciled.  Health maintenance file reviewed and addressed items due. Recent applicable lab, imaging and cardiovascular results reviewed.  Problem list items reviewed and modified or added entries (in the overview section) may not be transcribed into this encounter note due to note writer format.      5/2022 history of fall from a not great height with wrist fracture and compression fracture back.  Continued back problems, slow improvement.  Pain in the lower back on occasion.  Difficulty maintaining exercise program. Followed in endocrinology and back clinic.     Long history osteoporosis, unclear etiology.  Followed in Endocrinology.     Burning in feet.  Looked back through his records, had an EMG nerve conduction study several years ago consistent with mild polyneuropathy.  No history of diabetes, dyspnea progressed.  History of previous back surgery (2) out of system.     He was concerned about taking aspirin having a brain bleed.  Calcium score 30 in 2014. Saw cardiologist in 2022.  Recommended statin, fish oil instead of aspirin.     Expressed concerns about blood pressure readings that he did at home over the past couple of weeks.  He was concerned that Prolia may increase blood pressure.  There were a few systolics in the 150s, most were in the 130s over the past couple weeks.  Started an exercise program recently.  We discussed high intensity verses moderate intensity, prefer the latter.  Salt restriction in diet.  Gave him some heart rate goals for exercise 65-75% max and also concern blood pressure levels.  Follow-up with me if this persists.  We would also need to verify the accuracy of his home cuff.    Review of Systems   Constitutional:  Negative for appetite change, chills, diaphoresis, fatigue and fever.   HENT:  Negative for congestion, postnasal drip, rhinorrhea, sore throat and trouble swallowing.    Eyes:  Negative for visual disturbance.   Respiratory:  Negative for cough, choking, chest tightness, shortness of breath and wheezing.    Cardiovascular:  Negative for chest pain and leg swelling.   Gastrointestinal:  Negative for abdominal distention, abdominal pain, diarrhea, nausea and vomiting.   Genitourinary:  Negative for difficulty urinating and hematuria.   Musculoskeletal:  Positive for arthralgias and back pain. Negative for myalgias.   Skin:  Negative for rash.   Neurological:  Negative for weakness, light-headedness and headaches.   Psychiatric/Behavioral:  Negative for confusion and dysphoric mood.      Objective:      Physical Exam  Vitals and nursing note reviewed.   Constitutional:       Appearance: He is well-developed. He is not diaphoretic.   Eyes:      General: No scleral icterus.  Neck:      Thyroid: No thyromegaly.      Vascular: No carotid bruit or JVD.      Trachea: No tracheal deviation.   Cardiovascular:      Rate and Rhythm: Normal rate and regular rhythm.      Heart sounds: Normal heart sounds. No murmur heard.    No friction rub. No gallop.   Pulmonary:       Effort: Pulmonary effort is normal. No respiratory distress.      Breath sounds: Normal breath sounds. No wheezing or rales.   Abdominal:      General: There is no distension.      Palpations: Abdomen is soft. There is no mass.      Tenderness: There is no abdominal tenderness. There is no guarding or rebound.   Musculoskeletal:      Cervical back: Normal range of motion and neck supple.   Lymphadenopathy:      Cervical: No cervical adenopathy.   Skin:     General: Skin is warm and dry.      Findings: No erythema or rash.   Neurological:      Mental Status: He is alert and oriented to person, place, and time.      Cranial Nerves: No cranial nerve deficit.      Motor: No tremor.      Coordination: Coordination normal.      Gait: Gait normal.   Psychiatric:         Behavior: Behavior normal.         Thought Content: Thought content normal.         Judgment: Judgment normal.       Assessment:       1. Hyperlipidemia, unspecified hyperlipidemia type    2. BPH with urinary obstruction    3. Prostate cancer    4. Pulmonary nodule    5. Atherosclerosis of aorta    6. Osteoporosis, unspecified osteoporosis type, unspecified pathological fracture presence    7. Compression fracture of L2 vertebra with routine healing          Plan:     Medication List with Changes/Refills   Current Medications    CALCIUM PHOSPHATE TRIB/VIT D3 (CITRACAL + D3, CALCIUM PHOS, ORAL)    Take 1 tablet by mouth once daily.    CHOLECALCIFEROL, VITAMIN D3, (VITAMIN D3) 50 MCG (2,000 UNIT) CAP    Take 1 capsule by mouth once daily.    ICOSAPENT ETHYL (VASCEPA) 1 GRAM CAP    Take 1 capsule (1,000 mg total) by mouth 2 (two) times a day.    MULTIVITAMIN CAPSULE    Take 1 capsule by mouth once daily.    PANTOPRAZOLE (PROTONIX) 40 MG TABLET    Take 1 tablet (40 mg total) by mouth once daily.    TAMSULOSIN (FLOMAX) 0.4 MG CAP    Take 1 capsule (0.4 mg total) by mouth once daily.   Changed and/or Refilled Medications    Modified Medication Previous  Medication    ATORVASTATIN (LIPITOR) 20 MG TABLET atorvastatin (LIPITOR) 20 MG tablet       Take 1 tablet (20 mg total) by mouth once daily.    TAKE 1 TABLET BY MOUTH EVERY DAY   Discontinued Medications    ALBUTEROL (PROVENTIL/VENTOLIN HFA) 90 MCG/ACTUATION INHALER    Inhale 1 puff into the lungs every 4 (four) hours as needed for cough.     1. Hyperlipidemia, unspecified hyperlipidemia type  -     atorvastatin (LIPITOR) 20 MG tablet; Take 1 tablet (20 mg total) by mouth once daily.  Dispense: 90 tablet; Refill: 3  -     Comprehensive Metabolic Panel; Future; Expected date: 01/12/2023  -     Lipid Panel; Future; Expected date: 01/12/2023    2. BPH with urinary obstruction  Overview:  -[facility] 7/27/2021      3. Prostate cancer  Overview:  -Discovered on TURP 7/13/2019 by Dr. Knowles.  1% of specimen Julius 6.  On AS, followed by urology      4. Pulmonary nodule  Overview:  -Seen on CT from 05/06/16.  Stable from previous imaging. 0.4 cm pulmonary nodule adjacent to the left major fissure is stable compared to 9/2014. Per Fleischner Society guidelines such stability this period of time for nodule of this size favors a benign etiology and no followup is necessary  -nonsmoker      5. Atherosclerosis of aorta  Overview:  -Seen on CT from 05/06/16  -Agaston 30 in 2014  -on statin and fish oil    Orders:  -     atorvastatin (LIPITOR) 20 MG tablet; Take 1 tablet (20 mg total) by mouth once daily.  Dispense: 90 tablet; Refill: 3    6. Osteoporosis, unspecified osteoporosis type, unspecified pathological fracture presence  Overview:  -Followed by endocrinology      7. Compression fracture of L2 vertebra with routine healing  Overview:  -fall from 2-3 feet 5/2022 - osteoporotic, followed in endocrinology and back clinic        See meds, orders, follow up, routing and instructions sections of encounter and AVS. Discussed with patient and provided on AVS.    Discussed diet and exercise and links provided on AVS for detailed  information.  Lab Results   Component Value Date     05/09/2022    K 4.5 05/09/2022     05/09/2022    BUN 19 05/09/2022    CREATININE 0.9 05/09/2022     (H) 05/09/2022    HGBA1C 5.6 05/16/2022    MG 2.4 08/29/2018    AST 24 05/09/2022    ALT 24 05/09/2022    ALBUMIN 4.0 05/09/2022    PROT 6.5 05/09/2022    BILITOT 0.7 05/09/2022    CHOL 134 05/09/2022    HDL 42 05/09/2022    LDLCALC 66.6 05/09/2022    TRIG 127 05/09/2022    WBC 5.20 05/17/2022    HGB 15.4 05/17/2022    HCT 45.9 05/17/2022     05/17/2022    PSA 3.0 10/18/2018    PSADIAG 1.3 12/28/2022    TSH 3.718 09/23/2016    URICACID 4.7 08/15/2013

## 2023-04-19 ENCOUNTER — PATIENT MESSAGE (OUTPATIENT)
Dept: UROLOGY | Facility: CLINIC | Age: 73
End: 2023-04-19
Payer: MEDICARE

## 2023-04-19 ENCOUNTER — OFFICE VISIT (OUTPATIENT)
Dept: GASTROENTEROLOGY | Facility: CLINIC | Age: 73
End: 2023-04-19
Payer: COMMERCIAL

## 2023-04-19 VITALS — DIASTOLIC BLOOD PRESSURE: 70 MMHG | WEIGHT: 189.31 LBS | BODY MASS INDEX: 26.4 KG/M2 | SYSTOLIC BLOOD PRESSURE: 130 MMHG

## 2023-04-19 DIAGNOSIS — K59.04 CHRONIC IDIOPATHIC CONSTIPATION: ICD-10-CM

## 2023-04-19 DIAGNOSIS — Z12.11 SCREEN FOR COLON CANCER: ICD-10-CM

## 2023-04-19 DIAGNOSIS — K21.9 GASTROESOPHAGEAL REFLUX DISEASE WITHOUT ESOPHAGITIS: Primary | ICD-10-CM

## 2023-04-19 PROCEDURE — 99214 PR OFFICE/OUTPT VISIT, EST, LEVL IV, 30-39 MIN: ICD-10-PCS | Mod: S$GLB,,, | Performed by: INTERNAL MEDICINE

## 2023-04-19 PROCEDURE — 99214 OFFICE O/P EST MOD 30 MIN: CPT | Mod: S$GLB,,, | Performed by: INTERNAL MEDICINE

## 2023-04-19 PROCEDURE — 99999 PR PBB SHADOW E&M-EST. PATIENT-LVL IV: CPT | Mod: PBBFAC,,, | Performed by: INTERNAL MEDICINE

## 2023-04-19 PROCEDURE — 99999 PR PBB SHADOW E&M-EST. PATIENT-LVL IV: ICD-10-PCS | Mod: PBBFAC,,, | Performed by: INTERNAL MEDICINE

## 2023-04-19 RX ORDER — PANTOPRAZOLE SODIUM 40 MG/1
40 TABLET, DELAYED RELEASE ORAL DAILY
Qty: 90 TABLET | Refills: 3 | Status: SHIPPED | OUTPATIENT
Start: 2023-04-19 | End: 2024-02-19

## 2023-04-19 RX ORDER — POLYETHYLENE GLYCOL 3350 17 G/17G
POWDER, FOR SOLUTION ORAL
COMMUNITY

## 2023-04-19 NOTE — PATIENT INSTRUCTIONS
"There is a large amount of stool in your colon.  I would like for you to buy a bottle of liquid Hollis's Milk of Magnesia (do not use the pills).  Drink TBD mL when you have time to stay home near the toilet.  Drink another TBD  mL 2-3 hours later.  The goal is to have 6-10 loose BM and to feel empty.  If 2 doses did not achieve that, you may need an additional dose.    After that is complete, start Linzess.    It is common for Linzess to cause diarrhea the first 7-10 days after starting it.  Linzess is always more effective the first 7-10 days of use, and it then "wears off" afterward, meaning that the diarrhea improves.  The goal is 1 BM every day, or every other day, without "accidents" or urgency.  It is ok if the BM are still loose or watery as long as it is not more than once per day, with accidents, or urgent.  If there are no good BM after taking this dose for 4 days, DOUBLE the dose and take two of the pills together each morning.    "

## 2023-04-20 ENCOUNTER — PATIENT MESSAGE (OUTPATIENT)
Dept: GASTROENTEROLOGY | Facility: CLINIC | Age: 73
End: 2023-04-20
Payer: MEDICARE

## 2023-04-25 NOTE — PROGRESS NOTES
Subjective     Patient ID: Jean Carlson is a 73 y.o. male.    Chief Complaint: Constipation    74 yo M seen previously for GERD last visit 3/2021 complains of constipation.  At our initial visit 7/2019 he was having constipation that we attributed to being post op on surgery.  We did Miralax and he did well until recently.  For the past few months he has had hard stools associated with bloating and gas for which he has taken lactulose.  He has also changed his diet to include:  All Bran Buds, Kashi, and Flax seed which has helped somewhat.  He would like to discuss repeating his colonoscopy.  He is doing well with his GERD while on PPI.    Review of Systems   Constitutional:  Negative for chills and fever.   Respiratory:  Negative for shortness of breath and wheezing.    Cardiovascular:  Negative for chest pain, palpitations and leg swelling.   Gastrointestinal:  Positive for constipation.   Neurological:  Negative for dizziness and speech difficulty.        Objective   /70 (BP Location: Left arm, Patient Position: Sitting, BP Method: Large (Manual))   Wt 85.9 kg (189 lb 4.8 oz)   BMI 26.40 kg/m²     Physical Exam  Constitutional:       Appearance: Normal appearance. He is well-developed. He is not ill-appearing.   HENT:      Head: Normocephalic and atraumatic.   Eyes:      Extraocular Movements: Extraocular movements intact.      Pupils: Pupils are equal, round, and reactive to light.   Pulmonary:      Effort: Pulmonary effort is normal. No respiratory distress.   Abdominal:      General: There is no distension.      Palpations: Abdomen is soft.   Musculoskeletal:         General: No deformity. Normal range of motion.      Cervical back: Normal range of motion and neck supple.   Neurological:      General: No focal deficit present.      Mental Status: He is alert and oriented to person, place, and time.   Psychiatric:         Mood and Affect: Mood normal.         Behavior: Behavior normal.     Lab  Results   Component Value Date    WBC 5.20 05/17/2022    HGB 15.4 05/17/2022    HCT 45.9 05/17/2022    MCV 92 05/17/2022     05/17/2022     Old records from chart reviewed and summarized, significant for:  EGD 3/2021:  suspected esophageal motility changes, EoE (-), reflux biopsy (+), HP (-)  EGD 4/2013:  EoE (-)    U/s was independently visualized and reviewed by me and showed no acute changes.     Assessment and Plan     Problem List Items Addressed This Visit          GI    Gastroesophageal reflux disease without esophagitis - Primary    Relevant Medications    pantoprazole (PROTONIX) 40 MG tablet    Screen for colon cancer    Chronic idiopathic constipation    Relevant Medications    polyethylene glycol (GLYCOLAX) 17 gram PwPk    Other Relevant Orders    X-Ray Abdomen Flat And Erect (Completed)       Gastroesophageal reflux disease without esophagitis  -     pantoprazole (PROTONIX) 40 MG tablet; Take 1 tablet (40 mg total) by mouth once daily.  Dispense: 90 tablet; Refill: 3    Chronic idiopathic constipation  -     X-Ray Abdomen Flat And Erect; Future; Expected date: 04/19/2023, being done to quantify stool burden.  -     He will likely need MOM clean out and Linzess, but amount of stool seen will help determine best dose.  Recs will be on result note    Screen for colon cancer        -     I will place reminder to contact him in 2 weeks to schedule, once constipation is improved

## 2023-05-15 ENCOUNTER — OFFICE VISIT (OUTPATIENT)
Dept: INTERNAL MEDICINE | Facility: CLINIC | Age: 73
End: 2023-05-15
Payer: MEDICARE

## 2023-05-15 ENCOUNTER — HOSPITAL ENCOUNTER (OUTPATIENT)
Dept: RADIOLOGY | Facility: HOSPITAL | Age: 73
Discharge: HOME | End: 2023-05-15
Attending: STUDENT IN AN ORGANIZED HEALTH CARE EDUCATION/TRAINING PROGRAM
Payer: MEDICARE

## 2023-05-15 VITALS
HEART RATE: 76 BPM | SYSTOLIC BLOOD PRESSURE: 122 MMHG | DIASTOLIC BLOOD PRESSURE: 64 MMHG | OXYGEN SATURATION: 96 % | WEIGHT: 189.63 LBS | BODY MASS INDEX: 26.55 KG/M2 | RESPIRATION RATE: 16 BRPM | HEIGHT: 71 IN

## 2023-05-15 DIAGNOSIS — S89.91XA KNEE INJURY, RIGHT, INITIAL ENCOUNTER: Primary | ICD-10-CM

## 2023-05-15 DIAGNOSIS — S89.91XA KNEE INJURY, RIGHT, INITIAL ENCOUNTER: ICD-10-CM

## 2023-05-15 PROCEDURE — 1101F PT FALLS ASSESS-DOCD LE1/YR: CPT | Mod: CPTII,S$GLB,, | Performed by: STUDENT IN AN ORGANIZED HEALTH CARE EDUCATION/TRAINING PROGRAM

## 2023-05-15 PROCEDURE — 3074F SYST BP LT 130 MM HG: CPT | Mod: CPTII,S$GLB,, | Performed by: STUDENT IN AN ORGANIZED HEALTH CARE EDUCATION/TRAINING PROGRAM

## 2023-05-15 PROCEDURE — 73562 X-RAY EXAM OF KNEE 3: CPT | Mod: 26,RT,, | Performed by: RADIOLOGY

## 2023-05-15 PROCEDURE — 99213 OFFICE O/P EST LOW 20 MIN: CPT | Mod: S$GLB,,, | Performed by: STUDENT IN AN ORGANIZED HEALTH CARE EDUCATION/TRAINING PROGRAM

## 2023-05-15 PROCEDURE — 1159F MED LIST DOCD IN RCRD: CPT | Mod: CPTII,S$GLB,, | Performed by: STUDENT IN AN ORGANIZED HEALTH CARE EDUCATION/TRAINING PROGRAM

## 2023-05-15 PROCEDURE — 3008F PR BODY MASS INDEX (BMI) DOCUMENTED: ICD-10-PCS | Mod: CPTII,S$GLB,, | Performed by: STUDENT IN AN ORGANIZED HEALTH CARE EDUCATION/TRAINING PROGRAM

## 2023-05-15 PROCEDURE — 3078F DIAST BP <80 MM HG: CPT | Mod: CPTII,S$GLB,, | Performed by: STUDENT IN AN ORGANIZED HEALTH CARE EDUCATION/TRAINING PROGRAM

## 2023-05-15 PROCEDURE — 1159F PR MEDICATION LIST DOCUMENTED IN MEDICAL RECORD: ICD-10-PCS | Mod: CPTII,S$GLB,, | Performed by: STUDENT IN AN ORGANIZED HEALTH CARE EDUCATION/TRAINING PROGRAM

## 2023-05-15 PROCEDURE — 3008F BODY MASS INDEX DOCD: CPT | Mod: CPTII,S$GLB,, | Performed by: STUDENT IN AN ORGANIZED HEALTH CARE EDUCATION/TRAINING PROGRAM

## 2023-05-15 PROCEDURE — 3288F PR FALLS RISK ASSESSMENT DOCUMENTED: ICD-10-PCS | Mod: CPTII,S$GLB,, | Performed by: STUDENT IN AN ORGANIZED HEALTH CARE EDUCATION/TRAINING PROGRAM

## 2023-05-15 PROCEDURE — 3078F PR MOST RECENT DIASTOLIC BLOOD PRESSURE < 80 MM HG: ICD-10-PCS | Mod: CPTII,S$GLB,, | Performed by: STUDENT IN AN ORGANIZED HEALTH CARE EDUCATION/TRAINING PROGRAM

## 2023-05-15 PROCEDURE — 99999 PR PBB SHADOW E&M-EST. PATIENT-LVL IV: CPT | Mod: PBBFAC,,, | Performed by: STUDENT IN AN ORGANIZED HEALTH CARE EDUCATION/TRAINING PROGRAM

## 2023-05-15 PROCEDURE — 3288F FALL RISK ASSESSMENT DOCD: CPT | Mod: CPTII,S$GLB,, | Performed by: STUDENT IN AN ORGANIZED HEALTH CARE EDUCATION/TRAINING PROGRAM

## 2023-05-15 PROCEDURE — 3074F PR MOST RECENT SYSTOLIC BLOOD PRESSURE < 130 MM HG: ICD-10-PCS | Mod: CPTII,S$GLB,, | Performed by: STUDENT IN AN ORGANIZED HEALTH CARE EDUCATION/TRAINING PROGRAM

## 2023-05-15 PROCEDURE — 1101F PR PT FALLS ASSESS DOC 0-1 FALLS W/OUT INJ PAST YR: ICD-10-PCS | Mod: CPTII,S$GLB,, | Performed by: STUDENT IN AN ORGANIZED HEALTH CARE EDUCATION/TRAINING PROGRAM

## 2023-05-15 PROCEDURE — 1125F PR PAIN SEVERITY QUANTIFIED, PAIN PRESENT: ICD-10-PCS | Mod: CPTII,S$GLB,, | Performed by: STUDENT IN AN ORGANIZED HEALTH CARE EDUCATION/TRAINING PROGRAM

## 2023-05-15 PROCEDURE — 73562 XR KNEE 3 VIEW RIGHT: ICD-10-PCS | Mod: 26,RT,, | Performed by: RADIOLOGY

## 2023-05-15 PROCEDURE — 99999 PR PBB SHADOW E&M-EST. PATIENT-LVL IV: ICD-10-PCS | Mod: PBBFAC,,, | Performed by: STUDENT IN AN ORGANIZED HEALTH CARE EDUCATION/TRAINING PROGRAM

## 2023-05-15 PROCEDURE — 73562 X-RAY EXAM OF KNEE 3: CPT | Mod: TC,RT

## 2023-05-15 PROCEDURE — 1125F AMNT PAIN NOTED PAIN PRSNT: CPT | Mod: CPTII,S$GLB,, | Performed by: STUDENT IN AN ORGANIZED HEALTH CARE EDUCATION/TRAINING PROGRAM

## 2023-05-15 PROCEDURE — 99213 PR OFFICE/OUTPT VISIT, EST, LEVL III, 20-29 MIN: ICD-10-PCS | Mod: S$GLB,,, | Performed by: STUDENT IN AN ORGANIZED HEALTH CARE EDUCATION/TRAINING PROGRAM

## 2023-05-15 NOTE — PROGRESS NOTES
Subjective     Patient ID: Jean Carlson is a 73 y.o. male.    Chief Complaint: Knee Injury    Knee Injury  Pertinent negatives include no chest pain, chills, congestion, coughing, fever, headaches, nausea or vomiting.   Patient is a 72 yo male who twisted his right knee while playing pickle ball. He has tried ice, motrin but pain has been getting worse. There is edema as well. He is currently wearing a soft knee brace which is helping. Pain is mostly located on the lateral side of his right knee and has significant pain when standing. He has an appt with dr. Fritz next week. He is having difficulty walking.  He would like me to order an MRI before the appt with dr. Fritz since dr. Fritz is also sports med. I did explain to him that we usually do PT first before getting MRI but Pt is requesting an MRI.     Review of Systems   Constitutional:  Negative for chills and fever.   HENT:  Negative for nasal congestion and rhinorrhea.    Respiratory:  Negative for cough, chest tightness and shortness of breath.    Cardiovascular:  Negative for chest pain.   Gastrointestinal:  Negative for blood in stool, diarrhea, nausea and vomiting.   Genitourinary:  Negative for dysuria and hematuria.   Neurological:  Negative for dizziness, light-headedness and headaches.        Objective     Physical Exam  Constitutional:       Appearance: Normal appearance.   Eyes:      Conjunctiva/sclera: Conjunctivae normal.   Cardiovascular:      Rate and Rhythm: Normal rate and regular rhythm.   Pulmonary:      Effort: Pulmonary effort is normal. No respiratory distress.   Musculoskeletal:      Right lower leg: No edema.      Left lower leg: No edema.      Comments: Right knee edema present, no erythema. Pain with palpation of the lateral knee. No instability of the knee noted on exam   Skin:     General: Skin is warm.   Neurological:      Mental Status: He is alert and oriented to person, place, and time.   Psychiatric:         Mood and  Affect: Mood normal.         Behavior: Behavior normal.          Assessment and Plan     1. Knee injury, right, initial encounter  Assessment & Plan:  Suspecting meniscal tear. Continue wearing the knee brace. Apply ice. Will order xray which he can have done today and I also ordered MRI which he can have scheduled after insurance approves. Pt is requesting an MRI which is why I am ordering it. Did recommend PT first. Ordered outpatient PT. F/u with dr. Fritz    Orders:  -     MRI Knee Without Contrast Right; Future; Expected date: 05/15/2023  -     X-Ray Knee 3 View Right; Future; Expected date: 05/15/2023  -     Ambulatory referral/consult to Physical/Occupational Therapy; Future; Expected date: 05/16/2023     I spent a total of 20 minutes on the day of the visit.This includes face to face time and non-face to face time preparing to see the patient (eg, review of tests), obtaining and/or reviewing separately obtained history, documenting clinical information in the electronic or other health record, independently interpreting results and communicating results to the patient/family/caregiver, or care coordinator.

## 2023-05-15 NOTE — ASSESSMENT & PLAN NOTE
Suspecting meniscal tear. Continue wearing the knee brace. Apply ice. Will order xray which he can have done today and I also ordered MRI which he can have scheduled after insurance approves. Pt is requesting an MRI which is why I am ordering it. Did recommend PT first. Ordered outpatient PT. F/u with dr. Fritz

## 2023-05-16 ENCOUNTER — TELEPHONE (OUTPATIENT)
Dept: INTERNAL MEDICINE | Facility: CLINIC | Age: 73
End: 2023-05-16
Payer: MEDICARE

## 2023-05-16 NOTE — TELEPHONE ENCOUNTER
----- Message from Fermin Peralta MD sent at 5/16/2023  9:32 AM CDT -----  Pls let patient know that some swelling noted above the patella but no dislocation noted.

## 2023-05-16 NOTE — TELEPHONE ENCOUNTER
Called PT. Informed PT that there is some swelling noted above the patella. Informed PT no dislocation noted.

## 2023-05-18 ENCOUNTER — OFFICE VISIT (OUTPATIENT)
Dept: INTERNAL MEDICINE | Facility: CLINIC | Age: 73
End: 2023-05-18
Attending: FAMILY MEDICINE
Payer: MEDICARE

## 2023-05-18 VITALS
WEIGHT: 189 LBS | DIASTOLIC BLOOD PRESSURE: 67 MMHG | SYSTOLIC BLOOD PRESSURE: 137 MMHG | TEMPERATURE: 98 F | BODY MASS INDEX: 26.46 KG/M2 | HEIGHT: 71 IN | HEART RATE: 72 BPM | OXYGEN SATURATION: 98 %

## 2023-05-18 DIAGNOSIS — K21.9 GASTROESOPHAGEAL REFLUX DISEASE WITHOUT ESOPHAGITIS: ICD-10-CM

## 2023-05-18 DIAGNOSIS — Z82.49 FAMILY HISTORY OF CORONARY ARTERIOSCLEROSIS: ICD-10-CM

## 2023-05-18 DIAGNOSIS — I70.0 ATHEROSCLEROSIS OF AORTA: Chronic | ICD-10-CM

## 2023-05-18 DIAGNOSIS — K59.00 CONSTIPATION, UNSPECIFIED CONSTIPATION TYPE: ICD-10-CM

## 2023-05-18 DIAGNOSIS — S89.91XA KNEE INJURY, RIGHT, INITIAL ENCOUNTER: Primary | ICD-10-CM

## 2023-05-18 DIAGNOSIS — E78.5 HYPERLIPIDEMIA, UNSPECIFIED HYPERLIPIDEMIA TYPE: ICD-10-CM

## 2023-05-18 PROBLEM — R29.898 IMPAIRED FLEXIBILITY OF LOWER EXTREMITY: Status: RESOLVED | Noted: 2022-05-24 | Resolved: 2023-05-18

## 2023-05-18 PROBLEM — Z74.09 IMPAIRED MOBILITY AND ADLS: Status: RESOLVED | Noted: 2021-12-15 | Resolved: 2023-05-18

## 2023-05-18 PROBLEM — Z78.9 IMPAIRED MOBILITY AND ADLS: Status: RESOLVED | Noted: 2021-12-15 | Resolved: 2023-05-18

## 2023-05-18 PROBLEM — Z12.11 SCREEN FOR COLON CANCER: Status: RESOLVED | Noted: 2019-06-05 | Resolved: 2023-05-18

## 2023-05-18 PROCEDURE — 3075F SYST BP GE 130 - 139MM HG: CPT | Mod: CPTII,,, | Performed by: FAMILY MEDICINE

## 2023-05-18 PROCEDURE — 99215 OFFICE O/P EST HI 40 MIN: CPT | Performed by: FAMILY MEDICINE

## 2023-05-18 PROCEDURE — 1101F PR PT FALLS ASSESS DOC 0-1 FALLS W/OUT INJ PAST YR: ICD-10-PCS | Mod: CPTII,,, | Performed by: FAMILY MEDICINE

## 2023-05-18 PROCEDURE — 1159F PR MEDICATION LIST DOCUMENTED IN MEDICAL RECORD: ICD-10-PCS | Mod: CPTII,,, | Performed by: FAMILY MEDICINE

## 2023-05-18 PROCEDURE — 3008F BODY MASS INDEX DOCD: CPT | Mod: CPTII,,, | Performed by: FAMILY MEDICINE

## 2023-05-18 PROCEDURE — 3288F PR FALLS RISK ASSESSMENT DOCUMENTED: ICD-10-PCS | Mod: CPTII,,, | Performed by: FAMILY MEDICINE

## 2023-05-18 PROCEDURE — 1125F PR PAIN SEVERITY QUANTIFIED, PAIN PRESENT: ICD-10-PCS | Mod: CPTII,,, | Performed by: FAMILY MEDICINE

## 2023-05-18 PROCEDURE — 99999 PR PBB SHADOW E&M-EST. PATIENT-LVL V: CPT | Mod: PBBFAC,,, | Performed by: FAMILY MEDICINE

## 2023-05-18 PROCEDURE — 1159F MED LIST DOCD IN RCRD: CPT | Mod: CPTII,,, | Performed by: FAMILY MEDICINE

## 2023-05-18 PROCEDURE — 1101F PT FALLS ASSESS-DOCD LE1/YR: CPT | Mod: CPTII,,, | Performed by: FAMILY MEDICINE

## 2023-05-18 PROCEDURE — 3288F FALL RISK ASSESSMENT DOCD: CPT | Mod: CPTII,,, | Performed by: FAMILY MEDICINE

## 2023-05-18 PROCEDURE — 1160F PR REVIEW ALL MEDS BY PRESCRIBER/CLIN PHARMACIST DOCUMENTED: ICD-10-PCS | Mod: CPTII,,, | Performed by: FAMILY MEDICINE

## 2023-05-18 PROCEDURE — 3078F PR MOST RECENT DIASTOLIC BLOOD PRESSURE < 80 MM HG: ICD-10-PCS | Mod: CPTII,,, | Performed by: FAMILY MEDICINE

## 2023-05-18 PROCEDURE — 3078F DIAST BP <80 MM HG: CPT | Mod: CPTII,,, | Performed by: FAMILY MEDICINE

## 2023-05-18 PROCEDURE — 1160F RVW MEDS BY RX/DR IN RCRD: CPT | Mod: CPTII,,, | Performed by: FAMILY MEDICINE

## 2023-05-18 PROCEDURE — 99214 PR OFFICE/OUTPT VISIT, EST, LEVL IV, 30-39 MIN: ICD-10-PCS | Mod: ,,, | Performed by: FAMILY MEDICINE

## 2023-05-18 PROCEDURE — 3075F PR MOST RECENT SYSTOLIC BLOOD PRESS GE 130-139MM HG: ICD-10-PCS | Mod: CPTII,,, | Performed by: FAMILY MEDICINE

## 2023-05-18 PROCEDURE — 99999 PR PBB SHADOW E&M-EST. PATIENT-LVL V: ICD-10-PCS | Mod: PBBFAC,,, | Performed by: FAMILY MEDICINE

## 2023-05-18 PROCEDURE — 1125F AMNT PAIN NOTED PAIN PRSNT: CPT | Mod: CPTII,,, | Performed by: FAMILY MEDICINE

## 2023-05-18 PROCEDURE — 3008F PR BODY MASS INDEX (BMI) DOCUMENTED: ICD-10-PCS | Mod: CPTII,,, | Performed by: FAMILY MEDICINE

## 2023-05-18 PROCEDURE — 99214 OFFICE O/P EST MOD 30 MIN: CPT | Mod: ,,, | Performed by: FAMILY MEDICINE

## 2023-05-18 NOTE — PATIENT INSTRUCTIONS
Physical Therapy/Occupational Therapy number to schedule appointments - [phone].     Physical Activity and Exercise  [email or web address]/[email or web address]_[email or web address]

## 2023-05-18 NOTE — PROGRESS NOTES
Subjective:       Patient ID: Jean Carlson is a 73 y.o. male.    Chief Complaint: Knee Pain (Right knee pain and swelling for 3 days. Injured his knee playing pickle ball.)    patient, same day urgent care presents - R knee injury 8 days ago - turn and twist w/ pickleball. Some swelling and instab sx. Saw UC and had XR (reviewed). MRI pending.    Patient maintains a high fitness level with working out, healthy diet and maintenance of normal body weight.      Review of Systems   Constitutional:  Positive for activity change. Negative for unexpected weight change.   HENT:  Negative for hearing loss, rhinorrhea and trouble swallowing.    Eyes:  Negative for discharge and visual disturbance.   Respiratory:  Negative for chest tightness and wheezing.    Cardiovascular:  Negative for chest pain and palpitations.   Gastrointestinal:  Positive for constipation. Negative for blood in stool, diarrhea and vomiting.   Endocrine: Negative for polydipsia and polyuria.   Genitourinary:  Negative for difficulty urinating, hematuria and urgency.   Musculoskeletal:  Positive for arthralgias, gait problem and joint swelling.   Neurological:  Negative for weakness and headaches.   Psychiatric/Behavioral:  Negative for confusion and dysphoric mood.      Objective:      Physical Exam  Vitals and nursing note reviewed.   Constitutional:       General: He is not in acute distress.     Appearance: He is well-developed.   Pulmonary:      Effort: Pulmonary effort is normal.   Musculoskeletal:      Cervical back: Neck supple.      Right knee: No swelling, deformity, effusion, erythema, ecchymosis, lacerations or bony tenderness. Normal range of motion. Tenderness present. No medial joint line, lateral joint line, MCL, LCL or patellar tendon tenderness. ACL laxity present. No LCL laxity or MCL laxity. Normal alignment, normal meniscus and normal patellar mobility.      Left knee: No swelling, deformity, effusion, erythema, ecchymosis,  lacerations or bony tenderness. Normal range of motion. No tenderness. No medial joint line, lateral joint line, MCL, LCL or patellar tendon tenderness. No LCL laxity or MCL laxity.Normal alignment, normal meniscus and normal patellar mobility.      Right lower leg: No edema.      Left lower leg: No edema.   Skin:     General: Skin is warm and dry.      Findings: No rash.   Neurological:      Mental Status: He is alert and oriented to person, place, and time.      Sensory: No sensory deficit.      Coordination: Coordination normal.      Gait: Gait normal.   Psychiatric:         Behavior: Behavior normal.         Thought Content: Thought content normal.         Judgment: Judgment normal.       Assessment:       1. Knee injury, right, initial encounter    2. Hyperlipidemia, unspecified hyperlipidemia type    3. Constipation, unspecified constipation type    4. Gastroesophageal reflux disease without esophagitis    5. Family history of coronary arteriosclerosis    6. Atherosclerosis of aorta        Plan:     Medication List with Changes/Refills   Current Medications    ATORVASTATIN (LIPITOR) 20 MG TABLET    Take 1 tablet (20 mg total) by mouth once daily.    CALCIUM PHOSPHATE TRIB/VIT D3 (CITRACAL + D3, CALCIUM PHOS, ORAL)    Take 1 tablet by mouth once daily.    CHOLECALCIFEROL, VITAMIN D3, (VITAMIN D3) 50 MCG (2,000 UNIT) CAP    Take 1 capsule by mouth once daily.    ICOSAPENT ETHYL (VASCEPA) 1 GRAM CAP    Take 1 capsule (1,000 mg total) by mouth 2 (two) times a day.    LINACLOTIDE (LINZESS) 145 MCG CAP CAPSULE    Take 1 capsule (145 mcg total) by mouth once daily.    MULTIVITAMIN CAPSULE    Take 1 capsule by mouth once daily.    PANTOPRAZOLE (PROTONIX) 40 MG TABLET    Take 1 tablet (40 mg total) by mouth once daily.    POLYETHYLENE GLYCOL (GLYCOLAX) 17 GRAM PWPK    Take by mouth.    TAMSULOSIN (FLOMAX) 0.4 MG CAP    Take 1 capsule (0.4 mg total) by mouth once daily.     1. Knee injury, right, initial  encounter  Assessment & Plan:  Suspicious for ACL tear    Orders:  -     Ambulatory referral/consult to Sports Medicine; Future; Expected date: 2023  -     Cardiac PET Scan Stress; Future; Expected date: 2023    2. Hyperlipidemia, unspecified hyperlipidemia type    3. Constipation, unspecified constipation type    4. Gastroesophageal reflux disease without esophagitis  Overview:  2023 eval GI - PPI, had EGD 3/2021      5. Family history of coronary arteriosclerosis  Overview:  -Mom age 64, Dad age 67 -  on same day. Pat Aunt early 70's.  -on statin  -cardiology eval 2022    Orders:  -     Cardiac PET Scan Stress; Future; Expected date: 2023    6. Atherosclerosis of aorta  Overview:  -Seen on CT from 16  -Fannie 30 in   -on statin and fish oil  -cardiology eval 2022    Orders:  -     Cardiac PET Scan Stress; Future; Expected date: 2023      See meds, orders, follow up, routing and instructions sections of encounter and AVS. Discussed with patient and provided on AVS.    Await MRI, begin protected WB, PT pending    We discussed several other issues today including chronic constipation.  Seems to be problematic but he has had an adequate GI workup.  States that his Glycolax is not particularly effective.  I do recommend a follow-up.  Significant family history atherosclerosis, he is on appropriate statin for the time being.  Has seen Cardiology.  We will update his PET stress scan at he and his wife's request after he is able to ambulate a bit better from his knee injury.  He has no acute complaints at this time.  His family history is rather significant.

## 2023-05-24 ENCOUNTER — PATIENT MESSAGE (OUTPATIENT)
Dept: GASTROENTEROLOGY | Facility: CLINIC | Age: 73
End: 2023-05-24
Payer: MEDICARE

## 2023-05-24 ENCOUNTER — PATIENT MESSAGE (OUTPATIENT)
Dept: ENDOCRINOLOGY | Facility: CLINIC | Age: 73
End: 2023-05-24
Payer: MEDICARE

## 2023-05-24 ENCOUNTER — OFFICE VISIT (OUTPATIENT)
Dept: SPORTS MEDICINE | Facility: CLINIC | Age: 73
End: 2023-05-24
Attending: FAMILY MEDICINE
Payer: MEDICARE

## 2023-05-24 ENCOUNTER — HOSPITAL ENCOUNTER (OUTPATIENT)
Dept: RADIOLOGY | Facility: HOSPITAL | Age: 73
Discharge: HOME | End: 2023-05-24
Attending: ORTHOPAEDIC SURGERY
Payer: MEDICARE

## 2023-05-24 ENCOUNTER — PATIENT MESSAGE (OUTPATIENT)
Dept: INTERNAL MEDICINE | Facility: CLINIC | Age: 73
End: 2023-05-24
Payer: MEDICARE

## 2023-05-24 VITALS
HEART RATE: 70 BPM | BODY MASS INDEX: 26.18 KG/M2 | HEIGHT: 71 IN | DIASTOLIC BLOOD PRESSURE: 70 MMHG | SYSTOLIC BLOOD PRESSURE: 112 MMHG | WEIGHT: 187 LBS

## 2023-05-24 DIAGNOSIS — M67.51 PLICA SYNDROME OF RIGHT KNEE: ICD-10-CM

## 2023-05-24 DIAGNOSIS — S89.91XA KNEE INJURY, RIGHT, INITIAL ENCOUNTER: ICD-10-CM

## 2023-05-24 DIAGNOSIS — S83.241A ACUTE MEDIAL MENISCUS TEAR, RIGHT, INITIAL ENCOUNTER: Primary | ICD-10-CM

## 2023-05-24 DIAGNOSIS — Z01.818 PREOPERATIVE CLEARANCE: Primary | ICD-10-CM

## 2023-05-24 DIAGNOSIS — S83.281A ACUTE LATERAL MENISCUS TEAR OF RIGHT KNEE: ICD-10-CM

## 2023-05-24 DIAGNOSIS — M94.261 CHONDROMALACIA, KNEE, RIGHT: ICD-10-CM

## 2023-05-24 PROCEDURE — 99204 PR OFFICE/OUTPT VISIT, NEW, LEVL IV, 45-59 MIN: ICD-10-PCS | Mod: S$GLB,,, | Performed by: ORTHOPAEDIC SURGERY

## 2023-05-24 PROCEDURE — 3078F PR MOST RECENT DIASTOLIC BLOOD PRESSURE < 80 MM HG: ICD-10-PCS | Mod: CPTII,S$GLB,, | Performed by: ORTHOPAEDIC SURGERY

## 2023-05-24 PROCEDURE — 3074F SYST BP LT 130 MM HG: CPT | Mod: CPTII,S$GLB,, | Performed by: ORTHOPAEDIC SURGERY

## 2023-05-24 PROCEDURE — 99999 PR PBB SHADOW E&M-EST. PATIENT-LVL IV: ICD-10-PCS | Mod: PBBFAC,,, | Performed by: ORTHOPAEDIC SURGERY

## 2023-05-24 PROCEDURE — 99204 OFFICE O/P NEW MOD 45 MIN: CPT | Mod: S$GLB,,, | Performed by: ORTHOPAEDIC SURGERY

## 2023-05-24 PROCEDURE — 3288F FALL RISK ASSESSMENT DOCD: CPT | Mod: CPTII,S$GLB,, | Performed by: ORTHOPAEDIC SURGERY

## 2023-05-24 PROCEDURE — 1101F PT FALLS ASSESS-DOCD LE1/YR: CPT | Mod: CPTII,S$GLB,, | Performed by: ORTHOPAEDIC SURGERY

## 2023-05-24 PROCEDURE — 3078F DIAST BP <80 MM HG: CPT | Mod: CPTII,S$GLB,, | Performed by: ORTHOPAEDIC SURGERY

## 2023-05-24 PROCEDURE — 1159F PR MEDICATION LIST DOCUMENTED IN MEDICAL RECORD: ICD-10-PCS | Mod: CPTII,S$GLB,, | Performed by: ORTHOPAEDIC SURGERY

## 2023-05-24 PROCEDURE — 1125F AMNT PAIN NOTED PAIN PRSNT: CPT | Mod: CPTII,S$GLB,, | Performed by: ORTHOPAEDIC SURGERY

## 2023-05-24 PROCEDURE — 99999 PR PBB SHADOW E&M-EST. PATIENT-LVL IV: CPT | Mod: PBBFAC,,, | Performed by: ORTHOPAEDIC SURGERY

## 2023-05-24 PROCEDURE — 73562 XR KNEE ORTHO EXTRA WITH FLEXION: ICD-10-PCS | Mod: 26,50,, | Performed by: RADIOLOGY

## 2023-05-24 PROCEDURE — 1125F PR PAIN SEVERITY QUANTIFIED, PAIN PRESENT: ICD-10-PCS | Mod: CPTII,S$GLB,, | Performed by: ORTHOPAEDIC SURGERY

## 2023-05-24 PROCEDURE — 73562 X-RAY EXAM OF KNEE 3: CPT | Mod: 26,50,, | Performed by: RADIOLOGY

## 2023-05-24 PROCEDURE — 3008F PR BODY MASS INDEX (BMI) DOCUMENTED: ICD-10-PCS | Mod: CPTII,S$GLB,, | Performed by: ORTHOPAEDIC SURGERY

## 2023-05-24 PROCEDURE — 3288F PR FALLS RISK ASSESSMENT DOCUMENTED: ICD-10-PCS | Mod: CPTII,S$GLB,, | Performed by: ORTHOPAEDIC SURGERY

## 2023-05-24 PROCEDURE — 73562 X-RAY EXAM OF KNEE 3: CPT | Mod: TC,50

## 2023-05-24 PROCEDURE — 3008F BODY MASS INDEX DOCD: CPT | Mod: CPTII,S$GLB,, | Performed by: ORTHOPAEDIC SURGERY

## 2023-05-24 PROCEDURE — 3074F PR MOST RECENT SYSTOLIC BLOOD PRESSURE < 130 MM HG: ICD-10-PCS | Mod: CPTII,S$GLB,, | Performed by: ORTHOPAEDIC SURGERY

## 2023-05-24 PROCEDURE — 1101F PR PT FALLS ASSESS DOC 0-1 FALLS W/OUT INJ PAST YR: ICD-10-PCS | Mod: CPTII,S$GLB,, | Performed by: ORTHOPAEDIC SURGERY

## 2023-05-24 PROCEDURE — 1159F MED LIST DOCD IN RCRD: CPT | Mod: CPTII,S$GLB,, | Performed by: ORTHOPAEDIC SURGERY

## 2023-05-24 NOTE — PROGRESS NOTES
CC: Right knee pain, referred by Dr Gallardo    73 y.o. Male with a history of Right pain who He states that the pain is severe and not responding to any conservative care.  He injured it playing pickleball and had a plant/twist injury to the knee. It did swell at that time.     + mechanical symptoms, no instability    Is affecting ADLs.    He is very active and wants to get back to the gym and other outdoor activities  He has a history of prior lumbar back surgery x2 and does have some residual weakness and numbness present in his leg and foot from this surgery  SANE: 40    Review of Systems   Constitution: Negative. Negative for chills, fever and night sweats.   HENT: Negative for congestion and headaches.    Eyes: Negative for blurred vision, left vision loss and right vision loss.   Cardiovascular: Negative for chest pain and syncope.   Respiratory: Negative for cough and shortness of breath.    Endocrine: Negative for polydipsia, polyphagia and polyuria.   Hematologic/Lymphatic: Negative for bleeding problem. Does not bruise/bleed easily.   Skin: Negative for dry skin, itching and rash.   Musculoskeletal: Negative for falls. Positive for knee pain and muscle weakness.   Gastrointestinal: Negative for abdominal pain and bowel incontinence.   Genitourinary: Negative for bladder incontinence and nocturia.   Neurological: Negative for disturbances in coordination, loss of balance and seizures.   Psychiatric/Behavioral: Negative for depression. The patient does not have insomnia.    Allergic/Immunologic: Negative for hives and persistent infections.     PAST MEDICAL HISTORY:   Past Medical History:   Diagnosis Date    Anterior tibialis tendinitis of right lower extremity 10/18/2018    Cancer     Closed fracture of left wrist 10/18/2021    Closed fracture of lower end of left radius with routine healing 2022    Elevated PSA     Family history of ASCVD 10/7/2016    Mom age 64, Dad age 67 -  on same day. [place]  Aunt early 70's.    Family history of coronary artery disease 10/7/2016    Mom age 64, Dad age 67 -  on same day. Pat Aunt early 70's.    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    Intractable back pain 2018    Nuclear sclerosis, bilateral 3/12/2018    Ocular migraine 2012    Osteoporosis     Prostate disease     Transient vision disturbance of both eyes 2012    Umbilical hernia without obstruction and without gangrene 10/1/2015    Urinary tract infection      PAST SURGICAL HISTORY:   Past Surgical History:   Procedure Laterality Date    back sx      lower    BLADDER FULGURATION N/A 2019    Procedure: FULGURATION, BLADDER;  Surgeon: Ryan Knowles MD;  Location: [facility];  Service: Urology;  Laterality: N/A;  bleeding tissue at bladder neck    CATARACT EXTRACTION W/  INTRAOCULAR LENS IMPLANT Bilateral     Dr Finn/Symfony IOL     COLONOSCOPY N/A 2019    Procedure: COLONOSCOPY;  Surgeon: Mauro Smallwood MD;  Location: [facility] (4TH FLR);  Service: Endoscopy;  Laterality: N/A;    CYSTOSCOPY N/A 2019    Procedure: CYSTOSCOPY;  Surgeon: Ryan Knowles MD;  Location: [facility];  Service: Urology;  Laterality: N/A;    CYSTOSCOPY      CYSTOSCOPY  2021    Procedure: CYSTOSCOPY;  Surgeon: Manan Ny MD;  Location: [facility] 1ST FLR;  Service: Urology;;    ESOPHAGOGASTRODUODENOSCOPY N/A 3/31/2021    Procedure: EGD (ESOPHAGOGASTRODUODENOSCOPY);  Surgeon: Jamialh Munoz MD;  Location: [facility];  Service: Endoscopy;  Laterality: N/A;    HERNIA REPAIR      PROSTATE SURGERY      REMOVAL OF BLOOD CLOT N/A 2019    Procedure: REMOVAL, BLOOD CLOT;  Surgeon: Ryan Knowles MD;  Location: [facility];  Service: Urology;  Laterality: N/A;  prostate    SPINE SURGERY      TONSILLECTOMY      TRANSURETHRAL RESECTION OF PROSTATE      TRANSURETHRAL RESECTION OF PROSTATE  N/A 7/13/2019    Procedure: TURP (TRANSURETHRAL RESECTION OF PROSTATE);  Surgeon: Ryan Knowles MD;  Location: [facility];  Service: Urology;  Laterality: N/A;    TRANSURETHRAL SURGICAL REMOVAL OF STRICTURE OF BLADDER NECK  7/27/2021    Procedure: EXCISION, CONTRACTURE, BLADDER NECK, TRANSURETHRAL;  Surgeon: Manan Ny MD;  Location: [facility] OR [address] [place] [address];  Service: Urology;;     FAMILY HISTORY:   Family History   Problem Relation Age of Onset    Glaucoma Maternal Uncle     Retinitis pigmentosa Maternal Uncle     Heart disease Mother     Heart attack Mother     Skin cancer Mother     Heart disease Father     Heart attack Father     Glaucoma Maternal Aunt     No Known Problems Daughter     Prostate cancer Neg Hx     Kidney disease Neg Hx      SOCIAL HISTORY:   Social History     Socioeconomic History    Marital status:    Occupational History     Employer: [employer]   Tobacco Use    Smoking status: Never    Smokeless tobacco: Never   Substance and Sexual Activity    Alcohol use: Yes     Alcohol/week: 3.0 standard drinks     Types: 3 Glasses of wine per week     Comment: rarely    Drug use: No    Sexual activity: Not Currently     Partners: Female   Social History Narrative    ** Merged History Encounter **          [employer]. 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     Social Determinants of Health     Financial Resource Strain: Low Risk     Difficulty of Paying Living Expenses: Not hard at all   Food Insecurity: No Food Insecurity    Worried About Running Out of Food in the Last Year: Never true    Ran Out of Food in the Last Year: Never true   Transportation Needs: No Transportation Needs    Lack of Transportation (Medical): No    Lack of Transportation (Non-Medical): No   Physical Activity: Sufficiently Active    Days of Exercise per Week: 5 days    Minutes of Exercise per Session: 70 min   Stress: No Stress Concern Present    Feeling of Stress : Not at all   Social Connections: Unknown    Frequency of  "Communication with Friends and Family: Three times a week    Frequency of Social Gatherings with Friends and Family: Once a week    Active Member of Clubs or Organizations: Yes    Attends Club or Organization Meetings: More than 4 times per year    Marital Status:    Housing Stability: Low Risk     Unable to Pay for Housing in the Last Year: No    Number of Places Lived in the Last Year: 1    Unstable Housing in the Last Year: No       MEDICATIONS:   Current Outpatient Medications:     atorvastatin (LIPITOR) 20 MG tablet, Take 1 tablet (20 mg total) by mouth once daily., Disp: 90 tablet, Rfl: 3    calcium phosphate trib/vit D3 (CITRACAL + D3, CALCIUM PHOS, ORAL), Take 1 tablet by mouth once daily., Disp: , Rfl:     cholecalciferol, vitamin D3, (VITAMIN D3) 50 mcg (2,000 unit) Cap, Take 1 capsule by mouth once daily., Disp: , Rfl:     icosapent ethyL (VASCEPA) 1 gram Cap, Take 1 capsule (1,000 mg total) by mouth 2 (two) times a day., Disp: 60 capsule, Rfl: 11    linaCLOtide (LINZESS) 145 mcg Cap capsule, Take 1 capsule (145 mcg total) by mouth once daily., Disp: 30 capsule, Rfl: 11    multivitamin capsule, Take 1 capsule by mouth once daily., Disp: , Rfl:     pantoprazole (PROTONIX) 40 MG tablet, Take 1 tablet (40 mg total) by mouth once daily., Disp: 90 tablet, Rfl: 3    polyethylene glycol (GLYCOLAX) 17 gram PwPk, Take by mouth., Disp: , Rfl:     tamsulosin (FLOMAX) 0.4 mg Cap, Take 1 capsule (0.4 mg total) by mouth once daily., Disp: 30 capsule, Rfl: 11  ALLERGIES: Review of patient's allergies indicates:  No Known Allergies    VITAL SIGNS: /70   Pulse 70   Ht 5' 11" (1.803 m)   Wt 84.8 kg (187 lb)   BMI 26.08 kg/m²      PHYSICAL EXAMINATION  VITAL SIGNS: /70   Pulse 70   Ht 5' 11" (1.803 m)   Wt 84.8 kg (187 lb)   BMI 26.08 kg/m²    General:  The patient is alert and oriented x 3.  Mood is pleasant.  Observation of ears, eyes and nose reveal no gross abnormalities.  HEENT: NCAT, sclera " nonicteric  Lungs: Respirations are equal and unlabored.    Right KNEE EXAMINATION     OBSERVATION / INSPECTION   Gait:   Nonantalgic   Alignment:  Neutral   Scars:   None   Muscle atrophy: Mild  Effusion:  +   Warmth:  None   Discoloration:   none     TENDERNESS / CREPITUS (T / C):          T / C      T / C   Patella   - / -   Lateral joint line   + / -    Peripatellar medial  -  Medial joint line    + / -    Peripatellar lateral -  Medial plica   - / -    Patellar tendon -   Popliteal fossa  - / -    Quad tendon   -   Gastrocnemius   -   Prepatellar Bursa - / -   Quadricep   -   Tibial tubercle  -  Thigh/hamstring  -   Pes anserine/HS -  Fibula    -   ITB   - / -  Tibia     -   Tib/fib joint  - / -  LCL    -     MFC   - / -   MCL: Proximal  -    LFC   - / -    Distal   -          ROM: (* = pain)  PASSIVE   ACTIVE    Left :   5 / 0 / 145   5 / 0 / 145     Right :    5 / 0 / 145   5 / 0 / 145    PATELLOFEMORAL EXAMINATION:  See above noted areas of tenderness.   Patella position    Subluxation / dislocation: Centered           Sup. / Inf;   Normal   Crepitus (PF):    Absent   Patellar Mobility:       Medial-lateral:   Normal    Superior-inferior:  Normal    Inferior tilt   Normal    Patellar tendon:  Normal   Lateral tilt:    Normal   J-sign:     None   Patellofemoral grind:   No pain       MENISCAL SIGNS:     Pain on terminal extension:  +  Pain on terminal flexion:  +  Fiordalizas maneuver:  + for pain medial and laterally  Squat     + posterior joint pain    LIGAMENT EXAMINATION:  ACL / Lachman:  normal (-1 to 2mm)    PCL-Post.  drawer: normal 0 to 2mm  MCL- Valgus:  normal 0 to 2mm  LCL- Varus:  normal 0 to 2mm  Pivot shift: normal (Equal)   Dial Test: difference c/w other side   At 30° flexion: normal (< 5°)    At 90° flexion: normal (< 5°)   Reverse Pivot Shift:   normal (Equal)     STRENGTH: (* = with pain) PAINFUL SIDE   Quadricep   5/5   Hamstrin/5    EXTREMITY NEURO-VASCULAR EXAMINATION:    Sensation:  Grossly intact to light touch all dermatomal regions.   Motor Function:  Fully intact motor function at hip, knee, foot and ankle    DTRs;  quadriceps and  achilles 2+.  No clonus and downgoing Babinski.    Vascular status:  DP and PT pulses 2+, brisk capillary refill, symmetric.     Other Findings:       X-rays reviewed and interpreted personally by me:  including standing, weight bearing AP and flexion bilateral knees, lateral and merchant views ordered and images reviewed by me show:  No fracture, dislocation. Mild degenerative changes appreciated.    MRI R knee reviewed and shows:  Medial and lateral meniscal tears  Joint effusion, chondromalacia tricompartmental     I made the decision to obtain old records of the patient including previous notes and imaging. New imaging was ordered today of the extremity or extremities evaluated. I independently reviewed and interpreted the radiographs and/or MRIs today as well as prior imaging.      ASSESSMENT:    Right Knee  Meniscus tear  medial and lateral       PLAN:   Given his activity level and the pathology seen on MRI, surgery is recommended, plan below:  MRI reviewed and interpreted personally by Dr Young:  Shows Right knee medial and lateral meniscal tear, chondromalacia.     ASSESSMENT:    Right Knee Meniscus tear.      he would benefit from knee arthroscopy, possible plica excision, possible chondroplasty with possible meniscectomy given the above.     PLAN: We have discussed the surgery and recovery of arthroscopic knee surgery. he understands that there may be limited weightbearing up to several weeks after surgery depending on procedures that are performed at the time of surgery.    The spectrum of treatment options were discussed with the patient, including nonoperative and operative options.  After thorough discussion, the patient has elected to undergo surgical treatment to include:  right   a. Knee arthroscopic medial and lateral meniscectomy      b. Knee arthroscopic possible plica excision   c. Knee arthroscopic possible chondroplasty    The details of the surgical procedure were explained, including the location of probable incisions and a description of likely hardware and/or grafts to be used.  The patient understands the likely convalescence after surgery.  Also, we have thoroughly discussed the risks, benefits and alternatives to surgery, including, but not limited to, the risk of infection, joint stiffness, blood clot (including DVT and/or pulmonary embolus), neurologic and vascular injury.  It was explained that, if tissue has been repaired or reconstructed, there is a chance of failure, which may require further management.      Patient has chondral damage to knee.  Therefore, I can offer no guarantee whatsoever to the results of a knee arthroscopic surgery.  I believe that arthroscopic surgery will benefit the patient.  I explained this in detail today and patient acknowledged understanding and wishes to proceed.   Long discussion about probable and possible outcomes,     All questions were answered, pt will contact us for questions or concerns in the interim.

## 2023-05-25 ENCOUNTER — PATIENT MESSAGE (OUTPATIENT)
Dept: PREADMISSION TESTING | Facility: HOSPITAL | Age: 73
End: 2023-05-25
Payer: MEDICARE

## 2023-05-25 NOTE — ANESTHESIA PAT ROS NOTE
05/25/2023  Jean Carlson is a 73 y.o., male.      Pre-op Assessment    I have reviewed the Patient Summary Reports.       I have reviewed the Medications.     Review of Systems  Anesthesia Hx:  No problems with previous Anesthesia   History of prior surgery of interest to airway management or planning:  Previous anesthesia: General 7/27/2021  TUR of Bladder Neck Contracture with general anesthesia.  Procedure performed at an [facility].      Airway issues documented on chart review include mask, easy, GETA, easy direct laryngoscopy     Denies Family Hx of Anesthesia complications.    Denies Personal Hx of Anesthesia complications.                    Social:  Non-Smoker, Social Alcohol Use       Hematology/Oncology:                   Hematology Comments: Hypercalciuria      --  Cancer in past history:              surgery   Oncology Comments: Prostate Cancer, S/P TURP     EENT/Dental:   Nuclear sclerosis, bilateral, transient vision disturbances both eyes,  Cataract Extraction w/ intraocular lens implant, tonsillectomy          Cardiovascular:  Exercise tolerance: good       Denies CABG/stent.    Denies Angina.     hyperlipidemia  Denies BOLIVAR.  ECG has been reviewed. Family history of ASCVD,  Atherosclerosis of aorta,  Agatston score 30                         Pulmonary:     Denies Asthma.   Denies Shortness of breath.   Pulmonary nodule               Renal/:    BPH Elevated PSA, Prostate disease, Gross hematuria,  S/P Prostate surgery, TURP, H/O Cysto, TUR Bladder, Bladder fulguration, Removal of blood clot, Removal of stricture of bladder neck             Hepatic/GI:     GERD, well controlled Denies Liver Disease.  Umbilical Hernia, S/P Repair, Chronic idiopathic constipation, Chronic gastritis            Musculoskeletal:     Acute medial meniscus tear, right,   Acute lateral meniscus tear of  right knee,  Chondromalacia, knee, right,  Plica syndrome of right knee, Anterior Tibialis tendinitis RLE,  Closed fracture left wrist, Intractable back pain, S/P Lumbar-sacral surgery X2 HNP, Spinal stenosis of lumbar region, Compression fracture of L2 vertebra with routine healing, Osteoporosis, multi level degenerative changes to Lumbar spine         Spine Disorders: lumbar Disc disease, Chronic Pain and Degenerative disease           Neurological:    Denies CVA.   Headaches Denies Seizures.    Ocular migraine                            Endocrine:  Endocrine Normal Denies Diabetes. Denies Hypothyroidism.          Psych:  Psychiatric Normal                   Past Medical History:   Diagnosis Date    Anterior tibialis tendinitis of right lower extremity 10/18/2018    Cancer     Closed fracture of left wrist 10/18/2021    Closed fracture of lower end of left radius with routine healing 2022    Elevated PSA     Family history of ASCVD 10/7/2016    Mom age 64, Dad age 67 -  on same day. Pat Aunt early 70's.    Family history of coronary artery disease 10/7/2016    Mom age 64, Dad age 67 -  on same day. Pat Aunt early 70's.    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    Intractable back pain 2018    Nuclear sclerosis, bilateral 3/12/2018    Ocular migraine 2012    Osteoporosis     Prostate disease     Transient vision disturbance of both eyes 2012    Umbilical hernia without obstruction and without gangrene 10/1/2015    Urinary tract infection      Past Surgical History:   Procedure Laterality Date    back sx      lower    BLADDER FULGURATION N/A 2019    Procedure: FULGURATION, BLADDER;  Surgeon: Ryan Knowles MD;  Location: [facility];  Service: Urology;  Laterality: N/A;  bleeding tissue at bladder neck    CATARACT EXTRACTION W/  INTRAOCULAR LENS IMPLANT Bilateral      Dr Finn/Ana IOL     COLONOSCOPY N/A 6/5/2019    Procedure: COLONOSCOPY;  Surgeon: Mauro Smallwood MD;  Location: [facility] (4TH FLR);  Service: Endoscopy;  Laterality: N/A;    CYSTOSCOPY N/A 7/13/2019    Procedure: CYSTOSCOPY;  Surgeon: Ryan Knowles MD;  Location: [facility] OR;  Service: Urology;  Laterality: N/A;    CYSTOSCOPY      CYSTOSCOPY  7/27/2021    Procedure: CYSTOSCOPY;  Surgeon: Manan Ny MD;  Location: [facility] 1ST FLR;  Service: Urology;;    ESOPHAGOGASTRODUODENOSCOPY N/A 3/31/2021    Procedure: EGD (ESOPHAGOGASTRODUODENOSCOPY);  Surgeon: Jamilah Munoz MD;  Location: [facility];  Service: Endoscopy;  Laterality: N/A;    HERNIA REPAIR      PROSTATE SURGERY      REMOVAL OF BLOOD CLOT N/A 7/13/2019    Procedure: REMOVAL, BLOOD CLOT;  Surgeon: Ryan Knowles MD;  Location: [facility];  Service: Urology;  Laterality: N/A;  prostate    SPINE SURGERY      TONSILLECTOMY      TRANSURETHRAL RESECTION OF PROSTATE      TRANSURETHRAL RESECTION OF PROSTATE N/A 7/13/2019    Procedure: TURP (TRANSURETHRAL RESECTION OF PROSTATE);  Surgeon: Ryan Knowles MD;  Location: [facility];  Service: Urology;  Laterality: N/A;    TRANSURETHRAL SURGICAL REMOVAL OF STRICTURE OF BLADDER NECK  7/27/2021    Procedure: EXCISION, CONTRACTURE, BLADDER NECK, TRANSURETHRAL;  Surgeon: Manan Ny MD;  Location: [facility] OR 1ST FLR;  Service: Urology;;       Anesthesia Assessment: Preoperative EQUATION    Planned Procedure: Procedure(s) (LRB):  ARTHROSCOPY, KNEE, WITH MENISCECTOMY (Right)  CHONDROPLASTY, KNEE (Right)  EXCISION, PLICA, KNEE, ARTHROSCOPIC (Right)  Requested Anesthesia Type:General  Surgeon: Taylor Young MD  Service: Orthopedics  Known or anticipated Date of Surgery:5/30/2023    Surgeon notes: reviewed    Electronic QUestionnaire Assessment completed via nurse interview with patient.        Triage considerations:     The patient has no apparent active cardiac condition (No unstable coronary Syndrome such as  severe unstable angina or recent [<1 month] myocardial infarction, decompensated CHF, severe valvular   disease or significant arrhythmia)    Previous anesthesia records:GETA, Easy airway, Easy intubation, and No problems    Last PCP note: within 1 month , within [facility]   Subspecialty notes: Gastroenterology, Urology    Patient is cleared for surgery by PCP.     Other important co-morbidities: GERD and HLD       EKG 5/26/2023:  Vent. Rate : 074 BPM     Atrial Rate : 074 BPM      P-R Int : 148 ms          QRS Dur : 092 ms       QT Int : 370 ms       P-R-T Axes : 045 035 058 degrees      QTc Int : 410 ms   Normal sinus rhythm   Normal ECG   When compared with ECG of 02-JUN-2022 09:09,   No significant change was found   Confirmed by AASHISH MINAYA MD ([id number]) on 5/26/2023 3:13:31 PM       Exercise Stress echo 8/15/2014:  EKG Conclusions:     1. The EKG portion of this study is negative for ischemia at a moderate workload, and peak heart rate of 153 bpm (97% of predicted).   2. Exercise capacity is above average.   3. Blood pressure response to exercise was normal (Presenting BP: 124/72 Peak BP: 160/59).   4. No significant arrhythmias were present.   5. There were no symptoms of chest discomfort or significant dyspnea throughout the protocol.   6. The Duke treadmill score was 8 suggesting a low probability for future cardiovascular events.      CONCLUSIONS     1 - Concentric remodeling.     2 - Normal left ventricular systolic function (EF 60-65%).     3 - Normal left ventricular diastolic function.     4 - Normal right ventricular systolic function .   No evidence of stress induced myocardial ischemia.               Instructions given. (See in Nurse's note)      Ht: 5'11  Wt: 187 lb  BMI: 26.08  Vaccinated

## 2023-05-26 ENCOUNTER — TELEPHONE (OUTPATIENT)
Dept: INTERNAL MEDICINE | Facility: CLINIC | Age: 73
End: 2023-05-26
Payer: MEDICARE

## 2023-05-26 ENCOUNTER — PATIENT MESSAGE (OUTPATIENT)
Dept: INTERNAL MEDICINE | Facility: CLINIC | Age: 73
End: 2023-05-26
Payer: MEDICARE

## 2023-05-26 ENCOUNTER — OFFICE VISIT (OUTPATIENT)
Dept: SPORTS MEDICINE | Facility: CLINIC | Age: 73
End: 2023-05-26
Payer: MEDICARE

## 2023-05-26 ENCOUNTER — HOSPITAL ENCOUNTER (OUTPATIENT)
Dept: CARDIOLOGY | Facility: CLINIC | Age: 73
Discharge: HOME | End: 2023-05-26
Attending: FAMILY MEDICINE
Payer: MEDICARE

## 2023-05-26 VITALS
HEIGHT: 71 IN | HEART RATE: 67 BPM | DIASTOLIC BLOOD PRESSURE: 66 MMHG | SYSTOLIC BLOOD PRESSURE: 111 MMHG | BODY MASS INDEX: 26.18 KG/M2 | WEIGHT: 187 LBS

## 2023-05-26 DIAGNOSIS — S83.241A ACUTE MEDIAL MENISCUS TEAR, RIGHT, INITIAL ENCOUNTER: Primary | ICD-10-CM

## 2023-05-26 DIAGNOSIS — Z01.818 PREOPERATIVE CLEARANCE: ICD-10-CM

## 2023-05-26 DIAGNOSIS — S83.281D ACUTE LATERAL MENISCUS TEAR OF RIGHT KNEE, SUBSEQUENT ENCOUNTER: ICD-10-CM

## 2023-05-26 PROCEDURE — 93005 ELECTROCARDIOGRAM TRACING: CPT | Mod: S$GLB,,, | Performed by: FAMILY MEDICINE

## 2023-05-26 PROCEDURE — 99499 NO LOS: ICD-10-PCS | Mod: S$GLB,,, | Performed by: PHYSICIAN ASSISTANT

## 2023-05-26 PROCEDURE — 93005 EKG 12-LEAD: ICD-10-PCS | Mod: S$GLB,,, | Performed by: FAMILY MEDICINE

## 2023-05-26 PROCEDURE — 99499 UNLISTED E&M SERVICE: CPT | Mod: S$GLB,,, | Performed by: PHYSICIAN ASSISTANT

## 2023-05-26 PROCEDURE — 93010 EKG 12-LEAD: ICD-10-PCS | Mod: S$GLB,,, | Performed by: INTERNAL MEDICINE

## 2023-05-26 PROCEDURE — 99999 PR PBB SHADOW E&M-EST. PATIENT-LVL III: CPT | Mod: PBBFAC,,, | Performed by: PHYSICIAN ASSISTANT

## 2023-05-26 PROCEDURE — 99999 PR PBB SHADOW E&M-EST. PATIENT-LVL III: ICD-10-PCS | Mod: PBBFAC,,, | Performed by: PHYSICIAN ASSISTANT

## 2023-05-26 PROCEDURE — 93010 ELECTROCARDIOGRAM REPORT: CPT | Mod: S$GLB,,, | Performed by: INTERNAL MEDICINE

## 2023-05-26 RX ORDER — TRAMADOL HYDROCHLORIDE 50 MG/1
50-100 TABLET ORAL EVERY 6 HOURS PRN
Qty: 12 TABLET | Refills: 0 | Status: SHIPPED | OUTPATIENT
Start: 2023-05-26 | End: 2023-06-12

## 2023-05-26 RX ORDER — PROMETHAZINE HYDROCHLORIDE 25 MG/1
25 TABLET ORAL EVERY 6 HOURS PRN
Qty: 8 TABLET | Refills: 0 | Status: SHIPPED | OUTPATIENT
Start: 2023-05-26 | End: 2023-06-12 | Stop reason: ALTCHOICE

## 2023-05-26 RX ORDER — HYDROCODONE BITARTRATE AND ACETAMINOPHEN 10; 325 MG/1; MG/1
TABLET ORAL
Qty: 8 TABLET | Refills: 0 | Status: SHIPPED | OUTPATIENT
Start: 2023-05-26 | End: 2023-06-12

## 2023-05-26 RX ORDER — SODIUM CHLORIDE 9 MG/ML
INJECTION, SOLUTION INTRAVENOUS CONTINUOUS
Status: CANCELLED | OUTPATIENT
Start: 2023-05-26

## 2023-05-26 RX ORDER — CEFAZOLIN SODIUM 2 G/50ML
2 SOLUTION INTRAVENOUS
Status: CANCELLED | OUTPATIENT
Start: 2023-05-26

## 2023-05-26 RX ORDER — ASPIRIN 81 MG/1
81 TABLET ORAL DAILY
Qty: 28 TABLET | Refills: 0 | COMMUNITY
Start: 2023-05-26 | End: 2023-11-18

## 2023-05-26 NOTE — H&P (VIEW-ONLY)
Jean Carlson  is here for a completion of his perioperative paperwork. he  Is scheduled to undergo      right              a. Knee arthroscopic medial and lateral meniscectomy                b. Knee arthroscopic possible plica excision              c. Knee arthroscopic possible chondroplasty on 23.      He is a healthy individual and does need clearance for this procedure from Dr. Fritz which he has received. He was told to discuss Prolia injections with his endocrinologist and if he shoulder postpone his next dose in 1 month. Endocrinologist told patient that she does not give medical clearance.     PAST MEDICAL HISTORY:   Past Medical History:   Diagnosis Date    Anterior tibialis tendinitis of right lower extremity 10/18/2018    Cancer     Closed fracture of left wrist 10/18/2021    Closed fracture of lower end of left radius with routine healing 2022    Elevated PSA     Family history of ASCVD 10/7/2016    Mom age 64, Dad age 67 -  on same day. Pat Aunt early 70's.    Family history of coronary artery disease 10/7/2016    Mom age 64, Dad age 67 -  on same day. Pat Aunt early 70's.    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    Intractable back pain 2018    Nuclear sclerosis, bilateral 3/12/2018    Ocular migraine 2012    Osteoporosis     Prostate disease     Transient vision disturbance of both eyes 2012    Umbilical hernia without obstruction and without gangrene 10/1/2015    Urinary tract infection      PAST SURGICAL HISTORY:   Past Surgical History:   Procedure Laterality Date    back sx      lower    BLADDER FULGURATION N/A 2019    Procedure: FULGURATION, BLADDER;  Surgeon: Ryan Knowles MD;  Location: [facility];  Service: Urology;  Laterality: N/A;  bleeding tissue at bladder neck    CATARACT EXTRACTION W/  INTRAOCULAR LENS IMPLANT Bilateral      Dr Finn/Ana IOL     COLONOSCOPY N/A 6/5/2019    Procedure: COLONOSCOPY;  Surgeon: Mauro Smallwood MD;  Location: [facility] (4TH FLR);  Service: Endoscopy;  Laterality: N/A;    CYSTOSCOPY N/A 7/13/2019    Procedure: CYSTOSCOPY;  Surgeon: Ryan Knowles MD;  Location: [facility];  Service: Urology;  Laterality: N/A;    CYSTOSCOPY      CYSTOSCOPY  7/27/2021    Procedure: CYSTOSCOPY;  Surgeon: Manan Ny MD;  Location: [facility] 1ST FLR;  Service: Urology;;    ESOPHAGOGASTRODUODENOSCOPY N/A 3/31/2021    Procedure: EGD (ESOPHAGOGASTRODUODENOSCOPY);  Surgeon: Jamilah Munoz MD;  Location: [facility];  Service: Endoscopy;  Laterality: N/A;    HERNIA REPAIR      PROSTATE SURGERY      REMOVAL OF BLOOD CLOT N/A 7/13/2019    Procedure: REMOVAL, BLOOD CLOT;  Surgeon: Ryan Knowles MD;  Location: [facility];  Service: Urology;  Laterality: N/A;  prostate    SPINE SURGERY      TONSILLECTOMY      TRANSURETHRAL RESECTION OF PROSTATE      TRANSURETHRAL RESECTION OF PROSTATE N/A 7/13/2019    Procedure: TURP (TRANSURETHRAL RESECTION OF PROSTATE);  Surgeon: Ryan Knowles MD;  Location: [facility];  Service: Urology;  Laterality: N/A;    TRANSURETHRAL SURGICAL REMOVAL OF STRICTURE OF BLADDER NECK  7/27/2021    Procedure: EXCISION, CONTRACTURE, BLADDER NECK, TRANSURETHRAL;  Surgeon: Manan Ny MD;  Location: [facility] 1ST FLR;  Service: Urology;;     FAMILY HISTORY:   Family History   Problem Relation Age of Onset    Glaucoma Maternal Uncle     Retinitis pigmentosa Maternal Uncle     Heart disease Mother     Heart attack Mother     Skin cancer Mother     Heart disease Father     Heart attack Father     Glaucoma Maternal Aunt     No Known Problems Daughter     Prostate cancer Neg Hx     Kidney disease Neg Hx      SOCIAL HISTORY:   Social History     Socioeconomic History    Marital status:    Occupational History     Employer: [employer]   Tobacco Use    Smoking status: Never    Smokeless tobacco: Never   Substance  and Sexual Activity    Alcohol use: Yes     Alcohol/week: 3.0 standard drinks     Types: 3 Glasses of wine per week     Comment: rarely    Drug use: No    Sexual activity: Not Currently     Partners: Female   Social History Narrative    ** Merged History Encounter **          Shell/Saroja. 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     Social Determinants of Health     Financial Resource Strain: Low Risk     Difficulty of Paying Living Expenses: Not hard at all   Food Insecurity: No Food Insecurity    Worried About Running Out of Food in the Last Year: Never true    Ran Out of Food in the Last Year: Never true   Transportation Needs: No Transportation Needs    Lack of Transportation (Medical): No    Lack of Transportation (Non-Medical): No   Physical Activity: Sufficiently Active    Days of Exercise per Week: 5 days    Minutes of Exercise per Session: 80 min   Stress: No Stress Concern Present    Feeling of Stress : Not at all   Social Connections: Unknown    Frequency of Communication with Friends and Family: Twice a week    Frequency of Social Gatherings with Friends and Family: Once a week    Active Member of Clubs or Organizations: Yes    Attends Club or Organization Meetings: More than 4 times per year    Marital Status:    Housing Stability: Low Risk     Unable to Pay for Housing in the Last Year: No    Number of Places Lived in the Last Year: 1    Unstable Housing in the Last Year: No       MEDICATIONS:   Current Outpatient Medications:     atorvastatin (LIPITOR) 20 MG tablet, Take 1 tablet (20 mg total) by mouth once daily., Disp: 90 tablet, Rfl: 3    calcium phosphate trib/vit D3 (CITRACAL + D3, CALCIUM PHOS, ORAL), Take 1 tablet by mouth once daily., Disp: , Rfl:     cholecalciferol, vitamin D3, (VITAMIN D3) 50 mcg (2,000 unit) Cap, Take 1 capsule by mouth once daily., Disp: , Rfl:     icosapent ethyL (VASCEPA) 1 gram Cap, Take 1 capsule (1,000 mg total) by mouth 2 (two) times a day., Disp: 60 capsule, Rfl:  "11    linaCLOtide (LINZESS) 145 mcg Cap capsule, Take 1 capsule (145 mcg total) by mouth once daily., Disp: 30 capsule, Rfl: 11    multivitamin capsule, Take 1 capsule by mouth once daily., Disp: , Rfl:     pantoprazole (PROTONIX) 40 MG tablet, Take 1 tablet (40 mg total) by mouth once daily., Disp: 90 tablet, Rfl: 3    polyethylene glycol (GLYCOLAX) 17 gram PwPk, Take by mouth., Disp: , Rfl:     tamsulosin (FLOMAX) 0.4 mg Cap, Take 1 capsule (0.4 mg total) by mouth once daily., Disp: 30 capsule, Rfl: 11    aspirin (ECOTRIN) 81 MG EC tablet, Take 1 tablet (81 mg total) by mouth once daily. For 4 weeks starting the day after surgery., Disp: 28 tablet, Rfl: 0    HYDROcodone-acetaminophen (NORCO)  mg per tablet, Take 1 tablet by mouth every 4-6 hours for pain., Disp: 8 tablet, Rfl: 0    promethazine (PHENERGAN) 25 MG tablet, Take 1 tablet (25 mg total) by mouth every 6 (six) hours as needed for Nausea., Disp: 8 tablet, Rfl: 0    traMADoL (ULTRAM) 50 mg tablet, Take 1-2 tablets ( mg total) by mouth every 6 (six) hours as needed for Pain., Disp: 12 tablet, Rfl: 0  ALLERGIES: Review of patient's allergies indicates:  No Known Allergies    VITAL SIGNS: /66   Pulse 67   Ht 5' 11" (1.803 m)   Wt 84.8 kg (187 lb)   BMI 26.08 kg/m²      Risks, indications and benefits of the surgical procedure were discussed with the patient. All questions with regard to surgery, rehab, expected return to functional activities, activities of daily living and recreational endeavors were answered to his satisfaction.    It was explained to the patient that there may be an increase in surgical risks if the patient has certain co-morbidities such as but not limited to: Obesity, Cardiovascular issues (CHF, CAD, Arrhythmias), chronic pulmonary issues, previous or current neurovascular/neurological issues, previous strokes, diabetes mellitus, previous wound healing issues, previous wound or skin infections, PVD, clotting " disorders, if the patient uses chronic steroids, if the patient takes or has immune compromising medications or diseases, or has previously or currently used tobacco products.     The patient verbalized that he/she does not have any additional clotting, bleeding, or blood disorders, other than what is list in her chart on today's review.     Then a brief history and physical exam were performed.    Review of Systems   Constitution: Negative. Negative for chills, fever and night sweats.   HENT: Negative for congestion and headaches.    Eyes: Negative for blurred vision, left vision loss and right vision loss.   Cardiovascular: Negative for chest pain and syncope.   Respiratory: Negative for cough and shortness of breath.    Endocrine: Negative for polydipsia, polyphagia and polyuria.   Hematologic/Lymphatic: Negative for bleeding problem. Does not bruise/bleed easily.   Skin: Negative for dry skin, itching and rash.   Musculoskeletal: Negative for falls and muscle weakness.   Gastrointestinal: Negative for abdominal pain and bowel incontinence.   Genitourinary: Negative for bladder incontinence and nocturia.   Neurological: Negative for disturbances in coordination, loss of balance and seizures.   Psychiatric/Behavioral: Negative for depression. The patient does not have insomnia.    Allergic/Immunologic: Negative for hives and persistent infections.     PHYSICAL EXAM:  GEN: A&Ox3, WD WN NAD  HEENT: WNL  CHEST: CTAB, no W/R/R  HEART: RRR, no M/R/G  ABD: Soft, NT ND, BS x4 QUADS  MS; See Epic  NEURO: CN II-XII intact       The surgical consent was then reviewed with the patient, who agreed with all the contents of the consent form and it was signed. he was then given the surgery packet to bring with him to surgery center for the anesthesia portion of his perioperative paperwork (if needed)   For all physicians except for Dr. Ramirez, we will email and possibly fax the consent forms and booking sheets to [facility].    The patient was given the opportunity to ask questions about the surgical plan and consent form, and once no other questions were asked, I proceeded with the pre-op appointment.    PHYSICAL THERAPY:  He was also instructed regarding physical therapy and will begin POD1. He was given a copy of the original prescription to schedule. Another copy of this prescription was also faxed to [facility].    POST OP CARE:instructions were reviewed including care of the wound and dressing after surgery and when he can shower. Patient was told not sleep or lay on there surgical extremity following surgery as this could cause repair damage, tissue damage, or nerve injury.    An extensive amount of time was spent on discussion of the following information based on what type of surgery the patient was having. Patient expressed understanding of the material below:    Shoulder surgery or upper extremity surgery requiring post-op sling:  It was explained to the patient that they should remove their arm from the sling approximately 6 times per day to do full elbow ROM (flexion and extension) and full supination and pronation of the elbow for approximately 5 minutes at a time to help prevent elbow stiffness, nerve pain or problems, or nerve injury. They were told to contact us if they begin having numbness and tingling of there surgical extremity that persists longer then 1 day without relief.     Extremity surgery requiring a splint:   It was explained to patient on how to properly elevated position there extremity to prevent pressure ulcers from occurring. I made sure that the patient understood that that surgical site may be numb following surgery and prevent them from feeling pressure pain that they would normal feel if a pressure injury was occurring. Pressure ulcers and there causes were discussed with the patient today.     Post-operative splint:  It was explain to the patient that they can contact us at anytime  if they feel that there is a problem with their splint or under their splint that needs evaluation. If there is concern, questions, or discomfort with the splint then they can present to either our clinic or the [facility] ED for removal, evaluation, and replacement of the splint.    CRUTCHES OR WALKER: It was explained to the patient that if they are having a lower extremity surgery that they will require either a walker or crutches to ambulate safely with after surgery. It was explained that a cane or other assistive devices are not sufficient to safely ambulate with after surgery. I explained to the patient that I will place an order for them to receive either crutches or a walker after surgery to go home with. It was explained that if they have crutches or a walker at home already, that they are REQUIRED to bring them to the hospital on the day of surgery. It was explained that if they do not have them at the hospital on the day of surgery that they WILL be provided a new pair or crutches or a walker to go home with to ensure ambulation will be safe if the patient needs to stop somewhere on the way home.      PAIN MANAGEMENT: Jean Carlson was also given a pain management regime, which includes the option of getting a TENS unit given to him by [facility] (if patient chooses) along with the education required for its use. He was also instructed regarding the Polar ice unit or gel ice packs (chosen by patient) that will be in place after surgery and his postoperative pain medications.     Patient understands that Polar Ice machine has to be bought today if they want it. It cannot be bought on day of surgery at surgery center.     PAIN MEDICATION:  Norco 10/325mg 1 po q 4-6 hours prn pain  Ultram 50 mg Take 1-2 p.o. q.6 hours p.r.n. breakthrough pain,   Phenergan 25 mg one p.o. q.6 hours p.r.n. nausea and vomiting.    DVT prophylaxis was discussed with the patient today including risk factors for  developing DVTs and history of DVTs. The patient was asked if any specific recommendations were given from the doctor/s that did pre-operative surgical clearance. The patient was then given an education sheet about DVTs and PE with warning signs and symptoms of both and steps to take if they suspect either of these.    This along with the Modified Caprini risk assessment model for VTE in general surgical patients was used to determine the patient's DVT risk.     From: Lazaro MK, Alphonso DA, Izzy SM, et al. Prevention of VTE in nonorthopedic surgical patients: antithrombotic therapy and prevention of thrombosis, 9th ed: American College of Chest Physicians evidence-based clinical practical guidelines. Chest 2012; 141:e227S. Copyright © 2012. Reproduced with permission from the American College of Chest Physicians.    The below listed DVT prophylaxis regimen along with bilateral DANIEL compression stockings will be used post-op. Length of treatment has been determined to be 10-42 days post-op by the above noted Caprini assessment model.     The patient was instructed to buy and take:  Aspirin 81mg QD x 4 weeks for DVT prophylaxis starting on the morning after surgery.  Patient will also use bilateral TEDs on lower extremities, SCDs during surgery, and early ambulation post-op. If the patient was previously taking 81mg baby aspirin, they were told to not take it starting 5 days prior to surgery and to restart the 81mg aspirin after surgery.       Patient was also told to buy over the counter Prilosec medication if needed and take it once daily for GI protection as long as they are taking NSAIDs or Aspirin.   Patient denies history of seizures.       The patient was told that narcotic pain medications may make them drowsy and instructions were given to not sign legal documents, drive or operate heavy machinery, cars, or equipment while under the influence of narcotic medications. The patient was told and understands that  narcotic pain medications should only be used as needed to control pain and that other options of pain control include TENs unit and ice packs/unit.     As there were no other questions to be asked, he was given my business card along with Taylor Young MD business card if he has any questions or concerns prior to surgery or in the postop period.

## 2023-05-26 NOTE — TELEPHONE ENCOUNTER
Patient was seen recently.  He had a knee injury and was referred to [facility] Medicine Orthopedics.  They would like to do a procedure early next week.  When he saw me, we discussed his family history of cardiovascular disease and agree to do a stress test when he was able to ambulate more.  He did not have any acute cardiopulmonary symptoms at the time, exercises on a regular basis and is well able to do above 4 Mets of exercise.  Likely greater than 10.    I requested he perform an EKG today to look for any resting changes.  This is normal.  He has no chest pain, dyspnea or leg swelling.    Surgical Risk Assessment     Active cardiac issues:  Active decompensated heart failure? No   Unstable angina?  No   Significant uncontrolled arrhythmias? No   Severe valvular heart disease-Aortic or Mitral Stenosis? No   Recent MI or coronary revascularization < 30 days? No     Clinical risk factors predicting perioperative major adverse cardiac events per RCRI  High risk surgery (suprainguinal vascular, intraperitoneal, or intrathoracic surgery)? No   History of CAD/ischemic heart disease? No   History of cerebrovascular disease (CVA or TIA)? No   History of compensated heart failure? No   Type 2 diabetes requiring insulin? No   Serum Creatinine > 2? No   Total cardiac risk factors 0     0 predictors = 3.9%, 1 predictor = 6.0%, 2 predictors = 10.1%, ?3 predictors = >15%    According to the revised cardiac risk index, the risk of george-procedural major cardiac complications (cardiac death, nonfatal MI, nonfatal cardiac arrest, postoperative cardiogenic pulmonary edema, complete heart block) is: 3.9 .     From [employer] 2017, based on pooled data from 5 high quality external validations (4 prospective). These numbers are higher than those often quoted from the now-outdated original study (Gaurav 1999).    If patient has a low risk of MACE (<1%), proceed to surgery. If the patient is at elevated risk of MACE, then determine functional  capacity (pt reported activity or DASI model).     If the patient has moderate, good, or excellent functional capacity (?4 METs), then proceed to surgery without further evaluation. If patient has poor or unknown functional capacity, will further testing impact decision making or perioperative care? If yes, then pharmacological stress testing is appropriate. In those patients with unknown functional capacity, exercise stress testing may be reasonable to perform.     Patient's functional mets:  Well above 4, likely able to do a 10 met stress test or more.    < 4 METs -unable to walk > 2 blocks on level ground without stopping due to symptoms  - eating, dressing, toileting, walking indoors, light housework. POOR   > 4 METs -climbing > 1 flight of stairs without stopping  -walking up hill > 1-2 blocks  -scrubbing floors  -moving furniture  - golf, bowling, dancing or tennis  -running short distance MODERATE to EXCELLENT     OR   [email or web address]-[email or web address]-[email or web address]    Clear for surgery and cc to referring physician.

## 2023-05-26 NOTE — TELEPHONE ENCOUNTER
----- Message from Debbie Oshea sent at 5/26/2023 10:26 AM CDT -----  Contact: [phone] Patient  Pt states that he needs a medical release form signed so he can have his surgery on 5/30/2023 with Dr. Young . Please call and advise. Thanks

## 2023-05-26 NOTE — H&P
Jean Carlson  is here for a completion of his perioperative paperwork. he  Is scheduled to undergo      right              a. Knee arthroscopic medial and lateral meniscectomy                b. Knee arthroscopic possible plica excision              c. Knee arthroscopic possible chondroplasty on 23.      He is a healthy individual and does need clearance for this procedure from Dr. Fritz which he has received. He was told to discuss Prolia injections with his endocrinologist and if he shoulder postpone his next dose in 1 month. Endocrinologist told patient that she does not give medical clearance.     PAST MEDICAL HISTORY:   Past Medical History:   Diagnosis Date    Anterior tibialis tendinitis of right lower extremity 10/18/2018    Cancer     Closed fracture of left wrist 10/18/2021    Closed fracture of lower end of left radius with routine healing 2022    Elevated PSA     Family history of ASCVD 10/7/2016    Mom age 64, Dad age 67 -  on same day. Pat Aunt early 70's.    Family history of coronary artery disease 10/7/2016    Mom age 64, Dad age 67 -  on same day. Pat Aunt early 70's.    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    Intractable back pain 2018    Nuclear sclerosis, bilateral 3/12/2018    Ocular migraine 2012    Osteoporosis     Prostate disease     Transient vision disturbance of both eyes 2012    Umbilical hernia without obstruction and without gangrene 10/1/2015    Urinary tract infection      PAST SURGICAL HISTORY:   Past Surgical History:   Procedure Laterality Date    back sx      lower    BLADDER FULGURATION N/A 2019    Procedure: FULGURATION, BLADDER;  Surgeon: Ryan Knowles MD;  Location: [facility];  Service: Urology;  Laterality: N/A;  bleeding tissue at bladder neck    CATARACT EXTRACTION W/  INTRAOCULAR LENS IMPLANT Bilateral      Dr Finn/Ana IOL     COLONOSCOPY N/A 6/5/2019    Procedure: COLONOSCOPY;  Surgeon: Mauro Smallwood MD;  Location: [facility] (4TH FLR);  Service: Endoscopy;  Laterality: N/A;    CYSTOSCOPY N/A 7/13/2019    Procedure: CYSTOSCOPY;  Surgeon: Ryan Knowles MD;  Location: [facility];  Service: Urology;  Laterality: N/A;    CYSTOSCOPY      CYSTOSCOPY  7/27/2021    Procedure: CYSTOSCOPY;  Surgeon: Manan Ny MD;  Location: [facility] 1ST FLR;  Service: Urology;;    ESOPHAGOGASTRODUODENOSCOPY N/A 3/31/2021    Procedure: EGD (ESOPHAGOGASTRODUODENOSCOPY);  Surgeon: Jamilah Munoz MD;  Location: [facility];  Service: Endoscopy;  Laterality: N/A;    HERNIA REPAIR      PROSTATE SURGERY      REMOVAL OF BLOOD CLOT N/A 7/13/2019    Procedure: REMOVAL, BLOOD CLOT;  Surgeon: Ryan Knowles MD;  Location: [facility];  Service: Urology;  Laterality: N/A;  prostate    SPINE SURGERY      TONSILLECTOMY      TRANSURETHRAL RESECTION OF PROSTATE      TRANSURETHRAL RESECTION OF PROSTATE N/A 7/13/2019    Procedure: TURP (TRANSURETHRAL RESECTION OF PROSTATE);  Surgeon: Ryan Knowles MD;  Location: [facility];  Service: Urology;  Laterality: N/A;    TRANSURETHRAL SURGICAL REMOVAL OF STRICTURE OF BLADDER NECK  7/27/2021    Procedure: EXCISION, CONTRACTURE, BLADDER NECK, TRANSURETHRAL;  Surgeon: Manan Ny MD;  Location: [facility] 1ST FLR;  Service: Urology;;     FAMILY HISTORY:   Family History   Problem Relation Age of Onset    Glaucoma Maternal Uncle     Retinitis pigmentosa Maternal Uncle     Heart disease Mother     Heart attack Mother     Skin cancer Mother     Heart disease Father     Heart attack Father     Glaucoma Maternal Aunt     No Known Problems Daughter     Prostate cancer Neg Hx     Kidney disease Neg Hx      SOCIAL HISTORY:   Social History     Socioeconomic History    Marital status:    Occupational History     Employer: [employer]   Tobacco Use    Smoking status: Never    Smokeless tobacco: Never   Substance  and Sexual Activity    Alcohol use: Yes     Alcohol/week: 3.0 standard drinks     Types: 3 Glasses of wine per week     Comment: rarely    Drug use: No    Sexual activity: Not Currently     Partners: Female   Social History Narrative    ** Merged History Encounter **          Shell/Saroja. 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     Social Determinants of Health     Financial Resource Strain: Low Risk     Difficulty of Paying Living Expenses: Not hard at all   Food Insecurity: No Food Insecurity    Worried About Running Out of Food in the Last Year: Never true    Ran Out of Food in the Last Year: Never true   Transportation Needs: No Transportation Needs    Lack of Transportation (Medical): No    Lack of Transportation (Non-Medical): No   Physical Activity: Sufficiently Active    Days of Exercise per Week: 5 days    Minutes of Exercise per Session: 80 min   Stress: No Stress Concern Present    Feeling of Stress : Not at all   Social Connections: Unknown    Frequency of Communication with Friends and Family: Twice a week    Frequency of Social Gatherings with Friends and Family: Once a week    Active Member of Clubs or Organizations: Yes    Attends Club or Organization Meetings: More than 4 times per year    Marital Status:    Housing Stability: Low Risk     Unable to Pay for Housing in the Last Year: No    Number of Places Lived in the Last Year: 1    Unstable Housing in the Last Year: No       MEDICATIONS:   Current Outpatient Medications:     atorvastatin (LIPITOR) 20 MG tablet, Take 1 tablet (20 mg total) by mouth once daily., Disp: 90 tablet, Rfl: 3    calcium phosphate trib/vit D3 (CITRACAL + D3, CALCIUM PHOS, ORAL), Take 1 tablet by mouth once daily., Disp: , Rfl:     cholecalciferol, vitamin D3, (VITAMIN D3) 50 mcg (2,000 unit) Cap, Take 1 capsule by mouth once daily., Disp: , Rfl:     icosapent ethyL (VASCEPA) 1 gram Cap, Take 1 capsule (1,000 mg total) by mouth 2 (two) times a day., Disp: 60 capsule, Rfl:  "11    linaCLOtide (LINZESS) 145 mcg Cap capsule, Take 1 capsule (145 mcg total) by mouth once daily., Disp: 30 capsule, Rfl: 11    multivitamin capsule, Take 1 capsule by mouth once daily., Disp: , Rfl:     pantoprazole (PROTONIX) 40 MG tablet, Take 1 tablet (40 mg total) by mouth once daily., Disp: 90 tablet, Rfl: 3    polyethylene glycol (GLYCOLAX) 17 gram PwPk, Take by mouth., Disp: , Rfl:     tamsulosin (FLOMAX) 0.4 mg Cap, Take 1 capsule (0.4 mg total) by mouth once daily., Disp: 30 capsule, Rfl: 11    aspirin (ECOTRIN) 81 MG EC tablet, Take 1 tablet (81 mg total) by mouth once daily. For 4 weeks starting the day after surgery., Disp: 28 tablet, Rfl: 0    HYDROcodone-acetaminophen (NORCO)  mg per tablet, Take 1 tablet by mouth every 4-6 hours for pain., Disp: 8 tablet, Rfl: 0    promethazine (PHENERGAN) 25 MG tablet, Take 1 tablet (25 mg total) by mouth every 6 (six) hours as needed for Nausea., Disp: 8 tablet, Rfl: 0    traMADoL (ULTRAM) 50 mg tablet, Take 1-2 tablets ( mg total) by mouth every 6 (six) hours as needed for Pain., Disp: 12 tablet, Rfl: 0  ALLERGIES: Review of patient's allergies indicates:  No Known Allergies    VITAL SIGNS: /66   Pulse 67   Ht 5' 11" (1.803 m)   Wt 84.8 kg (187 lb)   BMI 26.08 kg/m²      Risks, indications and benefits of the surgical procedure were discussed with the patient. All questions with regard to surgery, rehab, expected return to functional activities, activities of daily living and recreational endeavors were answered to his satisfaction.    It was explained to the patient that there may be an increase in surgical risks if the patient has certain co-morbidities such as but not limited to: Obesity, Cardiovascular issues (CHF, CAD, Arrhythmias), chronic pulmonary issues, previous or current neurovascular/neurological issues, previous strokes, diabetes mellitus, previous wound healing issues, previous wound or skin infections, PVD, clotting " disorders, if the patient uses chronic steroids, if the patient takes or has immune compromising medications or diseases, or has previously or currently used tobacco products.     The patient verbalized that he/she does not have any additional clotting, bleeding, or blood disorders, other than what is list in her chart on today's review.     Then a brief history and physical exam were performed.    Review of Systems   Constitution: Negative. Negative for chills, fever and night sweats.   HENT: Negative for congestion and headaches.    Eyes: Negative for blurred vision, left vision loss and right vision loss.   Cardiovascular: Negative for chest pain and syncope.   Respiratory: Negative for cough and shortness of breath.    Endocrine: Negative for polydipsia, polyphagia and polyuria.   Hematologic/Lymphatic: Negative for bleeding problem. Does not bruise/bleed easily.   Skin: Negative for dry skin, itching and rash.   Musculoskeletal: Negative for falls and muscle weakness.   Gastrointestinal: Negative for abdominal pain and bowel incontinence.   Genitourinary: Negative for bladder incontinence and nocturia.   Neurological: Negative for disturbances in coordination, loss of balance and seizures.   Psychiatric/Behavioral: Negative for depression. The patient does not have insomnia.    Allergic/Immunologic: Negative for hives and persistent infections.     PHYSICAL EXAM:  GEN: A&Ox3, WD WN NAD  HEENT: WNL  CHEST: CTAB, no W/R/R  HEART: RRR, no M/R/G  ABD: Soft, NT ND, BS x4 QUADS  MS; See Epic  NEURO: CN II-XII intact       The surgical consent was then reviewed with the patient, who agreed with all the contents of the consent form and it was signed. he was then given the surgery packet to bring with him to surgery center for the anesthesia portion of his perioperative paperwork (if needed)   For all physicians except for Dr. Ramirez, we will email and possibly fax the consent forms and booking sheets to [facility].    The patient was given the opportunity to ask questions about the surgical plan and consent form, and once no other questions were asked, I proceeded with the pre-op appointment.    PHYSICAL THERAPY:  He was also instructed regarding physical therapy and will begin POD1. He was given a copy of the original prescription to schedule. Another copy of this prescription was also faxed to [facility].    POST OP CARE:instructions were reviewed including care of the wound and dressing after surgery and when he can shower. Patient was told not sleep or lay on there surgical extremity following surgery as this could cause repair damage, tissue damage, or nerve injury.    An extensive amount of time was spent on discussion of the following information based on what type of surgery the patient was having. Patient expressed understanding of the material below:    Shoulder surgery or upper extremity surgery requiring post-op sling:  It was explained to the patient that they should remove their arm from the sling approximately 6 times per day to do full elbow ROM (flexion and extension) and full supination and pronation of the elbow for approximately 5 minutes at a time to help prevent elbow stiffness, nerve pain or problems, or nerve injury. They were told to contact us if they begin having numbness and tingling of there surgical extremity that persists longer then 1 day without relief.     Extremity surgery requiring a splint:   It was explained to patient on how to properly elevated position there extremity to prevent pressure ulcers from occurring. I made sure that the patient understood that that surgical site may be numb following surgery and prevent them from feeling pressure pain that they would normal feel if a pressure injury was occurring. Pressure ulcers and there causes were discussed with the patient today.     Post-operative splint:  It was explain to the patient that they can contact us at anytime  if they feel that there is a problem with their splint or under their splint that needs evaluation. If there is concern, questions, or discomfort with the splint then they can present to either our clinic or the [facility] ED for removal, evaluation, and replacement of the splint.    CRUTCHES OR WALKER: It was explained to the patient that if they are having a lower extremity surgery that they will require either a walker or crutches to ambulate safely with after surgery. It was explained that a cane or other assistive devices are not sufficient to safely ambulate with after surgery. I explained to the patient that I will place an order for them to receive either crutches or a walker after surgery to go home with. It was explained that if they have crutches or a walker at home already, that they are REQUIRED to bring them to the hospital on the day of surgery. It was explained that if they do not have them at the hospital on the day of surgery that they WILL be provided a new pair or crutches or a walker to go home with to ensure ambulation will be safe if the patient needs to stop somewhere on the way home.      PAIN MANAGEMENT: Jean Carlson was also given a pain management regime, which includes the option of getting a TENS unit given to him by [facility] (if patient chooses) along with the education required for its use. He was also instructed regarding the Polar ice unit or gel ice packs (chosen by patient) that will be in place after surgery and his postoperative pain medications.     Patient understands that Polar Ice machine has to be bought today if they want it. It cannot be bought on day of surgery at surgery center.     PAIN MEDICATION:  Norco 10/325mg 1 po q 4-6 hours prn pain  Ultram 50 mg Take 1-2 p.o. q.6 hours p.r.n. breakthrough pain,   Phenergan 25 mg one p.o. q.6 hours p.r.n. nausea and vomiting.    DVT prophylaxis was discussed with the patient today including risk factors for  developing DVTs and history of DVTs. The patient was asked if any specific recommendations were given from the doctor/s that did pre-operative surgical clearance. The patient was then given an education sheet about DVTs and PE with warning signs and symptoms of both and steps to take if they suspect either of these.    This along with the Modified Caprini risk assessment model for VTE in general surgical patients was used to determine the patient's DVT risk.     From: Lazaro MK, Alphonso DA, Izzy SM, et al. Prevention of VTE in nonorthopedic surgical patients: antithrombotic therapy and prevention of thrombosis, 9th ed: American College of Chest Physicians evidence-based clinical practical guidelines. Chest 2012; 141:e227S. Copyright © 2012. Reproduced with permission from the American College of Chest Physicians.    The below listed DVT prophylaxis regimen along with bilateral DANIEL compression stockings will be used post-op. Length of treatment has been determined to be 10-42 days post-op by the above noted Caprini assessment model.     The patient was instructed to buy and take:  Aspirin 81mg QD x 4 weeks for DVT prophylaxis starting on the morning after surgery.  Patient will also use bilateral TEDs on lower extremities, SCDs during surgery, and early ambulation post-op. If the patient was previously taking 81mg baby aspirin, they were told to not take it starting 5 days prior to surgery and to restart the 81mg aspirin after surgery.       Patient was also told to buy over the counter Prilosec medication if needed and take it once daily for GI protection as long as they are taking NSAIDs or Aspirin.   Patient denies history of seizures.       The patient was told that narcotic pain medications may make them drowsy and instructions were given to not sign legal documents, drive or operate heavy machinery, cars, or equipment while under the influence of narcotic medications. The patient was told and understands that  narcotic pain medications should only be used as needed to control pain and that other options of pain control include TENs unit and ice packs/unit.     As there were no other questions to be asked, he was given my business card along with Taylor Young MD business card if he has any questions or concerns prior to surgery or in the postop period.

## 2023-05-29 ENCOUNTER — PATIENT MESSAGE (OUTPATIENT)
Dept: GASTROENTEROLOGY | Facility: CLINIC | Age: 73
End: 2023-05-29
Payer: MEDICARE

## 2023-05-29 ENCOUNTER — TELEPHONE (OUTPATIENT)
Dept: SPORTS MEDICINE | Facility: CLINIC | Age: 73
End: 2023-05-29
Payer: MEDICARE

## 2023-05-30 ENCOUNTER — ANESTHESIA (OUTPATIENT)
Dept: SURGERY | Facility: HOSPITAL | Age: 73
End: 2023-05-30
Payer: MEDICARE

## 2023-05-30 ENCOUNTER — ANESTHESIA EVENT (OUTPATIENT)
Dept: SURGERY | Facility: HOSPITAL | Age: 73
End: 2023-05-30
Payer: MEDICARE

## 2023-05-30 ENCOUNTER — HOSPITAL ENCOUNTER (OUTPATIENT)
Facility: HOSPITAL | Age: 73
Discharge: HOME | End: 2023-05-30
Attending: ORTHOPAEDIC SURGERY | Admitting: ORTHOPAEDIC SURGERY
Payer: MEDICARE

## 2023-05-30 VITALS
HEIGHT: 71 IN | WEIGHT: 185 LBS | HEART RATE: 73 BPM | OXYGEN SATURATION: 100 % | SYSTOLIC BLOOD PRESSURE: 146 MMHG | DIASTOLIC BLOOD PRESSURE: 68 MMHG | BODY MASS INDEX: 25.9 KG/M2 | TEMPERATURE: 98 F | RESPIRATION RATE: 12 BRPM

## 2023-05-30 DIAGNOSIS — S83.241A ACUTE MEDIAL MENISCUS TEAR, RIGHT, INITIAL ENCOUNTER: ICD-10-CM

## 2023-05-30 DIAGNOSIS — S83.281D ACUTE LATERAL MENISCUS TEAR OF RIGHT KNEE, SUBSEQUENT ENCOUNTER: ICD-10-CM

## 2023-05-30 PROCEDURE — 94761 N-INVAS EAR/PLS OXIMETRY MLT: CPT

## 2023-05-30 PROCEDURE — 63600175 PHARM REV CODE 636 W HCPCS: Performed by: ORTHOPAEDIC SURGERY

## 2023-05-30 PROCEDURE — 27200651 HC AIRWAY, LMA: Performed by: STUDENT IN AN ORGANIZED HEALTH CARE EDUCATION/TRAINING PROGRAM

## 2023-05-30 PROCEDURE — 71000015 HC POSTOP RECOV 1ST HR: Performed by: ORTHOPAEDIC SURGERY

## 2023-05-30 PROCEDURE — D9220A PRA ANESTHESIA: ICD-10-PCS | Mod: ANES,,, | Performed by: STUDENT IN AN ORGANIZED HEALTH CARE EDUCATION/TRAINING PROGRAM

## 2023-05-30 PROCEDURE — 29880 PR KNEE SCOPE MED/LAT MENISCECTOMY: ICD-10-PCS | Mod: AS,RT,, | Performed by: PHYSICIAN ASSISTANT

## 2023-05-30 PROCEDURE — D9220A PRA ANESTHESIA: Mod: ANES,,, | Performed by: STUDENT IN AN ORGANIZED HEALTH CARE EDUCATION/TRAINING PROGRAM

## 2023-05-30 PROCEDURE — 25000003 PHARM REV CODE 250: Performed by: PHYSICIAN ASSISTANT

## 2023-05-30 PROCEDURE — 63600175 PHARM REV CODE 636 W HCPCS: Performed by: PHYSICIAN ASSISTANT

## 2023-05-30 PROCEDURE — 29880 PR KNEE SCOPE MED/LAT MENISCECTOMY: ICD-10-PCS | Mod: RT,,, | Performed by: ORTHOPAEDIC SURGERY

## 2023-05-30 PROCEDURE — 27201423 OPTIME MED/SURG SUP & DEVICES STERILE SUPPLY: Performed by: ORTHOPAEDIC SURGERY

## 2023-05-30 PROCEDURE — 25000003 PHARM REV CODE 250: Performed by: NURSE ANESTHETIST, CERTIFIED REGISTERED

## 2023-05-30 PROCEDURE — 37000008 HC ANESTHESIA 1ST 15 MINUTES: Performed by: ORTHOPAEDIC SURGERY

## 2023-05-30 PROCEDURE — 29880 ARTHRS KNE SRG MNISECTMY M&L: CPT | Mod: AS,RT,, | Performed by: PHYSICIAN ASSISTANT

## 2023-05-30 PROCEDURE — 36000710: Performed by: ORTHOPAEDIC SURGERY

## 2023-05-30 PROCEDURE — 25000003 PHARM REV CODE 250: Performed by: ORTHOPAEDIC SURGERY

## 2023-05-30 PROCEDURE — 63600175 PHARM REV CODE 636 W HCPCS: Performed by: NURSE ANESTHETIST, CERTIFIED REGISTERED

## 2023-05-30 PROCEDURE — 29880 ARTHRS KNE SRG MNISECTMY M&L: CPT | Mod: RT,,, | Performed by: ORTHOPAEDIC SURGERY

## 2023-05-30 PROCEDURE — 27000221 HC OXYGEN, UP TO 24 HOURS

## 2023-05-30 PROCEDURE — D9220A PRA ANESTHESIA: ICD-10-PCS | Mod: CRNA,,, | Performed by: NURSE ANESTHETIST, CERTIFIED REGISTERED

## 2023-05-30 PROCEDURE — 71000033 HC RECOVERY, INTIAL HOUR: Performed by: ORTHOPAEDIC SURGERY

## 2023-05-30 PROCEDURE — D9220A PRA ANESTHESIA: Mod: CRNA,,, | Performed by: NURSE ANESTHETIST, CERTIFIED REGISTERED

## 2023-05-30 PROCEDURE — 37000009 HC ANESTHESIA EA ADD 15 MINS: Performed by: ORTHOPAEDIC SURGERY

## 2023-05-30 PROCEDURE — 36000711: Performed by: ORTHOPAEDIC SURGERY

## 2023-05-30 RX ORDER — ONDANSETRON 2 MG/ML
INJECTION INTRAMUSCULAR; INTRAVENOUS
Status: DISCONTINUED | OUTPATIENT
Start: 2023-05-30 | End: 2023-05-30

## 2023-05-30 RX ORDER — ROPIVACAINE HYDROCHLORIDE 5 MG/ML
INJECTION, SOLUTION EPIDURAL; INFILTRATION; PERINEURAL
Status: DISCONTINUED | OUTPATIENT
Start: 2023-05-30 | End: 2023-05-30 | Stop reason: HOSPADM

## 2023-05-30 RX ORDER — HYDROMORPHONE HYDROCHLORIDE 1 MG/ML
0.2 INJECTION, SOLUTION INTRAMUSCULAR; INTRAVENOUS; SUBCUTANEOUS EVERY 5 MIN PRN
Status: DISCONTINUED | OUTPATIENT
Start: 2023-05-30 | End: 2023-05-30 | Stop reason: HOSPADM

## 2023-05-30 RX ORDER — MIDAZOLAM HYDROCHLORIDE 1 MG/ML
INJECTION, SOLUTION INTRAMUSCULAR; INTRAVENOUS
Status: DISCONTINUED | OUTPATIENT
Start: 2023-05-30 | End: 2023-05-30

## 2023-05-30 RX ORDER — KETOROLAC TROMETHAMINE 30 MG/ML
INJECTION, SOLUTION INTRAMUSCULAR; INTRAVENOUS
Status: DISCONTINUED | OUTPATIENT
Start: 2023-05-30 | End: 2023-05-30 | Stop reason: HOSPADM

## 2023-05-30 RX ORDER — FENTANYL CITRATE 50 UG/ML
25 INJECTION, SOLUTION INTRAMUSCULAR; INTRAVENOUS EVERY 5 MIN PRN
Status: DISCONTINUED | OUTPATIENT
Start: 2023-05-30 | End: 2023-05-30 | Stop reason: HOSPADM

## 2023-05-30 RX ORDER — TRAMADOL HYDROCHLORIDE 50 MG/1
100 TABLET ORAL EVERY 6 HOURS PRN
Status: DISCONTINUED | OUTPATIENT
Start: 2023-05-30 | End: 2023-05-30 | Stop reason: HOSPADM

## 2023-05-30 RX ORDER — HYDROCODONE BITARTRATE AND ACETAMINOPHEN 5; 325 MG/1; MG/1
2 TABLET ORAL EVERY 6 HOURS PRN
Status: DISCONTINUED | OUTPATIENT
Start: 2023-05-30 | End: 2023-05-30 | Stop reason: HOSPADM

## 2023-05-30 RX ORDER — SODIUM CHLORIDE 9 MG/ML
INJECTION, SOLUTION INTRAVENOUS CONTINUOUS
Status: DISCONTINUED | OUTPATIENT
Start: 2023-05-30 | End: 2023-05-30 | Stop reason: HOSPADM

## 2023-05-30 RX ORDER — OXYCODONE HYDROCHLORIDE 5 MG/1
5 TABLET ORAL
Status: DISCONTINUED | OUTPATIENT
Start: 2023-05-30 | End: 2023-05-30 | Stop reason: HOSPADM

## 2023-05-30 RX ORDER — DEXAMETHASONE SODIUM PHOSPHATE 4 MG/ML
INJECTION, SOLUTION INTRA-ARTICULAR; INTRALESIONAL; INTRAMUSCULAR; INTRAVENOUS; SOFT TISSUE
Status: DISCONTINUED | OUTPATIENT
Start: 2023-05-30 | End: 2023-05-30

## 2023-05-30 RX ORDER — EPINEPHRINE 1 MG/ML
INJECTION, SOLUTION, CONCENTRATE INTRAVENOUS
Status: DISCONTINUED | OUTPATIENT
Start: 2023-05-30 | End: 2023-05-30 | Stop reason: HOSPADM

## 2023-05-30 RX ORDER — EPHEDRINE SULFATE 50 MG/ML
INJECTION, SOLUTION INTRAVENOUS
Status: DISCONTINUED | OUTPATIENT
Start: 2023-05-30 | End: 2023-05-30

## 2023-05-30 RX ORDER — HALOPERIDOL 5 MG/ML
0.5 INJECTION INTRAMUSCULAR EVERY 10 MIN PRN
Status: DISCONTINUED | OUTPATIENT
Start: 2023-05-30 | End: 2023-05-30 | Stop reason: HOSPADM

## 2023-05-30 RX ORDER — LIDOCAINE HYDROCHLORIDE 20 MG/ML
INJECTION INTRAVENOUS
Status: DISCONTINUED | OUTPATIENT
Start: 2023-05-30 | End: 2023-05-30

## 2023-05-30 RX ORDER — ONDANSETRON 2 MG/ML
4 INJECTION INTRAMUSCULAR; INTRAVENOUS DAILY PRN
Status: DISCONTINUED | OUTPATIENT
Start: 2023-05-30 | End: 2023-05-30 | Stop reason: HOSPADM

## 2023-05-30 RX ORDER — PHENYLEPHRINE HYDROCHLORIDE 10 MG/ML
INJECTION INTRAVENOUS
Status: DISCONTINUED | OUTPATIENT
Start: 2023-05-30 | End: 2023-05-30

## 2023-05-30 RX ORDER — ONDANSETRON 2 MG/ML
4 INJECTION INTRAMUSCULAR; INTRAVENOUS EVERY 12 HOURS PRN
Status: DISCONTINUED | OUTPATIENT
Start: 2023-05-30 | End: 2023-05-30 | Stop reason: HOSPADM

## 2023-05-30 RX ORDER — FENTANYL CITRATE 50 UG/ML
INJECTION, SOLUTION INTRAMUSCULAR; INTRAVENOUS
Status: DISCONTINUED | OUTPATIENT
Start: 2023-05-30 | End: 2023-05-30

## 2023-05-30 RX ORDER — FAMOTIDINE 10 MG/ML
INJECTION INTRAVENOUS
Status: DISCONTINUED | OUTPATIENT
Start: 2023-05-30 | End: 2023-05-30

## 2023-05-30 RX ORDER — PROPOFOL 10 MG/ML
VIAL (ML) INTRAVENOUS
Status: DISCONTINUED | OUTPATIENT
Start: 2023-05-30 | End: 2023-05-30

## 2023-05-30 RX ORDER — KETAMINE HYDROCHLORIDE 100 MG/ML
INJECTION, SOLUTION INTRAMUSCULAR; INTRAVENOUS
Status: DISCONTINUED | OUTPATIENT
Start: 2023-05-30 | End: 2023-05-30 | Stop reason: HOSPADM

## 2023-05-30 RX ORDER — KETAMINE HCL IN 0.9 % NACL 50 MG/5 ML
SYRINGE (ML) INTRAVENOUS
Status: DISCONTINUED | OUTPATIENT
Start: 2023-05-30 | End: 2023-05-30

## 2023-05-30 RX ADMIN — ONDANSETRON 4 MG: 2 INJECTION, SOLUTION INTRAMUSCULAR; INTRAVENOUS at 11:05

## 2023-05-30 RX ADMIN — FENTANYL CITRATE 50 MCG: 50 INJECTION, SOLUTION INTRAMUSCULAR; INTRAVENOUS at 12:05

## 2023-05-30 RX ADMIN — MIDAZOLAM HYDROCHLORIDE 2 MG: 1 INJECTION, SOLUTION INTRAMUSCULAR; INTRAVENOUS at 11:05

## 2023-05-30 RX ADMIN — FENTANYL CITRATE 25 MCG: 50 INJECTION, SOLUTION INTRAMUSCULAR; INTRAVENOUS at 11:05

## 2023-05-30 RX ADMIN — GLYCOPYRROLATE 0.2 MG: 0.2 INJECTION, SOLUTION INTRAMUSCULAR; INTRAVENOUS at 11:05

## 2023-05-30 RX ADMIN — SODIUM CHLORIDE: 9 INJECTION, SOLUTION INTRAVENOUS at 09:05

## 2023-05-30 RX ADMIN — Medication 25 MG: at 12:05

## 2023-05-30 RX ADMIN — CEFAZOLIN 2 G: 2 INJECTION, POWDER, FOR SOLUTION INTRAMUSCULAR; INTRAVENOUS at 11:05

## 2023-05-30 RX ADMIN — EPHEDRINE SULFATE 10 MG: 50 INJECTION INTRAVENOUS at 11:05

## 2023-05-30 RX ADMIN — FAMOTIDINE 20 MG: 10 INJECTION, SOLUTION INTRAVENOUS at 11:05

## 2023-05-30 RX ADMIN — FENTANYL CITRATE 25 MCG: 50 INJECTION, SOLUTION INTRAMUSCULAR; INTRAVENOUS at 12:05

## 2023-05-30 RX ADMIN — LIDOCAINE HYDROCHLORIDE 100 MG: 20 INJECTION INTRAVENOUS at 11:05

## 2023-05-30 RX ADMIN — PHENYLEPHRINE HYDROCHLORIDE 100 MCG: 10 INJECTION INTRAVENOUS at 12:05

## 2023-05-30 RX ADMIN — PROPOFOL 200 MG: 10 INJECTION, EMULSION INTRAVENOUS at 11:05

## 2023-05-30 RX ADMIN — SODIUM CHLORIDE, SODIUM GLUCONATE, SODIUM ACETATE, POTASSIUM CHLORIDE, MAGNESIUM CHLORIDE, SODIUM PHOSPHATE, DIBASIC, AND POTASSIUM PHOSPHATE: .53; .5; .37; .037; .03; .012; .00082 INJECTION, SOLUTION INTRAVENOUS at 12:05

## 2023-05-30 RX ADMIN — DEXAMETHASONE SODIUM PHOSPHATE 8 MG: 4 INJECTION, SOLUTION INTRAMUSCULAR; INTRAVENOUS at 11:05

## 2023-05-30 NOTE — PLAN OF CARE
Pre op complete. Patient resting comfortably. Call light in reach. Wife to bedside shortly, will take belongings. Waiting on anesthesia consent and H&P update. Patient needs crutches for discharge/home use.

## 2023-05-30 NOTE — OPERATIVE NOTE ADDENDUM
Certification of Assistant at Surgery       Surgery Date: 5/30/2023     Participating Surgeons:  Surgeon(s) and Role:     * Taylor Young MD - Primary    YANI Bowers PA-C - 1st Assistant     Procedures:  Procedure(s) (LRB):  ARTHROSCOPY, KNEE, WITH MENISCECTOMY (Right)  CHONDROPLASTY, KNEE (Right)  [id number] (Right)  SYNOVECTOMY, KNEE (Right)    Assistant Surgeon's Certification of Necessity:  I understand that section 1842 (b) (6) (d) of the Social Security Act generally prohibits Medicare Part B reasonable charge payment for the services of assistants at surgery in teaching hospitals when qualified residents are available to furnish such services. I certify that the services for which payment is claimed were medically necessary, and that no qualified resident was available to perform the services. I further understand that these services are subject to post-payment review by the Medicare carrier.         Jagdish Bowers PA-C    05/30/2023  12:47 PM

## 2023-05-30 NOTE — DISCHARGE SUMMARY
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    DISPOSITION: Home or Self Care    FINAL DIAGNOSIS:  Right knee meniscus tear    FOLLOWUP: In clinic    DISCHARGE INSTRUCTIONS:    Discharge Procedure Orders   Diet general     Call MD for:  temperature >100.4     Call MD for:  persistent nausea and vomiting     Call MD for:  severe uncontrolled pain     Call MD for:  difficulty breathing, headache or visual disturbances     Call MD for:  redness, tenderness, or signs of infection (pain, swelling, redness, odor or green/yellow discharge around incision site)     Call MD for:  hives     Call MD for:  persistent dizziness or light-headedness     Ice to affected area   Order Comments: using barrier between ice and skin (specify duration&frequency)     No driving, operating heavy equipment or signing legal documents while taking pain medication     Remove dressing in 72 hours     Shower  on day dressing removed (No bath)

## 2023-05-30 NOTE — ANESTHESIA PROCEDURE NOTES
Intubation    Date/Time: 5/30/2023 11:36 AM  Performed by: Yuliet Parekh CRNA  Authorized by: Alok Landeros MD     Intubation:     Induction:  Intravenous    Intubated:  Postinduction    Mask Ventilation:  Easy mask    Attempts:  1    Attempted By:  CRNA    Difficult Airway Encountered?: No      Complications:  None    Airway Device:  Supraglottic airway/LMA    Airway Device Size:  4.5    Style/Cuff Inflation:  Cuffed    Secured at:  The lips    Placement Verified By:  Capnometry    Complicating Factors:  None    Findings Post-Intubation:  BS equal bilateral and atraumatic/condition of teeth unchanged

## 2023-05-30 NOTE — TRANSFER OF CARE
"Anesthesia Transfer of Care Note    Patient: Jean Carlson    Procedure(s) Performed: Procedure(s) (LRB):  ARTHROSCOPY, KNEE, WITH MENISCECTOMY (Right)  CHONDROPLASTY, KNEE (Right)  [id number] (Right)  SYNOVECTOMY, KNEE (Right)    Patient location: PACU    Anesthesia Type: general    Transport from OR: Transported from OR on 6-10 L/min O2 by face mask with adequate spontaneous ventilation    Post pain: adequate analgesia    Post assessment: no apparent anesthetic complications    Post vital signs: stable    Level of consciousness: awake    Nausea/Vomiting: no nausea/vomiting    Complications: none    Transfer of care protocol was followed      Last vitals:   Visit Vitals  /66   Pulse 73   Temp 36.6 °C (97.9 °F) (Tympanic)   Resp 16   Ht 5' 11" (1.803 m)   Wt 83.9 kg (185 lb)   SpO2 100%   BMI 25.80 kg/m²     "

## 2023-05-30 NOTE — ANESTHESIA PREPROCEDURE EVALUATION
05/30/2023  Pre-operative evaluation for Procedure(s) (LRB):  ARTHROSCOPY, KNEE, WITH MENISCECTOMY (Right)  CHONDROPLASTY, KNEE (Right)  EXCISION, PLICA, KNEE, ARTHROSCOPIC (Right)    Jean Carlson is a 73 y.o. male     Patient Active Problem List   Diagnosis    Osteoporosis    Erectile dysfunction due to arterial insufficiency    Family history of coronary arteriosclerosis    Hyperlipidemia    Gastroesophageal reflux disease without esophagitis    Spinal stenosis of lumbar region    BPH with urinary obstruction    Atherosclerosis of aorta    Pulmonary nodule    Inguinal hernia of left side without obstruction or gangrene    Chronic idiopathic constipation    Prostate cancer    Hypercalciuria    Chronic gastritis    Compression fracture of L2 vertebra with routine healing    Leg weakness, bilateral    Knee injury, right, initial encounter       Review of patient's allergies indicates:  No Known Allergies    No current facility-administered medications on file prior to encounter.     Current Outpatient Medications on File Prior to Encounter   Medication Sig Dispense Refill    atorvastatin (LIPITOR) 20 MG tablet Take 1 tablet (20 mg total) by mouth once daily. 90 tablet 3    calcium phosphate trib/vit D3 (CITRACAL + D3, CALCIUM PHOS, ORAL) Take 1 tablet by mouth once daily.      cholecalciferol, vitamin D3, (VITAMIN D3) 50 mcg (2,000 unit) Cap Take 1 capsule by mouth once daily.      icosapent ethyL (VASCEPA) 1 gram Cap Take 1 capsule (1,000 mg total) by mouth 2 (two) times a day. 60 capsule 11    linaCLOtide (LINZESS) 145 mcg Cap capsule Take 1 capsule (145 mcg total) by mouth once daily. 30 capsule 11    multivitamin capsule Take 1 capsule by mouth once daily.      pantoprazole (PROTONIX) 40 MG tablet Take 1 tablet (40 mg total) by mouth once daily. 90 tablet 3    polyethylene  glycol (GLYCOLAX) 17 gram PwPk Take by mouth.      tamsulosin (FLOMAX) 0.4 mg Cap Take 1 capsule (0.4 mg total) by mouth once daily. 30 capsule 11       Past Surgical History:   Procedure Laterality Date    back sx      lower    BLADDER FULGURATION N/A 7/13/2019    Procedure: FULGURATION, BLADDER;  Surgeon: Ryan Knowles MD;  Location: [facility];  Service: Urology;  Laterality: N/A;  bleeding tissue at bladder neck    CATARACT EXTRACTION W/  INTRAOCULAR LENS IMPLANT Bilateral     Dr Finn/Symfony IOL     COLONOSCOPY N/A 6/5/2019    Procedure: COLONOSCOPY;  Surgeon: Mauro Smallwood MD;  Location: [facility] (4TH FLR);  Service: Endoscopy;  Laterality: N/A;    CYSTOSCOPY N/A 7/13/2019    Procedure: CYSTOSCOPY;  Surgeon: Ryan Knowles MD;  Location: [facility];  Service: Urology;  Laterality: N/A;    CYSTOSCOPY      CYSTOSCOPY  7/27/2021    Procedure: CYSTOSCOPY;  Surgeon: Manan Ny MD;  Location: [facility] 1ST FLR;  Service: Urology;;    ESOPHAGOGASTRODUODENOSCOPY N/A 3/31/2021    Procedure: EGD (ESOPHAGOGASTRODUODENOSCOPY);  Surgeon: Jamilah Munoz MD;  Location: [facility];  Service: Endoscopy;  Laterality: N/A;    HERNIA REPAIR      PROSTATE SURGERY      REMOVAL OF BLOOD CLOT N/A 7/13/2019    Procedure: REMOVAL, BLOOD CLOT;  Surgeon: Ryan Knowles MD;  Location: [facility];  Service: Urology;  Laterality: N/A;  prostate    SPINE SURGERY      TONSILLECTOMY      TRANSURETHRAL RESECTION OF PROSTATE      TRANSURETHRAL RESECTION OF PROSTATE N/A 7/13/2019    Procedure: TURP (TRANSURETHRAL RESECTION OF PROSTATE);  Surgeon: Ryan Knowles MD;  Location: [facility];  Service: Urology;  Laterality: N/A;    TRANSURETHRAL SURGICAL REMOVAL OF STRICTURE OF BLADDER NECK  7/27/2021    Procedure: EXCISION, CONTRACTURE, BLADDER NECK, TRANSURETHRAL;  Surgeon: Manan Ny MD;  Location: [facility] 1ST FLR;  Service: Urology;;       Social History     Socioeconomic History    Marital status:     Occupational History     Employer: [employer]   Tobacco Use    Smoking status: Never    Smokeless tobacco: Never   Substance and Sexual Activity    Alcohol use: Yes     Alcohol/week: 3.0 standard drinks     Types: 3 Glasses of wine per week     Comment: rarely    Drug use: No    Sexual activity: Not Currently     Partners: Female   Social History Narrative    ** Merged History Encounter **          [employer]. 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     Social Determinants of Health     Financial Resource Strain: Low Risk     Difficulty of Paying Living Expenses: Not hard at all   Food Insecurity: No Food Insecurity    Worried About Running Out of Food in the Last Year: Never true    Ran Out of Food in the Last Year: Never true   Transportation Needs: No Transportation Needs    Lack of Transportation (Medical): No    Lack of Transportation (Non-Medical): No   Physical Activity: Sufficiently Active    Days of Exercise per Week: 5 days    Minutes of Exercise per Session: 80 min   Stress: No Stress Concern Present    Feeling of Stress : Not at all   Social Connections: Unknown    Frequency of Communication with Friends and Family: Twice a week    Frequency of Social Gatherings with Friends and Family: Once a week    Active Member of Clubs or Organizations: Yes    Attends Club or Organization Meetings: More than 4 times per year    Marital Status:    Housing Stability: Low Risk     Unable to Pay for Housing in the Last Year: No    Number of Places Lived in the Last Year: 1    Unstable Housing in the Last Year: No         CBC: No results for input(s): WBC, RBC, HGB, HCT, PLT, MCV, MCH, MCHC in the last 72 hours.    CMP: No results for input(s): NA, K, CL, CO2, BUN, CREATININE, GLU, MG, PHOS, CALCIUM, ALBUMIN, PROT, ALKPHOS, ALT, AST, BILITOT in the last 72 hours.    INR  No results for input(s): PT, INR, PROTIME, APTT in the last 72 hours.        Diagnostic Studies:      EKD Echo:  No  results found for this or any previous visit.      Pre-op Assessment    I have reviewed the Patient Summary Reports.     I have reviewed the Nursing Notes. I have reviewed the NPO Status.   I have reviewed the Medications.     Review of Systems  Hepatic/GI:   GERD        Physical Exam  General: Well nourished and Cooperative    Airway:  Mallampati: II   Mouth Opening: Normal  TM Distance: Normal  Tongue: Normal  Neck ROM: Normal ROM    Chest/Lungs:  Clear to auscultation, Normal Respiratory Rate    Heart:  Rate: Normal  Rhythm: Regular Rhythm  Sounds: Normal        Anesthesia Plan  Type of Anesthesia, risks & benefits discussed:    Anesthesia Type: Gen ETT, Gen Supraglottic Airway  Intra-op Monitoring Plan: Standard ASA Monitors  Post Op Pain Control Plan: multimodal analgesia and IV/PO Opioids PRN  Induction:  IV  Airway Plan: Direct and Video, Post-Induction  Informed Consent: Informed consent signed with the Patient and all parties understand the risks and agree with anesthesia plan.  All questions answered.   ASA Score: 2    Ready For Surgery From Anesthesia Perspective.     .

## 2023-05-30 NOTE — OP NOTE
DATE OF PROCEDURE: 05/30/2023    SURGEON:  Taylor Young M.D    ASSISTANT: YANI Bowers PA-C  The use of an assistant was medically necessary for positioning, skin retraction, and assistance with this procedure. The procedure could not be performed without the use of an assistant.   There was no qualified resident/fellow available for assistance with this procedure.    PREOPERATIVE DIAGNOSES:   right  1. knee medial and lateral meniscus tear   2. knee plica.   3. knee possible chondromalacia  4. knee synovitis  5. Knee adhesions  6. Knee loose body    POSTOPERATIVE DIAGNOSES:   right  1. knee medial and lateral meniscus tear   2. knee plica.   3. knee chondromalacia  4. knee synovitis.   5. Knee adhesions  6. Knee loose body    PROCEDURE PERFORMED:   right  1. knee arthroscopic chondroplasty (CPT 94146)  2. knee arthroscopic medial and lateral (CPT 02649) meniscectomy   3. knee arthroscopic partial synovectomy/debridement (CPT 00813).   4. knee arthroscopic plica excision(CPT 23895).    5. Knee arthroscopic lysis of adhesions (CPT 78762)  6. Knee arthroscopic loose body removal (CPT 80084)    ANESTHESIA: General with local 0.5% ropivicaine 30ml (5mg/ml), 60 mg ketamine, 60mg toradol (2ml toradol (30mg/ml))    BLOOD LOSS: Minimal.     DRAINS: None.     TOURNIQUET TIME: None.     COMPLICATIONS: None.     CONDITION ON TRANSFER: The patient was extubated and moved to the recovery room in stable condition, with compartments soft and capillary refill less than a second in all digits.     BRIEF INDICATION OF MEDICAL NECESSITY: The patient is a 73 y.o. year-old male who has history and physical examination findings consistent with the above. Nonoperative versus operative options were discussed. The risks and benefits were discussed with the patient. The patient acknowledged understanding and wished to proceed with operative intervention. Informed consent was obtained prior to the procedure. Details of the surgical procedure  were explained, including incisions and probable rehabilitation course. The patient understands the likely length of convalescence after surgery; and we have explained the risks, benefits, and alternatives of surgery. Reasonable expectations and potential complications were discussed and acknowledged, including but not limited to infection, bleeding, blood clots, (DVT and/or PE), nerve injury, retear, instability, continued pain and stiffness. It was also explained that there was a chance of failure which may require further management. The patient agreed and understood and wished to proceed.     EXAMINATION UNDER ANESTHESIA of the OPERATIVE right KNEE: ROM 0-130 degrees, negative Lachman, negative pivot shift, stable to varus-valgus stress testing, negative effusion.     EXAMINATION UNDER ANESTHESIA of the NON-OPERATED left KNEE: ROM 0-130 degrees, negative Lachman, negative pivot shift, stable to varus-valgus stress testing, negative effusion.     PROCEDURE IN DETAIL: The correct operative site was marked by the operative surgeon.  The patient was then taken to the operating room and placed supine on the operating room table. General anesthesia was administered by the anesthesia team. All pressure points were carefully padded and checked. Preoperative antibiotics were administered. A well-padded tourniquet was placed high on the operative thigh. Examination was begun with the above findings. The non-operative leg was then placed a well-padded well-leg diaz, in a comfortable position. The operative leg was placed in an arthroscopic leg diaz at the level of the tourniquet. The operative leg was prepped and draped in the usual sterile fashion. After prepping and draping, the appropriate landmarks were noted on the skin.  2cc skin and sub-cutaneous tissue was infilttrated with local anesthetic mixture superolaterally at needle insufflation site. The knee was insufflated supero-laterally with saline. 9cc skin wheal  and sub-cutaneous tissue and fat pad was infilttrated with local anesthetic mixture at both portal sites; mid-lateral followed by infero-medial portals were created, and a systematic examination of the joint revealed the following:    There was no evidence of any suprapatellar pouch adhesions or compartmentalization.  There was no evidence of any loose bodies in the medial or lateral gutters.     In the patellofemoral compartment, there was chondral damage to:  Patella 10 x 10 mm grade 2  Trochlea 10 x 20 mm grade 4  Chondroplasty was performed using arthroscopic shaver.    There was chondral damage to:  Medial femoral condyle 30 x 40 mm grade 3  Chondroplasty was performed using arthroscopic shaver.    There was chondral damage to:  Lateral femoral condyle 20 x 10 mm grade 3  Lateral tibial plateau 10 x 15 mm grade 3  Chondroplasty was performed using arthroscopic shaver.     In the medial compartment there was no evidence of meniscal instability.   Posterior horn medial meniscus tear,  complex was debrided with arthroscopic shaver and biter.  65% was debrided over an area of 2.5 cm.  Root and hoop fibers remained intact.    Attention was then turned to the notch. The ACL and PCL were probed carefully and found to be stable.     There was a hypertrophic infrapatellar plica.  This was debrided using arthroscopic biter and shaver.    There was some scar about the ACL.  This was debrided in the standard fashion and lysis of adhesions was performed.    In the lateral compartment there was no evidence of meniscal instability.   Posterior horn lateral meniscus tear, complex was debrided with arthroscopic shaver and biter.  25% was debrided over an area of 1.5 cm.  Root and hoop fibers remained intact. Anterior horn lateral meniscus tear, complex was debrided with arthroscopic shaver and biter.  25% was debrided over an area of 1.5 cm.  Root and hoop fibers remained intact.    Loose body measuring 5 x 5 x 7 mm was removed  from medial compartment using arthroscopic grasper.    Synovitis was debrided in the knee as needed to the areas of concern in medial and lateral compartments.      The knee and incisions were then copiously irrigated and fluid was extravasated using suction.  The arthroscopic portal incisions were closed using inverted 4-0 Monocryl suture.  5cc skin and sub-cutaneous tissue and around portals were infilttrated with local anesthetic mixture at both portal sites.  Steri-Strips were placed with Mastisol. Sterile TENS unit pads were placed which were medically necessary for pain relief. Wounds were dressed with Xeroform, 4x4s, and cast padding. DANIEL hose was placed on the operative leg to match that of the DANIEL hose placed preoperatively on the non-operative leg. Iceman was secured.  The patient was extubated and moved to the recovery room in stable condition with compartments soft and capillary refill less than a second in all digits.     POSTOPERATIVE PLAN: We will be following the arthroscopic partial meniscectomy guidelines with emphasis on patellar mobility.  This was discussed this with the patient's family after surgery.

## 2023-05-30 NOTE — PLAN OF CARE
Patient is AAO and VSS. Tolerating PO and states pain is tolerable. Dressing CDI. Patient states they are ready for d/c. IV removed. Catheter tip intact. Spouse at bedside. Discharge instructions reviewed and copy given to the patient and spouse. Questions answered. Both verbalized understanding. Medication delivered to bedside. DME provided prior(crutches). Patient wheeled to car by Leelee VARGHESE.

## 2023-05-31 PROBLEM — Z98.890 HISTORY OF MENISCECTOMY OF RIGHT KNEE: Status: ACTIVE | Noted: 2023-05-31

## 2023-05-31 PROBLEM — M25.661 DECREASED RANGE OF MOTION (ROM) OF RIGHT KNEE: Status: ACTIVE | Noted: 2023-05-31

## 2023-05-31 PROBLEM — R26.9 IMPAIRED GAIT: Status: ACTIVE | Noted: 2023-05-31

## 2023-05-31 NOTE — ANESTHESIA POSTPROCEDURE EVALUATION
Anesthesia Post Evaluation    Patient: Jean Carlson    Procedure(s) Performed: Procedure(s) (LRB):  ARTHROSCOPY, KNEE, WITH MENISCECTOMY (Right)  CHONDROPLASTY, KNEE (Right)  [id number] (Right)  SYNOVECTOMY, KNEE (Right)    Final Anesthesia Type: general      Patient location during evaluation: PACU  Patient participation: Yes- Able to Participate  Level of consciousness: awake and alert  Post-procedure vital signs: reviewed and stable  Pain management: adequate  Airway patency: patent  ANU mitigation strategies: Multimodal analgesia  PONV status at discharge: No PONV  Anesthetic complications: no      Cardiovascular status: blood pressure returned to baseline and hemodynamically stable  Respiratory status: unassisted  Hydration status: euvolemic  Follow-up not needed.          Vitals Value Taken Time   /68 05/30/23 1346   Temp 36.8 °C (98.2 °F) 05/30/23 1241   Pulse 73 05/30/23 1354   Resp 45 05/30/23 1354   SpO2 100 % 05/30/23 1354   Vitals shown include unvalidated device data.      Event Time   Out of Recovery 13:15:00         Pain/Alberto Score: Alberto Score: 10 (5/30/2023  1:00 PM)

## 2023-06-01 ENCOUNTER — PATIENT MESSAGE (OUTPATIENT)
Dept: GASTROENTEROLOGY | Facility: CLINIC | Age: 73
End: 2023-06-01
Payer: MEDICARE

## 2023-06-01 NOTE — TELEPHONE ENCOUNTER
Patient has started taking 2 Linzess 145 mcg and it is working fine. Patient needs a prescription for Linzess 290 mcg.

## 2023-06-02 NOTE — OPERATIVE NOTE ADDENDUM
Certification of Assistant at Surgery       Surgery Date: 5/30/2023     Participating Surgeons:  Surgeon(s) and Role:     * Taylor Young MD - Primary    Procedures:  Procedure(s) (LRB):  ARTHROSCOPY, KNEE, WITH MENISCECTOMY (Right)  CHONDROPLASTY, KNEE (Right)  [id number] (Right)  SYNOVECTOMY, KNEE (Right)    Assistant Surgeon's Certification of Necessity:  I understand that section 1842 (b) (6) (d) of the Social Security Act generally prohibits Medicare Part B reasonable charge payment for the services of assistants at surgery in teaching hospitals when qualified residents are available to furnish such services. I certify that the services for which payment is claimed were medically necessary, and that no qualified resident was available to perform the services. I further understand that these services are subject to post-payment review by the Medicare carrier.      Taylor Young MD    06/02/2023  11:08 AM

## 2023-06-12 ENCOUNTER — OFFICE VISIT (OUTPATIENT)
Dept: SPORTS MEDICINE | Facility: CLINIC | Age: 73
End: 2023-06-12
Payer: MEDICARE

## 2023-06-12 VITALS
HEIGHT: 71 IN | SYSTOLIC BLOOD PRESSURE: 109 MMHG | WEIGHT: 185 LBS | DIASTOLIC BLOOD PRESSURE: 71 MMHG | BODY MASS INDEX: 25.9 KG/M2 | HEART RATE: 83 BPM

## 2023-06-12 DIAGNOSIS — Z09 S/P ORTHOPEDIC SURGERY, FOLLOW-UP EXAM: Primary | ICD-10-CM

## 2023-06-12 PROCEDURE — 1101F PR PT FALLS ASSESS DOC 0-1 FALLS W/OUT INJ PAST YR: ICD-10-PCS | Mod: CPTII,S$GLB,, | Performed by: PHYSICIAN ASSISTANT

## 2023-06-12 PROCEDURE — 1126F PR PAIN SEVERITY QUANTIFIED, NO PAIN PRESENT: ICD-10-PCS | Mod: CPTII,S$GLB,, | Performed by: PHYSICIAN ASSISTANT

## 2023-06-12 PROCEDURE — 99999 PR PBB SHADOW E&M-EST. PATIENT-LVL III: ICD-10-PCS | Mod: PBBFAC,,, | Performed by: PHYSICIAN ASSISTANT

## 2023-06-12 PROCEDURE — 1160F PR REVIEW ALL MEDS BY PRESCRIBER/CLIN PHARMACIST DOCUMENTED: ICD-10-PCS | Mod: CPTII,S$GLB,, | Performed by: PHYSICIAN ASSISTANT

## 2023-06-12 PROCEDURE — 1159F MED LIST DOCD IN RCRD: CPT | Mod: CPTII,S$GLB,, | Performed by: PHYSICIAN ASSISTANT

## 2023-06-12 PROCEDURE — 3288F FALL RISK ASSESSMENT DOCD: CPT | Mod: CPTII,S$GLB,, | Performed by: PHYSICIAN ASSISTANT

## 2023-06-12 PROCEDURE — 3074F SYST BP LT 130 MM HG: CPT | Mod: CPTII,S$GLB,, | Performed by: PHYSICIAN ASSISTANT

## 2023-06-12 PROCEDURE — 1160F RVW MEDS BY RX/DR IN RCRD: CPT | Mod: CPTII,S$GLB,, | Performed by: PHYSICIAN ASSISTANT

## 2023-06-12 PROCEDURE — 99024 POSTOP FOLLOW-UP VISIT: CPT | Mod: S$GLB,,, | Performed by: PHYSICIAN ASSISTANT

## 2023-06-12 PROCEDURE — 99024 PR POST-OP FOLLOW-UP VISIT: ICD-10-PCS | Mod: S$GLB,,, | Performed by: PHYSICIAN ASSISTANT

## 2023-06-12 PROCEDURE — 3008F BODY MASS INDEX DOCD: CPT | Mod: CPTII,S$GLB,, | Performed by: PHYSICIAN ASSISTANT

## 2023-06-12 PROCEDURE — 3288F PR FALLS RISK ASSESSMENT DOCUMENTED: ICD-10-PCS | Mod: CPTII,S$GLB,, | Performed by: PHYSICIAN ASSISTANT

## 2023-06-12 PROCEDURE — 1101F PT FALLS ASSESS-DOCD LE1/YR: CPT | Mod: CPTII,S$GLB,, | Performed by: PHYSICIAN ASSISTANT

## 2023-06-12 PROCEDURE — 3074F PR MOST RECENT SYSTOLIC BLOOD PRESSURE < 130 MM HG: ICD-10-PCS | Mod: CPTII,S$GLB,, | Performed by: PHYSICIAN ASSISTANT

## 2023-06-12 PROCEDURE — 3008F PR BODY MASS INDEX (BMI) DOCUMENTED: ICD-10-PCS | Mod: CPTII,S$GLB,, | Performed by: PHYSICIAN ASSISTANT

## 2023-06-12 PROCEDURE — 1126F AMNT PAIN NOTED NONE PRSNT: CPT | Mod: CPTII,S$GLB,, | Performed by: PHYSICIAN ASSISTANT

## 2023-06-12 PROCEDURE — 99999 PR PBB SHADOW E&M-EST. PATIENT-LVL III: CPT | Mod: PBBFAC,,, | Performed by: PHYSICIAN ASSISTANT

## 2023-06-12 PROCEDURE — 3078F DIAST BP <80 MM HG: CPT | Mod: CPTII,S$GLB,, | Performed by: PHYSICIAN ASSISTANT

## 2023-06-12 PROCEDURE — 1159F PR MEDICATION LIST DOCUMENTED IN MEDICAL RECORD: ICD-10-PCS | Mod: CPTII,S$GLB,, | Performed by: PHYSICIAN ASSISTANT

## 2023-06-12 PROCEDURE — 3078F PR MOST RECENT DIASTOLIC BLOOD PRESSURE < 80 MM HG: ICD-10-PCS | Mod: CPTII,S$GLB,, | Performed by: PHYSICIAN ASSISTANT

## 2023-06-12 NOTE — PROGRESS NOTES
CC: right knee pain    History of present illness: Pt is here today for post-operative followup of knee arthroscopy.  he is doing well.  We have reviewed his findings and discussed plan of care and future treatment options.          Doing well.   Having some intermittent swelling that is improving.   No issues reported.     Pain is 0/10 on pain scale.                                 DATE OF PROCEDURE: 05/30/2023  SURGEON:  Taylor Young M.D  PROCEDURE PERFORMED:   right  1. knee arthroscopic chondroplasty (CPT 59473)  2. knee arthroscopic medial and lateral (CPT 31553) meniscectomy   3. knee arthroscopic partial synovectomy/debridement (CPT 10847).   4. knee arthroscopic plica excision(CPT 81466).    5. Knee arthroscopic lysis of adhesions (CPT 91368)  6. Knee arthroscopic loose body removal (CPT 64419)    In the patellofemoral compartment, there was chondral damage to:  Patella 10 x 10 mm grade 2  Trochlea 10 x 20 mm grade 4  Chondroplasty was performed using arthroscopic shaver.     There was chondral damage to:  Medial femoral condyle 30 x 40 mm grade 3  Chondroplasty was performed using arthroscopic shaver.     There was chondral damage to:  Lateral femoral condyle 20 x 10 mm grade 3  Lateral tibial plateau 10 x 15 mm grade 3  Chondroplasty was performed using arthroscopic shaver.                                               PHYSICAL EXAMINATION:     Incision sites healed well  No evidence of any erythema, infection or induration  Range of motion 0-120 degrees  Minimal effusion  2+ DP pulse  No swelling, no calf tenderness  - Robbie's sign  Negative medial joint line tenderness  Moderate quad atrophy                                                                                 ASSESSMENT:                                                                                                                                               1. Status post above, doing well.                                                                                                                                PLAN:                                                                                                                                                     1. Continue with PT  2. Emphasized quad function.  3. I have discussed return to activity in detail.  4.Patient will see us back at 6 week post-op trey.                                    5. Discussed case with PT.   All questions were answered, surgical technique was reviewed and interpreted, and patient should contact us with any questions or concerns in the interim.

## 2023-06-22 ENCOUNTER — PATIENT MESSAGE (OUTPATIENT)
Dept: INTERNAL MEDICINE | Facility: CLINIC | Age: 73
End: 2023-06-22
Payer: MEDICARE

## 2023-06-22 ENCOUNTER — TELEPHONE (OUTPATIENT)
Dept: INTERNAL MEDICINE | Facility: CLINIC | Age: 73
End: 2023-06-22
Payer: MEDICARE

## 2023-06-22 NOTE — TELEPHONE ENCOUNTER
Patient's PET stress scn is NOT approved by insurnace.    Please call patient, cancel scan, and schedulle a virtual televisit to discuss other options.    Thank you

## 2023-06-29 ENCOUNTER — INFUSION (OUTPATIENT)
Dept: INFECTIOUS DISEASES | Facility: HOSPITAL | Age: 73
End: 2023-06-29
Payer: MEDICARE

## 2023-06-29 VITALS
SYSTOLIC BLOOD PRESSURE: 142 MMHG | TEMPERATURE: 98 F | OXYGEN SATURATION: 99 % | BODY MASS INDEX: 25.89 KG/M2 | HEART RATE: 70 BPM | DIASTOLIC BLOOD PRESSURE: 67 MMHG | WEIGHT: 184.94 LBS | RESPIRATION RATE: 16 BRPM | HEIGHT: 71 IN

## 2023-06-29 DIAGNOSIS — M81.0 OSTEOPOROSIS, UNSPECIFIED OSTEOPOROSIS TYPE, UNSPECIFIED PATHOLOGICAL FRACTURE PRESENCE: ICD-10-CM

## 2023-06-29 DIAGNOSIS — K21.9 GASTROESOPHAGEAL REFLUX DISEASE WITHOUT ESOPHAGITIS: Primary | ICD-10-CM

## 2023-06-29 PROCEDURE — 63600175 PHARM REV CODE 636 W HCPCS: Mod: JZ,JG | Performed by: INTERNAL MEDICINE

## 2023-06-29 PROCEDURE — 96372 THER/PROPH/DIAG INJ SC/IM: CPT

## 2023-06-29 RX ADMIN — DENOSUMAB 60 MG: 60 INJECTION SUBCUTANEOUS at 09:06

## 2023-06-30 ENCOUNTER — PATIENT MESSAGE (OUTPATIENT)
Dept: GASTROENTEROLOGY | Facility: CLINIC | Age: 73
End: 2023-06-30
Payer: MEDICARE

## 2023-07-03 ENCOUNTER — TELEPHONE (OUTPATIENT)
Dept: SPORTS MEDICINE | Facility: CLINIC | Age: 73
End: 2023-07-03
Payer: MEDICARE

## 2023-07-07 ENCOUNTER — OFFICE VISIT (OUTPATIENT)
Dept: INTERNAL MEDICINE | Facility: CLINIC | Age: 73
End: 2023-07-07
Attending: FAMILY MEDICINE
Payer: MEDICARE

## 2023-07-07 DIAGNOSIS — K59.04 CHRONIC IDIOPATHIC CONSTIPATION: ICD-10-CM

## 2023-07-07 DIAGNOSIS — C61 PROSTATE CANCER: Primary | ICD-10-CM

## 2023-07-07 PROCEDURE — 99499 NO LOS: ICD-10-PCS | Mod: 95,,, | Performed by: FAMILY MEDICINE

## 2023-07-07 PROCEDURE — 99499 UNLISTED E&M SERVICE: CPT | Mod: 95,,, | Performed by: FAMILY MEDICINE

## 2023-07-07 NOTE — PATIENT INSTRUCTIONS
Schedule lab orders for future fasting.      Please see referral orders and please call patient to schedule a consult with Urology and Gastroenterology. Thank you.

## 2023-07-07 NOTE — PROGRESS NOTES
Subjective:       Patient ID: Jean Carlson is a 73 y.o. male.    Chief Complaint: No chief complaint on file.    The patient location is:  Home  The chief complaint leading to consultation is:  Constipation and once PSA    Visit type: audiovisual    Face to Face time with patient: 17  20+ minutes of total time spent on the encounter, which includes face to face time and non-face to face time preparing to see the patient (eg, review of tests), Obtaining and/or reviewing separately obtained history, Documenting clinical information in the electronic or other health record, Independently interpreting results (not separately reported) and communicating results to the patient/family/caregiver, or Care coordination (not separately reported).         Each patient to whom he or she provides medical services by telemedicine is:  (1) informed of the relationship between the physician and patient and the respective role of any other health care provider with respect to management of the patient; and (2) notified that he or she may decline to receive medical services by telemedicine and may withdraw from such care at any time.    Notes:  The video feed fail on the patient's end.  We spent most of the visit trying to get activity.  Talked on the phone.  Visit will not be charged.  Knees referral for chronic constipation and update on PSA.    Follow-up appointment with me in November        Review of Systems   Constitutional:  Negative for activity change and unexpected weight change.   HENT:  Positive for hearing loss. Negative for rhinorrhea and trouble swallowing.    Eyes:  Negative for discharge and visual disturbance.   Respiratory:  Negative for chest tightness and wheezing.    Cardiovascular:  Negative for chest pain and palpitations.   Gastrointestinal:  Positive for constipation. Negative for blood in stool, diarrhea and vomiting.   Endocrine: Negative for polydipsia and polyuria.   Genitourinary:  Negative for  difficulty urinating, hematuria and urgency.   Musculoskeletal:  Negative for arthralgias, joint swelling and neck pain.   Neurological:  Negative for weakness and headaches.   Psychiatric/Behavioral:  Negative for confusion and dysphoric mood.      Objective:      Physical Exam    Assessment:       1. Prostate cancer    2. Chronic idiopathic constipation        Plan:     Medication List with Changes/Refills   Current Medications    ASPIRIN (ECOTRIN) 81 MG EC TABLET    Take 1 tablet (81 mg total) by mouth once daily. For 4 weeks starting the day after surgery.    ATORVASTATIN (LIPITOR) 20 MG TABLET    Take 1 tablet (20 mg total) by mouth once daily.    CALCIUM PHOSPHATE TRIB/VIT D3 (CITRACAL + D3, CALCIUM PHOS, ORAL)    Take 1 tablet by mouth once daily.    CHOLECALCIFEROL, VITAMIN D3, (VITAMIN D3) 50 MCG (2,000 UNIT) CAP    Take 1 capsule by mouth once daily.    ICOSAPENT ETHYL (VASCEPA) 1 GRAM CAP    Take 1 capsule (1,000 mg total) by mouth 2 (two) times a day.    LINACLOTIDE (LINZESS) 290 MCG CAP CAPSULE    Take 1 capsule (290 mcg total) by mouth daily before breakfast.    MULTIVITAMIN CAPSULE    Take 1 capsule by mouth once daily.    PANTOPRAZOLE (PROTONIX) 40 MG TABLET    Take 1 tablet (40 mg total) by mouth once daily.    POLYETHYLENE GLYCOL (GLYCOLAX) 17 GRAM PWPK    Take by mouth.    TAMSULOSIN (FLOMAX) 0.4 MG CAP    Take 1 capsule (0.4 mg total) by mouth once daily.     1. Prostate cancer  Overview:  -Discovered on TURP 7/13/2019 by Dr. Knowles.  1% of specimen [place] 6.  On AS, followed by urology    Orders:  -     Ambulatory referral/consult to Urology; Future; Expected date: 07/14/2023  -     Prostate Specific Antigen, Diagnostic; Future; Expected date: 07/07/2023    2. Chronic idiopathic constipation  -     Ambulatory referral/consult to Gastroenterology; Future; Expected date: 07/14/2023      See meds, orders, follow up, routing and instructions sections of encounter and AVS. Discussed with patient and  provided on AVS.

## 2023-07-17 ENCOUNTER — OFFICE VISIT (OUTPATIENT)
Dept: SPORTS MEDICINE | Facility: CLINIC | Age: 73
End: 2023-07-17
Payer: MEDICARE

## 2023-07-17 ENCOUNTER — TELEPHONE (OUTPATIENT)
Dept: GASTROENTEROLOGY | Facility: CLINIC | Age: 73
End: 2023-07-17
Payer: MEDICARE

## 2023-07-17 VITALS
DIASTOLIC BLOOD PRESSURE: 73 MMHG | SYSTOLIC BLOOD PRESSURE: 116 MMHG | HEART RATE: 77 BPM | BODY MASS INDEX: 26.37 KG/M2 | WEIGHT: 189.06 LBS

## 2023-07-17 DIAGNOSIS — Z09 S/P ORTHOPEDIC SURGERY, FOLLOW-UP EXAM: Primary | ICD-10-CM

## 2023-07-17 PROCEDURE — 99999 PR PBB SHADOW E&M-EST. PATIENT-LVL III: ICD-10-PCS | Mod: PBBFAC,,, | Performed by: ORTHOPAEDIC SURGERY

## 2023-07-17 PROCEDURE — 1159F MED LIST DOCD IN RCRD: CPT | Mod: CPTII,S$GLB,, | Performed by: ORTHOPAEDIC SURGERY

## 2023-07-17 PROCEDURE — 99024 PR POST-OP FOLLOW-UP VISIT: ICD-10-PCS | Mod: S$GLB,,, | Performed by: ORTHOPAEDIC SURGERY

## 2023-07-17 PROCEDURE — 3074F SYST BP LT 130 MM HG: CPT | Mod: CPTII,S$GLB,, | Performed by: ORTHOPAEDIC SURGERY

## 2023-07-17 PROCEDURE — 3078F PR MOST RECENT DIASTOLIC BLOOD PRESSURE < 80 MM HG: ICD-10-PCS | Mod: CPTII,S$GLB,, | Performed by: ORTHOPAEDIC SURGERY

## 2023-07-17 PROCEDURE — 1126F PR PAIN SEVERITY QUANTIFIED, NO PAIN PRESENT: ICD-10-PCS | Mod: CPTII,S$GLB,, | Performed by: ORTHOPAEDIC SURGERY

## 2023-07-17 PROCEDURE — 99024 POSTOP FOLLOW-UP VISIT: CPT | Mod: S$GLB,,, | Performed by: ORTHOPAEDIC SURGERY

## 2023-07-17 PROCEDURE — 3074F PR MOST RECENT SYSTOLIC BLOOD PRESSURE < 130 MM HG: ICD-10-PCS | Mod: CPTII,S$GLB,, | Performed by: ORTHOPAEDIC SURGERY

## 2023-07-17 PROCEDURE — 3078F DIAST BP <80 MM HG: CPT | Mod: CPTII,S$GLB,, | Performed by: ORTHOPAEDIC SURGERY

## 2023-07-17 PROCEDURE — 99999 PR PBB SHADOW E&M-EST. PATIENT-LVL III: CPT | Mod: PBBFAC,,, | Performed by: ORTHOPAEDIC SURGERY

## 2023-07-17 PROCEDURE — 1159F PR MEDICATION LIST DOCUMENTED IN MEDICAL RECORD: ICD-10-PCS | Mod: CPTII,S$GLB,, | Performed by: ORTHOPAEDIC SURGERY

## 2023-07-17 PROCEDURE — 1126F AMNT PAIN NOTED NONE PRSNT: CPT | Mod: CPTII,S$GLB,, | Performed by: ORTHOPAEDIC SURGERY

## 2023-07-17 PROCEDURE — 3008F BODY MASS INDEX DOCD: CPT | Mod: CPTII,S$GLB,, | Performed by: ORTHOPAEDIC SURGERY

## 2023-07-17 PROCEDURE — 3008F PR BODY MASS INDEX (BMI) DOCUMENTED: ICD-10-PCS | Mod: CPTII,S$GLB,, | Performed by: ORTHOPAEDIC SURGERY

## 2023-07-17 NOTE — PROGRESS NOTES
CC: right knee pain    History of present illness: Pt is here today for post-operative followup of knee arthroscopy.  he is doing well.  We have reviewed his findings and discussed plan of care and future treatment options.          Doing well.   Having some intermittent swelling that is improving.   No issues reported.   Doing PT at [facility]  Is doing some work in the gym    Pain is 0/10 on pain scale.   LAUREN post op 87                                DATE OF PROCEDURE: 05/30/2023  SURGEON:  Taylor Young M.D  PROCEDURE PERFORMED:   right  1. knee arthroscopic chondroplasty (CPT 63288)  2. knee arthroscopic medial and lateral (CPT 89863) meniscectomy   3. knee arthroscopic partial synovectomy/debridement (CPT 03608).   4. knee arthroscopic plica excision(CPT 30347).    5. Knee arthroscopic lysis of adhesions (CPT 50842)  6. Knee arthroscopic loose body removal (CPT 09467)    In the patellofemoral compartment, there was chondral damage to:  Patella 10 x 10 mm grade 2  Trochlea 10 x 20 mm grade 4  Chondroplasty was performed using arthroscopic shaver.     There was chondral damage to:  Medial femoral condyle 30 x 40 mm grade 3  Chondroplasty was performed using arthroscopic shaver.     There was chondral damage to:  Lateral femoral condyle 20 x 10 mm grade 3  Lateral tibial plateau 10 x 15 mm grade 3  Chondroplasty was performed using arthroscopic shaver.                                               PHYSICAL EXAMINATION:     Incision sites healed well  No evidence of any erythema, infection or induration  Range of motion 0-130 degrees  Minimal effusion today  2+ DP pulse  No swelling, no calf tenderness  - Robbie's sign  Negative medial joint line tenderness  Moderate quad atrophy                                                                                 ASSESSMENT:                                                                                                                                                1. Status post above, doing well.                                                                                                                               PLAN:                                                                                                                                                     1. Continue with PT  2. Emphasized quad function.  3. I have discussed return to activity in detail.  4.Patient will see us back PRN                                    5. Discussed case with PT.   All questions were answered, surgical technique was reviewed and interpreted, and patient should contact us with any questions or concerns in the interim.

## 2023-07-17 NOTE — TELEPHONE ENCOUNTER
Spoke with patient.  Scheduled to see Dr.James Gallardo on 8/28 for 8:30.   Confirmation mailed.   Flakita

## 2023-07-17 NOTE — TELEPHONE ENCOUNTER
----- Message from Robyn Spencer sent at 7/13/2023  3:06 PM CDT -----  Regarding: Chronic idiopathic constipation [K59.04]  Please assist with scheduling appointment    Chronic idiopathic constipation [K59.04]

## 2023-07-18 ENCOUNTER — LAB VISIT (OUTPATIENT)
Dept: LAB | Facility: HOSPITAL | Age: 73
End: 2023-07-18
Attending: FAMILY MEDICINE
Payer: MEDICARE

## 2023-07-18 DIAGNOSIS — C61 PROSTATE CANCER: ICD-10-CM

## 2023-07-18 LAB — COMPLEXED PSA SERPL-MCNC: 1.2 NG/ML (ref 0–4)

## 2023-07-18 PROCEDURE — 84153 ASSAY OF PSA TOTAL: CPT | Performed by: FAMILY MEDICINE

## 2023-07-18 PROCEDURE — 36415 COLL VENOUS BLD VENIPUNCTURE: CPT | Performed by: FAMILY MEDICINE

## 2023-07-19 ENCOUNTER — PATIENT MESSAGE (OUTPATIENT)
Dept: UROLOGY | Facility: CLINIC | Age: 73
End: 2023-07-19
Payer: MEDICARE

## 2023-07-27 ENCOUNTER — OFFICE VISIT (OUTPATIENT)
Dept: UROLOGY | Facility: CLINIC | Age: 73
End: 2023-07-27
Attending: FAMILY MEDICINE
Payer: MEDICARE

## 2023-07-27 VITALS
WEIGHT: 189 LBS | DIASTOLIC BLOOD PRESSURE: 74 MMHG | HEART RATE: 83 BPM | SYSTOLIC BLOOD PRESSURE: 140 MMHG | HEIGHT: 71 IN | BODY MASS INDEX: 26.46 KG/M2

## 2023-07-27 DIAGNOSIS — N40.1 BPH WITH URINARY OBSTRUCTION: Primary | ICD-10-CM

## 2023-07-27 DIAGNOSIS — N13.8 BPH WITH URINARY OBSTRUCTION: Primary | ICD-10-CM

## 2023-07-27 DIAGNOSIS — C61 PROSTATE CANCER: ICD-10-CM

## 2023-07-27 PROCEDURE — 3077F SYST BP >= 140 MM HG: CPT | Mod: CPTII,S$GLB,,

## 2023-07-27 PROCEDURE — 1101F PT FALLS ASSESS-DOCD LE1/YR: CPT | Mod: CPTII,S$GLB,,

## 2023-07-27 PROCEDURE — 3078F DIAST BP <80 MM HG: CPT | Mod: CPTII,S$GLB,,

## 2023-07-27 PROCEDURE — 3008F BODY MASS INDEX DOCD: CPT | Mod: CPTII,S$GLB,,

## 2023-07-27 PROCEDURE — 1159F MED LIST DOCD IN RCRD: CPT | Mod: CPTII,S$GLB,,

## 2023-07-27 PROCEDURE — 99999 PR PBB SHADOW E&M-EST. PATIENT-LVL IV: CPT | Mod: PBBFAC,,,

## 2023-07-27 PROCEDURE — 99214 PR OFFICE/OUTPT VISIT, EST, LEVL IV, 30-39 MIN: ICD-10-PCS | Mod: S$GLB,,,

## 2023-07-27 PROCEDURE — 3078F PR MOST RECENT DIASTOLIC BLOOD PRESSURE < 80 MM HG: ICD-10-PCS | Mod: CPTII,S$GLB,,

## 2023-07-27 PROCEDURE — 1126F AMNT PAIN NOTED NONE PRSNT: CPT | Mod: CPTII,S$GLB,,

## 2023-07-27 PROCEDURE — 1101F PR PT FALLS ASSESS DOC 0-1 FALLS W/OUT INJ PAST YR: ICD-10-PCS | Mod: CPTII,S$GLB,,

## 2023-07-27 PROCEDURE — 99999 PR PBB SHADOW E&M-EST. PATIENT-LVL IV: ICD-10-PCS | Mod: PBBFAC,,,

## 2023-07-27 PROCEDURE — 3077F PR MOST RECENT SYSTOLIC BLOOD PRESSURE >= 140 MM HG: ICD-10-PCS | Mod: CPTII,S$GLB,,

## 2023-07-27 PROCEDURE — 99214 OFFICE O/P EST MOD 30 MIN: CPT | Mod: S$GLB,,,

## 2023-07-27 PROCEDURE — 1160F PR REVIEW ALL MEDS BY PRESCRIBER/CLIN PHARMACIST DOCUMENTED: ICD-10-PCS | Mod: CPTII,S$GLB,,

## 2023-07-27 PROCEDURE — 1160F RVW MEDS BY RX/DR IN RCRD: CPT | Mod: CPTII,S$GLB,,

## 2023-07-27 PROCEDURE — 3288F FALL RISK ASSESSMENT DOCD: CPT | Mod: CPTII,S$GLB,,

## 2023-07-27 PROCEDURE — 1159F PR MEDICATION LIST DOCUMENTED IN MEDICAL RECORD: ICD-10-PCS | Mod: CPTII,S$GLB,,

## 2023-07-27 PROCEDURE — 1126F PR PAIN SEVERITY QUANTIFIED, NO PAIN PRESENT: ICD-10-PCS | Mod: CPTII,S$GLB,,

## 2023-07-27 PROCEDURE — 3288F PR FALLS RISK ASSESSMENT DOCUMENTED: ICD-10-PCS | Mod: CPTII,S$GLB,,

## 2023-07-27 PROCEDURE — 3008F PR BODY MASS INDEX (BMI) DOCUMENTED: ICD-10-PCS | Mod: CPTII,S$GLB,,

## 2023-07-27 RX ORDER — TAMSULOSIN HYDROCHLORIDE 0.4 MG/1
0.4 CAPSULE ORAL DAILY
Qty: 90 CAPSULE | Refills: 3 | Status: SHIPPED | OUTPATIENT
Start: 2023-07-27 | End: 2024-07-26

## 2023-07-27 NOTE — PROGRESS NOTES
CHIEF COMPLAINT:  Prostate cancer active surveillance     HISTORY OF PRESENTING ILLINESS:  Jean Carlson is a 73 y.o. male established to urology. Here today for prostate cancer active surveillance   Chief Complaint:   HPI  Jean Carlson is a 69 y.o. male recently diagnosed with prostate cancer. Had transurethral prostatectomy 2019 by Dr. Knowles.  Still on flomax and avodart.   1% of specimen [place] 6. Had 22 grams resected. Retired [employer] .     Stage- T1a  PSA- 2.9  PSAD-  <.138  Path- [place] 6 1%.  CANDY score- 1 low risk disease  NCCN- very low risk disease  MRI- PI-RADS 1, 2.6 ccs!      REVIEW OF SYSTEMS:  ROS        PATIENT HISTORY:  Past Medical History:   Diagnosis Date    Anterior tibialis tendinitis of right lower extremity 10/18/2018    Cancer     Closed fracture of left wrist 10/18/2021    Closed fracture of lower end of left radius with routine healing 2022    Elevated PSA     Family history of ASCVD 10/7/2016    Mom age 64, Dad age 67 -  on same day. Pat Aunt early 70's.    Family history of coronary artery disease 10/7/2016    Mom age 64, Dad age 67 -  on same day. Pat Aunt early 70's.    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    Intractable back pain 2018    Nuclear sclerosis, bilateral 3/12/2018    Ocular migraine 2012    Osteoporosis     Prostate disease     Transient vision disturbance of both eyes 2012    Umbilical hernia without obstruction and without gangrene 10/1/2015    Urinary tract infection        Past Surgical History:   Procedure Laterality Date    back sx      lower    BLADDER FULGURATION N/A 2019    Procedure: FULGURATION, BLADDER;  Surgeon: Ryan Knowles MD;  Location: [facility];  Service: Urology;  Laterality: N/A;  bleeding tissue at bladder neck    CATARACT EXTRACTION W/  INTRAOCULAR LENS IMPLANT Bilateral      Dr Finn/Ana IOL     CHONDROPLASTY OF KNEE Right 5/30/2023    Procedure: CHONDROPLASTY, KNEE;  Surgeon: Taylor Young MD;  Location: [facility] OR;  Service: Orthopedics;  Laterality: Right;    COLONOSCOPY N/A 6/5/2019    Procedure: COLONOSCOPY;  Surgeon: Mauro Smallwood MD;  Location: [facility] (4TH FLR);  Service: Endoscopy;  Laterality: N/A;    CYSTOSCOPY N/A 7/13/2019    Procedure: CYSTOSCOPY;  Surgeon: Ryan Knowles MD;  Location: [facility] OR;  Service: Urology;  Laterality: N/A;    CYSTOSCOPY      CYSTOSCOPY  7/27/2021    Procedure: CYSTOSCOPY;  Surgeon: Manan Ny MD;  Location: [facility] 1ST FLR;  Service: Urology;;    ESOPHAGOGASTRODUODENOSCOPY N/A 3/31/2021    Procedure: EGD (ESOPHAGOGASTRODUODENOSCOPY);  Surgeon: Jamilah Munoz MD;  Location: [facility];  Service: Endoscopy;  Laterality: N/A;    HERNIA REPAIR      KNEE ARTHROSCOPY W/ MENISCECTOMY Right 5/30/2023    Procedure: ARTHROSCOPY, KNEE, WITH MENISCECTOMY;  Surgeon: Taylor Young MD;  Location: [facility] OR;  Service: Orthopedics;  Laterality: Right;    [id number] Right 5/30/2023    Procedure: [id number];  Surgeon: Taylor Young MD;  Location: [facility] OR;  Service: Orthopedics;  Laterality: Right;    PROSTATE SURGERY      REMOVAL OF BLOOD CLOT N/A 7/13/2019    Procedure: REMOVAL, BLOOD CLOT;  Surgeon: Ryan Knowles MD;  Location: [facility] OR;  Service: Urology;  Laterality: N/A;  prostate    SPINE SURGERY      SYNOVECTOMY OF KNEE Right 5/30/2023    Procedure: SYNOVECTOMY, KNEE;  Surgeon: Taylor Young MD;  Location: [facility] OR;  Service: Orthopedics;  Laterality: Right;    TONSILLECTOMY      TRANSURETHRAL RESECTION OF PROSTATE      TRANSURETHRAL RESECTION OF PROSTATE N/A 7/13/2019    Procedure: TURP (TRANSURETHRAL RESECTION OF PROSTATE);  Surgeon: Ryan Knowles MD;  Location: [facility] OR;  Service: Urology;  Laterality: N/A;    TRANSURETHRAL SURGICAL REMOVAL OF STRICTURE OF BLADDER NECK  7/27/2021    Procedure: EXCISION,  CONTRACTURE, BLADDER NECK, TRANSURETHRAL;  Surgeon: Manan Ny MD;  [facility] [address] [place] [address];  Service: Urology;;       Family History   Problem Relation Age of Onset    Glaucoma Maternal Uncle     Retinitis pigmentosa Maternal Uncle     Heart disease Mother     Heart attack Mother     Skin cancer Mother     Heart disease Father     Heart attack Father     Glaucoma Maternal Aunt     No Known Problems Daughter     Prostate cancer Neg Hx     Kidney disease Neg Hx        Social History     Socioeconomic History    Marital status:    Occupational History     Employer: [employer]   Tobacco Use    Smoking status: Never    Smokeless tobacco: Never   Substance and Sexual Activity    Alcohol use: Yes     Alcohol/week: 3.0 standard drinks     Types: 3 Glasses of wine per week     Comment: rarely    Drug use: No    Sexual activity: Not Currently     Partners: Female   Social History Narrative    ** Merged History Encounter **          [employer]. 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     Social Determinants of Health     Financial Resource Strain: Low Risk     Difficulty of Paying Living Expenses: Not hard at all   Food Insecurity: No Food Insecurity    Worried About Running Out of Food in the Last Year: Never true    Ran Out of Food in the Last Year: Never true   Transportation Needs: No Transportation Needs    Lack of Transportation (Medical): No    Lack of Transportation (Non-Medical): No   Physical Activity: Insufficiently Active    Days of Exercise per Week: 1 day    Minutes of Exercise per Session: 10 min   Stress: No Stress Concern Present    Feeling of Stress : Not at all   Social Connections: Unknown    Frequency of Communication with Friends and Family: Once a week    Frequency of Social Gatherings with Friends and Family: Once a week    Active Member of Clubs or Organizations: Yes    Attends Club or Organization Meetings: More than 4 times per year    Marital Status:    Housing Stability: Low Risk      Unable to Pay for Housing in the Last Year: No    Number of Places Lived in the Last Year: 1    Unstable Housing in the Last Year: No       Allergies:  Patient has no known allergies.    Medications:    Current Outpatient Medications:     aspirin (ECOTRIN) 81 MG EC tablet, Take 1 tablet (81 mg total) by mouth once daily. For 4 weeks starting the day after surgery., Disp: 28 tablet, Rfl: 0    atorvastatin (LIPITOR) 20 MG tablet, Take 1 tablet (20 mg total) by mouth once daily., Disp: 90 tablet, Rfl: 3    calcium phosphate trib/vit D3 (CITRACAL + D3, CALCIUM PHOS, ORAL), Take 1 tablet by mouth once daily., Disp: , Rfl:     cholecalciferol, vitamin D3, (VITAMIN D3) 50 mcg (2,000 unit) Cap, Take 1 capsule by mouth once daily., Disp: , Rfl:     icosapent ethyL (VASCEPA) 1 gram Cap, Take 1 capsule (1,000 mg total) by mouth 2 (two) times a day., Disp: 60 capsule, Rfl: 11    linaCLOtide (LINZESS) 290 mcg Cap capsule, Take 1 capsule (290 mcg total) by mouth daily before breakfast., Disp: 90 capsule, Rfl: 3    multivitamin capsule, Take 1 capsule by mouth once daily., Disp: , Rfl:     pantoprazole (PROTONIX) 40 MG tablet, Take 1 tablet (40 mg total) by mouth once daily., Disp: 90 tablet, Rfl: 3    polyethylene glycol (GLYCOLAX) 17 gram PwPk, Take by mouth., Disp: , Rfl:     tamsulosin (FLOMAX) 0.4 mg Cap, Take 1 capsule (0.4 mg total) by mouth once daily., Disp: 30 capsule, Rfl: 11    PHYSICAL EXAMINATION:  Physical Exam      LABS:      In office UA today was ***      Lab Results   Component Value Date    PSA 3.0 10/18/2018    PSA 2.1 10/27/2017    PSA 2.2 09/23/2016    PSADIAG 1.2 07/18/2023    PSADIAG 1.3 12/28/2022    PSADIAG 1.2 05/09/2022       Lab Results   Component Value Date    CREATININE 0.9 01/12/2023    EGFRNORACEVR >60.0 01/12/2023         IMPRESSION:  Encounter Diagnoses   Name Primary?    Prostate cancer          Assessment:       1. Prostate cancer        Plan:

## 2023-07-27 NOTE — PROGRESS NOTES
CHIEF COMPLAINT:  Prostate cancer surveillance       HISTORY OF PRESENTING ILLINESS:  Jean Carlson is a 73 y.o. male who had been diagnosed with Prostate Cancer during a transurethral prostatectomy 2019 by Dr. Knowles.  Was on Flomax and avodart. Now only on Flomax, working well.  He has been having dry mouth and constipation x 6 months - followed by GI.  1% of specimen [place] 6. Had 22 grams resected.   Retired [employer] .   meet with Dr. Calero for 2nd Opinion and decided on AS.     Stage- T1a  PSA- 2.9  PSAD-  <.138  Path- [place] 6 1%.  CANDY score- 1 low risk disease  NCCN- very low risk disease  MRI- PI-RADS 1, 2.6 ccs!     2021 reported worsening LUTS.    Underwent a cysto with Dr. Ny showing a BNC.   He is s/p incision of the BNC on 2021.  Since surgery he states he's voiding very well. Good FOS - Flomax helps FOS with nocturia episodes      Here today for f/u visit; with new PSA of 1.2 23       REVIEW OF SYSTEMS:  Review of Systems   Constitutional:  Negative for chills and fever.   HENT:  Negative for congestion and sore throat.    Respiratory:  Negative for cough and shortness of breath.    Cardiovascular:  Negative for chest pain and palpitations.   Gastrointestinal:  Negative for nausea and vomiting.   Genitourinary:  Negative for dysuria, flank pain, frequency, hematuria and urgency.   Neurological:  Negative for dizziness and headaches.       PATIENT HISTORY:    Past Medical History:   Diagnosis Date    Anterior tibialis tendinitis of right lower extremity 10/18/2018    Cancer     Closed fracture of left wrist 10/18/2021    Closed fracture of lower end of left radius with routine healing 2022    Elevated PSA     Family history of ASCVD 10/7/2016    Mom age 64, Dad age 67 -  on same day. Pat Aunt early 70's.    Family history of coronary artery disease 10/7/2016    Mom age 64, Dad age 67 -  on same day. Pat Aunt early 70's.    GERD  (gastroesophageal reflux disease)     Gross hematuria 7/14/2019    H/O lumbosacral spine surgery 10/18/2018    2003 and again recently due to recurrent HNP    Intractable back pain 8/28/2018    Nuclear sclerosis, bilateral 3/12/2018    Ocular migraine 12/20/2012    Osteoporosis     Prostate disease     Transient vision disturbance of both eyes 12/20/2012    Umbilical hernia without obstruction and without gangrene 10/1/2015    Urinary tract infection        Past Surgical History:   Procedure Laterality Date    back sx      lower    BLADDER FULGURATION N/A 7/13/2019    Procedure: FULGURATION, BLADDER;  Surgeon: Ryan Knowles MD;  Location: [facility];  Service: Urology;  Laterality: N/A;  bleeding tissue at bladder neck    CATARACT EXTRACTION W/  INTRAOCULAR LENS IMPLANT Bilateral     Dr Finn/Ana IOL     CHONDROPLASTY OF KNEE Right 5/30/2023    Procedure: CHONDROPLASTY, KNEE;  Surgeon: Taylor Young MD;  Location: [facility];  Service: Orthopedics;  Laterality: Right;    COLONOSCOPY N/A 6/5/2019    Procedure: COLONOSCOPY;  Surgeon: Mauro Smallwood MD;  Location: [facility] (4TH FLR);  Service: Endoscopy;  Laterality: N/A;    CYSTOSCOPY N/A 7/13/2019    Procedure: CYSTOSCOPY;  Surgeon: Ryan Knowles MD;  Location: [facility];  Service: Urology;  Laterality: N/A;    CYSTOSCOPY      CYSTOSCOPY  7/27/2021    Procedure: CYSTOSCOPY;  Surgeon: Manan Ny MD;  Location: [facility] 1ST FLR;  Service: Urology;;    ESOPHAGOGASTRODUODENOSCOPY N/A 3/31/2021    Procedure: EGD (ESOPHAGOGASTRODUODENOSCOPY);  Surgeon: Jamilah Munoz MD;  Location: [facility];  Service: Endoscopy;  Laterality: N/A;    HERNIA REPAIR      KNEE ARTHROSCOPY W/ MENISCECTOMY Right 5/30/2023    Procedure: ARTHROSCOPY, KNEE, WITH MENISCECTOMY;  Surgeon: Taylor Young MD;  Location: [facility];  Service: Orthopedics;  Laterality: Right;    [id number] Right 5/30/2023    Procedure: [id number];  Surgeon: Taylor Young MD;   Location: [facility] OR;  Service: Orthopedics;  Laterality: Right;    PROSTATE SURGERY      REMOVAL OF BLOOD CLOT N/A 7/13/2019    Procedure: REMOVAL, BLOOD CLOT;  Surgeon: Ryan Knowles MD;  Location: [facility] OR;  Service: Urology;  Laterality: N/A;  prostate    SPINE SURGERY      SYNOVECTOMY OF KNEE Right 5/30/2023    Procedure: SYNOVECTOMY, KNEE;  Surgeon: Taylor Young MD;  Location: [facility] OR;  Service: Orthopedics;  Laterality: Right;    TONSILLECTOMY      TRANSURETHRAL RESECTION OF PROSTATE      TRANSURETHRAL RESECTION OF PROSTATE N/A 7/13/2019    Procedure: TURP (TRANSURETHRAL RESECTION OF PROSTATE);  Surgeon: Ryan Knowles MD;  Location: [facility] OR;  Service: Urology;  Laterality: N/A;    TRANSURETHRAL SURGICAL REMOVAL OF STRICTURE OF BLADDER NECK  7/27/2021    Procedure: EXCISION, CONTRACTURE, BLADDER NECK, TRANSURETHRAL;  Surgeon: Manan Ny MD;  Location: [address];  Service: Urology;;       Family History   Problem Relation Age of Onset    Glaucoma Maternal Uncle     Retinitis pigmentosa Maternal Uncle     Heart disease Mother     Heart attack Mother     Skin cancer Mother     Heart disease Father     Heart attack Father     Glaucoma Maternal Aunt     No Known Problems Daughter     Prostate cancer Neg Hx     Kidney disease Neg Hx        Social History     Socioeconomic History    Marital status:    Occupational History     Employer: [employer]   Tobacco Use    Smoking status: Never    Smokeless tobacco: Never   Substance and Sexual Activity    Alcohol use: Yes     Alcohol/week: 3.0 standard drinks     Types: 3 Glasses of wine per week     Comment: rarely    Drug use: No    Sexual activity: Not Currently     Partners: Female   Social History Narrative    ** Merged History Encounter **          [employer]. 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     Social Determinants of Health     Financial Resource Strain: Low Risk     Difficulty of Paying Living Expenses: Not hard at all   Food Insecurity: No Food  Insecurity    Worried About Running Out of Food in the Last Year: Never true    Ran Out of Food in the Last Year: Never true   Transportation Needs: No Transportation Needs    Lack of Transportation (Medical): No    Lack of Transportation (Non-Medical): No   Physical Activity: Insufficiently Active    Days of Exercise per Week: 1 day    Minutes of Exercise per Session: 10 min   Stress: No Stress Concern Present    Feeling of Stress : Not at all   Social Connections: Unknown    Frequency of Communication with Friends and Family: Once a week    Frequency of Social Gatherings with Friends and Family: Once a week    Active Member of Clubs or Organizations: Yes    Attends Club or Organization Meetings: More than 4 times per year    Marital Status:    Housing Stability: Low Risk     Unable to Pay for Housing in the Last Year: No    Number of Places Lived in the Last Year: 1    Unstable Housing in the Last Year: No       Allergies:  Patient has no known allergies.    Medications:    Current Outpatient Medications:     aspirin (ECOTRIN) 81 MG EC tablet, Take 1 tablet (81 mg total) by mouth once daily. For 4 weeks starting the day after surgery., Disp: 28 tablet, Rfl: 0    atorvastatin (LIPITOR) 20 MG tablet, Take 1 tablet (20 mg total) by mouth once daily., Disp: 90 tablet, Rfl: 3    calcium phosphate trib/vit D3 (CITRACAL + D3, CALCIUM PHOS, ORAL), Take 1 tablet by mouth once daily., Disp: , Rfl:     cholecalciferol, vitamin D3, (VITAMIN D3) 50 mcg (2,000 unit) Cap, Take 1 capsule by mouth once daily., Disp: , Rfl:     icosapent ethyL (VASCEPA) 1 gram Cap, Take 1 capsule (1,000 mg total) by mouth 2 (two) times a day., Disp: 60 capsule, Rfl: 11    linaCLOtide (LINZESS) 290 mcg Cap capsule, Take 1 capsule (290 mcg total) by mouth daily before breakfast., Disp: 90 capsule, Rfl: 3    multivitamin capsule, Take 1 capsule by mouth once daily., Disp: , Rfl:     pantoprazole (PROTONIX) 40 MG tablet, Take 1 tablet (40 mg  total) by mouth once daily., Disp: 90 tablet, Rfl: 3    polyethylene glycol (GLYCOLAX) 17 gram PwPk, Take by mouth., Disp: , Rfl:     tamsulosin (FLOMAX) 0.4 mg Cap, Take 1 capsule (0.4 mg total) by mouth once daily., Disp: 90 capsule, Rfl: 3    PHYSICAL EXAMINATION:  Physical Exam  Constitutional:       Appearance: Normal appearance.   HENT:      Head: Normocephalic and atraumatic.      Right Ear: External ear normal.      Left Ear: External ear normal.   Pulmonary:      Effort: Pulmonary effort is normal. No respiratory distress.   Skin:     General: Skin is warm and dry.   Neurological:      General: No focal deficit present.      Mental Status: He is alert and oriented to person, place, and time.   Psychiatric:         Mood and Affect: Mood normal.         Behavior: Behavior normal.             Lab Results   Component Value Date    PSA 3.0 10/18/2018    PSA 2.1 10/27/2017    PSA 2.2 09/23/2016    PSADIAG 1.2 07/18/2023    PSADIAG 1.3 12/28/2022    PSADIAG 1.2 05/09/2022       Lab Results   Component Value Date    CREATININE 0.9 01/12/2023    EGFRNORACEVR >60.0 01/12/2023         IMPRESSION:  Encounter Diagnoses   Name Primary?    BPH with urinary obstruction Yes    Prostate cancer          Assessment:       1. BPH with urinary obstruction    2. Prostate cancer        Plan:   - 0.4 mg daily Flomax renewed, possible SE reviewed   - PSA in 6 months    PSA in 6 months with clinic visit with ASHLEY Love NP afterwards    I spent 30 minutes with the patient of which more than half was spent in direct consultation with the patient in regards to our treatment and plan.

## 2023-08-28 ENCOUNTER — PATIENT MESSAGE (OUTPATIENT)
Dept: GASTROENTEROLOGY | Facility: CLINIC | Age: 73
End: 2023-08-28

## 2023-08-28 ENCOUNTER — OFFICE VISIT (OUTPATIENT)
Dept: GASTROENTEROLOGY | Facility: CLINIC | Age: 73
End: 2023-08-28
Attending: FAMILY MEDICINE
Payer: MEDICARE

## 2023-08-28 ENCOUNTER — HOSPITAL ENCOUNTER (OUTPATIENT)
Dept: RADIOLOGY | Facility: HOSPITAL | Age: 73
Discharge: HOME | End: 2023-08-28
Attending: INTERNAL MEDICINE
Payer: MEDICARE

## 2023-08-28 VITALS
BODY MASS INDEX: 26.55 KG/M2 | DIASTOLIC BLOOD PRESSURE: 62 MMHG | WEIGHT: 189.63 LBS | HEART RATE: 65 BPM | HEIGHT: 71 IN | SYSTOLIC BLOOD PRESSURE: 115 MMHG

## 2023-08-28 DIAGNOSIS — K59.04 CHRONIC IDIOPATHIC CONSTIPATION: ICD-10-CM

## 2023-08-28 PROCEDURE — 1126F AMNT PAIN NOTED NONE PRSNT: CPT | Mod: CPTII,S$GLB,, | Performed by: INTERNAL MEDICINE

## 2023-08-28 PROCEDURE — 1159F MED LIST DOCD IN RCRD: CPT | Mod: CPTII,S$GLB,, | Performed by: INTERNAL MEDICINE

## 2023-08-28 PROCEDURE — 1159F PR MEDICATION LIST DOCUMENTED IN MEDICAL RECORD: ICD-10-PCS | Mod: CPTII,S$GLB,, | Performed by: INTERNAL MEDICINE

## 2023-08-28 PROCEDURE — 3074F SYST BP LT 130 MM HG: CPT | Mod: CPTII,S$GLB,, | Performed by: INTERNAL MEDICINE

## 2023-08-28 PROCEDURE — 3288F PR FALLS RISK ASSESSMENT DOCUMENTED: ICD-10-PCS | Mod: CPTII,S$GLB,, | Performed by: INTERNAL MEDICINE

## 2023-08-28 PROCEDURE — 1126F PR PAIN SEVERITY QUANTIFIED, NO PAIN PRESENT: ICD-10-PCS | Mod: CPTII,S$GLB,, | Performed by: INTERNAL MEDICINE

## 2023-08-28 PROCEDURE — 99999 PR PBB SHADOW E&M-EST. PATIENT-LVL IV: ICD-10-PCS | Mod: PBBFAC,,, | Performed by: INTERNAL MEDICINE

## 2023-08-28 PROCEDURE — 74019 XR ABDOMEN FLAT AND ERECT: ICD-10-PCS | Mod: 26,,, | Performed by: RADIOLOGY

## 2023-08-28 PROCEDURE — 99999 PR PBB SHADOW E&M-EST. PATIENT-LVL IV: CPT | Mod: PBBFAC,,, | Performed by: INTERNAL MEDICINE

## 2023-08-28 PROCEDURE — 1160F PR REVIEW ALL MEDS BY PRESCRIBER/CLIN PHARMACIST DOCUMENTED: ICD-10-PCS | Mod: CPTII,S$GLB,, | Performed by: INTERNAL MEDICINE

## 2023-08-28 PROCEDURE — 74019 RADEX ABDOMEN 2 VIEWS: CPT | Mod: 26,,, | Performed by: RADIOLOGY

## 2023-08-28 PROCEDURE — 3008F PR BODY MASS INDEX (BMI) DOCUMENTED: ICD-10-PCS | Mod: CPTII,S$GLB,, | Performed by: INTERNAL MEDICINE

## 2023-08-28 PROCEDURE — 1101F PR PT FALLS ASSESS DOC 0-1 FALLS W/OUT INJ PAST YR: ICD-10-PCS | Mod: CPTII,S$GLB,, | Performed by: INTERNAL MEDICINE

## 2023-08-28 PROCEDURE — 3074F PR MOST RECENT SYSTOLIC BLOOD PRESSURE < 130 MM HG: ICD-10-PCS | Mod: CPTII,S$GLB,, | Performed by: INTERNAL MEDICINE

## 2023-08-28 PROCEDURE — 99214 PR OFFICE/OUTPT VISIT, EST, LEVL IV, 30-39 MIN: ICD-10-PCS | Mod: S$GLB,,, | Performed by: INTERNAL MEDICINE

## 2023-08-28 PROCEDURE — 3078F PR MOST RECENT DIASTOLIC BLOOD PRESSURE < 80 MM HG: ICD-10-PCS | Mod: CPTII,S$GLB,, | Performed by: INTERNAL MEDICINE

## 2023-08-28 PROCEDURE — 3078F DIAST BP <80 MM HG: CPT | Mod: CPTII,S$GLB,, | Performed by: INTERNAL MEDICINE

## 2023-08-28 PROCEDURE — 1101F PT FALLS ASSESS-DOCD LE1/YR: CPT | Mod: CPTII,S$GLB,, | Performed by: INTERNAL MEDICINE

## 2023-08-28 PROCEDURE — 3008F BODY MASS INDEX DOCD: CPT | Mod: CPTII,S$GLB,, | Performed by: INTERNAL MEDICINE

## 2023-08-28 PROCEDURE — 3288F FALL RISK ASSESSMENT DOCD: CPT | Mod: CPTII,S$GLB,, | Performed by: INTERNAL MEDICINE

## 2023-08-28 PROCEDURE — 74019 RADEX ABDOMEN 2 VIEWS: CPT | Mod: TC

## 2023-08-28 PROCEDURE — 99214 OFFICE O/P EST MOD 30 MIN: CPT | Mod: S$GLB,,, | Performed by: INTERNAL MEDICINE

## 2023-08-28 PROCEDURE — 1160F RVW MEDS BY RX/DR IN RCRD: CPT | Mod: CPTII,S$GLB,, | Performed by: INTERNAL MEDICINE

## 2023-08-28 NOTE — PROGRESS NOTES
"GENERAL GI PATIENT INTAKE:    COVID symptoms in the last 7 days (runny nose, sore throat, congestion, cough, fever): No  PCP: Papi Gallardo  If not PCP-  number given to establish [phone]: N/A    ALLERGIES REVIEWED:  N/A    CHIEF COMPLAINT:    Chief Complaint   Patient presents with    Constipation       VITAL SIGNS:  /62   Pulse 65   Ht 5' 11" (1.803 m)   Wt 86 kg (189 lb 9.5 oz)   BMI 26.44 kg/m²      Change in medical, surgical, family or social history: No      REVIEWED MEDICATION LIST RECONCILED INCLUDING ABOVE MEDS:  Yes     "

## 2023-08-28 NOTE — PROGRESS NOTES
Subjective:       Patient ID: Jean Carlson is a 73 y.o. male.    Chief Complaint: Constipation    Constipation    Patient here seeking a 2nd opinion.  73-year-old gentleman.  Very healthy and active in general.  Has a history of reflux and dysphagia.  He has been on PPI for quite a while.  Gets good control of his reflux with the PPI but once he tries to stop it he will have a exacerbation of symptoms.  For right now he is doing well.  He is had EGDs in the past which have not demonstrated esophagitis.  No nocturnal regurgitation.  Curiously did have several times where he had dysphagia in the past.  An EGD suggested that he might have some dysmotility.  But that does not seem to be an issue now.      Main complaint is that of constipation.  It looks like he was having some mild constipation when he saw get his gastroenterologist in 2016.  But that was not a major issue.  However in 2019 it becomes significantly worse.  And this coincides with him having the onset of multiple surgeries.  He had a bladder procedure and then a prostate procedure.  Also back surgery.  I think during this time he is had 7 surgical procedures.  And constipation has been slowly progressive over this time.  He has been tried on various laxatives.  He was tried on MiraLax once a day and then later twice a day.  Previously he would go several days without a bowel movement now he is having a bowel movement every day but it is very difficult evacuation.  The stool might be hard and compact initially.  It is hard for him to initiate incomplete the evacuation process.  He does have some postprandial gas and bloating but does sound like gas and bloating in the main symptoms is more the difficult evacuation.  Earlier this year Linzess was added at 1:45 a.m. and later on after several months that was changed it to the 290 dosing and with the to 90 dosing combined with MiraLax twice a day as mentioned he will have a bowel movement on most days.   But still it remains difficult evacuation the stool often being hard.    Social history  Exercises every day  Lots of let us  Eats lots cherries that helps   Admits that he could drink more water  Flaxseed and bran cereal   Drinks coffee and that seems to help    Past Medical History:   Diagnosis Date    Anterior tibialis tendinitis of right lower extremity 10/18/2018    Cancer     Chronic idiopathic constipation 2019    Closed fracture of left wrist 10/18/2021    Closed fracture of lower end of left radius with routine healing 2022    Elevated PSA     Family history of ASCVD 10/7/2016    Mom age 64, Dad age 67 -  on same day. Pat Aunt early 70's.    Family history of coronary artery disease 10/7/2016    Mom age 64, Dad age 67 -  on same day. Pat Aunt early 70's.    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    Intractable back pain 2018    Nuclear sclerosis, bilateral 3/12/2018    Ocular migraine 2012    Osteoporosis     Prostate disease     Transient vision disturbance of both eyes 2012    Umbilical hernia without obstruction and without gangrene 10/1/2015    Urinary tract infection        Review of patient's allergies indicates:  No Known Allergies     Family History   Problem Relation Age of Onset    Heart disease Mother     Heart attack Mother     Skin cancer Mother     Heart disease Father     Heart attack Father     Glaucoma Maternal Aunt     Glaucoma Maternal Uncle     Retinitis pigmentosa Maternal Uncle     No Known Problems Daughter     Prostate cancer Neg Hx     Kidney disease Neg Hx     Colon cancer Neg Hx        Social History     Tobacco Use    Smoking status: Never    Smokeless tobacco: Never   Substance Use Topics    Alcohol use: Yes     Alcohol/week: 3.0 standard drinks of alcohol     Types: 3 Glasses of wine per week     Comment: rarely    Drug  use: No        Review of Systems   Gastrointestinal:  Positive for constipation.           No results found for this or any previous visit from the past 365 days.             Objective:      Physical Exam  Abdominal:      General: Abdomen is flat. Bowel sounds are normal. There is no distension. There are no signs of injury.      Palpations: Abdomen is soft. There is no shifting dullness, hepatomegaly or mass.      Tenderness: There is no abdominal tenderness.         Assessment & Plan:       Chronic idiopathic constipation  -     Ambulatory referral/consult to Gastroenterology  -     X-Ray Abdomen Flat And Erect; Future; Expected date: 08/28/2023     Assessment.  One of his GI problems is reflux but that is well controlled right now.  The main issue right now is the chronic idiopathic constipation it sounds like that may be a component of pelvic floor dysfunction.  I would like to make referral to physical therapy to see if they can help with any exercises that might help with the pelvic floor dysfunction.  It is an excellent patient in terms of compliance.  He is doing everything very well in terms of his diet.  He did find that psyllium helped him in the past and I see no reason not to add psyllium right now to his current regimen of MiraLax and Linzess.  I asked him to do this for several weeks but if it is no longer effective then I would consider changing Linzess probably to Amitiza 1st and then in the future consider Trulance or Motegrity as other options.  I do not think any further diagnostic workup is needed.  Did have a colonoscopy in 2019 which was completely normal.  I reviewed that is exam along with the 2021 EGD which did not show esophagitis but suggested possible esophageal motility.  Again I do not think we need to pursue that since they are no pertinent symptoms.      Therefore the plan for now is   One continue MiraLax twice a day  2. Continue Linzess 290  3. Add psyllium fiber once a day same  time every day with a big glass of water  4. Abdominal x-ray today  5. Report to me in 3-4 weeks on outcome  5. Place a referral to physical therapy for evaluation of pelvic floor dysfunction  This note was created with voice recognition dictation technology.  There may be errors that I did not see, detect or correct.      Dennis Gallardo MD

## 2023-10-10 NOTE — PROGRESS NOTES
"CRS Office Visit Follow-up  Referring Md:   No referring provider defined for this encounter.    SUBJECTIVE:     Chief Complaint: rectal pain    History of Present Illness:  Patient is a 69 y.o. male presents with rectal pain. The patient is a established patient to this practice.     Course is as follows:  Recent hernia repair with post operative constipation and urinary retention now s/p TURP.  This is associated with significant constipation.  He had multiple episodes of straining and developed perianal pain and swelling.  This is improved with taking MiraLax.  He is now having 1-2 self bowel movements per day without any bleeding.  He has always had a small external hemorrhoid that has not bothered him.  With the constipation, the hemorrhoid is increased in size and is now significantly more painful.    Last Colonoscopy: 6/5/19: normal    Review of Systems:  Review of Systems   Constitutional: Negative for chills, diaphoresis, fever, malaise/fatigue and weight loss.   HENT: Negative for congestion.    Respiratory: Negative for shortness of breath.    Cardiovascular: Negative for chest pain and leg swelling.   Gastrointestinal: Positive for constipation. Negative for abdominal pain, blood in stool, nausea and vomiting.   Genitourinary: Positive for frequency. Negative for dysuria.   Musculoskeletal: Negative for back pain and myalgias.   Skin: Negative for rash.   Neurological: Negative for dizziness and weakness.   Endo/Heme/Allergies: Does not bruise/bleed easily.   Psychiatric/Behavioral: Negative for depression.       OBJECTIVE:     Vital Signs (Most Recent)  BP (!) 141/77 (BP Location: Right arm, Patient Position: Sitting, BP Method: Large (Automatic))   Pulse 66   Ht 5' 11" (1.803 m)   Wt 83.9 kg (184 lb 15.5 oz)   BMI 25.80 kg/m²     Physical Exam:  General: White male in no distress   Neuro: alert and oriented x 4.  Moves all extremities.     HEENT: no icterus.  Trachea midline  Respiratory: " [facility]  Nurses Note -- 4 Eyes      10/10/2023       Skin assessed on: Q Shift      [x] No Pressure Injuries Present    []Prevention Measures Documented    [] Yes LDA  for Pressure Injury Previously documented     [] Yes New Pressure Injury Discovered   [] LDA for New Pressure Injury Added      Attending RN:  Tracey Lazcano, RN     Second RN:  Rmay Patton LPN        respirations are even and unlabored  Cardiac: regular rate  Abdomen:  Soft, nontender, no masses  Extremities: Warm dry and intact  Skin: no rashes  Anorectal:  External exam demonstrates thrombosed external hemorrhoid in the left lateral position. Digital exam was performed.  Small volume internal thrombosis noted. No significant inflammation.  Normal tone. No other masses.    Labs: H/H = 13/39.  Normal renal function    Imaging: none      ASSESSMENT/PLAN:     Jean was seen today for rectal pain.    Diagnoses and all orders for this visit:    Perianal venous thrombosis    Other orders  -     lidocaine (XYLOCAINE) 5 % Oint ointment; Apply topically once daily.        69-year-old gentleman with resolving thrombosed external hemorrhoid.  We discussed this with continue to improve over time.  Reassurance was provided.  Topical lidocaine cream cream was prescribed in addition to hydrocortisone cream.  He can follow up with me if he has no improvement.    REBECA Baldwin MD  Staff Surgeon  Colon & Rectal Surgery

## 2023-10-12 ENCOUNTER — TELEPHONE (OUTPATIENT)
Dept: SURGERY | Facility: CLINIC | Age: 73
End: 2023-10-12
Payer: MEDICARE

## 2023-10-12 NOTE — TELEPHONE ENCOUNTER
Spoke with patient regarding appointment for chronic constipation and hemorrhoids.  Appointment scheduled and details confirmed verbally over phone.

## 2023-10-12 NOTE — TELEPHONE ENCOUNTER
----- Message from Titus Gaitan sent at 10/12/2023 12:13 PM CDT -----  Contact: Pt  .Type:  Sooner Apoointment Request    Caller is requesting a sooner appointment.  Caller declined first available appointment listed below.  Caller will not accept being placed on the waitlist and is requesting a message be sent to doctor.  Name of Caller:pt  When is the first available appointment?11/16/2023  Symptoms: hemorroids  Would the patient rather a call back or a response via [facility]?  Call back  Best Call Back Number:[phone]  Additional Information: Pt. Would like to be seen to sooner because of pain.

## 2023-10-30 NOTE — PROGRESS NOTES
CRS Office Visit History and Physical    Referring Md:   Self, Aaareferral  No address on file    SUBJECTIVE:     Chief Complaint: constipation and hemorrhoids    History of Present Illness:  The patient is a new patient to this practice.   Course is as follows:  Patient is a 73 y.o. male presents with constipation and hemorrhoids.     2019: seen for thrombosed external hemorrhoid    10/31/23: chronic constipation on Linzess 290mcg per day. Hx of ongoing constipation for last 6 years, worse for the last 6 months. He temporally associates this with a series of surgeries over the 6 year time course. He has had laparoscopic inguinal hernia repairs and umbilical hernia repair, TURP, back surgery and knee surgery. States constipation has worsened after each surgery. He does not take any opioid after surgery due to fear of worsening his issues. His constipation has substantially worsened in the last 6 months after TURP and initiation of flomax. He is now at the point he strains for up to 20min with each bowel movement, requiring multiple maneuvers to have a BM. He brings his knees to his chest, massages his abdomen with each BM. Has 1 BM daily. Bms are associated with pain and occasional bleeding which he states is 2/2 hemorrhiods. He notes prolapsing of hemorrhoids. He takes Linzess as stated before, metamucil and miralax daily. He feels the metamucil and miralax help some but overall still very miserable.   Last Colonoscopy: 2019: normal  No fhx of colon cancer    Review of patient's allergies indicates:  No Known Allergies    Past Medical History:   Diagnosis Date    Anterior tibialis tendinitis of right lower extremity 10/18/2018    Cancer     Chronic idiopathic constipation 2019    Closed fracture of left wrist 10/18/2021    Closed fracture of lower end of left radius with routine healing 2022    Elevated PSA     Family history of ASCVD 10/7/2016    Mom age 64, Dad age 67 -  on same day. Pat Aunt  early 70's.    Family history of coronary artery disease 10/7/2016    Mom age 64, Dad age 67 -  on same day. Pat Aunt early 70's.    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    Intractable back pain 2018    Nuclear sclerosis, bilateral 3/12/2018    Ocular migraine 2012    Osteoporosis     Prostate disease     Transient vision disturbance of both eyes 2012    Umbilical hernia without obstruction and without gangrene 10/1/2015    Urinary tract infection      Past Surgical History:   Procedure Laterality Date    back sx      lower    BLADDER FULGURATION N/A 2019    Procedure: FULGURATION, BLADDER;  Surgeon: Ryan Knowles MD;  Location: [facility];  Service: Urology;  Laterality: N/A;  bleeding tissue at bladder neck    CATARACT EXTRACTION W/  INTRAOCULAR LENS IMPLANT Bilateral     Dr Finn/Symfony IOL     CHONDROPLASTY OF KNEE Right 2023    Procedure: CHONDROPLASTY, KNEE;  Surgeon: Taylor Young MD;  Location: [facility];  Service: Orthopedics;  Laterality: Right;    COLONOSCOPY N/A 2019    Procedure: COLONOSCOPY;  Surgeon: Mauro Smallwood MD;  Location: [facility] (4TH FLR);  Service: Endoscopy;  Laterality: N/A;    CYSTOSCOPY N/A 2019    Procedure: CYSTOSCOPY;  Surgeon: Ryan Knowles MD;  Location: [facility];  Service: Urology;  Laterality: N/A;    CYSTOSCOPY      CYSTOSCOPY  2021    Procedure: CYSTOSCOPY;  Surgeon: Manan Ny MD;  Location: [facility] 1ST FLR;  Service: Urology;;    ESOPHAGOGASTRODUODENOSCOPY N/A 3/31/2021    Procedure: EGD (ESOPHAGOGASTRODUODENOSCOPY);  Surgeon: Jamilah Munoz MD;  Location: [facility];  Service: Endoscopy;  Laterality: N/A;    HERNIA REPAIR      KNEE ARTHROSCOPY W/ MENISCECTOMY Right 2023    Procedure: ARTHROSCOPY, KNEE, WITH MENISCECTOMY;  Surgeon: Taylor Young MD;  Location: [facility];  Service: Orthopedics;   Laterality: Right;    [id number] Right 5/30/2023    Procedure: [id number];  Surgeon: Taylor Young MD;  Location: [facility] OR;  Service: Orthopedics;  Laterality: Right;    PROSTATE SURGERY      REMOVAL OF BLOOD CLOT N/A 7/13/2019    Procedure: REMOVAL, BLOOD CLOT;  Surgeon: Ryan Knowles MD;  Location: [facility] OR;  Service: Urology;  Laterality: N/A;  prostate    SPINE SURGERY      SYNOVECTOMY OF KNEE Right 5/30/2023    Procedure: SYNOVECTOMY, KNEE;  Surgeon: Taylor Young MD;  Location: [facility] OR;  Service: Orthopedics;  Laterality: Right;    TONSILLECTOMY      TRANSURETHRAL RESECTION OF PROSTATE      TRANSURETHRAL RESECTION OF PROSTATE N/A 7/13/2019    Procedure: TURP (TRANSURETHRAL RESECTION OF PROSTATE);  Surgeon: Ryan Knowles MD;  Location: [facility] OR;  Service: Urology;  Laterality: N/A;    TRANSURETHRAL SURGICAL REMOVAL OF STRICTURE OF BLADDER NECK  7/27/2021    Procedure: EXCISION, CONTRACTURE, BLADDER NECK, TRANSURETHRAL;  Surgeon: Manan Ny MD;  Location: [address];  Service: Urology;;     Family History   Problem Relation Age of Onset    Heart disease Mother     Heart attack Mother     Skin cancer Mother     Heart disease Father     Heart attack Father     Glaucoma Maternal Aunt     Glaucoma Maternal Uncle     Retinitis pigmentosa Maternal Uncle     No Known Problems Daughter     Prostate cancer Neg Hx     Kidney disease Neg Hx     Colon cancer Neg Hx      Social History     Tobacco Use    Smoking status: Never    Smokeless tobacco: Never   Substance Use Topics    Alcohol use: Yes     Alcohol/week: 3.0 standard drinks of alcohol     Types: 3 Glasses of wine per week     Comment: rarely    Drug use: No        Review of Systems:  Review of Systems   Constitutional:  Negative for chills and fever.   HENT:  Negative for sinus pain and sore throat.    Eyes:  Negative for pain.   Respiratory:  Negative for cough and shortness of breath.    Cardiovascular:  Negative for  "chest pain.   Gastrointestinal:  Positive for blood in stool and constipation. Negative for heartburn, nausea and vomiting.   Genitourinary:  Negative for dysuria and hematuria.   Musculoskeletal:  Negative for myalgias.   Skin:  Negative for rash.   Neurological:  Negative for dizziness and headaches.       OBJECTIVE:     Vital Signs (Most Recent)  /65 (BP Location: Left arm, Patient Position: Sitting)   Pulse 80   Resp 19   Ht 5' 10.98" (1.803 m)   Wt 83 kg (182 lb 14 oz)   SpO2 96%   BMI 25.52 kg/m²     Physical Exam:  General: White male in no distress   Neuro: alert and oriented x 4.  Moves all extremities.     HEENT: no icterus.  Trachea midline  Respiratory: respirations are even and unlabored  Cardiac: regular rate  Abdomen: soft, nondistened, nontender, well healed incisions  Extremities: Warm dry and intact  Skin: no rashes  Anorectal: small right posterior external hemmorroid, normal tone on exam, appropriate tone with squeeze and pressure with valsalva, no masses, large left lateral and right anterior internal hemorrhoids, small right posterior internal hemorrhoids    Labs: TSH normal.  No anemia.  Normal renal function.  Normal albumin    Imaging: no cross sectional abdominal imaging.       ASSESSMENT/PLAN:     Diagnoses and all orders for this visit:    Chronic idiopathic constipation  -     TSH; Future  -     CBC Auto Differential; Future  -     Comprehensive Metabolic Panel; Future  -     FL Defecogram; Future    Grade III hemorrhoids    Other orders  -     hydrocortisone (ANUSOL-HC) 25 mg suppository; Place 1 suppository (25 mg total) rectally 2 (two) times daily. for 10 days        73 y.o. male with presents with worsening constipation. He has daily bowel movements but requires substantial straining. He feels miralax and fiber have helped some. Does not feel Linzess has helped much. He notes occasional blood on toilet paper and pain 2/2 to his hemorrhoids. He notes prolapsing of " hemorrhoids with bowel movements that he manually reduces. We discussed that he is having daily bowel movements but it sounds like his issue is the straining due to extremely hard stool. We discussed increasing miralax to twice daily and this may require further increase until stool is softer. Will also check TSH, CBC, CMP to evaluate other sources. Will also get a defecography study to further evaluate pelvic floor. For his hemorrhoids he has two large columns of grade II hemorrhoiods which were banded in the office today - left lateral and right anterior. Will prescribe hydrocortisone suppositories as well. Will have him follow up in 5 weeks to evaluate response to therapy.     Rubber Band Ligation:  Verbal consent was obtained.   Clear plastic anoscope inserted.    Grade II hemorrhoids seen on the left lateral and right posterior position  Suction applicator was placed above the dentate line.   Rubber bands were deployed in the left lateral and right posterior position.    0.25% marcaine was injected above the rubber band into the banded hemorrhoidal tissue.   Patient tolerated the procedure well.       REBECA Baldwin MD, FACS, FASCRS  Staff Surgeon  Colon & Rectal Surgery

## 2023-10-31 ENCOUNTER — OFFICE VISIT (OUTPATIENT)
Dept: SURGERY | Facility: CLINIC | Age: 73
End: 2023-10-31
Payer: MEDICARE

## 2023-10-31 ENCOUNTER — TELEPHONE (OUTPATIENT)
Dept: SURGERY | Facility: CLINIC | Age: 73
End: 2023-10-31
Payer: MEDICARE

## 2023-10-31 ENCOUNTER — LAB VISIT (OUTPATIENT)
Dept: LAB | Facility: HOSPITAL | Age: 73
End: 2023-10-31
Attending: COLON & RECTAL SURGERY
Payer: MEDICARE

## 2023-10-31 VITALS
RESPIRATION RATE: 19 BRPM | BODY MASS INDEX: 25.6 KG/M2 | WEIGHT: 182.88 LBS | HEART RATE: 80 BPM | OXYGEN SATURATION: 96 % | DIASTOLIC BLOOD PRESSURE: 65 MMHG | HEIGHT: 71 IN | SYSTOLIC BLOOD PRESSURE: 122 MMHG

## 2023-10-31 DIAGNOSIS — K59.04 CHRONIC IDIOPATHIC CONSTIPATION: Primary | ICD-10-CM

## 2023-10-31 DIAGNOSIS — K59.04 CHRONIC IDIOPATHIC CONSTIPATION: ICD-10-CM

## 2023-10-31 DIAGNOSIS — K64.2 GRADE III HEMORRHOIDS: ICD-10-CM

## 2023-10-31 LAB
ALBUMIN SERPL BCP-MCNC: 4.2 G/DL (ref 3.5–5.2)
ALP SERPL-CCNC: 64 U/L (ref 55–135)
ALT SERPL W/O P-5'-P-CCNC: 23 U/L (ref 10–44)
ANION GAP SERPL CALC-SCNC: 10 MMOL/L (ref 8–16)
AST SERPL-CCNC: 20 U/L (ref 10–40)
BASOPHILS # BLD AUTO: 0.02 K/UL (ref 0–0.2)
BASOPHILS NFR BLD: 0.5 % (ref 0–1.9)
BILIRUB SERPL-MCNC: 0.6 MG/DL (ref 0.1–1)
BUN SERPL-MCNC: 19 MG/DL (ref 8–23)
CALCIUM SERPL-MCNC: 10 MG/DL (ref 8.7–10.5)
CHLORIDE SERPL-SCNC: 105 MMOL/L (ref 95–110)
CO2 SERPL-SCNC: 24 MMOL/L (ref 23–29)
CREAT SERPL-MCNC: 0.9 MG/DL (ref 0.5–1.4)
DIFFERENTIAL METHOD: ABNORMAL
EOSINOPHIL # BLD AUTO: 0.1 K/UL (ref 0–0.5)
EOSINOPHIL NFR BLD: 1.6 % (ref 0–8)
ERYTHROCYTE [DISTWIDTH] IN BLOOD BY AUTOMATED COUNT: 12.2 % (ref 11.5–14.5)
EST. GFR  (NO RACE VARIABLE): >60 ML/MIN/1.73 M^2
GLUCOSE SERPL-MCNC: 113 MG/DL (ref 70–110)
HCT VFR BLD AUTO: 46.1 % (ref 40–54)
HGB BLD-MCNC: 15.4 G/DL (ref 14–18)
IMM GRANULOCYTES # BLD AUTO: 0.01 K/UL (ref 0–0.04)
IMM GRANULOCYTES NFR BLD AUTO: 0.2 % (ref 0–0.5)
LYMPHOCYTES # BLD AUTO: 0.6 K/UL (ref 1–4.8)
LYMPHOCYTES NFR BLD: 13.9 % (ref 18–48)
MCH RBC QN AUTO: 30.4 PG (ref 27–31)
MCHC RBC AUTO-ENTMCNC: 33.4 G/DL (ref 32–36)
MCV RBC AUTO: 91 FL (ref 82–98)
MONOCYTES # BLD AUTO: 0.4 K/UL (ref 0.3–1)
MONOCYTES NFR BLD: 8.6 % (ref 4–15)
NEUTROPHILS # BLD AUTO: 3.2 K/UL (ref 1.8–7.7)
NEUTROPHILS NFR BLD: 75.2 % (ref 38–73)
NRBC BLD-RTO: 0 /100 WBC
PLATELET # BLD AUTO: 152 K/UL (ref 150–450)
PMV BLD AUTO: 11.1 FL (ref 9.2–12.9)
POTASSIUM SERPL-SCNC: 4.8 MMOL/L (ref 3.5–5.1)
PROT SERPL-MCNC: 7 G/DL (ref 6–8.4)
RBC # BLD AUTO: 5.07 M/UL (ref 4.6–6.2)
SODIUM SERPL-SCNC: 139 MMOL/L (ref 136–145)
TSH SERPL DL<=0.005 MIU/L-ACNC: 2.72 UIU/ML (ref 0.4–4)
WBC # BLD AUTO: 4.31 K/UL (ref 3.9–12.7)

## 2023-10-31 PROCEDURE — 3008F BODY MASS INDEX DOCD: CPT | Mod: CPTII,S$GLB,, | Performed by: COLON & RECTAL SURGERY

## 2023-10-31 PROCEDURE — 99204 PR OFFICE/OUTPT VISIT, NEW, LEVL IV, 45-59 MIN: ICD-10-PCS | Mod: 25,S$GLB,, | Performed by: COLON & RECTAL SURGERY

## 2023-10-31 PROCEDURE — 99999 PR PBB SHADOW E&M-EST. PATIENT-LVL IV: CPT | Mod: PBBFAC,,, | Performed by: COLON & RECTAL SURGERY

## 2023-10-31 PROCEDURE — 36415 COLL VENOUS BLD VENIPUNCTURE: CPT | Performed by: COLON & RECTAL SURGERY

## 2023-10-31 PROCEDURE — 46221 PR HEMORRHOIDECTOMY INTERNAL RUBBER BAND LIGATIONS: ICD-10-PCS | Mod: S$GLB,,, | Performed by: COLON & RECTAL SURGERY

## 2023-10-31 PROCEDURE — 3078F PR MOST RECENT DIASTOLIC BLOOD PRESSURE < 80 MM HG: ICD-10-PCS | Mod: CPTII,S$GLB,, | Performed by: COLON & RECTAL SURGERY

## 2023-10-31 PROCEDURE — 99999 PR PBB SHADOW E&M-EST. PATIENT-LVL IV: ICD-10-PCS | Mod: PBBFAC,,, | Performed by: COLON & RECTAL SURGERY

## 2023-10-31 PROCEDURE — 3078F DIAST BP <80 MM HG: CPT | Mod: CPTII,S$GLB,, | Performed by: COLON & RECTAL SURGERY

## 2023-10-31 PROCEDURE — 46221 LIGATION OF HEMORRHOID(S): CPT | Mod: S$GLB,,, | Performed by: COLON & RECTAL SURGERY

## 2023-10-31 PROCEDURE — 3008F PR BODY MASS INDEX (BMI) DOCUMENTED: ICD-10-PCS | Mod: CPTII,S$GLB,, | Performed by: COLON & RECTAL SURGERY

## 2023-10-31 PROCEDURE — 1101F PT FALLS ASSESS-DOCD LE1/YR: CPT | Mod: CPTII,S$GLB,, | Performed by: COLON & RECTAL SURGERY

## 2023-10-31 PROCEDURE — 1159F PR MEDICATION LIST DOCUMENTED IN MEDICAL RECORD: ICD-10-PCS | Mod: CPTII,S$GLB,, | Performed by: COLON & RECTAL SURGERY

## 2023-10-31 PROCEDURE — 80053 COMPREHEN METABOLIC PANEL: CPT | Performed by: COLON & RECTAL SURGERY

## 2023-10-31 PROCEDURE — 3074F SYST BP LT 130 MM HG: CPT | Mod: CPTII,S$GLB,, | Performed by: COLON & RECTAL SURGERY

## 2023-10-31 PROCEDURE — 3288F FALL RISK ASSESSMENT DOCD: CPT | Mod: CPTII,S$GLB,, | Performed by: COLON & RECTAL SURGERY

## 2023-10-31 PROCEDURE — 85025 COMPLETE CBC W/AUTO DIFF WBC: CPT | Performed by: COLON & RECTAL SURGERY

## 2023-10-31 PROCEDURE — 99204 OFFICE O/P NEW MOD 45 MIN: CPT | Mod: 25,S$GLB,, | Performed by: COLON & RECTAL SURGERY

## 2023-10-31 PROCEDURE — 3288F PR FALLS RISK ASSESSMENT DOCUMENTED: ICD-10-PCS | Mod: CPTII,S$GLB,, | Performed by: COLON & RECTAL SURGERY

## 2023-10-31 PROCEDURE — 84443 ASSAY THYROID STIM HORMONE: CPT | Performed by: COLON & RECTAL SURGERY

## 2023-10-31 PROCEDURE — 3074F PR MOST RECENT SYSTOLIC BLOOD PRESSURE < 130 MM HG: ICD-10-PCS | Mod: CPTII,S$GLB,, | Performed by: COLON & RECTAL SURGERY

## 2023-10-31 PROCEDURE — 1125F AMNT PAIN NOTED PAIN PRSNT: CPT | Mod: CPTII,S$GLB,, | Performed by: COLON & RECTAL SURGERY

## 2023-10-31 PROCEDURE — 1160F RVW MEDS BY RX/DR IN RCRD: CPT | Mod: CPTII,S$GLB,, | Performed by: COLON & RECTAL SURGERY

## 2023-10-31 PROCEDURE — 1125F PR PAIN SEVERITY QUANTIFIED, PAIN PRESENT: ICD-10-PCS | Mod: CPTII,S$GLB,, | Performed by: COLON & RECTAL SURGERY

## 2023-10-31 PROCEDURE — 1160F PR REVIEW ALL MEDS BY PRESCRIBER/CLIN PHARMACIST DOCUMENTED: ICD-10-PCS | Mod: CPTII,S$GLB,, | Performed by: COLON & RECTAL SURGERY

## 2023-10-31 PROCEDURE — 1101F PR PT FALLS ASSESS DOC 0-1 FALLS W/OUT INJ PAST YR: ICD-10-PCS | Mod: CPTII,S$GLB,, | Performed by: COLON & RECTAL SURGERY

## 2023-10-31 PROCEDURE — 1159F MED LIST DOCD IN RCRD: CPT | Mod: CPTII,S$GLB,, | Performed by: COLON & RECTAL SURGERY

## 2023-10-31 RX ORDER — HYDROCORTISONE ACETATE 25 MG/1
25 SUPPOSITORY RECTAL 2 TIMES DAILY
Qty: 20 SUPPOSITORY | Refills: 0 | Status: SHIPPED | OUTPATIENT
Start: 2023-10-31 | End: 2023-11-18

## 2023-10-31 RX ORDER — HYDROCORTISONE ACETATE 25 MG/1
25 SUPPOSITORY RECTAL 2 TIMES DAILY
Qty: 20 SUPPOSITORY | Refills: 0 | Status: SHIPPED | OUTPATIENT
Start: 2023-10-31 | End: 2023-10-31 | Stop reason: SDUPTHER

## 2023-10-31 NOTE — TELEPHONE ENCOUNTER
----- Message from Sharona Bingham sent at 10/31/2023 12:58 PM CDT -----  Regarding: pt advice  Contact: pt [phone]  Pt calling to consult for severe constipation. Pls call

## 2023-10-31 NOTE — TELEPHONE ENCOUNTER
Spoke with patient and wife regarding upcoming defecography appointment. Reminder slip placed in mail and details viewable in portal.

## 2023-10-31 NOTE — TELEPHONE ENCOUNTER
----- Message from REBECA Baldwin MD sent at 10/31/2023  1:49 PM CDT -----  Can he please be set up for defecography?    Rainer

## 2023-10-31 NOTE — TELEPHONE ENCOUNTER
Spoke with patient regarding post banding procedure. Explained to patient what to expect after banding and reiterated Dr. Baldwin's notes from clinic visit today. Patient verbalizes understanding.

## 2023-11-18 ENCOUNTER — OFFICE VISIT (OUTPATIENT)
Dept: INTERNAL MEDICINE | Facility: CLINIC | Age: 73
End: 2023-11-18
Attending: FAMILY MEDICINE
Payer: MEDICARE

## 2023-11-18 VITALS
DIASTOLIC BLOOD PRESSURE: 76 MMHG | HEIGHT: 71 IN | SYSTOLIC BLOOD PRESSURE: 132 MMHG | BODY MASS INDEX: 25.86 KG/M2 | OXYGEN SATURATION: 98 % | WEIGHT: 184.75 LBS | HEART RATE: 67 BPM

## 2023-11-18 DIAGNOSIS — Z98.890 HISTORY OF MENISCECTOMY OF RIGHT KNEE: ICD-10-CM

## 2023-11-18 DIAGNOSIS — S32.020D COMPRESSION FRACTURE OF L2 VERTEBRA WITH ROUTINE HEALING: ICD-10-CM

## 2023-11-18 DIAGNOSIS — K59.04 CHRONIC IDIOPATHIC CONSTIPATION: Primary | ICD-10-CM

## 2023-11-18 DIAGNOSIS — M48.062 SPINAL STENOSIS OF LUMBAR REGION WITH NEUROGENIC CLAUDICATION: ICD-10-CM

## 2023-11-18 DIAGNOSIS — M81.0 OSTEOPOROSIS, UNSPECIFIED OSTEOPOROSIS TYPE, UNSPECIFIED PATHOLOGICAL FRACTURE PRESENCE: ICD-10-CM

## 2023-11-18 DIAGNOSIS — K58.9 IRRITABLE BOWEL SYNDROME, UNSPECIFIED TYPE: ICD-10-CM

## 2023-11-18 DIAGNOSIS — C61 PROSTATE CANCER: ICD-10-CM

## 2023-11-18 PROBLEM — R26.9 IMPAIRED GAIT: Status: RESOLVED | Noted: 2023-05-31 | Resolved: 2023-11-18

## 2023-11-18 PROBLEM — S89.91XA KNEE INJURY, RIGHT, INITIAL ENCOUNTER: Status: RESOLVED | Noted: 2023-05-15 | Resolved: 2023-11-18

## 2023-11-18 PROBLEM — R29.898 LEG WEAKNESS, BILATERAL: Status: RESOLVED | Noted: 2022-05-24 | Resolved: 2023-11-18

## 2023-11-18 PROBLEM — S83.241A ACUTE MEDIAL MENISCUS TEAR, RIGHT, INITIAL ENCOUNTER: Status: RESOLVED | Noted: 2023-05-30 | Resolved: 2023-11-18

## 2023-11-18 PROCEDURE — 3008F PR BODY MASS INDEX (BMI) DOCUMENTED: ICD-10-PCS | Mod: CPTII,S$GLB,, | Performed by: FAMILY MEDICINE

## 2023-11-18 PROCEDURE — 3078F DIAST BP <80 MM HG: CPT | Mod: CPTII,S$GLB,, | Performed by: FAMILY MEDICINE

## 2023-11-18 PROCEDURE — 3078F PR MOST RECENT DIASTOLIC BLOOD PRESSURE < 80 MM HG: ICD-10-PCS | Mod: CPTII,S$GLB,, | Performed by: FAMILY MEDICINE

## 2023-11-18 PROCEDURE — 1160F RVW MEDS BY RX/DR IN RCRD: CPT | Mod: CPTII,S$GLB,, | Performed by: FAMILY MEDICINE

## 2023-11-18 PROCEDURE — 99999 PR PBB SHADOW E&M-EST. PATIENT-LVL V: CPT | Mod: PBBFAC,,, | Performed by: FAMILY MEDICINE

## 2023-11-18 PROCEDURE — 3075F PR MOST RECENT SYSTOLIC BLOOD PRESS GE 130-139MM HG: ICD-10-PCS | Mod: CPTII,S$GLB,, | Performed by: FAMILY MEDICINE

## 2023-11-18 PROCEDURE — 1126F PR PAIN SEVERITY QUANTIFIED, NO PAIN PRESENT: ICD-10-PCS | Mod: CPTII,S$GLB,, | Performed by: FAMILY MEDICINE

## 2023-11-18 PROCEDURE — 1160F PR REVIEW ALL MEDS BY PRESCRIBER/CLIN PHARMACIST DOCUMENTED: ICD-10-PCS | Mod: CPTII,S$GLB,, | Performed by: FAMILY MEDICINE

## 2023-11-18 PROCEDURE — 3075F SYST BP GE 130 - 139MM HG: CPT | Mod: CPTII,S$GLB,, | Performed by: FAMILY MEDICINE

## 2023-11-18 PROCEDURE — 99215 OFFICE O/P EST HI 40 MIN: CPT | Mod: S$GLB,,, | Performed by: FAMILY MEDICINE

## 2023-11-18 PROCEDURE — 3288F FALL RISK ASSESSMENT DOCD: CPT | Mod: CPTII,S$GLB,, | Performed by: FAMILY MEDICINE

## 2023-11-18 PROCEDURE — 99215 PR OFFICE/OUTPT VISIT, EST, LEVL V, 40-54 MIN: ICD-10-PCS | Mod: S$GLB,,, | Performed by: FAMILY MEDICINE

## 2023-11-18 PROCEDURE — 1101F PT FALLS ASSESS-DOCD LE1/YR: CPT | Mod: CPTII,S$GLB,, | Performed by: FAMILY MEDICINE

## 2023-11-18 PROCEDURE — 1101F PR PT FALLS ASSESS DOC 0-1 FALLS W/OUT INJ PAST YR: ICD-10-PCS | Mod: CPTII,S$GLB,, | Performed by: FAMILY MEDICINE

## 2023-11-18 PROCEDURE — 3288F PR FALLS RISK ASSESSMENT DOCUMENTED: ICD-10-PCS | Mod: CPTII,S$GLB,, | Performed by: FAMILY MEDICINE

## 2023-11-18 PROCEDURE — 3008F BODY MASS INDEX DOCD: CPT | Mod: CPTII,S$GLB,, | Performed by: FAMILY MEDICINE

## 2023-11-18 PROCEDURE — 1159F MED LIST DOCD IN RCRD: CPT | Mod: CPTII,S$GLB,, | Performed by: FAMILY MEDICINE

## 2023-11-18 PROCEDURE — 99999 PR PBB SHADOW E&M-EST. PATIENT-LVL V: ICD-10-PCS | Mod: PBBFAC,,, | Performed by: FAMILY MEDICINE

## 2023-11-18 PROCEDURE — 1159F PR MEDICATION LIST DOCUMENTED IN MEDICAL RECORD: ICD-10-PCS | Mod: CPTII,S$GLB,, | Performed by: FAMILY MEDICINE

## 2023-11-18 PROCEDURE — 1126F AMNT PAIN NOTED NONE PRSNT: CPT | Mod: CPTII,S$GLB,, | Performed by: FAMILY MEDICINE

## 2023-11-18 NOTE — PROGRESS NOTES
Subjective:       Patient ID: Jean Carlson is a 73 y.o. male.    Chief Complaint: Annual Exam    Established patient follows up for management of chronic medical illnesses with complaints today. Please see dictation and ROS for interval problems, specific complaints and disease management discussion.    Past, Surgical, Family, Social, Histories; Medications, allergies reviewed and reconciled.  Health maintenance file reviewed and addressed items due. Recent applicable lab, imaging and cardiovascular results reviewed.  Problem list items reviewed and modified or added entries (in the overview section) may not be transcribed into this encounter note due to note writer format.    Routine follow-up schedule however patient had a lengthy concern about fecal straining and constipation.  This is been going on for months.  He is had multiple evaluations by GI with a?  Manometry test pending.  Has affected his exercise.  Affected eating habits, states he is eating healthy.  Taking PPI, and a clear indication.  Last EGD a couple of years ago.  Colonoscopy up-to-date.  No definite abdominal pain. ?  Bloating.  Describes stools as being soft, but on the order of once a day.  No bleeding.  A more detailed analysis can be provided through the GI and Colorectal surgery notes recently.    Looking at his situation, my suggestion would be to consider discontinuation of PPI without clear indication.  Malabsorption of minerals due to low acid was discussed as a potential side effect of this type of medication.  Consult with GI concerning future directions, recommend see IBS specialist.  Complete current recommended testing and I also recommend Mediterranean type diet and additionally increasing to a more normal exercise pattern.  He asked about laxative, currently taking Linzess and?  MiraLax.  I would suggest reducing medications as a trial.  Did discuss that medications can have untoward side effects, discussed that with his  gastroenterologist.  Again, asked him to look at the NIH guidelines for overall dietary health.+    Follow-up with me in 3 months for annual physical examination.  Laboratory and health maintenance are up-to-date.        Review of Systems   Constitutional:  Negative for appetite change, chills, diaphoresis, fatigue and fever.   HENT:  Negative for congestion, postnasal drip, rhinorrhea, sore throat and trouble swallowing.    Eyes:  Negative for visual disturbance.   Respiratory:  Negative for cough, choking, chest tightness, shortness of breath and wheezing.    Cardiovascular:  Negative for chest pain and leg swelling.   Gastrointestinal:  Positive for constipation and diarrhea. Negative for abdominal distention, abdominal pain, nausea and vomiting.        Straining   Genitourinary:  Negative for difficulty urinating and hematuria.   Musculoskeletal:  Negative for arthralgias and myalgias.   Skin:  Negative for rash.   Neurological:  Negative for weakness, light-headedness and headaches.   Psychiatric/Behavioral:  Positive for dysphoric mood. Negative for confusion.        Objective:      Physical Exam  Vitals and nursing note reviewed.   Constitutional:       General: He is not in acute distress.     Appearance: He is well-developed.   Pulmonary:      Effort: Pulmonary effort is normal.   Musculoskeletal:      Cervical back: Neck supple.      Right lower leg: No edema.      Left lower leg: No edema.   Skin:     General: Skin is warm and dry.      Findings: No rash.   Neurological:      Mental Status: He is alert and oriented to person, place, and time.   Psychiatric:         Behavior: Behavior normal.         Thought Content: Thought content normal.         Judgment: Judgment normal.         Assessment:       1. Chronic idiopathic constipation    2. Osteoporosis, unspecified osteoporosis type, unspecified pathological fracture presence    3. Prostate cancer    4. Irritable bowel syndrome, unspecified type    5.  Spinal stenosis of lumbar region with neurogenic claudication    6. Compression fracture of L2 vertebra with routine healing    7. History of meniscectomy of right knee        Plan:     Medication List with Changes/Refills   Current Medications    ATORVASTATIN (LIPITOR) 20 MG TABLET    Take 1 tablet (20 mg total) by mouth once daily.    CALCIUM PHOSPHATE TRIB/VIT D3 (CITRACAL + D3, CALCIUM PHOS, ORAL)    Take 1 tablet by mouth once daily.    CHOLECALCIFEROL, VITAMIN D3, (VITAMIN D3) 50 MCG (2,000 UNIT) CAP    Take 1 capsule by mouth once daily.    ICOSAPENT ETHYL (VASCEPA) 1 GRAM CAP    Take 1 capsule (1,000 mg total) by mouth 2 (two) times a day.    LINACLOTIDE (LINZESS) 290 MCG CAP CAPSULE    Take 1 capsule (290 mcg total) by mouth daily before breakfast.    MULTIVITAMIN CAPSULE    Take 1 capsule by mouth once daily.    PANTOPRAZOLE (PROTONIX) 40 MG TABLET    Take 1 tablet (40 mg total) by mouth once daily.    POLYETHYLENE GLYCOL (GLYCOLAX) 17 GRAM PWPK    Take by mouth.    TAMSULOSIN (FLOMAX) 0.4 MG CAP    Take 1 capsule (0.4 mg total) by mouth once daily.   Discontinued Medications    ASPIRIN (ECOTRIN) 81 MG EC TABLET    Take 1 tablet (81 mg total) by mouth once daily. For 4 weeks starting the day after surgery.    FLU VAC 2023 [id number]-[id number],PF, (FLUAD QUAD 2023-24,65Y UP,,PF,) 60 MCG (15 MCG X 4)/0.5 ML SYRG    Inject 0.5 mLs into the muscle once. for 1 dose    HYDROCORTISONE (ANUSOL-HC) 25 MG SUPPOSITORY    Place 1 suppository (25 mg total) rectally 2 (two) times daily. for 10 days    RSVPREF3 ANTIGEN-AS01E, PF, (AREXVY, PF,) 120 MCG/0.5 ML SUSR VACCINE    Inject into the muscle.     1. Chronic idiopathic constipation  -     Ambulatory referral/consult to Gastroenterology; Future; Expected date: 11/25/2023    2. Osteoporosis, unspecified osteoporosis type, unspecified pathological fracture presence  Overview:  -Followed by endocrinology      3. Prostate cancer  Overview:  -Discovered on TURP 7/13/2019 by   Eleni.  1% of specimen Julius 6.  On AS, followed by urology      4. Irritable bowel syndrome, unspecified type  Comments:  ???  Orders:  -     Ambulatory referral/consult to Gastroenterology; Future; Expected date: 11/25/2023  -     Pancreatic elastase, fecal; Future; Expected date: 11/18/2023  -     Clostridium difficile EIA; Future; Expected date: 11/18/2023  -     Stool culture; Future; Expected date: 11/18/2023  -     H. pylori antigen, stool; Future; Expected date: 11/18/2023  -     Stool Exam-Ova,Cysts,Parasites; Future; Expected date: 11/18/2023  -     Giardia / Cryptosporidum, EIA; Future; Expected date: 11/18/2023  -     WBC, Stool; Future; Expected date: 11/18/2023  -     Rotavirus antigen, stool; Future; Expected date: 11/18/2023    5. Spinal stenosis of lumbar region with neurogenic claudication  Overview:  -reports 2 prior surgeries for 'discs'  -see 1/13/2022 Neurosurgery summary (L2 fx from fall 2021)  -MRI 5/18/2022 - 'mild' stenosis and other findings      6. Compression fracture of L2 vertebra with routine healing  Overview:  -fall from 2-3 feet 2021 - osteoporotic, followed in endocrinology and back clinic  -see 1/13/2022 Neurosurgery summary (L2 fx from fall 2021)      7. History of meniscectomy of right knee  Overview:  -2023        See meds, orders, follow up, routing and instructions sections of encounter and AVS. Discussed with patient and provided on AVS.    Discussed diet and exercise as therapeutic modalities for metabolic and other conditions. Provided patient information, which are included as links on the AVS for detailed information.    Lab Results   Component Value Date     10/31/2023    K 4.8 10/31/2023     10/31/2023    BUN 19 10/31/2023    CREATININE 0.9 10/31/2023     (H) 10/31/2023    HGBA1C 5.6 05/16/2022    MG 2.4 08/29/2018    AST 20 10/31/2023    ALT 23 10/31/2023    ALBUMIN 4.2 10/31/2023    PROT 7.0 10/31/2023    BILITOT 0.6 10/31/2023    CHOL 161  01/12/2023    HDL 55 01/12/2023    LDLCALC 86.4 01/12/2023    TRIG 98 01/12/2023    WBC 4.31 10/31/2023    HGB 15.4 10/31/2023    HCT 46.1 10/31/2023     10/31/2023    PSA 3.0 10/18/2018    PSADIAG 1.2 07/18/2023    TSH 2.717 10/31/2023    URICACID 4.7 08/15/2013         Today's appointment was >45 minutes including chart review (such as review of clinic notes, consult notes, op notes, ER notes, discharge summaries, labs, imaging, path, cultures, cardiovascular etc.), medication reconciliation and adjustment, and (in some cases) >50% time spent in counseling.

## 2023-11-18 NOTE — PATIENT INSTRUCTIONS
Information about cholesterol, high blood pressure and healthy diet and activity recommendations can be found at the following links on the Internet:    Dietary Guide - Comprehensive  https://health.gov/dietaryguidelines/2015/guidelines/    Physical Activity and Exercise  https://health.gov/paguidelines/second-edition/pdf/Physical_Activity_Guidelines_2nd_edition.pdf    Choosing Wisely  https://www.choosingwisely.org/patient-resources/

## 2023-11-20 ENCOUNTER — LAB VISIT (OUTPATIENT)
Dept: LAB | Facility: HOSPITAL | Age: 73
End: 2023-11-20
Attending: FAMILY MEDICINE
Payer: MEDICARE

## 2023-11-20 DIAGNOSIS — K58.9 IRRITABLE BOWEL SYNDROME, UNSPECIFIED TYPE: ICD-10-CM

## 2023-11-20 LAB
C DIFF GDH STL QL: NEGATIVE
C DIFF TOX A+B STL QL IA: NEGATIVE

## 2023-11-20 PROCEDURE — 87338 HPYLORI STOOL AG IA: CPT | Performed by: FAMILY MEDICINE

## 2023-11-20 PROCEDURE — 89055 LEUKOCYTE ASSESSMENT FECAL: CPT | Performed by: FAMILY MEDICINE

## 2023-11-20 PROCEDURE — 87329 GIARDIA AG IA: CPT | Performed by: FAMILY MEDICINE

## 2023-11-20 PROCEDURE — 87046 STOOL CULTR AEROBIC BACT EA: CPT | Performed by: FAMILY MEDICINE

## 2023-11-20 PROCEDURE — 87209 SMEAR COMPLEX STAIN: CPT | Performed by: FAMILY MEDICINE

## 2023-11-20 PROCEDURE — 87449 NOS EACH ORGANISM AG IA: CPT | Mod: 91 | Performed by: FAMILY MEDICINE

## 2023-11-20 PROCEDURE — 87427 SHIGA-LIKE TOXIN AG IA: CPT | Performed by: FAMILY MEDICINE

## 2023-11-20 PROCEDURE — 82653 EL-1 FECAL QUANTITATIVE: CPT | Performed by: FAMILY MEDICINE

## 2023-11-20 PROCEDURE — 87449 NOS EACH ORGANISM AG IA: CPT | Performed by: FAMILY MEDICINE

## 2023-11-20 PROCEDURE — 87425 ROTAVIRUS AG IA: CPT | Performed by: FAMILY MEDICINE

## 2023-11-20 PROCEDURE — 87045 FECES CULTURE AEROBIC BACT: CPT | Performed by: FAMILY MEDICINE

## 2023-11-21 ENCOUNTER — HOSPITAL ENCOUNTER (OUTPATIENT)
Dept: RADIOLOGY | Facility: HOSPITAL | Age: 73
Discharge: HOME | End: 2023-11-21
Attending: COLON & RECTAL SURGERY
Payer: MEDICARE

## 2023-11-21 DIAGNOSIS — K59.04 CHRONIC IDIOPATHIC CONSTIPATION: ICD-10-CM

## 2023-11-21 LAB
CRYPTOSP AG STL QL IA: NEGATIVE
E COLI SXT1 STL QL IA: NEGATIVE
E COLI SXT2 STL QL IA: NEGATIVE
G LAMBLIA AG STL QL IA: NEGATIVE
O+P STL MICRO: NORMAL
RV AG STL QL IA.RAPID: NEGATIVE
WBC #/AREA STL HPF: NORMAL /[HPF]

## 2023-11-21 PROCEDURE — A9698 NON-RAD CONTRAST MATERIALNOC: HCPCS | Performed by: COLON & RECTAL SURGERY

## 2023-11-21 PROCEDURE — 25500020 PHARM REV CODE 255: Performed by: COLON & RECTAL SURGERY

## 2023-11-21 PROCEDURE — 74270 X-RAY XM COLON 1CNTRST STD: CPT | Mod: TC

## 2023-11-21 PROCEDURE — 74270 X-RAY XM COLON 1CNTRST STD: CPT | Mod: 26,,, | Performed by: RADIOLOGY

## 2023-11-21 PROCEDURE — 74270 FL DEFECOGRAM: ICD-10-PCS | Mod: 26,,, | Performed by: RADIOLOGY

## 2023-11-21 RX ADMIN — BARIUM SULFATE 60 ML: 0.81 POWDER, FOR SUSPENSION ORAL at 10:11

## 2023-11-21 RX ADMIN — BARIUM SULFATE 454 G: 0.6 CREAM ORAL at 10:11

## 2023-11-22 LAB — BACTERIA STL CULT: NORMAL

## 2023-11-24 ENCOUNTER — OFFICE VISIT (OUTPATIENT)
Dept: URGENT CARE | Facility: CLINIC | Age: 73
End: 2023-11-24
Payer: MEDICARE

## 2023-11-24 VITALS
HEIGHT: 71 IN | DIASTOLIC BLOOD PRESSURE: 72 MMHG | WEIGHT: 180 LBS | SYSTOLIC BLOOD PRESSURE: 141 MMHG | TEMPERATURE: 97 F | BODY MASS INDEX: 25.2 KG/M2 | OXYGEN SATURATION: 99 % | RESPIRATION RATE: 16 BRPM | HEART RATE: 68 BPM

## 2023-11-24 DIAGNOSIS — M54.9 BACK PAIN, UNSPECIFIED BACK LOCATION, UNSPECIFIED BACK PAIN LATERALITY, UNSPECIFIED CHRONICITY: ICD-10-CM

## 2023-11-24 DIAGNOSIS — R07.9 CHEST PAIN, UNSPECIFIED TYPE: Primary | ICD-10-CM

## 2023-11-24 DIAGNOSIS — Z82.49 FAMILY HISTORY OF CORONARY ARTERY DISEASE: ICD-10-CM

## 2023-11-24 LAB — ELASTASE 1, FECAL: 265 MCG/G

## 2023-11-24 PROCEDURE — 93005 ELECTROCARDIOGRAM TRACING: CPT | Mod: S$GLB,,, | Performed by: PHYSICIAN ASSISTANT

## 2023-11-24 PROCEDURE — 93005 EKG 12-LEAD: ICD-10-PCS | Mod: S$GLB,,, | Performed by: PHYSICIAN ASSISTANT

## 2023-11-24 PROCEDURE — 99214 OFFICE O/P EST MOD 30 MIN: CPT | Mod: 25,S$GLB,, | Performed by: PHYSICIAN ASSISTANT

## 2023-11-24 PROCEDURE — 96372 THER/PROPH/DIAG INJ SC/IM: CPT | Mod: S$GLB,,, | Performed by: FAMILY MEDICINE

## 2023-11-24 PROCEDURE — 93010 EKG 12-LEAD: ICD-10-PCS | Mod: S$GLB,,, | Performed by: INTERNAL MEDICINE

## 2023-11-24 PROCEDURE — 96372 PR INJECTION,THERAP/PROPH/DIAG2ST, IM OR SUBCUT: ICD-10-PCS | Mod: S$GLB,,, | Performed by: FAMILY MEDICINE

## 2023-11-24 PROCEDURE — 99214 PR OFFICE/OUTPT VISIT, EST, LEVL IV, 30-39 MIN: ICD-10-PCS | Mod: 25,S$GLB,, | Performed by: PHYSICIAN ASSISTANT

## 2023-11-24 PROCEDURE — 93010 ELECTROCARDIOGRAM REPORT: CPT | Mod: S$GLB,,, | Performed by: INTERNAL MEDICINE

## 2023-11-24 RX ORDER — CYCLOBENZAPRINE HCL 5 MG
5 TABLET ORAL 3 TIMES DAILY PRN
Qty: 21 TABLET | Refills: 0 | Status: SHIPPED | OUTPATIENT
Start: 2023-11-24 | End: 2023-12-04

## 2023-11-24 RX ORDER — NAPROXEN 375 MG/1
375 TABLET ORAL 2 TIMES DAILY WITH MEALS
Qty: 14 TABLET | Refills: 0 | Status: SHIPPED | OUTPATIENT
Start: 2023-11-24 | End: 2023-12-01

## 2023-11-24 RX ORDER — KETOROLAC TROMETHAMINE 30 MG/ML
30 INJECTION, SOLUTION INTRAMUSCULAR; INTRAVENOUS
Status: COMPLETED | OUTPATIENT
Start: 2023-11-24 | End: 2023-11-24

## 2023-11-24 RX ADMIN — KETOROLAC TROMETHAMINE 30 MG: 30 INJECTION, SOLUTION INTRAMUSCULAR; INTRAVENOUS at 11:11

## 2023-11-24 NOTE — PROGRESS NOTES
"Subjective:      Patient ID: Jean Carlson is a 73 y.o. male.    Vitals:  height is 5' 11" (1.803 m) and weight is 81.6 kg (180 lb). His oral temperature is 97.2 °F (36.2 °C). His blood pressure is 141/72 (abnormal) and his pulse is 68. His respiration is 16 and oxygen saturation is 99%.     Chief Complaint: Chest Pain    Pt complains of left side chest pain and left shoulder pain that started arounf 10 am. He said the pain got worse when he took a deep breath. Pt has family hx of MI. He took 325mg of aspirin around 1015 and said the pain is not as back.  73-year-old otherwise healthy male doing well who comes in with constant chest pain left chest over pick the oralis major area going through to the back left scapula area.  Pain is constant in the chest but is reproducible with certain movements in the back area.  Cardiac pain for the most part does not come and go in his constant.  There are certain movements that relieve chest pain as well as worsening.  Chest pain is nonexertional.  He does not have any nausea vomiting or other GI symptoms.  He does have risk factors for family history both mother and father  of cardiac events in their 60s.  This patient does live a healthy lifestyle with good diet and exercise and nonsmoker.  He did have a CT calcium score done your to go that he states they scored him in the 20    Chest Pain   This is a new problem. The current episode started today. The onset quality is sudden. The problem has been gradually improving. The pain is present in the lateral region. The pain is at a severity of 5/10. The pain is moderate. The pain radiates to the left shoulder. Associated symptoms include back pain. Pertinent negatives include no palpitations or syncope. The pain is aggravated by deep breathing. He has tried NSAIDs for the symptoms. The treatment provided mild relief.   His family medical history is significant for early MI.       Cardiovascular:  Positive for chest pain. " Negative for chest trauma, leg swelling, palpitations, sob on exertion and passing out.   Musculoskeletal:  Positive for pain, back pain and muscle ache.   Skin:  Negative for erythema.      Objective:     Physical Exam   Constitutional: He is oriented to person, place, and time. He appears well-developed. He is cooperative.  Non-toxic appearance. He does not appear ill. No distress.   HENT:   Head: Normocephalic and atraumatic.   Ears:   Right Ear: Hearing, tympanic membrane, external ear and ear canal normal.   Left Ear: Hearing, tympanic membrane, external ear and ear canal normal.   Nose: Nose normal. No mucosal edema, rhinorrhea, nasal deformity or congestion. No epistaxis. Right sinus exhibits no maxillary sinus tenderness and no frontal sinus tenderness. Left sinus exhibits no maxillary sinus tenderness and no frontal sinus tenderness.   Mouth/Throat: Uvula is midline, oropharynx is clear and moist and mucous membranes are normal. No trismus in the jaw. Normal dentition. No uvula swelling. No oropharyngeal exudate, posterior oropharyngeal edema or posterior oropharyngeal erythema.   Eyes: Conjunctivae, EOM and lids are normal. Pupils are equal, round, and reactive to light. Right eye exhibits no discharge. Left eye exhibits no discharge. No scleral icterus.   Neck: Trachea normal and phonation normal. Neck supple. No JVD present. No tracheal deviation present. No thyromegaly present. No edema present. No erythema present. No neck rigidity present.   Cardiovascular: Normal rate, regular rhythm, normal heart sounds and normal pulses.   No murmur heard.Exam reveals no gallop and no friction rub.      Comments: No diagonal earlobe crease  No cardiac murmur or chest bruits heard   Pulmonary/Chest: Effort normal and breath sounds normal. No stridor. No respiratory distress. He has no decreased breath sounds. He has no wheezes. He has no rhonchi. He has no rales. He exhibits no tenderness.   Abdominal: Normal  appearance and bowel sounds are normal. He exhibits no distension and no mass. Soft. flat abdomen There is no abdominal tenderness. There is no rebound, no guarding, no left CVA tenderness and no right CVA tenderness.      Comments: No abdominal pulsation or bruit.   Musculoskeletal: Normal range of motion.         General: No deformity. Normal range of motion.   Neurological: He is alert and oriented to person, place, and time. He exhibits normal muscle tone. Coordination normal.   Skin: Skin is warm, dry, intact, not diaphoretic, not pale and no rash. Capillary refill takes less than 2 seconds. No erythema   Psychiatric: His speech is normal and behavior is normal. Judgment and thought content normal.   Nursing note and vitals reviewed.  EKG INTERPRETATION 11:15 A.M.  Normal sinus rhythm with a rate of 71.  His MI and QTC normal.  The inferior anterior septal and lateral leads do not show any indication for acute ischemia.  Chamber sizes are normal per EKG findings    IN A HISTORY AND PHYSICAL AND ASSESSMENT INITIALLY IS PROBABLE MUSCULOSKELETAL DUE TO THE CHEST PAIN BEING CONSTANT AS WELL AS CHEST PAIN IN LEFT UPPER BACK PAIN REPRODUCIBLE WITH CERTAIN MOVEMENTS BUT HE HAS HAD EPISODES WHERE THE PAIN WILL COME AND GO BUT IS VERY RARE.  MOSTLY DOWN THE PAIN IS CONSTANTLY THERE.  I DISCUSSED WITH HIM MY SUSPICIOUS-MUSCULOSKELETAL BUT THEN THERE IS A SMALL RISK OF THIS COULD POSSIBLY BEING CARDIAC OR THORACIC AORTA PATHOLOGY.  HIS WIFE IS CONCERNED THIS IS CARDIAC.  I DID DISCUSS THE POSSIBILITY OF THIS BEING THORACIC AORTA BUT THAT MY SUSPICION IS LOW BUT THAT THERE IS A VERY SLIM CHANCE THIS COULD BE THE AORTA.  HIS PAIN WAS SIGNIFICANTLY RELIEVED WITH TORADOL BUT HE IS STILL HAVING SOME PAIN BUT IT IS NOT PAIN WITH REST OR SITTING STILL IT IS PAIN WITH CERTAIN MOVEMENTS RAISES ARMS AND TWISTING.  AGAIN MY SUSPICION IS HIGH FOR MUSCULOSKELETAL BUT THERE WAS A LOW POSSIBILITY THIS COULD BE CARDIAC/AORTA ORIGIN OF  PAIN      Assessment:     1. Chest pain, unspecified type    2. Back pain, unspecified back location, unspecified back pain laterality, unspecified chronicity        Plan:       Chest pain, unspecified type  -     EKG 12-lead  -     ketorolac injection 30 mg  -     cyclobenzaprine (FLEXERIL) 5 MG tablet; Take 1 tablet (5 mg total) by mouth 3 (three) times daily as needed for Muscle spasms.  Dispense: 21 tablet; Refill: 0  -     naproxen (EC-NAPROSYN) 375 MG TbEC EC tablet; Take 1 tablet (375 mg total) by mouth 2 (two) times daily with meals. for 7 days  Dispense: 14 tablet; Refill: 0    Back pain, unspecified back location, unspecified back pain laterality, unspecified chronicity  -     ketorolac injection 30 mg  -     cyclobenzaprine (FLEXERIL) 5 MG tablet; Take 1 tablet (5 mg total) by mouth 3 (three) times daily as needed for Muscle spasms.  Dispense: 21 tablet; Refill: 0  -     naproxen (EC-NAPROSYN) 375 MG TbEC EC tablet; Take 1 tablet (375 mg total) by mouth 2 (two) times daily with meals. for 7 days  Dispense: 14 tablet; Refill: 0      Follow up if symptoms worsen or fail to improve, for F/U with PCP or ED.   Patient Instructions   AS WE DISCUSSED, YOU MAY WANT TO GO TO THE EMERGENCY DEPARTMENT FOR FURTHER EVALUATION IF THE CONCERN IS FOR CARDIAC OR AORTIC IN ORIGIN.

## 2023-11-24 NOTE — PATIENT INSTRUCTIONS
AS WE DISCUSSED, YOU MAY WANT TO GO TO THE EMERGENCY DEPARTMENT FOR FURTHER EVALUATION IF THE CONCERN IS FOR CARDIAC OR AORTIC IN ORIGIN.

## 2023-11-25 RX ORDER — ICOSAPENT ETHYL 1000 MG/1
1 CAPSULE ORAL 2 TIMES DAILY
Qty: 180 CAPSULE | Refills: 3 | OUTPATIENT
Start: 2023-11-25

## 2023-11-25 NOTE — TELEPHONE ENCOUNTER
Refill Routing Note   Medication(s) are not appropriate for processing by [facility] for the following reason(s):        No active prescription written by provider    [facility] action(s):  Defer               Appointments  past 12m or future 3m with PCP    Date Provider   Last Visit   11/18/2023 Papi Gallardo MD   Next Visit   4/30/2024 Papi Gallardo MD   ED visits in past 90 days: 0        Note composed:7:00 PM 11/24/2023

## 2023-11-27 ENCOUNTER — TELEPHONE (OUTPATIENT)
Dept: GASTROENTEROLOGY | Facility: CLINIC | Age: 73
End: 2023-11-27
Payer: MEDICARE

## 2023-11-27 NOTE — TELEPHONE ENCOUNTER
----- Message from Marie Mohamud MA sent at 11/22/2023  4:16 PM CST -----  THANK YOU A MILLION TIMES!!!!!!!!! HAPPY THANKSGIVING!  ----- Message -----  From: Tea Shaffer MA  Sent: 11/22/2023   4:10 PM CST  To: Marie Mohamud MA    Hi, Spoke with patient.  Scheduled to see Dr.James Gallardo on 1/8/2024 at 9:30.   [place]  ----- Message -----  From: Marie Mohamud MA  Sent: 11/22/2023   3:41 PM CST  To: Sami NORRIS Staff    Good afternoon,    Pt has requested to see Dr Gallardo stated that they have had a discussion regarding some of his Gastro issues and would like to schedule in 4 weeks if possible.      .[phone]

## 2023-11-28 LAB — H PYLORI AG STL QL IA: NOT DETECTED

## 2023-11-29 NOTE — TELEPHONE ENCOUNTER
Provider Staff:  Please note Refusal of medication.     Action required for this patient.      Requested Prescriptions     Refused Prescriptions Disp Refills    icosapent ethyL (VASCEPA) 1 gram Cap 180 capsule 3     Sig: Take 1 capsule (1,000 mg total) by mouth 2 (two) times a day.     Refused By: RAMBO MARTINEZ     Reason for Refusal: Prescriber not at this practice      Thanks!  [facility]   Note composed: 11/29/2023 5:18 AM

## 2023-12-05 ENCOUNTER — TELEPHONE (OUTPATIENT)
Dept: INTERNAL MEDICINE | Facility: CLINIC | Age: 73
End: 2023-12-05
Payer: MEDICARE

## 2023-12-05 NOTE — TELEPHONE ENCOUNTER
----- Message from Kathy Ernandez sent at 12/5/2023 10:09 AM CST -----  Contact: Pt [phone]  The patient prefers to see you today or tomorrow for back pain, please call and advise,    Thank you

## 2023-12-05 NOTE — TELEPHONE ENCOUNTER
We are already over book today, Please call patient and schedule patient for an appointment with Nurse Practitioner or Physician Assistant ([facility]), soon (first available). Thank you.

## 2023-12-05 NOTE — TELEPHONE ENCOUNTER
Returned pt call. States he woke this morning with severe low back pain, states he was having trouble walking. He has improved now that he has walked around some but requesting to see PCP today and tomorrow. Pt states he only wants to see PCP because he know his hx and he has had a lot of back sx. Pt denies changes with bowel and bladder.

## 2023-12-06 NOTE — TELEPHONE ENCOUNTER
Returned patient call patient stated that he needed a sooner appointment. Help scheduled patient with sooner appointment.

## 2023-12-06 NOTE — TELEPHONE ENCOUNTER
----- Message from Ruby Tejada sent at 12/6/2023  1:51 PM CST -----  Contact: [phone]  Patient called, following up on yesterday message about if is possible to be seen by pcp. Thank you

## 2023-12-06 NOTE — PROGRESS NOTES
[facility] Office Visit Followup    Referring Md:   No referring provider defined for this encounter.    SUBJECTIVE:     Chief Complaint: constipation and hemorrhoids    History of Present Illness:  Course is as follows:  Patient is a 73 y.o. male presents with constipation and hemorrhoids.     8/2019: seen for thrombosed external hemorrhoid    10/31/23: chronic constipation on Linzess 290mcg per day. Hx of ongoing constipation for last 6 years, worse for the last 6 months. He temporally associates this with a series of surgeries over the 6 year time course. He has had laparoscopic inguinal hernia repairs and umbilical hernia repair, TURP, back surgery and knee surgery. States constipation has worsened after each surgery. He does not take any opioid after surgery due to fear of worsening his issues. His constipation has substantially worsened in the last 6 months after TURP and initiation of flomax. He is now at the point he strains for up to 20min with each bowel movement, requiring multiple maneuvers to have a BM. He brings his knees to his chest, massages his abdomen with each BM. Has 1 BM daily. Bms are associated with pain and occasional bleeding which he states is 2/2 hemorrhiods. He notes prolapsing of hemorrhoids. He takes Linzess as stated before, metamucil and miralax daily. He feels the metamucil and miralax help some but overall still very miserable.    - recommended increasing MiraLax to twice per day to assist with his underlying constipation.  Defecography performed and showed normal evacuation of contrast.  Rubber-band ligation of grade 2 internal hemorrhoids along with steroid suppositories.    12/7/23: improved from prior exam.  He has stopped taking calcium as well as Protonix.  He is having soft bowel movements.  3 bowel movements over 15 minutes.    Last Colonoscopy: 6/5/2019: normal  No fhx of colon cancer      Review of Systems:  Review of Systems   Constitutional:  Negative for chills and fever.    HENT:  Negative for sinus pain and sore throat.    Eyes:  Negative for pain.   Respiratory:  Negative for cough and shortness of breath.    Cardiovascular:  Negative for chest pain.   Gastrointestinal:  Positive for blood in stool and constipation. Negative for heartburn, nausea and vomiting.   Genitourinary:  Negative for dysuria and hematuria.   Musculoskeletal:  Negative for myalgias.   Skin:  Negative for rash.   Neurological:  Negative for dizziness and headaches.       OBJECTIVE:     Vital Signs (Most Recent)  /70   Wt 82.6 kg (182 lb 1.6 oz)   BMI 25.40 kg/m²     Physical Exam:  General: White male in no distress   Neuro: alert and oriented x 4.  Moves all extremities.     HEENT: no icterus.  Trachea midline  Respiratory: respirations are even and unlabored  Cardiac: regular rate  Abdomen: soft, nondistened, nontender, well healed incisions  Extremities: Warm dry and intact  Skin: no rashes  Anorectal:  Small right lateral external hemorrhoid.  Normal tone.  Soft internal hemorrhoids palpated on digital exam.  Detailed anoscopy with larger grade 1 left lateral and right posterior internal hemorrhoids.    Labs: TSH normal.  No anemia.  Normal renal function.  Normal albumin    Imaging: no cross sectional abdominal imaging.       ASSESSMENT/PLAN:     There are no diagnoses linked to this encounter.      73 y.o. male with presents with worsening constipation.  Blood work normal.  Thyroid function normal. Defography with adequate ability to expel.  He has daily bowel movements but requires substantial straining.  Significant improvement after banding and decreased straining.  Will repeat banding today.  Return to clinic in 5-6 weeks for evaluation for repeat banding.  Discussed that as long as he improves with banding, serial banding may be necessary in order to relieve hemorrhoids.      Rubber Band Ligation:  Verbal consent was obtained.   Clear plastic anoscope inserted.    Grade I hemorrhoids seen on  the left lateral and right posterior position  Suction applicator was placed above the dentate line.   Rubber bands were deployed in the left lateral and right posterior position.    0.25% marcaine was injected above the rubber band into the banded hemorrhoidal tissue.   Patient tolerated the procedure well.       REBECA Baldwin MD, FACS, FASCRS  Staff Surgeon  Colon & Rectal Surgery

## 2023-12-07 ENCOUNTER — OFFICE VISIT (OUTPATIENT)
Dept: SURGERY | Facility: CLINIC | Age: 73
End: 2023-12-07
Payer: MEDICARE

## 2023-12-07 VITALS — WEIGHT: 182.13 LBS | SYSTOLIC BLOOD PRESSURE: 119 MMHG | DIASTOLIC BLOOD PRESSURE: 70 MMHG | BODY MASS INDEX: 25.4 KG/M2

## 2023-12-07 DIAGNOSIS — K64.0 GRADE I HEMORRHOIDS: Primary | ICD-10-CM

## 2023-12-07 PROCEDURE — 46221 PR HEMORRHOIDECTOMY INTERNAL RUBBER BAND LIGATIONS: ICD-10-PCS | Mod: S$GLB,,, | Performed by: COLON & RECTAL SURGERY

## 2023-12-07 PROCEDURE — 3008F BODY MASS INDEX DOCD: CPT | Mod: CPTII,S$GLB,, | Performed by: COLON & RECTAL SURGERY

## 2023-12-07 PROCEDURE — 99999 PR PBB SHADOW E&M-EST. PATIENT-LVL III: ICD-10-PCS | Mod: PBBFAC,,, | Performed by: COLON & RECTAL SURGERY

## 2023-12-07 PROCEDURE — 3008F PR BODY MASS INDEX (BMI) DOCUMENTED: ICD-10-PCS | Mod: CPTII,S$GLB,, | Performed by: COLON & RECTAL SURGERY

## 2023-12-07 PROCEDURE — 99213 OFFICE O/P EST LOW 20 MIN: CPT | Mod: 25,S$GLB,, | Performed by: COLON & RECTAL SURGERY

## 2023-12-07 PROCEDURE — 3078F PR MOST RECENT DIASTOLIC BLOOD PRESSURE < 80 MM HG: ICD-10-PCS | Mod: CPTII,S$GLB,, | Performed by: COLON & RECTAL SURGERY

## 2023-12-07 PROCEDURE — 46221 LIGATION OF HEMORRHOID(S): CPT | Mod: S$GLB,,, | Performed by: COLON & RECTAL SURGERY

## 2023-12-07 PROCEDURE — 99213 PR OFFICE/OUTPT VISIT, EST, LEVL III, 20-29 MIN: ICD-10-PCS | Mod: 25,S$GLB,, | Performed by: COLON & RECTAL SURGERY

## 2023-12-07 PROCEDURE — 99999 PR PBB SHADOW E&M-EST. PATIENT-LVL III: CPT | Mod: PBBFAC,,, | Performed by: COLON & RECTAL SURGERY

## 2023-12-07 PROCEDURE — 1159F PR MEDICATION LIST DOCUMENTED IN MEDICAL RECORD: ICD-10-PCS | Mod: CPTII,S$GLB,, | Performed by: COLON & RECTAL SURGERY

## 2023-12-07 PROCEDURE — 1159F MED LIST DOCD IN RCRD: CPT | Mod: CPTII,S$GLB,, | Performed by: COLON & RECTAL SURGERY

## 2023-12-07 PROCEDURE — 3078F DIAST BP <80 MM HG: CPT | Mod: CPTII,S$GLB,, | Performed by: COLON & RECTAL SURGERY

## 2023-12-07 PROCEDURE — 1160F RVW MEDS BY RX/DR IN RCRD: CPT | Mod: CPTII,S$GLB,, | Performed by: COLON & RECTAL SURGERY

## 2023-12-07 PROCEDURE — 1160F PR REVIEW ALL MEDS BY PRESCRIBER/CLIN PHARMACIST DOCUMENTED: ICD-10-PCS | Mod: CPTII,S$GLB,, | Performed by: COLON & RECTAL SURGERY

## 2023-12-07 PROCEDURE — 3074F PR MOST RECENT SYSTOLIC BLOOD PRESSURE < 130 MM HG: ICD-10-PCS | Mod: CPTII,S$GLB,, | Performed by: COLON & RECTAL SURGERY

## 2023-12-07 PROCEDURE — 3074F SYST BP LT 130 MM HG: CPT | Mod: CPTII,S$GLB,, | Performed by: COLON & RECTAL SURGERY

## 2023-12-08 ENCOUNTER — PATIENT MESSAGE (OUTPATIENT)
Dept: SURGERY | Facility: CLINIC | Age: 73
End: 2023-12-08
Payer: MEDICARE

## 2023-12-08 RX ORDER — HYDROCORTISONE 25 MG/G
CREAM TOPICAL 2 TIMES DAILY
Qty: 28 G | Refills: 1 | Status: SHIPPED | OUTPATIENT
Start: 2023-12-08

## 2023-12-20 ENCOUNTER — PATIENT MESSAGE (OUTPATIENT)
Dept: INTERNAL MEDICINE | Facility: CLINIC | Age: 73
End: 2023-12-20
Payer: MEDICARE

## 2023-12-20 DIAGNOSIS — M54.30 SCIATICA, UNSPECIFIED LATERALITY: Primary | ICD-10-CM

## 2023-12-21 ENCOUNTER — TELEPHONE (OUTPATIENT)
Dept: NEUROSURGERY | Facility: CLINIC | Age: 73
End: 2023-12-21
Payer: MEDICARE

## 2023-12-21 ENCOUNTER — OFFICE VISIT (OUTPATIENT)
Dept: NEUROSURGERY | Facility: CLINIC | Age: 73
End: 2023-12-21
Payer: MEDICARE

## 2023-12-21 ENCOUNTER — HOSPITAL ENCOUNTER (OUTPATIENT)
Dept: RADIOLOGY | Facility: HOSPITAL | Age: 73
Discharge: HOME | End: 2023-12-21
Attending: PHYSICIAN ASSISTANT
Payer: MEDICARE

## 2023-12-21 VITALS
DIASTOLIC BLOOD PRESSURE: 72 MMHG | SYSTOLIC BLOOD PRESSURE: 126 MMHG | WEIGHT: 182.13 LBS | BODY MASS INDEX: 25.5 KG/M2 | HEART RATE: 81 BPM | HEIGHT: 71 IN

## 2023-12-21 DIAGNOSIS — M41.50 DEGENERATIVE SCOLIOSIS: Primary | ICD-10-CM

## 2023-12-21 DIAGNOSIS — M54.16 LUMBAR RADICULOPATHY, CHRONIC: Primary | ICD-10-CM

## 2023-12-21 DIAGNOSIS — M41.50 DEGENERATIVE SCOLIOSIS: ICD-10-CM

## 2023-12-21 DIAGNOSIS — M54.6 PAIN IN THORACIC SPINE: ICD-10-CM

## 2023-12-21 PROCEDURE — 1160F PR REVIEW ALL MEDS BY PRESCRIBER/CLIN PHARMACIST DOCUMENTED: ICD-10-PCS | Mod: CPTII,S$GLB,, | Performed by: PHYSICIAN ASSISTANT

## 2023-12-21 PROCEDURE — 1125F PR PAIN SEVERITY QUANTIFIED, PAIN PRESENT: ICD-10-PCS | Mod: CPTII,S$GLB,, | Performed by: PHYSICIAN ASSISTANT

## 2023-12-21 PROCEDURE — 3074F PR MOST RECENT SYSTOLIC BLOOD PRESSURE < 130 MM HG: ICD-10-PCS | Mod: CPTII,S$GLB,, | Performed by: PHYSICIAN ASSISTANT

## 2023-12-21 PROCEDURE — 99999 PR PBB SHADOW E&M-EST. PATIENT-LVL IV: ICD-10-PCS | Mod: PBBFAC,,, | Performed by: PHYSICIAN ASSISTANT

## 2023-12-21 PROCEDURE — 3074F SYST BP LT 130 MM HG: CPT | Mod: CPTII,S$GLB,, | Performed by: PHYSICIAN ASSISTANT

## 2023-12-21 PROCEDURE — 99214 PR OFFICE/OUTPT VISIT, EST, LEVL IV, 30-39 MIN: ICD-10-PCS | Mod: S$GLB,,, | Performed by: PHYSICIAN ASSISTANT

## 2023-12-21 PROCEDURE — 72110 X-RAY EXAM L-2 SPINE 4/>VWS: CPT | Mod: 26,,, | Performed by: RADIOLOGY

## 2023-12-21 PROCEDURE — 3008F PR BODY MASS INDEX (BMI) DOCUMENTED: ICD-10-PCS | Mod: CPTII,S$GLB,, | Performed by: PHYSICIAN ASSISTANT

## 2023-12-21 PROCEDURE — 72110 X-RAY EXAM L-2 SPINE 4/>VWS: CPT | Mod: TC,FY

## 2023-12-21 PROCEDURE — 3008F BODY MASS INDEX DOCD: CPT | Mod: CPTII,S$GLB,, | Performed by: PHYSICIAN ASSISTANT

## 2023-12-21 PROCEDURE — 1101F PR PT FALLS ASSESS DOC 0-1 FALLS W/OUT INJ PAST YR: ICD-10-PCS | Mod: CPTII,S$GLB,, | Performed by: PHYSICIAN ASSISTANT

## 2023-12-21 PROCEDURE — 1159F PR MEDICATION LIST DOCUMENTED IN MEDICAL RECORD: ICD-10-PCS | Mod: CPTII,S$GLB,, | Performed by: PHYSICIAN ASSISTANT

## 2023-12-21 PROCEDURE — 3078F DIAST BP <80 MM HG: CPT | Mod: CPTII,S$GLB,, | Performed by: PHYSICIAN ASSISTANT

## 2023-12-21 PROCEDURE — 3078F PR MOST RECENT DIASTOLIC BLOOD PRESSURE < 80 MM HG: ICD-10-PCS | Mod: CPTII,S$GLB,, | Performed by: PHYSICIAN ASSISTANT

## 2023-12-21 PROCEDURE — 99999 PR PBB SHADOW E&M-EST. PATIENT-LVL IV: CPT | Mod: PBBFAC,,, | Performed by: PHYSICIAN ASSISTANT

## 2023-12-21 PROCEDURE — 3288F PR FALLS RISK ASSESSMENT DOCUMENTED: ICD-10-PCS | Mod: CPTII,S$GLB,, | Performed by: PHYSICIAN ASSISTANT

## 2023-12-21 PROCEDURE — 1101F PT FALLS ASSESS-DOCD LE1/YR: CPT | Mod: CPTII,S$GLB,, | Performed by: PHYSICIAN ASSISTANT

## 2023-12-21 PROCEDURE — 72110 XR LUMBAR SPINE AP AND LAT WITH FLEX/EXT: ICD-10-PCS | Mod: 26,,, | Performed by: RADIOLOGY

## 2023-12-21 PROCEDURE — 99214 OFFICE O/P EST MOD 30 MIN: CPT | Mod: S$GLB,,, | Performed by: PHYSICIAN ASSISTANT

## 2023-12-21 PROCEDURE — 1160F RVW MEDS BY RX/DR IN RCRD: CPT | Mod: CPTII,S$GLB,, | Performed by: PHYSICIAN ASSISTANT

## 2023-12-21 PROCEDURE — 1125F AMNT PAIN NOTED PAIN PRSNT: CPT | Mod: CPTII,S$GLB,, | Performed by: PHYSICIAN ASSISTANT

## 2023-12-21 PROCEDURE — 3288F FALL RISK ASSESSMENT DOCD: CPT | Mod: CPTII,S$GLB,, | Performed by: PHYSICIAN ASSISTANT

## 2023-12-21 PROCEDURE — 1159F MED LIST DOCD IN RCRD: CPT | Mod: CPTII,S$GLB,, | Performed by: PHYSICIAN ASSISTANT

## 2023-12-21 RX ORDER — PREGABALIN 75 MG/1
75 CAPSULE ORAL 2 TIMES DAILY
Qty: 60 CAPSULE | Refills: 2 | Status: SHIPPED | OUTPATIENT
Start: 2023-12-21 | End: 2024-02-19

## 2023-12-21 RX ORDER — METHYLPREDNISOLONE 4 MG/1
TABLET ORAL
Qty: 21 TABLET | Refills: 0 | Status: SHIPPED | OUTPATIENT
Start: 2023-12-21 | End: 2024-01-04

## 2023-12-23 NOTE — PROGRESS NOTES
"  Subjective:     Patient ID:  Jean Carlson is a 73 y.o. male.    Vick    Chief Complaint: Midback pain,  Low back and right leg pain and paresthesias    HPI    Jean Carlson is a 73 y.o. male who presents for follow up.  Patient had midback pain that is chronic.  No radiation.  About 4 weeks ago hew as cleaning his garage and lifted something and pushed something and felt a pop.  He has pain across the low back that is getting worse with radiation to the right shigh and into the right calf and numbness  No left leg pain.    He denies any falls or trauma.  Take tylenol prn.  On prolia.    Patient denies any recent accidents or trauma, no saddle anesthesias, and no bowel or bladder incontinence.      Review of Systems:  Constitution: Negative for chills, fever, night sweats and weight loss.   Musculoskeletal: Negative for falls.   Gastrointestinal: Negative for bowel incontinence, nausea and vomiting.   Genitourinary: Negative for bladder incontinence.   Neurological: Negative for disturbances in coordination and loss of balance.      Objective:      Vitals:    12/21/23 1541   BP: 126/72   Pulse: 81   Weight: 82.6 kg (182 lb 1.6 oz)   Height: 5' 11" (1.803 m)   PainSc:   5   PainLoc: Back         Physical Exam:      General:  Jean Carlson is well-developed, well-nourished, appears stated age, in no acute distress, alert and oriented to person, place, and time.    Pulmonary/Chest:  Respiratory effort normal  Abdominal: Exhibits no distension  Psychiatric:  Normal mood and affect.  Behavior is normal.  Judgement and thought content normal      Musculoskeletal:      Lumbar ROM:   No pain in lumbar flexion.  Pain in extension, left lateral bending, and right lateral bending.        Lumbar Spine Palpation:  No tenderness to low back palpation or midback palpation.    SI Joint Palpation:  No tenderness to SI Joint palpation.    Straight Leg Raise:  Negative right and left SLR.      Neurological:  Alert and " oriented to person, place, and time    Muscle strength against resistance:     Right Left   Hip flexion  4 / 5 4 / 5   Hip extension 4 / 5 4 / 5   Hip abduction 5 / 5 5 / 5   Hip adduction  5 / 5 5 / 5   Knee extension  4 / 5 4 / 5   Knee flexion 4 / 5 4 / 5   Dorsiflexion  5 / 5 5 / 5   EHL  5 / 5 5 / 5   Plantar flexion  5 / 5 5 / 5   Inversion of the feet 5 / 5 5 / 5   Eversion of the feet  5 / 5 5 / 5       Reflexes:     Right Left   Patellar 2+ 2+   Achilles 2+ 2+     Clonus:  Negative bilaterally    On gross examination of the bilateral upper extremities, patient has full painfree ROM with no signs of clubbing, cyanosis, edema, or weakness.       XRAY Results:     Narrative & Impression  EXAMINATION:  XR LUMBAR SPINE AP AND LAT WITH FLEX/EXT     CLINICAL HISTORY:  Other secondary scoliosis, site unspecified     TECHNIQUE:  AP, lateral and spot images were performed of the lumbar spine.  Additional flexion and extension views obtained.     COMPARISON:  05/17/2022     FINDINGS:  Unchanged superior endplate height loss at L2.  No evidence of a new displaced fracture.     Mild rightward convex curvature of the thoracolumbar spine straightening of the normal lumbar lordosis with mild retrolisthesis at L2-3.  No abnormal translation with flexion and extension.     Loss of disc height and degenerative endplate changes at several levels lumbar spine, worst at L2-3 and L4-5.  Bilateral facet hypertrophy.     Impression:     Scoliosis and degenerative change as above, without evidence of translational instability.        Electronically signed by: Bruce Paulino MD  Date:                                            12/21/2023  Time:                                           15:43  Assessment:          1. Lumbar radiculopathy, chronic    2. Pain in thoracic spine            Plan:          Orders Placed This Encounter    MRI Lumbar Spine Without Contrast    MRI Thoracic Spine Without Contrast    methylPREDNISolone (MEDROL  DOSEPACK) 4 mg tablet    pregabalin (LYRICA) 75 MG capsule       Will get MRI thoracic and lumbar spine for further evaluation of worsening mid to low back pain and bl leg weakness  Start Lyrica BID  Medrol pack with food  FU in clinic after MRIs    Follow-Up:  Follow up in about 2 weeks (around 1/4/2024). If there are any questions prior to this, the patient was instructed to contact the office.       DENY Singh, PA-C  Neurosurgery  [facility]  12/22/2023

## 2024-01-02 ENCOUNTER — INFUSION (OUTPATIENT)
Dept: INFECTIOUS DISEASES | Facility: HOSPITAL | Age: 74
End: 2024-01-02
Payer: MEDICARE

## 2024-01-02 VITALS
RESPIRATION RATE: 20 BRPM | DIASTOLIC BLOOD PRESSURE: 63 MMHG | OXYGEN SATURATION: 99 % | BODY MASS INDEX: 25.79 KG/M2 | SYSTOLIC BLOOD PRESSURE: 142 MMHG | TEMPERATURE: 98 F | HEART RATE: 73 BPM | WEIGHT: 184.19 LBS | HEIGHT: 71 IN

## 2024-01-02 DIAGNOSIS — K21.9 GASTROESOPHAGEAL REFLUX DISEASE WITHOUT ESOPHAGITIS: ICD-10-CM

## 2024-01-02 DIAGNOSIS — M81.0 OSTEOPOROSIS, UNSPECIFIED OSTEOPOROSIS TYPE, UNSPECIFIED PATHOLOGICAL FRACTURE PRESENCE: Primary | ICD-10-CM

## 2024-01-02 PROCEDURE — 96372 THER/PROPH/DIAG INJ SC/IM: CPT

## 2024-01-02 PROCEDURE — 63600175 PHARM REV CODE 636 W HCPCS: Mod: JZ,JG | Performed by: INTERNAL MEDICINE

## 2024-01-02 RX ADMIN — DENOSUMAB 60 MG: 60 INJECTION SUBCUTANEOUS at 09:01

## 2024-01-02 NOTE — PROGRESS NOTES
Patient arrives for Prolia injection - patient stated he is not taking any Vit D and Calcium supplements at this time, due to severe constipation, he stated his doctor are aware. Patient denies dental procedures over past 3 months - administered per guidelines.    Return appointment provided to patient.    Limited head-to-toe assessment due to privacy issues and visit reason though the opportunity was given for patient to express any concerns.

## 2024-01-02 NOTE — PLAN OF CARE
"Chief Complaint   Patient presents with    Ankle Pain     Swelling x2d Rt side         Subjective:   HISTORY OF PRESENT ILLNESS: Alberto Maldonado is a 37 y.o. male who presents for right ankle pain since Tuesday night.  Has a hx of gout, this feels like the same flare, he reports he has had gout in his wrists and right ankle in the past.  He is supposed to take allopurinol but doesn't take it consitently.       He his able to bear weight, the pain is mostly when he goes up and down the stairs, he is having trouble putting his boots on die to the swelling.  No severe pain, no fevers/chills, no body aches      Medications, Allergies, current problem list, Social and Family history reviewed today in Epic.     Objective:     /84   Pulse 74   Temp 36.7 °C (98 °F) (Temporal)   Resp 16   Ht 1.6 m (5' 3\")   Wt 106 kg (233 lb)   SpO2 98%     Physical Exam  Vitals reviewed.   Constitutional:       Appearance: Normal appearance.   HENT:      Mouth/Throat:      Mouth: Mucous membranes are moist.   Cardiovascular:      Rate and Rhythm: Normal rate.   Pulmonary:      Effort: Pulmonary effort is normal.   Musculoskeletal:      Right ankle: Swelling present. No deformity or ecchymosis. Tenderness present. No medial malleolus tenderness. Decreased range of motion.      Comments: Swelling and dependant edema noted to foot and ankle, no severe erythema or warmth noted.  Is able to flex and extend with minimal pain    Skin:     General: Skin is warm and dry.   Neurological:      Mental Status: He is alert and oriented to person, place, and time.   Psychiatric:         Mood and Affect: Mood normal.          Assessment/Plan:     Diagnosis and associated orders    1. Acute idiopathic gout of left foot  methylPREDNISolone (MEDROL DOSEPAK) 4 MG Tablet Therapy Pack          I personally reviewed prior external notes and test results pertinent to today's visit.       IMPRESSION: Patient is non-toxic appearing and suitable " Patient counseled on fall risk assessment and prevention at home and in public - verbalized understanding     for outpatient care at this time.  Symptoms and exam are consistent with gout flare.   Exam findings reassuring with no erythema, minimal warmth no pain out of proportion.  We will treat with medrol dose pack as this has worked for him in the past, Maryjane advised him to fu with PCP to back on his allopurinol for management as he has had multiple visits recently for this.  He reports that the colchicine did not help him last time.     Educated on red flag symptoms and Instructed patient to return to Urgent Care or nearest Emergency Department if symptoms fail to improve, for any change in condition, further concerns, or new concerning symptoms. Patient states understanding of the plan of care and discharge instructions.        Please note that this dictation was created using voice recognition software. I have made a reasonable attempt to correct obvious errors, but I expect that there are errors of grammar and possibly content that I did not discover before finalizing the note.    This note was electronically signed by RICHY Copeland

## 2024-01-04 ENCOUNTER — HOSPITAL ENCOUNTER (OUTPATIENT)
Dept: RADIOLOGY | Facility: HOSPITAL | Age: 74
Discharge: HOME | End: 2024-01-04
Attending: PHYSICIAN ASSISTANT
Payer: MEDICARE

## 2024-01-04 ENCOUNTER — OFFICE VISIT (OUTPATIENT)
Dept: INTERNAL MEDICINE | Facility: CLINIC | Age: 74
End: 2024-01-04
Attending: FAMILY MEDICINE
Payer: MEDICARE

## 2024-01-04 VITALS
BODY MASS INDEX: 25.74 KG/M2 | SYSTOLIC BLOOD PRESSURE: 124 MMHG | WEIGHT: 183.88 LBS | HEIGHT: 71 IN | OXYGEN SATURATION: 96 % | DIASTOLIC BLOOD PRESSURE: 68 MMHG | HEART RATE: 81 BPM

## 2024-01-04 DIAGNOSIS — M54.6 PAIN IN THORACIC SPINE: ICD-10-CM

## 2024-01-04 DIAGNOSIS — K59.04 CHRONIC IDIOPATHIC CONSTIPATION: ICD-10-CM

## 2024-01-04 DIAGNOSIS — Z82.49 FAMILY HISTORY OF CORONARY ARTERY DISEASE: ICD-10-CM

## 2024-01-04 DIAGNOSIS — M54.30 SCIATICA, UNSPECIFIED LATERALITY: Primary | ICD-10-CM

## 2024-01-04 DIAGNOSIS — F32.9 MAJOR DEPRESSIVE DISORDER WITH CURRENT ACTIVE EPISODE, UNSPECIFIED DEPRESSION EPISODE SEVERITY, UNSPECIFIED WHETHER RECURRENT: ICD-10-CM

## 2024-01-04 DIAGNOSIS — E78.49 OTHER HYPERLIPIDEMIA: Chronic | ICD-10-CM

## 2024-01-04 DIAGNOSIS — S32.020D COMPRESSION FRACTURE OF L2 VERTEBRA WITH ROUTINE HEALING: ICD-10-CM

## 2024-01-04 DIAGNOSIS — R07.9 CHEST PAIN, UNSPECIFIED TYPE: ICD-10-CM

## 2024-01-04 DIAGNOSIS — M48.062 SPINAL STENOSIS OF LUMBAR REGION WITH NEUROGENIC CLAUDICATION: ICD-10-CM

## 2024-01-04 DIAGNOSIS — M54.16 LUMBAR RADICULOPATHY, CHRONIC: ICD-10-CM

## 2024-01-04 PROCEDURE — 72148 MRI LUMBAR SPINE W/O DYE: CPT | Mod: 26,,, | Performed by: RADIOLOGY

## 2024-01-04 PROCEDURE — 72146 MRI CHEST SPINE W/O DYE: CPT | Mod: TC

## 2024-01-04 PROCEDURE — 1101F PT FALLS ASSESS-DOCD LE1/YR: CPT | Mod: CPTII,S$GLB,, | Performed by: FAMILY MEDICINE

## 2024-01-04 PROCEDURE — 1159F MED LIST DOCD IN RCRD: CPT | Mod: CPTII,S$GLB,, | Performed by: FAMILY MEDICINE

## 2024-01-04 PROCEDURE — 3078F DIAST BP <80 MM HG: CPT | Mod: CPTII,S$GLB,, | Performed by: FAMILY MEDICINE

## 2024-01-04 PROCEDURE — 99215 OFFICE O/P EST HI 40 MIN: CPT | Mod: S$GLB,,, | Performed by: FAMILY MEDICINE

## 2024-01-04 PROCEDURE — 3074F SYST BP LT 130 MM HG: CPT | Mod: CPTII,S$GLB,, | Performed by: FAMILY MEDICINE

## 2024-01-04 PROCEDURE — 3008F BODY MASS INDEX DOCD: CPT | Mod: CPTII,S$GLB,, | Performed by: FAMILY MEDICINE

## 2024-01-04 PROCEDURE — 72146 MRI CHEST SPINE W/O DYE: CPT | Mod: 26,,, | Performed by: RADIOLOGY

## 2024-01-04 PROCEDURE — 72148 MRI LUMBAR SPINE W/O DYE: CPT | Mod: TC

## 2024-01-04 PROCEDURE — 1125F AMNT PAIN NOTED PAIN PRSNT: CPT | Mod: CPTII,S$GLB,, | Performed by: FAMILY MEDICINE

## 2024-01-04 PROCEDURE — 99999 PR PBB SHADOW E&M-EST. PATIENT-LVL IV: CPT | Mod: PBBFAC,,, | Performed by: FAMILY MEDICINE

## 2024-01-04 PROCEDURE — 1160F RVW MEDS BY RX/DR IN RCRD: CPT | Mod: CPTII,S$GLB,, | Performed by: FAMILY MEDICINE

## 2024-01-04 PROCEDURE — 3288F FALL RISK ASSESSMENT DOCD: CPT | Mod: CPTII,S$GLB,, | Performed by: FAMILY MEDICINE

## 2024-01-04 RX ORDER — ICOSAPENT ETHYL 1000 MG/1
1 CAPSULE ORAL 2 TIMES DAILY
Qty: 60 CAPSULE | Refills: 11 | Status: SHIPPED | OUTPATIENT
Start: 2024-01-04

## 2024-01-04 RX ORDER — SERTRALINE HYDROCHLORIDE 50 MG/1
50 TABLET, FILM COATED ORAL DAILY
Qty: 90 TABLET | Refills: 1 | Status: SHIPPED | OUTPATIENT
Start: 2024-01-04 | End: 2024-01-12 | Stop reason: SINTOL

## 2024-01-04 NOTE — ASSESSMENT & PLAN NOTE
PET stress insurance denied may 2023. has reported CP and GERD sx. last few weeks ago, UC. EKG ok.    I will reorder  stress echo.    CT chest 2016 - no TAA
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
## 2024-01-04 NOTE — ASSESSMENT & PLAN NOTE
Vascepa had been prescribed by Dr. Giraldo (rec over [facility]). Will renew and if not ins covered then gen fish oil

## 2024-01-05 ENCOUNTER — PATIENT MESSAGE (OUTPATIENT)
Dept: INTERNAL MEDICINE | Facility: CLINIC | Age: 74
End: 2024-01-05
Payer: MEDICARE

## 2024-01-05 ENCOUNTER — OFFICE VISIT (OUTPATIENT)
Dept: NEUROSURGERY | Facility: CLINIC | Age: 74
End: 2024-01-05
Payer: MEDICARE

## 2024-01-05 VITALS
BODY MASS INDEX: 25.74 KG/M2 | DIASTOLIC BLOOD PRESSURE: 78 MMHG | SYSTOLIC BLOOD PRESSURE: 128 MMHG | HEIGHT: 71 IN | HEART RATE: 77 BPM | WEIGHT: 183.88 LBS

## 2024-01-05 DIAGNOSIS — G89.29 CHRONIC MIDLINE THORACIC BACK PAIN: Primary | ICD-10-CM

## 2024-01-05 DIAGNOSIS — M54.41 CHRONIC BILATERAL LOW BACK PAIN WITH RIGHT-SIDED SCIATICA: ICD-10-CM

## 2024-01-05 DIAGNOSIS — M54.16 LUMBAR RADICULOPATHY: ICD-10-CM

## 2024-01-05 DIAGNOSIS — M51.36 DDD (DEGENERATIVE DISC DISEASE), LUMBAR: ICD-10-CM

## 2024-01-05 DIAGNOSIS — M54.6 CHRONIC MIDLINE THORACIC BACK PAIN: Primary | ICD-10-CM

## 2024-01-05 DIAGNOSIS — G89.29 CHRONIC BILATERAL LOW BACK PAIN WITH RIGHT-SIDED SCIATICA: ICD-10-CM

## 2024-01-05 DIAGNOSIS — M47.26 OTHER SPONDYLOSIS WITH RADICULOPATHY, LUMBAR REGION: ICD-10-CM

## 2024-01-05 DIAGNOSIS — F32.9 MAJOR DEPRESSIVE DISORDER WITH CURRENT ACTIVE EPISODE, UNSPECIFIED DEPRESSION EPISODE SEVERITY, UNSPECIFIED WHETHER RECURRENT: Primary | ICD-10-CM

## 2024-01-05 PROCEDURE — 1159F MED LIST DOCD IN RCRD: CPT | Mod: CPTII,S$GLB,, | Performed by: PHYSICIAN ASSISTANT

## 2024-01-05 PROCEDURE — 3008F BODY MASS INDEX DOCD: CPT | Mod: CPTII,S$GLB,, | Performed by: PHYSICIAN ASSISTANT

## 2024-01-05 PROCEDURE — 99214 OFFICE O/P EST MOD 30 MIN: CPT | Mod: S$GLB,,, | Performed by: PHYSICIAN ASSISTANT

## 2024-01-05 PROCEDURE — 3074F SYST BP LT 130 MM HG: CPT | Mod: CPTII,S$GLB,, | Performed by: PHYSICIAN ASSISTANT

## 2024-01-05 PROCEDURE — 1160F RVW MEDS BY RX/DR IN RCRD: CPT | Mod: CPTII,S$GLB,, | Performed by: PHYSICIAN ASSISTANT

## 2024-01-05 PROCEDURE — 3288F FALL RISK ASSESSMENT DOCD: CPT | Mod: CPTII,S$GLB,, | Performed by: PHYSICIAN ASSISTANT

## 2024-01-05 PROCEDURE — 1125F AMNT PAIN NOTED PAIN PRSNT: CPT | Mod: CPTII,S$GLB,, | Performed by: PHYSICIAN ASSISTANT

## 2024-01-05 PROCEDURE — 99999 PR PBB SHADOW E&M-EST. PATIENT-LVL V: CPT | Mod: PBBFAC,,, | Performed by: PHYSICIAN ASSISTANT

## 2024-01-05 PROCEDURE — 3078F DIAST BP <80 MM HG: CPT | Mod: CPTII,S$GLB,, | Performed by: PHYSICIAN ASSISTANT

## 2024-01-05 PROCEDURE — 1101F PT FALLS ASSESS-DOCD LE1/YR: CPT | Mod: CPTII,S$GLB,, | Performed by: PHYSICIAN ASSISTANT

## 2024-01-05 NOTE — PROGRESS NOTES
"  Subjective:     Patient ID:  Jean Carlson is a 73 y.o. male.    Vick    Chief Complaint: Midback pain,  Low back and right leg pain and paresthesias    Interval History:   01/05/2024      Patient here for MRI follow-up.  No new symptoms.  He also has neck pain and right arm pain and paresthesias.    The Medrol pack really helped with his back and right leg symptoms.    HPI    Jean Carlson is a 73 y.o. male who presents for follow up.  Patient had midback pain that is chronic.  No radiation.  About 4 weeks ago hew as cleaning his garage and lifted something and pushed something and felt a pop.  He has pain across the low back that is getting worse with radiation to the right shigh and into the right calf and numbness  No left leg pain.    He denies any falls or trauma.  Take tylenol prn.  On prolia.    Patient denies any recent accidents or trauma, no saddle anesthesias, and no bowel or bladder incontinence.      Review of Systems:  Constitution: Negative for chills, fever, night sweats and weight loss.   Musculoskeletal: Negative for falls.   Gastrointestinal: Negative for bowel incontinence, nausea and vomiting.   Genitourinary: Negative for bladder incontinence.   Neurological: Negative for disturbances in coordination and loss of balance.      Objective:      Vitals:    01/05/24 1533   BP: 128/78   Pulse: 77   Weight: 83.4 kg (183 lb 13.8 oz)   Height: 5' 11" (1.803 m)   PainSc:   2   PainLoc: Back     XRAY Results:     Narrative & Impression  EXAMINATION:  XR LUMBAR SPINE AP AND LAT WITH FLEX/EXT     CLINICAL HISTORY:  Other secondary scoliosis, site unspecified     TECHNIQUE:  AP, lateral and spot images were performed of the lumbar spine.  Additional flexion and extension views obtained.     COMPARISON:  05/17/2022     FINDINGS:  Unchanged superior endplate height loss at L2.  No evidence of a new displaced fracture.     Mild rightward convex curvature of the thoracolumbar spine straightening of " the normal lumbar lordosis with mild retrolisthesis at L2-3.  No abnormal translation with flexion and extension.     Loss of disc height and degenerative endplate changes at several levels lumbar spine, worst at L2-3 and L4-5.  Bilateral facet hypertrophy.     Impression:     Scoliosis and degenerative change as above, without evidence of translational instability.        Electronically signed by: Bruce Paulino MD  Date:                                            12/21/2023  Time:                                           15:43      MRI results    Thoracic spine MRI did not show any spinal cord compression.  Atypical hemangioma at T3.  Lumbar spine MRI shows L4-5 degenerative disc disease.  Right L3-4 laminectomy defect.  Right L3-4 neural foraminal stenosis with nerve compression.  Chronic L2 compression fracture.    Assessment:          1. Chronic midline thoracic back pain    2. Chronic bilateral low back pain with right-sided sciatica    3. Other spondylosis with radiculopathy, lumbar region    4. DDD (degenerative disc disease), lumbar    5. Lumbar radiculopathy              Plan:          Orders Placed This Encounter    Ambulatory referral/consult to [facility]    Ambulatory referral/consult to Pain Clinic       Recommend [facility] physical therapy program   Recommend referral to pain management to discuss interventional pain procedure options  follow-up in spine surgery Clinic if interventional pain procedures and conservative treatments fail    Follow-Up:  Follow up if symptoms worsen or fail to improve. If there are any questions prior to this, the patient was instructed to contact the office.       Ute Rivas [facility], PA-C  Neurosurgery  [facility]  01/05/2024

## 2024-01-08 ENCOUNTER — OFFICE VISIT (OUTPATIENT)
Dept: GASTROENTEROLOGY | Facility: CLINIC | Age: 74
End: 2024-01-08
Payer: MEDICARE

## 2024-01-08 ENCOUNTER — LAB VISIT (OUTPATIENT)
Dept: LAB | Facility: HOSPITAL | Age: 74
End: 2024-01-08
Payer: MEDICARE

## 2024-01-08 VITALS
SYSTOLIC BLOOD PRESSURE: 127 MMHG | DIASTOLIC BLOOD PRESSURE: 63 MMHG | HEART RATE: 66 BPM | WEIGHT: 181.88 LBS | BODY MASS INDEX: 25.46 KG/M2 | HEIGHT: 71 IN

## 2024-01-08 DIAGNOSIS — K59.04 CHRONIC IDIOPATHIC CONSTIPATION: ICD-10-CM

## 2024-01-08 DIAGNOSIS — C61 PROSTATE CANCER: ICD-10-CM

## 2024-01-08 DIAGNOSIS — K58.9 IRRITABLE BOWEL SYNDROME, UNSPECIFIED TYPE: ICD-10-CM

## 2024-01-08 LAB — COMPLEXED PSA SERPL-MCNC: 1.5 NG/ML (ref 0–4)

## 2024-01-08 PROCEDURE — 36415 COLL VENOUS BLD VENIPUNCTURE: CPT

## 2024-01-08 PROCEDURE — 3074F SYST BP LT 130 MM HG: CPT | Mod: CPTII,S$GLB,, | Performed by: INTERNAL MEDICINE

## 2024-01-08 PROCEDURE — 1159F MED LIST DOCD IN RCRD: CPT | Mod: CPTII,S$GLB,, | Performed by: INTERNAL MEDICINE

## 2024-01-08 PROCEDURE — 99214 OFFICE O/P EST MOD 30 MIN: CPT | Mod: S$GLB,,, | Performed by: INTERNAL MEDICINE

## 2024-01-08 PROCEDURE — 1126F AMNT PAIN NOTED NONE PRSNT: CPT | Mod: CPTII,S$GLB,, | Performed by: INTERNAL MEDICINE

## 2024-01-08 PROCEDURE — 84153 ASSAY OF PSA TOTAL: CPT

## 2024-01-08 PROCEDURE — 3078F DIAST BP <80 MM HG: CPT | Mod: CPTII,S$GLB,, | Performed by: INTERNAL MEDICINE

## 2024-01-08 PROCEDURE — 99999 PR PBB SHADOW E&M-EST. PATIENT-LVL IV: CPT | Mod: PBBFAC,,, | Performed by: INTERNAL MEDICINE

## 2024-01-08 PROCEDURE — 1160F RVW MEDS BY RX/DR IN RCRD: CPT | Mod: CPTII,S$GLB,, | Performed by: INTERNAL MEDICINE

## 2024-01-08 PROCEDURE — 3008F BODY MASS INDEX DOCD: CPT | Mod: CPTII,S$GLB,, | Performed by: INTERNAL MEDICINE

## 2024-01-08 PROCEDURE — 3288F FALL RISK ASSESSMENT DOCD: CPT | Mod: CPTII,S$GLB,, | Performed by: INTERNAL MEDICINE

## 2024-01-08 PROCEDURE — 1101F PT FALLS ASSESS-DOCD LE1/YR: CPT | Mod: CPTII,S$GLB,, | Performed by: INTERNAL MEDICINE

## 2024-01-08 RX ORDER — PLECANATIDE 3 MG/1
3 TABLET ORAL DAILY
Qty: 30 TABLET | Refills: 3 | Status: SHIPPED | OUTPATIENT
Start: 2024-01-08 | End: 2024-02-01

## 2024-01-08 NOTE — PROGRESS NOTES
Subjective:       Patient ID: Jean Carlson is a 73 y.o. male.    Chief Complaint: GI Problem (IBS-C)    HPI    Follow-up visit for constipation.  In August I saw him as a 2nd opinion for his constipation.  And that has been a problem this developed over the past year.  It is bumped become progressively worse.  It was prescribed MiraLax and then Linzess and then Linzess combined with MiraLax.    This past fall he saw Dr. Baldwin and the MiraLax was increased to twice a day.  At around the same time he stopped his calcium and stopped his PPI and both those things did seem to help.    Overall there is a slight improvement in that previously had the difficult hard stool that he could not pass.  Had now he finds that the 1st stool maybe small volume and difficult to pass.  But then he has several subsequently looser stools in the morning.  Some of them a mushy inconsistency in and perhaps the last stool might be loose and difficult to clean.  He has the sensation of incomplete evacuation.  Tries hard to avoid straining.  Sometimes it cut back on that 2nd dose of MiraLax when it seems to caused too much diarrhea.  Even though he stopped the PPI he has not had much in the way of reflux so that is good.  He takes as needed antacids right now.  His primary care doctor want to start a depression medicine and he did try recently Zofran but that caused some really upsetting night sweats.    Social history  Coffee in the morning  Balanced diet with lots of vegetables   A very low-fat diet  Oatmeal in flaxseed in the morning   No fast food  Exercises walking and treadmill  Now having very good water intake every day    Past Medical History:   Diagnosis Date    Acute medial meniscus tear, right, initial encounter 05/30/2023    Anterior tibialis tendinitis of right lower extremity 10/18/2018    Cancer     Chronic idiopathic constipation 07/18/2019    Closed fracture of left wrist 10/18/2021    Closed fracture of lower end of  left radius with routine healing 2022    Elevated PSA     Family history of ASCVD 10/07/2016    Mom age 64, Dad age 67 -  on same day. Pat Aunt early 70's.    Family history of coronary artery disease 10/07/2016    Mom age 64, Dad age 67 -  on same day. Pat Aunt early 70's.    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    Intractable back pain 2018    Knee injury, right, initial encounter 05/15/2023    Nuclear sclerosis, bilateral 2018    Ocular migraine 2012    Osteoporosis     Prostate disease     Transient vision disturbance of both eyes 2012    Umbilical hernia without obstruction and without gangrene 10/01/2015    Urinary tract infection        Review of patient's allergies indicates:  No Known Allergies     Family History   Problem Relation Age of Onset    Heart disease Mother     Heart attack Mother     Skin cancer Mother     Heart disease Father     Heart attack Father     Glaucoma Maternal Aunt     Glaucoma Maternal Uncle     Retinitis pigmentosa Maternal Uncle     No Known Problems Daughter     Prostate cancer Neg Hx     Kidney disease Neg Hx     Colon cancer Neg Hx        Social History     Tobacco Use    Smoking status: Never    Smokeless tobacco: Never   Substance Use Topics    Alcohol use: Yes     Alcohol/week: 3.0 standard drinks of alcohol     Types: 3 Glasses of wine per week     Comment: rarely    Drug use: No        Review of Systems    CMP   Lab Results   Component Value Date     10/31/2023    K 4.8 10/31/2023     10/31/2023    CO2 24 10/31/2023     (H) 10/31/2023    BUN 19 10/31/2023    CREATININE 0.9 10/31/2023    CALCIUM 10.0 10/31/2023    PROT 7.0 10/31/2023    ALBUMIN 4.2 10/31/2023    BILITOT 0.6 10/31/2023    ALKPHOS 64 10/31/2023    AST 20 10/31/2023    ALT 23 10/31/2023    ANIONGAP 10 10/31/2023    and CBC   Lab  Results   Component Value Date    WBC 4.31 10/31/2023    HGB 15.4 10/31/2023    HCT 46.1 10/31/2023     10/31/2023       No results found for this or any previous visit from the past 365 days.             Objective:      Physical Exam    Assessment & Plan:       Chronic idiopathic constipation  -     Ambulatory referral/consult to Gastroenterology  -     plecanatide (TRULANCE) 3 mg Tab; Take 3 mg by mouth once daily.  Dispense: 30 tablet; Refill: 3    Irritable bowel syndrome, unspecified type  Comments:  ???  Orders:  -     Ambulatory referral/consult to Gastroenterology     Assessment.  Chronic idiopathic constipation.  He asked about whether this is IBS and I can not really say meets the criteria for IBS with constipation I think it is more chronic idiopathic constipation that we just have not found the right treatment for.  Right now are treatment has relieved some of the constipation but has led to some loose stools and overall this has been upsetting for him as well because it keeps him in the house and keeps him from doing activities that he would do otherwise.  I think we should continue trying to find a medicine that works better for him.  Rather than Linzess I would like to try Trulance, which initially would be combined with the MiraLax and he should continue all of his good habits as well.  On my last note I mentioned physical therapy I am not sure that was followed up on but I will want to consider that in the future.  And it seems like he has derived significant benefit from his appointments with Dr. Baldwin as well including the banding of hemorrhoids.  Reassurance given.  It is last colonoscopy was normal less than 5 years ago so he is up-to-date on colon cancer screening.  I have sent in a new prescription for Trulance by advise him to find out the cost before he picks it up for we might be able to work with the pharmacy to get it covered by insurance    This note was created with voice  recognition dictation technology.  There may be errors that I did not see, detect or correct.      Dennis Gallardo MD

## 2024-01-08 NOTE — PROGRESS NOTES
"GENERAL GI PATIENT INTAKE:    COVID symptoms in the last 7 days (runny nose, sore throat, congestion, cough, fever): No  PCP: Papi Gallardo  If not PCP-  number given to establish [phone]: N/A    ALLERGIES REVIEWED:  Yes    CHIEF COMPLAINT:    Chief Complaint   Patient presents with    GI Problem     IBS-C       VITAL SIGNS:  /63   Pulse 66   Ht 5' 11" (1.803 m)   Wt 82.5 kg (181 lb 14.1 oz)   BMI 25.37 kg/m²      Change in medical, surgical, family or social history: No      REVIEWED MEDICATION LIST RECONCILED INCLUDING ABOVE MEDS:  Yes     "

## 2024-01-09 ENCOUNTER — PATIENT MESSAGE (OUTPATIENT)
Dept: GASTROENTEROLOGY | Facility: CLINIC | Age: 74
End: 2024-01-09
Payer: MEDICARE

## 2024-01-10 ENCOUNTER — PATIENT MESSAGE (OUTPATIENT)
Dept: GASTROENTEROLOGY | Facility: CLINIC | Age: 74
End: 2024-01-10
Payer: MEDICARE

## 2024-01-10 ENCOUNTER — LAB VISIT (OUTPATIENT)
Dept: LAB | Facility: HOSPITAL | Age: 74
End: 2024-01-10
Attending: NURSE PRACTITIONER
Payer: MEDICARE

## 2024-01-10 ENCOUNTER — OFFICE VISIT (OUTPATIENT)
Dept: UROLOGY | Facility: CLINIC | Age: 74
End: 2024-01-10
Payer: MEDICARE

## 2024-01-10 VITALS
SYSTOLIC BLOOD PRESSURE: 132 MMHG | HEIGHT: 71 IN | HEART RATE: 76 BPM | BODY MASS INDEX: 25.31 KG/M2 | WEIGHT: 180.75 LBS | DIASTOLIC BLOOD PRESSURE: 65 MMHG

## 2024-01-10 DIAGNOSIS — R53.83 FATIGUE, UNSPECIFIED TYPE: ICD-10-CM

## 2024-01-10 DIAGNOSIS — N13.8 BPH WITH URINARY OBSTRUCTION: ICD-10-CM

## 2024-01-10 DIAGNOSIS — R61 NIGHT SWEATS: ICD-10-CM

## 2024-01-10 DIAGNOSIS — N40.1 BPH WITH URINARY OBSTRUCTION: Primary | ICD-10-CM

## 2024-01-10 DIAGNOSIS — Z90.79 S/P TURP: ICD-10-CM

## 2024-01-10 DIAGNOSIS — R68.82 DECREASED LIBIDO: ICD-10-CM

## 2024-01-10 DIAGNOSIS — R39.9 LOWER URINARY TRACT SYMPTOMS (LUTS): ICD-10-CM

## 2024-01-10 DIAGNOSIS — C61 PROSTATE CANCER: ICD-10-CM

## 2024-01-10 DIAGNOSIS — Z85.46 ENCOUNTER FOR FOLLOW-UP SURVEILLANCE OF PROSTATE CANCER: ICD-10-CM

## 2024-01-10 DIAGNOSIS — Z08 ENCOUNTER FOR FOLLOW-UP SURVEILLANCE OF PROSTATE CANCER: ICD-10-CM

## 2024-01-10 DIAGNOSIS — N13.8 BPH WITH URINARY OBSTRUCTION: Primary | ICD-10-CM

## 2024-01-10 DIAGNOSIS — N40.1 BPH WITH URINARY OBSTRUCTION: ICD-10-CM

## 2024-01-10 LAB
BILIRUB SERPL-MCNC: NORMAL MG/DL
BLOOD URINE, POC: NORMAL
CLARITY, POC UA: CLEAR
COLOR, POC UA: YELLOW
GLUCOSE UR QL STRIP: NORMAL
KETONES UR QL STRIP: NORMAL
LEUKOCYTE ESTERASE URINE, POC: NORMAL
NITRITE, POC UA: NORMAL
PH, POC UA: 5
PROTEIN, POC: NORMAL
SPECIFIC GRAVITY, POC UA: 1020
TESTOST SERPL-MCNC: 579 NG/DL (ref 304–1227)
UROBILINOGEN, POC UA: NORMAL

## 2024-01-10 PROCEDURE — 3078F DIAST BP <80 MM HG: CPT | Mod: CPTII,S$GLB,, | Performed by: NURSE PRACTITIONER

## 2024-01-10 PROCEDURE — 84403 ASSAY OF TOTAL TESTOSTERONE: CPT | Performed by: NURSE PRACTITIONER

## 2024-01-10 PROCEDURE — 99999 PR PBB SHADOW E&M-EST. PATIENT-LVL IV: CPT | Mod: PBBFAC,,, | Performed by: NURSE PRACTITIONER

## 2024-01-10 PROCEDURE — 36415 COLL VENOUS BLD VENIPUNCTURE: CPT | Performed by: NURSE PRACTITIONER

## 2024-01-10 PROCEDURE — 81002 URINALYSIS NONAUTO W/O SCOPE: CPT | Mod: S$GLB,,, | Performed by: NURSE PRACTITIONER

## 2024-01-10 PROCEDURE — 99215 OFFICE O/P EST HI 40 MIN: CPT | Mod: 25,S$GLB,, | Performed by: NURSE PRACTITIONER

## 2024-01-10 PROCEDURE — 1101F PT FALLS ASSESS-DOCD LE1/YR: CPT | Mod: CPTII,S$GLB,, | Performed by: NURSE PRACTITIONER

## 2024-01-10 PROCEDURE — 1159F MED LIST DOCD IN RCRD: CPT | Mod: CPTII,S$GLB,, | Performed by: NURSE PRACTITIONER

## 2024-01-10 PROCEDURE — 3075F SYST BP GE 130 - 139MM HG: CPT | Mod: CPTII,S$GLB,, | Performed by: NURSE PRACTITIONER

## 2024-01-10 PROCEDURE — 3008F BODY MASS INDEX DOCD: CPT | Mod: CPTII,S$GLB,, | Performed by: NURSE PRACTITIONER

## 2024-01-10 PROCEDURE — 3288F FALL RISK ASSESSMENT DOCD: CPT | Mod: CPTII,S$GLB,, | Performed by: NURSE PRACTITIONER

## 2024-01-10 PROCEDURE — 1160F RVW MEDS BY RX/DR IN RCRD: CPT | Mod: CPTII,S$GLB,, | Performed by: NURSE PRACTITIONER

## 2024-01-10 PROCEDURE — 1126F AMNT PAIN NOTED NONE PRSNT: CPT | Mod: CPTII,S$GLB,, | Performed by: NURSE PRACTITIONER

## 2024-01-10 NOTE — PROGRESS NOTES
CHIEF COMPLAINT:    Jean Carlson is a 73 y.o. male presents today for Prostate Cancer.     HISTORY OF PRESENTING ILLINESS:    Jean Carlson is a 72 y.o. male who had been diagnosed with Prostate Cancer during a transurethral prostatectomy 07/13/2019 by Dr. Knowles.  Was on flomax and avodart.  1% of specimen Julius 6. Had 22 grams resected.   Retired [employer] .  08/27/20219 meet with Dr. Calero for 2nd Opinion and decided on AS.      Stage- T1a  PSA- 2.9  PSAD-  <.138  Path- Julius 6 1%.  CANDY score- 1 low risk disease  NCCN- very low risk disease  12/2019 MRI- PI-RADS 1, 2.6 ccs!     11/10/2020 Prostate MRI:   - PSA was 0.62  - no significant lesions noted; PI-RADS 1     07/27/2021 had incision of BNC with Dr. Ny. Has been urinating well since.   10/21/2022 had a normal cysto with Dr. Ny done due to changes in urine flow at night.   He was given an Rx for Flomax that has helped since.    Last clinic visit was 07/27/2023 with PSA of 1.2.    He is here today for 6 month f/u visit.   PSA is now 1.5.  FOS is good during the day day; weaker at night; nocturia x 1.  He is still taking Flomax daily.  Reports ED and decreased libido.   He has been having night sweats/hot flashes.   PCP tried Zoloft to help with nightsweats; he was unable to tolerate.   Not interested in ED but concerned.   TSH was normal        REVIEW OF SYSTEMS:  Review of Systems   Constitutional:  Positive for diaphoresis (night sweats) and malaise/fatigue. Negative for chills and fever.   Eyes:  Negative for double vision.   Respiratory:  Negative for cough and shortness of breath.    Cardiovascular:  Negative for chest pain.   Gastrointestinal:  Positive for constipation (he is being followed by GI). Negative for abdominal pain, diarrhea, nausea and vomiting.   Genitourinary: Negative.  Negative for dysuria, flank pain and hematuria.        FOS is good during the day.  Weak at night; nocturia x1     Musculoskeletal:  Positive  for back pain and myalgias.        Chronic back pain     Neurological:  Positive for focal weakness. Negative for dizziness and seizures.   Endo/Heme/Allergies:  Negative for polydipsia.         PATIENT HISTORY:    Past Medical History:   Diagnosis Date    Acute medial meniscus tear, right, initial encounter 2023    Anterior tibialis tendinitis of right lower extremity 10/18/2018    Cancer     Chronic idiopathic constipation 2019    Closed fracture of left wrist 10/18/2021    Closed fracture of lower end of left radius with routine healing 2022    Elevated PSA     Family history of ASCVD 10/07/2016    Mom age 64, Dad age 67 -  on same day. Pat Aunt early 70's.    Family history of coronary artery disease 10/07/2016    Mom age 64, Dad age 67 -  on same day. Pat Aunt early 70's.    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    Intractable back pain 2018    Knee injury, right, initial encounter 05/15/2023    Nuclear sclerosis, bilateral 2018    Ocular migraine 2012    Osteoporosis     Prostate disease     Transient vision disturbance of both eyes 2012    Umbilical hernia without obstruction and without gangrene 10/01/2015    Urinary tract infection        Past Surgical History:   Procedure Laterality Date    back sx      lower    BLADDER FULGURATION N/A 2019    Procedure: FULGURATION, BLADDER;  Surgeon: Ryan Knowles MD;  Location: [facility];  Service: Urology;  Laterality: N/A;  bleeding tissue at bladder neck    CATARACT EXTRACTION W/  INTRAOCULAR LENS IMPLANT Bilateral     Dr Finn/Ana IOL     CHONDROPLASTY OF KNEE Right 2023    Procedure: CHONDROPLASTY, KNEE;  Surgeon: Taylor Young MD;  Location: [facility] OR;  Service: Orthopedics;  Laterality: Right;    COLONOSCOPY N/A 2019    Procedure: COLONOSCOPY;  Surgeon: Mauro Smallwood MD;   Location: [facility] ENDO (4TH FLR);  Service: Endoscopy;  Laterality: N/A;    CYSTOSCOPY N/A 7/13/2019    Procedure: CYSTOSCOPY;  Surgeon: Ryan Knowles MD;  [facility];  Service: Urology;  Laterality: N/A;    CYSTOSCOPY      CYSTOSCOPY  7/27/2021    Procedure: CYSTOSCOPY;  Surgeon: Manan Ny MD;  Location: [facility] 1ST FLR;  Service: Urology;;    ESOPHAGOGASTRODUODENOSCOPY N/A 3/31/2021    Procedure: EGD (ESOPHAGOGASTRODUODENOSCOPY);  Surgeon: Jamilah Munoz MD;  Location: [facility];  Service: Endoscopy;  Laterality: N/A;    HERNIA REPAIR      KNEE ARTHROSCOPY W/ MENISCECTOMY Right 5/30/2023    Procedure: ARTHROSCOPY, KNEE, WITH MENISCECTOMY;  Surgeon: Taylor Young MD;  Location: [facility] OR;  Service: Orthopedics;  Laterality: Right;    [id number] Right 5/30/2023    Procedure: [id number];  Surgeon: Taylor Young MD;  Location: [facility] OR;  Service: Orthopedics;  Laterality: Right;    PROSTATE SURGERY      REMOVAL OF BLOOD CLOT N/A 7/13/2019    Procedure: REMOVAL, BLOOD CLOT;  Surgeon: Ryan Knowles MD;  [facility];  Service: Urology;  Laterality: N/A;  prostate    SPINE SURGERY      SYNOVECTOMY OF KNEE Right 5/30/2023    Procedure: SYNOVECTOMY, KNEE;  Surgeon: Taylor Young MD;  Location: [facility] OR;  Service: Orthopedics;  Laterality: Right;    TONSILLECTOMY      TRANSURETHRAL RESECTION OF PROSTATE      TRANSURETHRAL RESECTION OF PROSTATE N/A 7/13/2019    Procedure: TURP (TRANSURETHRAL RESECTION OF PROSTATE);  Surgeon: Ryan Knowles MD;  [facility];  Service: Urology;  Laterality: N/A;    TRANSURETHRAL SURGICAL REMOVAL OF STRICTURE OF BLADDER NECK  7/27/2021    Procedure: EXCISION, CONTRACTURE, BLADDER NECK, TRANSURETHRAL;  Surgeon: Manan Ny MD;  Location: [facility] OR 1ST FLR;  Service: Urology;;       Family History   Problem Relation Age of Onset    Heart disease Mother     Heart attack Mother     Skin cancer Mother     Heart disease Father     Heart  attack Father     Glaucoma Maternal Aunt     Glaucoma Maternal Uncle     Retinitis pigmentosa Maternal Uncle     No Known Problems Daughter     Prostate cancer Neg Hx     Kidney disease Neg Hx     Colon cancer Neg Hx        Social History     Socioeconomic History    Marital status:    Occupational History     Employer: [employer]   Tobacco Use    Smoking status: Never    Smokeless tobacco: Never   Substance and Sexual Activity    Alcohol use: Yes     Alcohol/week: 3.0 standard drinks of alcohol     Types: 3 Glasses of wine per week     Comment: rarely    Drug use: No    Sexual activity: Not Currently     Partners: Female   Social History Narrative    ** Merged History Encounter **          [employer]. 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     Social Determinants of Health     Financial Resource Strain: Low Risk  (1/9/2024)    Overall Financial Resource Strain (CARDIA)     Difficulty of Paying Living Expenses: Not hard at all   Food Insecurity: No Food Insecurity (1/9/2024)    Hunger Vital Sign     Worried About Running Out of Food in the Last Year: Never true     Ran Out of Food in the Last Year: Never true   Transportation Needs: No Transportation Needs (1/9/2024)    PRAPARE - Transportation     Lack of Transportation (Medical): No     Lack of Transportation (Non-Medical): No   Physical Activity: Insufficiently Active (1/9/2024)    Exercise Vital Sign     Days of Exercise per Week: 2 days     Minutes of Exercise per Session: 30 min   Stress: No Stress Concern Present (1/9/2024)    [employer] [place] [employer] - Occupational Stress Questionnaire     Feeling of Stress : Not at all   Social Connections: Unknown (1/9/2024)    Social Connection and Isolation Panel [NHANES]     Frequency of Communication with Friends and Family: Once a week     Frequency of Social Gatherings with Friends and Family: Once a week     Active Member of Clubs or Organizations: Yes     Attends Club or Organization Meetings: More  than 4 times per year     Marital Status:    Housing Stability: Low Risk  (1/9/2024)    Housing Stability Vital Sign     Unable to Pay for Housing in the Last Year: No     Number of Places Lived in the Last Year: 1     Unstable Housing in the Last Year: No       Allergies:  Patient has no known allergies.    Medications:    Current Outpatient Medications:     atorvastatin (LIPITOR) 20 MG tablet, Take 1 tablet (20 mg total) by mouth once daily., Disp: 90 tablet, Rfl: 3    calcium phosphate trib/vit D3 (CITRACAL + D3, CALCIUM PHOS, ORAL), Take 1 tablet by mouth once daily., Disp: , Rfl:     cholecalciferol, vitamin D3, (VITAMIN D3) 50 mcg (2,000 unit) Cap, Take 1 capsule by mouth once daily., Disp: , Rfl:     hydrocortisone (ANUSOL-HC) 2.5 % rectal cream, Place 1 application rectally 2 (two) times daily., Disp: 28 g, Rfl: 1    icosapent ethyL (VASCEPA) 1 gram Cap, Take 1 capsule (1,000 mg total) by mouth 2 (two) times a day., Disp: 60 capsule, Rfl: 11    linaCLOtide (LINZESS) 290 mcg Cap capsule, Take 1 capsule (290 mcg total) by mouth daily before breakfast., Disp: 90 capsule, Rfl: 3    multivitamin capsule, Take 1 capsule by mouth once daily., Disp: , Rfl:     pantoprazole (PROTONIX) 40 MG tablet, Take 1 tablet (40 mg total) by mouth once daily., Disp: 90 tablet, Rfl: 3    plecanatide (TRULANCE) 3 mg Tab, Take 3 mg by mouth once daily., Disp: 30 tablet, Rfl: 3    polyethylene glycol (GLYCOLAX) 17 gram PwPk, Take by mouth., Disp: , Rfl:     pregabalin (LYRICA) 75 MG capsule, Take 1 capsule (75 mg total) by mouth 2 (two) times daily., Disp: 60 capsule, Rfl: 2    sertraline (ZOLOFT) 50 MG tablet, Take 1 tablet (50 mg total) by mouth once daily., Disp: 90 tablet, Rfl: 1    tamsulosin (FLOMAX) 0.4 mg Cap, Take 1 capsule (0.4 mg total) by mouth once daily., Disp: 90 capsule, Rfl: 3    PHYSICAL EXAMINATION:  Physical Exam  Vitals and nursing note reviewed.   Constitutional:       General: He is awake.       Appearance: Normal appearance.   HENT:      Head: Normocephalic.      Right Ear: External ear normal.      Left Ear: External ear normal.      Nose: Nose normal.   Cardiovascular:      Rate and Rhythm: Normal rate.   Pulmonary:      Effort: Pulmonary effort is normal. No respiratory distress.   Abdominal:      Tenderness: There is no abdominal tenderness. There is no right CVA tenderness or left CVA tenderness.   Genitourinary:     Penis: Normal and circumcised. No hypospadias or erythema.       Testes:         Right: Mass, tenderness or swelling not present.         Left: Varicocele present. Mass, tenderness or swelling not present.      Prostate: Enlarged (~35gms; smooth). Not tender and no nodules present.      Rectum: Normal.   Musculoskeletal:         General: Normal range of motion.      Cervical back: Normal range of motion.   Skin:     General: Skin is warm and dry.   Neurological:      General: No focal deficit present.      Mental Status: He is alert and oriented to person, place, and time.   Psychiatric:         Mood and Affect: Mood normal.         Behavior: Behavior is cooperative.           LABS:      In office UA today was clear of active infection and blood.       Lab Results   Component Value Date    PSA 3.0 10/18/2018    PSA 2.1 10/27/2017    PSA 2.2 09/23/2016    PSADIAG 1.5 01/08/2024    PSADIAG 1.2 07/18/2023    PSADIAG 1.3 12/28/2022       Lab Results   Component Value Date    CREATININE 0.9 10/31/2023    EGFRNORACEVR >60.0 10/31/2023             IMPRESSION:    Encounter Diagnoses   Name Primary?    BPH with urinary obstruction Yes    Prostate cancer     S/P TURP     Lower urinary tract symptoms (LUTS)     Encounter for follow-up surveillance of prostate cancer     Fatigue, unspecified type     Decreased libido     Night sweats          Assessment:       1. BPH with urinary obstruction    2. Prostate cancer    3. S/P TURP    4. Lower urinary tract symptoms (LUTS)    5. Encounter for follow-up  surveillance of prostate cancer    6. Fatigue, unspecified type    7. Decreased libido    8. Night sweats        Plan:         I spent 40 minutes with the patient of which more than half was spent in direct consultation with the patient in regards to our treatment and plan.  We addressed the office findings and recent labs.   Education and recommendations of today's plan of care including home remedies and needed follow up with PCP and GI; orthopedics  We discussed the chief complaint; reviewed the LUTS and the possible contributory factors.   Reassurance no infection and PSA is low/stable. No concern for PCa  Reviewed management  Recommended lifestyle modifications with a proper, healthy diet, good hydration but during the day. Reducing bladder irritants.   Continue Flomax.   Will get T level today.   Discussed that our hem/onc physicians use Effexor for hotflashes from ADT. Can discuss with PCP  RTC 6 months with PSA           R. Lateral duong

## 2024-01-10 NOTE — PATIENT INSTRUCTIONS
Effexor (Venlafaxine) for hot flashes     PSADIAG 1.5 01/08/2024    PSADIAG 1.2 07/18/2023    PSADIAG 1.3 12/28/2022      PSA 3.0 10/18/2018    PSA 2.1 10/27/2017    PSA 2.2 09/23/2016

## 2024-01-11 ENCOUNTER — OFFICE VISIT (OUTPATIENT)
Dept: SURGERY | Facility: CLINIC | Age: 74
End: 2024-01-11
Payer: MEDICARE

## 2024-01-11 ENCOUNTER — TELEPHONE (OUTPATIENT)
Dept: SURGERY | Facility: CLINIC | Age: 74
End: 2024-01-11
Payer: MEDICARE

## 2024-01-11 VITALS
DIASTOLIC BLOOD PRESSURE: 62 MMHG | SYSTOLIC BLOOD PRESSURE: 118 MMHG | WEIGHT: 181.19 LBS | BODY MASS INDEX: 25.27 KG/M2

## 2024-01-11 DIAGNOSIS — R63.4 UNINTENTIONAL WEIGHT LOSS OF 10% BODY WEIGHT WITHIN 6 MONTHS: Primary | ICD-10-CM

## 2024-01-11 DIAGNOSIS — K64.0 GRADE I HEMORRHOIDS: ICD-10-CM

## 2024-01-11 PROCEDURE — 99214 OFFICE O/P EST MOD 30 MIN: CPT | Mod: 25,S$GLB,, | Performed by: COLON & RECTAL SURGERY

## 2024-01-11 PROCEDURE — 3078F DIAST BP <80 MM HG: CPT | Mod: CPTII,S$GLB,, | Performed by: COLON & RECTAL SURGERY

## 2024-01-11 PROCEDURE — 1160F RVW MEDS BY RX/DR IN RCRD: CPT | Mod: CPTII,S$GLB,, | Performed by: COLON & RECTAL SURGERY

## 2024-01-11 PROCEDURE — 46221 LIGATION OF HEMORRHOID(S): CPT | Mod: S$GLB,,, | Performed by: COLON & RECTAL SURGERY

## 2024-01-11 PROCEDURE — 3008F BODY MASS INDEX DOCD: CPT | Mod: CPTII,S$GLB,, | Performed by: COLON & RECTAL SURGERY

## 2024-01-11 PROCEDURE — 99999 PR PBB SHADOW E&M-EST. PATIENT-LVL III: CPT | Mod: PBBFAC,,, | Performed by: COLON & RECTAL SURGERY

## 2024-01-11 PROCEDURE — 1159F MED LIST DOCD IN RCRD: CPT | Mod: CPTII,S$GLB,, | Performed by: COLON & RECTAL SURGERY

## 2024-01-11 PROCEDURE — 3074F SYST BP LT 130 MM HG: CPT | Mod: CPTII,S$GLB,, | Performed by: COLON & RECTAL SURGERY

## 2024-01-11 NOTE — PROGRESS NOTES
CRS Office Visit Followup    Referring Md:   No referring provider defined for this encounter.    SUBJECTIVE:     Chief Complaint: constipation and hemorrhoids    History of Present Illness:  Course is as follows:  Patient is a 73 y.o. male presents with constipation and hemorrhoids.     8/2019: seen for thrombosed external hemorrhoid    10/31/23: chronic constipation on Linzess 290mcg per day. Hx of ongoing constipation for last 6 years, worse for the last 6 months. He temporally associates this with a series of surgeries over the 6 year time course. He has had laparoscopic inguinal hernia repairs and umbilical hernia repair, TURP, back surgery and knee surgery. States constipation has worsened after each surgery. He does not take any opioid after surgery due to fear of worsening his issues. His constipation has substantially worsened in the last 6 months after TURP and initiation of flomax. He is now at the point he strains for up to 20min with each bowel movement, requiring multiple maneuvers to have a BM. He brings his knees to his chest, massages his abdomen with each BM. Has 1 BM daily. Bms are associated with pain and occasional bleeding which he states is 2/2 hemorrhiods. He notes prolapsing of hemorrhoids. He takes Linzess as stated before, metamucil and miralax daily. He feels the metamucil and miralax help some but overall still very miserable.    - recommended increasing MiraLax to twice per day to assist with his underlying constipation.  Defecography performed and showed normal evacuation of contrast.  Rubber-band ligation of grade 2 internal hemorrhoids along with steroid suppositories.    12/7/23: improved from prior exam.  He has stopped taking calcium as well as Protonix.  He is having soft bowel movements.  3 bowel movements over 15 minutes.   - rubber band ligation of Grade I hemorrhoids seen on the left lateral and right posterior position  1/11/24: He was seen by GI (Dr. Gallardo) and started on  Trulance rather than Linzess.  He has not tried the Trulance he had.  He presents for evaluation for recurrent prolapsing hemorrhoids that occurred after multiple bowel movements with clustering.  He also notes that he is having a 10 lb weight loss over the past several weeks associated with night sweats.    Last Colonoscopy: 6/5/2019: normal  No fhx of colon cancer      Review of Systems:  Review of Systems   Constitutional:  Positive for malaise/fatigue and weight loss. Negative for chills and fever.   HENT:  Negative for sinus pain and sore throat.    Eyes:  Negative for pain.   Respiratory:  Negative for cough and shortness of breath.    Cardiovascular:  Negative for chest pain.   Gastrointestinal:  Positive for blood in stool and constipation. Negative for heartburn, nausea and vomiting.   Genitourinary:  Negative for dysuria and hematuria.   Musculoskeletal:  Negative for myalgias.   Skin:  Negative for rash.   Neurological:  Negative for dizziness and headaches.       OBJECTIVE:     Vital Signs (Most Recent)  /62   Wt 82.2 kg (181 lb 3.5 oz)   BMI 25.27 kg/m²     Physical Exam:  General: White male in no distress   Neuro: alert and oriented x 4.  Moves all extremities.     HEENT: no icterus.  Trachea midline  Respiratory: respirations are even and unlabored  Cardiac: regular rate  Abdomen: soft, nondistened, nontender, well healed incisions  Extremities: Warm dry and intact  Skin: no rashes  Anorectal:  External exam with small volume external hemorrhoid most notably on the right lateral side.  Normal tone.  Soft internal hemorrhoids on digital exam.  Anoscopy with prior banding sites well healed.  Left posterior hemorrhoid as well as right posterior hemorrhoids.  Both grade 1.      Labs: TSH normal.  No anemia.  Normal renal function.  Normal albumin    Imaging: no cross sectional abdominal imaging.       ASSESSMENT/PLAN:     Diagnoses and all orders for this visit:    Unintentional weight loss of  10% body weight within 6 months  -     CT Chest Abdomen Pelvis With IV Contrast (XPD) Routine Oral Contrast; Future  -     Creatinine, serum; Future    Grade I hemorrhoids  -     Creatinine, serum; Future          73 y.o. male with presents with worsening constipation.  Blood work normal.  Thyroid function normal. Defography with adequate ability to expel.  He has daily bowel movements but requires substantial straining.  Significant improvement after banding and decreased straining.      Regarding the hemorrhoids, banding was offered and performed today without complication.    Regarding the weight loss and night sweats, will plan for further evaluation with a CT of the chest abdomen pelvis.    I will follow up with him with the results of his CT.      Rubber Band Ligation:  Verbal consent was obtained.   Clear plastic anoscope inserted.    Grade I hemorrhoids seen on the left posterior lateral and right posterior position  Suction applicator was placed above the dentate line.   Rubber bands were deployed in the left posterior lateral and right posterior position.    0.25% marcaine was injected above the rubber band into the banded hemorrhoidal tissue.   Patient tolerated the procedure well.       REBECA Baldwin MD, FACS, FASCRS  Staff Surgeon  Colon & Rectal Surgery

## 2024-01-11 NOTE — TELEPHONE ENCOUNTER
Contacted pt to conf appt//completed//   Dr. Chino will complete Anesthesia sign out. Patient can move to Phase II.

## 2024-01-12 ENCOUNTER — TELEPHONE (OUTPATIENT)
Dept: INTERNAL MEDICINE | Facility: CLINIC | Age: 74
End: 2024-01-12

## 2024-01-12 DIAGNOSIS — E78.49 OTHER HYPERLIPIDEMIA: ICD-10-CM

## 2024-01-12 DIAGNOSIS — I70.0 ATHEROSCLEROSIS OF AORTA: ICD-10-CM

## 2024-01-12 DIAGNOSIS — R07.9 CHEST PAIN, UNSPECIFIED TYPE: Primary | ICD-10-CM

## 2024-01-12 PROBLEM — F32.9 MAJOR DEPRESSIVE DISORDER WITH CURRENT ACTIVE EPISODE: Status: ACTIVE | Noted: 2024-01-12

## 2024-01-12 RX ORDER — ESCITALOPRAM OXALATE 5 MG/1
5 TABLET ORAL DAILY
Qty: 30 TABLET | Refills: 2 | Status: SHIPPED | OUTPATIENT
Start: 2024-01-12 | End: 2024-02-19

## 2024-01-17 ENCOUNTER — PATIENT MESSAGE (OUTPATIENT)
Dept: GASTROENTEROLOGY | Facility: CLINIC | Age: 74
End: 2024-01-17
Payer: MEDICARE

## 2024-01-19 ENCOUNTER — OFFICE VISIT (OUTPATIENT)
Dept: PAIN MEDICINE | Facility: CLINIC | Age: 74
End: 2024-01-19
Payer: MEDICARE

## 2024-01-19 ENCOUNTER — TELEPHONE (OUTPATIENT)
Dept: SURGERY | Facility: CLINIC | Age: 74
End: 2024-01-19
Payer: MEDICARE

## 2024-01-19 VITALS
DIASTOLIC BLOOD PRESSURE: 71 MMHG | BODY MASS INDEX: 25.4 KG/M2 | HEIGHT: 71 IN | HEART RATE: 80 BPM | SYSTOLIC BLOOD PRESSURE: 111 MMHG | WEIGHT: 181.44 LBS

## 2024-01-19 DIAGNOSIS — S32.020D COMPRESSION FRACTURE OF L2 VERTEBRA WITH ROUTINE HEALING: ICD-10-CM

## 2024-01-19 DIAGNOSIS — M54.41 CHRONIC BILATERAL LOW BACK PAIN WITH RIGHT-SIDED SCIATICA: Primary | ICD-10-CM

## 2024-01-19 DIAGNOSIS — M51.36 DDD (DEGENERATIVE DISC DISEASE), LUMBAR: ICD-10-CM

## 2024-01-19 DIAGNOSIS — G89.29 CHRONIC BILATERAL LOW BACK PAIN WITH RIGHT-SIDED SCIATICA: Primary | ICD-10-CM

## 2024-01-19 PROCEDURE — 3288F FALL RISK ASSESSMENT DOCD: CPT | Mod: CPTII,S$GLB,, | Performed by: STUDENT IN AN ORGANIZED HEALTH CARE EDUCATION/TRAINING PROGRAM

## 2024-01-19 PROCEDURE — 3074F SYST BP LT 130 MM HG: CPT | Mod: CPTII,S$GLB,, | Performed by: STUDENT IN AN ORGANIZED HEALTH CARE EDUCATION/TRAINING PROGRAM

## 2024-01-19 PROCEDURE — 1125F AMNT PAIN NOTED PAIN PRSNT: CPT | Mod: CPTII,S$GLB,, | Performed by: STUDENT IN AN ORGANIZED HEALTH CARE EDUCATION/TRAINING PROGRAM

## 2024-01-19 PROCEDURE — 1159F MED LIST DOCD IN RCRD: CPT | Mod: CPTII,S$GLB,, | Performed by: STUDENT IN AN ORGANIZED HEALTH CARE EDUCATION/TRAINING PROGRAM

## 2024-01-19 PROCEDURE — 1101F PT FALLS ASSESS-DOCD LE1/YR: CPT | Mod: CPTII,S$GLB,, | Performed by: STUDENT IN AN ORGANIZED HEALTH CARE EDUCATION/TRAINING PROGRAM

## 2024-01-19 PROCEDURE — 99214 OFFICE O/P EST MOD 30 MIN: CPT | Mod: S$GLB,,, | Performed by: STUDENT IN AN ORGANIZED HEALTH CARE EDUCATION/TRAINING PROGRAM

## 2024-01-19 PROCEDURE — 3008F BODY MASS INDEX DOCD: CPT | Mod: CPTII,S$GLB,, | Performed by: STUDENT IN AN ORGANIZED HEALTH CARE EDUCATION/TRAINING PROGRAM

## 2024-01-19 PROCEDURE — 99999 PR PBB SHADOW E&M-EST. PATIENT-LVL IV: CPT | Mod: PBBFAC,,, | Performed by: STUDENT IN AN ORGANIZED HEALTH CARE EDUCATION/TRAINING PROGRAM

## 2024-01-19 PROCEDURE — 3078F DIAST BP <80 MM HG: CPT | Mod: CPTII,S$GLB,, | Performed by: STUDENT IN AN ORGANIZED HEALTH CARE EDUCATION/TRAINING PROGRAM

## 2024-01-19 RX ORDER — MELOXICAM 15 MG/1
15 TABLET ORAL DAILY PRN
Qty: 30 TABLET | Refills: 0 | Status: SHIPPED | OUTPATIENT
Start: 2024-01-19 | End: 2024-02-16

## 2024-01-19 NOTE — TELEPHONE ENCOUNTER
----- Message from Stu Brown sent at 1/19/2024  2:52 PM CST -----  Regarding: Sooner appt request  Contact: @ [phone]  Pt is calling to speak to someone in the office; asking for a sooner appt than what they are scheduled for. Pt is already on the wait list; no available appts in Epic that are coming up soon.Please call to advise. Thanks.         Reason for sooner appt: Hemorrhoid removal       When is the first available appointment? Feb          Communication Preference: Call back     Additional Information: Pt is wanting an appt in Jan except for Jan 22nd and Jan 24th. Pt is wanting an appt in the afternoon preferred

## 2024-01-20 ENCOUNTER — HOSPITAL ENCOUNTER (OUTPATIENT)
Dept: RADIOLOGY | Facility: HOSPITAL | Age: 74
Discharge: HOME | End: 2024-01-20
Attending: COLON & RECTAL SURGERY
Payer: MEDICARE

## 2024-01-20 DIAGNOSIS — R63.4 UNINTENTIONAL WEIGHT LOSS OF 10% BODY WEIGHT WITHIN 6 MONTHS: ICD-10-CM

## 2024-01-20 PROCEDURE — 74177 CT ABD & PELVIS W/CONTRAST: CPT | Mod: TC

## 2024-01-20 PROCEDURE — 25500020 PHARM REV CODE 255: Performed by: COLON & RECTAL SURGERY

## 2024-01-20 PROCEDURE — 71260 CT THORAX DX C+: CPT | Mod: 26,,, | Performed by: RADIOLOGY

## 2024-01-20 PROCEDURE — A9698 NON-RAD CONTRAST MATERIALNOC: HCPCS | Performed by: COLON & RECTAL SURGERY

## 2024-01-20 PROCEDURE — 74177 CT ABD & PELVIS W/CONTRAST: CPT | Mod: 26,,, | Performed by: RADIOLOGY

## 2024-01-20 RX ADMIN — Medication 450 ML: at 11:01

## 2024-01-20 RX ADMIN — IOHEXOL 100 ML: 350 INJECTION, SOLUTION INTRAVENOUS at 11:01

## 2024-01-20 RX ADMIN — Medication 450 ML: at 01:01

## 2024-01-21 ENCOUNTER — PATIENT MESSAGE (OUTPATIENT)
Dept: UROLOGY | Facility: CLINIC | Age: 74
End: 2024-01-21
Payer: MEDICARE

## 2024-01-24 ENCOUNTER — HOSPITAL ENCOUNTER (OUTPATIENT)
Dept: CARDIOLOGY | Facility: HOSPITAL | Age: 74
Discharge: HOME | End: 2024-01-24
Attending: FAMILY MEDICINE
Payer: MEDICARE

## 2024-01-24 DIAGNOSIS — R07.9 CHEST PAIN, UNSPECIFIED TYPE: ICD-10-CM

## 2024-01-24 DIAGNOSIS — E78.49 OTHER HYPERLIPIDEMIA: ICD-10-CM

## 2024-01-24 DIAGNOSIS — I70.0 ATHEROSCLEROSIS OF AORTA: ICD-10-CM

## 2024-01-24 LAB
ASCENDING AORTA: 3.12 CM
AV INDEX (PROSTH): 0.85
AV MEAN GRADIENT: 3 MMHG
AV PEAK GRADIENT: 7 MMHG
AV VALVE AREA BY VELOCITY RATIO: 2.99 CM²
AV VALVE AREA: 3.16 CM²
AV VELOCITY RATIO: 0.8
CV ECHO LV RWT: 0.32 CM
CV STRESS BASE HR: 69 BPM
DIASTOLIC BLOOD PRESSURE: 75 MMHG
DOP CALC AO PEAK VEL: 1.31 M/S
DOP CALC AO VTI: 27.62 CM
DOP CALC LVOT AREA: 3.7 CM2
DOP CALC LVOT DIAMETER: 2.18 CM
DOP CALC LVOT PEAK VEL: 1.05 M/S
DOP CALC LVOT STROKE VOLUME: 87.22 CM3
DOP CALCLVOT PEAK VEL VTI: 23.38 CM
E WAVE DECELERATION TIME: 268.59 MSEC
E/A RATIO: 1.02
E/E' RATIO: 4.96 M/S
ECHO LV POSTERIOR WALL: 0.78 CM (ref 0.6–1.1)
FRACTIONAL SHORTENING: 29 % (ref 28–44)
INTERVENTRICULAR SEPTUM: 0.8 CM (ref 0.6–1.1)
LA MAJOR: 3.64 CM
LA MINOR: 4.08 CM
LA WIDTH: 3.17 CM
LEFT ATRIUM SIZE: 3.44 CM
LEFT ATRIUM VOLUME MOD: 32.75 CM3
LEFT ATRIUM VOLUME: 35.66 CM3
LEFT INTERNAL DIMENSION IN SYSTOLE: 3.47 CM (ref 2.1–4)
LEFT VENTRICLE DIASTOLIC VOLUME: 111.97 ML
LEFT VENTRICLE SYSTOLIC VOLUME: 49.75 ML
LEFT VENTRICULAR INTERNAL DIMENSION IN DIASTOLE: 4.88 CM (ref 3.5–6)
LEFT VENTRICULAR MASS: 128.21 G
LV LATERAL E/E' RATIO: 4.38 M/S
LV SEPTAL E/E' RATIO: 5.7 M/S
MV A" WAVE DURATION": 13.13 MSEC
MV PEAK A VEL: 0.56 M/S
MV PEAK E VEL: 0.57 M/S
MV STENOSIS PRESSURE HALF TIME: 77.89 MS
MV VALVE AREA P 1/2 METHOD: 2.82 CM2
OHS CV CPX 1 MINUTE RECOVERY HEART RATE: 141 BPM
OHS CV CPX 85 PERCENT MAX PREDICTED HEART RATE MALE: 127
OHS CV CPX ESTIMATED METS: 11
OHS CV CPX MAX PREDICTED HEART RATE: 149
OHS CV CPX PATIENT IS FEMALE: 0
OHS CV CPX PATIENT IS MALE: 1
OHS CV CPX PEAK DIASTOLIC BLOOD PRESSURE: 61 MMHG
OHS CV CPX PEAK HEAR RATE: 141 BPM
OHS CV CPX PEAK RATE PRESSURE PRODUCT: NORMAL
OHS CV CPX PEAK SYSTOLIC BLOOD PRESSURE: 164 MMHG
OHS CV CPX PERCENT MAX PREDICTED HEART RATE ACHIEVED: 95
OHS CV CPX RATE PRESSURE PRODUCT PRESENTING: 9936
PISA TR MAX VEL: 2.2 M/S
PULM VEIN S/D RATIO: 1.73
PV PEAK D VEL: 0.3 M/S
PV PEAK S VEL: 0.52 M/S
RA MAJOR: 3.42 CM
RA PRESSURE ESTIMATED: 3 MMHG
RA WIDTH: 2.73 CM
RIGHT VENTRICULAR END-DIASTOLIC DIMENSION: 3.24 CM
RV TB RVSP: 5 MMHG
SINUS: 3.05 CM
STJ: 3.37 CM
STRESS ECHO POST EXERCISE DUR MIN: 12 MINUTES
STRESS ECHO POST EXERCISE DUR SEC: 48 SECONDS
SYSTOLIC BLOOD PRESSURE: 144 MMHG
TDI LATERAL: 0.13 M/S
TDI SEPTAL: 0.1 M/S
TDI: 0.12 M/S
TR MAX PG: 19 MMHG
TRICUSPID ANNULAR PLANE SYSTOLIC EXCURSION: 2.23 CM
TV REST PULMONARY ARTERY PRESSURE: 22 MMHG

## 2024-01-24 PROCEDURE — 93351 STRESS TTE COMPLETE: CPT

## 2024-01-24 PROCEDURE — 93351 STRESS TTE COMPLETE: CPT | Mod: 26,,, | Performed by: INTERNAL MEDICINE

## 2024-01-30 ENCOUNTER — TELEPHONE (OUTPATIENT)
Dept: SURGERY | Facility: CLINIC | Age: 74
End: 2024-01-30
Payer: MEDICARE

## 2024-02-01 ENCOUNTER — OFFICE VISIT (OUTPATIENT)
Dept: SURGERY | Facility: CLINIC | Age: 74
End: 2024-02-01
Payer: MEDICARE

## 2024-02-01 VITALS
SYSTOLIC BLOOD PRESSURE: 140 MMHG | DIASTOLIC BLOOD PRESSURE: 60 MMHG | WEIGHT: 180.75 LBS | BODY MASS INDEX: 25.21 KG/M2

## 2024-02-01 DIAGNOSIS — K59.04 CHRONIC IDIOPATHIC CONSTIPATION: Primary | ICD-10-CM

## 2024-02-01 DIAGNOSIS — K64.0 GRADE I HEMORRHOIDS: ICD-10-CM

## 2024-02-01 PROCEDURE — 99999 PR PBB SHADOW E&M-EST. PATIENT-LVL III: CPT | Mod: PBBFAC,,, | Performed by: COLON & RECTAL SURGERY

## 2024-02-01 PROCEDURE — 3078F DIAST BP <80 MM HG: CPT | Mod: CPTII,S$GLB,, | Performed by: COLON & RECTAL SURGERY

## 2024-02-01 PROCEDURE — 99213 OFFICE O/P EST LOW 20 MIN: CPT | Mod: 25,S$GLB,, | Performed by: COLON & RECTAL SURGERY

## 2024-02-01 PROCEDURE — 1160F RVW MEDS BY RX/DR IN RCRD: CPT | Mod: CPTII,S$GLB,, | Performed by: COLON & RECTAL SURGERY

## 2024-02-01 PROCEDURE — 46221 LIGATION OF HEMORRHOID(S): CPT | Mod: S$GLB,,, | Performed by: COLON & RECTAL SURGERY

## 2024-02-01 PROCEDURE — 1159F MED LIST DOCD IN RCRD: CPT | Mod: CPTII,S$GLB,, | Performed by: COLON & RECTAL SURGERY

## 2024-02-01 PROCEDURE — 3008F BODY MASS INDEX DOCD: CPT | Mod: CPTII,S$GLB,, | Performed by: COLON & RECTAL SURGERY

## 2024-02-01 PROCEDURE — 3077F SYST BP >= 140 MM HG: CPT | Mod: CPTII,S$GLB,, | Performed by: COLON & RECTAL SURGERY

## 2024-02-01 NOTE — PROGRESS NOTES
CRS Office Visit Followup    Referring Md:   No referring provider defined for this encounter.    SUBJECTIVE:     Chief Complaint: constipation and hemorrhoids    History of Present Illness:  Course is as follows:  Patient is a 73 y.o. male presents with constipation and hemorrhoids.     8/2019: seen for thrombosed external hemorrhoid    10/31/23: chronic constipation on Linzess 290mcg per day. Hx of ongoing constipation for last 6 years, worse for the last 6 months. He temporally associates this with a series of surgeries over the 6 year time course. He has had laparoscopic inguinal hernia repairs and umbilical hernia repair, TURP, back surgery and knee surgery. States constipation has worsened after each surgery. He does not take any opioid after surgery due to fear of worsening his issues. His constipation has substantially worsened in the last 6 months after TURP and initiation of flomax. He is now at the point he strains for up to 20min with each bowel movement, requiring multiple maneuvers to have a BM. He brings his knees to his chest, massages his abdomen with each BM. Has 1 BM daily. Bms are associated with pain and occasional bleeding which he states is 2/2 hemorrhiods. He notes prolapsing of hemorrhoids. He takes Linzess as stated before, metamucil and miralax daily. He feels the metamucil and miralax help some but overall still very miserable.    - recommended increasing MiraLax to twice per day to assist with his underlying constipation.  Defecography performed and showed normal evacuation of contrast.  Rubber-band ligation of grade 2 internal hemorrhoids along with steroid suppositories.    12/7/23: improved from prior exam.  He has stopped taking calcium as well as Protonix.  He is having soft bowel movements.  3 bowel movements over 15 minutes.   - rubber band ligation of Grade I hemorrhoids seen on the left lateral and right posterior position  1/11/24: He was seen by GI (Dr. Gallardo) and started on  Trulance rather than Linzess.  He has not tried the Trulance he had.  He presents for evaluation for recurrent prolapsing hemorrhoids that occurred after multiple bowel movements with clustering.  He also notes that he is having a 10 lb weight loss over the past several weeks associated with night sweats.   - RBL of Grade I hemorrhoids seen on the left posterior lateral and right posterior position   - CT chest abd pelvis was done looking for any abnormality.     - 1/20/24: Unremarkable exam other than prostatectomy changes   2/1/24:  Did well after his prior banding.  His weight has fluctuated but overall stayed the same.  He was unable to tolerate the Trulance and therefore wound back to the Linzess 290 mcg per day.  He is also taking MiraLax in the morning as well as intermittent Dulcolax.  With straining, he had perianal swelling and bleeding.  He therefore presents for evaluation.    Last Colonoscopy: 6/5/2019: normal  No fhx of colon cancer      Review of Systems:  Review of Systems   Constitutional:  Positive for malaise/fatigue and weight loss. Negative for chills and fever.   HENT:  Negative for sinus pain and sore throat.    Eyes:  Negative for pain.   Respiratory:  Negative for cough and shortness of breath.    Cardiovascular:  Negative for chest pain.   Gastrointestinal:  Positive for blood in stool and constipation. Negative for heartburn, nausea and vomiting.   Genitourinary:  Negative for dysuria and hematuria.   Musculoskeletal:  Negative for myalgias.   Skin:  Negative for rash.   Neurological:  Negative for dizziness and headaches.       OBJECTIVE:     Vital Signs (Most Recent)  BP (!) 140/60   Wt 82 kg (180 lb 12.4 oz)   BMI 25.21 kg/m²     Physical Exam:  General: White male in no distress   Neuro: alert and oriented x 4.  Moves all extremities.     HEENT: no icterus.  Trachea midline  Respiratory: respirations are even and unlabored  Cardiac: regular rate  Abdomen: soft, nondistened,  nontender, well healed incisions  Extremities: Warm dry and intact  Skin: no rashes  Anorectal:  External exam was normal  Normal tone.  Soft internal hemorrhoids on digital exam.  Anoscopy with prior banding sites well healed.  Left posterior hemorrhoid as well as right anterior hemorrhoids.  Both grade 1.      Labs: TSH normal.  No anemia.  Normal renal function.  Normal albumin    Imaging: reviewd above      ASSESSMENT/PLAN:     Diagnoses and all orders for this visit:    Chronic idiopathic constipation  -     lactulose (CEPHULAC) 20 gram Pack; Take 1 packet (20 g total) by mouth 3 (three) times daily.    Grade I hemorrhoids            73 y.o. male with presents with worsening constipation.  Blood work normal.  Thyroid function normal. Defography with adequate ability to expel.  He has daily bowel movements but requires substantial straining.  Significant improvement after banding and decreased straining.      Banding and then previously performed on 01/11/2024.  Repeat banding was offered and performed today.  Recommended team MiraLax in both the morning and at night in order to avoid a large amount firm stool in the morning.    Lactulose given to assist with his constipation.      He can follow up with any future concerns or issues.      Rubber Band Ligation:  Verbal consent was obtained.   Clear plastic anoscope inserted.    Grade I hemorrhoids seen on the left posterior lateral and right anterior position  Suction applicator was placed above the dentate line.   Rubber bands were deployed in the left posterior lateral and right anterior position.    0.25% marcaine was injected above the rubber band into the banded hemorrhoidal tissue.   Patient tolerated the procedure well.       REBECA Baldwin MD, FACS, FASCRS  Staff Surgeon  Colon & Rectal Surgery

## 2024-02-05 ENCOUNTER — CLINICAL SUPPORT (OUTPATIENT)
Dept: REHABILITATION | Facility: HOSPITAL | Age: 74
End: 2024-02-05
Payer: MEDICARE

## 2024-02-05 DIAGNOSIS — M53.86 DECREASED RANGE OF MOTION OF LUMBAR SPINE: Primary | ICD-10-CM

## 2024-02-05 DIAGNOSIS — G89.29 CHRONIC MIDLINE THORACIC BACK PAIN: ICD-10-CM

## 2024-02-05 DIAGNOSIS — M54.6 CHRONIC MIDLINE THORACIC BACK PAIN: ICD-10-CM

## 2024-02-05 DIAGNOSIS — G89.29 CHRONIC BILATERAL LOW BACK PAIN WITH RIGHT-SIDED SCIATICA: ICD-10-CM

## 2024-02-05 DIAGNOSIS — M54.41 CHRONIC BILATERAL LOW BACK PAIN WITH RIGHT-SIDED SCIATICA: ICD-10-CM

## 2024-02-05 PROCEDURE — 97750 PHYSICAL PERFORMANCE TEST: CPT | Mod: 59,32

## 2024-02-05 PROCEDURE — 97112 NEUROMUSCULAR REEDUCATION: CPT

## 2024-02-05 PROCEDURE — 97530 THERAPEUTIC ACTIVITIES: CPT

## 2024-02-05 NOTE — PLAN OF CARE
[facility]  Physical Therapy Lumbar Evaluation      Name: Jean KHAN [facility]  Clinic Number: [id number]    Therapy Diagnosis:   Encounter Diagnoses   Name Primary?    Chronic midline thoracic back pain     Chronic bilateral low back pain with right-sided sciatica     Decreased range of motion of lumbar spine Yes     Physician: Ute Rivas, *    Physician Orders: PT Eval and Treat  Medical Diagnosis from Referral: M54.6,G89.29 (ICD-10-CM) - Chronic midline thoracic back pain M54.41,G89.29 (ICD-10-CM) - Chronic bilateral low back pain with right-sided sciatica  Evaluation Date: 2/5/2024  Authorization Period Expiration: 01/04/2025  Plan of Care Expiration: 4/15/2024  Reassessment Due: 3/5/2024  Visit # / Visits authorized: 1/20  MedX testing visit 1    Time In: 3:00 PM  Time Out: 4:30 PM  Total Billable Time: 90 minutes  INSURANCE and OUTCOMES: Fee for Service with FOTO Outcomes 1/3    Precautions: standard, history of cancer, and osteoporosis    Pattern of pain determined: 1PEN    Subjective     Date of onset: chronic  History of current condition: Jean reports history of chronic back pain throughout his adult life that comes and goes.  He relates exacerbations to doing heavy work around house previously but now feels that he has to be very careful when going to the gym or doing anything around the house.  Most recent pain exacerbation was 8 weeks ago when he was cleaning up the garage and lifted a 40# container of pain.  Pain was significant but did get relief with steroid pack.  Pt says his biggest complaint now is not being able to go the gym and work out without worrying he will hurt his back.      History of back surgeries 2003, 2018    Medical History:   Past Medical History:   Diagnosis Date    Acute medial meniscus tear, right, initial encounter 05/30/2023    Anterior tibialis tendinitis of right lower extremity 10/18/2018    Cancer     Chronic idiopathic  constipation 2019    Closed fracture of left wrist 10/18/2021    Closed fracture of lower end of left radius with routine healing 2022    Elevated PSA     Family history of ASCVD 10/07/2016    Mom age 64, Dad age 67 -  on same day. Pat Aunt early 70's.    Family history of coronary artery disease 10/07/2016    Mom age 64, Dad age 67 -  on same day. Pat Aunt early 70's.    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    Intractable back pain 2018    Knee injury, right, initial encounter 05/15/2023    Nuclear sclerosis, bilateral 2018    Ocular migraine 2012    Osteoporosis     Prostate disease     Transient vision disturbance of both eyes 2012    Umbilical hernia without obstruction and without gangrene 10/01/2015    Urinary tract infection        Surgical History:   Jean Carlson  has a past surgical history that includes Prostate surgery; back sx; Tonsillectomy; Spine surgery; Transurethral resection of prostate; Cataract extraction w/  intraocular lens implant (Bilateral); Colonoscopy (N/A, 2019); Hernia repair; Removal of blood clot (N/A, 2019); Transurethral resection of prostate (N/A, 2019); Cystoscopy (N/A, 2019); Bladder fulguration (N/A, 2019); Esophagogastroduodenoscopy (N/A, 3/31/2021); Cystoscopy; Transurethral surgical removal of stricture of bladder neck (2021); Cystoscopy (2021); Knee arthroscopy w/ meniscectomy (Right, 2023); Chondroplasty of knee (Right, 2023); [id number]-[id number]-hateuxltsghy (Right, 2023); and Synovectomy of knee (Right, 2023).    Medications:   Jean has a current medication list which includes the following prescription(s): atorvastatin, calcium phosphate trib/vit d3, cholecalciferol (vitamin d3), escitalopram oxalate, hydrocortisone, icosapent ethyl, lactulose,  linaclotide, meloxicam, multivitamin, pantoprazole, polyethylene glycol, pregabalin, and tamsulosin.    Allergies:   Review of patient's allergies indicates:  No Known Allergies     Imaging: X-ray   EXAMINATION:  XR LUMBAR SPINE AP AND LAT WITH FLEX/EXT     CLINICAL HISTORY:  Other secondary scoliosis, site unspecified     TECHNIQUE:  AP, lateral and spot images were performed of the lumbar spine.  Additional flexion and extension views obtained.     COMPARISON:  05/17/2022     FINDINGS:  Unchanged superior endplate height loss at L2.  No evidence of a new displaced fracture.     Mild rightward convex curvature of the thoracolumbar spine straightening of the normal lumbar lordosis with mild retrolisthesis at L2-3.  No abnormal translation with flexion and extension.     Loss of disc height and degenerative endplate changes at several levels lumbar spine, worst at L2-3 and L4-5.  Bilateral facet hypertrophy.     Impression:     Scoliosis and degenerative change as above, without evidence of translational instability.    MRI:  EXAMINATION:  MRI LUMBAR SPINE WITHOUT CONTRAST     CLINICAL HISTORY:  Lumbar radiculopathy, symptoms persist with conservative treatment; Radiculopathy, lumbar region     TECHNIQUE:  Multiplanar, multisequence MR images were acquired from the thoracolumbar junction to the sacrum without contrast.     COMPARISON:  05/18/2022.     FINDINGS:  Alignment: Normal.     Vertebrae: There is moderate height loss of the L2 vertebral body in keeping with remote compression fracture, unchanged from 12/21/2023 and 05/18/2022.  No acute compression fracture no evidence for marrow infiltrative process.     Discs: Severe disc height loss at L4-L5.  No evidence for discitis.     Cord: Conus terminates at L1 and appears unremarkable.  Cauda equina appears unremarkable.     Degenerative findings:     T12-L1: No spinal canal stenosis or neuroforaminal narrowing.     L1-L2: Circumferential disc bulge.  No spinal  canal stenosis or neural foraminal narrowing.     L2-L3: Right circumferential disc bulge and mild facet arthropathy result in mild spinal canal stenosis and mild bilateral neural foraminal narrowing.     L3-L4: Circumferential disc bulge with right paracentral protrusion result in effacement of the right lateral recess and moderate right neural foraminal narrowing.  Disc material contacts the descending right L4 nerve root.     L4-L5: Circumferential disc bulge and mild facet arthropathy result in mild bilateral neural foraminal narrowing.     L5-S1: Circumferential disc bulge and mild facet arthropathy result in mild left neural foraminal narrowing.     Paraspinal muscles & soft tissues: Moderate paraspinal muscle atrophy.  Several bilateral renal cysts.     Impression:     1. Multilevel degenerative changes detailed above.  Mild-to-moderate neural foraminal narrowing at L2-S1.  Right paracentral disc protrusion at L3-L4 resulting effacement of the right lateral recess.  2. Chronic L2 vertebral body compression fracture with moderate height loss.    Prior Therapy: yes  Prior Treatment: surgeries, PT  Social History:  lives with their spouse  Occupation: retired  Leisure: working out, yard work (limited right now), involved with [employer]      Prior Level of Function: independent  Current Level of Function: independent  DME owned/used: elliptical, stationary bike  Gym Membership: yes ([employer])    Pain:  Current 0/10, worst 2/10, best 0/10   Location: right back , right posterior leg into foot  Description: Aching and Numb  Aggravating Factors: Bending  Easing Factors: lying down and tylenol  Disturbed Sleep: no    Pattern of pain questions:  1.  Where is your pain the worst? back  2.  Is your pain constant or intermittent? intermittent  3.  Does bending forward make your typical pain worse? yes  4.  Since the start of your back pain, has there been a change in your bowel or bladder? no  5.  What  "can't you do now that you use to be able to do? Yard work, full gym workouts    Pts goals: "Unrestricted gym work outs, learn how to work out, strengthen right leg, work in the yard without pain"    Red Flag Screening:   Cough/Sneeze Strain: (--)  Bladder/Bowel: (--)  Falls: (--)  Night pain: (--)  Unexplained weight loss: (--)  General health: Very good    Objective      Postural examination/scapula alignment: Rounded shoulder and Head forward  Joint integrity: Firm end feeling  Skin integrity:WNL   Edema: None  Correction of posture: better with lumbar roll  Sitting: poor  Standing: fair    MOVEMENT LOSS - Lumbar   Norms ROM Loss Initial   Flexion Fingers touch toes, sacral angle >/= 70 deg, uniform spinal curvature, posterior weight shift  minimal loss   Extension ASIS surpasses toes, spine of scapulae surpasses heels, uniform spinal curve minimal loss   Side glide Right  minimal loss   Side glide Left  minimal loss   Rotation Right PT observes contralateral shoulder minimal loss   Rotation Left PT observes contralateral shoulder minimal loss     Lower Extremity Strength  Right LE  Left LE    Hip flexion: 4+/5 Hip flexion: 5/5   Hip extension:  4/5 Hip extension: 4+/5   Hip abduction: 4/5 Hip abduction: 4+/5   Hip adduction:  4/5 Hip adduction:  4+/5   Hip External Rotation 4+/5 Hip External Rotation 4+/5   Hip Internal rotation   5/5 Hip Internal rotation 5/5   Knee Flexion 5/5 Knee Flexion 5/5   Knee Extension 5/5 Knee Extension 5/5   Ankle dorsiflexion: 4+/5 Ankle dorsiflexion: 4+/5   Ankle plantarflexion: 4+/5 Ankle plantarflexion: 4+/5     GAIT:  Assistive Device used: none  Level of Assistance: independent  Patient displays the following gait deviations: no gait deviations observed.     Special Tests:   Test Name  Test Result   Prone Instability Test NT   SI Joint Provocation Test (--)   Straight Leg Raise (--)   Neural Tension Test (--)   Crossed Straight Leg Raise (--)   Walking on toes Able   Walking on " "heels  Able     NEUROLOGICAL SCREENING:     Sensory deficits: intact to light touch    Reflexes:    Left Right   Patella Tendon 2+ 1+   Achilles Tendon 2+ 2+   Babinski  (--) (--)   Clonus (--) (--)     REPEATED TEST MOVEMENTS:    Baseline symptoms:  Repeated Flexion in Standing end range pain  no effect   Repeated Extension in Standing no effect   Repeated Flexion in lying no effect   Repeated Extension in lying  no effect       STATIC TESTS and other movements:   Prone lie no effect   Prone lie on elbows no effect   Sitting slouched  worse   Sitting erect better   Standing slouched worse   Standing erect  better   Lying prone in extension  no effect   Long sitting   no effect   Sustained flexion worse   Sustained prone using mat NT     Lumbar testing Visit 1   Lumbar Isometric Testing on Med X equipment: Testing administered by PT    Test Initial Midpoint Final   Date 2/5/2024     ROM 0-54 deg     Max Peak Torque 122      Min Peak Torque 42      Flex/Ext Ratio 2.9:1     % below normative data 58%     % gain from initial test Not available visit 1         CMS Impairment/Limitation/Restriction for FOTO Lumbar Survey    Therapist reviewed FOTO scores for Jean Carlson on 2/5/2024.   FOTO documents entered into [email or web address] - see Media section.    Limitation Score: 59% functional ability  Discharge Goal: 65% functional ability         Treatment     Total Treatment time separate from Evaluation: 30 minutes    Jean received therapeutic exercises to develop/improve posture, lumbar ROM, strength, and muscular endurance for 5 minutes including the following exercises:     LTR x 5  Figure 4 piriformis stretch 20" x 3 right lower extremity   Transverse abdominal activation PPT    Written Home Exercises Provided: yes.    HEP AS FOLLOWS:  LTR, piriformis stretch, PPT    Exercises were reviewed and Jean was able to demonstrate them prior to the end of the session. Jean demonstrated good  understanding of the education provided. "     See EMR under Patient Instructions for exercises provided 2/5/2024.    Jean received the following manual therapy techniques:   MedX Testing:   Patient received neuromuscular education to engage spinal musculature correctly for motor control and engagement of musculature for 20 minutes including the MedX exercise component and practice and standard testing. MedX dynamic exercise and baseline isometric test performed with instructions to guide the patient safely through the testing procedure. Patient instructed to perform isometric test correctly and safely while building to an optimal force with a pain-free effort. Patient also instructed that they should feel support/pressure from MedX restraints but no pain/discomfort. Patient demonstrated appropriate understanding of information.         2/6/2024    12:58 PM   [facility]   Visit Number 1   VAS Pain Rating 0   Seat Adjustment 0   Top Dead Center 24   Counterweight 242   Cervical Flexion 54   Cervical Extension 0   Cervical Peak Torque 122 ft. lbs.   Ice - Z Lie (in min.) 5        Therapeutic Education/Activity provided for 15 minutes:   - Patient was given an [facility] Visit 1 handout which discusses the following:  - what to expect in therapy  - an overview of the program, including health coaching and wellness  - importance of spinal hygiene, proper posture, lifting mechanics, sleep quality, and nutrition/hydration   - Flip roll trialed, recommended, and purchase information was provided.  - Patient received a handout regarding anticipated muscular soreness following the isometric test and strategies for management were reviewed with patient including stretching, using ice and scheduled rest.   - Patient received verbal education on the following:   - [facility] program   - purpose of the isometric test  - safe progression of lumbar strengthening, wellness approach, and systemic strengthening.   - safe usage of MedX machine and  testing protocols.    Jean received cold pack for 5 minutes to low back in Z-lie.    Assessment     Jean is a 73 y.o. male referred to [facility] with a medical diagnosis of M54.6,G89.29 (ICD-10-CM) - Chronic midline thoracic back pain M54.41,G89.29 (ICD-10-CM) - Chronic bilateral low back pain with right-sided sciatica. Pt presents with  poor posture, mild scoliosis, decreased trunk ROM/strength, decreased R>L LE strength, and 59% FOTO ability score. Per lumbar MedX testing, pt was 58% below average in strength when compared to those of  same age/height/weight/gender. All of the above noted supports potential lumbar classification as a pattern 1 PEN with recurrent/or consistent symptoms, thus pt is a good candidate for the [facility] Program. Pt would benefit from LE and trunk mobility training, stability training,  improved cardiovascular and muscular endurance, neuromuscular re-education for posture, coordination, and muscular recruitment and education on positional offloading techniques to decrease the intensity and frequency of flare-ups.    Pain Pattern: 1PEN       Pt prognosis is Good.     Pt will benefit from skilled outpatient Physical Therapy to address the deficits stated above and in the chart below, to provide pt/family education, and to maximize pt's level of independence. Based on the above history and physical examination an active physical therapy program is recommended.      Plan of care discussed with patient: Yes  Pt's spiritual, cultural and educational needs considered and patient is agreeable to the plan of care and goals as stated below:     Anticipated Barriers for therapy: none    PT Evaluation Completed? Yes    Medical necessity is demonstrated by the following problem list:    History  Co-morbidities and personal factors that may impact the plan of care [] LOW: no personal factors / co-morbidities  [] MODERATE: 1-2 personal factors / co-morbidities  [x] HIGH: 3+ personal  factors / co-morbidities    Moderate / High Support Documentation:   Co-morbidities affecting plan of care: see above PMH list    Personal Factors:   coping style     Examination  Body Structures and Functions, activity limitations and participation restrictions that may impact the plan of care [] LOW: addressing 1-2 elements  [x] MODERATE: 3+ elements  [] HIGH: 4+ elements (please support below)    Moderate / High Support Documentation:     Clinical Presentation [x] LOW: stable  [] MODERATE: Evolving  [] HIGH: Unstable     Decision Making/ Complexity Score: low         GOALS: Pt is in agreement with the following goals.    Short term goals:  6 weeks or 10 visits   - Pt will demonstrate increased lumbar MedX ROM by at least 3 degrees from the initial ROM value with improvements noted in functional ROM and ability to perform ADLs. Appropriate and Ongoing  - Pt will demonstrate increased MedX average isometric strength value by 25% from initial test resulting in improved ability to perform bending, lifting, and carrying activities safely, confidently. Appropriate and Ongoing  - Pt will report a reduction in worst pain score by 1-2 points for improved tolerance for household chores. Appropriate and Ongoing  - Pt able to perform HEP correctly with minimal cueing or supervision from therapist to encourage independent management of symptoms. Appropriate and Ongoing    Long term goals: 10 weeks or 20 visits   - Pt will demonstrate increased lumbar MedX ROM by at least 3 degrees from initial ROM value, resulting in improved ability to perform functional forward bending while standing and sitting. Appropriate and Ongoing  - Pt will demonstrate increased MedX average isometric strength value by 45% from initial test resulting in improved ability to perform bending, lifting, and carrying activities safely and confidently. Appropriate and Ongoing  - Pt to demonstrate ability to independently control and reduce their pain  through posture positioning and mechanical movements throughout a typical day. Appropriate and Ongoing  - Pt will demonstrate reduced pain and improved functional outcomes as reported on the FOTO by reaching an intake score of >/= 65% functional ability in order to demonstrate subjective improvement in patient's condition. . Appropriate and Ongoing  - Pt will demonstrate independence with the HEP at discharge. Appropriate and Ongoing  - Pt will be able to complete gym workouts without onset of pain (patient goal) Appropriate and Ongoing    Plan     Outpatient physical therapy 2x week for 10 weeks or 20 visits to include the following:   - Patient education  - Therapeutic exercise  - Manual therapy  - Performance testing   - Neuromuscular Re-education  - Therapeutic activity   - Modalities    Pt may be seen by PTA as part of the rehabilitation team.     Therapist: Michelle Galindo, PT  2/5/2024

## 2024-02-16 ENCOUNTER — CLINICAL SUPPORT (OUTPATIENT)
Dept: REHABILITATION | Facility: HOSPITAL | Age: 74
End: 2024-02-16
Payer: MEDICARE

## 2024-02-16 DIAGNOSIS — M53.86 DECREASED RANGE OF MOTION OF LUMBAR SPINE: Primary | ICD-10-CM

## 2024-02-16 PROCEDURE — 97110 THERAPEUTIC EXERCISES: CPT

## 2024-02-16 PROCEDURE — 97112 NEUROMUSCULAR REEDUCATION: CPT

## 2024-02-16 RX ORDER — MELOXICAM 15 MG/1
15 TABLET ORAL DAILY PRN
Qty: 30 TABLET | Refills: 0 | Status: SHIPPED | OUTPATIENT
Start: 2024-02-16 | End: 2024-02-19

## 2024-02-16 NOTE — PROGRESS NOTES
"[facility]  Physical Therapy Treatment Note     Name: Jean KHAN [facility]  Clinic Number: [id number]    Therapy Diagnosis:   Encounter Diagnosis   Name Primary?    Decreased range of motion of lumbar spine Yes     Physician: Ute Rivas, *    Visit Date: 2/16/2024    Physician Orders: PT Eval and Treat  Medical Diagnosis from Referral: M54.6,G89.29 (ICD-10-CM) - Chronic midline thoracic back pain M54.41,G89.29 (ICD-10-CM) - Chronic bilateral low back pain with right-sided sciatica  Evaluation Date: 2/5/2024  Authorization Period Expiration: 01/04/2025  Reassessment Due: 3/5/2024  Plan of Care Certification Period: 4/15/2024  Visit #/Visits authorized: 2/20   MedX Testing:MedX testing visit 1    PTA Visit #: 0/5     Time In: 2:30 PM  Time Out: 3:25 PM  Total Billable Time: 30 minutes (1 on 1 time)  INSURANCE and OUTCOMES: Fee for Service with FOTO Outcomes 1/3    Precautions: standard, history of cancer, and osteoporosis     Pattern of pain determined: 1 PEN    Subjective   Jaen reports no back pain currently.    Patient reports tolerating previous visit no complaints   Patient reports their pain to be 0/10 on a 0-10 scale with 0 being no pain and 10 being the worst pain imaginable.  Pain Location: right low back, right posterior leg into foot      Work and leisure: retired / working out, yard work (limited right now), involved with [employer]    Pt goals: "Unrestricted gym work outs, learn how to work out, strengthen right leg, work in the yard without pain"     Objective      Lumbar  Isometric Testing on Med X equipment: Testing administered by PT    Test Initial Baseline Midpoint Final   Date 2/5/24     ROM 0-54 deg     Max Peak Torque 122      Min Peak Torque 42      Flex/Ext Ratio 2.9:1     % below normative data 58%     % gain from initial test Not available visit 1       Outcomes:  Intake Score: 59% functional ability  Visit 6 Score:   Visit 10 Score: " "  Discharge Score:  Goal Score: 65% functional ability     Treatment     Jean received the treatments listed below:      Medical MedX Treatment as follows:  MedX exercise started day 2:  Patient received neuromuscular education for 10 minutes via participation on the Medical MedX Machine. Therapist assisted patient in isolating and engaging spinal stabilization musculature in order to improve functional ability and postural control. Patient performed exercise with therapist guidance in order to accurately use pacer function, avoid valsalva, and optimally exert effort within a safe and effective range via the Kristopher Exertion Rating Scale. Patient instructed to perform at a midrange of exertion and to complete 15-20 repetitions within appropriate split time, with proper technique, and while maintaining safety.          2/16/2024     3:56 PM   [facility]   Visit Number 2   VAS Pain Rating 0   Treadmill Time (in min.) 5 min   Extension in Standing 10   Flexion in Lying 10   Lumbar Weight 55 lbs   Repetitions 15   Rating of Perceived Exertion 3   Ice - Z Lie (in min.) 5     Jean participated in neuromuscular re-education activities to improve balance, coordination, proprioception, motor control and/or posture for  minutes. The following activities were included:       Jean participated in therapeutic exercises to develop strength, endurance, ROM, posture, and core stabilization for 40 minutes including:    LTR x10  +DKTC c/ T-ball x10  Figure 4 piriformis stretch 20" x 3 right lower extremity   PPT x10  +Bridges x10  +EIS x10    Peripheral muscle strengthening which included one set of 15-20 repetitions at a slow and controlled 10-13 second per rep pace focused on strengthening supporting musculature in order to improve body mechanics and functional mobility. Patient and therapist focused on proper form during treatment to ensure optimal strengthening of each targeted muscle group.  Machines utilized " "included:Torso rotation, Leg Ext, Hip Abd, Hip Add, and Leg Press  To be added next visit:Leg Curl, Chest Press, Rowing, Triceps, and Biceps      Jean participated in dynamic functional therapeutic activities to improve functional performance and simulate household and community activities for 00  minutes. The following activities were included:    Jean received manual therapy techniques for 00  minutes. The following activities were included:      Pt given cold pack for 5 minutes to low back in Z-lie.    Patient Education and Home Exercises   Home exercises include: LTR x 5, Figure 4 piriformis stretch 20" x 3 right lower extremity, Transverse abdominal activation PPT  Cardio program (V5): -  Lifting education (V11): -  Posture/Lumbar roll:   Fridge Magnet Discharge handout (date given): -  Equipment at home/gym membership:     Education provided:   - PT role and POC  - HEP  - MedX exercise  - RPE scale  - Cues with exercises     Written Home Exercises Provided: Patient instructed to cont prior HEP.  Exercises were reviewed and Jean was able to demonstrate them prior to the end of the session.  Jean demonstrated good  understanding of the education provided.     See EMR under Patient Instructions for exercises provided 2/16/2024.    Assessment   Jean presents to second healthy back visit reporting no back pain currently, he was able to demo HEP with Min VC for form. Added DKTC, bridges, and EIS which he tolerated well. Pt was able to tolerate Medical MedX machine well as follows: MedX exercise started  and patient tolerated  neuro reeducation training, strengthening, and endurance training on the lumbar MedX at 55 ft/lbs. Pt was able to complete 15 reps, with 3/10 RPE. Pt was also able to complete half of the peripheral strengthening exercises without increased discomfort and will complete the complete circuit next visit as tolerated.    Patient is making  progress towards established goals.  Pt will continue to " benefit from skilled outpatient physical therapy to address the deficits stated in the impairment chart, provide pt/family education and to maximize pt's level of independence in the home and community environment.     Anticipated Barriers for therapy: none  Pt's spiritual, cultural and educational needs considered and pt agreeable to plan of care and goals as stated below:     Goals:     Short term goals:  6 weeks or 10 visits   - Pt will demonstrate increased lumbar MedX ROM by at least 3 degrees from the initial ROM value with improvements noted in functional ROM and ability to perform ADLs. Appropriate and Ongoing  - Pt will demonstrate increased MedX average isometric strength value by 25% from initial test resulting in improved ability to perform bending, lifting, and carrying activities safely, confidently. Appropriate and Ongoing  - Pt will report a reduction in worst pain score by 1-2 points for improved tolerance for household chores. Appropriate and Ongoing  - Pt able to perform HEP correctly with minimal cueing or supervision from therapist to encourage independent management of symptoms. Appropriate and Ongoing     Long term goals: 10 weeks or 20 visits   - Pt will demonstrate increased lumbar MedX ROM by at least 3 degrees from initial ROM value, resulting in improved ability to perform functional forward bending while standing and sitting. Appropriate and Ongoing  - Pt will demonstrate increased MedX average isometric strength value by 45% from initial test resulting in improved ability to perform bending, lifting, and carrying activities safely and confidently. Appropriate and Ongoing  - Pt to demonstrate ability to independently control and reduce their pain through posture positioning and mechanical movements throughout a typical day. Appropriate and Ongoing  - Pt will demonstrate reduced pain and improved functional outcomes as reported on the FOTO by reaching an intake score of >/= 65% functional  ability in order to demonstrate subjective improvement in patient's condition. . Appropriate and Ongoing  - Pt will demonstrate independence with the HEP at discharge. Appropriate and Ongoing  - Pt will be able to complete gym workouts without onset of pain (patient goal) Appropriate and Ongoing    Plan   Continue with established Plan of Care towards established PT goals.     Therapist: Supriya Bingham, PT  2/18/2024

## 2024-02-19 ENCOUNTER — CLINICAL SUPPORT (OUTPATIENT)
Dept: REHABILITATION | Facility: HOSPITAL | Age: 74
End: 2024-02-19
Payer: MEDICARE

## 2024-02-19 ENCOUNTER — LAB VISIT (OUTPATIENT)
Dept: LAB | Facility: HOSPITAL | Age: 74
End: 2024-02-19
Attending: FAMILY MEDICINE
Payer: MEDICARE

## 2024-02-19 ENCOUNTER — OFFICE VISIT (OUTPATIENT)
Dept: INTERNAL MEDICINE | Facility: CLINIC | Age: 74
End: 2024-02-19
Attending: FAMILY MEDICINE
Payer: MEDICARE

## 2024-02-19 VITALS
HEIGHT: 71 IN | WEIGHT: 184.94 LBS | DIASTOLIC BLOOD PRESSURE: 64 MMHG | SYSTOLIC BLOOD PRESSURE: 128 MMHG | OXYGEN SATURATION: 96 % | BODY MASS INDEX: 25.89 KG/M2 | HEART RATE: 82 BPM

## 2024-02-19 DIAGNOSIS — E78.49 OTHER HYPERLIPIDEMIA: Chronic | ICD-10-CM

## 2024-02-19 DIAGNOSIS — I70.0 ATHEROSCLEROSIS OF AORTA: Chronic | ICD-10-CM

## 2024-02-19 DIAGNOSIS — K21.9 GASTROESOPHAGEAL REFLUX DISEASE WITHOUT ESOPHAGITIS: ICD-10-CM

## 2024-02-19 DIAGNOSIS — K29.50 OTHER CHRONIC GASTRITIS WITHOUT HEMORRHAGE: ICD-10-CM

## 2024-02-19 DIAGNOSIS — M53.86 DECREASED RANGE OF MOTION OF LUMBAR SPINE: Primary | ICD-10-CM

## 2024-02-19 DIAGNOSIS — K59.04 CHRONIC IDIOPATHIC CONSTIPATION: ICD-10-CM

## 2024-02-19 DIAGNOSIS — F32.9 MAJOR DEPRESSIVE DISORDER WITH CURRENT ACTIVE EPISODE, UNSPECIFIED DEPRESSION EPISODE SEVERITY, UNSPECIFIED WHETHER RECURRENT: Primary | ICD-10-CM

## 2024-02-19 DIAGNOSIS — R07.9 CHEST PAIN, UNSPECIFIED TYPE: ICD-10-CM

## 2024-02-19 DIAGNOSIS — M48.062 SPINAL STENOSIS OF LUMBAR REGION WITH NEUROGENIC CLAUDICATION: ICD-10-CM

## 2024-02-19 LAB
ALBUMIN SERPL BCP-MCNC: 4.2 G/DL (ref 3.5–5.2)
ALP SERPL-CCNC: 62 U/L (ref 55–135)
ALT SERPL W/O P-5'-P-CCNC: 55 U/L (ref 10–44)
ANION GAP SERPL CALC-SCNC: 7 MMOL/L (ref 8–16)
AST SERPL-CCNC: 39 U/L (ref 10–40)
BILIRUB SERPL-MCNC: 0.6 MG/DL (ref 0.1–1)
BUN SERPL-MCNC: 18 MG/DL (ref 8–23)
CALCIUM SERPL-MCNC: 9.8 MG/DL (ref 8.7–10.5)
CHLORIDE SERPL-SCNC: 104 MMOL/L (ref 95–110)
CHOLEST SERPL-MCNC: 170 MG/DL (ref 120–199)
CHOLEST/HDLC SERPL: 3 {RATIO} (ref 2–5)
CO2 SERPL-SCNC: 26 MMOL/L (ref 23–29)
CREAT SERPL-MCNC: 0.8 MG/DL (ref 0.5–1.4)
EST. GFR  (NO RACE VARIABLE): >60 ML/MIN/1.73 M^2
GLUCOSE SERPL-MCNC: 104 MG/DL (ref 70–110)
HDLC SERPL-MCNC: 56 MG/DL (ref 40–75)
HDLC SERPL: 32.9 % (ref 20–50)
LDLC SERPL CALC-MCNC: 87.8 MG/DL (ref 63–159)
NONHDLC SERPL-MCNC: 114 MG/DL
POTASSIUM SERPL-SCNC: 4.6 MMOL/L (ref 3.5–5.1)
PROT SERPL-MCNC: 7 G/DL (ref 6–8.4)
SODIUM SERPL-SCNC: 137 MMOL/L (ref 136–145)
TRIGL SERPL-MCNC: 131 MG/DL (ref 30–150)

## 2024-02-19 PROCEDURE — 80053 COMPREHEN METABOLIC PANEL: CPT | Performed by: FAMILY MEDICINE

## 2024-02-19 PROCEDURE — 3008F BODY MASS INDEX DOCD: CPT | Mod: CPTII,S$GLB,, | Performed by: FAMILY MEDICINE

## 2024-02-19 PROCEDURE — 1101F PT FALLS ASSESS-DOCD LE1/YR: CPT | Mod: CPTII,S$GLB,, | Performed by: FAMILY MEDICINE

## 2024-02-19 PROCEDURE — 3078F DIAST BP <80 MM HG: CPT | Mod: CPTII,S$GLB,, | Performed by: FAMILY MEDICINE

## 2024-02-19 PROCEDURE — 1159F MED LIST DOCD IN RCRD: CPT | Mod: CPTII,S$GLB,, | Performed by: FAMILY MEDICINE

## 2024-02-19 PROCEDURE — 97110 THERAPEUTIC EXERCISES: CPT

## 2024-02-19 PROCEDURE — 36415 COLL VENOUS BLD VENIPUNCTURE: CPT | Performed by: FAMILY MEDICINE

## 2024-02-19 PROCEDURE — 97112 NEUROMUSCULAR REEDUCATION: CPT

## 2024-02-19 PROCEDURE — 1160F RVW MEDS BY RX/DR IN RCRD: CPT | Mod: CPTII,S$GLB,, | Performed by: FAMILY MEDICINE

## 2024-02-19 PROCEDURE — 3074F SYST BP LT 130 MM HG: CPT | Mod: CPTII,S$GLB,, | Performed by: FAMILY MEDICINE

## 2024-02-19 PROCEDURE — 1126F AMNT PAIN NOTED NONE PRSNT: CPT | Mod: CPTII,S$GLB,, | Performed by: FAMILY MEDICINE

## 2024-02-19 PROCEDURE — 3288F FALL RISK ASSESSMENT DOCD: CPT | Mod: CPTII,S$GLB,, | Performed by: FAMILY MEDICINE

## 2024-02-19 PROCEDURE — 99214 OFFICE O/P EST MOD 30 MIN: CPT | Mod: S$GLB,,, | Performed by: FAMILY MEDICINE

## 2024-02-19 PROCEDURE — 99999 PR PBB SHADOW E&M-EST. PATIENT-LVL IV: CPT | Mod: PBBFAC,,, | Performed by: FAMILY MEDICINE

## 2024-02-19 PROCEDURE — 80061 LIPID PANEL: CPT | Performed by: FAMILY MEDICINE

## 2024-02-19 NOTE — ASSESSMENT & PLAN NOTE
-night sweats on zoloft trial, took for 3-4 days and stopped(1/2024), changed to lexapro 1/2024 and never took  -relates to constipation

## 2024-02-19 NOTE — PROGRESS NOTES
"  [facility]  Physical Therapy Treatment Note     Name: Jean KHAN [facility]  Clinic Number: [id number]    Therapy Diagnosis:   Encounter Diagnosis   Name Primary?    Decreased range of motion of lumbar spine Yes     Physician: Ute Rivas, *    Visit Date: 2/19/2024    Physician Orders: PT Eval and Treat  Medical Diagnosis from Referral: M54.6,G89.29 (ICD-10-CM) - Chronic midline thoracic back pain M54.41,G89.29 (ICD-10-CM) - Chronic bilateral low back pain with right-sided sciatica  Evaluation Date: 2/5/2024  Authorization Period Expiration: 01/04/2025  Reassessment Due: 3/5/2024  Plan of Care Certification Period: 4/15/2024  Visit #/Visits authorized: 3/20   MedX Testing:MedX testing visit 1    PTA Visit #: 0/5     Time In: 11:30 AM  Time Out: 12:30 PM  Total Billable Time: 30 minutes (1 on 1 time)  INSURANCE and OUTCOMES: Fee for Service with FOTO Outcomes 1/3    Precautions: standard, history of cancer, and osteoporosis     Pattern of pain determined: 1 PEN    Subjective   Jean reports no back pain currently. Pt says he was able to wash the car yesterday without onset of back pain.    Patient reports tolerating previous visit no complaints   Patient reports their pain to be 0/10 on a 0-10 scale with 0 being no pain and 10 being the worst pain imaginable.  Pain Location: right low back, right posterior leg into foot      Work and leisure: retired / working out, yard work (limited right now), involved with [employer]    Pt goals: "Unrestricted gym work outs, learn how to work out, strengthen right leg, work in the yard without pain"     Objective      Lumbar  Isometric Testing on Med X equipment: Testing administered by PT    Test Initial Baseline Midpoint Final   Date 2/5/24     ROM 0-54 deg     Max Peak Torque 122      Min Peak Torque 42      Flex/Ext Ratio 2.9:1     % below normative data 58%     % gain from initial test Not available visit 1   " "    Outcomes:  Intake Score: 59% functional ability  Visit 6 Score:   Visit 10 Score:   Discharge Score:  Goal Score: 65% functional ability     Treatment     Jean received the treatments listed below:      Medical MedX Treatment as follows:  MedX exercise started day 2:  Patient received neuromuscular education for 10 minutes via participation on the Medical MedX Machine. Therapist assisted patient in isolating and engaging spinal stabilization musculature in order to improve functional ability and postural control. Patient performed exercise with therapist guidance in order to accurately use pacer function, avoid valsalva, and optimally exert effort within a safe and effective range via the Kristopher Exertion Rating Scale. Patient instructed to perform at a midrange of exertion and to complete 15-20 repetitions within appropriate split time, with proper technique, and while maintaining safety.          2/19/2024    12:43 PM   [facility]   Visit Number 3   VAS Pain Rating 0   Treadmill Time (in min.) 5 min   Extension in Standing 10   Flexion in Lying 10   Lumbar Weight 55 lbs   Repetitions 20   Rating of Perceived Exertion 3   Ice - Z Lie (in min.) 5         Jean participated in neuromuscular re-education activities to improve balance, coordination, proprioception, motor control and/or posture for  minutes. The following activities were included:       Jean participated in therapeutic exercises to develop strength, endurance, ROM, posture, and core stabilization for 40 minutes including:    LTR x10  DKTC c/ T-ball x10  Figure 4 piriformis stretch 20" x 3 right lower extremity   PPT x10  Bridges with GTB x15  EIS x10  + side lying clam with GTB x 20  + Paloff press with 15# x 10    Peripheral muscle strengthening which included one set of 15-20 repetitions at a slow and controlled 10-13 second per rep pace focused on strengthening supporting musculature in order to improve body mechanics and functional " "mobility. Patient and therapist focused on proper form during treatment to ensure optimal strengthening of each targeted muscle group.  Machines utilized included:Torso rotation, Leg Ext, Hip Abd, Hip Add, and Leg Press  To be added next visit:Leg Curl, Chest Press, Rowing, Triceps, and Biceps      Jean participated in dynamic functional therapeutic activities to improve functional performance and simulate household and community activities for 00  minutes. The following activities were included:    Jean received manual therapy techniques for 00  minutes. The following activities were included:      Pt given cold pack for 5 minutes to low back in Z-lie.    Patient Education and Home Exercises   Home exercises include: LTR x 5, Figure 4 piriformis stretch 20" x 3 right lower extremity, Transverse abdominal activation PPT  Cardio program (V5): -  Lifting education (V11): -  Posture/Lumbar roll:   Fridge Magnet Discharge handout (date given): -  Equipment at home/gym membership:     Education provided:   - PT role and POC  - HEP  - MedX exercise  - RPE scale  - Cues with exercises     Written Home Exercises Provided: Patient instructed to cont prior HEP.  Exercises were reviewed and Jean was able to demonstrate them prior to the end of the session.  Jean demonstrated good  understanding of the education provided.     See EMR under Patient Instructions for exercises provided 2/16/2024.    Assessment   Jean presents to session without low back pain.  Neuro reeducation training, strengthening, and endurance training continued.  Side lying clams and Paloff press were added to program which he tolerated well. Lumbar MedX maintained at 55 ft/lbs and patient was able to complete 20 reps with RPE of 3/10.  Pt was also able to complete the full circuit of the peripheral strengthening exercises without increased discomfort.  [facility] protocol will be progressed as tolerated.    Patient is making  progress towards established " goals.  Pt will continue to benefit from skilled outpatient physical therapy to address the deficits stated in the impairment chart, provide pt/family education and to maximize pt's level of independence in the home and community environment.     Anticipated Barriers for therapy: none  Pt's spiritual, cultural and educational needs considered and pt agreeable to plan of care and goals as stated below:     Goals:     Short term goals:  6 weeks or 10 visits   - Pt will demonstrate increased lumbar MedX ROM by at least 3 degrees from the initial ROM value with improvements noted in functional ROM and ability to perform ADLs. Appropriate and Ongoing  - Pt will demonstrate increased MedX average isometric strength value by 25% from initial test resulting in improved ability to perform bending, lifting, and carrying activities safely, confidently. Appropriate and Ongoing  - Pt will report a reduction in worst pain score by 1-2 points for improved tolerance for household chores. Appropriate and Ongoing  - Pt able to perform HEP correctly with minimal cueing or supervision from therapist to encourage independent management of symptoms. Appropriate and Ongoing     Long term goals: 10 weeks or 20 visits   - Pt will demonstrate increased lumbar MedX ROM by at least 3 degrees from initial ROM value, resulting in improved ability to perform functional forward bending while standing and sitting. Appropriate and Ongoing  - Pt will demonstrate increased MedX average isometric strength value by 45% from initial test resulting in improved ability to perform bending, lifting, and carrying activities safely and confidently. Appropriate and Ongoing  - Pt to demonstrate ability to independently control and reduce their pain through posture positioning and mechanical movements throughout a typical day. Appropriate and Ongoing  - Pt will demonstrate reduced pain and improved functional outcomes as reported on the FOTO by reaching an intake  score of >/= 65% functional ability in order to demonstrate subjective improvement in patient's condition. . Appropriate and Ongoing  - Pt will demonstrate independence with the HEP at discharge. Appropriate and Ongoing  - Pt will be able to complete gym workouts without onset of pain (patient goal) Appropriate and Ongoing    Plan   Continue with established Plan of Care towards established PT goals.     Therapist: Michelle Galindo, PT  2/19/2024

## 2024-02-19 NOTE — ASSESSMENT & PLAN NOTE
-Dr. Baldwin 2/1, Dr. Gallardo 1/8, Dr. Munoz in past    -seeing multiple consultants  -Linzess per GI  -Currently using miralax 2 caps in the am, then additional cap(s) through out the day based on sx  -lactulose for 'emergency'    Reports better

## 2024-02-19 NOTE — PROGRESS NOTES
Subjective:       Patient ID: Jean Carlson is a 73 y.o. male.    Chief Complaint: Follow-up    Established patient follows up for management of chronic medical illnesses with complaints today. Please see dictation and ROS for interval problems, specific complaints and disease management discussion.    Past, Surgical, Family, Social, Histories; Medications, allergies reviewed and reconciled.  Health maintenance file reviewed and addressed items due. Recent applicable lab, imaging and cardiovascular results reviewed.  Problem list items reviewed and modified or added entries (in the overview section) may not be transcribed into this encounter note due to note writer format.      Would be due for annual about this time. See labs.    Concerning the depression from last visit, only took Zoloft 3-4 days and then did not start Lexapro.  He feels his problems is largely from his constipation issue.  He states that has improved significantly.  He has developed a system of using MiraLax described as 2 cap fulls in the morning and then up to 3 additional doses during the day.  He is seen Colorectal surgery and GI medicine.    We reviewed his health status.  He seems to be in good stead at this time.  No cardiovascular complaints.  Maintains a fit body weight and habitus.        Review of Systems   Constitutional:  Negative for appetite change, chills, diaphoresis, fatigue and fever.   HENT:  Negative for congestion, postnasal drip, rhinorrhea, sore throat and trouble swallowing.    Eyes:  Negative for visual disturbance.   Respiratory:  Negative for cough, choking, chest tightness, shortness of breath and wheezing.    Cardiovascular:  Negative for chest pain and leg swelling.   Gastrointestinal:  Positive for constipation. Negative for abdominal distention, abdominal pain, diarrhea, nausea and vomiting.   Genitourinary:  Negative for difficulty urinating and hematuria.   Musculoskeletal:  Positive for arthralgias and back  pain. Negative for myalgias.   Skin:  Negative for rash.   Neurological:  Negative for weakness, light-headedness and headaches.   Psychiatric/Behavioral:  Negative for confusion and dysphoric mood.        Objective:      Physical Exam  Vitals and nursing note reviewed.   Constitutional:       Appearance: He is well-developed. He is not diaphoretic.   HENT:      Head: Normocephalic and atraumatic.   Eyes:      General: No scleral icterus.     Conjunctiva/sclera: Conjunctivae normal.   Neck:      Vascular: No carotid bruit.   Cardiovascular:      Rate and Rhythm: Normal rate and regular rhythm.      Heart sounds: Normal heart sounds. No murmur heard.     No friction rub. No gallop.   Pulmonary:      Effort: Pulmonary effort is normal. No respiratory distress.      Breath sounds: Normal breath sounds. No wheezing or rales.   Abdominal:      General: There is no distension.      Tenderness: There is no abdominal tenderness.   Musculoskeletal:         General: No deformity.      Cervical back: Normal range of motion and neck supple.   Skin:     General: Skin is warm and dry.      Findings: No erythema or rash.   Neurological:      Mental Status: He is alert and oriented to person, place, and time.      Cranial Nerves: No cranial nerve deficit.      Motor: No tremor.      Coordination: Coordination normal.      Gait: Gait normal.   Psychiatric:         Behavior: Behavior normal.         Thought Content: Thought content normal.         Judgment: Judgment normal.         Assessment:       1. Major depressive disorder with current active episode, unspecified depression episode severity, unspecified whether recurrent    2. Spinal stenosis of lumbar region with neurogenic claudication    3. Chronic idiopathic constipation    4. Other chronic gastritis without hemorrhage    5. Gastroesophageal reflux disease without esophagitis    6. Other hyperlipidemia    7. Chest pain, unspecified type    8. Atherosclerosis of aorta         Plan:     Medication List with Changes/Refills   Current Medications    ATORVASTATIN (LIPITOR) 20 MG TABLET    Take 1 tablet (20 mg total) by mouth once daily.    CALCIUM PHOSPHATE TRIB/VIT D3 (CITRACAL + D3, CALCIUM PHOS, ORAL)    Take 1 tablet by mouth once daily.    CHOLECALCIFEROL, VITAMIN D3, (VITAMIN D3) 50 MCG (2,000 UNIT) CAP    Take 1 capsule by mouth once daily.    HYDROCORTISONE (ANUSOL-HC) 2.5 % RECTAL CREAM    Place 1 application rectally 2 (two) times daily.    ICOSAPENT ETHYL (VASCEPA) 1 GRAM CAP    Take 1 capsule (1,000 mg total) by mouth 2 (two) times a day.    LACTULOSE (CEPHULAC) 20 GRAM PACK    Mix 1 packet (20 g total) with 4 ounces of water and take by mouth 3 (three) times daily.    LINACLOTIDE (LINZESS) 290 MCG CAP CAPSULE    Take 1 capsule (290 mcg total) by mouth daily before breakfast.    MULTIVITAMIN CAPSULE    Take 1 capsule by mouth once daily.    POLYETHYLENE GLYCOL (GLYCOLAX) 17 GRAM PWPK    Take by mouth.    TAMSULOSIN (FLOMAX) 0.4 MG CAP    Take 1 capsule (0.4 mg total) by mouth once daily.   Discontinued Medications    ESCITALOPRAM OXALATE (LEXAPRO) 5 MG TAB    Take 1 tablet (5 mg total) by mouth once daily.    MELOXICAM (MOBIC) 15 MG TABLET    TAKE 1 TABLET BY MOUTH EVERY DAY AS NEEDED FOR PAIN    PANTOPRAZOLE (PROTONIX) 40 MG TABLET    Take 1 tablet (40 mg total) by mouth once daily.    PREGABALIN (LYRICA) 75 MG CAPSULE    Take 1 capsule (75 mg total) by mouth 2 (two) times daily.     1. Major depressive disorder with current active episode, unspecified depression episode severity, unspecified whether recurrent  Assessment & Plan:  -night sweats on zoloft trial, took for 3-4 days and stopped(1/2024), changed to lexapro 1/2024 and never took  -relates to constipation      2. Spinal stenosis of lumbar region with neurogenic claudication  Overview:  -reports 2 prior surgeries for 'discs'  -see 1/13/2022 Neurosurgery summary (L2 fx from fall 2021)  -MRI 5/18/2022 - 'mild' stenosis  and other findings    -NS eval 12/21/23, 1/5/2024 - [facility] referral: MRI thoracic and lumbar 1/4/2024. PM eval 1/19/2024  -[facility] 1/22/2024 - acute on chronic L4 radiculopathy    Assessment & Plan:  -stopped lyrica and mobic on his own      3. Chronic idiopathic constipation  Overview:  -followed in [facility] (and GI)    Assessment & Plan:  -Dr. Baldwin 2/1, Dr. Gallardo 1/8, Dr. Munoz in past    -seeing multiple consultants  -Linzess per GI  -Currently using miralax 2 caps in the am, then additional cap(s) through out the day based on sx  -lactulose for 'emergency'    Reports better      4. Other chronic gastritis without hemorrhage  Overview:  -EGD 3/31/2021 mobility  -followed in GI    Assessment & Plan:  -Dr. Gallardo 1/8/2024      5. Gastroesophageal reflux disease without esophagitis  Overview:  -EGD 3/31/2021 mobility  -followed in GI - PPI    Assessment & Plan:  -1/8/2024 Dr. Gallardo      6. Other hyperlipidemia  -     Comprehensive Metabolic Panel; Future; Expected date: 02/19/2024  -     Lipid Panel; Future; Expected date: 02/19/2024    7. Chest pain, unspecified type  Assessment & Plan:  -nml DEREK 1/24/2024, he relates to back  -no current sx      8. Atherosclerosis of aorta  Overview:  -Seen on CT from 05/06/16, 1/20/2024  -Agaston 30 in 2014  -on statin and fish oil  -cardiology eval 6/2022        See meds, orders, follow up, routing and instructions sections of encounter and AVS. Discussed with patient and provided on AVS.    Discussed diet and exercise as therapeutic modalities for metabolic and other conditions. Provided patient information, which are included as links on the AVS for detailed information.    Lab Results   Component Value Date     02/19/2024    K 4.6 02/19/2024     02/19/2024    BUN 18 02/19/2024    CREATININE 0.8 02/19/2024     02/19/2024    HGBA1C 5.6 05/16/2022    MG 2.4 08/29/2018    AST 39 02/19/2024    ALT 55 (H) 02/19/2024    ALBUMIN 4.2  02/19/2024    PROT 7.0 02/19/2024    BILITOT 0.6 02/19/2024    CHOL 170 02/19/2024    HDL 56 02/19/2024    LDLCALC 87.8 02/19/2024    TRIG 131 02/19/2024    WBC 4.31 10/31/2023    HGB 15.4 10/31/2023    HCT 46.1 10/31/2023     10/31/2023    PSA 3.0 10/18/2018    PSADIAG 1.5 01/08/2024    TSH 2.717 10/31/2023    URICACID 4.7 08/15/2013

## 2024-02-23 DIAGNOSIS — I70.0 ATHEROSCLEROSIS OF AORTA: ICD-10-CM

## 2024-02-23 DIAGNOSIS — E78.5 HYPERLIPIDEMIA, UNSPECIFIED HYPERLIPIDEMIA TYPE: ICD-10-CM

## 2024-02-23 RX ORDER — ATORVASTATIN CALCIUM 20 MG/1
20 TABLET, FILM COATED ORAL DAILY
Qty: 90 TABLET | Refills: 3 | Status: SHIPPED | OUTPATIENT
Start: 2024-02-23

## 2024-02-23 NOTE — TELEPHONE ENCOUNTER
----- Message from Partha Randall sent at 2/23/2024 11:37 AM CST -----  Regarding: [facility] [phone]  Type: RX Refill Request    Who Called: [facility]    Have you contacted your pharmacy: yes     Refill    RX Name and Strength: atorvastatin (LIPITOR) 20 MG tablet    Preferred Pharmacy with phone number:   [facility] #[id number] - [place] - [address] [place] [address]  [facility]   [place] [address]  Phone: [phone] Fax: [phone]       Local or Mail Order: local     Would the patient rather a call back or a response via [facility]? Call back     Best Call Back Number: [phone]     Additional Information:     Thank you.

## 2024-02-23 NOTE — TELEPHONE ENCOUNTER
Refill Decision Note   Jean Robyn  is requesting a refill authorization.  Brief Assessment and Rationale for Refill:  Approve     Medication Therapy Plan:         Comments:     Note composed:1:13 PM 02/23/2024

## 2024-02-23 NOTE — TELEPHONE ENCOUNTER
No care due was identified.  [facility] Embedded Care Due Messages. Reference number: [id number].   2/23/2024 11:58:25 AM CST

## 2024-02-26 ENCOUNTER — CLINICAL SUPPORT (OUTPATIENT)
Dept: REHABILITATION | Facility: HOSPITAL | Age: 74
End: 2024-02-26
Payer: MEDICARE

## 2024-02-26 DIAGNOSIS — M53.86 DECREASED RANGE OF MOTION OF LUMBAR SPINE: Primary | ICD-10-CM

## 2024-02-26 PROCEDURE — 97110 THERAPEUTIC EXERCISES: CPT

## 2024-02-26 PROCEDURE — 97112 NEUROMUSCULAR REEDUCATION: CPT

## 2024-02-26 NOTE — PROGRESS NOTES
"  [facility]  Physical Therapy Treatment Note     Name: Jean [facility]HAN [facility]  Clinic Number: [id number]    Therapy Diagnosis:   Encounter Diagnosis   Name Primary?    Decreased range of motion of lumbar spine Yes       Physician: Ute Rivas, *    Visit Date: 2/26/2024    Physician Orders: PT Eval and Treat  Medical Diagnosis from Referral: M54.6,G89.29 (ICD-10-CM) - Chronic midline thoracic back pain M54.41,G89.29 (ICD-10-CM) - Chronic bilateral low back pain with right-sided sciatica  Evaluation Date: 2/5/2024  Authorization Period Expiration: 01/04/2025  Reassessment Due: 3/5/2024  Plan of Care Certification Period: 4/15/2024  Visit #/Visits authorized: 4/20   MedX Testing:MedX testing visit 1    PTA Visit #: 0/5     Time In: 1:40 PM  Time Out: 2:30 PM  Total Billable Time: 45 minutes (1 on 1 time)  INSURANCE and OUTCOMES: Fee for Service with FOTO Outcomes 1/3    Precautions: standard, history of cancer, and osteoporosis     Pattern of pain determined: 1 PEN    Subjective   Jean reports mild back pain today. He spent the weekend outside enjoying the weather. When his back gets stiff he does some of the therapy exercises and his HEP and they really help.     Patient reports tolerating previous visit no complaints   Patient reports their pain to be 0/10 on a 0-10 scale with 0 being no pain and 10 being the worst pain imaginable.  Pain Location: right low back, right posterior leg into foot      Work and leisure: retired / working out, yard work (limited right now), involved with [employer]    Pt goals: "Unrestricted gym work outs, learn how to work out, strengthen right leg, work in the yard without pain"     Objective      Lumbar  Isometric Testing on Med X equipment: Testing administered by PT    Test Initial Baseline Midpoint Final   Date 2/5/24     ROM 0-54 deg     Max Peak Torque 122      Min Peak Torque 42      Flex/Ext Ratio 2.9:1     % below normative " "data 58%     % gain from initial test Not available visit 1       Outcomes:  Intake Score: 59% functional ability  Visit 6 Score:   Visit 10 Score:   Discharge Score:  Goal Score: 65% functional ability     Treatment     Jean received the treatments listed below:      Medical MedX Treatment as follows:  MedX exercise started day 2:  Patient received neuromuscular education for 10 minutes via participation on the Medical MedX Machine. Therapist assisted patient in isolating and engaging spinal stabilization musculature in order to improve functional ability and postural control. Patient performed exercise with therapist guidance in order to accurately use pacer function, avoid valsalva, and optimally exert effort within a safe and effective range via the Kristopher Exertion Rating Scale. Patient instructed to perform at a midrange of exertion and to complete 15-20 repetitions within appropriate split time, with proper technique, and while maintaining safety.          2/26/2024     1:41 PM   [facility]   Visit Number 4   Extension in Standing 10   Flexion in Lying 10   Lumbar Weight 58 lbs   Repetitions 18   Rating of Perceived Exertion 3               Jean participated in therapeutic exercises to develop strength, endurance, ROM, posture, and core stabilization for 40 minutes including:    LTR x10  DKTC c/ T-ball x12  Figure 4 piriformis stretch 20" x 3 right lower extremity   PPT x10  Bridges with GTB x15  EIS x10  side lying clam with GTB x 20  + Paloff press with 15# x 10 - NP    Peripheral muscle strengthening which included one set of 15-20 repetitions at a slow and controlled 10-13 second per rep pace focused on strengthening supporting musculature in order to improve body mechanics and functional mobility. Patient and therapist focused on proper form during treatment to ensure optimal strengthening of each targeted muscle group.  Machines utilized included:Torso rotation, Leg Ext, Hip Abd, Hip Add, and Leg " "Press  To be added next visit:Leg Curl, Chest Press, Rowing, Triceps, and Biceps      Jean participated in dynamic functional therapeutic activities to improve functional performance and simulate household and community activities for 00  minutes. The following activities were included:    Jean received manual therapy techniques for 00  minutes. The following activities were included:      Pt given cold pack for 5 minutes to low back in [place].    Patient Education and Home Exercises   Home exercises include: LTR x 5, Figure 4 piriformis stretch 20" x 3 right lower extremity, bridges with GTB, clamshells with GTB  Cardio program (V5): -  Lifting education (V11): -  Posture/Lumbar roll:   Fridge Magnet Discharge handout (date given): -  Equipment at home/gym membership:     Education provided:   - PT role and POC  - HEP: bridges and clamshells with green theraband added 2/26/24  - MedX exercise  - RPE scale  - Cues with exercises     Written Home Exercises Provided: Patient instructed to cont prior HEP.  Exercises were reviewed and Jean was able to demonstrate them prior to the end of the session.  Jean demonstrated good  understanding of the education provided.     See EMR under Patient Instructions for exercises provided 2/16/2024.    Assessment   Jean presents to therapy today reporting mild low back pain. Symptoms decreased following therapeutic exercises. Added bridges and clamshells to HEP per patient request for more exercises to do at home. He was able to demonstrate them prior to the end of session. Increased Lumbar MedX today to 58 ft/lbs, and he was able to perform 18 repetitions with an exertion level of 3/10. PT to continue to progress as tolerated for improved strength, endurance, and ROM and improved participation in ADLs and work activities.     Patient is making  progress towards established goals.  Pt will continue to benefit from skilled outpatient physical therapy to address the deficits stated in " the impairment chart, provide pt/family education and to maximize pt's level of independence in the home and community environment.     Anticipated Barriers for therapy: none  Pt's spiritual, cultural and educational needs considered and pt agreeable to plan of care and goals as stated below:     Goals:     Short term goals:  6 weeks or 10 visits   - Pt will demonstrate increased lumbar MedX ROM by at least 3 degrees from the initial ROM value with improvements noted in functional ROM and ability to perform ADLs. Appropriate and Ongoing  - Pt will demonstrate increased MedX average isometric strength value by 25% from initial test resulting in improved ability to perform bending, lifting, and carrying activities safely, confidently. Appropriate and Ongoing  - Pt will report a reduction in worst pain score by 1-2 points for improved tolerance for household chores. Appropriate and Ongoing  - Pt able to perform HEP correctly with minimal cueing or supervision from therapist to encourage independent management of symptoms. Appropriate and Ongoing     Long term goals: 10 weeks or 20 visits   - Pt will demonstrate increased lumbar MedX ROM by at least 3 degrees from initial ROM value, resulting in improved ability to perform functional forward bending while standing and sitting. Appropriate and Ongoing  - Pt will demonstrate increased MedX average isometric strength value by 45% from initial test resulting in improved ability to perform bending, lifting, and carrying activities safely and confidently. Appropriate and Ongoing  - Pt to demonstrate ability to independently control and reduce their pain through posture positioning and mechanical movements throughout a typical day. Appropriate and Ongoing  - Pt will demonstrate reduced pain and improved functional outcomes as reported on the FOTO by reaching an intake score of >/= 65% functional ability in order to demonstrate subjective improvement in patient's condition. .  Appropriate and Ongoing  - Pt will demonstrate independence with the HEP at discharge. Appropriate and Ongoing  - Pt will be able to complete gym workouts without onset of pain (patient goal) Appropriate and Ongoing    Plan   Continue with established Plan of Care towards established PT goals.     Therapist: Veronica Paz, PT  Co-treated by Mena Dubon, PT  2/26/2024

## 2024-02-29 ENCOUNTER — CLINICAL SUPPORT (OUTPATIENT)
Dept: REHABILITATION | Facility: HOSPITAL | Age: 74
End: 2024-02-29
Payer: MEDICARE

## 2024-02-29 DIAGNOSIS — M53.86 DECREASED RANGE OF MOTION OF LUMBAR SPINE: Primary | ICD-10-CM

## 2024-02-29 PROCEDURE — 97112 NEUROMUSCULAR REEDUCATION: CPT

## 2024-02-29 PROCEDURE — 97110 THERAPEUTIC EXERCISES: CPT

## 2024-02-29 NOTE — PROGRESS NOTES
"  [facility]  Physical Therapy Treatment Note     Name: Jean KHAN [facility]  Clinic Number: [id number]    Therapy Diagnosis:   Encounter Diagnosis   Name Primary?    Decreased range of motion of lumbar spine Yes       Physician: Ute Rivas, *    Visit Date: 2/29/2024    Physician Orders: PT Eval and Treat  Medical Diagnosis from Referral: M54.6,G89.29 (ICD-10-CM) - Chronic midline thoracic back pain M54.41,G89.29 (ICD-10-CM) - Chronic bilateral low back pain with right-sided sciatica  Evaluation Date: 2/5/2024  Authorization Period Expiration: 01/04/2025  Reassessment Due: 3/5/2024  Plan of Care Certification Period: 4/15/2024  Visit #/Visits authorized: 5/20   MedX Testing:MedX testing visit 1    PTA Visit #: 0/5     Time In: 230  Time Out:330  Total Billable Time: 40 minutes (1 on 1 time)  INSURANCE and OUTCOMES: Fee for Service with FOTO Outcomes 1/3    Precautions: standard, history of cancer, and osteoporosis     Pattern of pain determined: 1 PEN    Subjective   Jean reports mild back pain today. Pt states he can already feel the difference with the exercises.  Patient reports tolerating previous visit no complaints   Patient reports their pain to be 0/10 on a 0-10 scale with 0 being no pain and 10 being the worst pain imaginable.  Pain Location: right low back, right posterior leg into foot      Work and leisure: retired / working out, yard work (limited right now), involved with [employer]    Pt goals: "Unrestricted gym work outs, learn how to work out, strengthen right leg, work in the yard without pain"     Objective      Lumbar  Isometric Testing on Med X equipment: Testing administered by PT    Test Initial Baseline Midpoint Final   Date 2/5/24     ROM 0-54 deg     Max Peak Torque 122      Min Peak Torque 42      Flex/Ext Ratio 2.9:1     % below normative data 58%     % gain from initial test Not available visit 1       Outcomes:  Intake Score: 59% " "functional ability  Visit 6 Score:   Visit 10 Score:   Discharge Score:  Goal Score: 65% functional ability     Treatment     Jean received the treatments listed below:      Medical MedX Treatment as follows:  MedX exercise started day 2:  Patient received neuromuscular education for 10 minutes via participation on the Medical MedX Machine. Therapist assisted patient in isolating and engaging spinal stabilization musculature in order to improve functional ability and postural control. Patient performed exercise with therapist guidance in order to accurately use pacer function, avoid valsalva, and optimally exert effort within a safe and effective range via the Kristopher Exertion Rating Scale. Patient instructed to perform at a midrange of exertion and to complete 15-20 repetitions within appropriate split time, with proper technique, and while maintaining safety.          2/29/2024     3:04 PM   [facility]   Visit Number 5   VAS Pain Rating 0   Treadmill Time (in min.) 5 min   Time 5   Extension in Standing 10   Flexion in Lying 10   Lumbar Weight 58 lbs   Repetitions 20   Rating of Perceived Exertion 3   Ice - Z Lie (in min.) 5         Jean participated in therapeutic exercises to develop strength, endurance, ROM, posture, and core stabilization for 50 minutes including:    LTR x10  DKTC c/ T-ball x12  Figure 4 piriformis stretch 20" x 3 right lower extremity   PPT x10  PPT c/ LE extension 10x  Bridges with GTB x15  EIS x10  side lying clam with GTB x 20  + Paloff press with 15# x 10 - NP    Peripheral muscle strengthening which included one set of 15-20 repetitions at a slow and controlled 10-13 second per rep pace focused on strengthening supporting musculature in order to improve body mechanics and functional mobility. Patient and therapist focused on proper form during treatment to ensure optimal strengthening of each targeted muscle group.  Machines utilized included:Torso rotation, Leg Ext, Hip Abd, Hip " "Add, and Leg Press  To be added next visit:Leg Curl, Chest Press, Rowing, Triceps, and Biceps      Jean participated in dynamic functional therapeutic activities to improve functional performance and simulate household and community activities for 00  minutes. The following activities were included:    Jean received manual therapy techniques for 00  minutes. The following activities were included:      Pt given cold pack for 5 minutes to low back in Z-lie.    Patient Education and Home Exercises   Home exercises include: LTR x 5, Figure 4 piriformis stretch 20" x 3 right lower extremity, bridges with GTB, clamshells with GTB  Cardio program (V5): 2/29/24  Lifting education (V11): -  Posture/Lumbar roll:   Fridge Magnet Discharge handout (date given): -  Equipment at home/gym membership:      Education provided:   - PT role and POC  - HEP: bridges and clamshells with green theraband added 2/26/24  - MedX exercise  - RPE scale  - Cues with exercise  Visit 5 cardio information reviewed  Written Home Exercises Provided: Patient instructed to cont prior HEP.  Exercises were reviewed and Jean was able to demonstrate them prior to the end of the session.  Jean demonstrated good  understanding of the education provided.     See EMR under Patient Instructions for exercises provided 2/16/2024.    Assessment   Jean presents to therapy today reporting mild low back pain. Added PPT with LE extension to increase core stability. Discussed stabilization.  Maintained Lumbar MedX today to 58 ft/lbs, and he was able to perform 20 repetitions with an exertion level of 4/10. Inc 10% next visit. PT to continue to progress as tolerated for improved strength, endurance, and ROM and improved participation in ADLs and work activities. Added PPT c/LE extension for HEP as well.    Patient is making  progress towards established goals.  Pt will continue to benefit from skilled outpatient physical therapy to address the deficits stated in the " impairment chart, provide pt/family education and to maximize pt's level of independence in the home and community environment.     Anticipated Barriers for therapy: none  Pt's spiritual, cultural and educational needs considered and pt agreeable to plan of care and goals as stated below:     Goals:     Short term goals:  6 weeks or 10 visits   - Pt will demonstrate increased lumbar MedX ROM by at least 3 degrees from the initial ROM value with improvements noted in functional ROM and ability to perform ADLs. Appropriate and Ongoing  - Pt will demonstrate increased MedX average isometric strength value by 25% from initial test resulting in improved ability to perform bending, lifting, and carrying activities safely, confidently. Appropriate and Ongoing  - Pt will report a reduction in worst pain score by 1-2 points for improved tolerance for household chores. Appropriate and Ongoing  - Pt able to perform HEP correctly with minimal cueing or supervision from therapist to encourage independent management of symptoms. Appropriate and Ongoing     Long term goals: 10 weeks or 20 visits   - Pt will demonstrate increased lumbar MedX ROM by at least 3 degrees from initial ROM value, resulting in improved ability to perform functional forward bending while standing and sitting. Appropriate and Ongoing  - Pt will demonstrate increased MedX average isometric strength value by 45% from initial test resulting in improved ability to perform bending, lifting, and carrying activities safely and confidently. Appropriate and Ongoing  - Pt to demonstrate ability to independently control and reduce their pain through posture positioning and mechanical movements throughout a typical day. Appropriate and Ongoing  - Pt will demonstrate reduced pain and improved functional outcomes as reported on the FOTO by reaching an intake score of >/= 65% functional ability in order to demonstrate subjective improvement in patient's condition. .  Appropriate and Ongoing  - Pt will demonstrate independence with the HEP at discharge. Appropriate and Ongoing  - Pt will be able to complete gym workouts without onset of pain (patient goal) Appropriate and Ongoing    Plan   Continue with established Plan of Care towards established PT goals.     Therapist: Mena Dubon, PT    2/29/2024

## 2024-03-04 ENCOUNTER — CLINICAL SUPPORT (OUTPATIENT)
Dept: REHABILITATION | Facility: HOSPITAL | Age: 74
End: 2024-03-04
Payer: MEDICARE

## 2024-03-04 DIAGNOSIS — M53.86 DECREASED RANGE OF MOTION OF LUMBAR SPINE: Primary | ICD-10-CM

## 2024-03-04 PROCEDURE — 97112 NEUROMUSCULAR REEDUCATION: CPT | Mod: CQ

## 2024-03-04 PROCEDURE — 97110 THERAPEUTIC EXERCISES: CPT | Mod: CQ

## 2024-03-04 NOTE — PROGRESS NOTES
"  [facility]  Physical Therapy Treatment Note     Name: Jean KHAN [facility]  Clinic Number: [id number]    Therapy Diagnosis:   Encounter Diagnosis   Name Primary?    Decreased range of motion of lumbar spine Yes       Physician: Ute Rivas, *    Visit Date: 3/4/2024    Physician Orders: PT Eval and Treat  Medical Diagnosis from Referral: M54.6,G89.29 (ICD-10-CM) - Chronic midline thoracic back pain M54.41,G89.29 (ICD-10-CM) - Chronic bilateral low back pain with right-sided sciatica  Evaluation Date: 2/5/2024  Authorization Period Expiration: 01/04/2025  Reassessment Due: 3/5/2024  Plan of Care Certification Period: 4/15/2024  Visit #/Visits authorized: 6/20   MedX Testing:MedX testing visit 1    Time In: 11:00 AM  Time Out: 12:10 PM  Total Billable Time: 60 minutes   INSURANCE and OUTCOMES: Fee for Service with FOTO Outcomes 2/3     Precautions: standard, history of cancer, and osteoporosis     Pattern of pain determined: 1 PEN    Subjective   Jean reports mild back stiffness without c/o pain currently and feels the HB program has been helpful thus far.    Patient reports tolerating previous visit no complaints   Patient reports their pain to be 0/10 on a 0-10 scale with 0 being no pain and 10 being the worst pain imaginable.  Pain Location: right low back, right posterior leg into foot      Work and leisure: retired / working out, yard work (limited right now), involved with [employer]    Pt goals: "Unrestricted gym work outs, learn how to work out, strengthen right leg, work in the yard without pain"     Objective      Lumbar  Isometric Testing on Med X equipment: Testing administered by PT    Test Initial Baseline Midpoint Final   Date 2/5/24     ROM 0-54 deg     Max Peak Torque 122      Min Peak Torque 42      Flex/Ext Ratio 2.9:1     % below normative data 58%     % gain from initial test Not available visit 1       Outcomes:  Intake Score: 59% " "functional ability  Visit 6 Score: 72%  Visit 10 Score:   Discharge Score:  Goal Score: 65% functional ability           Treatment     Jean received the treatments listed below:      Medical MedX Treatment as follows:  MedX exercise started day 2:  Patient received neuromuscular education for 10 minutes via participation on the Medical MedX Machine. Therapist assisted patient in isolating and engaging spinal stabilization musculature in order to improve functional ability and postural control. Patient performed exercise with therapist guidance in order to accurately use pacer function, avoid valsalva, and optimally exert effort within a safe and effective range via the Kristopher Exertion Rating Scale. Patient instructed to perform at a midrange of exertion and to complete 15-20 repetitions within appropriate split time, with proper technique, and while maintaining safety.          3/4/2024    11:44 AM   [facility]   Visit Number 6   VAS Pain Rating 0   Treadmill Time (in min.) 5 min   Extension in Standing 10   Flexion in Lying 10   Lumbar Weight 63 lbs   Repetitions 20   Rating of Perceived Exertion 3.5      Jean participated in therapeutic exercises to develop strength, endurance, ROM, posture, and core stabilization for 50 minutes including:    LTR x10  DKTC c/ T-ball x10  Figure 4 piriformis stretch 30" x 2   + R sciatic nerve glide x 15  PPT c/ LE extension 10x--NP  +TrA activation (T-ball) + SLR x 10  Bridges with GTB x20  side lying clam with GTB x 20  EIS x10  Paloff press with 15# x 15    Peripheral muscle strengthening which included one set of 15-20 repetitions at a slow and controlled 10-13 second per rep pace focused on strengthening supporting musculature in order to improve body mechanics and functional mobility. Patient and therapist focused on proper form during treatment to ensure optimal strengthening of each targeted muscle group.  Machines utilized included:Torso rotation, Leg Ext, " "Hip Abd, Hip Add, and Leg Press  Leg Curl, Chest Press, Rowing, Triceps, and Biceps    Jean received manual therapy techniques for 00  minutes. The following activities were included:    Pt given cold pack for 5 minutes to low back in Z-lie.    Patient Education and Home Exercises   Home exercises include: LTR x 5, Figure 4 piriformis stretch 20" x 3 right lower extremity, bridges with GTB, clamshells with GTB, nerve glide  Cardio program (V5): 3/4/24 Benefits of cardio handout reviewed and provided  Lifting education (V11): -  Posture/Lumbar roll:   Fridge Magnet Discharge handout (date given): -  Equipment at home/gym membership:      Education provided:   - Cues with exercise  - MedX performance  - Precor ex performance  - 3/4/24 availability of Health coaching.    Written Home Exercises Provided: Patient instructed to cont prior HEP. Added sciatic nerve glide 3/4/24  Exercises were reviewed and Jean was able to demonstrate them prior to the end of the session.  Jean demonstrated good  understanding of the education provided.     See EMR under Patient Instructions for exercises provided prior visit and today's visit.    Assessment   Jean returns with mild lower back stiffness without c/o pain currently. Treatment continued with flexibility, strengthening and neuromuscular reeducation ex's. Added R LE sciatic nerve glides to help address chronic R LE symptoms, progressed reps for bridging with band and also added TrA activation for progressive strengthening. He was able to perform ex's with min cues and without c/o pain. Lumbar MedX resistance was increased to 63 ft/lbs and he completed 20 reps with a RPE = 3.5/10. He was able to complete the full circuit of peripheral strengthening ex's without c/o. Will continue per HB protocol and patient tolerance. Updated FOTO scoring reflects improvements.      Patient is making  progress towards established goals.  Pt will continue to benefit from skilled outpatient " physical therapy to address the deficits stated in the impairment chart, provide pt/family education and to maximize pt's level of independence in the home and community environment.     Anticipated Barriers for therapy: none  Pt's spiritual, cultural and educational needs considered and pt agreeable to plan of care and goals as stated below:     Goals:     Short term goals:  6 weeks or 10 visits   - Pt will demonstrate increased lumbar MedX ROM by at least 3 degrees from the initial ROM value with improvements noted in functional ROM and ability to perform ADLs. Appropriate and Ongoing  - Pt will demonstrate increased MedX average isometric strength value by 25% from initial test resulting in improved ability to perform bending, lifting, and carrying activities safely, confidently. Appropriate and Ongoing  - Pt will report a reduction in worst pain score by 1-2 points for improved tolerance for household chores. Appropriate and Ongoing  - Pt able to perform HEP correctly with minimal cueing or supervision from therapist to encourage independent management of symptoms. Appropriate and Ongoing     Long term goals: 10 weeks or 20 visits   - Pt will demonstrate increased lumbar MedX ROM by at least 3 degrees from initial ROM value, resulting in improved ability to perform functional forward bending while standing and sitting. Appropriate and Ongoing  - Pt will demonstrate increased MedX average isometric strength value by 45% from initial test resulting in improved ability to perform bending, lifting, and carrying activities safely and confidently. Appropriate and Ongoing  - Pt to demonstrate ability to independently control and reduce their pain through posture positioning and mechanical movements throughout a typical day. Appropriate and Ongoing  - Pt will demonstrate reduced pain and improved functional outcomes as reported on the FOTO by reaching an intake score of >/= 65% functional ability in order to demonstrate  subjective improvement in patient's condition. . Appropriate and Ongoing  - Pt will demonstrate independence with the HEP at discharge. Appropriate and Ongoing  - Pt will be able to complete gym workouts without onset of pain (patient goal) Appropriate and Ongoing    Plan   Continue with established Plan of Care towards established PT goals.     Therapist: Carrington Disla, PTA    3/4/2024

## 2024-03-06 ENCOUNTER — CLINICAL SUPPORT (OUTPATIENT)
Dept: REHABILITATION | Facility: HOSPITAL | Age: 74
End: 2024-03-06
Payer: MEDICARE

## 2024-03-06 DIAGNOSIS — M53.86 DECREASED RANGE OF MOTION OF LUMBAR SPINE: Primary | ICD-10-CM

## 2024-03-06 PROCEDURE — 97112 NEUROMUSCULAR REEDUCATION: CPT | Mod: CQ

## 2024-03-06 PROCEDURE — 97110 THERAPEUTIC EXERCISES: CPT | Mod: CQ

## 2024-03-06 NOTE — PROGRESS NOTES
"  [facility]  Physical Therapy Treatment Note     Name: Jean KHAN [facility]  Clinic Number: [id number]    Therapy Diagnosis:   Encounter Diagnosis   Name Primary?    Decreased range of motion of lumbar spine Yes       Physician: Ute Rivas, *    Visit Date: 3/6/2024    Physician Orders: PT Eval and Treat  Medical Diagnosis from Referral: M54.6,G89.29 (ICD-10-CM) - Chronic midline thoracic back pain M54.41,G89.29 (ICD-10-CM) - Chronic bilateral low back pain with right-sided sciatica  Evaluation Date: 2/5/2024  Authorization Period Expiration: 01/04/2025  Reassessment Due: 4/3/2024 ( a reassess was performed by Michelle Galindo, PT this visit 3/6/24)  Plan of Care Certification Period: 4/15/2024  Visit #/Visits authorized: 7/20   MedX Testing:MedX testing visit 1    Time In: 11:00 AM  Time Out: 12:00 PM  Total Billable Time: 55minutes   INSURANCE and OUTCOMES: Fee for Service with FOTO Outcomes 2/3     Precautions: standard, history of cancer, and osteoporosis     Pattern of pain determined: 1 PEN    Subjective   Jean reports mild back stiffness without c/o pain currently. He reports compliance with HEP.    Patient reports tolerating previous visit no complaints   Patient reports their pain to be 0/10 on a 0-10 scale with 0 being no pain and 10 being the worst pain imaginable.  Pain Location: right low back, right posterior leg into foot      Work and leisure: retired / working out, yard work (limited right now), involved with [employer]    Pt goals: "Unrestricted gym work outs, learn how to work out, strengthen right leg, work in the yard without pain"     Objective      MOVEMENT LOSS - Lumbar    Norms ROM Loss Initial Range of motion 3/6/2024   Flexion Fingers touch toes, sacral angle >/= 70 deg, uniform spinal curvature, posterior weight shift  minimal loss Within functional limits   Extension ASIS surpasses toes, spine of scapulae surpasses heels, uniform " spinal curve minimal loss Minimal loss   Side glide Right   minimal loss Minimal loss   Side glide Left   minimal loss Minimal loss   Rotation Right PT observes contralateral shoulder minimal loss Minimal loss   Rotation Left PT observes contralateral shoulder minimal loss Minimal loss        Lumbar  Isometric Testing on Med X equipment: Testing administered by PT    Test Initial Baseline Midpoint Final   Date 2/5/24     ROM 0-54 deg     Max Peak Torque 122      Min Peak Torque 42      Flex/Ext Ratio 2.9:1     % below normative data 58%     % gain from initial test Not available visit 1       Outcomes:  Intake Score: 59% functional ability  Visit 6 Score: 72%  Visit 10 Score:   Discharge Score:  Goal Score: 65% functional ability           Treatment     Jean received the treatments listed below:      Medical MedX Treatment as follows:  MedX exercise started day 2:  Patient received neuromuscular education for 10 minutes via participation on the Medical MedX Machine. Therapist assisted patient in isolating and engaging spinal stabilization musculature in order to improve functional ability and postural control. Patient performed exercise with therapist guidance in order to accurately use pacer function, avoid valsalva, and optimally exert effort within a safe and effective range via the Kristopher Exertion Rating Scale. Patient instructed to perform at a midrange of exertion and to complete 15-20 repetitions within appropriate split time, with proper technique, and while maintaining safety.          3/6/2024    11:16 AM   [facility]   Visit Number 7   VAS Pain Rating 0   Lumbar Stretches - Slouch Overcorrection 10   Extension in Standing 10   Flexion in Lying 10   Lumbar Weight 68 lbs   Repetitions 20   Rating of Perceived Exertion 3.5   Ice - Z Lie (in min.) 5       Jean participated in therapeutic exercises to develop strength, endurance, ROM, posture, and core stabilization for 45 minutes including:    LTR  "x10  DKTC c/ T-ball x10  Figure 4 piriformis stretch 30" x 2   +HSS w/strap 2 x 10 sec  R sciatic nerve glide x 15--NP  PPT c/ LE extension 10x--NP  TrA activation (T-ball) + SLR + 2# x 10  Bridges with BTB x20  side lying clam with BTB x 15  +SOC x 10  EIS x10  Paloff press with 15# x 10 + Overhead    Peripheral muscle strengthening which included one set of 15-20 repetitions at a slow and controlled 10-13 second per rep pace focused on strengthening supporting musculature in order to improve body mechanics and functional mobility. Patient and therapist focused on proper form during treatment to ensure optimal strengthening of each targeted muscle group.  Machines utilized included:Torso rotation, Leg Ext, Hip Abd, Hip Add, and Leg Press  Leg Curl, Chest Press, Rowing, Triceps, and Biceps    Jean received manual therapy techniques for 00  minutes. The following activities were included:    Pt given cold pack for 5 minutes to low back in Z-lie.    Patient Education and Home Exercises   Home exercises include: LTR x 5, Figure 4 piriformis stretch 20" x 3 right lower extremity, bridges with GTB, clamshells with GTB, nerve glide  Cardio program (V5): 3/4/24 Benefits of cardio handout reviewed and provided  Lifting education (V11): -  Posture/Lumbar roll:   Fridge Magnet Discharge handout (date given): -  Equipment at home/gym membership:      Education provided:   - Cues with exercise  - MedX performance  - Precor ex performance  - 3/4/24 availability of Health coaching.    Written Home Exercises Provided: Patient instructed to cont prior HEP. Added sciatic nerve glide 3/4/24  Exercises were reviewed and Jean was able to demonstrate them prior to the end of the session.  Jean demonstrated good  understanding of the education provided.     See EMR under Patient Instructions for exercises provided prior visits.    Assessment   Jean returns with mild lower back stiffness without c/o pain currently. Treatment continued " with flexibility, strengthening and neuromuscular reeducation ex's. He was progressed to perform 2# resistance for TrA + SLR, BTB for bridging with band and clamshells and also added overhead component for Paloff press for progressive strengthening and SOC ex for mobility. He was able to perform ex's with min cues and without c/o pain. Lumbar MedX resistance was increased to 68 ft/lbs and he completed 20 reps with a RPE = 3.5/10. He was able to complete the full circuit of peripheral strengthening ex's without c/o. Will continue per HB protocol and patient tolerance.     Patient is making  progress towards established goals.  Pt will continue to benefit from skilled outpatient physical therapy to address the deficits stated in the impairment chart, provide pt/family education and to maximize pt's level of independence in the home and community environment.     Anticipated Barriers for therapy: none  Pt's spiritual, cultural and educational needs considered and pt agreeable to plan of care and goals as stated below:     Goals:   Short term goals:  6 weeks or 10 visits   - Pt will demonstrate increased lumbar MedX ROM by at least 3 degrees from the initial ROM value with improvements noted in functional ROM and ability to perform ADLs. Appropriate and Ongoing  - Pt will demonstrate increased MedX average isometric strength value by 25% from initial test resulting in improved ability to perform bending, lifting, and carrying activities safely, confidently. Appropriate and Ongoing  - Pt will report a reduction in worst pain score by 1-2 points for improved tolerance for household chores. Appropriate and Ongoing  - Pt able to perform HEP correctly with minimal cueing or supervision from therapist to encourage independent management of symptoms. Appropriate and Ongoing     Long term goals: 10 weeks or 20 visits   - Pt will demonstrate increased lumbar MedX ROM by at least 3 degrees from initial ROM value, resulting in  improved ability to perform functional forward bending while standing and sitting. Appropriate and Ongoing  - Pt will demonstrate increased MedX average isometric strength value by 45% from initial test resulting in improved ability to perform bending, lifting, and carrying activities safely and confidently. Appropriate and Ongoing  - Pt to demonstrate ability to independently control and reduce their pain through posture positioning and mechanical movements throughout a typical day. Appropriate and Ongoing  - Pt will demonstrate reduced pain and improved functional outcomes as reported on the FOTO by reaching an intake score of >/= 65% functional ability in order to demonstrate subjective improvement in patient's condition. . Appropriate and Ongoing  - Pt will demonstrate independence with the HEP at discharge. Appropriate and Ongoing  - Pt will be able to complete gym workouts without onset of pain (patient goal) Appropriate and Ongoing    Plan   Continue with established Plan of Care towards established PT goals.     Therapist: Carrington Disla, PTA    3/6/2024

## 2024-03-11 ENCOUNTER — CLINICAL SUPPORT (OUTPATIENT)
Dept: REHABILITATION | Facility: HOSPITAL | Age: 74
End: 2024-03-11
Payer: MEDICARE

## 2024-03-11 DIAGNOSIS — M53.86 DECREASED RANGE OF MOTION OF LUMBAR SPINE: Primary | ICD-10-CM

## 2024-03-11 PROCEDURE — 97110 THERAPEUTIC EXERCISES: CPT

## 2024-03-11 PROCEDURE — 97112 NEUROMUSCULAR REEDUCATION: CPT

## 2024-03-11 NOTE — PROGRESS NOTES
"  [facility]  Physical Therapy Treatment Note     Name: Jean KHAN [facility]  Clinic Number: [id number]    Therapy Diagnosis:   Encounter Diagnosis   Name Primary?    Decreased range of motion of lumbar spine Yes       Physician: Ute Rivas, *    Visit Date: 3/11/2024    Physician Orders: PT Eval and Treat  Medical Diagnosis from Referral: M54.6,G89.29 (ICD-10-CM) - Chronic midline thoracic back pain M54.41,G89.29 (ICD-10-CM) - Chronic bilateral low back pain with right-sided sciatica  Evaluation Date: 2/5/2024  Authorization Period Expiration: 01/04/2025  Reassessment Due: 4/3/2024   Plan of Care Certification Period: 4/15/2024  Visit #/Visits authorized: 8/20   MedX Testing:MedX testing visit 1    Time In: 11:30 AM  Time Out: 12:30 PM  Total Billable Time: 55 minutes   INSURANCE and OUTCOMES: Fee for Service with FOTO Outcomes 2/3     Precautions: standard, history of cancer, and osteoporosis     Pattern of pain determined: 1 PEN    Subjective   Jean reports mild back stiffness without c/o pain currently, does have some mild pain in left arch of foot. He reports compliance with HEP.    Patient reports tolerating previous visit no complaints   Patient reports their pain to be 0/10 on a 0-10 scale with 0 being no pain and 10 being the worst pain imaginable.  Pain Location: right low back, right posterior leg into foot      Work and leisure: retired / working out, yard work (limited right now), involved with [employer]    Pt goals: "Unrestricted gym work outs, learn how to work out, strengthen right leg, work in the yard without pain"     Objective      MOVEMENT LOSS - Lumbar    Norms ROM Loss Initial Range of motion 3/6/2024   Flexion Fingers touch toes, sacral angle >/= 70 deg, uniform spinal curvature, posterior weight shift  minimal loss Within functional limits   Extension ASIS surpasses toes, spine of scapulae surpasses heels, uniform spinal curve minimal " loss Minimal loss   Side glide Right   minimal loss Minimal loss   Side glide Left   minimal loss Minimal loss   Rotation Right PT observes contralateral shoulder minimal loss Minimal loss   Rotation Left PT observes contralateral shoulder minimal loss Minimal loss        Lumbar  Isometric Testing on Med X equipment: Testing administered by PT    Test Initial Baseline Midpoint Final   Date 2/5/24     ROM 0-54 deg     Max Peak Torque 122      Min Peak Torque 42      Flex/Ext Ratio 2.9:1     % below normative data 58%     % gain from initial test Not available visit 1       Outcomes:  Intake Score: 59% functional ability  Visit 6 Score: 72%  Visit 10 Score:   Discharge Score:  Goal Score: 65% functional ability           Treatment     Jean received the treatments listed below:      Medical MedX Treatment as follows:  MedX exercise started day 2:  Patient received neuromuscular education for 10 minutes via participation on the Medical MedX Machine. Therapist assisted patient in isolating and engaging spinal stabilization musculature in order to improve functional ability and postural control. Patient performed exercise with therapist guidance in order to accurately use pacer function, avoid valsalva, and optimally exert effort within a safe and effective range via the Kristopher Exertion Rating Scale. Patient instructed to perform at a midrange of exertion and to complete 15-20 repetitions within appropriate split time, with proper technique, and while maintaining safety.          3/11/2024    12:41 PM   [facility]   Visit Number 8   VAS Pain Rating 0   Treadmill Time (in min.) 4 min   Lumbar Stretches - Slouch Overcorrection 10   Extension in Standing 10   Flexion in Lying 10   Lumbar Weight 74 lbs   Repetitions 19   Rating of Perceived Exertion 3   Ice - Z Lie (in min.) 5         Jean participated in therapeutic exercises to develop strength, endurance, ROM, posture, and core stabilization for 45 minutes  "including:    LTR x10  DKTC c/ T-ball x10  Figure 4 piriformis stretch 30" x 2   HSS w/strap 2 x 10 sec  R sciatic nerve glide x 15--NP  TrA activation (T-ball) + SLR + 2# x 15  Bridges with BTB x20  side lying clam with BTB x 15  SOC x 10  EIS x10  Paloff press with 15# x 10 + Overhead    Peripheral muscle strengthening which included one set of 15-20 repetitions at a slow and controlled 10-13 second per rep pace focused on strengthening supporting musculature in order to improve body mechanics and functional mobility. Patient and therapist focused on proper form during treatment to ensure optimal strengthening of each targeted muscle group.  Machines utilized included:Torso rotation, Leg Ext, Hip Abd, Hip Add, and Leg Press, Leg Curl, Chest Press, Rowing, Triceps, and Biceps    Jean received manual therapy techniques for 00  minutes. The following activities were included:    Pt given cold pack for 5 minutes to low back in Z-lie.    Patient Education and Home Exercises   Home exercises include: LTR x 5, Figure 4 piriformis stretch 20" x 3 right lower extremity, bridges with GTB, clamshells with GTB, nerve glide  Cardio program (V5): 3/4/24 Benefits of cardio handout reviewed and provided  Lifting education (V11): -  Posture/Lumbar roll:   Fridge Magnet Discharge handout (date given): -  Equipment at home/gym membership:      Education provided:   - Cues with exercise  - MedX performance  - Precor ex performance  - 3/4/24 availability of Health coaching.    Written Home Exercises Provided: Patient instructed to cont prior HEP. Added sciatic nerve glide 3/4/24  Exercises were reviewed and Jean was able to demonstrate them prior to the end of the session.  Jean demonstrated good  understanding of the education provided.     See EMR under Patient Instructions for exercises provided prior visits.    Assessment   Jean returns with mild lower back stiffness without c/o pain currently. Treatment continued with " flexibility, strengthening and neuromuscular reeducation ex's. He was progressed to perform 15 reps for TrA + SLR. He was able to perform ex's with min cues and without c/o pain. Lumbar MedX resistance was increased to 74 ft/lbs and he completed 19 reps with a RPE = 3/10. He was able to complete the full circuit of peripheral strengthening ex's without c/o. Will continue per HB protocol and patient tolerance.     Patient is making  progress towards established goals.  Pt will continue to benefit from skilled outpatient physical therapy to address the deficits stated in the impairment chart, provide pt/family education and to maximize pt's level of independence in the home and community environment.     Anticipated Barriers for therapy: none  Pt's spiritual, cultural and educational needs considered and pt agreeable to plan of care and goals as stated below:     Goals:   Short term goals:  6 weeks or 10 visits   - Pt will demonstrate increased lumbar MedX ROM by at least 3 degrees from the initial ROM value with improvements noted in functional ROM and ability to perform ADLs. Appropriate and Ongoing  - Pt will demonstrate increased MedX average isometric strength value by 25% from initial test resulting in improved ability to perform bending, lifting, and carrying activities safely, confidently. Appropriate and Ongoing  - Pt will report a reduction in worst pain score by 1-2 points for improved tolerance for household chores. Appropriate and Ongoing  - Pt able to perform HEP correctly with minimal cueing or supervision from therapist to encourage independent management of symptoms. Appropriate and Ongoing     Long term goals: 10 weeks or 20 visits   - Pt will demonstrate increased lumbar MedX ROM by at least 3 degrees from initial ROM value, resulting in improved ability to perform functional forward bending while standing and sitting. Appropriate and Ongoing  - Pt will demonstrate increased MedX average isometric  strength value by 45% from initial test resulting in improved ability to perform bending, lifting, and carrying activities safely and confidently. Appropriate and Ongoing  - Pt to demonstrate ability to independently control and reduce their pain through posture positioning and mechanical movements throughout a typical day. Appropriate and Ongoing  - Pt will demonstrate reduced pain and improved functional outcomes as reported on the FOTO by reaching an intake score of >/= 65% functional ability in order to demonstrate subjective improvement in patient's condition. . Appropriate and Ongoing  - Pt will demonstrate independence with the HEP at discharge. Appropriate and Ongoing  - Pt will be able to complete gym workouts without onset of pain (patient goal) Appropriate and Ongoing    Plan   Continue with established Plan of Care towards established PT goals.     Therapist: Michelle Galindo, PT    3/11/2024

## 2024-03-13 ENCOUNTER — CLINICAL SUPPORT (OUTPATIENT)
Dept: REHABILITATION | Facility: HOSPITAL | Age: 74
End: 2024-03-13
Payer: MEDICARE

## 2024-03-13 DIAGNOSIS — M53.86 DECREASED RANGE OF MOTION OF LUMBAR SPINE: Primary | ICD-10-CM

## 2024-03-13 PROCEDURE — 97110 THERAPEUTIC EXERCISES: CPT

## 2024-03-13 PROCEDURE — 97112 NEUROMUSCULAR REEDUCATION: CPT

## 2024-03-13 NOTE — PROGRESS NOTES
"  [facility]  Physical Therapy Treatment Note     Name: Jean KHAN [facility]  Clinic Number: [id number]    Therapy Diagnosis:   Encounter Diagnosis   Name Primary?    Decreased range of motion of lumbar spine Yes       Physician: Ute Rivas, *    Visit Date: 3/13/2024    Physician Orders: PT Eval and Treat  Medical Diagnosis from Referral: M54.6,G89.29 (ICD-10-CM) - Chronic midline thoracic back pain M54.41,G89.29 (ICD-10-CM) - Chronic bilateral low back pain with right-sided sciatica  Evaluation Date: 2/5/2024  Authorization Period Expiration: 01/04/2025  Reassessment Due: 4/3/2024   Plan of Care Certification Period: 4/15/2024  Visit #/Visits authorized: 9/20   MedX Testing:MedX testing visit 1    Time In: 11:30 AM  Time Out: 12:30 PM  Total Billable Time: 55 minutes   INSURANCE and OUTCOMES: Fee for Service with FOTO Outcomes 2/3     Precautions: standard, history of cancer, and osteoporosis     Pattern of pain determined: 1 PEN    Subjective   Jean denies pain at start of session, feels that program is helping.     Patient reports tolerating previous visit no complaints   Patient reports their pain to be 0/10 on a 0-10 scale with 0 being no pain and 10 being the worst pain imaginable.  Pain Location: right low back, right posterior leg into foot      Work and leisure: retired / working out, yard work (limited right now), involved with [employer]    Pt goals: "Unrestricted gym work outs, learn how to work out, strengthen right leg, work in the yard without pain"     Objective      MOVEMENT LOSS - Lumbar    Norms ROM Loss Initial Range of motion 3/6/2024   Flexion Fingers touch toes, sacral angle >/= 70 deg, uniform spinal curvature, posterior weight shift  minimal loss Within functional limits   Extension ASIS surpasses toes, spine of scapulae surpasses heels, uniform spinal curve minimal loss Minimal loss   Side glide Right   minimal loss Minimal loss " "  Side glide Left   minimal loss Minimal loss   Rotation Right PT observes contralateral shoulder minimal loss Minimal loss   Rotation Left PT observes contralateral shoulder minimal loss Minimal loss        Lumbar  Isometric Testing on Med X equipment: Testing administered by PT    Test Initial Baseline Midpoint Final   Date 2/5/24     ROM 0-54 deg     Max Peak Torque 122      Min Peak Torque 42      Flex/Ext Ratio 2.9:1     % below normative data 58%     % gain from initial test Not available visit 1       Outcomes:  Intake Score: 59% functional ability  Visit 6 Score: 72%  Visit 10 Score:   Discharge Score:  Goal Score: 65% functional ability           Treatment     Jean received the treatments listed below:      Medical MedX Treatment as follows:  MedX exercise started day 2:  Patient received neuromuscular education for 10 minutes via participation on the Medical MedX Machine. Therapist assisted patient in isolating and engaging spinal stabilization musculature in order to improve functional ability and postural control. Patient performed exercise with therapist guidance in order to accurately use pacer function, avoid valsalva, and optimally exert effort within a safe and effective range via the Kristopher Exertion Rating Scale. Patient instructed to perform at a midrange of exertion and to complete 15-20 repetitions within appropriate split time, with proper technique, and while maintaining safety.          3/13/2024    12:43 PM   [facility]   Visit Number 9   VAS Pain Rating 0   Treadmill Time (in min.) 5 min   Lumbar Stretches - Slouch Overcorrection 10   Extension in Standing 10   Flexion in Lying 10   Lumbar Weight 74 lbs   Repetitions 20   Rating of Perceived Exertion 4   Ice - Z Lie (in min.) 5         Jean participated in therapeutic exercises to develop strength, endurance, ROM, posture, and core stabilization for 45 minutes including:    LTR x10  DKTC c/ T-ball x10  Figure 4 piriformis stretch 30" " "x 2   HSS w/strap 2 x 10 sec  R sciatic nerve glide x 15--NP  TrA activation (T-ball) + SLR + 2# x 15  Bridges with BTB x20  side lying clam with BTB x 15  SOC x 10  EIS x10  Paloff press with 15# x 10 + Overhead    Peripheral muscle strengthening which included one set of 15-20 repetitions at a slow and controlled 10-13 second per rep pace focused on strengthening supporting musculature in order to improve body mechanics and functional mobility. Patient and therapist focused on proper form during treatment to ensure optimal strengthening of each targeted muscle group.  Machines utilized included:Torso rotation, Leg Ext, Hip Abd, Hip Add, and Leg Press, Leg Curl, Chest Press, Rowing, Triceps, and Biceps    Jean received manual therapy techniques for 00  minutes. The following activities were included:    Pt given cold pack for 5 minutes to low back in Z-lie.    Patient Education and Home Exercises   Home exercises include: LTR x 5, Figure 4 piriformis stretch 20" x 3 right lower extremity, bridges with GTB, clamshells with GTB, nerve glide  Cardio program (V5): 3/4/24 Benefits of cardio handout reviewed and provided  Lifting education (V11): -  Posture/Lumbar roll:   Fridge Magnet Discharge handout (date given): -  Equipment at home/gym membership:      Education provided:   - Cues with exercise  - MedX performance  - Precor ex performance  - 3/4/24 availability of Health coaching.    Written Home Exercises Provided: Patient instructed to cont prior HEP. Added sciatic nerve glide 3/4/24  Exercises were reviewed and Jean was able to demonstrate them prior to the end of the session.  Jean demonstrated good  understanding of the education provided.     See EMR under Patient Instructions for exercises provided prior visits.    Assessment   Jean returns with mild lower back stiffness without c/o pain currently. Treatment continued with flexibility, strengthening and neuromuscular reeducation ex's.  He was able to perform " ex's with min cues and without c/o pain. Lumbar MedX resistance was maintained at  74 ft/lbs and he completed 20 reps with a RPE = 4/10. He was able to complete the full circuit of peripheral strengthening ex's without c/o. Will continue per HB protocol and patient tolerance.     Patient is making  progress towards established goals.  Pt will continue to benefit from skilled outpatient physical therapy to address the deficits stated in the impairment chart, provide pt/family education and to maximize pt's level of independence in the home and community environment.     Anticipated Barriers for therapy: none  Pt's spiritual, cultural and educational needs considered and pt agreeable to plan of care and goals as stated below:     Goals:   Short term goals:  6 weeks or 10 visits   - Pt will demonstrate increased lumbar MedX ROM by at least 3 degrees from the initial ROM value with improvements noted in functional ROM and ability to perform ADLs. Appropriate and Ongoing  - Pt will demonstrate increased MedX average isometric strength value by 25% from initial test resulting in improved ability to perform bending, lifting, and carrying activities safely, confidently. Appropriate and Ongoing  - Pt will report a reduction in worst pain score by 1-2 points for improved tolerance for household chores. Appropriate and Ongoing  - Pt able to perform HEP correctly with minimal cueing or supervision from therapist to encourage independent management of symptoms. Appropriate and Ongoing     Long term goals: 10 weeks or 20 visits   - Pt will demonstrate increased lumbar MedX ROM by at least 3 degrees from initial ROM value, resulting in improved ability to perform functional forward bending while standing and sitting. Appropriate and Ongoing  - Pt will demonstrate increased MedX average isometric strength value by 45% from initial test resulting in improved ability to perform bending, lifting, and carrying activities safely and  confidently. Appropriate and Ongoing  - Pt to demonstrate ability to independently control and reduce their pain through posture positioning and mechanical movements throughout a typical day. Appropriate and Ongoing  - Pt will demonstrate reduced pain and improved functional outcomes as reported on the FOTO by reaching an intake score of >/= 65% functional ability in order to demonstrate subjective improvement in patient's condition. . Appropriate and Ongoing  - Pt will demonstrate independence with the HEP at discharge. Appropriate and Ongoing  - Pt will be able to complete gym workouts without onset of pain (patient goal) Appropriate and Ongoing    Plan   Continue with established Plan of Care towards established PT goals.     Therapist: Michelle Galindo, PT    3/13/2024

## 2024-03-18 ENCOUNTER — CLINICAL SUPPORT (OUTPATIENT)
Dept: REHABILITATION | Facility: HOSPITAL | Age: 74
End: 2024-03-18
Payer: MEDICARE

## 2024-03-18 DIAGNOSIS — M53.86 DECREASED RANGE OF MOTION OF LUMBAR SPINE: Primary | ICD-10-CM

## 2024-03-18 PROCEDURE — 97112 NEUROMUSCULAR REEDUCATION: CPT

## 2024-03-18 PROCEDURE — 97110 THERAPEUTIC EXERCISES: CPT

## 2024-03-18 NOTE — PROGRESS NOTES
"  [facility]  Physical Therapy Treatment Note     Name: Jean KHAN [facility]  Clinic Number: [id number]    Therapy Diagnosis:   Encounter Diagnosis   Name Primary?    Decreased range of motion of lumbar spine Yes       Physician: Ute Rivas, *    Visit Date: 3/18/2024    Physician Orders: PT Eval and Treat  Medical Diagnosis from Referral: M54.6,G89.29 (ICD-10-CM) - Chronic midline thoracic back pain M54.41,G89.29 (ICD-10-CM) - Chronic bilateral low back pain with right-sided sciatica  Evaluation Date: 2/5/2024  Authorization Period Expiration: 01/04/2025  Reassessment Due: 4/3/2024   Plan of Care Certification Period: 4/15/2024  Visit #/Visits authorized: 10/20   MedX Testing:MedX testing visit 1    Time In: 11:30 AM  Time Out: 12:30 PM  Total Billable Time: 55 minutes   INSURANCE and OUTCOMES: Fee for Service with FOTO Outcomes 2/3     Precautions: standard, history of cancer, and osteoporosis     Pattern of pain determined: 1 PEN    Subjective   Jean denies pain at start of session, feels that program is helping.     Patient reports tolerating previous visit no complaints   Patient reports their pain to be 0/10 on a 0-10 scale with 0 being no pain and 10 being the worst pain imaginable.  Pain Location: right low back, right posterior leg into foot      Work and leisure: retired / working out, yard work (limited right now), involved with [employer]    Pt goals: "Unrestricted gym work outs, learn how to work out, strengthen right leg, work in the yard without pain"     Objective      MOVEMENT LOSS - Lumbar    Norms ROM Loss Initial Range of motion 3/6/2024   Flexion Fingers touch toes, sacral angle >/= 70 deg, uniform spinal curvature, posterior weight shift  minimal loss Within functional limits   Extension ASIS surpasses toes, spine of scapulae surpasses heels, uniform spinal curve minimal loss Minimal loss   Side glide Right   minimal loss Minimal loss "   Side glide Left   minimal loss Minimal loss   Rotation Right PT observes contralateral shoulder minimal loss Minimal loss   Rotation Left PT observes contralateral shoulder minimal loss Minimal loss        Lumbar  Isometric Testing on Med X equipment: Testing administered by PT    Test Initial Baseline Midpoint Final   Date 2/5/24 3/18/24    ROM 0-54 deg 0-54    Max Peak Torque 122  148    Min Peak Torque 42  58    Flex/Ext Ratio 2.9:1 2.5:1    % below normative data 58% 52%    % gain from initial test Not available visit 1 19%      Outcomes:  Intake Score: 59% functional ability  Visit 6 Score: 72%  Visit 10 Score: 67%   Discharge Score:  Goal Score: 65% functional ability       Treatment     Jean received the treatments listed below:      Medical MedX Treatment as follows:  MedX exercise started day 2:  Patient received neuromuscular education for 10 minutes via participation on the Medical MedX Machine. Therapist assisted patient in isolating and engaging spinal stabilization musculature in order to improve functional ability and postural control. Patient performed exercise with therapist guidance in order to accurately use pacer function, avoid valsalva, and optimally exert effort within a safe and effective range via the Kristopher Exertion Rating Scale. Patient instructed to perform at a midrange of exertion and to complete 15-20 repetitions within appropriate split time, with proper technique, and while maintaining safety.          3/18/2024    11:57 AM   [facility]   Visit Number 10   VAS Pain Rating 0   Treadmill Time (in min.) 5 min   Lumbar Stretches - Slouch Overcorrection 10   Extension in Standing 10   Flexion in Lying 10   Lumbar Flexion 54   Lumbar Extension 0   Lumbar Peak Torque 148 ft. lbs.   Min Torque 58   Test Percent Below Normative Data 52 %   Test Percent Gain in Strength from Initial  19 %   Ice - Z Lie (in min.) 5         Jean participated in therapeutic exercises to develop strength,  "endurance, ROM, posture, and core stabilization for 45 minutes including:    LTR x10  DKTC c/ T-ball x10  Figure 4 piriformis stretch 30" x 2   HSS w/strap 2 x 20 sec  R sciatic nerve glide x 15--NP  TrA activation (T-ball) + SLR + 2# x 15  Bridges with BTB x20  side lying clam with BTB x 15  SOC x 10  EIS x10  Paloff press with 15# x 15 + Overhead    Peripheral muscle strengthening which included one set of 15-20 repetitions at a slow and controlled 10-13 second per rep pace focused on strengthening supporting musculature in order to improve body mechanics and functional mobility. Patient and therapist focused on proper form during treatment to ensure optimal strengthening of each targeted muscle group.  Machines utilized included:Torso rotation, Leg Ext, Hip Abd, Hip Add, and Leg Press, Leg Curl, Chest Press, Rowing, Triceps, and Biceps    Jean received manual therapy techniques for 00  minutes. The following activities were included:    Pt given cold pack for 5 minutes to low back in Z-lie.    Patient Education and Home Exercises   Home exercises include: LTR x 5, Figure 4 piriformis stretch 20" x 3 right lower extremity, bridges with GTB, clamshells with GTB, nerve glide  Cardio program (V5): 3/4/24 Benefits of cardio handout reviewed and provided  Lifting education (V11): -  Posture/Lumbar roll: recommended  Fridge Magnet Discharge handout (date given): -  Equipment at home/gym membership: yes     Education provided:   - Cues with exercise  - MedX performance  - Precor ex performance  - 3/4/24 availability of Health coaching.    Written Home Exercises Provided: Patient instructed to cont prior HEP. Added sciatic nerve glide 3/4/24  Exercises were reviewed and Jean was able to demonstrate them prior to the end of the session.  Jean demonstrated good  understanding of the education provided.     See EMR under Patient Instructions for exercises provided prior visits.    Assessment   Jean  [facility] which included MD evaluation, PT evaluation with isometric testing, and physical therapy treatment including HEP instruction, education, aerobic activity, dynamic strengthening on MedX equipment for the spine, and whole body strengthening on MedX equipment with increasing resistance. Patient demonstrates increased ability to reduce symptoms, improve posture, improve ROM, and improve strength, as stated below:    -Improved posture, lumbar roll recommended  -Improved strength at each test point on lumbar MedX isometric test with 19% average improvement noted with reduced pain noted by patient.  -Initial outcome tool score 59% functional ability and current outcome tool score 67% functional ability indicating reduced pain and improved function.      Patient is making  progress towards established goals.  Pt will continue to benefit from skilled outpatient physical therapy to address the deficits stated in the impairment chart, provide pt/family education and to maximize pt's level of independence in the home and community environment.     Anticipated Barriers for therapy: none  Pt's spiritual, cultural and educational needs considered and pt agreeable to plan of care and goals as stated below:     Goals:   Short term goals:  6 weeks or 10 visits   - Pt will demonstrate increased lumbar MedX ROM by at least 3 degrees from the initial ROM value with improvements noted in functional ROM and ability to perform ADLs. Appropriate and Ongoing  - Pt will demonstrate increased MedX average isometric strength value by 25% from initial test resulting in improved ability to perform bending, lifting, and carrying activities safely, confidently. Appropriate and Ongoing  - Pt will report a reduction in worst pain score by 1-2 points for improved tolerance for household chores. MET  - Pt able to perform HEP correctly with minimal cueing or supervision from therapist to encourage independent management of symptoms.  MET     Long term goals: 10 weeks or 20 visits   - Pt will demonstrate increased lumbar MedX ROM by at least 3 degrees from initial ROM value, resulting in improved ability to perform functional forward bending while standing and sitting. Appropriate and Ongoing  - Pt will demonstrate increased MedX average isometric strength value by 45% from initial test resulting in improved ability to perform bending, lifting, and carrying activities safely and confidently. Appropriate and Ongoing  - Pt to demonstrate ability to independently control and reduce their pain through posture positioning and mechanical movements throughout a typical day. Appropriate and Ongoing  - Pt will demonstrate reduced pain and improved functional outcomes as reported on the FOTO by reaching an intake score of >/= 65% functional ability in order to demonstrate subjective improvement in patient's condition. . Appropriate and Ongoing  - Pt will demonstrate independence with the HEP at discharge. Appropriate and Ongoing  - Pt will be able to complete gym workouts without onset of pain (patient goal) Appropriate and Ongoing    Plan   Continue with established Plan of Care towards established PT goals.     Therapist: Michelle Galindo, PT    3/18/2024

## 2024-03-20 ENCOUNTER — DOCUMENTATION ONLY (OUTPATIENT)
Dept: REHABILITATION | Facility: HOSPITAL | Age: 74
End: 2024-03-20
Payer: MEDICARE

## 2024-03-20 ENCOUNTER — CLINICAL SUPPORT (OUTPATIENT)
Dept: REHABILITATION | Facility: HOSPITAL | Age: 74
End: 2024-03-20
Payer: MEDICARE

## 2024-03-20 DIAGNOSIS — M53.86 DECREASED RANGE OF MOTION OF LUMBAR SPINE: Primary | ICD-10-CM

## 2024-03-20 PROCEDURE — 97110 THERAPEUTIC EXERCISES: CPT

## 2024-03-20 PROCEDURE — 97112 NEUROMUSCULAR REEDUCATION: CPT

## 2024-03-20 NOTE — PROGRESS NOTES
"  [facility]  Physical Therapy Treatment Note     Name: Jean KHAN [facility]  Clinic Number: [id number]    Therapy Diagnosis:   Encounter Diagnosis   Name Primary?    Decreased range of motion of lumbar spine Yes       Physician: Ute Rivas, *    Visit Date: 3/20/2024    Physician Orders: PT Eval and Treat  Medical Diagnosis from Referral: M54.6,G89.29 (ICD-10-CM) - Chronic midline thoracic back pain M54.41,G89.29 (ICD-10-CM) - Chronic bilateral low back pain with right-sided sciatica  Evaluation Date: 2/5/2024  Authorization Period Expiration: 01/04/2025  Reassessment Due: 4/3/2024   Plan of Care Certification Period: 4/15/2024  Visit #/Visits authorized: 11/20   MedX Testing:MedX testing visit 1    Time In: 11:30 AM  Time Out: 12:30 PM  Total Billable Time: 55 minutes   INSURANCE and OUTCOMES: Fee for Service with FOTO Outcomes 2/3     Precautions: standard, history of cancer, and osteoporosis     Pattern of pain determined: 1 PEN    Subjective   Jean reports increased muscle discomfort after last session.  Pt did say he was lifting 40# container of gas and doing work outside the past few days.      Patient reports tolerating previous visit no complaints   Patient reports their pain to be 4/10 on a 0-10 scale with 0 being no pain and 10 being the worst pain imaginable.  Pain Location: right low back, right posterior leg into foot      Work and leisure: retired / working out, yard work (limited right now), involved with [employer]    Pt goals: "Unrestricted gym work outs, learn how to work out, strengthen right leg, work in the yard without pain"     Objective      MOVEMENT LOSS - Lumbar    Norms ROM Loss Initial Range of motion 3/6/2024   Flexion Fingers touch toes, sacral angle >/= 70 deg, uniform spinal curvature, posterior weight shift  minimal loss Within functional limits   Extension ASIS surpasses toes, spine of scapulae surpasses heels, uniform spinal " curve minimal loss Minimal loss   Side glide Right   minimal loss Minimal loss   Side glide Left   minimal loss Minimal loss   Rotation Right PT observes contralateral shoulder minimal loss Minimal loss   Rotation Left PT observes contralateral shoulder minimal loss Minimal loss        Lumbar  Isometric Testing on Med X equipment: Testing administered by PT    Test Initial Baseline Midpoint Final   Date 2/5/24 3/18/24    ROM 0-54 deg 0-54    Max Peak Torque 122  148    Min Peak Torque 42  58    Flex/Ext Ratio 2.9:1 2.5:1    % below normative data 58% 52%    % gain from initial test Not available visit 1 19%      Outcomes:  Intake Score: 59% functional ability  Visit 6 Score: 72%  Visit 10 Score: 67%   Discharge Score:  Goal Score: 65% functional ability       Treatment     Jean received the treatments listed below:      Medical MedX Treatment as follows:  MedX exercise started day 2:  Patient received neuromuscular education for 10 minutes via participation on the Medical MedX Machine. Therapist assisted patient in isolating and engaging spinal stabilization musculature in order to improve functional ability and postural control. Patient performed exercise with therapist guidance in order to accurately use pacer function, avoid valsalva, and optimally exert effort within a safe and effective range via the Kristopher Exertion Rating Scale. Patient instructed to perform at a midrange of exertion and to complete 15-20 repetitions within appropriate split time, with proper technique, and while maintaining safety.          3/20/2024    12:33 PM   [facility]   Visit Number 11   VAS Pain Rating 4   Treadmill Time (in min.) 5 min   Lumbar Stretches - Slouch Overcorrection 10   Extension in Standing 10   Flexion in Lying 10   Lumbar Weight 74 lbs   Repetitions 15   Rating of Perceived Exertion 5   Ice - Z Lie (in min.) 5         Jean participated in therapeutic exercises to develop strength, endurance, ROM, posture, and  "core stabilization for 45 minutes including:    LTR x10  DKTC c/ T-ball x10  Figure 4 piriformis stretch 30" x 2   HSS w/strap 2 x 20 sec  R sciatic nerve glide x 15--NP  TrA activation (T-ball) + SLR + 2# x 15  Bridges with BTB + kick out  x15  side lying clam with BTB x 15  SOC x 10  EIS x10  Paloff press with 15# x 15 + Overhead  + hip hinging with dowel x 10    Lifting education:              Floor to table lift with 10# KB x 5 (max cuing for proper form)              Hip hinging with 10# KB x 10       Peripheral muscle strengthening which included one set of 15-20 repetitions at a slow and controlled 10-13 second per rep pace focused on strengthening supporting musculature in order to improve body mechanics and functional mobility. Patient and therapist focused on proper form during treatment to ensure optimal strengthening of each targeted muscle group.  Machines utilized included:Torso rotation, Leg Ext, Hip Abd, Hip Add, and Leg Press, Leg Curl, Chest Press, Rowing, Triceps, and Biceps    Jean received manual therapy techniques for 00  minutes. The following activities were included:    Pt given cold pack for 5 minutes to low back in Z-lie.    Patient Education and Home Exercises   Home exercises include: LTR x 5, Figure 4 piriformis stretch 20" x 3 right lower extremity, bridges with GTB, clamshells with GTB, nerve glide  Cardio program (V5): 3/4/24 Benefits of cardio handout reviewed and provided  Lifting education (V11): - 3/20/24  Posture/Lumbar roll: recommended  Fridge Magnet Discharge handout (date given): -  Equipment at home/gym membership: yes     Education provided:   - Cues with exercise  - MedX performance  - Precor ex performance  - 3/4/24 availability of Health coaching.    Written Home Exercises Provided: Patient instructed to cont prior HEP. Added sciatic nerve glide 3/4/24  Exercises were reviewed and Jean was able to demonstrate them prior to the end of the session.  Jean demonstrated good "  understanding of the education provided.     See EMR under Patient Instructions for exercises provided prior visits.    Assessment   Jean returns with mild to moderate lower back pain and stiffness today.  Treatment continued with flexibility, strengthening and neuromuscular reeducation ex's.  Lifting education completed today, patient demonstrates hip hinging with mod cuing.  He was able to perform ex's with min cues and without c/o pain. Lumbar MedX resistance was maintained at  74 ft/lbs and he completed 20 reps with a RPE = 4/10. He was able to complete the full circuit of peripheral strengthening ex's without c/o. Will continue per HB protocol and patient tolerance.       Patient is making  progress towards established goals.  Pt will continue to benefit from skilled outpatient physical therapy to address the deficits stated in the impairment chart, provide pt/family education and to maximize pt's level of independence in the home and community environment.     Anticipated Barriers for therapy: none  Pt's spiritual, cultural and educational needs considered and pt agreeable to plan of care and goals as stated below:     Goals:   Short term goals:  6 weeks or 10 visits   - Pt will demonstrate increased lumbar MedX ROM by at least 3 degrees from the initial ROM value with improvements noted in functional ROM and ability to perform ADLs. Appropriate and Ongoing  - Pt will demonstrate increased MedX average isometric strength value by 25% from initial test resulting in improved ability to perform bending, lifting, and carrying activities safely, confidently. Appropriate and Ongoing  - Pt will report a reduction in worst pain score by 1-2 points for improved tolerance for household chores. MET  - Pt able to perform HEP correctly with minimal cueing or supervision from therapist to encourage independent management of symptoms. MET     Long term goals: 10 weeks or 20 visits   - Pt will demonstrate increased lumbar  MedX ROM by at least 3 degrees from initial ROM value, resulting in improved ability to perform functional forward bending while standing and sitting. Appropriate and Ongoing  - Pt will demonstrate increased MedX average isometric strength value by 45% from initial test resulting in improved ability to perform bending, lifting, and carrying activities safely and confidently. Appropriate and Ongoing  - Pt to demonstrate ability to independently control and reduce their pain through posture positioning and mechanical movements throughout a typical day. Appropriate and Ongoing  - Pt will demonstrate reduced pain and improved functional outcomes as reported on the FOTO by reaching an intake score of >/= 65% functional ability in order to demonstrate subjective improvement in patient's condition. . Appropriate and Ongoing  - Pt will demonstrate independence with the HEP at discharge. Appropriate and Ongoing  - Pt will be able to complete gym workouts without onset of pain (patient goal) Appropriate and Ongoing    Plan   Continue with established Plan of Care towards established PT goals.     Therapist: Michelle Galindo, PT    3/20/2024   parents smoked

## 2024-03-20 NOTE — PROGRESS NOTES
Health  Consult Note    Name: Jean KHAN [facility] Number: [id number]  Physician: Ute Rivas, *  Past Medical History:   Diagnosis Date    Acute medial meniscus tear, right, initial encounter 2023    Anterior tibialis tendinitis of right lower extremity 10/18/2018    Cancer     Chronic idiopathic constipation 2019    Closed fracture of left wrist 10/18/2021    Closed fracture of lower end of left radius with routine healing 2022    Elevated PSA     Family history of ASCVD 10/07/2016    Mom age 64, Dad age 67 -  on same day. Pat Aunt early 70's.    Family history of coronary artery disease 10/07/2016    Mom age 64, Dad age 67 -  on same day. Pat Aunt early 70's.    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     Vision:      Values: healthy    Strengths:  structured    Challenges:  anger outbursts    Support:      Hobbies:      Objective:  Jean was instructed to continue exploring wellbeing.      INITIAL date: 3/20/24  One a scale of 1-10, with 10 being 100% happy, how would you rate your happiness in each of the wellness areas below?    Happiness:         1     2     3     4     5     6     7     8     9     10    Initial Date: DC Date: +/- Total Change   Exercise/Movement      Physical Health      Stress Level 8      Nutrition 8     Sleep      Play      Body Image      Relationships      Energy/Vitality        Assessment:   3/20/24: beginning to review happiness scale    Plan:  Patient goals for next consult include  3/20/24: look into stress management    Health : Alissa Youngblood  3/20/2024

## 2024-03-25 ENCOUNTER — CLINICAL SUPPORT (OUTPATIENT)
Dept: REHABILITATION | Facility: HOSPITAL | Age: 74
End: 2024-03-25
Payer: MEDICARE

## 2024-03-25 DIAGNOSIS — M53.86 DECREASED RANGE OF MOTION OF LUMBAR SPINE: Primary | ICD-10-CM

## 2024-03-25 PROCEDURE — 97112 NEUROMUSCULAR REEDUCATION: CPT | Mod: CQ

## 2024-03-25 PROCEDURE — 97110 THERAPEUTIC EXERCISES: CPT | Mod: CQ

## 2024-03-25 NOTE — PROGRESS NOTES
"  [facility]  Physical Therapy Treatment Note     Name: Jean KHAN [facility]  Clinic Number: [id number]    Therapy Diagnosis:   Encounter Diagnosis   Name Primary?    Decreased range of motion of lumbar spine Yes       Physician: Ute Rivas, *    Visit Date: 3/25/2024    Physician Orders: PT Eval and Treat  Medical Diagnosis from Referral: M54.6,G89.29 (ICD-10-CM) - Chronic midline thoracic back pain M54.41,G89.29 (ICD-10-CM) - Chronic bilateral low back pain with right-sided sciatica  Evaluation Date: 2/5/2024  Authorization Period Expiration: 01/04/2025  Reassessment Due: 4/3/2024   Plan of Care Certification Period: 4/15/2024  Visit #/Visits authorized: 12/20   MedX Testing:MedX testing visit 1    Time In: 1:30 PM  Time Out: 2:30 PM  Total Billable Time: 55 minutes   INSURANCE and OUTCOMES: Fee for Service with FOTO Outcomes 3/3     Precautions: standard, history of cancer, and osteoporosis     Pattern of pain determined: 1 PEN    Subjective   Jean reports mild (R) lower back discomfort/stiffness currently. States that he was a little discouraged that he didn't have more strength gains when tested at his 10th visit retest.    Patient reports tolerating previous visit no complaints   Patient reports their pain to be 2/10 on a 0-10 scale with 0 being no pain and 10 being the worst pain imaginable.  Pain Location: right low back, right posterior leg into foot      Work and leisure: retired / working out, yard work (limited right now), involved with [employer]    Pt goals: "Unrestricted gym work outs, learn how to work out, strengthen right leg, work in the yard without pain"     Objective      MOVEMENT LOSS - Lumbar    Norms ROM Loss Initial Range of motion 3/6/2024   Flexion Fingers touch toes, sacral angle >/= 70 deg, uniform spinal curvature, posterior weight shift  minimal loss Within functional limits   Extension ASIS surpasses toes, spine of scapulae " surpasses heels, uniform spinal curve minimal loss Minimal loss   Side glide Right   minimal loss Minimal loss   Side glide Left   minimal loss Minimal loss   Rotation Right PT observes contralateral shoulder minimal loss Minimal loss   Rotation Left PT observes contralateral shoulder minimal loss Minimal loss        Lumbar  Isometric Testing on Med X equipment: Testing administered by PT    Test Initial Baseline Midpoint Final   Date 2/5/24 3/18/24    ROM 0-54 deg 0-54    Max Peak Torque 122  148    Min Peak Torque 42  58    Flex/Ext Ratio 2.9:1 2.5:1    % below normative data 58% 52%    % gain from initial test Not available visit 1 19%      Outcomes:  Intake Score: 59% functional ability  Visit 6 Score: 72%  Visit 10 Score: 67%   Discharge Score:  Goal Score: 65% functional ability       Treatment     Jean received the treatments listed below:      Medical MedX Treatment as follows:  MedX exercise started day 2:  Patient received neuromuscular education for 10 minutes via participation on the Medical MedX Machine. Therapist assisted patient in isolating and engaging spinal stabilization musculature in order to improve functional ability and postural control. Patient performed exercise with therapist guidance in order to accurately use pacer function, avoid valsalva, and optimally exert effort within a safe and effective range via the Kristopher Exertion Rating Scale. Patient instructed to perform at a midrange of exertion and to complete 15-20 repetitions within appropriate split time, with proper technique, and while maintaining safety.          3/25/2024     2:06 PM   [facility] - [facility]   Visit Number 12   VAS Pain Rating 2   Treadmill Time (in min.) 5 min   Lumbar Stretches - Slouch 10   Extension in Standing 10   Flexion in Lying 10   Lumbar Weight 78 lbs   Repetitions 15   Rating of Perceived Exertion 4      Jean participated in therapeutic exercises to develop strength, endurance, ROM, posture, and core  "stabilization for 45 minutes including:    LTR x10  DKTC c/ T-ball x10  Figure 4 piriformis stretch + Hamstring 20" x 2   R sciatic nerve glide x 15--NP  TrA activation (T-ball) + SLR + 2# x 15--NP  Bridges with BTB + kick out + Lat pull down with sports cord x 10  side lying clam with BTB x 15  +EIL with therapist overpressure x 10  SOC x 10  EIS x10  Paloff press with 15# x 15 + Overhead  hip hinging with dowel x 10--NP     Peripheral muscle strengthening which included one set of 15-20 repetitions at a slow and controlled 10-13 second per rep pace focused on strengthening supporting musculature in order to improve body mechanics and functional mobility. Patient and therapist focused on proper form during treatment to ensure optimal strengthening of each targeted muscle group.  Machines utilized included:Torso rotation, Leg Ext, Hip Abd, Hip Add, and Leg Press, Leg Curl, Chest Press, Rowing, Triceps, and Biceps    Jean received manual therapy techniques for 00  minutes. The following activities were included:    Pt given cold pack for 5 minutes to low back in Z-lie.    Patient Education and Home Exercises   Home exercises include: LTR x 5, Figure 4 piriformis stretch 20" x 3 right lower extremity, bridges with GTB, clamshells with GTB, nerve glide  Cardio program (V5): 3/4/24 Benefits of cardio handout reviewed and provided  Lifting education (V11): - 3/20/24  Posture/Lumbar roll: recommended  Fridge Magnet Discharge handout (date given): -  Equipment at home/gym membership: yes     Education provided:   - Cues with exercise  - MedX performance  - Precor ex performance  - 3/4/24 availability of Health coaching.    Written Home Exercises Provided: Patient instructed to cont prior HEP. Added sciatic nerve glide 3/4/24  Exercises were reviewed and Jean was able to demonstrate them prior to the end of the session.  Jean demonstrated good  understanding of the education provided.     See EMR under Patient Instructions " for exercises provided prior visits.    Assessment   Jean returns with mild R lower back discomfort/stiffness currently. Treatment continued with flexibility, strengthening and neuromuscular reeducation ex's. He was progressed to perform Lat pull down combo with bridging for progressive strengthening and also added EIL this visit with therapist overpressure which felt good per subjective report. He was able to perform ex's with min cues and without c/o pain. Lumbar MedX resistance was increased to 78 ft/lbs and he completed 15 reps with a RPE = 4/10. He was able to complete the full circuit of peripheral strengthening ex's within appropriate muscular fatigue and without increased pain. Will continue per HB protocol and patient tolerance. He reports feeling better post treatment with less pain and decreased stiffness.     Patient is making  progress towards established goals.  Pt will continue to benefit from skilled outpatient physical therapy to address the deficits stated in the impairment chart, provide pt/family education and to maximize pt's level of independence in the home and community environment.     Anticipated Barriers for therapy: none  Pt's spiritual, cultural and educational needs considered and pt agreeable to plan of care and goals as stated below:     Goals:   Short term goals:  6 weeks or 10 visits   - Pt will demonstrate increased lumbar MedX ROM by at least 3 degrees from the initial ROM value with improvements noted in functional ROM and ability to perform ADLs. Appropriate and Ongoing  - Pt will demonstrate increased MedX average isometric strength value by 25% from initial test resulting in improved ability to perform bending, lifting, and carrying activities safely, confidently. Appropriate and Ongoing  - Pt will report a reduction in worst pain score by 1-2 points for improved tolerance for household chores. MET  - Pt able to perform HEP correctly with minimal cueing or supervision from  therapist to encourage independent management of symptoms. MET     Long term goals: 10 weeks or 20 visits   - Pt will demonstrate increased lumbar MedX ROM by at least 3 degrees from initial ROM value, resulting in improved ability to perform functional forward bending while standing and sitting. Appropriate and Ongoing  - Pt will demonstrate increased MedX average isometric strength value by 45% from initial test resulting in improved ability to perform bending, lifting, and carrying activities safely and confidently. Appropriate and Ongoing  - Pt to demonstrate ability to independently control and reduce their pain through posture positioning and mechanical movements throughout a typical day. Appropriate and Ongoing  - Pt will demonstrate reduced pain and improved functional outcomes as reported on the FOTO by reaching an intake score of >/= 65% functional ability in order to demonstrate subjective improvement in patient's condition. . Appropriate and Ongoing  - Pt will demonstrate independence with the HEP at discharge. Appropriate and Ongoing  - Pt will be able to complete gym workouts without onset of pain (patient goal) Appropriate and Ongoing    Plan   Continue with established Plan of Care towards established PT goals.     Therapist: Carrington Disla, PTA    3/25/2024

## 2024-03-27 ENCOUNTER — CLINICAL SUPPORT (OUTPATIENT)
Dept: REHABILITATION | Facility: HOSPITAL | Age: 74
End: 2024-03-27
Payer: MEDICARE

## 2024-03-27 ENCOUNTER — OFFICE VISIT (OUTPATIENT)
Dept: OPTOMETRY | Facility: CLINIC | Age: 74
End: 2024-03-27
Payer: MEDICARE

## 2024-03-27 ENCOUNTER — DOCUMENTATION ONLY (OUTPATIENT)
Dept: REHABILITATION | Facility: HOSPITAL | Age: 74
End: 2024-03-27
Payer: MEDICARE

## 2024-03-27 DIAGNOSIS — M53.86 DECREASED RANGE OF MOTION OF LUMBAR SPINE: Primary | ICD-10-CM

## 2024-03-27 DIAGNOSIS — H53.9 TRANSIENT VISION DISTURBANCE OF BOTH EYES: Primary | ICD-10-CM

## 2024-03-27 DIAGNOSIS — H53.15 VISUAL DISTORTIONS OF SHAPE AND SIZE: ICD-10-CM

## 2024-03-27 PROCEDURE — 99999 PR PBB SHADOW E&M-EST. PATIENT-LVL III: CPT | Mod: PBBFAC,,, | Performed by: OPTOMETRIST

## 2024-03-27 PROCEDURE — 92134 CPTRZ OPH DX IMG PST SGM RTA: CPT | Mod: S$GLB,,, | Performed by: OPTOMETRIST

## 2024-03-27 PROCEDURE — 1160F RVW MEDS BY RX/DR IN RCRD: CPT | Mod: CPTII,S$GLB,, | Performed by: OPTOMETRIST

## 2024-03-27 PROCEDURE — 1101F PT FALLS ASSESS-DOCD LE1/YR: CPT | Mod: CPTII,S$GLB,, | Performed by: OPTOMETRIST

## 2024-03-27 PROCEDURE — 92014 COMPRE OPH EXAM EST PT 1/>: CPT | Mod: S$GLB,,, | Performed by: OPTOMETRIST

## 2024-03-27 PROCEDURE — 97110 THERAPEUTIC EXERCISES: CPT | Mod: CQ

## 2024-03-27 PROCEDURE — 97112 NEUROMUSCULAR REEDUCATION: CPT | Mod: CQ

## 2024-03-27 PROCEDURE — 1126F AMNT PAIN NOTED NONE PRSNT: CPT | Mod: CPTII,S$GLB,, | Performed by: OPTOMETRIST

## 2024-03-27 PROCEDURE — 1159F MED LIST DOCD IN RCRD: CPT | Mod: CPTII,S$GLB,, | Performed by: OPTOMETRIST

## 2024-03-27 PROCEDURE — 3288F FALL RISK ASSESSMENT DOCD: CPT | Mod: CPTII,S$GLB,, | Performed by: OPTOMETRIST

## 2024-03-27 NOTE — PROGRESS NOTES
PT/PTA met face to face to discuss pt's treatment plan and progress towards established goals. Pt will be seen by a physical therapist minimally every 6th visit or every 30 days.        Carrington Disla PTA

## 2024-03-27 NOTE — PROGRESS NOTES
HPI    72 Y/o male is here for routine eye exam with C/o pt states he has become   more Photophobia over the past months pt also states he has been seeing   kaleidescope, pt also states he also has migraines along with blind spots,   pt states he has had Cat. Sx but is still experiencing star burst and   halo's around lights at night while driving. Pt wears Rx glasses for   reading only   Pt denies pain and discomfort   No f/f    Eye med: no gtt   Last edited by Moira Velarde MA on 3/27/2024  1:27 PM.            Assessment /Plan     For exam results, see Encounter Report.    Transient vision disturbance of both eyes  -     [place] Visual Field - OU - Extended - Both Eyes; Future; Expected date: 04/27/2024    Visual distortions of shape and size  -     OCT, Retina - OU - Both Eyes; Future      See prev notes:  Mod pco OS/Mild pco OD sp MFIOL OU.  Subsequently had YAG OU    Pt presents TODAY with inc oc john sx over the past few months.  Has john hx, but now occurring twice a week.  DFE/Mac OCT shows no fluid/heme.  Will run VF to ro other neurovasc causes of sx    PLAN:    HVF 24-2 sf.  Appt w me after for review, and pt wishes REFRACTION for new spex (has MFIOL, but wears prescription readers)

## 2024-03-27 NOTE — PROGRESS NOTES
"  [facility]  Physical Therapy Treatment Note     Name: Jean KHAN [facility]  Clinic Number: [id number]    Therapy Diagnosis:   Encounter Diagnosis   Name Primary?    Decreased range of motion of lumbar spine Yes       Physician: Ute Rivas, *    Visit Date: 3/27/2024    Physician Orders: PT Eval and Treat  Medical Diagnosis from Referral: M54.6,G89.29 (ICD-10-CM) - Chronic midline thoracic back pain M54.41,G89.29 (ICD-10-CM) - Chronic bilateral low back pain with right-sided sciatica  Evaluation Date: 2/5/2024  Authorization Period Expiration: 01/04/2025  Reassessment Due: 4/3/2024   Plan of Care Certification Period: 4/15/2024  Visit #/Visits authorized: 13/20   MedX Testing:MedX testing visit 1    Time In: 11:00 AM  Time Out: 12:00 PM   Total Billable Time:  55  minutes   INSURANCE and OUTCOMES: Fee for Service with FOTO Outcomes 3/3     Precautions: standard, history of cancer, and osteoporosis     Pattern of pain determined: 1 PEN    Subjective   Jean reports mild (R) lower back discomfort/stiffness currently. States some residual soreness after last session but was jermaine to work it out at the gym using Treadmill and Elliptical.    Patient reports tolerating previous visit no complaints   Patient reports their pain to be 2/10 on a 0-10 scale with 0 being no pain and 10 being the worst pain imaginable.  Pain Location: right low back, right posterior leg into foot      Work and leisure: retired / working out, yard work (limited right now), involved with [employer]    Pt goals: "Unrestricted gym work outs, learn how to work out, strengthen right leg, work in the yard without pain"     Objective      MOVEMENT LOSS - Lumbar    Norms ROM Loss Initial Range of motion 3/6/2024   Flexion Fingers touch toes, sacral angle >/= 70 deg, uniform spinal curvature, posterior weight shift  minimal loss Within functional limits   Extension ASIS surpasses toes, spine of " scapulae surpasses heels, uniform spinal curve minimal loss Minimal loss   Side glide Right   minimal loss Minimal loss   Side glide Left   minimal loss Minimal loss   Rotation Right PT observes contralateral shoulder minimal loss Minimal loss   Rotation Left PT observes contralateral shoulder minimal loss Minimal loss        Lumbar  Isometric Testing on Med X equipment: Testing administered by PT    Test Initial Baseline Midpoint Final   Date 2/5/24 3/18/24    ROM 0-54 deg 0-54    Max Peak Torque 122  148    Min Peak Torque 42  58    Flex/Ext Ratio 2.9:1 2.5:1    % below normative data 58% 52%    % gain from initial test Not available visit 1 19%      Outcomes:  Intake Score: 59% functional ability  Visit 6 Score: 72%  Visit 10 Score: 67%   Discharge Score:  Goal Score: 65% functional ability       Treatment     Jean received the treatments listed below:      Medical MedX Treatment as follows:  MedX exercise started day 2:  Patient received neuromuscular education for 10 minutes via participation on the Medical MedX Machine. Therapist assisted patient in isolating and engaging spinal stabilization musculature in order to improve functional ability and postural control. Patient performed exercise with therapist guidance in order to accurately use pacer function, avoid valsalva, and optimally exert effort within a safe and effective range via the Kristopher Exertion Rating Scale. Patient instructed to perform at a midrange of exertion and to complete 15-20 repetitions within appropriate split time, with proper technique, and while maintaining safety.          3/27/2024    11:54 AM   [facility] - Short   Visit Number 13   VAS Pain Rating 2   Treadmill Time (in min.) 5 min   Lumbar Stretches - Slouch 10   Extension in Lying 10   Extension in Standing 10   Flexion in Lying 10   Lumbar Weight 78 lbs   Repetitions 16   Rating of Perceived Exertion 5       Jean participated in therapeutic exercises to develop strength,  "endurance, ROM, posture, and core stabilization for 45 minutes including:    LTR x10  DKTC c/ T-ball x10  Figure 4 piriformis stretch + Hamstring 20" x 2   R sciatic nerve glide x 15--NP  TrA activation (T-ball) + SLR + 2# x 15--NP  Bridges with BTB + kick out + Lat pull down with sports cord x 10  side lying Plank clamshells with BTB x 10  EIL with therapist overpressure x 10  +Cat/cow x 10  SOC x 10  EIS x10  Paloff press with 15# x 15 + Overhead  hip hinging with dowel x 10--NP     Peripheral muscle strengthening which included one set of 15-20 repetitions at a slow and controlled 10-13 second per rep pace focused on strengthening supporting musculature in order to improve body mechanics and functional mobility. Patient and therapist focused on proper form during treatment to ensure optimal strengthening of each targeted muscle group.  Machines utilized included:Torso rotation, Leg Ext, Hip Abd, Hip Add, and Leg Press, Leg Curl, Chest Press, Rowing, Triceps, and Biceps    Jean received manual therapy techniques for 00  minutes. The following activities were included:    Pt given cold pack for 5 minutes to low back in Z-lie.    Patient Education and Home Exercises   Home exercises include: LTR x 5, Figure 4 piriformis stretch 20" x 3 right lower extremity, bridges with GTB, clamshells with GTB, nerve glide  Cardio program (V5): 3/4/24 Benefits of cardio handout reviewed and provided  Lifting education (V11): - 3/20/24  Posture/Lumbar roll: recommended  Fridge Magnet Discharge handout (date given): -  Equipment at home/gym membership: yes     Education provided:   - Cues with exercise  - MedX performance  - Precor ex performance  - 3/4/24 availability of Health coaching.    Written Home Exercises Provided: Patient instructed to cont prior HEP. Added sciatic nerve glide 3/4/24  Exercises were reviewed and Jean was able to demonstrate them prior to the end of the session.  Jean demonstrated good  understanding of the " education provided.     See EMR under Patient Instructions for exercises provided prior visits.    Assessment   Jean returns with mild R lower back discomfort/stiffness currently. Treatment continued with flexibility, strengthening and neuromuscular reeducation ex's. Continues to benefit from therapist overpressure with EIL. Added Cat/cow stretch for additional mobility.  He was also progressed to perform clamshells in sideying plank position which was challenging. He was able to perform ex's with min cues and without c/o pain. Lumbar MedX resistance was maintained at 78 ft/lbs and he completed 16 reps with a RPE = 5/10. He was able to complete the full circuit of peripheral strengthening ex's within appropriate muscular fatigue and without increased pain. Will continue per HB protocol and patient tolerance.     Patient is making  progress towards established goals.  Pt will continue to benefit from skilled outpatient physical therapy to address the deficits stated in the impairment chart, provide pt/family education and to maximize pt's level of independence in the home and community environment.     Anticipated Barriers for therapy: none  Pt's spiritual, cultural and educational needs considered and pt agreeable to plan of care and goals as stated below:     Goals:   Short term goals:  6 weeks or 10 visits   - Pt will demonstrate increased lumbar MedX ROM by at least 3 degrees from the initial ROM value with improvements noted in functional ROM and ability to perform ADLs. Appropriate and Ongoing  - Pt will demonstrate increased MedX average isometric strength value by 25% from initial test resulting in improved ability to perform bending, lifting, and carrying activities safely, confidently. Appropriate and Ongoing  - Pt will report a reduction in worst pain score by 1-2 points for improved tolerance for household chores. MET  - Pt able to perform HEP correctly with minimal cueing or supervision from therapist to  encourage independent management of symptoms. MET     Long term goals: 10 weeks or 20 visits   - Pt will demonstrate increased lumbar MedX ROM by at least 3 degrees from initial ROM value, resulting in improved ability to perform functional forward bending while standing and sitting. Appropriate and Ongoing  - Pt will demonstrate increased MedX average isometric strength value by 45% from initial test resulting in improved ability to perform bending, lifting, and carrying activities safely and confidently. Appropriate and Ongoing  - Pt to demonstrate ability to independently control and reduce their pain through posture positioning and mechanical movements throughout a typical day. Appropriate and Ongoing  - Pt will demonstrate reduced pain and improved functional outcomes as reported on the FOTO by reaching an intake score of >/= 65% functional ability in order to demonstrate subjective improvement in patient's condition. . Appropriate and Ongoing  - Pt will demonstrate independence with the HEP at discharge. Appropriate and Ongoing  - Pt will be able to complete gym workouts without onset of pain (patient goal) Appropriate and Ongoing    Plan   Continue with established Plan of Care towards established PT goals.     Therapist: Carringotn Disla, PTA    3/27/2024

## 2024-04-01 ENCOUNTER — CLINICAL SUPPORT (OUTPATIENT)
Dept: REHABILITATION | Facility: HOSPITAL | Age: 74
End: 2024-04-01
Payer: MEDICARE

## 2024-04-01 DIAGNOSIS — M53.86 DECREASED RANGE OF MOTION OF LUMBAR SPINE: Primary | ICD-10-CM

## 2024-04-01 PROCEDURE — 97110 THERAPEUTIC EXERCISES: CPT | Mod: CQ

## 2024-04-01 PROCEDURE — 97112 NEUROMUSCULAR REEDUCATION: CPT | Mod: CQ

## 2024-04-01 NOTE — PROGRESS NOTES
"  [facility]  Physical Therapy Treatment Note     Name: Jean KHAN [facility]  Clinic Number: [id number]    Therapy Diagnosis:   Encounter Diagnosis   Name Primary?    Decreased range of motion of lumbar spine Yes       Physician: Ute Rivas, *    Visit Date: 4/1/2024    Physician Orders: PT Eval and Treat  Medical Diagnosis from Referral: M54.6,G89.29 (ICD-10-CM) - Chronic midline thoracic back pain M54.41,G89.29 (ICD-10-CM) - Chronic bilateral low back pain with right-sided sciatica  Evaluation Date: 2/5/2024  Authorization Period Expiration: 01/04/2025  Reassessment Due: 4/3/2024   Plan of Care Certification Period: 4/15/2024  Visit #/Visits authorized: 14/20   MedX Testing:MedX testing visit 1    Time In: 12:30 PM  Time Out: 1:35 PM  Total Billable Time:   55  minutes   INSURANCE and OUTCOMES: Fee for Service with FOTO Outcomes 3/3     Precautions: standard, history of cancer, and osteoporosis     Pattern of pain determined: 1 PEN    Subjective   Jean reports states that his back is feeling pretty good but had onset of some increased (L) hip pain yesterday extending into today.    Patient reports tolerating previous visit no complaints   Patient reports their pain to be 2/10 on a 0-10 scale with 0 being no pain and 10 being the worst pain imaginable.  Pain Location: right low back, right posterior leg into foot      Work and leisure: retired / working out, yard work (limited right now), involved with [employer]    Pt goals: "Unrestricted gym work outs, learn how to work out, strengthen right leg, work in the yard without pain"     Objective      MOVEMENT LOSS - Lumbar    Norms ROM Loss Initial Range of motion 3/6/2024   Flexion Fingers touch toes, sacral angle >/= 70 deg, uniform spinal curvature, posterior weight shift  minimal loss Within functional limits   Extension ASIS surpasses toes, spine of scapulae surpasses heels, uniform spinal curve minimal " loss Minimal loss   Side glide Right   minimal loss Minimal loss   Side glide Left   minimal loss Minimal loss   Rotation Right PT observes contralateral shoulder minimal loss Minimal loss   Rotation Left PT observes contralateral shoulder minimal loss Minimal loss        Lumbar  Isometric Testing on Med X equipment: Testing administered by PT    Test Initial Baseline Midpoint Final   Date 2/5/24 3/18/24    ROM 0-54 deg 0-54    Max Peak Torque 122  148    Min Peak Torque 42  58    Flex/Ext Ratio 2.9:1 2.5:1    % below normative data 58% 52%    % gain from initial test Not available visit 1 19%      Outcomes:  Intake Score: 59% functional ability  Visit 6 Score: 72%  Visit 10 Score: 67%   Discharge Score:  Goal Score: 65% functional ability       Treatment     Jean received the treatments listed below:      Medical MedX Treatment as follows:  MedX exercise started day 2:  Patient received neuromuscular education for 10 minutes via participation on the Medical MedX Machine. Therapist assisted patient in isolating and engaging spinal stabilization musculature in order to improve functional ability and postural control. Patient performed exercise with therapist guidance in order to accurately use pacer function, avoid valsalva, and optimally exert effort within a safe and effective range via the Kristopher Exertion Rating Scale. Patient instructed to perform at a midrange of exertion and to complete 15-20 repetitions within appropriate split time, with proper technique, and while maintaining safety.          4/1/2024     1:02 PM   [facility] - Short   Visit Number 14   VAS Pain Rating 2   Treadmill Time (in min.) 5 min   Extension in Lying 10   Extension in Standing 10   Flexion in Lying 10   Manual Therapy 5 mins.   Lumbar Weight 78 lbs   Repetitions 18   Rating of Perceived Exertion 5        Jean participated in therapeutic exercises to develop strength, endurance, ROM, posture, and core stabilization for 45  "minutes including:    LTR x10  DKTC  x10  Figure 4 piriformis stretch + Hamstring 20" x 2   R sciatic nerve glide x 15--NP  TrA activation (T-ball) + SLR + 2# x 15--NP  Bridges with BTB  + Lat pull down with sports cord x 15  +SL bridge w/Crossed Leg x 10 each  side lying Plank clamshells with BTB x 10  EIL with therapist overpressure x 10  Cat/cow x 10  SOC x 10--NP  EIS x10  Paloff press with 15# x 15 + Overhead  hip hinging with dowel x 10--NP     Peripheral muscle strengthening which included one set of 15-20 repetitions at a slow and controlled 10-13 second per rep pace focused on strengthening supporting musculature in order to improve body mechanics and functional mobility. Patient and therapist focused on proper form during treatment to ensure optimal strengthening of each targeted muscle group.  Machines utilized included:Torso rotation, Leg Ext, Hip Abd, Hip Add, and Leg Press, Leg Curl, Chest Press, Rowing, Triceps, and Biceps    Jean received manual therapy techniques for 5  minutes. The following activities were included: R LE long axis distraction, R Hip Mobilization with movement hip ER/IR    Pt given cold pack for 5 minutes to low back in Z-lie.    Patient Education and Home Exercises   Home exercises include: LTR x 5, Figure 4 piriformis stretch 20" x 3 right lower extremity, bridges with GTB, clamshells with GTB, nerve glide  Cardio program (V5): 3/4/24 Benefits of cardio handout reviewed and provided  Lifting education (V11): - 3/20/24  Posture/Lumbar roll: recommended  Fridge Magnet Discharge handout (date given): -  Equipment at home/gym membership: yes     Education provided:   - Cues with exercise  - MedX performance  - Precor ex performance  - 3/4/24 availability of Health coaching.    Written Home Exercises Provided: Patient instructed to cont prior HEP. Added sciatic nerve glide 3/4/24  Exercises were reviewed and Jean was able to demonstrate them prior to the end of the session.  Jean " demonstrated good  understanding of the education provided.     See EMR under Patient Instructions for exercises provided prior visits.    Assessment   Jean returns with mild R lower back discomfort/stiffness along with some (L) hip pain today.. Treatment continued with flexibility, strengthening and neuromuscular reeducation ex's. Added manual techniques for (L) hip which provides some relief. Also added SL bridging today for progressive strengthening. He was able to perform ex's with min cues and without c/o pain. Lumbar MedX resistance was maintained at 78 ft/lbs and he completed 18 reps with a RPE = 5/10. He was able to complete the full circuit of peripheral strengthening ex's within appropriate muscular fatigue and without increased pain. Will continue per HB protocol and patient tolerance.     Patient is making progress towards established goals.  Pt will continue to benefit from skilled outpatient physical therapy to address the deficits stated in the impairment chart, provide pt/family education and to maximize pt's level of independence in the home and community environment.     Anticipated Barriers for therapy: none  Pt's spiritual, cultural and educational needs considered and pt agreeable to plan of care and goals as stated below:     Goals:   Short term goals:  6 weeks or 10 visits   - Pt will demonstrate increased lumbar MedX ROM by at least 3 degrees from the initial ROM value with improvements noted in functional ROM and ability to perform ADLs. Appropriate and Ongoing  - Pt will demonstrate increased MedX average isometric strength value by 25% from initial test resulting in improved ability to perform bending, lifting, and carrying activities safely, confidently. Appropriate and Ongoing  - Pt will report a reduction in worst pain score by 1-2 points for improved tolerance for household chores. MET  - Pt able to perform HEP correctly with minimal cueing or supervision from therapist to encourage  independent management of symptoms. MET     Long term goals: 10 weeks or 20 visits   - Pt will demonstrate increased lumbar MedX ROM by at least 3 degrees from initial ROM value, resulting in improved ability to perform functional forward bending while standing and sitting. Appropriate and Ongoing  - Pt will demonstrate increased MedX average isometric strength value by 45% from initial test resulting in improved ability to perform bending, lifting, and carrying activities safely and confidently. Appropriate and Ongoing  - Pt to demonstrate ability to independently control and reduce their pain through posture positioning and mechanical movements throughout a typical day. Appropriate and Ongoing  - Pt will demonstrate reduced pain and improved functional outcomes as reported on the FOTO by reaching an intake score of >/= 65% functional ability in order to demonstrate subjective improvement in patient's condition. . Appropriate and Ongoing  - Pt will demonstrate independence with the HEP at discharge. Appropriate and Ongoing  - Pt will be able to complete gym workouts without onset of pain (patient goal) Appropriate and Ongoing    Plan   Continue with established Plan of Care towards established PT goals.     Therapist: Carrington Disla, PTA    4/1/2024

## 2024-04-03 ENCOUNTER — CLINICAL SUPPORT (OUTPATIENT)
Dept: REHABILITATION | Facility: HOSPITAL | Age: 74
End: 2024-04-03
Payer: MEDICARE

## 2024-04-03 ENCOUNTER — DOCUMENTATION ONLY (OUTPATIENT)
Dept: REHABILITATION | Facility: HOSPITAL | Age: 74
End: 2024-04-03
Payer: MEDICARE

## 2024-04-03 DIAGNOSIS — M53.86 DECREASED RANGE OF MOTION OF LUMBAR SPINE: Primary | ICD-10-CM

## 2024-04-03 PROCEDURE — 97110 THERAPEUTIC EXERCISES: CPT | Mod: CQ

## 2024-04-03 PROCEDURE — 97112 NEUROMUSCULAR REEDUCATION: CPT | Mod: CQ

## 2024-04-03 NOTE — PROGRESS NOTES
Health  Consult Note    Name: Jean KHAN [facility] Number: [id number]  Physician: Ute Rivas, *  Past Medical History:   Diagnosis Date    Acute medial meniscus tear, right, initial encounter 2023    Anterior tibialis tendinitis of right lower extremity 10/18/2018    Cancer     Chronic idiopathic constipation 2019    Closed fracture of left wrist 10/18/2021    Closed fracture of lower end of left radius with routine healing 2022    Elevated PSA     Family history of ASCVD 10/07/2016    Mom age 64, Dad age 67 -  on same day. Pat Aunt early 70's.    Family history of coronary artery disease 10/07/2016    Mom age 64, Dad age 67 -  on same day. Pat Aunt early 70's.    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     Vision:      Values: healthy    Strengths:  structured    Challenges:  anger outbursts    Support:      Hobbies:  helping others    Objective:  Jean was instructed to continue exploring wellbeing.      INITIAL date: 3/20/24  One a scale of 1-10, with 10 being 100% happy, how would you rate your happiness in each of the wellness areas below?    Happiness:         1     2     3     4     5     6     7     8     9     10    Initial Date: DC Date: +/- Total Change   Exercise/Movement 7      Physical Health 7     Stress Level 8     Nutrition 8     Sleep      Play      Sense of Purpose 5     Relationships      Energy/Vitality        Assessment:   3/20/24: beginning to review happiness scale  4/3/24: pt discussed past, Muslim    Plan:  Patient goals for next consult include  3/20/24: look into stress management  4/3/24: pt not returning on days HC at facility remainder of the month.     Health : Alissa Youngblood  4/3/2024    Health Coaching Agreement  This Agreement was reviewed by  and client and verbal agreement given from client to receive  coaching services  focusing on topics such as nutrition, meditation, stress management, anxiety, goal setting, sleep deprivation, and etc.      Description of Coaching: Coaching is partnership (defined as an alliance, not a legal business partnership) between the  and the Client in a thought-provoking and creative process that inspires the client to maximize their overall potential.    Health coaches are change agents who help their clients set and achieve health goals and build new habits.  Health coaches are not just a source of information but a catalyst for transformation.     How is Coaching Different from Therapy?  Coaching is not therapy.  We do not look for causation in the past nor healing, though healing may happen as a side effect of increasing awareness. Our work will be as much as possible in the present and focused on your desires and present time objectives. I will support you to move forward, set personal or professional goals, and take the necessary action to create what you desire. If for any reason I feel other therapeutic professional services are required, I will request you get that help.    Confidentiality  This coaching relationship, as well as all information (documented or verbal) that the Client shares with the  as part of this relationship, is bound by the principles of confidentiality set forth in the [employer] Code of  Ethics.  The  agrees not to disclose any information pertaining to the Client without the Client's written consent. The  will not disclose the Client's name as a reference without the Client's consent.      Procedure/Operations  Coaching sessions can be conducted in person or virtually with a time allotment of 30 minutes    and client will meet in a suitably private location without distractions in order to be fully present for the in person coaching sessions   and client will commit to being present in a quiet space or room in order to be fully present for virtual coaching visits  Sessions will be scheduled ahead of time and reasonable cancel notice is expected if circumstances change    Client responsibilities   Choose area of focus for the coaching sessions  The client agrees to communicate honestly. To be open to feedback and assistance, and to create the time and energy to participate fully in his/her sessions  Create specific goals, strategies, actions, and decide the time frame   Make your time with me a priority.  Show up on time, be prepared to work, have fun, and be ready to explore your life in a new and more meaningful way     responsibilities  Create a safe and supportive environment  Apply effective communication skills, such as use of open-ended questions to promote a new perspective, awareness, and thought processes.  The  reserves the right to ask challenging questions which could lead to client's thinking pattern being challenged  Your  will occasionally interrupt you in order to make sure they are understanding you and promoting clarity  Help clients develop achievable and measurable goals by supporting in the clarifying, setting, and maintaining of those goals  Help clients develop and make full use of their strengths in order to support successful behavioral changes. Hold client accountable and keep him/her focused  Encourage patients during every session.   Your  truly cares about you and is passionate about helping you succeed and improve your wellness. We want to cheer you on and celebrate your victories       Client understands that coaching does not provide treatment, therapy, or diagnosis and agrees  to see their physician, therapist or qualified helping professional for such needs.

## 2024-04-03 NOTE — PROGRESS NOTES
"  [facility]  Physical Therapy Treatment Note     Name: Jean KHAN [facility]  Clinic Number: [id number]    Therapy Diagnosis:   Encounter Diagnosis   Name Primary?    Decreased range of motion of lumbar spine Yes       Physician: Ute Rivas, *    Visit Date: 4/3/2024    Physician Orders: PT Eval and Treat  Medical Diagnosis from Referral: M54.6,G89.29 (ICD-10-CM) - Chronic midline thoracic back pain M54.41,G89.29 (ICD-10-CM) - Chronic bilateral low back pain with right-sided sciatica  Evaluation Date: 2/5/2024  Authorization Period Expiration: 01/04/2025  Reassessment Due: 4/3/2024 ( A reassessment was performed by Mena Dubon PT this visit 4/3/24)  Plan of Care Certification Period: 4/15/2024  Visit #/Visits authorized: 15/20   MedX Testing:MedX testing visit 1    Time In: 12:30 PM  Time Out: 1:30 PM  Total Billable Time:   55  minutes   INSURANCE and OUTCOMES: Fee for Service with FOTO Outcomes 3/3     Precautions: standard, history of cancer, and osteoporosis     Pattern of pain determined: 1 PEN    Subjective   Jean reports mild lower back/hip discomfort presently but feeling a little better overall.     Patient reports tolerating previous visit no complaints   Patient reports their pain to be 2/10 on a 0-10 scale with 0 being no pain and 10 being the worst pain imaginable.  Pain Location: right low back, right posterior leg into foot      Work and leisure: retired / working out, yard work (limited right now), involved with [employer]    Pt goals: "Unrestricted gym work outs, learn how to work out, strengthen right leg, work in the yard without pain"     Objective      MOVEMENT LOSS - Lumbar    Norms ROM Loss Initial Range of motion 3/6/2024 4/3/24   Flexion Fingers touch toes, sacral angle >/= 70 deg, uniform spinal curvature, posterior weight shift  minimal loss Within functional limits WFL   Extension ASIS surpasses toes, spine of scapulae surpasses " heels, uniform spinal curve minimal loss Minimal loss Min loss   Side glide Right   minimal loss Minimal loss WFL   Side glide Left   minimal loss Minimal loss WFL   Rotation Right PT observes contralateral shoulder minimal loss Minimal loss WFL   Rotation Left PT observes contralateral shoulder minimal loss Minimal loss WFL        Lumbar  Isometric Testing on Med X equipment: Testing administered by PT    Test Initial Baseline Midpoint Final   Date 2/5/24 3/18/24    ROM 0-54 deg 0-54    Max Peak Torque 122  148    Min Peak Torque 42  58    Flex/Ext Ratio 2.9:1 2.5:1    % below normative data 58% 52%    % gain from initial test Not available visit 1 19%      Outcomes:  Intake Score: 59% functional ability  Visit 6 Score: 72%  Visit 10 Score: 67%   Discharge Score:  Goal Score: 65% functional ability       Treatment     Jean received the treatments listed below:      Medical MedX Treatment as follows:  MedX exercise started day 2:  Patient received neuromuscular education for 10 minutes via participation on the Medical MedX Machine. Therapist assisted patient in isolating and engaging spinal stabilization musculature in order to improve functional ability and postural control. Patient performed exercise with therapist guidance in order to accurately use pacer function, avoid valsalva, and optimally exert effort within a safe and effective range via the Kristopher Exertion Rating Scale. Patient instructed to perform at a midrange of exertion and to complete 15-20 repetitions within appropriate split time, with proper technique, and while maintaining safety.          4/3/2024    12:41 PM   [facility] - Short   Visit Number 15   VAS Pain Rating 2   Treadmill Time (in min.) 5 min   Extension in Lying 10   Extension in Standing 10   Flexion in Lying 10   Lumbar Weight 78 lbs   Repetitions 18   Rating of Perceived Exertion 5         Jean participated in therapeutic exercises to develop strength, endurance, ROM, posture,  "and core stabilization for 45 minutes including:    LTR x10  DKTC  x10  Figure 4 piriformis stretch + Hamstring 20" x 2   R sciatic nerve glide x 15--NP  TrA activation (T-ball) + SLR + 2# x 15--NP  Bridges with BTB  + Lat pull down with sports cord x 15  SL bridge w/Crossed Leg x 10 each  +Bridging with LE support x 10  side lying Plank clamshells with BTB x 12  EIL with therapist overpressure x 10  +Prone plank hold 2 x 20 sec  +Bird dog ex x 10  Cat/cow x 10  SOC x 10--NP  EIS x10  Paloff press with 15# x 15 + Overhead--NP       Peripheral muscle strengthening which included one set of 15-20 repetitions at a slow and controlled 10-13 second per rep pace focused on strengthening supporting musculature in order to improve body mechanics and functional mobility. Patient and therapist focused on proper form during treatment to ensure optimal strengthening of each targeted muscle group.  Machines utilized included:Torso rotation, Leg Ext, Hip Abd, Hip Add, and Leg Press, Leg Curl, Chest Press, Rowing, Triceps, and Biceps    Jean received manual therapy techniques for 00 minutes. The following activities were included: R LE long axis distraction, R Hip Mobilization with movement hip ER/IR    Pt given cold pack for 5 minutes to low back in [place]-lie.    Patient Education and Home Exercises   Home exercises include: LTR x 5, Figure 4 piriformis stretch 20" x 3 right lower extremity, bridges with GTB, clamshells with GTB, nerve glide  Cardio program (V5): 3/4/24 Benefits of cardio handout reviewed and provided  Lifting education (V11): - 3/20/24  Posture/Lumbar roll: recommended  Fridge Magnet Discharge handout (date given): -  Equipment at home/gym membership: yes     Education provided:   - Cues with exercise  - MedX performance  - Precor ex performance  - 3/4/24 availability of Health coaching.    Written Home Exercises Provided: Patient instructed to cont prior HEP. Added sciatic nerve glide 3/4/24  Exercises were " reviewed and Jean was able to demonstrate them prior to the end of the session.  eJan demonstrated good  understanding of the education provided.     See EMR under Patient Instructions for exercises provided prior visits.    Assessment   Jean returns with mild R lower back discomfort/stiffness. Treatment continued with flexibility, strengthening and neuromuscular reeducation ex's. He was progressed to perform bridging with LE support on T-ball for additional stability challenge along with addition prone plank hold and Bird dog ex for additional core strengthening. He was able to perform ex's with min cues and without c/o pain. Lumbar MedX resistance was maintained at 78 ft/lbs and he completed  18 reps with a RPE = 5/10. He was able to complete the full circuit of peripheral strengthening ex's within appropriate muscular fatigue and without increased pain. Will continue per HB protocol and patient tolerance.     Patient is making progress towards established goals.  Pt will continue to benefit from skilled outpatient physical therapy to address the deficits stated in the impairment chart, provide pt/family education and to maximize pt's level of independence in the home and community environment.     Anticipated Barriers for therapy: none  Pt's spiritual, cultural and educational needs considered and pt agreeable to plan of care and goals as stated below:     Goals:   Short term goals:  6 weeks or 10 visits   - Pt will demonstrate increased lumbar MedX ROM by at least 3 degrees from the initial ROM value with improvements noted in functional ROM and ability to perform ADLs. Appropriate and Ongoing  - Pt will demonstrate increased MedX average isometric strength value by 25% from initial test resulting in improved ability to perform bending, lifting, and carrying activities safely, confidently. Appropriate and Ongoing  - Pt will report a reduction in worst pain score by 1-2 points for improved tolerance for household  chores. MET  - Pt able to perform HEP correctly with minimal cueing or supervision from therapist to encourage independent management of symptoms. MET     Long term goals: 10 weeks or 20 visits   - Pt will demonstrate increased lumbar MedX ROM by at least 3 degrees from initial ROM value, resulting in improved ability to perform functional forward bending while standing and sitting. Appropriate and Ongoing  - Pt will demonstrate increased MedX average isometric strength value by 45% from initial test resulting in improved ability to perform bending, lifting, and carrying activities safely and confidently. Appropriate and Ongoing  - Pt to demonstrate ability to independently control and reduce their pain through posture positioning and mechanical movements throughout a typical day. Appropriate and Ongoing  - Pt will demonstrate reduced pain and improved functional outcomes as reported on the FOTO by reaching an intake score of >/= 65% functional ability in order to demonstrate subjective improvement in patient's condition. . Appropriate and Ongoing  - Pt will demonstrate independence with the HEP at discharge. Appropriate and Ongoing  - Pt will be able to complete gym workouts without onset of pain (patient goal) Appropriate and Ongoing    Plan   Continue with established Plan of Care towards established PT goals.     Therapist: Carrington Disla, PTA    4/3/2024

## 2024-04-06 ENCOUNTER — TELEPHONE (OUTPATIENT)
Dept: INTERNAL MEDICINE | Facility: CLINIC | Age: 74
End: 2024-04-06
Payer: MEDICARE

## 2024-04-06 DIAGNOSIS — R94.5 ABNORMAL RESULTS OF LIVER FUNCTION STUDIES: Primary | ICD-10-CM

## 2024-04-06 NOTE — TELEPHONE ENCOUNTER
Please call patient and explain that tests show mild elevation of the ALT liver test, nonspecific.      I recommend follow up testing. Please see orders for Labs ordered in this encounter and please schedule soon.     Thank you.

## 2024-04-08 ENCOUNTER — CLINICAL SUPPORT (OUTPATIENT)
Dept: REHABILITATION | Facility: HOSPITAL | Age: 74
End: 2024-04-08
Payer: MEDICARE

## 2024-04-08 DIAGNOSIS — M53.86 DECREASED RANGE OF MOTION OF LUMBAR SPINE: Primary | ICD-10-CM

## 2024-04-08 PROCEDURE — 97112 NEUROMUSCULAR REEDUCATION: CPT | Mod: CQ

## 2024-04-08 PROCEDURE — 97110 THERAPEUTIC EXERCISES: CPT | Mod: CQ

## 2024-04-08 NOTE — PROGRESS NOTES
"  [facility]  Physical Therapy Treatment Note     Name: Jean KHAN [facility]  Clinic Number: [id number]    Therapy Diagnosis:   Encounter Diagnosis   Name Primary?    Decreased range of motion of lumbar spine Yes       Physician: Ute Rivas, *    Visit Date: 4/8/2024    Physician Orders: PT Eval and Treat  Medical Diagnosis from Referral: M54.6,G89.29 (ICD-10-CM) - Chronic midline thoracic back pain M54.41,G89.29 (ICD-10-CM) - Chronic bilateral low back pain with right-sided sciatica  Evaluation Date: 2/5/2024  Authorization Period Expiration: 01/04/2025  Reassessment Due: 5/3/2024    Plan of Care Certification Period: 4/15/2024  Visit #/Visits authorized: 16/20   MedX Testing:MedX testing visit 1    Time In: 12:30 PM  Time Out: 1:30 PM  Total Billable Time:  55 minutes   INSURANCE and OUTCOMES: Fee for Service with FOTO Outcomes 3/3     Precautions: standard, history of cancer, and osteoporosis     Pattern of pain determined: 1 PEN    Subjective   Jean reports mild lower back/hip discomfort presently. States that he felt really good after last session and was able to perform increased activities around the house. He reports onset of migraine symptoms earlier this morning that has subsided.    Patient reports tolerating previous visit no complaints   Patient reports their pain to be 2/10 on a 0-10 scale with 0 being no pain and 10 being the worst pain imaginable.  Pain Location: right low back, right posterior leg into foot      Work and leisure: retired / working out, yard work (limited right now), involved with [employer] [facility] [employer]    Pt goals: "Unrestricted gym work outs, learn how to work out, strengthen right leg, work in the yard without pain"     Objective      MOVEMENT LOSS - Lumbar    Norms ROM Loss Initial Range of motion 3/6/2024 4/3/24   Flexion Fingers touch toes, sacral angle >/= 70 deg, uniform spinal curvature, posterior weight shift  minimal loss Within " functional limits WFL   Extension ASIS surpasses toes, spine of scapulae surpasses heels, uniform spinal curve minimal loss Minimal loss Min loss   Side glide Right   minimal loss Minimal loss WFL   Side glide Left   minimal loss Minimal loss WFL   Rotation Right PT observes contralateral shoulder minimal loss Minimal loss WFL   Rotation Left PT observes contralateral shoulder minimal loss Minimal loss WFL        Lumbar  Isometric Testing on Med X equipment: Testing administered by PT    Test Initial Baseline Midpoint Final   Date 2/5/24 3/18/24    ROM 0-54 deg 0-54    Max Peak Torque 122  148    Min Peak Torque 42  58    Flex/Ext Ratio 2.9:1 2.5:1    % below normative data 58% 52%    % gain from initial test Not available visit 1 19%      Outcomes:  Intake Score: 59% functional ability  Visit 6 Score: 72%  Visit 10 Score: 67%   Discharge Score:  Goal Score: 65% functional ability       Treatment     Jean received the treatments listed below:      Medical MedX Treatment as follows:  MedX exercise started day 2:  Patient received neuromuscular education for 10 minutes via participation on the Medical MedX Machine. Therapist assisted patient in isolating and engaging spinal stabilization musculature in order to improve functional ability and postural control. Patient performed exercise with therapist guidance in order to accurately use pacer function, avoid valsalva, and optimally exert effort within a safe and effective range via the Kristopher Exertion Rating Scale. Patient instructed to perform at a midrange of exertion and to complete 15-20 repetitions within appropriate split time, with proper technique, and while maintaining safety.          4/8/2024    12:35 PM   [facility] - Short   Visit Number 16   VAS Pain Rating 2   Treadmill Time (in min.) 5 min   Extension in Lying 10   Extension in Standing 10   Flexion in Lying 10   Lumbar Flexion 57   Lumbar Extension 0   Lumbar Weight 78 lbs   Repetitions 20  "  Rating of Perceived Exertion 4          Jean participated in therapeutic exercises to develop strength, endurance, ROM, posture, and core stabilization for 45 minutes including:    LTR x10  DKTC  x10  Figure 4 piriformis stretch + Hamstring 20" x 2   R sciatic nerve glide x 15--NP  TrA activation (T-ball) + SLR + 2# x 15--NP  Bridges with BTB  + Lat pull down with sports cord x 15  SL bridge w/Crossed Leg x 10 each  Bridging with LE support on T-ball x 15  side lying Plank clamshells with BTB x 15  EIL with therapist overpressure x 10  Prone plank hold 2 x 20 sec--NP  Cat/cow x 10  Bird dog ex + GTB x 10  SOC x 10--NP  EIS x10  Paloff press with 15# x 15 + Overhead--NP       Peripheral muscle strengthening which included one set of 15-20 repetitions at a slow and controlled 10-13 second per rep pace focused on strengthening supporting musculature in order to improve body mechanics and functional mobility. Patient and therapist focused on proper form during treatment to ensure optimal strengthening of each targeted muscle group.  Machines utilized included:Torso rotation, Leg Ext, Hip Abd, Hip Add, and Leg Press, Leg Curl, Chest Press, Rowing, Triceps, and Biceps    Jean received manual therapy techniques for 00 minutes. The following activities were included: R LE long axis distraction, R Hip Mobilization with movement hip ER/IR    Pt given cold pack for 5 minutes to low back in Z-lie.    Patient Education and Home Exercises   Home exercises include: LTR x 5, Figure 4 piriformis stretch 20" x 3 right lower extremity, bridges with GTB, clamshells with GTB, nerve glide  Cardio program (V5): 3/4/24 Benefits of cardio handout reviewed and provided  Lifting education (V11): - 3/20/24  Posture/Lumbar roll: recommended  Fridge Magnet Discharge handout (date given): -  Equipment at home/gym membership: yes     Education provided:   - Cues with exercise  - MedX performance  - Precor ex performance  - 3/4/24 availability of " Health coaching.    Written Home Exercises Provided: Patient instructed to cont prior HEP. Added sciatic nerve glide 3/4/24  Exercises were reviewed and Jean was able to demonstrate them prior to the end of the session.  Jean demonstrated good  understanding of the education provided.     See EMR under Patient Instructions for exercises provided prior visits.    Assessment   Jean returns with mild lower back discomfort/stiffness. Treatment continued with flexibility, strengthening and neuromuscular reeducation ex's. He was progressed with increased reps for lateral plank clamshells/ T-ball bridging and also added GTB resistance with bird dog ex. He was able to perform ex's with min cues and without increased pain c/o. Lumbar MedX Flexion ROM was increased to 57 degrees and resistance was maintained at 78 ft/lbs completing 20 reps with a RPE = 4/10. He was able to complete the full circuit of peripheral strengthening ex's within appropriate muscular fatigue and without increased pain. Will continue per HB protocol and patient tolerance.     Patient is making progress towards established goals.  Pt will continue to benefit from skilled outpatient physical therapy to address the deficits stated in the impairment chart, provide pt/family education and to maximize pt's level of independence in the home and community environment.     Anticipated Barriers for therapy: none  Pt's spiritual, cultural and educational needs considered and pt agreeable to plan of care and goals as stated below:     Goals:   Short term goals:  6 weeks or 10 visits   - Pt will demonstrate increased lumbar MedX ROM by at least 3 degrees from the initial ROM value with improvements noted in functional ROM and ability to perform ADLs. Appropriate and Ongoing  - Pt will demonstrate increased MedX average isometric strength value by 25% from initial test resulting in improved ability to perform bending, lifting, and carrying activities safely,  confidently. Appropriate and Ongoing  - Pt will report a reduction in worst pain score by 1-2 points for improved tolerance for household chores. MET  - Pt able to perform HEP correctly with minimal cueing or supervision from therapist to encourage independent management of symptoms. MET     Long term goals: 10 weeks or 20 visits   - Pt will demonstrate increased lumbar MedX ROM by at least 3 degrees from initial ROM value, resulting in improved ability to perform functional forward bending while standing and sitting. Appropriate and Ongoing  - Pt will demonstrate increased MedX average isometric strength value by 45% from initial test resulting in improved ability to perform bending, lifting, and carrying activities safely and confidently. Appropriate and Ongoing  - Pt to demonstrate ability to independently control and reduce their pain through posture positioning and mechanical movements throughout a typical day. Appropriate and Ongoing  - Pt will demonstrate reduced pain and improved functional outcomes as reported on the FOTO by reaching an intake score of >/= 65% functional ability in order to demonstrate subjective improvement in patient's condition. . Appropriate and Ongoing  - Pt will demonstrate independence with the HEP at discharge. Appropriate and Ongoing  - Pt will be able to complete gym workouts without onset of pain (patient goal) Appropriate and Ongoing    Plan   Continue with established Plan of Care towards established PT goals.     Therapist: Carrington Disla, PTA    4/8/2024

## 2024-04-10 NOTE — PROGRESS NOTES
"  [facility]  Physical Therapy Treatment Note     Name: Jean KHAN [facility]  Clinic Number: [id number]    Therapy Diagnosis:   Encounter Diagnosis   Name Primary?    Decreased range of motion of lumbar spine Yes         Physician: Ute Rivas, *    Visit Date: 4/11/2024    Physician Orders: PT Eval and Treat  Medical Diagnosis from Referral: M54.6,G89.29 (ICD-10-CM) - Chronic midline thoracic back pain M54.41,G89.29 (ICD-10-CM) - Chronic bilateral low back pain with right-sided sciatica  Evaluation Date: 2/5/2024  Authorization Period Expiration: 01/04/2025  Reassessment Due: 5/11/2024    Plan of Care Certification Period: 4/15/2024 sent 4/11/24-6/11/24  Visit #/Visits authorized: 17/20   MedX Testing:MedX testing visit 1    Time In: 11:30 AM  Time Out: 12:30 PM  Total Billable Time:  60 minutes   INSURANCE and OUTCOMES: Fee for Service with FOTO Outcomes 3/3     Precautions: standard, history of cancer, and osteoporosis     Pattern of pain determined: 1 PEN    Subjective   Jean reports no symptoms at present.  He is doing yard work with greater ease.  He walks on rojas with wife and does go to gym to strengthen (although not lately)   Not very consistent with stretching and we reviewed a well rounded exercise program has stretching too.   He reports burning in feet if standing long, goes away when he rests.  No other LE symptoms.    States that he felt really good after last session and was able to perform increased activities around the house.     Patient reports tolerating previous visit no complaints   Patient reports their pain to be 0/10 on a 0-10 scale with 0 being no pain and 10 being the worst pain imaginable.  Pain Location: right low back, right posterior leg into foot      Work and leisure: retired / working out, yard work (limited right now), involved with [employer]    Pt goals: "Unrestricted gym work outs, learn how to work out, strengthen right " "leg, work in the yard without pain"     Objective      MOVEMENT LOSS - Lumbar    Norms ROM Loss Initial Range of motion 3/6/2024 4/11/24   Flexion Fingers touch toes, sacral angle >/= 70 deg, uniform spinal curvature, posterior weight shift  minimal loss Within functional limits WFL   Extension ASIS surpasses toes, spine of scapulae surpasses heels, uniform spinal curve minimal loss Minimal loss Min loss   Side glide Right   minimal loss Minimal loss WFL   Side glide Left   minimal loss Minimal loss WFL   Rotation Right PT observes contralateral shoulder minimal loss Minimal loss WFL   Rotation Left PT observes contralateral shoulder minimal loss Minimal loss WFL      Neg slump    Lumbar  Isometric Testing on Med X equipment: Testing administered by PT    Test Initial Baseline Midpoint Final   Date 2/5/24 3/18/24    ROM 0-54 deg 0-54    Max Peak Torque 122  148    Min Peak Torque 42  58    Flex/Ext Ratio 2.9:1 2.5:1    % below normative data 58% 52%    % gain from initial test Not available visit 1 19%      Outcomes:  Intake Score: 59% functional ability  Visit 6 Score: 72%  Visit 10 Score: 67%   Discharge Score:  Goal Score: 65% functional ability       Treatment     Jean received the treatments listed below:      Medical MedX Treatment as follows:  MedX exercise started day 2:  Patient received neuromuscular education for 10 minutes via participation on the Medical MedX Machine. Therapist assisted patient in isolating and engaging spinal stabilization musculature in order to improve functional ability and postural control. Patient performed exercise with therapist guidance in order to accurately use pacer function, avoid valsalva, and optimally exert effort within a safe and effective range via the Kristopher Exertion Rating Scale. Patient instructed to perform at a midrange of exertion and to complete 15-20 repetitions within appropriate split time, with proper technique, and while maintaining safety.          " "4/11/2024    12:52 PM   [facility]   Visit Number 17   VAS Pain Rating 0   Treadmill Time (in min.) 8 min   Extension in Lying 10   Extension in Standing 10   Flexion in Lying 10   Lumbar Weight 80 lbs   Repetitions 20   Rating of Perceived Exertion 4   Ice - Z Lie (in min.) 5             Jean participated in therapeutic exercises to develop strength, endurance, ROM, posture, and core stabilization for 45 minutes including:    LTR x10  DKTC  x10  Figure 4 piriformis stretch + Hamstring 20" x 2   R sciatic nerve glide x 15--NP  TrA activation (T-ball) + SLR + 2# x 15--NP  Bridges with BTB  + Lat pull down with sports cord x 15  SL bridge w/Crossed Leg x 10 each  Bridging with LE support on T-ball x 15  side lying Plank clamshells with BTB x 15  EIL with therapist overpressure x 10 -NP  Prone plank hold 2 x 20 sec--NP  Cat/cow x 10  Bird dog ex + GTB x 10 -NP  SOC x 10--NP  EIS x10  Paloff press with 15# x 15 + Overhead--NP       Peripheral muscle strengthening which included one set of 15-20 repetitions at a slow and controlled 10-13 second per rep pace focused on strengthening supporting musculature in order to improve body mechanics and functional mobility. Patient and therapist focused on proper form during treatment to ensure optimal strengthening of each targeted muscle group.  Machines utilized included:Torso rotation, Leg Ext, Hip Abd, Hip Add, and Leg Press, Leg Curl, Chest Press, Rowing, Triceps, and Biceps    Jean received manual therapy techniques for 00 minutes. The following activities were included: R LE long axis distraction, R Hip Mobilization with movement hip ER/IR-NP    Pt given cold pack for 5 minutes to low back in Z-lie.    Patient Education and Home Exercises   Home exercises include: LTR x 5, Figure 4 piriformis stretch 20" x 3 right lower extremity, bridges with GTB, clamshells with GTB, nerve glide  Cardio program (V5): 3/4/24 Benefits of cardio handout reviewed and " provided  Lifting education (V11): - 3/20/24  Posture/Lumbar roll: recommended  Fridge Magnet Discharge handout (date given): -  Equipment at home/gym membership: yes     Education provided:   - Cues with exercise  - MedX performance  - Precor ex performance    Written Home Exercises Provided: Patient instructed to cont prior HEP. Added sciatic nerve glide 3/4/24  Exercises were reviewed and Jean was able to demonstrate them prior to the end of the session.  Jean demonstrated good  understanding of the education provided.     See EMR under Patient Instructions for exercises provided prior visits.    Assessment   Jean returns with no symptoms today at rest.  He does plan to continue walking on rojas with wife and strengthening/cardio at gym.  Reviewed importance of stretching as well. Treatment continued with flexibility, strengthening and neuromuscular reeducation ex's.  He was able to perform ex's with min cues and without increased pain c/o. Lumbar MedX  resistance was increased to 80 ft/lbs completing 20 reps with a RPE = 4/10. He was able to complete the full circuit of peripheral strengthening ex's within appropriate muscular fatigue and without increased pain. Will continue per HB protocol and patient tolerance.   Encourage stretching.    Patient is making progress towards established goals.  Pt will continue to benefit from skilled outpatient physical therapy to address the deficits stated in the impairment chart, provide pt/family education and to maximize pt's level of independence in the home and community environment.     Anticipated Barriers for therapy: none  Pt's spiritual, cultural and educational needs considered and pt agreeable to plan of care and goals as stated below:     Goals:   Short term goals:  6 weeks or 10 visits   - Pt will demonstrate increased lumbar MedX ROM by at least 3 degrees from the initial ROM value with improvements noted in functional ROM and ability to perform ADLs.  Appropriate and Ongoing  - Pt will demonstrate increased MedX average isometric strength value by 25% from initial test resulting in improved ability to perform bending, lifting, and carrying activities safely, confidently. Appropriate and Ongoing  - Pt will report a reduction in worst pain score by 1-2 points for improved tolerance for household chores. MET  - Pt able to perform HEP correctly with minimal cueing or supervision from therapist to encourage independent management of symptoms. MET     Long term goals: 10 weeks or 20 visits   - Pt will demonstrate increased lumbar MedX ROM by at least 3 degrees from initial ROM value, resulting in improved ability to perform functional forward bending while standing and sitting. Appropriate and Ongoing  - Pt will demonstrate increased MedX average isometric strength value by 45% from initial test resulting in improved ability to perform bending, lifting, and carrying activities safely and confidently. Appropriate and Ongoing  - Pt to demonstrate ability to independently control and reduce their pain through posture positioning and mechanical movements throughout a typical day. Appropriate and Ongoing  - Pt will demonstrate reduced pain and improved functional outcomes as reported on the FOTO by reaching an intake score of >/= 65% functional ability in order to demonstrate subjective improvement in patient's condition. . Appropriate and Ongoing  - Pt will demonstrate independence with the HEP at discharge. Appropriate and Ongoing  - Pt will be able to complete gym workouts without onset of pain (patient goal) Appropriate and Ongoing    Plan   Continue with established Plan of Care towards established PT goals.     Therapist: Lisandra Barba, PT    4/11/2024

## 2024-04-11 ENCOUNTER — LAB VISIT (OUTPATIENT)
Dept: LAB | Facility: HOSPITAL | Age: 74
End: 2024-04-11
Attending: FAMILY MEDICINE
Payer: MEDICARE

## 2024-04-11 ENCOUNTER — OFFICE VISIT (OUTPATIENT)
Dept: GASTROENTEROLOGY | Facility: CLINIC | Age: 74
End: 2024-04-11
Payer: MEDICARE

## 2024-04-11 ENCOUNTER — CLINICAL SUPPORT (OUTPATIENT)
Dept: REHABILITATION | Facility: HOSPITAL | Age: 74
End: 2024-04-11
Payer: MEDICARE

## 2024-04-11 VITALS
DIASTOLIC BLOOD PRESSURE: 64 MMHG | BODY MASS INDEX: 26.67 KG/M2 | HEIGHT: 71 IN | SYSTOLIC BLOOD PRESSURE: 129 MMHG | WEIGHT: 190.5 LBS | HEART RATE: 81 BPM

## 2024-04-11 DIAGNOSIS — K59.04 CHRONIC IDIOPATHIC CONSTIPATION: Primary | ICD-10-CM

## 2024-04-11 DIAGNOSIS — M53.86 DECREASED RANGE OF MOTION OF LUMBAR SPINE: Primary | ICD-10-CM

## 2024-04-11 DIAGNOSIS — R94.5 ABNORMAL RESULTS OF LIVER FUNCTION STUDIES: ICD-10-CM

## 2024-04-11 LAB
ALBUMIN SERPL BCP-MCNC: 4 G/DL (ref 3.5–5.2)
ALP SERPL-CCNC: 55 U/L (ref 55–135)
ALT SERPL W/O P-5'-P-CCNC: 36 U/L (ref 10–44)
AST SERPL-CCNC: 21 U/L (ref 10–40)
BILIRUB DIRECT SERPL-MCNC: 0.2 MG/DL (ref 0.1–0.3)
BILIRUB SERPL-MCNC: 0.7 MG/DL (ref 0.1–1)
HAV IGM SERPL QL IA: NORMAL
HBV CORE IGM SERPL QL IA: NORMAL
HBV SURFACE AG SERPL QL IA: NORMAL
HCV AB SERPL QL IA: NORMAL
PROT SERPL-MCNC: 6.5 G/DL (ref 6–8.4)

## 2024-04-11 PROCEDURE — 99999 PR PBB SHADOW E&M-EST. PATIENT-LVL III: CPT | Mod: PBBFAC,,, | Performed by: INTERNAL MEDICINE

## 2024-04-11 PROCEDURE — 99214 OFFICE O/P EST MOD 30 MIN: CPT | Mod: S$GLB,,, | Performed by: INTERNAL MEDICINE

## 2024-04-11 PROCEDURE — 3078F DIAST BP <80 MM HG: CPT | Mod: CPTII,S$GLB,, | Performed by: INTERNAL MEDICINE

## 2024-04-11 PROCEDURE — 3288F FALL RISK ASSESSMENT DOCD: CPT | Mod: CPTII,S$GLB,, | Performed by: INTERNAL MEDICINE

## 2024-04-11 PROCEDURE — 3008F BODY MASS INDEX DOCD: CPT | Mod: CPTII,S$GLB,, | Performed by: INTERNAL MEDICINE

## 2024-04-11 PROCEDURE — 36415 COLL VENOUS BLD VENIPUNCTURE: CPT | Performed by: FAMILY MEDICINE

## 2024-04-11 PROCEDURE — 80074 ACUTE HEPATITIS PANEL: CPT | Performed by: FAMILY MEDICINE

## 2024-04-11 PROCEDURE — 97110 THERAPEUTIC EXERCISES: CPT | Performed by: PHYSICAL MEDICINE & REHABILITATION

## 2024-04-11 PROCEDURE — 3074F SYST BP LT 130 MM HG: CPT | Mod: CPTII,S$GLB,, | Performed by: INTERNAL MEDICINE

## 2024-04-11 PROCEDURE — 97112 NEUROMUSCULAR REEDUCATION: CPT | Performed by: PHYSICAL MEDICINE & REHABILITATION

## 2024-04-11 PROCEDURE — 1159F MED LIST DOCD IN RCRD: CPT | Mod: CPTII,S$GLB,, | Performed by: INTERNAL MEDICINE

## 2024-04-11 PROCEDURE — 1160F RVW MEDS BY RX/DR IN RCRD: CPT | Mod: CPTII,S$GLB,, | Performed by: INTERNAL MEDICINE

## 2024-04-11 PROCEDURE — 1126F AMNT PAIN NOTED NONE PRSNT: CPT | Mod: CPTII,S$GLB,, | Performed by: INTERNAL MEDICINE

## 2024-04-11 PROCEDURE — 80076 HEPATIC FUNCTION PANEL: CPT | Performed by: FAMILY MEDICINE

## 2024-04-11 PROCEDURE — 1101F PT FALLS ASSESS-DOCD LE1/YR: CPT | Mod: CPTII,S$GLB,, | Performed by: INTERNAL MEDICINE

## 2024-04-11 NOTE — PROGRESS NOTES
"GENERAL GI PATIENT INTAKE:    COVID symptoms in the last 7 days (runny nose, sore throat, congestion, cough, fever): No  PCP: Papi Gallardo  If not PCP-  number given to establish [phone]: N/A    ALLERGIES REVIEWED:  Yes    CHIEF COMPLAINT:    Chief Complaint   Patient presents with    Follow-up       VITAL SIGNS:  /64   Pulse 81   Ht 5' 11" (1.803 m)   Wt 86.4 kg (190 lb 7.6 oz)   BMI 26.57 kg/m²      Change in medical, surgical, family or social history: No      REVIEWED MEDICATION LIST RECONCILED INCLUDING ABOVE MEDS:  Yes     "

## 2024-04-11 NOTE — PLAN OF CARE
"[facility]  Physical Therapy Plan of Care Note       Name: Jean KHAN [facility]  Clinic Number: [id number]    Therapy Diagnosis:   Encounter Diagnosis   Name Primary?    Decreased range of motion of lumbar spine Yes         Physician: Ute Rivas, *    Visit Date: 4/11/2024    Physician Orders: PT Eval and Treat  Medical Diagnosis from Referral: M54.6,G89.29 (ICD-10-CM) - Chronic midline thoracic back pain M54.41,G89.29 (ICD-10-CM) - Chronic bilateral low back pain with right-sided sciatica  Evaluation Date: 2/5/2024  Authorization Period Expiration: 01/04/2025  Reassessment Due: 5/11/2024    Plan of Care Certification Period: 4/15/2024 sent 4/11/24-6/11/24  Visit #/Visits authorized: 17/20   MedX Testing:MedX testing visit 1    Time In: 11:30 AM  Time Out: 12:30 PM  Total Billable Time:  60 minutes   INSURANCE and OUTCOMES: Fee for Service with FOTO Outcomes 3/3     Precautions: standard, history of cancer, and osteoporosis     Pattern of pain determined: 1 PEN    Subjective   Jean reports no symptoms at present.  He is doing yard work with greater ease.  He walks on rojas with wife and does go to gym to strengthen (although not lately)   Not very consistent with stretching and we reviewed a well rounded exercise program has stretching too.   He reports burning in feet if standing long, goes away when he rests.  No other LE symptoms.    States that he felt really good after last session and was able to perform increased activities around the house.     Patient reports tolerating previous visit no complaints   Patient reports their pain to be 0/10 on a 0-10 scale with 0 being no pain and 10 being the worst pain imaginable.  Pain Location: right low back, right posterior leg into foot      Work and leisure: retired / working out, yard work (limited right now), involved with [employer] [facility] [employer]    Pt goals: "Unrestricted gym work outs, learn how to work out, strengthen right leg, work in " "the yard without pain"     Objective      MOVEMENT LOSS - Lumbar    Norms ROM Loss Initial Range of motion 3/6/2024 4/11/24   Flexion Fingers touch toes, sacral angle >/= 70 deg, uniform spinal curvature, posterior weight shift  minimal loss Within functional limits WFL   Extension ASIS surpasses toes, spine of scapulae surpasses heels, uniform spinal curve minimal loss Minimal loss Min loss   Side glide Right   minimal loss Minimal loss WFL   Side glide Left   minimal loss Minimal loss WFL   Rotation Right PT observes contralateral shoulder minimal loss Minimal loss WFL   Rotation Left PT observes contralateral shoulder minimal loss Minimal loss WFL      Neg slump    Lumbar  Isometric Testing on Med X equipment: Testing administered by PT    Test Initial Baseline Midpoint Final   Date 2/5/24 3/18/24    ROM 0-54 deg 0-54    Max Peak Torque 122  148    Min Peak Torque 42  58    Flex/Ext Ratio 2.9:1 2.5:1    % below normative data 58% 52%    % gain from initial test Not available visit 1 19%      Outcomes:  Intake Score: 59% functional ability  Visit 6 Score: 72%  Visit 10 Score: 67%   Discharge Score:  Goal Score: 65% functional ability     Assessment   Jean returns with no symptoms today at rest.  He does plan to continue walking on rojas with wife and strengthening/cardio at gym.  Reviewed importance of stretching as well. Treatment continued with flexibility, strengthening and neuromuscular reeducation ex's.  He was able to perform ex's with min cues and without increased pain c/o. Lumbar MedX  resistance was increased to 80 ft/lbs completing 20 reps with a RPE = 4/10. He was able to complete the full circuit of peripheral strengthening ex's within appropriate muscular fatigue and without increased pain. Will continue per HB protocol and patient tolerance.   Encourage stretching.    Patient is making progress towards established goals.  Pt will continue to benefit from skilled outpatient physical therapy to address " the deficits stated in the impairment chart, provide pt/family education and to maximize pt's level of independence in the home and community environment.     Anticipated Barriers for therapy: none  Pt's spiritual, cultural and educational needs considered and pt agreeable to plan of care and goals as stated below:     Goals:   Short term goals:  6 weeks or 10 visits   - Pt will demonstrate increased lumbar MedX ROM by at least 3 degrees from the initial ROM value with improvements noted in functional ROM and ability to perform ADLs. Appropriate and Ongoing  - Pt will demonstrate increased MedX average isometric strength value by 25% from initial test resulting in improved ability to perform bending, lifting, and carrying activities safely, confidently. Appropriate and Ongoing  - Pt will report a reduction in worst pain score by 1-2 points for improved tolerance for household chores. MET  - Pt able to perform HEP correctly with minimal cueing or supervision from therapist to encourage independent management of symptoms. MET     Long term goals: 10 weeks or 20 visits   - Pt will demonstrate increased lumbar MedX ROM by at least 3 degrees from initial ROM value, resulting in improved ability to perform functional forward bending while standing and sitting. Appropriate and Ongoing  - Pt will demonstrate increased MedX average isometric strength value by 45% from initial test resulting in improved ability to perform bending, lifting, and carrying activities safely and confidently. Appropriate and Ongoing  - Pt to demonstrate ability to independently control and reduce their pain through posture positioning and mechanical movements throughout a typical day. Appropriate and Ongoing  - Pt will demonstrate reduced pain and improved functional outcomes as reported on the FOTO by reaching an intake score of >/= 65% functional ability in order to demonstrate subjective improvement in patient's condition. . Appropriate and  Ongoing  - Pt will demonstrate independence with the HEP at discharge. Appropriate and Ongoing  - Pt will be able to complete gym workouts without onset of pain (patient goal) Appropriate and Ongoing    PLAN     Updated Certification Period: 4/11/24 to 6/11/24   Recommended Treatment Plan:   Healthy Back    Lisandra Barba, PT

## 2024-04-11 NOTE — PROGRESS NOTES
Subjective:       Patient ID: Jean Carlson is a 73 y.o. male.    Chief Complaint: Follow-up    HPI    Follow-up visit.  73-year-old gentleman that I have been following for constipation.  Last August I saw for the 1st time as a 2nd opinion.  And then my most recent visit was in January of this year.  In January recommended that we try changing Linzess to Trulance.  But that was ineffective.  He tried to Trulance and 1 week and therapy he still had had a bowel movement.  So he stopped that went back to Linzess.  Also in the interval since that time he saw Dr. Baldwin on a couple of occasions.  Had some hemorrhoid treatment.  And also had abdominal CT scan in January that was normal.    His current regimen is Linzess 290 once a day and 2 cap fulls of MiraLax every morning.  Also in the morning have 2 cups of coffee.  And this results very consistently in a soft pasty stool every morning.  And then that is followed by several pasty to loose stools but resulting in complete evacuation.  He has no longer having to strain.  He is having less abdominal discomfort.  Still has intermittent bloating.  But overall he feels like this regimen is effective.    I reviewed his diet and habits again and they are excellent.  A lot of good fiber intake.  No meat.  Added kiwi to his regimen fruit.    Past Medical History:   Diagnosis Date    Acute medial meniscus tear, right, initial encounter 2023    Anterior tibialis tendinitis of right lower extremity 10/18/2018    Cancer     Chronic idiopathic constipation 2019    Closed fracture of left wrist 10/18/2021    Closed fracture of lower end of left radius with routine healing 2022    Elevated PSA     Family history of ASCVD 10/07/2016    Mom age 64, Dad age 67 -  on same day. Pat Aunt early 70's.    Family history of coronary artery disease 10/07/2016    Mom age 64, Dad age 67 -  on same day. Pat Aunt early 70's.    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    Intractable back pain 08/28/2018    Knee injury, right, initial encounter 05/15/2023    Nuclear sclerosis, bilateral 03/12/2018    Ocular migraine 12/20/2012    Osteoporosis     Prostate disease     Transient vision disturbance of both eyes 12/20/2012    Umbilical hernia without obstruction and without gangrene 10/01/2015    Urinary tract infection        Review of patient's allergies indicates:  No Known Allergies     Family History   Problem Relation Age of Onset    Heart disease Mother     Heart attack Mother     Skin cancer Mother     Heart disease Father     Heart attack Father     Glaucoma Maternal Aunt     Glaucoma Maternal Uncle     Retinitis pigmentosa Maternal Uncle     No Known Problems Daughter     Prostate cancer Neg Hx     Kidney disease Neg Hx     Colon cancer Neg Hx        Social History     Tobacco Use    Smoking status: Never    Smokeless tobacco: Never   Substance Use Topics    Alcohol use: Yes     Alcohol/week: 3.0 standard drinks of alcohol     Types: 3 Glasses of wine per week     Comment: rarely    Drug use: No        Review of Systems    CMP   Lab Results   Component Value Date     02/19/2024     10/31/2023    K 4.6 02/19/2024    K 4.8 10/31/2023     02/19/2024     10/31/2023    CO2 26 02/19/2024    CO2 24 10/31/2023     02/19/2024     (H) 10/31/2023    BUN 18 02/19/2024    BUN 19 10/31/2023    CREATININE 0.8 02/19/2024    CREATININE 0.9 10/31/2023    CALCIUM 9.8 02/19/2024    CALCIUM 10.0 10/31/2023    PROT 7.0 02/19/2024    PROT 7.0 10/31/2023    ALBUMIN 4.2 02/19/2024    ALBUMIN 4.2 10/31/2023    BILITOT 0.6 02/19/2024    BILITOT 0.6 10/31/2023    ALKPHOS 62 02/19/2024    ALKPHOS 64 10/31/2023    AST 39 02/19/2024    AST 20 10/31/2023    ALT 55 (H) 02/19/2024    ALT 23 10/31/2023    ANIONGAP 7 (L) 02/19/2024    ANIONGAP 10  10/31/2023    and CBC   Lab Results   Component Value Date    WBC 4.31 10/31/2023    HGB 15.4 10/31/2023    HCT 46.1 10/31/2023     10/31/2023       No results found for this or any previous visit from the past 365 days.             Objective:      Physical Exam    Assessment & Plan:       Chronic idiopathic constipation     Assessment.  Chronic idiopathic constipation.  He is satisfied with the current regimen.  I told him that he could experiment and try reducing in very slight amounts the amount of MiraLax he takes in the morning maybe reducing by 25% and see if that cuts down the number of loose stools he has after that initial bowel movement.  But I do not think we need to change any other medication at this time.  No further testing needed..  He will continue I am sure on the same good healthy diet.  Not due for colonoscopy.  He will stay in touch if he needs any other guidance.  This note was created with voice recognition dictation technology.  There may be errors that I did not see, detect or correct.      Dennis Gallardo MD

## 2024-04-15 ENCOUNTER — CLINICAL SUPPORT (OUTPATIENT)
Dept: REHABILITATION | Facility: HOSPITAL | Age: 74
End: 2024-04-15
Payer: MEDICARE

## 2024-04-15 ENCOUNTER — PATIENT MESSAGE (OUTPATIENT)
Dept: GASTROENTEROLOGY | Facility: CLINIC | Age: 74
End: 2024-04-15
Payer: MEDICARE

## 2024-04-15 DIAGNOSIS — M53.86 DECREASED RANGE OF MOTION OF LUMBAR SPINE: Primary | ICD-10-CM

## 2024-04-15 PROCEDURE — 97112 NEUROMUSCULAR REEDUCATION: CPT | Mod: CQ

## 2024-04-15 PROCEDURE — 97110 THERAPEUTIC EXERCISES: CPT | Mod: CQ

## 2024-04-15 NOTE — PATIENT INSTRUCTIONS
"HEALTHY BACK TOOLS        KEEP YOUR SPINE FEELING FINE   HEALTHY HABITS   Do your "GO TO" stretches 2/day   Get a good night's REST   Watch your POSTURE in sitting/standing Drink PLENTY of water   Use a lumbar roll Eat LOTS of fruits & vegetables   GET UP often (walk and/or stretch) Manage your STRESS   Make your workplace IDEAL FOR YOU  Don't smoke   Lift correctly EXERCISE                           WHAT TO DO WHEN SYMPTOMS FLARE UP  Back and neck pain may occasionally flare up.  If you experience a flare   up, remember your tools. Be encouraged, by remembering that flare-ups will   usually pass.   My Tools:    ~Use your "Go To" Stretches/Positions   ~Keep Moving-pain usually gets better if you move  ~Z lie (with or without ice)  10 min several times a day until symptoms reduce  ~Slowly resume normal activities   ~Practice Deep Breathing and Relaxation techniques                                                 MY EXERCISE PLAN  GO TO STRETCHES  2/day (like brushing your teeth) STRENGTHENING  2-3 times/week CARDIO PROGRAM  150 min/week   Bent knee trunk twist x 10 Bridging with crossed leg x 15 Biking or walking   Double knee to chest x 10 Bridging with band around knees x 20     Crossed leg figure four buttock/hamstring stretch 2 x 20 sec Side Plank clamshells w/band x 15     Crossed leg torso stretch 5 x 10 sec Bird dog (on hands and knees) x 10     Push up back extension x 10      Cat/Cow stretch x 10 (head up arch downward)     Standing Back Extension x 10        "

## 2024-04-15 NOTE — PROGRESS NOTES
"  [facility]  Physical Therapy Treatment Note     Name: Jean KHAN [facility]  Clinic Number: [id number]    Therapy Diagnosis:   Encounter Diagnosis   Name Primary?    Decreased range of motion of lumbar spine Yes       Physician: Ute Rivas, *    Visit Date: 4/15/2024    Physician Orders: PT Eval and Treat  Medical Diagnosis from Referral: M54.6,G89.29 (ICD-10-CM) - Chronic midline thoracic back pain M54.41,G89.29 (ICD-10-CM) - Chronic bilateral low back pain with right-sided sciatica  Evaluation Date: 2/5/2024  Authorization Period Expiration: 01/04/2025  Reassessment Due: 5/11/2024    Plan of Care Certification Period: 4/15/2024 sent 4/11/24-6/11/24  Visit #/Visits authorized: 18/20   MedX Testing:MedX testing visit 1    Time In: 12:30 PM  Time Out: 1:30 PM  Total Billable Time:  55 minutes   INSURANCE and OUTCOMES: Fee for Service with FOTO Outcomes 3/3     Precautions: standard, history of cancer, and osteoporosis     Pattern of pain determined: 1 PEN    Subjective   Jean reports that he has some increased total body soreness after performing yard work and  fixing his mower blade over the weekend.    Patient reports tolerating previous visit no complaints  Patient reports their pain to be 2/10 on a 0-10 scale with 0 being no pain and 10 being the worst pain imaginable.  Pain Location: right low back, right posterior leg into foot      Work and leisure: retired / working out, yard work (limited right now), involved with [employer] [facility]    Pt goals: "Unrestricted gym work outs, learn how to work out, strengthen right leg, work in the yard without pain"     Objective      MOVEMENT LOSS - Lumbar    Norms ROM Loss Initial Range of motion 3/6/2024 4/11/24   Flexion Fingers touch toes, sacral angle >/= 70 deg, uniform spinal curvature, posterior weight shift  minimal loss Within functional limits WFL   Extension ASIS surpasses toes, spine of scapulae surpasses heels, " uniform spinal curve minimal loss Minimal loss Min loss   Side glide Right   minimal loss Minimal loss WFL   Side glide Left   minimal loss Minimal loss WFL   Rotation Right PT observes contralateral shoulder minimal loss Minimal loss WFL   Rotation Left PT observes contralateral shoulder minimal loss Minimal loss WFL      Neg slump    Lumbar  Isometric Testing on Med X equipment: Testing administered by PT    Test Initial Baseline Midpoint Final   Date 2/5/24 3/18/24    ROM 0-54 deg 0-54    Max Peak Torque 122  148    Min Peak Torque 42  58    Flex/Ext Ratio 2.9:1 2.5:1    % below normative data 58% 52%    % gain from initial test Not available visit 1 19%      Outcomes:  Intake Score: 59% functional ability  Visit 6 Score: 72%  Visit 10 Score: 67%   Discharge Score:  Goal Score: 65% functional ability       Treatment     Jean received the treatments listed below:      Medical MedX Treatment as follows:  MedX exercise started day 2:  Patient received neuromuscular education for 10 minutes via participation on the Medical MedX Machine. Therapist assisted patient in isolating and engaging spinal stabilization musculature in order to improve functional ability and postural control. Patient performed exercise with therapist guidance in order to accurately use pacer function, avoid valsalva, and optimally exert effort within a safe and effective range via the Kristopher Exertion Rating Scale. Patient instructed to perform at a midrange of exertion and to complete 15-20 repetitions within appropriate split time, with proper technique, and while maintaining safety.          4/15/2024     1:24 PM   HealthyBack Therapy - Short   Visit Number 18   VAS Pain Rating 2   Treadmill Time (in min.) 5 min   Extension in Lying 10   Extension in Standing 10   Flexion in Lying 10   Lumbar Weight 84 lbs   Repetitions 18   Rating of Perceived Exertion 5           Jean participated in therapeutic exercises to develop strength, endurance, ROM,  "posture, and core stabilization for 45 minutes including:    "Fridge Magnet Go-To ex's"  LTR x10  DKTC  x10  Figure 4 piriformis stretch + Hamstring 20" x 2   Bridges with BTB  + Lat pull down with sports cord x 15  SL bridge w/Crossed Leg x 10 each  side lying Plank clamshells with BTB x 15  EIL x 10  Cat/cow x 10  Bird dog ex x 10   EIS x10    Not performed today  R sciatic nerve glide x 15--NP  TrA activation (T-ball) + SLR + 2# x 15--NP  Bridging with LE support on T-ball x 15--NP  Prone plank hold 2 x 20 sec--NP  SOC x 10--NP  Paloff press with 15# x 15 + Overhead--NP     Peripheral muscle strengthening which included one set of 15-20 repetitions at a slow and controlled 10-13 second per rep pace focused on strengthening supporting musculature in order to improve body mechanics and functional mobility. Patient and therapist focused on proper form during treatment to ensure optimal strengthening of each targeted muscle group.  Machines utilized included:Torso rotation, Leg Ext, Hip Abd, Hip Add, and Leg Press, Leg Curl, Chest Press, Rowing, Triceps, and Biceps    Jean received manual therapy techniques for 00 minutes. The following activities were included: R LE long axis distraction, R Hip Mobilization with movement hip ER/IR-NP    Pt given cold pack for 5 minutes to low back in Z-lie.    Patient Education and Home Exercises   Home exercises include: LTR  , Figure 4 piriformis stretch , bridges with band, Side plank clamshells with band,  nerve glide, DKTC, SL bridge, EIL, EIS, Bird dog, Cat/Cow  Cardio program (V5): 3/4/24 Benefits of cardio handout reviewed and provided  Lifting education (V11): - 3/20/24  Posture/Lumbar roll: recommended  Fridge Magnet Discharge handout (date given): -4/15/24  Equipment at home/gym membership: yes     Education provided:   - Cues with exercise  - MedX performance  - Precor ex performance    Written Home Exercises Provided: Patient instructed to cont prior HEP.  Updated Go- " "to Fridge Magnet ex's provided 4/15/24  Exercises were reviewed and Jean was able to demonstrate them prior to the end of the session.  Jean demonstrated good  understanding of the education provided.     See EMR under Patient Instructions for exercises provided prior visits.    Assessment   Jean returns with mild/minimal total body soreness/stiffness after increased yard activities over the weekend. Treatment continued with flexibility, strengthening and neuromuscular reeducation ex's. His " Go-To Fridge Magnet ex's" were established and performed.  He was able to perform ex's with min cues and without increased pain c/o. Lumbar MedX resistance was increased to 84 ft/lbs completing 18 reps with a RPE = 5/10. He was able to complete the full circuit of peripheral strengthening ex's within appropriate muscular fatigue and without increased pain. Will continue per HB protocol and patient tolerance.        Patient is making progress towards established goals.  Pt will continue to benefit from skilled outpatient physical therapy to address the deficits stated in the impairment chart, provide pt/family education and to maximize pt's level of independence in the home and community environment.     Anticipated Barriers for therapy: none  Pt's spiritual, cultural and educational needs considered and pt agreeable to plan of care and goals as stated below:     Goals:   Short term goals:  6 weeks or 10 visits   - Pt will demonstrate increased lumbar MedX ROM by at least 3 degrees from the initial ROM value with improvements noted in functional ROM and ability to perform ADLs. Appropriate and Ongoing  - Pt will demonstrate increased MedX average isometric strength value by 25% from initial test resulting in improved ability to perform bending, lifting, and carrying activities safely, confidently. Appropriate and Ongoing  - Pt will report a reduction in worst pain score by 1-2 points for improved tolerance for household chores. " MET  - Pt able to perform HEP correctly with minimal cueing or supervision from therapist to encourage independent management of symptoms. MET     Long term goals: 10 weeks or 20 visits   - Pt will demonstrate increased lumbar MedX ROM by at least 3 degrees from initial ROM value, resulting in improved ability to perform functional forward bending while standing and sitting. Appropriate and Ongoing  - Pt will demonstrate increased MedX average isometric strength value by 45% from initial test resulting in improved ability to perform bending, lifting, and carrying activities safely and confidently. Appropriate and Ongoing  - Pt to demonstrate ability to independently control and reduce their pain through posture positioning and mechanical movements throughout a typical day. Appropriate and Ongoing  - Pt will demonstrate reduced pain and improved functional outcomes as reported on the FOTO by reaching an intake score of >/= 65% functional ability in order to demonstrate subjective improvement in patient's condition. . Appropriate and Ongoing  - Pt will demonstrate independence with the HEP at discharge. Appropriate and Ongoing  - Pt will be able to complete gym workouts without onset of pain (patient goal) Appropriate and Ongoing    Plan   Continue with established Plan of Care towards established PT goals.     Therapist: Carrington Disla, PTA    4/15/2024

## 2024-04-18 ENCOUNTER — CLINICAL SUPPORT (OUTPATIENT)
Dept: REHABILITATION | Facility: HOSPITAL | Age: 74
End: 2024-04-18
Payer: MEDICARE

## 2024-04-18 DIAGNOSIS — M53.86 DECREASED RANGE OF MOTION OF LUMBAR SPINE: Primary | ICD-10-CM

## 2024-04-18 PROCEDURE — 97110 THERAPEUTIC EXERCISES: CPT | Performed by: PHYSICAL MEDICINE & REHABILITATION

## 2024-04-18 PROCEDURE — 97112 NEUROMUSCULAR REEDUCATION: CPT | Performed by: PHYSICAL MEDICINE & REHABILITATION

## 2024-04-18 NOTE — PROGRESS NOTES
"  [facility]  Physical Therapy Treatment Note     Name: Jean KHAN [facility]  Clinic Number: [id number]    Therapy Diagnosis:   Encounter Diagnosis   Name Primary?    Decreased range of motion of lumbar spine Yes         Physician: Ute Rivas, *    Visit Date: 4/18/2024    Physician Orders: PT Eval and Treat  Medical Diagnosis from Referral: M54.6,G89.29 (ICD-10-CM) - Chronic midline thoracic back pain M54.41,G89.29 (ICD-10-CM) - Chronic bilateral low back pain with right-sided sciatica  Evaluation Date: 2/5/2024  Authorization Period Expiration: 01/04/2025  Reassessment Due: 5/11/2024    Plan of Care Certification Period: 4/15/2024 sent 4/11/24-6/11/24  Visit #/Visits authorized: 19/20   MedX Testing:MedX testing visit 1    Time In: 11:25 PM  Time Out: 12:37  PM  Total Billable Time:  65 minutes   INSURANCE and OUTCOMES: Fee for Service with FOTO Outcomes 3/3     Precautions: standard, history of cancer, and osteoporosis     Pattern of pain determined: 1 PEN    Subjective   Jean reports that he has mild stiffness today. He is  trying to stretch daily, but doesn't always.  He plans to go back to the gym but hasn't been there yet.  We set goals of stretching daily and getting to the gym before his next visit.  He was given his exercise card.  He will also think about wellness.  One of his high school friends passed away and this is difficult.      Patient reports tolerating previous visit no complaints  Patient reports their pain to be 2/10 on a 0-10 scale with 0 being no pain and 10 being the worst pain imaginable.  Pain Location: right low back, right posterior leg into foot      Work and leisure: retired / working out, yard work (limited right now), involved with [employer]    Pt goals: "Unrestricted gym work outs, learn how to work out, strengthen right leg, work in the yard without pain"     Objective      MOVEMENT LOSS - Lumbar    Norms ROM Loss Initial " Range of motion 3/6/2024 4/11/24   Flexion Fingers touch toes, sacral angle >/= 70 deg, uniform spinal curvature, posterior weight shift  minimal loss Within functional limits WFL   Extension ASIS surpasses toes, spine of scapulae surpasses heels, uniform spinal curve minimal loss Minimal loss Min loss   Side glide Right   minimal loss Minimal loss WFL   Side glide Left   minimal loss Minimal loss WFL   Rotation Right PT observes contralateral shoulder minimal loss Minimal loss WFL   Rotation Left PT observes contralateral shoulder minimal loss Minimal loss WFL      Neg slump    Lumbar  Isometric Testing on Med X equipment: Testing administered by PT    Test Initial Baseline Midpoint Final   Date 2/5/24 3/18/24    ROM 0-54 deg 0-54    Max Peak Torque 122  148    Min Peak Torque 42  58    Flex/Ext Ratio 2.9:1 2.5:1    % below normative data 58% 52%    % gain from initial test Not available visit 1 19%      Outcomes:  Intake Score: 59% functional ability  Visit 6 Score: 72%  Visit 10 Score: 67%   Discharge Score:  Goal Score: 65% functional ability       Treatment     Jean received the treatments listed below:      Medical MedX Treatment as follows:  MedX exercise started day 2:  Patient received neuromuscular education for 10 minutes via participation on the Medical MedX Machine. Therapist assisted patient in isolating and engaging spinal stabilization musculature in order to improve functional ability and postural control. Patient performed exercise with therapist guidance in order to accurately use pacer function, avoid valsalva, and optimally exert effort within a safe and effective range via the Kristopher Exertion Rating Scale. Patient instructed to perform at a midrange of exertion and to complete 15-20 repetitions within appropriate split time, with proper technique, and while maintaining safety.          4/18/2024    12:41 PM   [facility]   Visit Number 19   VAS Pain Rating 0   Treadmill Time (in min.) 5  "min   Lumbar Weight 84 lbs   Repetitions 17   Rating of Perceived Exertion 5   Ice - Z Lie (in min.) 5      Jean participated in therapeutic exercises to develop strength, endurance, ROM, posture, and core stabilization for 45 minutes including:    "Fridge Magnet Go-To ex's"  LTR x10  DKTC  x10  Figure 4 piriformis stretch + Hamstring 20" x 2   Bridges with BTB  + Lat pull down with sports cord x 15  SL bridge w/Crossed Leg x 10 each  side lying Plank clamshells with BTB x 15  EIL x 10  Cat/cow x 10  Bird dog ex x 10 -NP  EIS x10    Not performed today  R sciatic nerve glide x 15--NP  TrA activation (T-ball) + SLR + 2# x 15--NP  Bridging with LE support on T-ball x 15--NP  Prone plank hold 2 x 20 sec--NP  SOC x 10--NP  Paloff press with 15# x 15 + Overhead--NP     Peripheral muscle strengthening which included one set of 15-20 repetitions at a slow and controlled 10-13 second per rep pace focused on strengthening supporting musculature in order to improve body mechanics and functional mobility. Patient and therapist focused on proper form during treatment to ensure optimal strengthening of each targeted muscle group.  Machines utilized included:Torso rotation, Leg Ext, Hip Abd, Hip Add, and Leg Press, Leg Curl, Chest Press, Rowing, Triceps, and Biceps    Jean received manual therapy techniques for 00 minutes. The following activities were included: R LE long axis distraction, R Hip Mobilization with movement hip ER/IR-NP    Pt given cold pack for 5 minutes to low back in Z-lie.    Patient Education and Home Exercises   Home exercises include: LTR  , Figure 4 piriformis stretch , bridges with band, Side plank clamshells with band,  nerve glide, DKTC, SL bridge, EIL, EIS, Bird dog, Cat/Cow  Cardio program (V5): 3/4/24 Benefits of cardio handout reviewed and provided  Lifting education (V11): - 3/20/24  Posture/Lumbar roll: recommended  Fridge Magnet Discharge handout (date given): -4/15/24  Equipment at home/gym " "membership: yes     Education provided:   - Cues with exercise  - MedX performance  - Precor ex performance  -wellness, getting to gym, exercise card given, stretching daily    Written Home Exercises Provided: Patient instructed to cont prior HEP.  Updated Go- to Fridge Magnet ex's provided 4/15/24  Exercises were reviewed and Jean was able to demonstrate them prior to the end of the session.  Jean demonstrated good  understanding of the education provided.     See EMR under Patient Instructions for exercises provided prior visits.    Assessment   Jean returns with mild/minimal total body soreness/stiffness noting he doesn't stretch daily but will aim to.   Treatment continued with education regarding self care, flexibility, strengthening and neuromuscular reeducation ex's. His " Go-To Fridge Magnet ex's" were reviewed and performed.  He was able to perform ex's with min cues and without increased pain c/o. Lumbar MedX resistance was increased to 84 ft/lbs completing 17 reps with a RPE = 5/10. He was able to complete the full circuit of peripheral strengthening ex's within appropriate muscular fatigue and without increased pain. Will continue per HB protocol and patient tolerance.        Patient is making progress towards established goals.  Pt will continue to benefit from skilled outpatient physical therapy to address the deficits stated in the impairment chart, provide pt/family education and to maximize pt's level of independence in the home and community environment.     Anticipated Barriers for therapy: none  Pt's spiritual, cultural and educational needs considered and pt agreeable to plan of care and goals as stated below:     Goals:   Short term goals:  6 weeks or 10 visits   - Pt will demonstrate increased lumbar MedX ROM by at least 3 degrees from the initial ROM value with improvements noted in functional ROM and ability to perform ADLs. Appropriate and Ongoing  - Pt will demonstrate increased MedX " average isometric strength value by 25% from initial test resulting in improved ability to perform bending, lifting, and carrying activities safely, confidently. Appropriate and Ongoing  - Pt will report a reduction in worst pain score by 1-2 points for improved tolerance for household chores. MET  - Pt able to perform HEP correctly with minimal cueing or supervision from therapist to encourage independent management of symptoms. MET     Long term goals: 10 weeks or 20 visits   - Pt will demonstrate increased lumbar MedX ROM by at least 3 degrees from initial ROM value, resulting in improved ability to perform functional forward bending while standing and sitting. Appropriate and Ongoing  - Pt will demonstrate increased MedX average isometric strength value by 45% from initial test resulting in improved ability to perform bending, lifting, and carrying activities safely and confidently. Appropriate and Ongoing  - Pt to demonstrate ability to independently control and reduce their pain through posture positioning and mechanical movements throughout a typical day. Appropriate and Ongoing  - Pt will demonstrate reduced pain and improved functional outcomes as reported on the FOTO by reaching an intake score of >/= 65% functional ability in order to demonstrate subjective improvement in patient's condition. . Appropriate and Ongoing  - Pt will demonstrate independence with the HEP at discharge. Appropriate and Ongoing  - Pt will be able to complete gym workouts without onset of pain (patient goal) Appropriate and Ongoing    Plan   Continue with established Plan of Care towards established PT goals.     Therapist: Lisandra Barba, PT    4/18/2024

## 2024-04-25 ENCOUNTER — CLINICAL SUPPORT (OUTPATIENT)
Dept: REHABILITATION | Facility: HOSPITAL | Age: 74
End: 2024-04-25
Payer: MEDICARE

## 2024-04-25 DIAGNOSIS — M53.86 DECREASED RANGE OF MOTION OF LUMBAR SPINE: Primary | ICD-10-CM

## 2024-04-25 PROCEDURE — 97110 THERAPEUTIC EXERCISES: CPT | Performed by: PHYSICAL MEDICINE & REHABILITATION

## 2024-04-25 PROCEDURE — 97112 NEUROMUSCULAR REEDUCATION: CPT | Performed by: PHYSICAL MEDICINE & REHABILITATION

## 2024-04-25 NOTE — PROGRESS NOTES
[facility]  Physical Therapy Treatment Note     Name: Jean KHAN [facility]  Clinic Number: [id number]    Therapy Diagnosis:   Encounter Diagnosis   Name Primary?    Decreased range of motion of lumbar spine Yes         Physician: Ute Rivas, *    Visit Date: 4/25/2024    Physician Orders: PT Eval and Treat  Medical Diagnosis from Referral: M54.6,G89.29 (ICD-10-CM) - Chronic midline thoracic back pain M54.41,G89.29 (ICD-10-CM) - Chronic bilateral low back pain with right-sided sciatica  Evaluation Date: 2/5/2024  Authorization Period Expiration: 01/04/2025  Reassessment Due: 5/11/2024    Plan of Care Certification Period: 4/15/2024 sent 4/11/24-6/11/24  Visit #/Visits authorized: 20/20 -discharge to gym, patient decided not to do wellness  MedX Testing:MedX testing visit 1    Time In: 1:10 pm  Time Out:  2:15 pm  Total Billable Time:  65 minutes   INSURANCE and OUTCOMES: Fee for Service with FOTO Outcomes 3/3     Precautions: standard, history of cancer, and osteoporosis     Pattern of pain determined: 1 PEN    Subjective   Jean reports that he has mild stiffness today. He reports he needs to work on stretching  as he is not doing this daily.  He is trying to.  He likes going to the gym but just doing cardio and strengthening.  We suggested even bite sized stretching throughout the day.  He has gone to the gym and managed well.  HE is functioning well at home but he avoids lifting really heavy items.    We set goals of stretching daily and getting to the gym before his next visit.  He was given his exercise card.            Patient reports tolerating previous visit no complaints  Patient reports their pain to be 0/10 on a 0-10 scale with 0 being no pain and 10 being the worst pain imaginable.  Pain Location: right low back, right posterior leg into foot      Work and leisure: retired / working out, yard work (limited right now), involved with [employer]    Pt  "goals: "Unrestricted gym work outs, learn how to work out, strengthen right leg, work in the yard without pain"     Objective      MOVEMENT LOSS - Lumbar    Norms ROM Loss Initial Range of motion 3/6/2024 4/25/24   Flexion Fingers touch toes, sacral angle >/= 70 deg, uniform spinal curvature, posterior weight shift  minimal loss Within functional limits WFL   Extension ASIS surpasses toes, spine of scapulae surpasses heels, uniform spinal curve minimal loss Minimal loss WFL   Side glide Right   minimal loss Minimal loss WFL   Side glide Left   minimal loss Minimal loss WFL   Rotation Right PT observes contralateral shoulder minimal loss Minimal loss WFL   Rotation Left PT observes contralateral shoulder minimal loss Minimal loss WFL      Neg slump    Lumbar  Isometric Testing on Med X equipment: Testing administered by PT    Test Initial Baseline Midpoint Final   Date 2/5/24 3/18/24 4/25/24   ROM 0-54 deg 0-54 0-57   Max Peak Torque 122  148 173   Min Peak Torque 42  58 103   Flex/Ext Ratio 2.9:1 2.5:1 1.2/1   % below normative data 58% 52% 26   % gain from initial test Not available visit 1 19% 84%     Outcomes:  Intake Score: 59% functional ability  Visit 6 Score: 72%  Visit 10 Score: 67%   Discharge Score:  69%  Goal Score: 65% functional ability met      Treatment     Jean received the treatments listed below:      Medical MedX Treatment as follows:  MedX testing performed for performance test: Patient  received neuromuscular education to engage spinal musculature correctly for motor control and engagement of musculature for 24 minutes including the MedX exercise component and practice and standard testing. MedX dynamic exercise and baseline isometric test performed with instructions to guide the patient safely through the testing procedure. Patient instructed to perform isometric test correctly and safely while building to an optimal force with a pain-free effort. Patient also instructed that they should feel " "support/pressure from MedX restraints but no pain/discomfort, and encouraged to report any pain to therapist. Patient demonstrated appropriate understanding of information and tolerance of test.  Education regarding purpose of test, safety during test given, and reviewed possible more soreness and strategies.         4/25/2024     2:51 PM   [facility]   Visit Number 20   VAS Pain Rating 0   Treadmill Time (in min.) 5 min   Extension in Lying 10   Extension in Standing 10   Flexion in Lying 10   Lumbar Extension Seat Pad 0   Femur Restraint 6   Top Dead Center 24   Counterweight 242   Lumbar Flexion 57   Lumbar Extension 0   Lumbar Peak Torque 173 ft. lbs.   Min Torque 103   Test Percent Below Normative Data 26 %   Test Percent Gain in Strength from Initial  84 %   Ice - Z Lie (in min.) 5      Jean participated in therapeutic exercises to develop strength, endurance, ROM, posture, and core stabilization for 45 minutes including:    "Fridge Magnet Go-To ex's"  LTR x10  DKTC  x10  Figure 4 piriformis stretch + Hamstring 20" x 2   Bridges with BTB  + Lat pull down with sports cord x 15  SL bridge w/Crossed Leg x 10 each  side lying Plank clamshells with BTB x 15  EIL x 10  Cat/cow x 10  Bird dog ex x 10 -NP  EIS x10    Not performed today  R sciatic nerve glide x 15--NP  TrA activation (T-ball) + SLR + 2# x 15--NP  Bridging with LE support on T-ball x 15--NP  Prone plank hold 2 x 20 sec--NP  SOC x 10--NP  Paloff press with 15# x 15 + Overhead--NP     Peripheral muscle strengthening which included one set of 15-20 repetitions at a slow and controlled 10-13 second per rep pace focused on strengthening supporting musculature in order to improve body mechanics and functional mobility. Patient and therapist focused on proper form during treatment to ensure optimal strengthening of each targeted muscle group.  Machines utilized included:Torso rotation, Leg Ext, Hip Abd, Hip Add, and Leg Press, Leg Curl, Chest Press, " Rowing, Triceps, and Biceps    Jean received manual therapy techniques for 00 minutes. The following activities were included: R LE long axis distraction, R Hip Mobilization with movement hip ER/IR-NP    Pt given cold pack for 5 minutes to low back in Z-lie.    Patient Education and Home Exercises   Home exercises include: LTR  , Figure 4 piriformis stretch , bridges with band, Side plank clamshells with band,  nerve glide, DKTC, SL bridge, EIL, EIS, Bird dog, Cat/Cow, fridge magnet instruction for home, gym weights given, cardio and wellness discussed  Cardio program (V5): 3/4/24 Benefits of cardio handout reviewed and provided  Lifting education (V11): - 3/20/24  Posture/Lumbar roll: recommended  Fridge Magnet Discharge handout (date given): -4/15/24  Equipment at home/gym membership: yes     Education provided:   - Cues with exercise  - MedX performance and performance test results  - Precor ex performance  -wellness, getting to gym, exercise card given, stretching daily    Written Home Exercises Provided: Patient instructed to cont prior HEP.  Updated Go- to Fridge Magnet ex's provided 4/15/24  Exercises were reviewed and Jean was able to demonstrate them prior to the end of the session.  Jean demonstrated good  understanding of the education provided.     See EMR under Patient Instructions for exercises provided prior visits.    Assessment   Patient has attended 20 visits of the [facility] program focusing aerobic training, isometric testing with dynamic strengthening on MedX machine for spine, whole body strengthening on peripheral strengthening equipment, HEP, and patient education. Patient has completed the [facility] Program and is ready to be transitioned into wellness program. Educated on the importance of wellness program and attending weekly in order to maintain strength. Stressed the importance of continuing exercise and maintaining proper body mechanics and ergonomics in order to fully participate  in activities of daily living, work, and leisure activities. At this time, patient demonstrates improvement in ability to reduce symptoms, improved posture, improved spinal ROM and improved strength. Discharge handout with HEP given and reviewed all information given. Patient able to demonstrate and verbalize understanding. Patient does not plan to attend wellness and is appropriate for transition to his gym.     -Improved posture and he is  using lumbar roll.  -Improved lumbar  ROM, initially on MedX test 0-54 and currently 0-57.  -Improved strength at each test point on lumbar med ex IM test with 84%% average improvement with reduced pain noted by patient.  -Initial outcome tool score 59% function and current outcome tool score 69% function indicating reduced pain and improved function.           Patient is making progress towards established goals.  Pt will continue to benefit from skilled outpatient physical therapy to address the deficits stated in the impairment chart, provide pt/family education and to maximize pt's level of independence in the home and community environment.     Anticipated Barriers for therapy: none  Pt's spiritual, cultural and educational needs considered and pt agreeable to plan of care and goals as stated below:     Goals:   Short term goals:  6 weeks or 10 visits   - Pt will demonstrate increased lumbar MedX ROM by at least 3 degrees from the initial ROM value with improvements noted in functional ROM and ability to perform ADLs. MET 4/25/24  - Pt will demonstrate increased MedX average isometric strength value by 25% from initial test resulting in improved ability to perform bending, lifting, and carrying activities safely, confidently. MET 4/25/24  - Pt will report a reduction in worst pain score by 1-2 points for improved tolerance for household chores. MET  - Pt able to perform HEP correctly with minimal cueing or supervision from therapist to encourage independent management of  symptoms. MET     Long term goals: 10 weeks or 20 visits   - Pt will demonstrate increased lumbar MedX ROM by at least 3 degrees from initial ROM value, resulting in improved ability to perform functional forward bending while standing and sitting. MET 4/25/24  - Pt will demonstrate increased MedX average isometric strength value by 45% from initial test resulting in improved ability to perform bending, lifting, and carrying activities safely and confidently. MET 4/25/24  - Pt to demonstrate ability to independently control and reduce their pain through posture positioning and mechanical movements throughout a typical day. MET 4/25/24  - Pt will demonstrate reduced pain and improved functional outcomes as reported on the FOTO by reaching an intake score of >/= 65% functional ability in order to demonstrate subjective improvement in patient's condition. .MET 4/25/24  - Pt will demonstrate independence with the HEP at discharge. MET 4/25/24  - Pt will be able to complete gym workouts without onset of pain (patient goal) MET 4/25/24    Plan   Discharge on HEP, patient education complete. Patient agreeable and will go to gym  Therapist: Lisandra Barba, PT    4/25/2024

## 2024-04-26 ENCOUNTER — DOCUMENTATION ONLY (OUTPATIENT)
Dept: REHABILITATION | Facility: HOSPITAL | Age: 74
End: 2024-04-26
Payer: MEDICARE

## 2024-04-26 NOTE — PROGRESS NOTES
Health  Consult Note    Name: Jean KHAN [facility] [address] Number: [id number]  Physician: Ute Rivas, *  Past Medical History:   Diagnosis Date    Acute medial meniscus tear, right, initial encounter 2023    Anterior tibialis tendinitis of right lower extremity 10/18/2018    Cancer     Chronic idiopathic constipation 2019    Closed fracture of left wrist 10/18/2021    Closed fracture of lower end of left radius with routine healing 2022    Elevated PSA     Family history of ASCVD 10/07/2016    Mom age 64, Dad age 67 -  on same day. Pat Aunt early 70's.    Family history of coronary artery disease 10/07/2016    Mom age 64, Dad age 67 -  on same day. Pat Aunt early 70's.    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     Vision:      Values: healthy    Strengths:  structured    Challenges:  anger outbursts    Support:      Hobbies:  helping others    Objective:  Jean was instructed to continue exploring wellbeing.      INITIAL date: 3/20/24  One a scale of 1-10, with 10 being 100% happy, how would you rate your happiness in each of the wellness areas below?    Happiness:         1     2     3     4     5     6     7     8     9     10    Initial Date: DC Date: +/- Total Change   Exercise/Movement 7      Physical Health 7     Stress Level 8     Nutrition 8     Sleep      Play      Sense of Purpose 5     Relationships      Energy/Vitality        Assessment:   3/20/24: beginning to review happiness scale  4/3/24: pt discussed past, Muslim  4/26/24: wellness wheel, something is missing    Plan:  Patient goals for next consult include  3/20/24: look into stress management  4/3/24: pt not returning on days HC at facility remainder of the month.   4/26/24: what brings blake.    Health : Alissa Youngblood  4/26/2024    Health Coaching Agreement  This Agreement was reviewed by  and client and verbal agreement given from client to receive  coaching services  focusing on topics such as nutrition, meditation, stress management, anxiety, goal setting, sleep deprivation, and etc.      Description of Coaching: Coaching is partnership (defined as an alliance, not a legal business partnership) between the  and the Client in a thought-provoking and creative process that inspires the client to maximize their overall potential.    Health coaches are change agents who help their clients set and achieve health goals and build new habits.  Health coaches are not just a source of information but a catalyst for transformation.     How is Coaching Different from Therapy?  Coaching is not therapy.  We do not look for causation in the past nor healing, though healing may happen as a side effect of increasing awareness. Our work will be as much as possible in the present and focused on your desires and present time objectives. I will support you to move forward, set personal or professional goals, and take the necessary action to create what you desire. If for any reason I feel other therapeutic professional services are required, I will request you get that help.    Confidentiality  This coaching relationship, as well as all information (documented or verbal) that the Client shares with the  as part of this relationship,  is bound by the principles of confidentiality set forth in the [employer] Code of Ethics.  The  agrees not to disclose any information pertaining to the Client without the Client's written consent. The  will not disclose the Client's name as a reference without the Client's consent.      Procedure/Operations  Coaching sessions can be conducted in person or virtually with a time allotment of 30 minutes    and client will meet in a suitably private location without distractions in order to be fully present for the in person coaching sessions   and client will commit to being present in a quiet space or room in order to be fully present for virtual coaching visits  Sessions will be scheduled ahead of time and reasonable cancel notice is expected if circumstances change    Client responsibilities   Choose area of focus for the coaching sessions  The client agrees to communicate honestly. To be open to feedback and assistance, and to create the time and energy to participate fully in his/her sessions  Create specific goals, strategies, actions, and decide the time frame   Make your time with me a priority.  Show up on time, be prepared to work, have fun, and be ready to explore your life in a new and more meaningful way     responsibilities  Create a safe and supportive environment  Apply effective communication skills, such as use of open-ended questions to promote a new perspective, awareness, and thought processes.  The  reserves the right to ask challenging questions which could lead to client's thinking pattern being challenged  Your  will occasionally interrupt you in order to make sure they are understanding you and promoting clarity  Help clients develop achievable and measurable goals by supporting in the clarifying, setting, and maintaining of those goals  Help clients develop and make full use of their strengths in order to support successful behavioral changes. Hold client  accountable and keep him/her focused  Encourage patients during every session.  Your  truly cares about you and is passionate about helping you succeed and improve your wellness. We want to cheer you on and celebrate your victories       Client understands that coaching does not provide treatment, therapy, or diagnosis and agrees  to see their physician, therapist or qualified helping professional for such needs.

## 2024-04-30 ENCOUNTER — OFFICE VISIT (OUTPATIENT)
Dept: INTERNAL MEDICINE | Facility: CLINIC | Age: 74
End: 2024-04-30
Attending: FAMILY MEDICINE
Payer: MEDICARE

## 2024-04-30 VITALS
SYSTOLIC BLOOD PRESSURE: 132 MMHG | HEIGHT: 71 IN | HEART RATE: 76 BPM | DIASTOLIC BLOOD PRESSURE: 60 MMHG | BODY MASS INDEX: 26.08 KG/M2 | WEIGHT: 186.31 LBS | OXYGEN SATURATION: 98 %

## 2024-04-30 DIAGNOSIS — E78.49 OTHER HYPERLIPIDEMIA: Chronic | ICD-10-CM

## 2024-04-30 DIAGNOSIS — S32.020D COMPRESSION FRACTURE OF L2 VERTEBRA WITH ROUTINE HEALING: ICD-10-CM

## 2024-04-30 DIAGNOSIS — C61 PROSTATE CANCER: ICD-10-CM

## 2024-04-30 DIAGNOSIS — K59.04 CHRONIC IDIOPATHIC CONSTIPATION: ICD-10-CM

## 2024-04-30 DIAGNOSIS — Z00.00 ANNUAL PHYSICAL EXAM: Primary | ICD-10-CM

## 2024-04-30 DIAGNOSIS — F32.9 MAJOR DEPRESSIVE DISORDER WITH CURRENT ACTIVE EPISODE, UNSPECIFIED DEPRESSION EPISODE SEVERITY, UNSPECIFIED WHETHER RECURRENT: ICD-10-CM

## 2024-04-30 DIAGNOSIS — I70.0 ATHEROSCLEROSIS OF AORTA: Chronic | ICD-10-CM

## 2024-04-30 DIAGNOSIS — M81.0 OSTEOPOROSIS, UNSPECIFIED OSTEOPOROSIS TYPE, UNSPECIFIED PATHOLOGICAL FRACTURE PRESENCE: ICD-10-CM

## 2024-04-30 PROCEDURE — 99999 PR PBB SHADOW E&M-EST. PATIENT-LVL IV: CPT | Mod: PBBFAC,,, | Performed by: FAMILY MEDICINE

## 2024-04-30 PROCEDURE — 1101F PT FALLS ASSESS-DOCD LE1/YR: CPT | Mod: CPTII,S$GLB,, | Performed by: FAMILY MEDICINE

## 2024-04-30 PROCEDURE — 1126F AMNT PAIN NOTED NONE PRSNT: CPT | Mod: CPTII,S$GLB,, | Performed by: FAMILY MEDICINE

## 2024-04-30 PROCEDURE — 3008F BODY MASS INDEX DOCD: CPT | Mod: CPTII,S$GLB,, | Performed by: FAMILY MEDICINE

## 2024-04-30 PROCEDURE — 1159F MED LIST DOCD IN RCRD: CPT | Mod: CPTII,S$GLB,, | Performed by: FAMILY MEDICINE

## 2024-04-30 PROCEDURE — 3075F SYST BP GE 130 - 139MM HG: CPT | Mod: CPTII,S$GLB,, | Performed by: FAMILY MEDICINE

## 2024-04-30 PROCEDURE — 99397 PER PM REEVAL EST PAT 65+ YR: CPT | Mod: S$GLB,,, | Performed by: FAMILY MEDICINE

## 2024-04-30 PROCEDURE — 1160F RVW MEDS BY RX/DR IN RCRD: CPT | Mod: CPTII,S$GLB,, | Performed by: FAMILY MEDICINE

## 2024-04-30 PROCEDURE — 3288F FALL RISK ASSESSMENT DOCD: CPT | Mod: CPTII,S$GLB,, | Performed by: FAMILY MEDICINE

## 2024-04-30 PROCEDURE — 3078F DIAST BP <80 MM HG: CPT | Mod: CPTII,S$GLB,, | Performed by: FAMILY MEDICINE

## 2024-04-30 NOTE — PROGRESS NOTES
Subjective:       Patient ID: Jean Carlson is a 74 y.o. male.    Chief Complaint: Annual Exam    Established patient for an annual wellness check/physical exam and also chronic disease management. Specific complaints - see dictation, M*model entries and please see ROS.  Past, Surgical, Family, Social Histories; Medications, Allergies reviewed and reconciled.  Health maintenance file reviewed and addressed items due. Recent applicable lab, imaging and cardiovascular results reviewed.  Problem list items reviewed and modified or added entries (in the overview section) may not be transcribed into this encounter note due to note writer format.      Back program, stable.    Constipation doing well.        Review of Systems   Constitutional:  Negative for appetite change, chills, diaphoresis, fatigue and fever.   HENT:  Negative for congestion, postnasal drip, rhinorrhea, sore throat and trouble swallowing.    Eyes:  Negative for visual disturbance.   Respiratory:  Negative for cough, choking, chest tightness, shortness of breath and wheezing.    Cardiovascular:  Negative for chest pain and leg swelling.   Gastrointestinal:  Negative for abdominal distention, abdominal pain, diarrhea, nausea and vomiting.   Genitourinary:  Negative for difficulty urinating and hematuria.   Musculoskeletal:  Positive for arthralgias and back pain. Negative for myalgias.   Skin:  Negative for rash.   Neurological:  Negative for weakness, light-headedness and headaches.   Psychiatric/Behavioral:  Negative for confusion and dysphoric mood.        Objective:      Physical Exam  Vitals and nursing note reviewed.   Constitutional:       Appearance: He is well-developed. He is not diaphoretic.   Eyes:      General: No scleral icterus.  Neck:      Thyroid: No thyromegaly.      Vascular: No JVD.      Trachea: No tracheal deviation.   Cardiovascular:      Rate and Rhythm: Normal rate and regular rhythm.      Heart sounds: Normal heart sounds.  No murmur heard.     No friction rub. No gallop.   Pulmonary:      Effort: Pulmonary effort is normal. No respiratory distress.      Breath sounds: Normal breath sounds. No wheezing or rales.   Abdominal:      General: There is no distension.      Palpations: Abdomen is soft. There is no mass.      Tenderness: There is no abdominal tenderness. There is no guarding or rebound.   Musculoskeletal:      Cervical back: Normal range of motion and neck supple.   Lymphadenopathy:      Cervical: No cervical adenopathy.   Skin:     General: Skin is warm and dry.      Findings: No erythema or rash.   Neurological:      Mental Status: He is alert and oriented to person, place, and time.      Cranial Nerves: No cranial nerve deficit.      Motor: No tremor.      Coordination: Coordination normal.      Gait: Gait normal.   Psychiatric:         Behavior: Behavior normal.         Thought Content: Thought content normal.         Judgment: Judgment normal.         Assessment:       1. Annual physical exam    2. Chronic idiopathic constipation    3. Other hyperlipidemia    4. Atherosclerosis of aorta    5. Prostate cancer    6. Major depressive disorder with current active episode, unspecified depression episode severity, unspecified whether recurrent    7. Compression fracture of L2 vertebra with routine healing    8. Osteoporosis, unspecified osteoporosis type, unspecified pathological fracture presence        Plan:     Medication List with Changes/Refills   Current Medications    ATORVASTATIN (LIPITOR) 20 MG TABLET    Take 1 tablet (20 mg total) by mouth once daily.    CHOLECALCIFEROL, VITAMIN D3, (VITAMIN D3) 50 MCG (2,000 UNIT) CAP    Take 1 capsule by mouth once daily.    HYDROCORTISONE (ANUSOL-HC) 2.5 % RECTAL CREAM    Place 1 application rectally 2 (two) times daily.    ICOSAPENT ETHYL (VASCEPA) 1 GRAM CAP    Take 1 capsule (1,000 mg total) by mouth 2 (two) times a day.    LACTULOSE (CEPHULAC) 20 GRAM PACK    Mix 1 packet (20 g  total) with 4 ounces of water and take by mouth 3 (three) times daily.    LINACLOTIDE (LINZESS) 290 MCG CAP CAPSULE    Take 1 capsule (290 mcg total) by mouth daily before breakfast.    MULTIVITAMIN CAPSULE    Take 1 capsule by mouth once daily.    POLYETHYLENE GLYCOL (GLYCOLAX) 17 GRAM PWPK    Take by mouth.    TAMSULOSIN (FLOMAX) 0.4 MG CAP    Take 1 capsule (0.4 mg total) by mouth once daily.     1. Annual physical exam    2. Chronic idiopathic constipation  Overview:  -followed in [facility] (and GI)    Assessment & Plan:  -Dr. Baldwin 2/1, Dr. Gallardo 1/8, Dr. Munoz in past     -seeing multiple consultants  -Linzess per GI  -Currently using miralax 2 caps in the am, then additional cap(s) through out the day based on sx  -lactulose for 'emergency'     Reports better      3. Other hyperlipidemia    4. Atherosclerosis of aorta  Overview:  -Seen on CT from 05/06/16, 1/20/2024  -Fannie 30 in 2014  -on statin and fish oil  -cardiology eval 6/2022      5. Prostate cancer  Overview:  -Discovered on TURP 7/13/2019 by Dr. Knowles.  1% of specimen [place] 6.  On AS, followed by urology (Dr. Calero)      6. Major depressive disorder with current active episode, unspecified depression episode severity, unspecified whether recurrent  Assessment & Plan:  -as constipation is much better, so is this. No meds      7. Compression fracture of L2 vertebra with routine healing  Overview:  -fall from 2-3 feet 2021 - osteoporotic, followed in endocrinology and back clinic  -see 1/13/2022 Neurosurgery summary (L2 fx from fall 2021)      8. Osteoporosis, unspecified osteoporosis type, unspecified pathological fracture presence  Overview:  -Followed by endocrinology    Orders:  -     Ambulatory referral/consult to Endocrinology; Future; Expected date: 05/07/2024      See meds, orders, follow up, routing and instructions sections of encounter and AVS. Discussed with patient and provided on AVS.    Discussed diet and exercise as therapeutic  modalities for metabolic and other conditions. Provided patient information, which are included as links on the AVS for detailed information.    Lab Results   Component Value Date     02/19/2024    K 4.6 02/19/2024     02/19/2024    BUN 18 02/19/2024    CREATININE 0.8 02/19/2024     02/19/2024    HGBA1C 5.6 05/16/2022    MG 2.4 08/29/2018    AST 21 04/11/2024    ALT 36 04/11/2024    ALBUMIN 4.0 04/11/2024    PROT 6.5 04/11/2024    BILITOT 0.7 04/11/2024    CHOL 170 02/19/2024    HDL 56 02/19/2024    LDLCALC 87.8 02/19/2024    TRIG 131 02/19/2024    WBC 4.31 10/31/2023    HGB 15.4 10/31/2023    HCT 46.1 10/31/2023     10/31/2023    PSA 3.0 10/18/2018    PSADIAG 1.5 01/08/2024    TSH 2.717 10/31/2023    URICACID 4.7 08/15/2013

## 2024-05-02 ENCOUNTER — CLINICAL SUPPORT (OUTPATIENT)
Dept: OPHTHALMOLOGY | Facility: CLINIC | Age: 74
End: 2024-05-02
Payer: MEDICARE

## 2024-05-02 ENCOUNTER — OFFICE VISIT (OUTPATIENT)
Dept: OPTOMETRY | Facility: CLINIC | Age: 74
End: 2024-05-02
Payer: MEDICARE

## 2024-05-02 DIAGNOSIS — H53.9 TRANSIENT VISION DISTURBANCE OF BOTH EYES: ICD-10-CM

## 2024-05-02 PROCEDURE — 92015 DETERMINE REFRACTIVE STATE: CPT | Mod: S$GLB,,, | Performed by: OPTOMETRIST

## 2024-05-02 PROCEDURE — 1160F RVW MEDS BY RX/DR IN RCRD: CPT | Mod: CPTII,S$GLB,, | Performed by: OPTOMETRIST

## 2024-05-02 PROCEDURE — 3288F FALL RISK ASSESSMENT DOCD: CPT | Mod: CPTII,S$GLB,, | Performed by: OPTOMETRIST

## 2024-05-02 PROCEDURE — 92083 EXTENDED VISUAL FIELD XM: CPT | Mod: S$GLB,,, | Performed by: OPTOMETRIST

## 2024-05-02 PROCEDURE — 99999 PR PBB SHADOW E&M-EST. PATIENT-LVL II: CPT | Mod: PBBFAC,,, | Performed by: OPTOMETRIST

## 2024-05-02 PROCEDURE — 1101F PT FALLS ASSESS-DOCD LE1/YR: CPT | Mod: CPTII,S$GLB,, | Performed by: OPTOMETRIST

## 2024-05-02 PROCEDURE — 99213 OFFICE O/P EST LOW 20 MIN: CPT | Mod: S$GLB,,, | Performed by: OPTOMETRIST

## 2024-05-02 PROCEDURE — 1159F MED LIST DOCD IN RCRD: CPT | Mod: CPTII,S$GLB,, | Performed by: OPTOMETRIST

## 2024-05-02 NOTE — PROGRESS NOTES
HPI    73 Y/o male is here for routine eye exam with C/o pt states eye's are   sensitive to light and say's when he goes back inside his eye's take a   while to refocus  Pt denies pain and discomfort   Occ floaters     Eye med:   Last edited by Moira Velarde MA on 5/2/2024  1:26 PM.            Assessment /Plan     For exam results, see Encounter Report.    Transient vision disturbance of both eyes  -     [place] Visual Field - OU - Extended - Both Eyes      See notes from 6 weeks ago:  Mod pco OS/Mild pco OD sp MFIOL OU.  Subsequently had YAG OU  2. Pt presents with inc oc john sx over the past few months.  Has john hx, but now occurring twice a week.  DFE/Mac OCT shows no fluid/heme.  Will run VF to ro other neurovasc causes of sx      TODAY VF wnl.  Pt reports neck problems, so may be vasc cause of migs    PLAN:    Wrote PAL w plano distance for pt  Pt will discuss migs w PCP  Rtc to me 1 yr

## 2024-05-02 NOTE — PROGRESS NOTES
HVF done ou/rel/fix/coop. Good ou/ -.50 + .75 x 145/od -.25/os/chart checked for latex allergy.-[facility]

## 2024-05-13 ENCOUNTER — TELEPHONE (OUTPATIENT)
Dept: PAIN MEDICINE | Facility: CLINIC | Age: 74
End: 2024-05-13
Payer: MEDICARE

## 2024-05-13 ENCOUNTER — OFFICE VISIT (OUTPATIENT)
Dept: PAIN MEDICINE | Facility: CLINIC | Age: 74
End: 2024-05-13
Payer: MEDICARE

## 2024-05-13 VITALS
DIASTOLIC BLOOD PRESSURE: 71 MMHG | BODY MASS INDEX: 27.12 KG/M2 | SYSTOLIC BLOOD PRESSURE: 138 MMHG | WEIGHT: 193.69 LBS | HEIGHT: 71 IN | HEART RATE: 80 BPM

## 2024-05-13 DIAGNOSIS — M54.16 LUMBAR RADICULOPATHY: ICD-10-CM

## 2024-05-13 DIAGNOSIS — M54.41 CHRONIC BILATERAL LOW BACK PAIN WITH RIGHT-SIDED SCIATICA: ICD-10-CM

## 2024-05-13 DIAGNOSIS — M51.36 DDD (DEGENERATIVE DISC DISEASE), LUMBAR: Primary | ICD-10-CM

## 2024-05-13 DIAGNOSIS — M54.16 LUMBAR RADICULOPATHY: Primary | ICD-10-CM

## 2024-05-13 DIAGNOSIS — G89.29 CHRONIC BILATERAL LOW BACK PAIN WITH RIGHT-SIDED SCIATICA: ICD-10-CM

## 2024-05-13 PROCEDURE — 3288F FALL RISK ASSESSMENT DOCD: CPT | Mod: CPTII,S$GLB,, | Performed by: STUDENT IN AN ORGANIZED HEALTH CARE EDUCATION/TRAINING PROGRAM

## 2024-05-13 PROCEDURE — 99999 PR PBB SHADOW E&M-EST. PATIENT-LVL III: CPT | Mod: PBBFAC,,, | Performed by: STUDENT IN AN ORGANIZED HEALTH CARE EDUCATION/TRAINING PROGRAM

## 2024-05-13 PROCEDURE — 1125F AMNT PAIN NOTED PAIN PRSNT: CPT | Mod: CPTII,S$GLB,, | Performed by: STUDENT IN AN ORGANIZED HEALTH CARE EDUCATION/TRAINING PROGRAM

## 2024-05-13 PROCEDURE — 3008F BODY MASS INDEX DOCD: CPT | Mod: CPTII,S$GLB,, | Performed by: STUDENT IN AN ORGANIZED HEALTH CARE EDUCATION/TRAINING PROGRAM

## 2024-05-13 PROCEDURE — 3078F DIAST BP <80 MM HG: CPT | Mod: CPTII,S$GLB,, | Performed by: STUDENT IN AN ORGANIZED HEALTH CARE EDUCATION/TRAINING PROGRAM

## 2024-05-13 PROCEDURE — 99214 OFFICE O/P EST MOD 30 MIN: CPT | Mod: S$GLB,,, | Performed by: STUDENT IN AN ORGANIZED HEALTH CARE EDUCATION/TRAINING PROGRAM

## 2024-05-13 PROCEDURE — 1159F MED LIST DOCD IN RCRD: CPT | Mod: CPTII,S$GLB,, | Performed by: STUDENT IN AN ORGANIZED HEALTH CARE EDUCATION/TRAINING PROGRAM

## 2024-05-13 PROCEDURE — 1101F PT FALLS ASSESS-DOCD LE1/YR: CPT | Mod: CPTII,S$GLB,, | Performed by: STUDENT IN AN ORGANIZED HEALTH CARE EDUCATION/TRAINING PROGRAM

## 2024-05-13 PROCEDURE — 3075F SYST BP GE 130 - 139MM HG: CPT | Mod: CPTII,S$GLB,, | Performed by: STUDENT IN AN ORGANIZED HEALTH CARE EDUCATION/TRAINING PROGRAM
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
## 2024-05-13 NOTE — TELEPHONE ENCOUNTER
----- Message from Ema Mcintosh DO sent at 2024  1:55 PM CDT -----  Regarding: Order for MALIKA PAPPAS    Patient Name: MALIKA PAPPAS([id number])  Sex: Male  : 1950      PCP: RAMBO MARTINEZ    Center: [facility] CENTRAL BILLING OFFICE     Types of orders made on 2024: Procedure Request    Order Date:2024  Ordering User:EMA MCINTOSH [[id number]]  Encounter Provider:Ema Mcintosh DO [[id number]]  Author  torying Provider: Ema Mcintosh DO [[id number]]  Department:[facility][[id number]]    Common Order Information  Procedure -> Epidural Injection (specify level) Cmt: L5/S1    Pre-op Diagnosis -> DDD (degenerative disc disease), lumbar       -> Lumbar radiculopathy     Order Specific Information  Order: Procedure Order to Pain Management [Custom: [id number]]  Order #:          [id number]: 1 FUTURE    Priority:   Routine  Class: Clinic Performed    Future Order Information      Expires on:2025            Expected by:2024                   Associated Diagnoses      M51.36 DDD (degenerative disc disease), lumbar      M54.16 Lumbar radiculopathy      Physician -> Gelter         Is patient on anti-coagulants? -> No         Facility Name: -> [facility]           Priority: Routine  Class: Clinic Performed      Future Order Information      Expires on:2025            Expected by:2024                   Associated Diagnoses      M51.36 DDD (degenerative disc disease), lumbar      M54.16 Lumbar radiculopathy      Procedure -> Epidural Injection (specify level) Cmt: L5/S1        Physician -> Gelter         Is patient on anti-coagulants? -> No         Pre-op Diagnosis -> DDD (degenerative disc disease), lumbar           -> Lumbar   radiculopathy         Facility Name: -> [facility]

## 2024-05-14 ENCOUNTER — TELEPHONE (OUTPATIENT)
Dept: PAIN MEDICINE | Facility: CLINIC | Age: 74
End: 2024-05-14
Payer: MEDICARE

## 2024-05-14 RX ORDER — METHYLPREDNISOLONE 4 MG/1
TABLET ORAL
Qty: 21 EACH | Refills: 0 | Status: SHIPPED | OUTPATIENT
Start: 2024-05-14 | End: 2024-06-04

## 2024-05-14 RX ORDER — METHYLPREDNISOLONE 4 MG/1
TABLET ORAL
Qty: 21 EACH | Refills: 0 | Status: SHIPPED | OUTPATIENT
Start: 2024-05-14 | End: 2024-05-14

## 2024-05-17 ENCOUNTER — TELEPHONE (OUTPATIENT)
Dept: PAIN MEDICINE | Facility: CLINIC | Age: 74
End: 2024-05-17
Payer: MEDICARE

## 2024-05-20 RX ORDER — LINACLOTIDE 290 UG/1
290 CAPSULE, GELATIN COATED ORAL
Qty: 90 CAPSULE | Refills: 3 | Status: SHIPPED | OUTPATIENT
Start: 2024-05-20

## 2024-05-22 ENCOUNTER — PATIENT MESSAGE (OUTPATIENT)
Dept: PAIN MEDICINE | Facility: CLINIC | Age: 74
End: 2024-05-22
Payer: MEDICARE

## 2024-06-05 NOTE — PROGRESS NOTES
Kidney function stable/normal.   Previous vitamin D levels done 2022 normal  OK to proceed with prolia injections   Needs appointment with NP or me in six - twelve months.

## 2024-06-10 ENCOUNTER — PATIENT MESSAGE (OUTPATIENT)
Dept: INTERNAL MEDICINE | Facility: CLINIC | Age: 74
End: 2024-06-10
Payer: MEDICARE

## 2024-06-26 ENCOUNTER — TELEPHONE (OUTPATIENT)
Dept: DERMATOLOGY | Facility: CLINIC | Age: 74
End: 2024-06-26
Payer: MEDICARE

## 2024-06-26 DIAGNOSIS — C44.319 BASAL CELL CARCINOMA (BCC) OF RIGHT CHEEK: Primary | ICD-10-CM

## 2024-06-26 NOTE — TELEPHONE ENCOUNTER
Pt is referral from Dr. Soren Zamora for BCC R cheek. Left message for pt to give us a call back to get scheduled.

## 2024-06-27 ENCOUNTER — TELEPHONE (OUTPATIENT)
Dept: DERMATOLOGY | Facility: CLINIC | Age: 74
End: 2024-06-27
Payer: MEDICARE

## 2024-06-27 ENCOUNTER — PATIENT MESSAGE (OUTPATIENT)
Dept: DERMATOLOGY | Facility: CLINIC | Age: 74
End: 2024-06-27
Payer: MEDICARE

## 2024-06-27 NOTE — TELEPHONE ENCOUNTER
Over the phone consult completed. Scheduled pt for Mohs for BCC R cheek on 8/22 at 11:30. Pt confirmed date, time, and location. Reminder sent in mail.

## 2024-07-03 ENCOUNTER — INFUSION (OUTPATIENT)
Dept: INFECTIOUS DISEASES | Facility: HOSPITAL | Age: 74
End: 2024-07-03
Payer: MEDICARE

## 2024-07-03 VITALS
HEART RATE: 73 BPM | DIASTOLIC BLOOD PRESSURE: 74 MMHG | TEMPERATURE: 98 F | WEIGHT: 192.88 LBS | BODY MASS INDEX: 27 KG/M2 | HEIGHT: 71 IN | RESPIRATION RATE: 18 BRPM | SYSTOLIC BLOOD PRESSURE: 157 MMHG | OXYGEN SATURATION: 99 %

## 2024-07-03 DIAGNOSIS — M81.0 OSTEOPOROSIS, UNSPECIFIED OSTEOPOROSIS TYPE, UNSPECIFIED PATHOLOGICAL FRACTURE PRESENCE: Primary | ICD-10-CM

## 2024-07-03 DIAGNOSIS — K21.9 GASTROESOPHAGEAL REFLUX DISEASE WITHOUT ESOPHAGITIS: ICD-10-CM

## 2024-07-03 PROCEDURE — 63600175 PHARM REV CODE 636 W HCPCS: Mod: JZ,JG | Performed by: INTERNAL MEDICINE

## 2024-07-03 PROCEDURE — 96372 THER/PROPH/DIAG INJ SC/IM: CPT

## 2024-07-03 RX ADMIN — DENOSUMAB 60 MG: 60 INJECTION SUBCUTANEOUS at 10:07

## 2024-07-08 ENCOUNTER — LAB VISIT (OUTPATIENT)
Dept: LAB | Facility: HOSPITAL | Age: 74
End: 2024-07-08
Payer: MEDICARE

## 2024-07-08 DIAGNOSIS — N13.8 BPH WITH URINARY OBSTRUCTION: ICD-10-CM

## 2024-07-08 DIAGNOSIS — N40.1 BPH WITH URINARY OBSTRUCTION: ICD-10-CM

## 2024-07-08 DIAGNOSIS — R68.82 DECREASED LIBIDO: ICD-10-CM

## 2024-07-08 DIAGNOSIS — R53.83 FATIGUE, UNSPECIFIED TYPE: ICD-10-CM

## 2024-07-08 DIAGNOSIS — Z85.46 ENCOUNTER FOR FOLLOW-UP SURVEILLANCE OF PROSTATE CANCER: ICD-10-CM

## 2024-07-08 DIAGNOSIS — C61 PROSTATE CANCER: ICD-10-CM

## 2024-07-08 DIAGNOSIS — R39.9 LOWER URINARY TRACT SYMPTOMS (LUTS): ICD-10-CM

## 2024-07-08 DIAGNOSIS — Z08 ENCOUNTER FOR FOLLOW-UP SURVEILLANCE OF PROSTATE CANCER: ICD-10-CM

## 2024-07-08 DIAGNOSIS — Z90.79 S/P TURP: ICD-10-CM

## 2024-07-08 LAB — COMPLEXED PSA SERPL-MCNC: 1.5 NG/ML (ref 0–4)

## 2024-07-08 PROCEDURE — 36415 COLL VENOUS BLD VENIPUNCTURE: CPT | Performed by: NURSE PRACTITIONER

## 2024-07-08 PROCEDURE — 84153 ASSAY OF PSA TOTAL: CPT | Performed by: NURSE PRACTITIONER

## 2024-07-10 ENCOUNTER — OFFICE VISIT (OUTPATIENT)
Dept: UROLOGY | Facility: CLINIC | Age: 74
End: 2024-07-10
Payer: MEDICARE

## 2024-07-10 VITALS
HEART RATE: 80 BPM | HEIGHT: 71 IN | BODY MASS INDEX: 26.74 KG/M2 | WEIGHT: 191 LBS | DIASTOLIC BLOOD PRESSURE: 62 MMHG | SYSTOLIC BLOOD PRESSURE: 138 MMHG

## 2024-07-10 DIAGNOSIS — Z85.46 ENCOUNTER FOR FOLLOW-UP SURVEILLANCE OF PROSTATE CANCER: ICD-10-CM

## 2024-07-10 DIAGNOSIS — Z90.79 S/P TURP: ICD-10-CM

## 2024-07-10 DIAGNOSIS — C61 PROSTATE CANCER: ICD-10-CM

## 2024-07-10 DIAGNOSIS — N13.8 BPH WITH URINARY OBSTRUCTION: Primary | ICD-10-CM

## 2024-07-10 DIAGNOSIS — Z08 ENCOUNTER FOR FOLLOW-UP SURVEILLANCE OF PROSTATE CANCER: ICD-10-CM

## 2024-07-10 DIAGNOSIS — R39.9 LOWER URINARY TRACT SYMPTOMS (LUTS): ICD-10-CM

## 2024-07-10 DIAGNOSIS — M81.0 AGE-RELATED OSTEOPOROSIS WITHOUT CURRENT PATHOLOGICAL FRACTURE: Primary | ICD-10-CM

## 2024-07-10 DIAGNOSIS — N40.1 BPH WITH URINARY OBSTRUCTION: Primary | ICD-10-CM

## 2024-07-10 LAB
BILIRUB SERPL-MCNC: NEGATIVE MG/DL
BLOOD URINE, POC: NEGATIVE
CLARITY, POC UA: CLEAR
COLOR, POC UA: YELLOW
GLUCOSE UR QL STRIP: NEGATIVE
KETONES UR QL STRIP: NEGATIVE
LEUKOCYTE ESTERASE URINE, POC: NEGATIVE
NITRITE, POC UA: NEGATIVE
PH, POC UA: NEGATIVE
PROTEIN, POC: NEGATIVE
SPECIFIC GRAVITY, POC UA: 1.03
UROBILINOGEN, POC UA: 0.2

## 2024-07-10 PROCEDURE — 1160F RVW MEDS BY RX/DR IN RCRD: CPT | Mod: CPTII,S$GLB,, | Performed by: NURSE PRACTITIONER

## 2024-07-10 PROCEDURE — 1126F AMNT PAIN NOTED NONE PRSNT: CPT | Mod: CPTII,S$GLB,, | Performed by: NURSE PRACTITIONER

## 2024-07-10 PROCEDURE — 3288F FALL RISK ASSESSMENT DOCD: CPT | Mod: CPTII,S$GLB,, | Performed by: NURSE PRACTITIONER

## 2024-07-10 PROCEDURE — 99999 PR PBB SHADOW E&M-EST. PATIENT-LVL III: CPT | Mod: PBBFAC,,, | Performed by: NURSE PRACTITIONER

## 2024-07-10 PROCEDURE — 3075F SYST BP GE 130 - 139MM HG: CPT | Mod: CPTII,S$GLB,, | Performed by: NURSE PRACTITIONER

## 2024-07-10 PROCEDURE — 1101F PT FALLS ASSESS-DOCD LE1/YR: CPT | Mod: CPTII,S$GLB,, | Performed by: NURSE PRACTITIONER

## 2024-07-10 PROCEDURE — 3078F DIAST BP <80 MM HG: CPT | Mod: CPTII,S$GLB,, | Performed by: NURSE PRACTITIONER

## 2024-07-10 PROCEDURE — 81002 URINALYSIS NONAUTO W/O SCOPE: CPT | Mod: S$GLB,,, | Performed by: NURSE PRACTITIONER

## 2024-07-10 PROCEDURE — 1159F MED LIST DOCD IN RCRD: CPT | Mod: CPTII,S$GLB,, | Performed by: NURSE PRACTITIONER

## 2024-07-10 PROCEDURE — 99214 OFFICE O/P EST MOD 30 MIN: CPT | Mod: 25,S$GLB,, | Performed by: NURSE PRACTITIONER

## 2024-07-10 PROCEDURE — 3008F BODY MASS INDEX DOCD: CPT | Mod: CPTII,S$GLB,, | Performed by: NURSE PRACTITIONER

## 2024-07-10 NOTE — PROGRESS NOTES
CHIEF COMPLAINT:    Jean Carlson is a 74 y.o. male presents today for Prostate Cancer    HISTORY OF PRESENTING ILLINESS:    Jean Carlson is a 74 y.o. male who had been diagnosed with Prostate Cancer during a transurethral prostatectomy 07/13/2019 by Dr. Knowles.  Was on flomax and avodart.  1% of specimen Jones 6. Had 22 grams resected.   Retired [employer] .  08/27/20219 meet with Dr. Calero for 2nd Opinion and decided on AS.      Stage- T1a  PSA- 2.9  PSAD-  <.138  Path- Julius 6 1%.  CANDY score- 1 low risk disease  NCCN- very low risk disease  12/2019 MRI- PI-RADS 1, 2.6 ccs!     11/10/2020 Prostate MRI:   - PSA was 0.62  - no significant lesions noted; PI-RADS 1      07/27/2021 had incision of BNC with Dr. Ny. Has been urinating well since.   10/21/2022 had a normal cysto with Dr. Ny done due to changes in urine flow at night.   He was given an Rx for Flomax that has helped since.     Last clinic visit was 01/10/2024 with PSA of 1.5.     He is here today for 6 month f/u visit.   PSA is remains 1.5.  FOS is good during the day day; weaker at night; nocturia x 1.  He is still taking Flomax daily.  Reports ED and decreased libido. Testosterone normal at 579  He had been having some weight loss;   Osteoporosis; now taking Prolia.   History of having night sweats/hot flashes.   PCP tried Zoloft to help with nightsweats; he was unable to tolerate.   Not interested in ED but concerned.   TSH was normal     Now taking Prolia; was told he needed repeat DXA scan before getting the next one. He asked me to reach out to Dr. Slade regarding DXA scan.       REVIEW OF SYSTEMS:  Review of Systems   Constitutional:  Positive for malaise/fatigue and weight loss. Negative for chills and fever.   Eyes:  Negative for double vision.   Respiratory:  Negative for cough and shortness of breath.    Cardiovascular:  Negative for chest pain and palpitations.   Gastrointestinal:  Negative for abdominal pain,  constipation, diarrhea, nausea and vomiting.   Genitourinary:  Negative for dysuria and hematuria.        See HPI   Musculoskeletal:  Positive for joint pain (bilateral knee). Negative for falls.        (+) osteoporosis.    Neurological:  Negative for dizziness and seizures.   Endo/Heme/Allergies:  Negative for polydipsia.         PATIENT HISTORY:    Past Medical History:   Diagnosis Date    Acute medial meniscus tear, right, initial encounter 2023    Anterior tibialis tendinitis of right lower extremity 10/18/2018    Cancer     Chronic idiopathic constipation 2019    Closed fracture of left wrist 10/18/2021    Closed fracture of lower end of left radius with routine healing 2022    Elevated PSA     Family history of ASCVD 10/07/2016    Mom age 64, Dad age 67 -  on same day. Pat Aunt early 70's.    Family history of coronary artery disease 10/07/2016    Mom age 64, Dad age 67 -  on same day. Pat Aunt early 70's.    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    Intractable back pain 2018    Knee injury, right, initial encounter 05/15/2023    Nuclear sclerosis, bilateral 2018    Ocular migraine 2012    Osteoporosis     Prostate disease     Transient vision disturbance of both eyes 2012    Umbilical hernia without obstruction and without gangrene 10/01/2015    Urinary tract infection        Past Surgical History:   Procedure Laterality Date    back sx      lower    BLADDER FULGURATION N/A 2019    Procedure: FULGURATION, BLADDER;  Surgeon: Ryan Knowles MD;  Location: [facility] OR;  Service: Urology;  Laterality: N/A;  bleeding tissue at bladder neck    CATARACT EXTRACTION W/  INTRAOCULAR LENS IMPLANT Bilateral     Dr Finn/Ana IOL     CHONDROPLASTY OF KNEE Right 2023    Procedure: CHONDROPLASTY, KNEE;  Surgeon: Taylor Young MD;  Location: [facility]  OR;  Service: Orthopedics;  Laterality: Right;    COLONOSCOPY N/A 6/5/2019    Procedure: COLONOSCOPY;  Surgeon: Mauro Smallwood MD;  Location: [facility] (4TH FLR);  Service: Endoscopy;  Laterality: N/A;    CYSTOSCOPY N/A 7/13/2019    Procedure: CYSTOSCOPY;  Surgeon: Ryan Knowles MD;  Location: [facility] OR;  Service: Urology;  Laterality: N/A;    CYSTOSCOPY      CYSTOSCOPY  7/27/2021    Procedure: CYSTOSCOPY;  Surgeon: Manan Ny MD;  Location: [facility] OR 1ST FLR;  Service: Urology;;    ESOPHAGOGASTRODUODENOSCOPY N/A 3/31/2021    Procedure: EGD (ESOPHAGOGASTRODUODENOSCOPY);  Surgeon: Jamilah Munoz MD;  Location: [facility];  Service: Endoscopy;  Laterality: N/A;    HERNIA REPAIR      KNEE ARTHROSCOPY W/ MENISCECTOMY Right 5/30/2023    Procedure: ARTHROSCOPY, KNEE, WITH MENISCECTOMY;  Surgeon: Taylor Young MD;  Location: [facility] OR;  Service: Orthopedics;  Laterality: Right;    [id number] Right 5/30/2023    Procedure: [id number];  Surgeon: Taylor Young MD;  Location: [facility] OR;  Service: Orthopedics;  Laterality: Right;    PROSTATE SURGERY      REMOVAL OF BLOOD CLOT N/A 7/13/2019    Procedure: REMOVAL, BLOOD CLOT;  Surgeon: Ryan Knowles MD;  Location: [facility] OR;  Service: Urology;  Laterality: N/A;  prostate    SPINE SURGERY      SYNOVECTOMY OF KNEE Right 5/30/2023    Procedure: SYNOVECTOMY, KNEE;  Surgeon: Taylor Young MD;  Location: [facility] OR;  Service: Orthopedics;  Laterality: Right;    TONSILLECTOMY      TRANSURETHRAL RESECTION OF PROSTATE      TRANSURETHRAL RESECTION OF PROSTATE N/A 7/13/2019    Procedure: TURP (TRANSURETHRAL RESECTION OF PROSTATE);  Surgeon: Ryan Knowles MD;  Location: [facility] OR;  Service: Urology;  Laterality: N/A;    TRANSURETHRAL SURGICAL REMOVAL OF STRICTURE OF BLADDER NECK  7/27/2021    Procedure: EXCISION, CONTRACTURE, BLADDER NECK, TRANSURETHRAL;  Surgeon: Manan Ny MD;  Location: [facility] OR 1ST FLR;  Service: Urology;;       Family History    Problem Relation Name Age of Onset    Heart disease Mother      Heart attack Mother      Skin cancer Mother      Heart disease Father      Heart attack Father      Glaucoma Maternal Aunt      Glaucoma Maternal Uncle [place] Madelyn     Retinitis pigmentosa Maternal Uncle Derek Joshi     No Known Problems Daughter      Prostate cancer Neg Hx      Kidney disease Neg Hx      Colon cancer Neg Hx         Social History     Socioeconomic History    Marital status:    Occupational History     Employer: [employer]   Tobacco Use    Smoking status: Never    Smokeless tobacco: Never   Substance and Sexual Activity    Alcohol use: Yes     Alcohol/week: 3.0 standard drinks of alcohol     Types: 3 Glasses of wine per week     Comment: rarely    Drug use: No    Sexual activity: Not Currently     Partners: Female   Social History Narrative    ** Merged History Encounter **          [employer]. 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     Social Determinants of Health     Financial Resource Strain: Low Risk  (1/9/2024)    Overall Financial Resource Strain (CARDIA)     Difficulty of Paying Living Expenses: Not hard at all   Food Insecurity: No Food Insecurity (1/9/2024)    Hunger Vital Sign     Worried About Running Out of Food in the Last Year: Never true     Ran Out of Food in the Last Year: Never true   Transportation Needs: No Transportation Needs (1/9/2024)    PRAPARE - Transportation     Lack of Transportation (Medical): No     Lack of Transportation (Non-Medical): No   Physical Activity: Insufficiently Active (1/9/2024)    Exercise Vital Sign     Days of Exercise per Week: 2 days     Minutes of Exercise per Session: 30 min   Stress: No Stress Concern Present (1/9/2024)    [employer] [place] [employer] - Occupational Stress Questionnaire     Feeling of Stress : Not at all   Housing Stability: Low Risk  (1/9/2024)    Housing Stability Vital Sign     Unable to Pay for Housing in the Last Year: No     Number of Places Lived in  the Last Year: 1     Unstable Housing in the Last Year: No       Allergies:  Patient has no known allergies.    Medications:    Current Outpatient Medications:     atorvastatin (LIPITOR) 20 MG tablet, Take 1 tablet (20 mg total) by mouth once daily., Disp: 90 tablet, Rfl: 3    cholecalciferol, vitamin D3, (VITAMIN D3) 50 mcg (2,000 unit) Cap, Take 1 capsule by mouth once daily., Disp: , Rfl:     hydrocortisone (ANUSOL-HC) 2.5 % rectal cream, Place 1 application rectally 2 (two) times daily., Disp: 28 g, Rfl: 1    icosapent ethyL (VASCEPA) 1 gram Cap, Take 1 capsule (1,000 mg total) by mouth 2 (two) times a day., Disp: 60 capsule, Rfl: 11    lactulose (CEPHULAC) 20 gram Pack, Mix 1 packet (20 g total) with 4 ounces of water and take by mouth 3 (three) times daily., Disp: 90 packet, Rfl: 5    LINZESS 290 mcg Cap capsule, TAKE 1 CAPSULE BY MOUTH EVERY DAY BEFORE BREAKFAST, Disp: 90 capsule, Rfl: 3    multivitamin capsule, Take 1 capsule by mouth once daily., Disp: , Rfl:     polyethylene glycol (GLYCOLAX) 17 gram PwPk, Take by mouth., Disp: , Rfl:     tamsulosin (FLOMAX) 0.4 mg Cap, Take 1 capsule (0.4 mg total) by mouth once daily., Disp: 90 capsule, Rfl: 3    PHYSICAL EXAMINATION:  Physical Exam  Vitals and nursing note reviewed.   Constitutional:       General: He is awake.      Appearance: Normal appearance.   HENT:      Head: Normocephalic.      Right Ear: External ear normal.      Left Ear: External ear normal.      Nose: Nose normal.   Cardiovascular:      Rate and Rhythm: Normal rate.   Pulmonary:      Effort: Pulmonary effort is normal. No respiratory distress.   Abdominal:      Palpations: Abdomen is soft.      Tenderness: There is no abdominal tenderness. There is no right CVA tenderness or left CVA tenderness.   Musculoskeletal:         General: Normal range of motion.      Cervical back: Normal range of motion.   Skin:     General: Skin is warm and dry.   Neurological:      General: No focal deficit  present.      Mental Status: He is alert and oriented to person, place, and time.   Psychiatric:         Mood and Affect: Mood normal.         Behavior: Behavior is cooperative.           LABS:      In office UA today was clear of active infection and blood.       Lab Results   Component Value Date    PSA 3.0 10/18/2018    PSA 2.1 10/27/2017    PSA 2.2 09/23/2016    PSADIAG 1.5 07/08/2024    PSADIAG 1.5 01/08/2024    PSADIAG 1.2 07/18/2023       Lab Results   Component Value Date    CREATININE 0.8 02/19/2024    EGFRNORACEVR >60.0 02/19/2024               IMPRESSION:    Encounter Diagnoses   Name Primary?    BPH with urinary obstruction Yes    Prostate cancer     S/P TURP     Lower urinary tract symptoms (LUTS)     Encounter for follow-up surveillance of prostate cancer          Assessment:       1. BPH with urinary obstruction    2. Prostate cancer    3. S/P TURP    4. Lower urinary tract symptoms (LUTS)    5. Encounter for follow-up surveillance of prostate cancer        Plan:         I spent 30 minutes with the patient of which more than half was spent in direct consultation with the patient in regards to our treatment and plan.  We addressed the office findings and recent labs; any possible need to go the ER today.   Education and recommendations of today's plan of care including home remedies and needed follow up with PCP.   We discussed the chief complaints; reviewed the LUTS and the possible contributory factors.   Reassurance no infection or visible blood seen in today's urine sample  PSA remains stable and low. No concern for Prostate Cancer.   Reviewed management for his Prostate Cancer.   Recommended lifestyle modifications with a proper, healthy diet, good hydration but during the day. Reducing bladder irritants.   Benefits of regular exercise.   PSA in 6 months;   I will reach out to Dr. Slade about his DXA scan

## 2024-08-10 DIAGNOSIS — N13.8 BPH WITH URINARY OBSTRUCTION: ICD-10-CM

## 2024-08-10 DIAGNOSIS — N40.1 BPH WITH URINARY OBSTRUCTION: ICD-10-CM

## 2024-08-12 RX ORDER — TAMSULOSIN HYDROCHLORIDE 0.4 MG/1
1 CAPSULE ORAL
Qty: 90 CAPSULE | Refills: 1 | Status: SHIPPED | OUTPATIENT
Start: 2024-08-12

## 2024-08-14 ENCOUNTER — TELEPHONE (OUTPATIENT)
Dept: INTERNAL MEDICINE | Facility: CLINIC | Age: 74
End: 2024-08-14
Payer: MEDICARE

## 2024-08-14 DIAGNOSIS — F32.9 MAJOR DEPRESSIVE DISORDER WITH CURRENT ACTIVE EPISODE, UNSPECIFIED DEPRESSION EPISODE SEVERITY, UNSPECIFIED WHETHER RECURRENT: ICD-10-CM

## 2024-08-14 NOTE — TELEPHONE ENCOUNTER
Refill Routing Note   Medication(s) are not appropriate for processing by [facility] for the following reason(s):        No active prescription written by provider    [facility] action(s):  Defer        Medication Therapy Plan: The original prescription was discontinued on 1/12/2024 by Papi Gallardo MD      Appointments  past 12m or future 3m with PCP    Date Provider   Last Visit   4/30/2024 Papi Gallardo MD   Next Visit   Visit date not found Papi Gallardo MD   ED visits in past 90 days: 0        Note composed:2:45 PM 08/14/2024

## 2024-08-14 NOTE — TELEPHONE ENCOUNTER
No care due was identified.  [facility] Embedded Care Due Messages. Reference number: [id number].   8/14/2024 11:41:11 AM CDT

## 2024-08-15 RX ORDER — SERTRALINE HYDROCHLORIDE 50 MG/1
50 TABLET, FILM COATED ORAL
Qty: 90 TABLET | Refills: 1 | OUTPATIENT
Start: 2024-08-15

## 2024-08-22 ENCOUNTER — PROCEDURE VISIT (OUTPATIENT)
Dept: DERMATOLOGY | Facility: CLINIC | Age: 74
End: 2024-08-22
Payer: MEDICARE

## 2024-08-22 VITALS
DIASTOLIC BLOOD PRESSURE: 74 MMHG | HEART RATE: 81 BPM | HEIGHT: 71 IN | WEIGHT: 190.94 LBS | SYSTOLIC BLOOD PRESSURE: 141 MMHG | BODY MASS INDEX: 26.73 KG/M2

## 2024-08-22 DIAGNOSIS — C44.319 BASAL CELL CARCINOMA (BCC) OF RIGHT CHEEK: ICD-10-CM

## 2024-08-22 NOTE — PROGRESS NOTES
New patient with an unknown duration of growth on the R cheek. Dr. Zamora found the lesion with the most recent skin check in June. Denies pain, bleeding, scabbing. Biopsy c/w BCC. No prior treatment.    Physical Exam   HENT:   Head:         R superior preauricular cheek with a 5 x 5 mm pink bx site located 4 cm anteriorly from the right inferior lobe attachment and 2.5 cm superiorly.    Biopsy proven nodular basal cell carcinoma- R cheek, path# [id number].  Diagnosis, photograph, and pathology report were reviewed with patient.  Discussed risks, benefits, and alternatives of Mohs surgery.  Discussed repair options including complex closure, skin flap, skin graft, and second intention healing.  Patient agreed to proceed with Mohs surgery today.      PROCEDURE: Mohs' Micrographic Surgery    INDICATION: Location in mask areas of face including central face, nose, eyelids, eyebrows, lips, chin, preauricular, temple, and ear. Biopsy-proven skin cancer of cosmetically and functionally important areas, including head, neck, genital, hand, foot, or areas known for having difficulty in healing, such as the lower anterior legs. Tumor with ill-defined borders.    REFERRING PROVIDER: Soren Zamora M.D.    CASE NUMBER:     ANESTHETIC: 3.5 cc 0.5% Lidocaine with Epi 1:200,000 mixed 1:1 with 0.5% Bupivacaine    SURGICAL PREP: Hibiclens    SURGEON: Kayla Paul MD    ASSISTANTS: Cristy Lopez PA-C and Diana Noel MA    PREOPERATIVE DIAGNOSIS: basal cell carcinoma- nodular    POSTOPERATIVE DIAGNOSIS: basal cell carcinoma; SCCIS    PATHOLOGIC DIAGNOSIS: basal cell carcinoma- nodular; SCCIS    HISTOLOGY OF SPECIMENS IN FIRST STAGE:   Tumor Type: Tumor seen. Squamous cell carcinoma in situ: Epidermis with full-thickness atypia and variable epidermal maturation.  Epidermal margin missing.   Depth of Invasion: epidermis  Perineural Invasion: No    HISTOLOGY OF SPECIMENS IN SUBSEQUENT STAGES:  Tumor Type:  No tumor  seen.    STAGES OF MOHS' SURGERY PERFORMED: 2    TUMOR-FREE PLANE ACHIEVED: Yes    HEMOSTASIS: electrocoagulation     SPECIMENS: 4 (2 in stage A and 2 in stage B)    LOCATION: right cheek (superior preauricular). Location verified with Dr. Zamora's clinical photograph. Patient also verified location with hand held mirror.  Also felt indentation of biopsy site.     INITIAL LESION SIZE: 0.5 x 0.6 cm    FINAL DEFECT SIZE: 1.0 x 1.5 cm    WOUND REPAIR/DISPOSITION: The patient tolerated Mohs' Micrographic Surgery for a basal cell carcinoma very well. When the tumor was completely removed, a repair of the surgical defect was undertaken.    PROCEDURE: Complex Linear Repair    INDICATION: Status post Mohs' Micrographic Surgery for basal cell carcinoma.    CASE NUMBER:     SURGEON: Kayla Paul MD    ASSISTANTS: Cristy Lopez PA-C and Thelma Elliott Surg Leida    ANESTHETIC: 3 cc 0.5% Lidocaine with Epi 1:200,000 mixed 1:1 with 0.5% Bupivacaine and 2.5 cc 1% Lidocaine with Epinephrine 1:100,000    SURGICAL PREP: Hibiclens, prepped by Thelma Elliott Surg Leida    LOCATION: right cheek (superior preauricular)    DEFECT SIZE: 1.0 x 1.5 cm    WOUND REPAIR/DISPOSITION:  After the patient's carcinoma had been completely removed with Mohs' Micrographic Surgery, a repair of the surgical defect was undertaken. The patient was returned to the operating suite where the area of right superior preauricular cheek was prepped, draped, and anesthetized in the usual sterile fashion. The wound was widely undermined in all directions. The wound was undermined to a distance at least the maximum width of the defect as measured perpendicular to the closure line along at least one entire edge of the defect, in this case 2 cm. Then, electrocoagulation was used to obtain meticulous hemostasis. 5-0 Vicryl buried vertical mattress sutures were placed into the subcutaneous and dermal plane to close the wound and spenser the cutaneous wound edge.  "Bilateral dog ears were identified and were removed by a standard Burow's triangle technique. The cutaneous wound edges were closed using interrupted 5-0 Prolene suture.    The patient tolerated the procedure well.    The area was cleaned and dressed appropriately and the patient was given wound care instructions, as well as appointment for follow-up evaluation and suture removal in 7 days.    LENGTH OF REPAIR: 3.2 cm    Vitals:    08/22/24 1122 08/22/24 1353   BP: 118/66 (!) 141/74   BP Location: Left arm    Patient Position: Sitting    BP Method: Medium (Automatic)    Pulse: 81 (P) 63   Weight: 86.6 kg (190 lb 14.7 oz)    Height: 5' 11" (1.803 m)          "

## 2024-08-29 ENCOUNTER — OFFICE VISIT (OUTPATIENT)
Dept: DERMATOLOGY | Facility: CLINIC | Age: 74
End: 2024-08-29
Payer: MEDICARE

## 2024-08-29 DIAGNOSIS — Z09 POSTOP CHECK: Primary | ICD-10-CM

## 2024-08-29 PROCEDURE — 99024 POSTOP FOLLOW-UP VISIT: CPT | Mod: S$GLB,,, | Performed by: DERMATOLOGY

## 2024-08-29 PROCEDURE — 1159F MED LIST DOCD IN RCRD: CPT | Mod: CPTII,S$GLB,, | Performed by: DERMATOLOGY

## 2024-08-29 PROCEDURE — 3288F FALL RISK ASSESSMENT DOCD: CPT | Mod: CPTII,S$GLB,, | Performed by: DERMATOLOGY

## 2024-08-29 PROCEDURE — 99999 PR PBB SHADOW E&M-EST. PATIENT-LVL II: CPT | Mod: PBBFAC,,, | Performed by: DERMATOLOGY

## 2024-08-29 PROCEDURE — 1101F PT FALLS ASSESS-DOCD LE1/YR: CPT | Mod: CPTII,S$GLB,, | Performed by: DERMATOLOGY

## 2024-08-29 PROCEDURE — 1126F AMNT PAIN NOTED NONE PRSNT: CPT | Mod: CPTII,S$GLB,, | Performed by: DERMATOLOGY

## 2024-08-29 NOTE — PROGRESS NOTES
74 y.o. male patient is here for suture removal following Mohs' surgery.    Patient reports no problems.    WOUND PE:  The right cheek sutures intact. Wound healing well. Good skin edges. No signs or symptoms of infection.    IMPRESSION:  Healing operative site from Mohs' surgery BCC right cheek s/p Mohs with CLC, postop day #7.    PLAN:  Sutures removed today by  Pamela Michelle MA . Steri-strips applied.  Continue wound care.  Keep moist with Aquaphor.  Call if any issues arise    RTC:  In 3-6 months with Soren Zamora M.D. for skin check or sooner if new concern arises.

## 2024-09-17 ENCOUNTER — TELEPHONE (OUTPATIENT)
Dept: DERMATOLOGY | Facility: CLINIC | Age: 74
End: 2024-09-17
Payer: MEDICARE

## 2024-09-17 NOTE — TELEPHONE ENCOUNTER
Let pt know he should clean his wound, apply aquaphor and keep it covered daily. Scheduled for w/c in 2 weeks.

## 2024-10-02 ENCOUNTER — OFFICE VISIT (OUTPATIENT)
Dept: DERMATOLOGY | Facility: CLINIC | Age: 74
End: 2024-10-02
Payer: MEDICARE

## 2024-10-02 DIAGNOSIS — C44.319 BASAL CELL CARCINOMA (BCC) OF RIGHT CHEEK: Primary | ICD-10-CM

## 2024-10-02 PROCEDURE — 3288F FALL RISK ASSESSMENT DOCD: CPT | Mod: CPTII,S$GLB,, | Performed by: DERMATOLOGY

## 2024-10-02 PROCEDURE — 1126F AMNT PAIN NOTED NONE PRSNT: CPT | Mod: CPTII,S$GLB,, | Performed by: DERMATOLOGY

## 2024-10-02 PROCEDURE — 99999 PR PBB SHADOW E&M-EST. PATIENT-LVL II: CPT | Mod: PBBFAC,,, | Performed by: DERMATOLOGY

## 2024-10-02 PROCEDURE — 1101F PT FALLS ASSESS-DOCD LE1/YR: CPT | Mod: CPTII,S$GLB,, | Performed by: DERMATOLOGY

## 2024-10-02 PROCEDURE — 99212 OFFICE O/P EST SF 10 MIN: CPT | Mod: S$GLB,,, | Performed by: DERMATOLOGY

## 2024-10-02 NOTE — PROGRESS NOTES
74 y.o. male patient is here for wound check after surgery.    Patient reports area not healling.    WOUND PE:  The right cheek incision  with slight dehiscence in center, re-epithelializing in nicely.  No signs of infection.      IMPRESSION:  Healing operative site from Mohs' surgery BCC right cheek s/p Mohs with CLC, postop week # 6, now with scar dehiscence in center.    PLAN:  Continue wound care - keep moist and covered for 2 weeks.   Wound rebandaged today.   Disc it appears sutures in center did not hold so center dehisced.   Will need to let it re-epithelialize fully at this point.  Can consider scar revision but need to wait 3-6 months before doing so - disc with patient  Recheck in person at 6 weeks to re-assess.     RTC:  In 6 weeks.

## 2024-11-11 DIAGNOSIS — K21.9 GASTROESOPHAGEAL REFLUX DISEASE WITHOUT ESOPHAGITIS: ICD-10-CM

## 2024-11-11 RX ORDER — PANTOPRAZOLE SODIUM 40 MG/1
40 TABLET, DELAYED RELEASE ORAL DAILY
Qty: 90 TABLET | Refills: 3 | OUTPATIENT
Start: 2024-11-11 | End: 2025-11-11

## 2024-11-11 RX ORDER — PANTOPRAZOLE SODIUM 40 MG/1
40 TABLET, DELAYED RELEASE ORAL DAILY
Qty: 90 TABLET | Refills: 3 | Status: CANCELLED | OUTPATIENT
Start: 2024-11-11 | End: 2025-11-11

## 2024-11-11 NOTE — TELEPHONE ENCOUNTER
Refill Decision Note   Jean Carlson  is requesting a refill authorization.  Brief Assessment and Rationale for Refill:  Quick Discontinue     Medication Therapy Plan:  discontinued on 2/19/2024 by Papi Gallardo MD      Comments:     Note composed:3:08 PM 11/11/2024

## 2024-11-11 NOTE — TELEPHONE ENCOUNTER
No care due was identified.  [facility] Embedded Care Due Messages. Reference number: [id number].   11/11/2024 2:56:27 PM CST

## 2024-11-13 ENCOUNTER — OFFICE VISIT (OUTPATIENT)
Dept: DERMATOLOGY | Facility: CLINIC | Age: 74
End: 2024-11-13
Payer: MEDICARE

## 2024-11-13 ENCOUNTER — TELEPHONE (OUTPATIENT)
Dept: INTERNAL MEDICINE | Facility: CLINIC | Age: 74
End: 2024-11-13
Payer: MEDICARE

## 2024-11-13 DIAGNOSIS — C44.319 BASAL CELL CARCINOMA (BCC) OF RIGHT CHEEK: Primary | ICD-10-CM

## 2024-11-13 PROCEDURE — 1159F MED LIST DOCD IN RCRD: CPT | Mod: CPTII,S$GLB,, | Performed by: DERMATOLOGY

## 2024-11-13 PROCEDURE — 3288F FALL RISK ASSESSMENT DOCD: CPT | Mod: CPTII,S$GLB,, | Performed by: DERMATOLOGY

## 2024-11-13 PROCEDURE — 99212 OFFICE O/P EST SF 10 MIN: CPT | Mod: S$GLB,,, | Performed by: DERMATOLOGY

## 2024-11-13 PROCEDURE — 1101F PT FALLS ASSESS-DOCD LE1/YR: CPT | Mod: CPTII,S$GLB,, | Performed by: DERMATOLOGY

## 2024-11-13 PROCEDURE — 1126F AMNT PAIN NOTED NONE PRSNT: CPT | Mod: CPTII,S$GLB,, | Performed by: DERMATOLOGY

## 2024-11-13 PROCEDURE — 99999 PR PBB SHADOW E&M-EST. PATIENT-LVL II: CPT | Mod: PBBFAC,,, | Performed by: DERMATOLOGY

## 2024-11-13 NOTE — TELEPHONE ENCOUNTER
----- Message from Michelle sent at 11/13/2024  4:14 PM CST -----  Contact: [phone]  [email or web address]     Patient is calling for Medical Advice regarding:pt is calling he would like to see Dr. Gallardo but nothing is coming up for him. He has a problem with his left foot when he walk. Very painful.  Please call him back and advise.    How long has patient had these symptoms:    Pharmacy name and phone#:    Patient wants a call back or thru [facility]:callback    Comments:    Please advise patient replies from provider may take up to 48 hours.

## 2024-11-13 NOTE — PROGRESS NOTES
74 y.o. male patient is here for wound check after surgery.    Patient reports no problems.    WOUND PE:  The right cheek incision is well healed. Mild firmness and erythema of scar. No nodularity. Much improved from last visit. Spitting suture x 1.      IMPRESSION:  Healing operative site from Mohs' surgery BCC right cheek s/p Mohs with CLC, postop week #12, healing improved.     PLAN:  Spitting suture removed today.  Keep moist with aquaphor for another few weeks.  Should heal by then, call if not.   No need for scar revision at this time.   Daily SPF.  Regular skin checks.    RTC:  In 3-6 months with Soren Zamora M.D. for skin check or sooner if new concern arises.

## 2024-11-18 ENCOUNTER — HOSPITAL ENCOUNTER (OUTPATIENT)
Dept: RADIOLOGY | Facility: HOSPITAL | Age: 74
Discharge: HOME | End: 2024-11-18
Attending: FAMILY MEDICINE
Payer: MEDICARE

## 2024-11-18 ENCOUNTER — TELEPHONE (OUTPATIENT)
Dept: GASTROENTEROLOGY | Facility: CLINIC | Age: 74
End: 2024-11-18
Payer: MEDICARE

## 2024-11-18 ENCOUNTER — OFFICE VISIT (OUTPATIENT)
Dept: INTERNAL MEDICINE | Facility: CLINIC | Age: 74
End: 2024-11-18
Attending: FAMILY MEDICINE
Payer: MEDICARE

## 2024-11-18 VITALS
WEIGHT: 180.75 LBS | OXYGEN SATURATION: 98 % | DIASTOLIC BLOOD PRESSURE: 68 MMHG | SYSTOLIC BLOOD PRESSURE: 138 MMHG | BODY MASS INDEX: 25.21 KG/M2 | HEART RATE: 74 BPM

## 2024-11-18 DIAGNOSIS — M79.672 LEFT FOOT PAIN: ICD-10-CM

## 2024-11-18 DIAGNOSIS — K21.9 GASTROESOPHAGEAL REFLUX DISEASE WITHOUT ESOPHAGITIS: ICD-10-CM

## 2024-11-18 DIAGNOSIS — R20.0 NUMBNESS: ICD-10-CM

## 2024-11-18 DIAGNOSIS — M79.672 LEFT FOOT PAIN: Primary | ICD-10-CM

## 2024-11-18 DIAGNOSIS — M81.0 OSTEOPOROSIS, UNSPECIFIED OSTEOPOROSIS TYPE, UNSPECIFIED PATHOLOGICAL FRACTURE PRESENCE: ICD-10-CM

## 2024-11-18 DIAGNOSIS — K59.04 CHRONIC IDIOPATHIC CONSTIPATION: ICD-10-CM

## 2024-11-18 DIAGNOSIS — M48.062 SPINAL STENOSIS OF LUMBAR REGION WITH NEUROGENIC CLAUDICATION: ICD-10-CM

## 2024-11-18 PROCEDURE — 99214 OFFICE O/P EST MOD 30 MIN: CPT | Mod: S$GLB,,, | Performed by: FAMILY MEDICINE

## 2024-11-18 PROCEDURE — 1125F AMNT PAIN NOTED PAIN PRSNT: CPT | Mod: CPTII,S$GLB,, | Performed by: FAMILY MEDICINE

## 2024-11-18 PROCEDURE — 73630 X-RAY EXAM OF FOOT: CPT | Mod: 26,LT,, | Performed by: RADIOLOGY

## 2024-11-18 PROCEDURE — 1101F PT FALLS ASSESS-DOCD LE1/YR: CPT | Mod: CPTII,S$GLB,, | Performed by: FAMILY MEDICINE

## 2024-11-18 PROCEDURE — 3078F DIAST BP <80 MM HG: CPT | Mod: CPTII,S$GLB,, | Performed by: FAMILY MEDICINE

## 2024-11-18 PROCEDURE — 99999 PR PBB SHADOW E&M-EST. PATIENT-LVL IV: CPT | Mod: PBBFAC,,, | Performed by: FAMILY MEDICINE

## 2024-11-18 PROCEDURE — 73630 X-RAY EXAM OF FOOT: CPT | Mod: TC,LT

## 2024-11-18 PROCEDURE — 3008F BODY MASS INDEX DOCD: CPT | Mod: CPTII,S$GLB,, | Performed by: FAMILY MEDICINE

## 2024-11-18 PROCEDURE — 3075F SYST BP GE 130 - 139MM HG: CPT | Mod: CPTII,S$GLB,, | Performed by: FAMILY MEDICINE

## 2024-11-18 PROCEDURE — 3288F FALL RISK ASSESSMENT DOCD: CPT | Mod: CPTII,S$GLB,, | Performed by: FAMILY MEDICINE

## 2024-11-18 NOTE — TELEPHONE ENCOUNTER
Spoke with patient.  C/o bloating.   Scheduled to see Dr.James Gallardo on 1/21/2025 at 1:00.   Patient has also been place on the wait list to see .   Confirmation mailed.   Flakita

## 2024-11-18 NOTE — PROGRESS NOTES
Subjective:       Patient ID: Jean Carlson is a 74 y.o. male.    Chief Complaint: Foot Pain    3 week hx of L foot injury, [place]. Saw bruise. Some increase in walking. Stair master and ellipital also cause pain. Stopped d/t sx. Limp. Lat midfoot.    Requesting pantoprazole for 'bloating'. We had stopped few months ago over concerns for osteoporosis and ? Indication for use (constipation rather tan reflux). Not having reflux, constipation and bloating continue.    Chronic 'burning' in feet.    Foot Pain  This is a new problem. The current episode started 1 to 4 weeks ago. The problem occurs constantly. The problem has been gradually worsening. Associated symptoms include arthralgias, diaphoresis and numbness. Pertinent negatives include no abdominal pain, chest pain, chills, congestion, coughing, fatigue, fever, headaches, myalgias, nausea, rash, sore throat, vomiting or weakness. The symptoms are aggravated by walking. Treatments tried: ankle brace. The treatment provided no relief.       Review of Systems   Constitutional:  Positive for diaphoresis. Negative for appetite change, chills, fatigue and fever.        -night sweats'   HENT:  Negative for congestion, postnasal drip, rhinorrhea, sore throat and trouble swallowing.    Eyes:  Negative for visual disturbance.   Respiratory:  Negative for cough, choking, chest tightness, shortness of breath and wheezing.    Cardiovascular:  Negative for chest pain and leg swelling.   Gastrointestinal:  Negative for abdominal distention, abdominal pain, diarrhea, nausea and vomiting.   Genitourinary:  Negative for difficulty urinating and hematuria.   Musculoskeletal:  Positive for arthralgias and gait problem. Negative for myalgias.   Skin:  Negative for rash.   Neurological:  Positive for numbness. Negative for weakness, light-headedness and headaches.   Psychiatric/Behavioral:  Negative for confusion and dysphoric mood.        Objective:      Physical  Exam  Vitals and nursing note reviewed.   Constitutional:       General: He is not in acute distress.     Appearance: He is well-developed.   Cardiovascular:      Pulses:           Dorsalis pedis pulses are 2+ on the left side.   Pulmonary:      Effort: Pulmonary effort is normal.   Musculoskeletal:      Cervical back: Neck supple.      Right lower leg: No edema.      Left lower leg: Tenderness and bony tenderness present. No edema.      Left foot: No deformity or Charcot foot.        Legs:    Feet:      Left foot:      Protective Sensation: 3 sites tested.  3 sites sensed.      Comments: Diminished sensation  Skin:     General: Skin is warm and dry.      Findings: No rash.   Neurological:      Mental Status: He is alert and oriented to person, place, and time.   Psychiatric:         Behavior: Behavior normal.         Thought Content: Thought content normal.         Judgment: Judgment normal.         Assessment:       1. Left foot pain    2. Gastroesophageal reflux disease without esophagitis    3. Chronic idiopathic constipation    4. Osteoporosis, unspecified osteoporosis type, unspecified pathological fracture presence    5. Spinal stenosis of lumbar region with neurogenic claudication    6. Numbness        Plan:     Medication List with Changes/Refills   Current Medications    ATORVASTATIN (LIPITOR) 20 MG TABLET    Take 1 tablet (20 mg total) by mouth once daily.    CHOLECALCIFEROL, VITAMIN D3, (VITAMIN D3) 50 MCG (2,000 UNIT) CAP    Take 1 capsule by mouth once daily.    HYDROCORTISONE (ANUSOL-HC) 2.5 % RECTAL CREAM    Place 1 application rectally 2 (two) times daily.    ICOSAPENT ETHYL (VASCEPA) 1 GRAM CAP    Take 1 capsule (1,000 mg total) by mouth 2 (two) times a day.    LACTULOSE (CEPHULAC) 20 GRAM PACK    Mix 1 packet (20 g total) with 4 ounces of water and take by mouth 3 (three) times daily.    LINZESS 290 MCG CAP CAPSULE    TAKE 1 CAPSULE BY MOUTH EVERY DAY BEFORE BREAKFAST    MULTIVITAMIN CAPSULE     Take 1 capsule by mouth once daily.    POLYETHYLENE GLYCOL (GLYCOLAX) 17 GRAM PWPK    Take by mouth.    TAMSULOSIN (FLOMAX) 0.4 MG CAP    TAKE 1 CAPSULE BY MOUTH EVERY DAY     1. Left foot pain  -     Ambulatory referral/consult to Podiatry; Future; Expected date: 11/25/2024  -     X-Ray Foot Complete Left; Future; Expected date: 11/18/2024    2. Gastroesophageal reflux disease without esophagitis  Overview:  -EGD 3/31/2021 mobility  -followed in GI - PPI    Assessment & Plan:  -moreso bloating than actual indigestion  -saw Dr. Gallardo 4/2024  -EGD 2021  -CT (GI) 1/2024    Orders:  -     Ambulatory referral/consult to Gastroenterology; Future; Expected date: 11/25/2024    3. Chronic idiopathic constipation  Overview:  -followed in [facility] (and GI)    Assessment & Plan:  -continues  -saw Dr. Gallardo 4/2024  -EGD 2021    Orders:  -     Ambulatory referral/consult to Gastroenterology; Future; Expected date: 11/25/2024    4. Osteoporosis, unspecified osteoporosis type, unspecified pathological fracture presence  Overview:  -Followed by endocrinology    Orders:  -     Magnesium; Future; Expected date: 11/18/2024  -     Vitamin D; Future; Expected date: 11/18/2024  -     Vitamin B12; Future; Expected date: 11/18/2024    5. Spinal stenosis of lumbar region with neurogenic claudication  Overview:  -reports 2 prior surgeries for 'discs'  -see 1/13/2022 Neurosurgery summary (L2 fx from fall 2021)  -MRI 5/18/2022 - 'mild' stenosis and other findings    -NS eval 12/21/23, 1/5/2024 - healthy back referral: MRI thoracic and lumbar 1/4/2024. PM eval 1/19/2024  -EMG [facility] 1/22/2024 - acute on chronic L4 radiculopathy      6. Numbness  -     Ambulatory referral/consult to Podiatry; Future; Expected date: 11/25/2024  -     Vitamin B12; Future; Expected date: 11/18/2024      See meds, orders, follow up, routing and instructions sections of encounter and AVS. Discussed with patient and provided on AVS.    Limit  activities until XR and podiatry eval.

## 2024-11-21 ENCOUNTER — OFFICE VISIT (OUTPATIENT)
Dept: ORTHOPEDICS | Facility: CLINIC | Age: 74
End: 2024-11-21
Payer: MEDICARE

## 2024-11-21 VITALS — HEIGHT: 71 IN | BODY MASS INDEX: 25.31 KG/M2 | WEIGHT: 180.75 LBS

## 2024-11-21 DIAGNOSIS — M76.72 PERONEAL TENDINITIS OF LEFT LOWER EXTREMITY: ICD-10-CM

## 2024-11-21 DIAGNOSIS — M79.672 ACUTE FOOT PAIN, LEFT: Primary | ICD-10-CM

## 2024-11-21 PROCEDURE — 3288F FALL RISK ASSESSMENT DOCD: CPT | Mod: CPTII,S$GLB,, | Performed by: ORTHOPAEDIC SURGERY

## 2024-11-21 PROCEDURE — 3008F BODY MASS INDEX DOCD: CPT | Mod: CPTII,S$GLB,, | Performed by: ORTHOPAEDIC SURGERY

## 2024-11-21 PROCEDURE — 1159F MED LIST DOCD IN RCRD: CPT | Mod: CPTII,S$GLB,, | Performed by: ORTHOPAEDIC SURGERY

## 2024-11-21 PROCEDURE — 1125F AMNT PAIN NOTED PAIN PRSNT: CPT | Mod: CPTII,S$GLB,, | Performed by: ORTHOPAEDIC SURGERY

## 2024-11-21 PROCEDURE — 99214 OFFICE O/P EST MOD 30 MIN: CPT | Mod: S$GLB,,, | Performed by: ORTHOPAEDIC SURGERY

## 2024-11-21 PROCEDURE — 1160F RVW MEDS BY RX/DR IN RCRD: CPT | Mod: CPTII,S$GLB,, | Performed by: ORTHOPAEDIC SURGERY

## 2024-11-21 PROCEDURE — 1101F PT FALLS ASSESS-DOCD LE1/YR: CPT | Mod: CPTII,S$GLB,, | Performed by: ORTHOPAEDIC SURGERY

## 2024-11-21 PROCEDURE — 99999 PR PBB SHADOW E&M-EST. PATIENT-LVL III: CPT | Mod: PBBFAC,,, | Performed by: ORTHOPAEDIC SURGERY

## 2024-11-21 RX ORDER — METHYLPREDNISOLONE 4 MG/1
TABLET ORAL
Qty: 1 EACH | Refills: 0 | Status: SHIPPED | OUTPATIENT
Start: 2024-11-21

## 2024-11-21 NOTE — PROGRESS NOTES
DATE: 2024  PATIENT: Jean Carlson    CHIEF COMPLAINT:  Left foot pain    HISTORY:  Jean Carlson is a 74 y.o. male here for evaluation of left foot pain.  Patient states for about a month he has been having pain over the lateral aspect of his left foot.  He states about a month ago he was [place] started having the pain.  He states he has tried bracing as well as Tylenol, but he still he is having pain frequently.  There is no associated numbness tingling.       PAST MEDICAL/SURGICAL HISTORY:  Past Medical History:   Diagnosis Date    Acute medial meniscus tear, right, initial encounter 2023    Anterior tibialis tendinitis of right lower extremity 10/18/2018    Basal cell carcinoma (BCC) of right cheek 2024    R cheek    Cancer     Chronic idiopathic constipation 2019    Closed fracture of left wrist 10/18/2021    Closed fracture of lower end of left radius with routine healing 2022    Elevated PSA     Family history of ASCVD 10/07/2016    Mom age 64, Dad age 67 -  on same day. Pat Aunt early 70's.    Family history of coronary artery disease 10/07/2016    Mom age 64, Dad age 67 -  on same day. Pat Aunt early 70's.    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    Intractable back pain 2018    Knee injury, right, initial encounter 05/15/2023    Nuclear sclerosis, bilateral 2018    Ocular migraine 2012    Osteoporosis     Prostate disease     Transient vision disturbance of both eyes 2012    Umbilical hernia without obstruction and without gangrene 10/01/2015    Urinary tract infection      Past Surgical History:   Procedure Laterality Date    back sx      lower    BLADDER FULGURATION N/A 2019    Procedure: FULGURATION, BLADDER;  Surgeon: Ryan Knowles MD;  Location: [facility]  OR;  Service: Urology;  Laterality: N/A;  bleeding tissue at bladder neck    CATARACT EXTRACTION W/  INTRAOCULAR LENS IMPLANT Bilateral     Dr Finn/Symfony IOL     CHONDROPLASTY OF KNEE Right 5/30/2023    Procedure: CHONDROPLASTY, KNEE;  Surgeon: Taylor Young MD;  Location: [facility] OR;  Service: Orthopedics;  Laterality: Right;    COLONOSCOPY N/A 6/5/2019    Procedure: COLONOSCOPY;  Surgeon: Mauro Smallwood MD;  Location: [facility] (4TH FLR);  Service: Endoscopy;  Laterality: N/A;    CYSTOSCOPY N/A 7/13/2019    Procedure: CYSTOSCOPY;  Surgeon: Ryan Knowles MD;  Location: [facility] OR;  Service: Urology;  Laterality: N/A;    CYSTOSCOPY      CYSTOSCOPY  7/27/2021    Procedure: CYSTOSCOPY;  Surgeon: Manan Ny MD;  Location: [facility] 1ST FLR;  Service: Urology;;    ESOPHAGOGASTRODUODENOSCOPY N/A 3/31/2021    Procedure: EGD (ESOPHAGOGASTRODUODENOSCOPY);  Surgeon: Jamilah Munoz MD;  Location: [facility];  Service: Endoscopy;  Laterality: N/A;    HERNIA REPAIR      KNEE ARTHROSCOPY W/ MENISCECTOMY Right 5/30/2023    Procedure: ARTHROSCOPY, KNEE, WITH MENISCECTOMY;  Surgeon: Taylor Young MD;  Location: [facility] OR;  Service: Orthopedics;  Laterality: Right;    XOBUD-[id number] Right 5/30/2023    Procedure: [id number]-[id number]-[id number];  Surgeon: Taylor Young MD;  Location: [facility] OR;  Service: Orthopedics;  Laterality: Right;    PROSTATE SURGERY      REMOVAL OF BLOOD CLOT N/A 7/13/2019    Procedure: REMOVAL, BLOOD CLOT;  Surgeon: Ryan Knowles MD;  Location: [facility] OR;  Service: Urology;  Laterality: N/A;  prostate    SPINE SURGERY      SYNOVECTOMY OF KNEE Right 5/30/2023    Procedure: SYNOVECTOMY, KNEE;  Surgeon: Taylor Young MD;  Location: [facility] OR;  Service: Orthopedics;  Laterality: Right;    TONSILLECTOMY      TRANSURETHRAL RESECTION OF PROSTATE      TRANSURETHRAL RESECTION OF PROSTATE N/A 7/13/2019    Procedure: TURP (TRANSURETHRAL RESECTION OF PROSTATE);  Surgeon: Ryan Knowles MD;  Location: [facility] OR;   Service: Urology;  Laterality: N/A;    TRANSURETHRAL SURGICAL REMOVAL OF STRICTURE OF BLADDER NECK  7/27/2021    Procedure: EXCISION, CONTRACTURE, BLADDER NECK, TRANSURETHRAL;  Surgeon: Manan Ny MD;  Location: [facility] OR [address] [place] [address];  Service: Urology;;       Current Medications:   Current Outpatient Medications:     atorvastatin (LIPITOR) 20 MG tablet, Take 1 tablet (20 mg total) by mouth once daily., Disp: 90 tablet, Rfl: 3    cholecalciferol, vitamin D3, (VITAMIN D3) 50 mcg (2,000 unit) Cap, Take 1 capsule by mouth once daily., Disp: , Rfl:     hydrocortisone (ANUSOL-HC) 2.5 % rectal cream, Place 1 application rectally 2 (two) times daily., Disp: 28 g, Rfl: 1    icosapent ethyL (VASCEPA) 1 gram Cap, Take 1 capsule (1,000 mg total) by mouth 2 (two) times a day., Disp: 60 capsule, Rfl: 11    lactulose (CEPHULAC) 20 gram Pack, Mix 1 packet (20 g total) with 4 ounces of water and take by mouth 3 (three) times daily., Disp: 90 packet, Rfl: 5    LINZESS 290 mcg Cap capsule, TAKE 1 CAPSULE BY MOUTH EVERY DAY BEFORE BREAKFAST, Disp: 90 capsule, Rfl: 3    multivitamin capsule, Take 1 capsule by mouth once daily., Disp: , Rfl:     polyethylene glycol (GLYCOLAX) 17 gram PwPk, Take by mouth., Disp: , Rfl:     tamsulosin (FLOMAX) 0.4 mg Cap, TAKE 1 CAPSULE BY MOUTH EVERY DAY, Disp: 90 capsule, Rfl: 1    methylPREDNISolone (MEDROL, COURT,) 4 mg tablet, Take as instructed on package, Disp: 1 each, Rfl: 0    Social History:   Social History     Socioeconomic History    Marital status:    Occupational History     Employer: [employer]   Tobacco Use    Smoking status: Never    Smokeless tobacco: Never   Substance and Sexual Activity    Alcohol use: Yes     Alcohol/week: 3.0 standard drinks of alcohol     Types: 3 Glasses of wine per week     Comment: rarely    Drug use: No    Sexual activity: Not Currently     Partners: Female   Social History Narrative    ** Merged History Encounter **          [employer]. RM x 8, 1  "daughter, 1 Gk     Social Drivers of Health     Financial Resource Strain: Low Risk  (1/9/2024)    Overall Financial Resource Strain (CARDIA)     Difficulty of Paying Living Expenses: Not hard at all   Food Insecurity: No Food Insecurity (1/9/2024)    Hunger Vital Sign     Worried About Running Out of Food in the Last Year: Never true     Ran Out of Food in the Last Year: Never true   Transportation Needs: No Transportation Needs (1/9/2024)    PRAPARE - Transportation     Lack of Transportation (Medical): No     Lack of Transportation (Non-Medical): No   Physical Activity: Insufficiently Active (1/9/2024)    Exercise Vital Sign     Days of Exercise per Week: 2 days     Minutes of Exercise per Session: 30 min   Stress: No Stress Concern Present (1/9/2024)    [employer] - Occupational Stress Questionnaire     Feeling of Stress : Not at all   Housing Stability: Low Risk  (1/9/2024)    Housing Stability Vital Sign     Unable to Pay for Housing in the Last Year: No     Number of Places Lived in the Last Year: 1     Unstable Housing in the Last Year: No       REVIEW OF SYSTEMS:  Constitution: Negative. Negative for chills, fever and night sweats.   Cardiovascular: Negative for chest pain and syncope.   Respiratory: Negative for cough and shortness of breath.   Gastrointestinal: See HPI. Negative for nausea/vomiting. Negative for abdominal pain.  Genitourinary: See HPI. Negative for discoloration or dysuria.  Skin: Negative for dry skin, itching and rash.   Hematologic/Lymphatic: Negative for bleeding problem. Does not bruise/bleed easily.   Musculoskeletal: Negative for falls and muscle weakness.   Neurological: See HPI. No seizures.   Endocrine: Negative for polydipsia, polyphagia and polyuria.   Allergic/Immunologic: Negative for hives and persistent infections.    PHYSICAL EXAMINATION:    Ht 5' 11" (1.803 m)   Wt 82 kg (180 lb 12.4 oz)   BMI 25.21 kg/m²     General: The patient is a 74 " y.o. male in no apparent distress, the patient is oriented to person, place and time.   Psych: Normal mood and affect  HEENT:  NCAT, sclera nonicteric  Lungs:  Respirations are equal and unlabored.  CV:  2+ bilateral upper and lower extremity pulses.  Skin:  Intact throughout.  Musculoskeletal: No pain with the range of motion of the bilateral hips. No trochanteric tenderness to palpation. No pain with range of motion about the bilateral knees.      Left Foot and Ankle Exam    INSPECTION:        Gait:    Normal    Scars:   None   Swelling:  None   Color:   Normal   Atrophy:  None  Heel / Toe Walking: No difficulty    ALIGNMENT:    Hindfoot  Normal    Midfoot: Normal  Forefoot: Normal     Collective Ankle-Hindfoot Alignment    Good -plantigrade (PG), well aligne       TENDERNESS:  LATERAL:      Sinus tarsi:  None    Syndesmosis:  none    ATFL:   none     CFL:   none    Anterolateral gutter: none    Fibula:   none  Peroneal tendons: none    Peroneal tubercle.  None     ANTERIOR:  Anteromedial joint line:  none  Anterolateral joint line:  None  Talonavicular:    None  Anterior tibialis:   none  Extensor tendons:   none    POSTERIOR:  Medial/lateral achilles:   none  Medial/lateral achilles insertion: None    MEDIAL:      Deltoid:  none    Malleolus:  none    PTT:   none    Navicular:  none           CALCANEUS:  Retrocalcaneal:   none  Medial achilles:   None  Lateral achilles:   None  Calcaneal tuberosity:   None    FOOT:    Calcaneal cuboid  none   MT / MT heads:  none   Navicular   none    Medial cord origin PF:  none  Cuneiforms:   none    Web space:   none  Lisfranc    none    Tarsal tunnel:   none  Base of the fifth metatarsal  TTP   Tinels sign   neg        RANGE OF MOTION:  RIGHT/ LEFT      Ankle DF/PF:  15/45  15/45         Eversion/Inversion: 15/25 15/25     Midfoot ABD/ADD: 10/10 10/10     First MTP DF/PF: 60/25 60/25              (* = pain)                  STRENGTH: (affected)  Anterior  tibialis: 5/5  Posterior tibialis: 5/5  Gastroc-soleus: 5/5  Peroneals:  5/5  EHL:   5/5  FHL:   5/5    (* = pain)    SPECIAL TESTS:   ANKLE INSTABILITY: (*pain)    Anterior drawer:   Normal      (C-W contralateral side)     Inversion:   30°     Eversion  10°            Collective Instability: (Ant-post and varus-valgus)     Stable     NEUROLOGIC TESTING:  All dermatomes foot, ankle and leg have normal sensation light touch  Ankle Reflexes 2+, symmetric   Negative Babinski and No Clonus    VASCULAR:  2+ pulses PT/DT with brisk capillary refill toes.        IMAGING:     Radiographs of the left foot were ordered and personally reviewed with the patient today:  No osseous abnormalities        ASSESSMENT/PLAN:    Jean was seen today for pain.    Diagnoses and all orders for this visit:    Acute foot pain, left 5th metatarsal  -     MRI Midfoot WO Contrast LT; Future  -     methylPREDNISolone (MEDROL, COURT,) 4 mg tablet; Take as instructed on package    Possible insertion Peroneal tendinitis of left lower extremity  -     MRI Midfoot WO Contrast LT; Future  -     methylPREDNISolone (MEDROL, COURT,) 4 mg tablet; Take as instructed on package        74 y.o. yo male with left peroneal tendinitis    Plan: The patient and I had a thorough discussion today.  We discussed the working diagnosis as well as several other potential alternative diagnoses.  Treatment options were discussed, both conservative and surgical.  Conservative treatment options would include things such as activity modifications, a period of rest, tylenol, anti-inflammatory medications, physical therapy, immobilization, corticosteroid injections, and others.     At this time, the patient would like to proceed with boot immobilization, NSAIDs as needed, MRI of the left foot, Medrol Dosepak.    Return to clinic 4 weeks.    I have personally taken the history and examined this patient and agree with the residents note as stated above.

## 2024-11-25 ENCOUNTER — PATIENT MESSAGE (OUTPATIENT)
Dept: INTERNAL MEDICINE | Facility: CLINIC | Age: 74
End: 2024-11-25
Payer: MEDICARE

## 2024-11-26 NOTE — PATIENT INSTRUCTIONS
"HEALTHY BACK TOOLS        KEEP YOUR SPINE FEELING FINE   HEALTHY HABITS   Do your "GO TO" stretches 2/day   Get a good night's REST   Watch your POSTURE in sitting/standing Drink PLENTY of water   Use a lumbar roll Eat LOTS of fruits & vegetables   GET UP often (walk and/or stretch) Manage your STRESS   Make your workplace IDEAL FOR YOU  Don't smoke   Lift correctly EXERCISE                           WHAT TO DO WHEN SYMPTOMS FLARE UP  Back and neck pain may occasionally flare up.  If you experience a flare   up, remember your tools. Be encouraged, by remembering that flare-ups will   usually pass.   My Tools:    ~Use your "Go To" Stretches/Positions   ~Keep Moving-pain usually gets better if you move  ~Z lie (with or without ice)  10 min several times a day until symptoms reduce  ~Slowly resume normal activities   ~Practice Deep Breathing and Relaxation techniques                                                 MY EXERCISE PLAN  GO TO STRETCHES  2/day (like brushing your teeth) STRENGTHENING  2-3 times/week CARDIO PROGRAM  150 min/week   Bent knee trunk twist x 10 Bridging with crossed leg x 15 Biking or walking   Double knee to chest x 10 Bridging with band around knees x 20     Crossed leg figure four buttock/hamstring stretch 2 x 20 sec Side Plank clamshells w/band x 15     Crossed leg torso stretch 5 x 10 sec Bird dog (on hands and knees) x 10     Push up back extension x 10 Superman or back machine     Cat/Cow stretch x 10 (head up arch downward)     Standing Back Extension x 10        "
[Time Spent: ___ minutes] : I have spent [unfilled] minutes of time on the encounter which excludes teaching and separately reported services.

## 2024-12-01 ENCOUNTER — PATIENT MESSAGE (OUTPATIENT)
Dept: ORTHOPEDICS | Facility: CLINIC | Age: 74
End: 2024-12-01
Payer: MEDICARE

## 2024-12-02 ENCOUNTER — OFFICE VISIT (OUTPATIENT)
Dept: ORTHOPEDICS | Facility: CLINIC | Age: 74
End: 2024-12-02
Payer: MEDICARE

## 2024-12-02 ENCOUNTER — OFFICE VISIT (OUTPATIENT)
Dept: ENDOCRINOLOGY | Facility: CLINIC | Age: 74
End: 2024-12-02
Payer: MEDICARE

## 2024-12-02 VITALS
BODY MASS INDEX: 26.05 KG/M2 | HEART RATE: 73 BPM | OXYGEN SATURATION: 95 % | HEIGHT: 71 IN | WEIGHT: 186.06 LBS | SYSTOLIC BLOOD PRESSURE: 110 MMHG | DIASTOLIC BLOOD PRESSURE: 70 MMHG

## 2024-12-02 DIAGNOSIS — M81.0 OSTEOPOROSIS, UNSPECIFIED OSTEOPOROSIS TYPE, UNSPECIFIED PATHOLOGICAL FRACTURE PRESENCE: Primary | ICD-10-CM

## 2024-12-02 DIAGNOSIS — S92.355A CLOSED NONDISPLACED FRACTURE OF FIFTH METATARSAL BONE OF LEFT FOOT, INITIAL ENCOUNTER: Primary | ICD-10-CM

## 2024-12-02 DIAGNOSIS — R82.994 HYPERCALCIURIA: ICD-10-CM

## 2024-12-02 PROCEDURE — 99999 PR PBB SHADOW E&M-EST. PATIENT-LVL IV: CPT | Mod: PBBFAC,,, | Performed by: INTERNAL MEDICINE

## 2024-12-02 PROCEDURE — 99441 PR PHYSICIAN TELEPHONE EVALUATION 5-10 MIN: CPT | Mod: 95,,, | Performed by: ORTHOPAEDIC SURGERY

## 2024-12-02 PROCEDURE — 1160F RVW MEDS BY RX/DR IN RCRD: CPT | Mod: CPTII,95,, | Performed by: ORTHOPAEDIC SURGERY

## 2024-12-02 PROCEDURE — 3008F BODY MASS INDEX DOCD: CPT | Mod: CPTII,S$GLB,, | Performed by: INTERNAL MEDICINE

## 2024-12-02 PROCEDURE — 1159F MED LIST DOCD IN RCRD: CPT | Mod: CPTII,S$GLB,, | Performed by: INTERNAL MEDICINE

## 2024-12-02 PROCEDURE — 3074F SYST BP LT 130 MM HG: CPT | Mod: CPTII,S$GLB,, | Performed by: INTERNAL MEDICINE

## 2024-12-02 PROCEDURE — 1159F MED LIST DOCD IN RCRD: CPT | Mod: CPTII,95,, | Performed by: ORTHOPAEDIC SURGERY

## 2024-12-02 PROCEDURE — 99214 OFFICE O/P EST MOD 30 MIN: CPT | Mod: S$GLB,,, | Performed by: INTERNAL MEDICINE

## 2024-12-02 PROCEDURE — 3288F FALL RISK ASSESSMENT DOCD: CPT | Mod: CPTII,S$GLB,, | Performed by: INTERNAL MEDICINE

## 2024-12-02 PROCEDURE — 3078F DIAST BP <80 MM HG: CPT | Mod: CPTII,S$GLB,, | Performed by: INTERNAL MEDICINE

## 2024-12-02 PROCEDURE — 1126F AMNT PAIN NOTED NONE PRSNT: CPT | Mod: CPTII,S$GLB,, | Performed by: INTERNAL MEDICINE

## 2024-12-02 PROCEDURE — G2211 COMPLEX E/M VISIT ADD ON: HCPCS | Mod: S$GLB,,, | Performed by: INTERNAL MEDICINE

## 2024-12-02 PROCEDURE — 1160F RVW MEDS BY RX/DR IN RCRD: CPT | Mod: CPTII,S$GLB,, | Performed by: INTERNAL MEDICINE

## 2024-12-02 PROCEDURE — 1100F PTFALLS ASSESS-DOCD GE2>/YR: CPT | Mod: CPTII,S$GLB,, | Performed by: INTERNAL MEDICINE

## 2024-12-02 NOTE — PROGRESS NOTES
Subjective:      Patient ID: Jean Carlson is a 74 y.o. male.    Chief Complaint:  Osteoporosis      History of Present Illness  Jean Carlson is a 74 y.o. male with dyslipidemia, prostate cancer, hypercalciuria and osteoporosis thought to be due to idiopathic hypercalciuria, and history of prostate cancer who presents for follow up of osteoporosis.  Last visit with Dr. Mcbride in 5/2022.      He was previously followed by Dr. Mejia.  Per chart review, he was noted to have hypercalciuria with normal serum calcium and normal PTH.  He was treated with Fosamax from 2005 through 2012 with significant improvement in bone density and stable alkaline phosphatase levels.       Fracture:  Compression fracture L2 10/2021  Left arm distal radial fracture (fall from a chair)  Boxer fracture in 2014 after a fall from ladder     Briefly used a thiazide in the past for hypercalciuria but couldn't tolerate it due to polyuria.      Supplements:    Currently taking OTC Ca 540 mg w/ 50 mcg vitamin D     Previous Treatment: alendronate (FOSAMAX) [id number]    Was started on Prolia in 6/2022 and last injection in 7/2024 (has been getting on time without delays).     More recently he suffered a non-displaced fracture in the 5th metatarsal of his foot which is being managed conservatively.      History of previous fracture: had fracture of hand after trauma, but no history of fragility fracture  Parent with fractured hip: No  Smoking status: no  Glucocorticoid use: No  History of RA: No  Alcohol 3 or more/day: No  Nephrolithiasis: Yes stone seen on imaging in the past, patient was asymptomatic  Dental up to date: yes, wife is dentist  Height loss:  Feels that he has gradually decreased about 1 in     Not on PPI.    Review of Systems   Constitutional:  Negative for chills and fever.   Gastrointestinal:  Negative for nausea and vomiting.       Objective:   Physical Exam  Vitals and nursing note reviewed.       BP Readings from Last 3  Encounters:   12/02/24 110/70   11/18/24 138/68   08/22/24 (!) 141/74     Wt Readings from Last 1 Encounters:   12/02/24 1057 84.4 kg (186 lb 1.1 oz)       Body mass index is 25.95 kg/m².    Lab Review:   Lab Results   Component Value Date    HGBA1C 5.6 05/16/2022     Lab Results   Component Value Date    CHOL 170 02/19/2024    HDL 56 02/19/2024    LDLCALC 87.8 02/19/2024    TRIG 131 02/19/2024    CHOLHDL 32.9 02/19/2024     Lab Results   Component Value Date     02/19/2024    K 4.6 02/19/2024     02/19/2024    CO2 26 02/19/2024     02/19/2024    BUN 18 02/19/2024    CREATININE 0.8 02/19/2024    CALCIUM 9.8 02/19/2024    PROT 6.5 04/11/2024    ALBUMIN 4.0 04/11/2024    BILITOT 0.7 04/11/2024    ALKPHOS 55 04/11/2024    AST 21 04/11/2024    ALT 36 04/11/2024    ANIONGAP 7 (L) 02/19/2024    ESTGFRAFRICA >60.0 05/09/2022    EGFRNONAA >60.0 05/09/2022    TSH 2.717 10/31/2023         Assessment and Plan     Osteoporosis  Reviewed recent BMD from 7/2024 which showed improvement at the spine and hip  Recent fracture of 5th metatarsal but would not classify this as a fragility fracture  Studies to date have not shown a clear correlation between BMD and metatarsal fractures so would not start an anabolic in this case  Will be sure to optimize calcium and vitamin D status and try and address hypercalciuria (see below)  Continue q6 month Prolia for now and if deemed a candidate for a holiday in the future will give Reclast x 1 at that time  Have discussed rebound vertebral fractures   Repeat BMD in 7/2026    Hypercalciuria  --Will repeat 24 hr urine when able  --Will likely try and restart low dose HCTZ at 6.25 mg daily pending 24 hr urine result      Visit today included increased complexity associated with the care of the episodic problem osteoporosis addressed and managing the longitudinal care of the patient due to the serious and/or complex managed problem(s).       Follow up in 4 months      Raymond  Yany GALE Staff Endocrinology

## 2024-12-02 NOTE — ASSESSMENT & PLAN NOTE
Reviewed recent BMD from 7/2024 which showed improvement at the spine and hip  Recent fracture of 5th metatarsal but would not classify this as a fragility fracture  Studies to date have not shown a clear correlation between BMD and metatarsal fractures so would not start an anabolic in this case  Will be sure to optimize calcium and vitamin D status and try and address hypercalciuria (see below)  Continue q6 month Prolia for now and if deemed a candidate for a holiday in the future will give Reclast x 1 at that time  Have discussed rebound vertebral fractures   Repeat BMD in 7/2026

## 2024-12-02 NOTE — PROGRESS NOTES
Established Patient - Audio Only Telehealth Visit     The patient location is:  [place]  The chief complaint leading to consultation is:  MRI results  Visit type: Virtual visit with audio only (telephone)  Total time spent with patient:  5 minute (I called patient on 11/27/2024 and discussed results with him)       The reason for the audio only service rather than synchronous audio and video virtual visit was related to technical difficulties or patient preference/necessity.     Each patient to whom I provide medical services by telemedicine is:  (1) informed of the relationship between the physician and patient and the respective role of any other health care provider with respect to management of the patient; and (2) notified that they may decline to receive medical services by telemedicine and may withdraw from such care at any time. Patient verbally consented to receive this service via voice-only telephone call.       HPI:  This is a 74-year-old male who presented to me on 11/21/2024 four of month long history of left lateral foot pain with no known injury.  He reported the onset of his pain to a period when he was doing a lot of steps up and [place].  His pain was around the base of the 5th metatarsal and I suspected that he may have insertional peroneal tendinitis.  Symptoms were fairly severe so I ordered an MRI of the foot and placed him in a boot.      MRI of the left midfoot performed on 11/27/2024 revealed a nondisplaced fracture of the base of the 5th metatarsal in the region of the metaphyseal diaphyseal junction consistent with a Wisdom fracture.  Plain x-rays did not show any evidence of a fracture.     Assessment and plan:    1. Closed nondisplaced fracture of fifth metatarsal bone of left foot, initial encounter          Recommendation:  As mentioned above I discussed the results with him on the day of the MRI and explained to him that he has a stress fracture of the base of 5th metatarsal  consistent with a Wisdom fracture.  I explained to him that these can be difficult to heal but can heal without surgical intervention.  I explained to him that is important that he avoid any weight-bearing on the foot that causes any pain at the fracture site.  I recommended that he continue to use the boot for immobilization and protection.    Follow-up in five weeks with repeat x-ray left foot                        This service was not originating from a related E/M service provided within the previous 7 days nor will  to an E/M service or procedure within the next 24 hours or my soonest available appointment.  Prevailing standard of care was able to be met in this audio-only visit.

## 2024-12-02 NOTE — ASSESSMENT & PLAN NOTE
--Will repeat 24 hr urine when able  --Will likely try and restart low dose HCTZ at 6.25 mg daily pending 24 hr urine result

## 2024-12-05 ENCOUNTER — PATIENT MESSAGE (OUTPATIENT)
Dept: ENDOCRINOLOGY | Facility: CLINIC | Age: 74
End: 2024-12-05
Payer: MEDICARE

## 2024-12-05 ENCOUNTER — LAB VISIT (OUTPATIENT)
Dept: LAB | Facility: HOSPITAL | Age: 74
End: 2024-12-05
Attending: INTERNAL MEDICINE
Payer: MEDICARE

## 2024-12-05 DIAGNOSIS — M81.0 OSTEOPOROSIS, UNSPECIFIED OSTEOPOROSIS TYPE, UNSPECIFIED PATHOLOGICAL FRACTURE PRESENCE: ICD-10-CM

## 2024-12-05 DIAGNOSIS — R82.994 HYPERCALCIURIA: ICD-10-CM

## 2024-12-05 PROCEDURE — 82340 ASSAY OF CALCIUM IN URINE: CPT | Performed by: INTERNAL MEDICINE

## 2024-12-05 PROCEDURE — 82570 ASSAY OF URINE CREATININE: CPT | Performed by: INTERNAL MEDICINE

## 2024-12-06 ENCOUNTER — PATIENT MESSAGE (OUTPATIENT)
Dept: ENDOCRINOLOGY | Facility: CLINIC | Age: 74
End: 2024-12-06
Payer: MEDICARE

## 2024-12-06 DIAGNOSIS — R82.994 HYPERCALCIURIA: Primary | ICD-10-CM

## 2024-12-06 LAB
CALCIUM 24H UR-MRATE: 23 MG/HR (ref 4–12)
CALCIUM UR-MCNC: 13.7 MG/DL (ref 0–15)
CALCIUM URINE (MG/SPEC): 548 MG/SPEC
CREAT 24H UR-MRATE: 96.7 MG/HR (ref 40–75)
CREAT UR-MCNC: 58 MG/DL (ref 23–375)
CREATININE, URINE (MG/SPEC): 2320 MG/SPEC
URINE COLLECTION DURATION: 24 HR
URINE COLLECTION DURATION: 24 HR
URINE VOLUME: 4000 ML
URINE VOLUME: 4000 ML

## 2024-12-06 RX ORDER — HYDROCHLOROTHIAZIDE 12.5 MG/1
TABLET ORAL
Qty: 45 TABLET | Refills: 3 | Status: SHIPPED | OUTPATIENT
Start: 2024-12-06

## 2024-12-11 ENCOUNTER — OFFICE VISIT (OUTPATIENT)
Dept: PODIATRY | Facility: CLINIC | Age: 74
End: 2024-12-11
Attending: FAMILY MEDICINE
Payer: MEDICARE

## 2024-12-11 VITALS
BODY MASS INDEX: 25.95 KG/M2 | DIASTOLIC BLOOD PRESSURE: 68 MMHG | HEART RATE: 71 BPM | SYSTOLIC BLOOD PRESSURE: 114 MMHG | HEIGHT: 71 IN

## 2024-12-11 DIAGNOSIS — M79.672 LEFT FOOT PAIN: ICD-10-CM

## 2024-12-11 DIAGNOSIS — R20.0 NUMBNESS: ICD-10-CM

## 2024-12-11 PROCEDURE — 3288F FALL RISK ASSESSMENT DOCD: CPT | Mod: CPTII,S$GLB,, | Performed by: PODIATRIST

## 2024-12-11 PROCEDURE — 99999 PR PBB SHADOW E&M-EST. PATIENT-LVL III: CPT | Mod: PBBFAC,,, | Performed by: PODIATRIST

## 2024-12-11 PROCEDURE — 99204 OFFICE O/P NEW MOD 45 MIN: CPT | Mod: S$GLB,,, | Performed by: PODIATRIST

## 2024-12-11 PROCEDURE — 1159F MED LIST DOCD IN RCRD: CPT | Mod: CPTII,S$GLB,, | Performed by: PODIATRIST

## 2024-12-11 PROCEDURE — 3008F BODY MASS INDEX DOCD: CPT | Mod: CPTII,S$GLB,, | Performed by: PODIATRIST

## 2024-12-11 PROCEDURE — 1101F PT FALLS ASSESS-DOCD LE1/YR: CPT | Mod: CPTII,S$GLB,, | Performed by: PODIATRIST

## 2024-12-11 PROCEDURE — 3074F SYST BP LT 130 MM HG: CPT | Mod: CPTII,S$GLB,, | Performed by: PODIATRIST

## 2024-12-11 PROCEDURE — 3078F DIAST BP <80 MM HG: CPT | Mod: CPTII,S$GLB,, | Performed by: PODIATRIST

## 2024-12-11 NOTE — PROGRESS NOTES
Subjective:      Patient ID: Jean Carlson is a 74 y.o. male.    Chief Complaint: Foot Pain (Left, second opinion, shoes/footwear) and Numbness (B/L)    Pt presents today with several complaints. First complaint is a dash fracture of the left 5th metatarsal. Pt states he was doing a lot of exercising a sustained a met fx. Pt is also c/o numbness of both of his feet. Pt is under the care of Dr Steiner for his metatarsal fx. Pt is also c/o some heel pain, first few steps in the morning and while exercising.     Review of Systems   Constitutional: Negative for chills, fever and malaise/fatigue.   HENT:  Negative for hearing loss.    Cardiovascular:  Negative for claudication.   Respiratory:  Negative for shortness of breath.    Skin:  Negative for flushing and rash.   Musculoskeletal:  Negative for joint pain and myalgias.   Gastrointestinal:  Negative for nausea and vomiting.   Neurological:  Positive for numbness and paresthesias. Negative for loss of balance and sensory change.   Psychiatric/Behavioral:  Negative for altered mental status.    Allergic/Immunologic: Positive for hives.           Objective:      Physical Exam  Vitals reviewed.   Cardiovascular:      Pulses:           Dorsalis pedis pulses are 2+ on the right side and 2+ on the left side.        Posterior tibial pulses are 2+ on the right side and 2+ on the left side.      Comments: No edema noted b/L  Musculoskeletal:         General: Tenderness and signs of injury present. No deformity.      Comments:     POP to plantar heels, b/L    Left ROM deferred due to fx   Feet:      Right foot:      Protective Sensation: 5 sites tested.  5 sites sensed.      Left foot:      Protective Sensation: 5 sites tested.  5 sites sensed.   Skin:     General: Skin is warm.      Capillary Refill: Capillary refill takes 2 to 3 seconds.      Comments: Normal skin tugor noted.   No open lesion noted b/L  Skin temp is warm to warm from proximal to distal b/L.  Webspaces  clean, dry, and intact     Neurological:      Mental Status: He is alert.      Comments: Gross sensation intact b/L               Assessment:       Encounter Diagnoses   Name Primary?    Left foot pain     Numbness          Plan:       Jean was seen today for foot pain and numbness.    Diagnoses and all orders for this visit:    Left foot pain  -     Ambulatory referral/consult to Podiatry    Numbness  -     Ambulatory referral/consult to Podiatry      I counseled the patient on his conditions, their implications and medical management.  Medical records reviewed  X-rays and MRI reviewed  Pt advised to continue wearing the CAM boot and to stay off of the foot as much as possible to facilitate healing. I agree with Burak's assessment of met fx plan and treatment.     Pt advised that EMG/NCV may be a useful tool to diagnosis numbness, but after fracture heals.     Pt also advised that physical therapy may be a viable option after healing.     Pt and wife had a lot of questions, all answered    I spent a total of 35 minutes on the day of the visit.This includes face to face time and non-face to face time preparing to see the patient (eg, review of tests), obtaining and/or reviewing separately obtained history, documenting clinical information in the electronic or other health record, independently interpreting results and communicating results to the patient/family/caregiver, or care coordinator.          .

## 2024-12-26 ENCOUNTER — PATIENT MESSAGE (OUTPATIENT)
Dept: PODIATRY | Facility: CLINIC | Age: 74
End: 2024-12-26
Payer: MEDICARE

## 2024-12-26 ENCOUNTER — TELEPHONE (OUTPATIENT)
Dept: PODIATRY | Facility: CLINIC | Age: 74
End: 2024-12-26
Payer: MEDICARE

## 2024-12-26 ENCOUNTER — PATIENT MESSAGE (OUTPATIENT)
Dept: INTERNAL MEDICINE | Facility: CLINIC | Age: 74
End: 2024-12-26
Payer: MEDICARE

## 2024-12-26 ENCOUNTER — TELEPHONE (OUTPATIENT)
Dept: ORTHOPEDICS | Facility: CLINIC | Age: 74
End: 2024-12-26
Payer: MEDICARE

## 2024-12-26 DIAGNOSIS — F41.9 ANXIETY: Primary | ICD-10-CM

## 2024-12-26 NOTE — TELEPHONE ENCOUNTER
LOV with Papi Gallardo MD , 11/18/2024  Requesting psychology referral for anxiety. Referral pended for review

## 2024-12-26 NOTE — TELEPHONE ENCOUNTER
----- Message from Sylvia sent at 12/26/2024  1:23 PM CST -----  Regarding: appt  Contact: [phone]  ..Type:  Needs new provider     Who Called: pt   Symptoms (please be specific): pt stated his appt was r/s with Dr Bell, but he is out of town during the new appt date. Pt stated he would like a new provider. Pt has date available, 1/7  he will be in office with a Dr Steiner at 10:15    Would the patient rather a call back or a response via [facility]? Call back   Best Call Back Number: [phone]  Additional Information: n/a

## 2024-12-26 NOTE — TELEPHONE ENCOUNTER
Attempted to contact pt, no answer,. Lvm       ----- Message from Sylvia sent at 12/26/2024  1:26 PM CST -----  Regarding: pt advice  Contact: [phone]  ..Type:  Needs Medical Advice    Who Called: pt   Symptoms (please be specific): wants this provider recommendation of what podiatrist he should see that treats neuropathy      Would the patient rather a call back or a response via [facility]? Call back   Best Call Back Number: [phone]  Additional Information: pt was seeing Bell, but is asking for a new provider

## 2024-12-26 NOTE — TELEPHONE ENCOUNTER
Spoke with pt. And was able to get him scheduled with Dr. Adams. Pt. Agreed to appointment date and time.pt. verbally confirmed new appointment date and time.

## 2025-01-06 ENCOUNTER — PATIENT MESSAGE (OUTPATIENT)
Dept: GASTROENTEROLOGY | Facility: CLINIC | Age: 75
End: 2025-01-06
Payer: MEDICARE

## 2025-01-06 RX ORDER — PANTOPRAZOLE SODIUM 40 MG/1
40 TABLET, DELAYED RELEASE ORAL DAILY
Qty: 30 TABLET | Refills: 12 | Status: SHIPPED | OUTPATIENT
Start: 2025-01-06

## 2025-01-07 ENCOUNTER — OFFICE VISIT (OUTPATIENT)
Dept: ORTHOPEDICS | Facility: CLINIC | Age: 75
End: 2025-01-07
Payer: MEDICARE

## 2025-01-07 ENCOUNTER — TELEPHONE (OUTPATIENT)
Dept: GASTROENTEROLOGY | Facility: CLINIC | Age: 75
End: 2025-01-07
Payer: MEDICARE

## 2025-01-07 ENCOUNTER — HOSPITAL ENCOUNTER (OUTPATIENT)
Dept: RADIOLOGY | Facility: HOSPITAL | Age: 75
Discharge: HOME | End: 2025-01-07
Attending: ORTHOPAEDIC SURGERY
Payer: MEDICARE

## 2025-01-07 VITALS — BODY MASS INDEX: 26.05 KG/M2 | WEIGHT: 186.06 LBS | HEIGHT: 71 IN

## 2025-01-07 DIAGNOSIS — S92.355D CLOSED NONDISPLACED FRACTURE OF FIFTH METATARSAL BONE OF LEFT FOOT WITH ROUTINE HEALING, SUBSEQUENT ENCOUNTER: Primary | ICD-10-CM

## 2025-01-07 DIAGNOSIS — S92.355A CLOSED NONDISPLACED FRACTURE OF FIFTH METATARSAL BONE OF LEFT FOOT, INITIAL ENCOUNTER: ICD-10-CM

## 2025-01-07 PROCEDURE — 3008F BODY MASS INDEX DOCD: CPT | Mod: CPTII,S$GLB,, | Performed by: ORTHOPAEDIC SURGERY

## 2025-01-07 PROCEDURE — 1159F MED LIST DOCD IN RCRD: CPT | Mod: CPTII,S$GLB,, | Performed by: ORTHOPAEDIC SURGERY

## 2025-01-07 PROCEDURE — 1160F RVW MEDS BY RX/DR IN RCRD: CPT | Mod: CPTII,S$GLB,, | Performed by: ORTHOPAEDIC SURGERY

## 2025-01-07 PROCEDURE — 99999 PR PBB SHADOW E&M-EST. PATIENT-LVL III: CPT | Mod: PBBFAC,,, | Performed by: ORTHOPAEDIC SURGERY

## 2025-01-07 PROCEDURE — 3288F FALL RISK ASSESSMENT DOCD: CPT | Mod: CPTII,S$GLB,, | Performed by: ORTHOPAEDIC SURGERY

## 2025-01-07 PROCEDURE — 1101F PT FALLS ASSESS-DOCD LE1/YR: CPT | Mod: CPTII,S$GLB,, | Performed by: ORTHOPAEDIC SURGERY

## 2025-01-07 PROCEDURE — 73630 X-RAY EXAM OF FOOT: CPT | Mod: 26,LT,, | Performed by: RADIOLOGY

## 2025-01-07 PROCEDURE — 1125F AMNT PAIN NOTED PAIN PRSNT: CPT | Mod: CPTII,S$GLB,, | Performed by: ORTHOPAEDIC SURGERY

## 2025-01-07 PROCEDURE — 73630 X-RAY EXAM OF FOOT: CPT | Mod: TC,LT

## 2025-01-07 PROCEDURE — 99213 OFFICE O/P EST LOW 20 MIN: CPT | Mod: S$GLB,,, | Performed by: ORTHOPAEDIC SURGERY

## 2025-01-07 NOTE — TELEPHONE ENCOUNTER
Spoke with patient.  Scheduled to see Dr.James Gallardo on 2/25 at 1:00.   Confirmation mailed.   Flakita

## 2025-01-07 NOTE — PROGRESS NOTES
Jean Carslon  Returns today for follow-up.  This is a 74-year-old male who presented to me on 11/21/2024 for a month long history of lateral left foot pain with no known injury.  The onset of his pain occurred when he was doing a lot of steps up and down a [place] and he had pain around the base of his 5th metatarsal and I suspected that he may have insertional peroneal tendinitis.  His symptoms were fairly severe so I ordered an MRI of the foot and placed him in a boot.  The MRI that was performed on 11/27/2024 revealed a nondisplaced fracture of the base of the 5th metatarsal in the region of the metaphyseal diaphyseal junction consistent with a Wisdom fracture.  I discussed the results of the MRI with him and I recommended that he avoid any weight-bearing that causes any pain at that site and to continue to use the boot for immobilization.  Returns today and reports that overall he is doing a bit better.  He has been using a fracture boot.  He still has some pain when he puts any weight on the lateral aspect of his foot.    Examination:  The left foot reveals no swelling today.  There is still some tenderness around the fracture site but appears to be less tender than previous.    Imaging:  I ordered and reviewed an x-ray of the left foot today.  The fracture is now obvious on plain x-ray and there does appear to be some new bone formation but significant radiolucency remains.    Impression:   1. Closed nondisplaced fracture of fifth metatarsal bone of left foot with routine healing, subsequent encounter  X-Ray Foot Complete Left            Recommendation:  I reassured him that there are signs that the fracture is healing.  He appears to have less tenderness and there appears to be some new bone formation.  I would have him continue to try avoid any weight-bearing that causes pain at the fracture site.    Follow-up in four weeks with repeat x-ray left foot

## 2025-01-07 NOTE — TELEPHONE ENCOUNTER
----- Message from Sylvia sent at 1/6/2025  2:04 PM CST -----  Regarding: appt  Contact: [phone]  ..Type:  Needs appt     Who Called: pt   reason (please be specific): asking for appt. Attempted to schedule, no access     Would the patient rather a call back or a response via [facility]? Call back   Best Call Back Number: [phone]  Additional Information: n/a

## 2025-01-10 ENCOUNTER — OFFICE VISIT (OUTPATIENT)
Dept: UROLOGY | Facility: CLINIC | Age: 75
End: 2025-01-10
Payer: MEDICARE

## 2025-01-10 VITALS
HEIGHT: 71 IN | SYSTOLIC BLOOD PRESSURE: 129 MMHG | BODY MASS INDEX: 26.05 KG/M2 | DIASTOLIC BLOOD PRESSURE: 73 MMHG | HEART RATE: 84 BPM | WEIGHT: 186.06 LBS

## 2025-01-10 DIAGNOSIS — R39.9 LOWER URINARY TRACT SYMPTOMS (LUTS): ICD-10-CM

## 2025-01-10 DIAGNOSIS — N13.8 BPH WITH URINARY OBSTRUCTION: Primary | ICD-10-CM

## 2025-01-10 DIAGNOSIS — Z85.46 ENCOUNTER FOR FOLLOW-UP SURVEILLANCE OF PROSTATE CANCER: ICD-10-CM

## 2025-01-10 DIAGNOSIS — N40.1 BPH WITH URINARY OBSTRUCTION: Primary | ICD-10-CM

## 2025-01-10 DIAGNOSIS — Z08 ENCOUNTER FOR FOLLOW-UP SURVEILLANCE OF PROSTATE CANCER: ICD-10-CM

## 2025-01-10 DIAGNOSIS — C61 PROSTATE CANCER: ICD-10-CM

## 2025-01-10 LAB
BILIRUB SERPL-MCNC: NORMAL MG/DL
BLOOD URINE, POC: NORMAL
CLARITY, POC UA: CLEAR
COLOR, POC UA: YELLOW
GLUCOSE UR QL STRIP: NORMAL
KETONES UR QL STRIP: NORMAL
LEUKOCYTE ESTERASE URINE, POC: NORMAL
NITRITE, POC UA: NORMAL
PH, POC UA: 5 5
PROTEIN, POC: NORMAL
SPECIFIC GRAVITY, POC UA: 1.02
UROBILINOGEN, POC UA: 0.2

## 2025-01-10 PROCEDURE — 99999 PR PBB SHADOW E&M-EST. PATIENT-LVL III: CPT | Mod: PBBFAC,,, | Performed by: NURSE PRACTITIONER

## 2025-01-10 NOTE — PROGRESS NOTES
CHIEF COMPLAINT:    Jean Carlson is a 74 y.o. male presents today for Prostate Cancer.     HISTORY OF PRESENTING ILLINESS:    Jaen Carlson is a 74 y.o. male who had been diagnosed with Prostate Cancer during a transurethral prostatectomy 07/13/2019 by Dr. Knowles.  Was on flomax and avodart.  1% of specimen Julius 6. Had 22 grams resected.   Retired [employer] .  08/27/20219 meet with Dr. Calero for 2nd Opinion and decided on AS.      Stage- T1a  PSA- 2.9  PSAD-  <.138  Path- Julius 6 1%.  CANDY score- 1 low risk disease  NCCN- very low risk disease  12/2019 MRI- PI-RADS 1, 2.6 ccs!     11/10/2020 Prostate MRI:   - PSA was 0.62  - no significant lesions noted; PI-RADS 1      07/27/2021 had incision of BNC with Dr. Ny. Has been urinating well since.   10/21/2022 had a normal cysto with Dr. Ny done due to changes in urine flow at night.   He was given an Rx for Flomax that has helped since.     Last clinic visit was 07/10/2024 with PSA of 1.5.     He is here today for 6 month f/u visit.   Very concerned with Prostate Cancer;   PSA is now 1.9.  FOS is good during the day day; weaker at night; nocturia 1-2x.  He is still taking Flomax daily.  Reports ED and decreased libido. Testosterone normal at 579  He had been having some weight loss;   Osteoporosis; now taking Prolia.   History of having night sweats/hot flashes.   PCP tried Zoloft to help with nightsweats; he was unable to tolerate.   Not interested in ED but concerned.   TSH was normal     Now taking Prolia; was told he needed repeat DXA scan before getting the next one.          REVIEW OF SYSTEMS:  Review of Systems   Constitutional: Negative.  Negative for chills, fever and malaise/fatigue.   Eyes:  Negative for double vision.   Respiratory:  Negative for cough and shortness of breath.    Cardiovascular:  Negative for chest pain and palpitations.   Gastrointestinal:  Negative for abdominal pain, constipation, diarrhea, nausea and  vomiting.   Genitourinary:  Negative for dysuria, frequency and hematuria.        See HPI   Musculoskeletal:  Positive for myalgias. Negative for falls.   Neurological:  Negative for dizziness and seizures.   Endo/Heme/Allergies:  Negative for polydipsia.   Psychiatric/Behavioral:  The patient is nervous/anxious.          PATIENT HISTORY:    Past Medical History:   Diagnosis Date    Acute medial meniscus tear, right, initial encounter 2023    Anterior tibialis tendinitis of right lower extremity 10/18/2018    Basal cell carcinoma (BCC) of right cheek 2024    R cheek    Cancer     Chronic idiopathic constipation 2019    Closed fracture of left wrist 10/18/2021    Closed fracture of lower end of left radius with routine healing 2022    Elevated PSA     Family history of ASCVD 10/07/2016    Mom age 64, Dad age 67 -  on same day. Pat Aunt early 70's.    Family history of coronary artery disease 10/07/2016    Mom age 64, Dad age 67 -  on same day. Pat Aunt early 70's.    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    Intractable back pain 2018    Knee injury, right, initial encounter 05/15/2023    Nuclear sclerosis, bilateral 2018    Ocular migraine 2012    Osteoporosis     Prostate disease     Transient vision disturbance of both eyes 2012    Umbilical hernia without obstruction and without gangrene 10/01/2015    Urinary tract infection        Past Surgical History:   Procedure Laterality Date    back sx      lower    BLADDER FULGURATION N/A 2019    Procedure: FULGURATION, BLADDER;  Surgeon: Ryan Knowles MD;  [facility];  Service: Urology;  Laterality: N/A;  bleeding tissue at bladder neck    CATARACT EXTRACTION W/  INTRAOCULAR LENS IMPLANT Bilateral     Dr Finn/nAa IOL     CHONDROPLASTY OF KNEE Right 2023    Procedure:  CHONDROPLASTY, KNEE;  Surgeon: Taylor Young MD;  Location: [facility] OR;  Service: Orthopedics;  Laterality: Right;    COLONOSCOPY N/A 6/5/2019    Procedure: COLONOSCOPY;  Surgeon: Mauro Smallwood MD;  Location: [facility] ENDO (4TH FLR);  Service: Endoscopy;  Laterality: N/A;    CYSTOSCOPY N/A 7/13/2019    Procedure: CYSTOSCOPY;  Surgeon: Ryan Knowles MD;  Location: [facility] OR;  Service: Urology;  Laterality: N/A;    CYSTOSCOPY      CYSTOSCOPY  7/27/2021    Procedure: CYSTOSCOPY;  Surgeon: Manan Ny MD;  Location: [facility] OR 1ST FLR;  Service: Urology;;    ESOPHAGOGASTRODUODENOSCOPY N/A 3/31/2021    Procedure: EGD (ESOPHAGOGASTRODUODENOSCOPY);  Surgeon: Jamilah Munoz MD;  Location: [facility];  Service: Endoscopy;  Laterality: N/A;    HERNIA REPAIR      KNEE ARTHROSCOPY W/ MENISCECTOMY Right 5/30/2023    Procedure: ARTHROSCOPY, KNEE, WITH MENISCECTOMY;  Surgeon: Taylor Young MD;  Location: [facility] OR;  Service: Orthopedics;  Laterality: Right;    [id number] Right 5/30/2023    Procedure: [id number];  Surgeon: Taylor Young MD;  Location: [facility] OR;  Service: Orthopedics;  Laterality: Right;    PROSTATE SURGERY      REMOVAL OF BLOOD CLOT N/A 7/13/2019    Procedure: REMOVAL, BLOOD CLOT;  Surgeon: Ryan Knowles MD;  Location: [facility] OR;  Service: Urology;  Laterality: N/A;  prostate    SPINE SURGERY      SYNOVECTOMY OF KNEE Right 5/30/2023    Procedure: SYNOVECTOMY, KNEE;  Surgeon: Taylor Young MD;  Location: [facility] OR;  Service: Orthopedics;  Laterality: Right;    TONSILLECTOMY      TRANSURETHRAL RESECTION OF PROSTATE      TRANSURETHRAL RESECTION OF PROSTATE N/A 7/13/2019    Procedure: TURP (TRANSURETHRAL RESECTION OF PROSTATE);  Surgeon: Ryan Knowles MD;  Location: [facility] OR;  Service: Urology;  Laterality: N/A;    TRANSURETHRAL SURGICAL REMOVAL OF STRICTURE OF BLADDER NECK  7/27/2021    Procedure: EXCISION, CONTRACTURE, BLADDER NECK, TRANSURETHRAL;  Surgeon: Manan Ny MD;  Location:  [facility] OR 1ST FLR;  Service: Urology;;       Family History   Problem Relation Name Age of Onset    Heart disease Mother      Heart attack Mother      Skin cancer Mother      Heart disease Father      Heart attack Father      Glaucoma Maternal Aunt      Glaucoma Maternal Uncle Derek Joshi     Retinitis pigmentosa Maternal Uncle Derek Joshi     No Known Problems Daughter      Prostate cancer Neg Hx      Kidney disease Neg Hx      Colon cancer Neg Hx         Social History     Socioeconomic History    Marital status:    Occupational History     Employer: [employer]   Tobacco Use    Smoking status: Never    Smokeless tobacco: Never   Substance and Sexual Activity    Alcohol use: Yes     Alcohol/week: 3.0 standard drinks of alcohol     Types: 3 Glasses of wine per week     Comment: rarely    Drug use: No    Sexual activity: Not Currently     Partners: Female   Social History Narrative    ** Merged History Encounter **          [employer]. 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     Social Drivers of Health     Financial Resource Strain: Low Risk  (1/9/2024)    Overall Financial Resource Strain (CARDIA)     Difficulty of Paying Living Expenses: Not hard at all   Food Insecurity: No Food Insecurity (1/9/2024)    Hunger Vital Sign     Worried About Running Out of Food in the Last Year: Never true     Ran Out of Food in the Last Year: Never true   Transportation Needs: No Transportation Needs (1/9/2024)    PRAPARE - Transportation     Lack of Transportation (Medical): No     Lack of Transportation (Non-Medical): No   Physical Activity: Insufficiently Active (1/9/2024)    Exercise Vital Sign     Days of Exercise per Week: 2 days     Minutes of Exercise per Session: 30 min   Stress: No Stress Concern Present (1/9/2024)    [employer] - Occupational Stress Questionnaire     Feeling of Stress : Not at all   Housing Stability: Low Risk  (1/9/2024)    Housing Stability Vital Sign     Unable to Pay for  Housing in the Last Year: No     Number of Places Lived in the Last Year: 1     Unstable Housing in the Last Year: No       Allergies:  Patient has no known allergies.    Medications:    Current Outpatient Medications:     atorvastatin (LIPITOR) 20 MG tablet, TAKE 1 TABLET BY MOUTH EVERY DAY, Disp: 90 tablet, Rfl: 0    cholecalciferol, vitamin D3, (VITAMIN D3) 50 mcg (2,000 unit) Cap, Take 1 capsule by mouth once daily., Disp: , Rfl:     hydroCHLOROthiazide (HYDRODIURIL) 12.5 MG Tab, Take a half tablet (6.25 mg) by mouth once daily in the morning, Disp: 45 tablet, Rfl: 3    hydrocortisone (ANUSOL-HC) 2.5 % rectal cream, Place 1 application rectally 2 (two) times daily., Disp: 28 g, Rfl: 1    icosapent ethyL (VASCEPA) 1 gram Cap, Take 1 capsule (1,000 mg total) by mouth 2 (two) times a day., Disp: 60 capsule, Rfl: 11    lactulose (CEPHULAC) 20 gram Pack, Mix 1 packet (20 g total) with 4 ounces of water and take by mouth 3 (three) times daily., Disp: 90 packet, Rfl: 5    LINZESS 290 mcg Cap capsule, TAKE 1 CAPSULE BY MOUTH EVERY DAY BEFORE BREAKFAST, Disp: 90 capsule, Rfl: 3    multivitamin capsule, Take 1 capsule by mouth once daily., Disp: , Rfl:     pantoprazole (PROTONIX) 40 MG tablet, Take 1 tablet (40 mg total) by mouth once daily., Disp: 30 tablet, Rfl: 12    polyethylene glycol (GLYCOLAX) 17 gram PwPk, Take by mouth., Disp: , Rfl:     tamsulosin (FLOMAX) 0.4 mg Cap, TAKE 1 CAPSULE BY MOUTH EVERY DAY, Disp: 90 capsule, Rfl: 1    PHYSICAL EXAMINATION:  Physical Exam  Vitals and nursing note reviewed.   Constitutional:       General: He is awake.      Appearance: Normal appearance.   HENT:      Head: Normocephalic.      Right Ear: External ear normal.      Left Ear: External ear normal.      Nose: Nose normal.   Cardiovascular:      Rate and Rhythm: Normal rate.   Pulmonary:      Effort: Pulmonary effort is normal. No respiratory distress.   Abdominal:      Tenderness: There is no right CVA tenderness or left CVA  tenderness.   Musculoskeletal:         General: Normal range of motion.      Cervical back: Normal range of motion.   Skin:     General: Skin is warm and dry.   Neurological:      General: No focal deficit present.      Mental Status: He is alert and oriented to person, place, and time.   Psychiatric:         Mood and Affect: Mood normal.         Behavior: Behavior is cooperative.           LABS:      In office UA today was clear of active infection and visible blood.       Lab Results   Component Value Date    PSA 3.0 10/18/2018    PSA 2.1 10/27/2017    PSA 2.2 09/23/2016    PSADIAG 1.9 01/07/2025    PSADIAG 1.5 07/08/2024    PSADIAG 1.5 01/08/2024       Lab Results   Component Value Date    CREATININE 0.8 02/19/2024    EGFRNORACEVR >60.0 02/19/2024               IMPRESSION:    Encounter Diagnoses   Name Primary?    BPH with urinary obstruction Yes    Encounter for follow-up surveillance of prostate cancer     Prostate cancer     Lower urinary tract symptoms (LUTS)          Assessment:       1. BPH with urinary obstruction    2. Encounter for follow-up surveillance of prostate cancer    3. Prostate cancer    4. Lower urinary tract symptoms (LUTS)        Plan:         I spent a total of 30 minutes on the day of the visit. This includes face to face time and non-face to face time preparing to see the patient (eg, review of tests), obtaining and/or reviewing separately obtained history, documenting clinical information in the electronic or other health record, independently interpreting results and communicating results to the patient/family/caregiver, or care coordinator  Reviewed management of his Prostate Cancer surveillance  Reassurance with his current PSA.   -Age adjusted PSA normals for  male:   40-50 y/o  0-2.5   50-58 y/o  0-3.5  60-70 y/o  0-4.5  70-80 y/o  0-6.5     Reviewed management.  Recommendations for PCP follow up visit; any need to go to the ER.    Recommended lifestyle modifications with a  proper, healthy diet, good hydration but during the day. Reducing bladder irritants.   RTC 6 months with PSA

## 2025-02-04 ENCOUNTER — OFFICE VISIT (OUTPATIENT)
Dept: PODIATRY | Facility: CLINIC | Age: 75
End: 2025-02-04
Payer: MEDICARE

## 2025-02-04 VITALS
WEIGHT: 185.19 LBS | BODY MASS INDEX: 25.93 KG/M2 | HEIGHT: 71 IN | SYSTOLIC BLOOD PRESSURE: 129 MMHG | HEART RATE: 84 BPM | DIASTOLIC BLOOD PRESSURE: 73 MMHG

## 2025-02-04 DIAGNOSIS — G57.53 TARSAL TUNNEL SYNDROME OF BOTH LOWER EXTREMITIES: ICD-10-CM

## 2025-02-04 DIAGNOSIS — M48.061 SPINAL STENOSIS OF LUMBAR REGION WITHOUT NEUROGENIC CLAUDICATION: ICD-10-CM

## 2025-02-04 DIAGNOSIS — M79.671 PAIN IN BOTH FEET: ICD-10-CM

## 2025-02-04 DIAGNOSIS — M79.672 PAIN IN BOTH FEET: ICD-10-CM

## 2025-02-04 DIAGNOSIS — R20.0 NUMBNESS: Primary | ICD-10-CM

## 2025-02-04 PROCEDURE — 64450 NJX AA&/STRD OTHER PN/BRANCH: CPT | Mod: 50,S$GLB,, | Performed by: PODIATRIST

## 2025-02-04 PROCEDURE — 3078F DIAST BP <80 MM HG: CPT | Mod: CPTII,S$GLB,, | Performed by: PODIATRIST

## 2025-02-04 PROCEDURE — 3008F BODY MASS INDEX DOCD: CPT | Mod: CPTII,S$GLB,, | Performed by: PODIATRIST

## 2025-02-04 PROCEDURE — 1125F AMNT PAIN NOTED PAIN PRSNT: CPT | Mod: CPTII,S$GLB,, | Performed by: PODIATRIST

## 2025-02-04 PROCEDURE — 3074F SYST BP LT 130 MM HG: CPT | Mod: CPTII,S$GLB,, | Performed by: PODIATRIST

## 2025-02-04 PROCEDURE — 99999 PR PBB SHADOW E&M-EST. PATIENT-LVL IV: CPT | Mod: PBBFAC,,, | Performed by: PODIATRIST

## 2025-02-04 PROCEDURE — 99214 OFFICE O/P EST MOD 30 MIN: CPT | Mod: 25,S$GLB,, | Performed by: PODIATRIST

## 2025-02-06 ENCOUNTER — INFUSION (OUTPATIENT)
Dept: INFECTIOUS DISEASES | Facility: HOSPITAL | Age: 75
End: 2025-02-06
Payer: MEDICARE

## 2025-02-06 ENCOUNTER — TELEPHONE (OUTPATIENT)
Dept: PAIN MEDICINE | Facility: CLINIC | Age: 75
End: 2025-02-06
Payer: MEDICARE

## 2025-02-06 VITALS
HEIGHT: 71 IN | TEMPERATURE: 98 F | OXYGEN SATURATION: 97 % | HEART RATE: 77 BPM | RESPIRATION RATE: 19 BRPM | BODY MASS INDEX: 25.77 KG/M2 | SYSTOLIC BLOOD PRESSURE: 135 MMHG | DIASTOLIC BLOOD PRESSURE: 60 MMHG | WEIGHT: 184.06 LBS

## 2025-02-06 DIAGNOSIS — M81.0 OSTEOPOROSIS, UNSPECIFIED OSTEOPOROSIS TYPE, UNSPECIFIED PATHOLOGICAL FRACTURE PRESENCE: Primary | ICD-10-CM

## 2025-02-06 DIAGNOSIS — K21.9 GASTROESOPHAGEAL REFLUX DISEASE WITHOUT ESOPHAGITIS: ICD-10-CM

## 2025-02-06 PROCEDURE — 96372 THER/PROPH/DIAG INJ SC/IM: CPT

## 2025-02-06 PROCEDURE — 63600175 PHARM REV CODE 636 W HCPCS: Mod: JZ,TB | Performed by: INTERNAL MEDICINE

## 2025-02-06 RX ADMIN — DENOSUMAB 60 MG: 60 INJECTION SUBCUTANEOUS at 10:02

## 2025-02-07 ENCOUNTER — PATIENT MESSAGE (OUTPATIENT)
Dept: PODIATRY | Facility: CLINIC | Age: 75
End: 2025-02-07
Payer: MEDICARE

## 2025-02-07 NOTE — PROGRESS NOTES
Subjective:      Patient ID: Jean Carlson is a 74 y.o. male.    Chief Complaint: Foot Problem (Bilateral burning with numbness ) and Foot Pain (Left foot fracture with cyan discoloration that comes and go)    Pt presents today with several complaints. First complaint is a dash fracture of the left 5th metatarsal. Pt states he was doing a lot of exercising a sustained a met fx. Pt is also c/o numbness of both of his feet. Pt is under the care of Dr Steiner for his metatarsal fx.  Patient is having issues with tingling burning and numbness to the bottoms of both feet.  Review of Systems   Constitutional: Negative for chills, fever and malaise/fatigue.   HENT:  Negative for hearing loss.    Cardiovascular:  Negative for claudication.   Respiratory:  Negative for shortness of breath.    Skin:  Negative for flushing and rash.   Musculoskeletal:  Negative for joint pain and myalgias.   Gastrointestinal:  Negative for nausea and vomiting.   Neurological:  Positive for numbness and paresthesias. Negative for loss of balance and sensory change.   Psychiatric/Behavioral:  Negative for altered mental status.    Allergic/Immunologic: Positive for hives.           Objective:      Physical Exam  Vitals reviewed.   Cardiovascular:      Pulses:           Dorsalis pedis pulses are 2+ on the right side and 2+ on the left side.        Posterior tibial pulses are 2+ on the right side and 2+ on the left side.      Comments: No edema noted b/L  Musculoskeletal:         General: Tenderness and signs of injury present. No deformity.      Comments:     POP to plantar heels, b/L    Left ROM deferred due to fx   Feet:      Right foot:      Protective Sensation: 5 sites tested.  5 sites sensed.      Left foot:      Protective Sensation: 5 sites tested.  5 sites sensed.   Skin:     General: Skin is warm.      Capillary Refill: Capillary refill takes 2 to 3 seconds.      Comments: Normal skin tugor noted.   No open lesion noted b/L  Skin  temp is warm to warm from proximal to distal b/L.  Webspaces clean, dry, and intact     Neurological:      Mental Status: He is alert.      Comments: Gross sensation intact b/L       Positive provocation sign/Tinel sign bilateral posterior tibial nerve.        Assessment:       Encounter Diagnoses   Name Primary?    Numbness Yes    Tarsal tunnel syndrome of both lower extremities     Pain in both feet     Spinal stenosis of lumbar region without neurogenic claudication          Plan:       Jean was seen today for foot problem and foot pain.    Diagnoses and all orders for this visit:    Numbness  -     Ambulatory referral/consult to Pain Clinic; Future    Tarsal tunnel syndrome of both lower extremities  -     HME - OTHER  -     Ambulatory referral/consult to Pain Clinic; Future    Pain in both feet    Spinal stenosis of lumbar region without neurogenic claudication  -     Ambulatory referral/consult to Pain Clinic; Future      I counseled the patient on his conditions, their implications and medical management.    Independent visualization of imaging was performed.  Results were reviewed in detail with patient.    Discussed options for peripheral neuropathy/nerve entrapment syndrome including nerve block therapy, surgical nerve entrapment decompression procedures, and various vitamins and supplementation available shown to improve nerve function.    Nerve injection:    Performed by:  Nicolás Adams DPM    Preop diagnosis:  Neuropathy symptoms/paresthesias/pain    The area overlying the bilateral posterior tibial nerve(s) was sterilely prepped, verbal consent was obtained, 2 cc's of 0.5% Marcaine plain was infiltrated around the affected nerve(s) for a diagnostic nerve block.  This was well tolerated with no complications.    Recommend pain clinic for possible epidural evaluation.  Begin topical compound neuropathy cream.  Follow-up in 4-6 weeks.      .

## 2025-02-11 ENCOUNTER — OFFICE VISIT (OUTPATIENT)
Dept: ORTHOPEDICS | Facility: CLINIC | Age: 75
End: 2025-02-11
Payer: MEDICARE

## 2025-02-11 ENCOUNTER — HOSPITAL ENCOUNTER (OUTPATIENT)
Dept: RADIOLOGY | Facility: HOSPITAL | Age: 75
Discharge: HOME | End: 2025-02-11
Attending: ORTHOPAEDIC SURGERY
Payer: MEDICARE

## 2025-02-11 VITALS — HEIGHT: 71 IN | BODY MASS INDEX: 25.77 KG/M2 | WEIGHT: 184.06 LBS

## 2025-02-11 DIAGNOSIS — S92.355D CLOSED NONDISPLACED FRACTURE OF FIFTH METATARSAL BONE OF LEFT FOOT WITH ROUTINE HEALING, SUBSEQUENT ENCOUNTER: ICD-10-CM

## 2025-02-11 DIAGNOSIS — S92.355D CLOSED NONDISPLACED FRACTURE OF FIFTH METATARSAL BONE OF LEFT FOOT WITH ROUTINE HEALING, SUBSEQUENT ENCOUNTER: Primary | ICD-10-CM

## 2025-02-11 PROCEDURE — 1101F PT FALLS ASSESS-DOCD LE1/YR: CPT | Mod: CPTII,S$GLB,, | Performed by: ORTHOPAEDIC SURGERY

## 2025-02-11 PROCEDURE — 73630 X-RAY EXAM OF FOOT: CPT | Mod: 26,LT,, | Performed by: RADIOLOGY

## 2025-02-11 PROCEDURE — 1160F RVW MEDS BY RX/DR IN RCRD: CPT | Mod: CPTII,S$GLB,, | Performed by: ORTHOPAEDIC SURGERY

## 2025-02-11 PROCEDURE — 3008F BODY MASS INDEX DOCD: CPT | Mod: CPTII,S$GLB,, | Performed by: ORTHOPAEDIC SURGERY

## 2025-02-11 PROCEDURE — 73630 X-RAY EXAM OF FOOT: CPT | Mod: TC,LT

## 2025-02-11 PROCEDURE — 1159F MED LIST DOCD IN RCRD: CPT | Mod: CPTII,S$GLB,, | Performed by: ORTHOPAEDIC SURGERY

## 2025-02-11 PROCEDURE — 99213 OFFICE O/P EST LOW 20 MIN: CPT | Mod: S$GLB,,, | Performed by: ORTHOPAEDIC SURGERY

## 2025-02-11 PROCEDURE — 99999 PR PBB SHADOW E&M-EST. PATIENT-LVL IV: CPT | Mod: PBBFAC,,, | Performed by: ORTHOPAEDIC SURGERY

## 2025-02-11 PROCEDURE — 1125F AMNT PAIN NOTED PAIN PRSNT: CPT | Mod: CPTII,S$GLB,, | Performed by: ORTHOPAEDIC SURGERY

## 2025-02-11 PROCEDURE — 3288F FALL RISK ASSESSMENT DOCD: CPT | Mod: CPTII,S$GLB,, | Performed by: ORTHOPAEDIC SURGERY

## 2025-02-11 NOTE — PROGRESS NOTES
Jean Carlson  Returns today for follow-up.  This is a 74-year-old male who presented to me on 11/21/2024 for a month long history of lateral left foot pain with no injury.  The onset of his pain occurred when he was doing a lot of steps [place] and [place] and he developed pain around the base of the 5th metatarsal.  I suspected that he may have peroneal tendinitis but his symptoms were fairly severe so I ordered an MRI of the foot and placed him in a boot.  The MRI performed on 11/27/2024 revealed a nondisplaced fracture of the base of the 5th metatarsal in the region of the metaphyseal diaphyseal junction consistent with a Wisdom fracture.  I recommended continued close treatment to avoid any weight-bearing that causes pain and continued boot immobilization.  He returned to see me on 01/07/2025 reporting that his pain was overall better but he still had pain any time he put weight on the lateral aspect of his foot.  X-ray at that time show continued lucency but there appeared to be some new bone formation.  I recommended continued time and protected weight-bearing.  He returns today and reports continued improvement with his pain but he reports generalized weakness and fatigue.  It does not report any significant swelling.  He saw Dr. Adams for neuropathy and states that the injections have helped his burning pain tremendously.    Examination:  The left foot reveals no significant swelling.  There is very minimal tenderness to palpation over the fracture site.    Imaging: I ordered and reviewed an x-ray of the left foot today.  Lucency is still apparent but continues to show improved healing.    Impression:   1. Closed nondisplaced fracture of fifth metatarsal bone of left foot with routine healing, subsequent encounter  X-Ray Foot Complete Left    Ambulatory referral/consult to Orthopedics    Ambulatory Referral/Consult to Physical/Occupational Therapy            Recommendation:  At this point I would like  him to try to start weaning out of the boot especially at home.  I also want him to start physical therapy.    Follow-up in four weeks with repeat x-ray left foot

## 2025-02-17 ENCOUNTER — PATIENT MESSAGE (OUTPATIENT)
Dept: INTERNAL MEDICINE | Facility: CLINIC | Age: 75
End: 2025-02-17
Payer: MEDICARE

## 2025-02-17 DIAGNOSIS — E78.5 HYPERLIPIDEMIA, UNSPECIFIED HYPERLIPIDEMIA TYPE: ICD-10-CM

## 2025-02-17 DIAGNOSIS — N13.8 BPH WITH URINARY OBSTRUCTION: ICD-10-CM

## 2025-02-17 DIAGNOSIS — I70.0 ATHEROSCLEROSIS OF AORTA: ICD-10-CM

## 2025-02-17 DIAGNOSIS — N40.1 BPH WITH URINARY OBSTRUCTION: ICD-10-CM

## 2025-02-17 DIAGNOSIS — Z82.49 FAMILY HISTORY OF CORONARY ARTERY DISEASE: ICD-10-CM

## 2025-02-17 PROBLEM — M53.86 DECREASED RANGE OF MOTION OF LUMBAR SPINE: Status: RESOLVED | Noted: 2024-02-05 | Resolved: 2025-02-17

## 2025-02-17 PROBLEM — R29.898 DECREASED STRENGTH OF LOWER EXTREMITY: Status: ACTIVE | Noted: 2025-02-17

## 2025-02-17 PROBLEM — M25.672 DECREASED RANGE OF MOTION OF LEFT ANKLE: Status: ACTIVE | Noted: 2025-02-17

## 2025-02-18 ENCOUNTER — PATIENT MESSAGE (OUTPATIENT)
Dept: UROLOGY | Facility: CLINIC | Age: 75
End: 2025-02-18
Payer: MEDICARE

## 2025-02-18 RX ORDER — ATORVASTATIN CALCIUM 20 MG/1
20 TABLET, FILM COATED ORAL DAILY
Qty: 90 TABLET | Refills: 0 | Status: SHIPPED | OUTPATIENT
Start: 2025-02-18

## 2025-02-18 RX ORDER — ICOSAPENT ETHYL 1 G/1
1 CAPSULE ORAL 2 TIMES DAILY
Qty: 180 CAPSULE | Refills: 0 | Status: SHIPPED | OUTPATIENT
Start: 2025-02-18

## 2025-02-18 NOTE — TELEPHONE ENCOUNTER
Refill Routing Note   Medication(s) are not appropriate for processing by [facility] for the following reason(s):        Required labs outdated    [facility] action(s):  Defer     Requires labs : Yes             Appointments  past 12m or future 3m with PCP    Date Provider   Last Visit   11/18/2024 Papi Gallardo MD   Next Visit   5/19/2025 Papi Gallardo MD   ED visits in past 90 days: 0        Note composed:10:59 AM 02/18/2025

## 2025-02-18 NOTE — TELEPHONE ENCOUNTER
Care Due:                  Date            Visit Type   Department     Provider  --------------------------------------------------------------------------------                                EP -                              PRIMARY      [facility]  Last Visit: 11-      CARE ([facility])   MEDICINE       RAMBO MARTINEZ  Next Visit: None Scheduled  None         None Found                                                            Last  Test          Frequency    Reason                     Performed    Due Date  --------------------------------------------------------------------------------    CMP.........  12 months..  atorvastatin, icosapent..  02- 04-    [facility] Embedded Care Due Messages. Reference number: [id number].   2/17/2025 9:46:13 PM CST

## 2025-02-20 ENCOUNTER — TELEPHONE (OUTPATIENT)
Dept: UROLOGY | Facility: CLINIC | Age: 75
End: 2025-02-20
Payer: MEDICARE

## 2025-02-20 DIAGNOSIS — N13.8 BPH WITH URINARY OBSTRUCTION: ICD-10-CM

## 2025-02-20 DIAGNOSIS — N40.1 BPH WITH URINARY OBSTRUCTION: ICD-10-CM

## 2025-02-20 RX ORDER — TAMSULOSIN HYDROCHLORIDE 0.4 MG/1
1 CAPSULE ORAL DAILY
Qty: 90 CAPSULE | Refills: 3 | Status: SHIPPED | OUTPATIENT
Start: 2025-02-20

## 2025-02-20 NOTE — TELEPHONE ENCOUNTER
----- Message from HALIMA Jacques sent at 2/20/2025 11:42 AM CST -----  Regarding: FW: REfill  Contact: MALIKA PAPPAS [[id number]]    ----- Message -----  From: Sherly Garza  Sent: 2/20/2025  11:17 AM CST  To: Galilea TAVAREZ Staff  Subject: REfill                                           Refill RequestType:  Rx Refill RequestRefill or New Rx:  RefillRx Name and Strength: amsulosin (FLOMAX) 0.4 mg [id number] [place], [place] [facility] [address] [facility] [facility] [id number] [facility] [address]Dwvlk: [phone] Fax: [phone] or Mail Order:  LocalWould the patient rather a call back or response vis [facility]?  [facility] Call Back Number: [phone] Information:

## 2025-02-25 ENCOUNTER — OFFICE VISIT (OUTPATIENT)
Dept: GASTROENTEROLOGY | Facility: CLINIC | Age: 75
End: 2025-02-25
Payer: MEDICARE

## 2025-02-25 VITALS
HEIGHT: 71 IN | WEIGHT: 185 LBS | SYSTOLIC BLOOD PRESSURE: 115 MMHG | HEART RATE: 87 BPM | BODY MASS INDEX: 25.9 KG/M2 | DIASTOLIC BLOOD PRESSURE: 65 MMHG

## 2025-02-25 DIAGNOSIS — K21.9 GASTROESOPHAGEAL REFLUX DISEASE WITHOUT ESOPHAGITIS: ICD-10-CM

## 2025-02-25 DIAGNOSIS — K59.04 CHRONIC IDIOPATHIC CONSTIPATION: ICD-10-CM

## 2025-02-25 PROCEDURE — 3008F BODY MASS INDEX DOCD: CPT | Mod: CPTII,S$GLB,, | Performed by: INTERNAL MEDICINE

## 2025-02-25 PROCEDURE — 3074F SYST BP LT 130 MM HG: CPT | Mod: CPTII,S$GLB,, | Performed by: INTERNAL MEDICINE

## 2025-02-25 PROCEDURE — 99999 PR PBB SHADOW E&M-EST. PATIENT-LVL IV: CPT | Mod: PBBFAC,,, | Performed by: INTERNAL MEDICINE

## 2025-02-25 PROCEDURE — 99214 OFFICE O/P EST MOD 30 MIN: CPT | Mod: S$GLB,,, | Performed by: INTERNAL MEDICINE

## 2025-02-25 PROCEDURE — 1159F MED LIST DOCD IN RCRD: CPT | Mod: CPTII,S$GLB,, | Performed by: INTERNAL MEDICINE

## 2025-02-25 PROCEDURE — 1160F RVW MEDS BY RX/DR IN RCRD: CPT | Mod: CPTII,S$GLB,, | Performed by: INTERNAL MEDICINE

## 2025-02-25 PROCEDURE — 1101F PT FALLS ASSESS-DOCD LE1/YR: CPT | Mod: CPTII,S$GLB,, | Performed by: INTERNAL MEDICINE

## 2025-02-25 PROCEDURE — 3078F DIAST BP <80 MM HG: CPT | Mod: CPTII,S$GLB,, | Performed by: INTERNAL MEDICINE

## 2025-02-25 PROCEDURE — 3288F FALL RISK ASSESSMENT DOCD: CPT | Mod: CPTII,S$GLB,, | Performed by: INTERNAL MEDICINE

## 2025-02-25 PROCEDURE — 1126F AMNT PAIN NOTED NONE PRSNT: CPT | Mod: CPTII,S$GLB,, | Performed by: INTERNAL MEDICINE

## 2025-02-25 RX ORDER — FAMOTIDINE 20 MG/1
20 TABLET, FILM COATED ORAL 2 TIMES DAILY
Qty: 60 TABLET | Refills: 11 | Status: SHIPPED | OUTPATIENT
Start: 2025-02-25 | End: 2026-02-25

## 2025-02-25 NOTE — PROGRESS NOTES
Subjective:       Patient ID: Jean Carlson is a 74 y.o. male.    Chief Complaint: Follow-up    HPI    Follow-up visit.  74-year-old gentleman that I have been following for a little more than a year.  Previously treated by Dr. Munoz  Longstanding history of significant constipation.  He came to me on the combination of Linzess and MiraLax, after Linzess by itself was ineffective.  I think he was previously tried on Amitiza and I know that I tried him on Trulance which she tried for about a week and it did not prove effective.  He is very diligent on all of his efforts with in terms of diet habits and exercise.    Right now he is on the Linzess 290 along with MiraLax and MiraLax could be between 2 and 4 cap fulls a day.  And by doing this it keeps him from straining and he has had problems with hemorrhoids in the past.  And this yields several small volume bowel movements a day.  They can vary from hard to soft and pasty.  Depending on what he eats.  Not that much bloating right now.  Definitely reports that he had a lot of bloating when he tried to get off pantoprazole before he has been on pantoprazole for years.  He has had EGDs which have not shown esophagitis.      Social history   Excellent diet with a lot of greens   Has tried daily pair in key we intake in the past   Good water intake    Past Medical History:   Diagnosis Date    Acute medial meniscus tear, right, initial encounter 2023    Anterior tibialis tendinitis of right lower extremity 10/18/2018    Basal cell carcinoma (BCC) of right cheek 2024    R cheek    Cancer     Chronic idiopathic constipation 2019    Closed fracture of left wrist 10/18/2021    Closed fracture of lower end of left radius with routine healing 2022    Elevated PSA     Family history of ASCVD 10/07/2016    Mom age 64, Dad age 67 -  on same day. Pat Aunt early 70's.    Family history of coronary artery disease 10/07/2016    Mom age 64, Dad age 67 -   on same day. Pat Aunt early 70's.    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    Intractable back pain 2018    Knee injury, right, initial encounter 05/15/2023    Nuclear sclerosis, bilateral 2018    Ocular migraine 2012    Osteoporosis     Prostate disease     Transient vision disturbance of both eyes 2012    Umbilical hernia without obstruction and without gangrene 10/01/2015    Urinary tract infection        Review of patient's allergies indicates:  No Known Allergies     Family History   Problem Relation Name Age of Onset    Heart disease Mother      Heart attack Mother      Skin cancer Mother      Heart disease Father      Heart attack Father      Glaucoma Maternal Aunt      Glaucoma Maternal Uncle Derek Madelyn     Retinitis pigmentosa Maternal Uncle Derek Madelyn     No Known Problems Daughter      Prostate cancer Neg Hx      Kidney disease Neg Hx      Colon cancer Neg Hx         Social History[1]     Review of Systems        No results found for this or any previous visit from the past 365 days.             Objective:      Physical Exam    Assessment & Plan:       Gastroesophageal reflux disease without esophagitis  -     Ambulatory referral/consult to Gastroenterology    Chronic idiopathic constipation  -     Ambulatory referral/consult to Gastroenterology     Assessment  One chronic idiopathic constipation.  He has already had a different g which was normal.  I do not think there probably is any role for pelvic floor therapy.  I offered a chance to switch to Motegrity which is also indicated for CIC.  He might benefit from Ibsrela, but that is only indicated for the diagnosis of IBS C.  But we could certainly meet the diagnostic criteria for that.  For now he is going to continue with all of his dietary measures, with a good water intake, and consider a medication  trial in the future  2. Gastroesophageal reflux.  He has been on the pantoprazole with good symptom relief.  He is concerned about that now that he has osteopenia and a recent bone fracture.  When he tried to get off pantoprazole before he went cold turkey on no H2 blocker.  I think he might have more success it getting off a PPI if he does go to an H2 blocker and we are going to try that with a Pepcid transition now.  He will let me know after 1 month    This note was created with voice recognition dictation technology.  There may be errors that I did not see, detect or correct.      Dennis Gallardo MD          [1]   Social History  Tobacco Use    Smoking status: Never    Smokeless tobacco: Never   Substance Use Topics    Alcohol use: Yes     Alcohol/week: 3.0 standard drinks of alcohol     Types: 3 Glasses of wine per week     Comment: rarely    Drug use: No

## 2025-03-05 ENCOUNTER — OFFICE VISIT (OUTPATIENT)
Dept: PODIATRY | Facility: CLINIC | Age: 75
End: 2025-03-05
Payer: MEDICARE

## 2025-03-05 VITALS
SYSTOLIC BLOOD PRESSURE: 126 MMHG | BODY MASS INDEX: 25.8 KG/M2 | HEART RATE: 77 BPM | DIASTOLIC BLOOD PRESSURE: 77 MMHG | HEIGHT: 71 IN

## 2025-03-05 DIAGNOSIS — M79.672 PAIN IN BOTH FEET: ICD-10-CM

## 2025-03-05 DIAGNOSIS — M48.061 SPINAL STENOSIS OF LUMBAR REGION WITHOUT NEUROGENIC CLAUDICATION: ICD-10-CM

## 2025-03-05 DIAGNOSIS — G57.53 TARSAL TUNNEL SYNDROME OF BOTH LOWER EXTREMITIES: Primary | ICD-10-CM

## 2025-03-05 DIAGNOSIS — M79.671 PAIN IN BOTH FEET: ICD-10-CM

## 2025-03-05 PROCEDURE — 99999 PR PBB SHADOW E&M-EST. PATIENT-LVL III: CPT | Mod: PBBFAC,,, | Performed by: PODIATRIST

## 2025-03-05 PROCEDURE — 3074F SYST BP LT 130 MM HG: CPT | Mod: CPTII,S$GLB,, | Performed by: PODIATRIST

## 2025-03-05 PROCEDURE — 1125F AMNT PAIN NOTED PAIN PRSNT: CPT | Mod: CPTII,S$GLB,, | Performed by: PODIATRIST

## 2025-03-05 PROCEDURE — 99214 OFFICE O/P EST MOD 30 MIN: CPT | Mod: S$GLB,,, | Performed by: PODIATRIST

## 2025-03-05 PROCEDURE — 3078F DIAST BP <80 MM HG: CPT | Mod: CPTII,S$GLB,, | Performed by: PODIATRIST

## 2025-03-05 PROCEDURE — 3008F BODY MASS INDEX DOCD: CPT | Mod: CPTII,S$GLB,, | Performed by: PODIATRIST

## 2025-03-07 ENCOUNTER — OFFICE VISIT (OUTPATIENT)
Dept: URGENT CARE | Facility: CLINIC | Age: 75
End: 2025-03-07
Payer: MEDICARE

## 2025-03-07 VITALS
RESPIRATION RATE: 16 BRPM | HEART RATE: 73 BPM | SYSTOLIC BLOOD PRESSURE: 140 MMHG | WEIGHT: 185 LBS | TEMPERATURE: 98 F | HEIGHT: 71 IN | BODY MASS INDEX: 25.9 KG/M2 | OXYGEN SATURATION: 99 % | DIASTOLIC BLOOD PRESSURE: 71 MMHG

## 2025-03-07 DIAGNOSIS — H65.191 ACUTE EFFUSION OF RIGHT EAR: Primary | ICD-10-CM

## 2025-03-07 RX ORDER — FLUTICASONE PROPIONATE 50 MCG
1 SPRAY, SUSPENSION (ML) NASAL DAILY
Qty: 9.9 ML | Refills: 0 | Status: SHIPPED | OUTPATIENT
Start: 2025-03-07

## 2025-03-07 NOTE — PROGRESS NOTES
"Subjective:      Patient ID: Jean Carlson is a 74 y.o. male.    Vitals:  height is 5' 11" (1.803 m) and weight is 83.9 kg (185 lb). His oral temperature is 98.1 °F (36.7 °C). His blood pressure is 140/71 (abnormal) and his pulse is 73. His respiration is 16 and oxygen saturation is 99%.     Chief Complaint: Otalgia    Pt complains of right ear pain on/off for a week. Pt states he has not taken any medications to help.    Otalgia   There is pain in the right ear. This is a new problem. The current episode started 1 to 4 weeks ago. The problem occurs constantly. The problem has been unchanged. There has been no fever. The pain is at a severity of 3/10. The pain is mild. Pertinent negatives include no abdominal pain, coughing, diarrhea, ear discharge, headaches, hearing loss, neck pain, rash, rhinorrhea, sore throat or vomiting. He has tried nothing for the symptoms. The treatment provided no relief.       HENT:  Positive for ear pain. Negative for ear discharge, hearing loss and sore throat.    Neck: Negative for neck pain.   Respiratory:  Negative for cough.    Gastrointestinal:  Negative for abdominal pain, vomiting and diarrhea.   Skin:  Negative for rash.   Neurological:  Negative for headaches.      Objective:     Physical Exam   Constitutional: He does not appear ill. No distress. normal  HENT:   Head: Normocephalic and atraumatic.   Ears:   Right Ear: Ear canal normal. Tympanic membrane is not injected and not erythematous. A middle ear effusion is present.   Left Ear: Hearing, tympanic membrane and ear canal normal.   Nose: No rhinorrhea or congestion.   Neck: Neck supple.   Cardiovascular: Normal rate, regular rhythm, normal heart sounds and normal pulses.   Pulmonary/Chest: Effort normal and breath sounds normal.   Abdominal: Normal appearance.   Lymphadenopathy:     He has no cervical adenopathy.   Neurological: He is alert.   Nursing note and vitals reviewed.      Assessment:     1. Acute effusion of " right ear        Plan:       Acute effusion of right ear  -     fluticasone propionate (FLONASE) 50 mcg/actuation nasal spray; 1 spray (50 mcg total) by Each Nostril route once daily.  Dispense: 9.9 mL; Refill: 0    Discussed OTC decongestants

## 2025-03-11 ENCOUNTER — OFFICE VISIT (OUTPATIENT)
Dept: ORTHOPEDICS | Facility: CLINIC | Age: 75
End: 2025-03-11
Payer: MEDICARE

## 2025-03-11 ENCOUNTER — HOSPITAL ENCOUNTER (OUTPATIENT)
Dept: RADIOLOGY | Facility: HOSPITAL | Age: 75
Discharge: HOME | End: 2025-03-11
Attending: ORTHOPAEDIC SURGERY
Payer: MEDICARE

## 2025-03-11 VITALS — WEIGHT: 184.94 LBS | BODY MASS INDEX: 25.89 KG/M2 | HEIGHT: 71 IN

## 2025-03-11 DIAGNOSIS — S92.355D CLOSED NONDISPLACED FRACTURE OF FIFTH METATARSAL BONE OF LEFT FOOT WITH ROUTINE HEALING, SUBSEQUENT ENCOUNTER: Primary | ICD-10-CM

## 2025-03-11 DIAGNOSIS — S92.355D CLOSED NONDISPLACED FRACTURE OF FIFTH METATARSAL BONE OF LEFT FOOT WITH ROUTINE HEALING, SUBSEQUENT ENCOUNTER: ICD-10-CM

## 2025-03-11 PROCEDURE — 3008F BODY MASS INDEX DOCD: CPT | Mod: CPTII,S$GLB,, | Performed by: ORTHOPAEDIC SURGERY

## 2025-03-11 PROCEDURE — 73630 X-RAY EXAM OF FOOT: CPT | Mod: TC,LT

## 2025-03-11 PROCEDURE — 99999 PR PBB SHADOW E&M-EST. PATIENT-LVL III: CPT | Mod: PBBFAC,,, | Performed by: ORTHOPAEDIC SURGERY

## 2025-03-11 PROCEDURE — 1159F MED LIST DOCD IN RCRD: CPT | Mod: CPTII,S$GLB,, | Performed by: ORTHOPAEDIC SURGERY

## 2025-03-11 PROCEDURE — 1125F AMNT PAIN NOTED PAIN PRSNT: CPT | Mod: CPTII,S$GLB,, | Performed by: ORTHOPAEDIC SURGERY

## 2025-03-11 PROCEDURE — 73630 X-RAY EXAM OF FOOT: CPT | Mod: 26,LT,, | Performed by: RADIOLOGY

## 2025-03-11 PROCEDURE — 1101F PT FALLS ASSESS-DOCD LE1/YR: CPT | Mod: CPTII,S$GLB,, | Performed by: ORTHOPAEDIC SURGERY

## 2025-03-11 PROCEDURE — 99212 OFFICE O/P EST SF 10 MIN: CPT | Mod: S$GLB,,, | Performed by: ORTHOPAEDIC SURGERY

## 2025-03-11 PROCEDURE — 3288F FALL RISK ASSESSMENT DOCD: CPT | Mod: CPTII,S$GLB,, | Performed by: ORTHOPAEDIC SURGERY

## 2025-03-11 NOTE — PROGRESS NOTES
Jean Carlson  Returns today for follow-up.  This is a 74-year-old male who presented to me on 11/21/2024 with a one-month history of lateral left foot pain with no injury.  Subsequent MRI revealed a nondisplaced fracture of the base of the 5th metatarsal consistent with a Wisdom fracture.  We have been managing his fracture with closed measures including boot immobilization and protected weight-bearing to avoid pain at the fracture site.  His last visit was on 02/11/2025 at which time he reported continued but slow improvement along with generalized weakness and fatigue.  There was very minimal tenderness over the fracture site but there was still some radiolucency on the x-rays which appeared to be improved.  I suggested he could start weaning out of the boot and I recommended some physical therapy due to his weakness issues.  He returns today and reports that he is currently in physical therapy in his sitting significant improvement with his range of motion.  He feels the pain at the fracture site is also improved and he has been able to walk around the house without the boot in regular shoes.    Examination:  The left foot reveals no significant swelling.  I am unable to elicit any tenderness to palpation over the base of the 5th metatarsal.  He has good function and strength of the peroneus brevis tendon.    Imaging: I ordered and reviewed an x-ray of the left foot today.  There continues to be radiolucency at the fracture site but it appears to be filling in with new calcified bone.    Impression:   1. Closed nondisplaced fracture of fifth metatarsal bone of left foot with routine healing, subsequent encounter  X-Ray Foot Complete Left            Recommendation:  I reassured him that clinically he is essentially healed and the only activities he should avoid at this point are high impact running and jumping activities which she has no plans to do any way.  I encouraged him to wean more out of the boot and  to progress his low-impact activities.      Follow-up in six weeks with repeat x-ray left foot

## 2025-03-15 ENCOUNTER — OFFICE VISIT (OUTPATIENT)
Dept: URGENT CARE | Facility: CLINIC | Age: 75
End: 2025-03-15
Payer: MEDICARE

## 2025-03-15 VITALS
DIASTOLIC BLOOD PRESSURE: 82 MMHG | WEIGHT: 184 LBS | TEMPERATURE: 99 F | HEIGHT: 71 IN | RESPIRATION RATE: 18 BRPM | HEART RATE: 90 BPM | OXYGEN SATURATION: 96 % | SYSTOLIC BLOOD PRESSURE: 144 MMHG | BODY MASS INDEX: 25.76 KG/M2

## 2025-03-15 DIAGNOSIS — U07.1 COVID: Primary | ICD-10-CM

## 2025-03-15 DIAGNOSIS — U07.1 COVID-19 VIRUS DETECTED: ICD-10-CM

## 2025-03-15 LAB
CTP QC/QA: YES
CTP QC/QA: YES
POC MOLECULAR INFLUENZA A AGN: NEGATIVE
POC MOLECULAR INFLUENZA B AGN: NEGATIVE
SARS CORONAVIRUS 2 ANTIGEN: POSITIVE

## 2025-03-15 PROCEDURE — 87811 SARS-COV-2 COVID19 W/OPTIC: CPT | Mod: QW,S$GLB,, | Performed by: NURSE PRACTITIONER

## 2025-03-15 PROCEDURE — 99213 OFFICE O/P EST LOW 20 MIN: CPT | Mod: 25,S$GLB,, | Performed by: NURSE PRACTITIONER

## 2025-03-15 PROCEDURE — 87502 INFLUENZA DNA AMP PROBE: CPT | Mod: QW,S$GLB,, | Performed by: NURSE PRACTITIONER

## 2025-03-15 PROCEDURE — 96372 THER/PROPH/DIAG INJ SC/IM: CPT | Mod: S$GLB,,, | Performed by: NURSE PRACTITIONER

## 2025-03-15 RX ORDER — PROMETHAZINE HYDROCHLORIDE AND DEXTROMETHORPHAN HYDROBROMIDE 6.25; 15 MG/5ML; MG/5ML
5 SYRUP ORAL NIGHTLY PRN
Qty: 118 ML | Refills: 0 | Status: SHIPPED | OUTPATIENT
Start: 2025-03-15

## 2025-03-15 RX ORDER — BENZONATATE 200 MG/1
200 CAPSULE ORAL EVERY 8 HOURS PRN
Qty: 30 CAPSULE | Refills: 0 | Status: SHIPPED | OUTPATIENT
Start: 2025-03-15

## 2025-03-15 RX ORDER — IBUPROFEN 800 MG/1
800 TABLET ORAL EVERY 6 HOURS PRN
Qty: 30 TABLET | Refills: 0 | Status: SHIPPED | OUTPATIENT
Start: 2025-03-15

## 2025-03-15 RX ORDER — KETOROLAC TROMETHAMINE 30 MG/ML
30 INJECTION, SOLUTION INTRAMUSCULAR; INTRAVENOUS
Status: COMPLETED | OUTPATIENT
Start: 2025-03-15 | End: 2025-03-15

## 2025-03-15 RX ADMIN — KETOROLAC TROMETHAMINE 30 MG: 30 INJECTION, SOLUTION INTRAMUSCULAR; INTRAVENOUS at 10:03

## 2025-03-15 NOTE — PROGRESS NOTES
"Subjective:      Patient ID: Jean Carlson is a 74 y.o. male.    Vitals:  height is 5' 11" (1.803 m) and weight is 83.5 kg (184 lb). His oral temperature is 98.9 °F (37.2 °C). His blood pressure is 144/82 (abnormal) and his pulse is 90. His respiration is 18 and oxygen saturation is 96%.     Chief Complaint: Fever    73 y/o male presents to  today with c/o significant cough productive of yellow-brown-blood tinged mucous, nasal congestion/sinus pressure, headache, and fatigue. He states he feels awful. Symptoms began yesterday. Taking mucinex D, Flonase, and nyquil.     Fever   This is a new problem. The current episode started 2 days ago. The problem has been gradually worsening. The maximum temperature noted was 100 to 100.9 F. Associated symptoms include congestion, coughing, diarrhea, headaches, muscle aches, sleepiness and a sore throat. Pertinent negatives include no ear pain, not playful when afebrile, rash, vomiting or wheezing. He has tried acetaminophen for the symptoms. The treatment provided mild relief.       Constitution: Positive for fatigue and fever.   HENT:  Positive for congestion, sinus pressure and sore throat. Negative for ear pain.    Respiratory:  Positive for cough and sputum production. Negative for shortness of breath and wheezing.    Gastrointestinal:  Positive for diarrhea. Negative for vomiting.   Musculoskeletal:  Positive for muscle ache.   Skin:  Negative for rash.   Neurological:  Positive for headaches.      Objective:     Physical Exam   Constitutional: He is oriented to person, place, and time. He appears well-developed. He is cooperative.  Non-toxic appearance. He appears ill. No distress.   HENT:   Head: Normocephalic.   Ears:   Right Ear: Hearing, tympanic membrane, external ear and ear canal normal.   Left Ear: Hearing, tympanic membrane, external ear and ear canal normal.   Nose: Congestion present. No mucosal edema, rhinorrhea or nasal deformity. No epistaxis. Right " sinus exhibits no maxillary sinus tenderness and no frontal sinus tenderness. Left sinus exhibits no maxillary sinus tenderness and no frontal sinus tenderness.   Mouth/Throat: Uvula is midline and mucous membranes are normal. Mucous membranes are moist. No trismus in the jaw. Normal dentition. No uvula swelling. Posterior oropharyngeal erythema present. No oropharyngeal exudate or posterior oropharyngeal edema. Oropharynx is clear.   Eyes: Conjunctivae and lids are normal. No scleral icterus.   Neck: Trachea normal and phonation normal. Neck supple. No edema present. No erythema present. No neck rigidity present.   Cardiovascular: Normal rate and regular rhythm.   Pulmonary/Chest: Effort normal and breath sounds normal. No respiratory distress. He has no decreased breath sounds. He has no wheezes. He has no rhonchi.   Musculoskeletal: Normal range of motion.         General: No deformity. Normal range of motion.   Neurological: He is alert and oriented to person, place, and time. He exhibits normal muscle tone. Coordination normal.   Skin: Skin is warm, dry, intact, not diaphoretic and not pale.   Psychiatric: His speech is normal and behavior is normal. Judgment and thought content normal.   Nursing note and vitals reviewed.    Results for orders placed or performed in visit on 03/15/25   POCT Influenza A/B MOLECULAR    Collection Time: 03/15/25  9:47 AM   Result Value Ref Range    POC Molecular Influenza A Ag Negative Negative    POC Molecular Influenza B Ag Negative Negative     Acceptable Yes    SARS Coronavirus 2 Antigen, POCT Manual Read    Collection Time: 03/15/25  9:47 AM   Result Value Ref Range    SARS Coronavirus 2 Antigen Positive (A) Negative, Presumptive Negative     Acceptable Yes      *Note: Due to a large number of results and/or encounters for the requested time period, some results have not been displayed. A complete set of results can be found in Results Review.       Assessment:     1. COVID        Plan:       COVID  -     POCT Influenza A/B MOLECULAR  -     SARS Coronavirus 2 Antigen, POCT Manual Read  -     ketorolac injection 30 mg  -     molnupiravir 200 mg capsule (EUA); Take 4 capsules (800 mg total) by mouth every 12 (twelve) hours. for 5 days  Dispense: 40 capsule; Refill: 0  -     benzonatate (TESSALON) 200 MG capsule; Take 1 capsule (200 mg total) by mouth every 8 (eight) hours as needed for Cough.  Dispense: 30 capsule; Refill: 0  -     promethazine-dextromethorphan (PROMETHAZINE-DM) 6.25-15 mg/5 mL Syrp; Take 5 mLs by mouth nightly as needed (cough).  Dispense: 118 mL; Refill: 0  -     ibuprofen (ADVIL,MOTRIN) 800 MG tablet; Take 1 tablet (800 mg total) by mouth every 6 (six) hours as needed (pain or fever).  Dispense: 30 tablet; Refill: 0      Patient Instructions   Do not take any more ibuprofen until 4pm (after receiving toradol injection).   You can alternate ibprofen with tylenol 100mg, as needed for extra pain relief.     Oral fluids  Rest  Steam (hot showers, hot tea)  Blow nose often  Droplet and contact precautions: good handwashing; wear mask as needed; physical distancing   OTC ibuprofen or tylenol for aches and/or fever   You no longer have to quarantine   You may return to normal activities when for at least 24 hours: your symptoms are overall improving; and you have not had a fever (without use of fever-reducing medicines)   If symptoms worsen, after returning to normal activities, return home and avoid close contact with others for at least another 24 hours   Seek ER care with symptoms such as wheeze, respiratory distress, lethargy, dehydration

## 2025-03-15 NOTE — PATIENT INSTRUCTIONS
Do not take any more ibuprofen until 4pm (after receiving toradol injection).   You can alternate ibprofen with tylenol 100mg, as needed for extra pain relief.     Oral fluids  Rest  Steam (hot showers, hot tea)  Blow nose often  Droplet and contact precautions: good handwashing; wear mask as needed; physical distancing   OTC ibuprofen or tylenol for aches and/or fever   You no longer have to quarantine   You may return to normal activities when for at least 24 hours: your symptoms are overall improving; and you have not had a fever (without use of fever-reducing medicines)   If symptoms worsen, after returning to normal activities, return home and avoid close contact with others for at least another 24 hours   Seek ER care with symptoms such as wheeze, respiratory distress, lethargy, dehydration

## 2025-03-16 ENCOUNTER — NURSE TRIAGE (OUTPATIENT)
Dept: ADMINISTRATIVE | Facility: CLINIC | Age: 75
End: 2025-03-16
Payer: MEDICARE

## 2025-03-16 NOTE — TELEPHONE ENCOUNTER
Pt tested positive for covid on yesterday. Pt stated the antiviral is 1400 dollars. Pt stated he feels weak. Pt stated he hasn't eaten since yesterday. No appetite. No fever. Pt refused triage. Stated he needed to eat and get off of the phone. No triage done   Reason for Disposition   Nursing judgment    Protocols used: No Guideline or Reference [id number]

## 2025-03-16 NOTE — PROGRESS NOTES
Subjective:      Patient ID: Jean Carlson is a 74 y.o. male.    Chief Complaint: Foot Injury (Left foot ) and Foot Pain (Severe pain to left foot when standing on heel )    Pt presents today with several complaints. First complaint is a dash fracture of the left 5th metatarsal. Pt states he was doing a lot of exercising a sustained a met fx. Pt is also c/o numbness of both of his feet. Pt is under the care of Dr Steiner for his metatarsal fx.  Patient is having issues with tingling burning and numbness to the bottoms of both feet.  Nerve injection bilateral tarsal tunnel helped considerably short term.  May make him a good candidate for tarsal tunnel release in the future.  Patient is doing well with 5th metatarsal fracture/progressing well.  Recent worsening of heel pain.  Review of Systems   Constitutional: Negative for chills, fever and malaise/fatigue.   HENT:  Negative for hearing loss.    Cardiovascular:  Negative for claudication.   Respiratory:  Negative for shortness of breath.    Skin:  Negative for flushing and rash.   Musculoskeletal:  Negative for joint pain and myalgias.   Gastrointestinal:  Negative for nausea and vomiting.   Neurological:  Positive for numbness and paresthesias. Negative for loss of balance and sensory change.   Psychiatric/Behavioral:  Negative for altered mental status.    Allergic/Immunologic: Positive for hives.           Objective:      Physical Exam  Vitals reviewed.   Cardiovascular:      Pulses:           Dorsalis pedis pulses are 2+ on the right side and 2+ on the left side.        Posterior tibial pulses are 2+ on the right side and 2+ on the left side.      Comments: No edema noted b/L  Musculoskeletal:         General: Tenderness and signs of injury present. No deformity.      Comments:     POP to plantar heels, b/L    Left ROM deferred due to fx   Feet:      Right foot:      Protective Sensation: 5 sites tested.  5 sites sensed.      Left foot:      Protective  Sensation: 5 sites tested.  5 sites sensed.   Skin:     General: Skin is warm.      Capillary Refill: Capillary refill takes 2 to 3 seconds.      Comments: Normal skin tugor noted.   No open lesion noted b/L  Skin temp is warm to warm from proximal to distal b/L.  Webspaces clean, dry, and intact     Neurological:      Mental Status: He is alert.      Comments: Gross sensation intact b/L       Positive provocation sign/Tinel sign bilateral posterior tibial nerve.        Assessment:       Encounter Diagnoses   Name Primary?    Tarsal tunnel syndrome of both lower extremities Yes    Spinal stenosis of lumbar region without neurogenic claudication     Pain in both feet          Plan:       Jean was seen today for foot injury and foot pain.    Diagnoses and all orders for this visit:    Tarsal tunnel syndrome of both lower extremities    Spinal stenosis of lumbar region without neurogenic claudication    Pain in both feet      I counseled the patient on his conditions, their implications and medical management.    Independent visualization of imaging was performed.  Results were reviewed in detail with patient.    Discussed options for peripheral neuropathy/nerve entrapment syndrome including nerve block therapy, surgical nerve entrapment decompression procedures, and various vitamins and supplementation available shown to improve nerve function.      Recommend pain clinic for possible epidural evaluation.  Begin topical compound neuropathy cream.  Follow-up in 4-6 weeks.      .

## 2025-03-27 ENCOUNTER — OFFICE VISIT (OUTPATIENT)
Dept: PAIN MEDICINE | Facility: CLINIC | Age: 75
End: 2025-03-27
Payer: MEDICARE

## 2025-03-27 ENCOUNTER — TELEPHONE (OUTPATIENT)
Dept: PAIN MEDICINE | Facility: CLINIC | Age: 75
End: 2025-03-27

## 2025-03-27 VITALS
WEIGHT: 188.25 LBS | HEART RATE: 86 BPM | SYSTOLIC BLOOD PRESSURE: 109 MMHG | DIASTOLIC BLOOD PRESSURE: 70 MMHG | HEIGHT: 71 IN | BODY MASS INDEX: 26.35 KG/M2

## 2025-03-27 DIAGNOSIS — M62.830 BACK SPASM: ICD-10-CM

## 2025-03-27 DIAGNOSIS — M48.061 SPINAL STENOSIS OF LUMBAR REGION WITHOUT NEUROGENIC CLAUDICATION: ICD-10-CM

## 2025-03-27 DIAGNOSIS — R20.0 NUMBNESS: ICD-10-CM

## 2025-03-27 DIAGNOSIS — M54.16 LUMBAR RADICULOPATHY: Primary | ICD-10-CM

## 2025-03-27 DIAGNOSIS — G60.9 IDIOPATHIC PERIPHERAL NEUROPATHY: ICD-10-CM

## 2025-03-27 DIAGNOSIS — G60.9 IDIOPATHIC PERIPHERAL NEUROPATHY: Primary | ICD-10-CM

## 2025-03-27 DIAGNOSIS — G57.53 TARSAL TUNNEL SYNDROME OF BOTH LOWER EXTREMITIES: ICD-10-CM

## 2025-03-27 PROCEDURE — 99999 PR PBB SHADOW E&M-EST. PATIENT-LVL IV: CPT | Mod: PBBFAC,,, | Performed by: STUDENT IN AN ORGANIZED HEALTH CARE EDUCATION/TRAINING PROGRAM

## 2025-03-27 RX ORDER — PREDNISONE 10 MG/1
TABLET ORAL
Qty: 14 TABLET | Refills: 0 | Status: SHIPPED | OUTPATIENT
Start: 2025-03-27 | End: 2025-04-02

## 2025-03-27 RX ORDER — METHOCARBAMOL 750 MG/1
750 TABLET, FILM COATED ORAL 3 TIMES DAILY PRN
Qty: 90 TABLET | Refills: 0 | Status: SHIPPED | OUTPATIENT
Start: 2025-03-27 | End: 2025-04-26

## 2025-03-27 NOTE — PROGRESS NOTES
Chronic Pain - New Consult    Referring Physician: Nicolás Adams, DAIANA    Date: 03/27/2025     Re: Jean Carlson  MR#: [id number]  YOB: 1950  Age: 74 y.o.    Chief Complaint: No chief complaint on file.        **This note is dictated using the M*Modal Fluency Direct word recognition program. There are word recognition mistakes that are occasionally missed on review.**    ASSESSMENT: 74 y.o. year old male with back, leg, and foot pain, consistent with     1. Numbness  Ambulatory referral/consult to Pain Clinic      2. Tarsal tunnel syndrome of both lower extremities  Ambulatory referral/consult to Pain Clinic      3. Spinal stenosis of lumbar region without neurogenic claudication  Ambulatory referral/consult to Pain Clinic        PLAN:     Left 5th metatarsal fracture  -healing on X-ray. Following with Dr. Adams    Lumbar radiculopathy and bad L4-5 DDD  -has bilateral L5 radiculopathy symptoms but this is confounded by his neuropathy  -new EMG ordered  -patient does not have stenosis at L4,5 or S1 but has symptoms of radiculitis with and L5 distribution of pain and numbness down the leg.   -If this worsens then will plan on b/l L5-S1 TFESI    Idiopathic peripheral neuropathy  -recommended Qutenza  4/24/25 - b/l feet Qutenza 4 patches for idiopathic neuropathy  -consider neuropathic medications but patient prefers not to take medications.    Axial back pain / post laminectomy  -Suspect this is more spasm related  -for the next axial back pain flare up try methocarbamol 750mg TID + prednisone taper  -continue voltaren  -restart healthy back PT after completing PT for the foot      - RTC after qutenza  - Counseled patient regarding the importance of  activity modification and physical therapy.    The above plan and management options were discussed at length with patient. Patient is in agreement with the above and verbalized understanding. It will be communicated with the referring physician via  electronic record, fax, or mail.  Lab/study reports reviewed were important and necessary because subsequent medical and treatment recommendations required review of the above lab/study reports. Images viewed/reviewed above were important and necessary because subsequent medical and treatment recommendations required review of the reviewed image(s).     Electronically signed by:  Micky Ramos DO  03/27/2025    Patient was seen in clinic today.  The total amount of time spent on this patient was exactly 50 minutes.  Due to medical complexity, time spent with the patient, and multiple issues discussed/addressed I am billing for a level 5 visit. This includes face to face time and non-face to face time preparing to see the patient by reviewing previous labs/imaging, obtaining and/or reviewing separately obtained history, documenting clinical information in the electronic or other health record, independently reviewing results and communicating results to the patient/family/caregiver/additional providers.  The available imaging was reviewed in person with the patient, pathology and treatment options were explained in detail with the patient.  The patient was given multiple opportunities to ask questions, and all questions were answered.    =========================================================================================================    SUBJECTIVE:    Jean Carlson is a 74 y.o. male presents to the clinic for the evaluation of bilateral feet& foot,back pain. The pain started years ago following no inciting event and symptoms have been worsening. Patient has history of neuropathy in his feet. He broke his foot 4 months ago and that has caused him to lose muscle mass due to not being able to move.      Failed PT.  Has been in PT since 02/2025. History of multiple back surgeries with hemilaminectomy and microdisc at L3-4. He has had an EMG in 2014 which showed radiculopathy.     Currently for the  pain he takes ibuprofen and uses a heating pad.  He can bend over okay. He will occasionally get flare ups that will last for a week and limit his activity. This can occur every 1-2 months.  He completed healthy back which was helpful but then broke his foot.     Pain Description:    The pain is located in the lower back, bilateral legs & feet area and does not radiate.    At BEST  2/10   At WORST  7/10 on the WORST day.    On average pain is rated as 3/10.   Today the pain is rated as 3/10  The pain is intermittent.  The pain is described as aching, burning, numbing, and sharp    Symptoms interfere with daily activity.   Exacerbating factors: Walking, Extension, and Flexing.    Mitigating factors topicals.   He reports 7-8 hours of sleep per night.    Physical Therapy/Home Exercise: Yes, currently in Physical therapy    Current Pain Medications:    - ibuprofen    Failed Pain Medications:    - OTC medications    Pain Treatment Therapies:    Pain procedures: none  Physical Therapy: yes many times  Chiropractor: none  Acupuncture: noen  TENS unit: none  Spinal decompression:   2015 - microdisc w; Dr. Lowe  2018 - R L3-4 Hemilaminectomy w/ Dr. Lowe  Joint replacement: none    Patient denies urinary incontinence and bowel incontinence.  Patient denies any suicidal or homicidal ideations     report:  Reviewed and consistent with medication use as prescribed.    Imaging:   LUMBAR MRI  Results for orders placed during the hospital encounter of 01/04/24    MRI Lumbar Spine Without Contrast    Narrative  EXAMINATION:  MRI LUMBAR SPINE WITHOUT CONTRAST    CLINICAL HISTORY:  Lumbar radiculopathy, symptoms persist with conservative treatment; Radiculopathy, lumbar region    TECHNIQUE:  Multiplanar, multisequence MR images were acquired from the thoracolumbar junction to the sacrum without contrast.    COMPARISON:  05/18/2022.    FINDINGS:  Alignment: Normal.    Vertebrae: There is moderate height loss of the L2  vertebral body in keeping with remote compression fracture, unchanged from 12/21/2023 and 05/18/2022.  No acute compression fracture no evidence for marrow infiltrative process.    Discs: Severe disc height loss at L4-L5.  No evidence for discitis.    Cord: Conus terminates at L1 and appears unremarkable.  Cauda equina appears unremarkable.    Degenerative findings:    T12-L1: No spinal canal stenosis or neuroforaminal narrowing.    L1-L2: Circumferential disc bulge.  No spinal canal stenosis or neural foraminal narrowing.    L2-L3: Right circumferential disc bulge and mild facet arthropathy result in mild spinal canal stenosis and mild bilateral neural foraminal narrowing.    L3-L4: Circumferential disc bulge with right paracentral protrusion result in effacement of the right lateral recess and moderate right neural foraminal narrowing.  Disc material contacts the descending right L4 nerve root.    L4-L5: Circumferential disc bulge and mild facet arthropathy result in mild bilateral neural foraminal narrowing.    L5-S1: Circumferential disc bulge and mild facet arthropathy result in mild left neural foraminal narrowing.    Paraspinal muscles & soft tissues: Moderate paraspinal muscle atrophy.  Several bilateral renal cysts.    Impression  1. Multilevel degenerative changes detailed above.  Mild-to-moderate neural foraminal narrowing at L2-S1.  Right paracentral disc protrusion at L3-L4 resulting effacement of the right lateral recess.  2. Chronic L2 vertebral body compression fracture with moderate height loss.      Electronically signed by: Hans Elizalde MD  Date:    01/04/2024  Time:    16:24            5/13/2024     1:12 PM 1/19/2024    11:30 AM 6/2/2022     2:58 PM   Pain Disability Index (PDI)   Family/Home Responsibilities: 5 5 3   Recreation: 5 6 3   Occupation: 5 0 3   Sexual Behavior: 0 0 0   Self Care: 3 7 0   Life-Support Activities: 0 7 0   Pain Disability Index (PDI) 23 32 12        Past Medical  History:   Diagnosis Date    Acute medial meniscus tear, right, initial encounter 2023    Anterior tibialis tendinitis of right lower extremity 10/18/2018    Basal cell carcinoma (BCC) of right cheek 2024    R cheek    Cancer     Chronic idiopathic constipation 2019    Closed fracture of left wrist 10/18/2021    Closed fracture of lower end of left radius with routine healing 2022    Elevated PSA     Family history of ASCVD 10/07/2016    Mom age 64, Dad age 67 -  on same day. Pat Aunt early 70's.    Family history of coronary artery disease 10/07/2016    Mom age 64, Dad age 67 -  on same day. Pat Aunt early 70's.    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    Intractable back pain 2018    Knee injury, right, initial encounter 05/15/2023    Nuclear sclerosis, bilateral 2018    Ocular migraine 2012    Osteoporosis     Prostate disease     Transient vision disturbance of both eyes 2012    Umbilical hernia without obstruction and without gangrene 10/01/2015    Urinary tract infection      Past Surgical History:   Procedure Laterality Date    back sx      lower    BLADDER FULGURATION N/A 2019    Procedure: FULGURATION, BLADDER;  Surgeon: Ryan Knowles MD;  Location: [facility] OR;  Service: Urology;  Laterality: N/A;  bleeding tissue at bladder neck    CATARACT EXTRACTION W/  INTRAOCULAR LENS IMPLANT Bilateral     Dr Finn/Symfony IOL     CHONDROPLASTY OF KNEE Right 2023    Procedure: CHONDROPLASTY, KNEE;  Surgeon: Taylor Young MD;  Location: [facility] OR;  Service: Orthopedics;  Laterality: Right;    COLONOSCOPY N/A 2019    Procedure: COLONOSCOPY;  Surgeon: Mauro Smallwood MD;  Location: [facility] ENDO [address]);  Service: Endoscopy;  Laterality: N/A;    CYSTOSCOPY N/A 2019    Procedure: CYSTOSCOPY;  Surgeon: Rayn Knowles MD;  Location:  [facility] OR;  Service: Urology;  Laterality: N/A;    CYSTOSCOPY      CYSTOSCOPY  7/27/2021    Procedure: CYSTOSCOPY;  Surgeon: Manan Ny MD;  Location: [facility] OR [address] [place] [address];  Service: Urology;;    ESOPHAGOGASTRODUODENOSCOPY N/A 3/31/2021    Procedure: EGD (ESOPHAGOGASTRODUODENOSCOPY);  Surgeon: Jamilah Munoz MD;  Location: [facility];  Service: Endoscopy;  Laterality: N/A;    HERNIA REPAIR      KNEE ARTHROSCOPY W/ MENISCECTOMY Right 5/30/2023    Procedure: ARTHROSCOPY, KNEE, WITH MENISCECTOMY;  Surgeon: Taylor Young MD;  Location: [facility] OR;  Service: Orthopedics;  Laterality: Right;    [id number] Right 5/30/2023    Procedure: [id number];  Surgeon: Taylor Young MD;  Location: [facility] OR;  Service: Orthopedics;  Laterality: Right;    PROSTATE SURGERY      REMOVAL OF BLOOD CLOT N/A 7/13/2019    Procedure: REMOVAL, BLOOD CLOT;  Surgeon: Ryan Knowles MD;  Location: [facility] OR;  Service: Urology;  Laterality: N/A;  prostate    SPINE SURGERY      SYNOVECTOMY OF KNEE Right 5/30/2023    Procedure: SYNOVECTOMY, KNEE;  Surgeon: Taylor Young MD;  Location: [facility] OR;  Service: Orthopedics;  Laterality: Right;    TONSILLECTOMY      TRANSURETHRAL RESECTION OF PROSTATE      TRANSURETHRAL RESECTION OF PROSTATE N/A 7/13/2019    Procedure: TURP (TRANSURETHRAL RESECTION OF PROSTATE);  Surgeon: Ryan Knowles MD;  Location: [facility] OR;  Service: Urology;  Laterality: N/A;    TRANSURETHRAL SURGICAL REMOVAL OF STRICTURE OF BLADDER NECK  7/27/2021    Procedure: EXCISION, CONTRACTURE, BLADDER NECK, TRANSURETHRAL;  Surgeon: Manan Ny MD;  [facility];  Service: Urology;;     Social History[1]  Family History   Problem Relation Name Age of Onset    Heart disease Mother      Heart attack Mother      Skin cancer Mother      Heart disease Father      Heart attack Father      Glaucoma Maternal Aunt      Glaucoma Maternal Uncle Derek Madelyn     Retinitis pigmentosa Maternal Uncle [place] Madelyn     No  Known Problems Daughter      Prostate cancer Neg Hx      Kidney disease Neg Hx      Colon cancer Neg Hx         Review of patient's allergies indicates:  No Known Allergies    Current Outpatient Medications   Medication Sig    atorvastatin (LIPITOR) 20 MG tablet Take 1 tablet (20 mg total) by mouth once daily.    benzonatate (TESSALON) 200 MG capsule Take 1 capsule (200 mg total) by mouth every 8 (eight) hours as needed for Cough.    cholecalciferol, vitamin D3, (VITAMIN D3) 50 mcg (2,000 unit) Cap Take 1 capsule by mouth once daily.    famotidine (PEPCID) 20 MG tablet Take 1 tablet (20 mg total) by mouth 2 (two) times daily.    fluticasone propionate (FLONASE) 50 mcg/actuation nasal spray 1 spray (50 mcg total) by Each Nostril route once daily.    hydroCHLOROthiazide (HYDRODIURIL) 12.5 MG Tab Take a half tablet (6.25 mg) by mouth once daily in the morning    hydrocortisone (ANUSOL-HC) 2.5 % rectal cream Place 1 application rectally 2 (two) times daily.    ibuprofen (ADVIL,MOTRIN) 800 MG tablet Take 1 tablet (800 mg total) by mouth every 6 (six) hours as needed (pain or fever).    icosapent ethyL (VASCEPA) 1 gram Cap Take 1 capsule (1,000 mg total) by mouth 2 (two) times a day.    lactulose (CEPHULAC) 20 gram Pack Mix 1 packet (20 g total) with 4 ounces of water and take by mouth 3 (three) times daily.    LINZESS 290 mcg Cap capsule TAKE 1 CAPSULE BY MOUTH EVERY DAY BEFORE BREAKFAST    multivitamin capsule Take 1 capsule by mouth once daily.    pantoprazole (PROTONIX) 40 MG tablet Take 1 tablet (40 mg total) by mouth once daily.    polyethylene glycol (GLYCOLAX) 17 gram PwPk Take by mouth.    promethazine-dextromethorphan (PROMETHAZINE-DM) 6.25-15 mg/5 mL Syrp Take 5 mLs by mouth nightly as needed (cough).    tamsulosin (FLOMAX) 0.4 mg Cap Take 1 capsule (0.4 mg total) by mouth once daily.     No current facility-administered medications for this visit.       REVIEW OF SYSTEMS:    GENERAL:  No weight loss, malaise  or fevers.;NO  HEENT:   No recent changes in vision or hearing:+HEARING  NECK:  Negative for lumps, no difficulty with swallowing.:NO  RESPIRATORY:  Negative for cough, wheezing or shortness of breath, patient denies any recent URI.:+cough  CARDIOVASCULAR:  Negative for chest pain, leg swelling or palpitations.:NO  GI:  Negative for abdominal discomfort, blood in stools or black stools or change in bowel habits.:NO  MUSCULOSKELETAL:  See HPI.  SKIN:  Negative for lesions, rash, and itching.:NO  PSYCH:  No mood disorder or recent psychosocial stressors.  Patients sleep is not disturbed secondary to pain.:NO  HEMATOLOGY/LYMPHOLOGY:  Negative for prolonged bleeding, bruising easily or swollen nodes.  Patient is not currently taking any anti-coagulants:NO  NEURO:   No history of headaches, syncope, paralysis, seizures or tremors.:NO  All other reviewed and negative other than HPI.    OBJECTIVE:    There were no vitals taken for this visit.    PHYSICAL EXAMINATION:    GENERAL: Well appearing, in no acute distress, alert and oriented x3.  PSYCH:  Mood and affect appropriate.  SKIN: Skin color, texture, turgor normal, no rashes or lesions.  HEAD/FACE:  Normocephalic, atraumatic. Cranial nerves grossly intact.    NECK:   - Did not perform pain to palpation over the cervical paraspinous muscles.   - Spurling  Did not perform.  - Did not perform pain with neck flexion, extension, or lateral flexion.     CV: RRR with palpation of the radial artery.  PULM: CTAB. No evidence of respiratory difficulty, symmetric chest rise.  GI:  Soft and non-tender.    BACK:   - No obvious deformity or signs of trauma, Normal lumbar lordotic curve  - Negative spinous process tenderness  - Negative paravertebral tenderness  - Negative pain to palpation over the facet joints of the lumbar spine.   - Negative QL / Iliac crest / Glut tenderness  - Slump test is Negative for radicular pain  - Slump test is Negative for back pain  - Supine Straight  leg raising is Negative for radicular pain  - Supine Straight leg raising is Negative for back pain  - Lumbar ROM is normal in Flexion without pain  - Lumbar ROM is normal in Extension without pain  - Lumbar ROM is normal in Lateral Flexion without pain  - Negative Sustained Hip Flexion test (for discogenic pain)  - Negative Altered Gait, Posture  - Axial facet loading test Negative on the bilateral side(s)    SI Joint exam:  - Negative SI joint tenderness to palpation  - Ganga's sign Negative  - Yeoman's Test: Did not perform for SI joint pain indicating anterior SI ligament involvement. Did not perform for anterior thigh pain/paresthesia which indicates femoral nerve stretch.  - Gaenslen's Test:Negative  - Finger Misael's Sign:Negative  - SI compression test:Did not perform  - SI distraction test:Did not perform  - Thigh Thrust: Did not perform  - SI Thrust: Did not perform    MUSKULOSKELETAL:    EXTREMITIES:   Hip Exam:  - Log Roll Negative  - FADIR Did not perform  - Stinchfield Did not perform  - Hip Scour Did not perform  - GTB Tenderness Did not perform  ination and muscle stretch reflexes are physiologic and symmetric.      NEUROLOGICAL EXAM:  MENTAL STATUS: A x O x 3, good concentration, speech is fluent and goal directed  MEMORY: recent and remote are intact  CN: CN2-12 grossly intact  MOTOR: 5/5 in all muscle groups  DTRs: 2+ intact symmetric  Sensation:    -yes Loss of sensation in a left lower and right lower  L5  distribution.         [1]   Social History  Socioeconomic History    Marital status:    Occupational History     Employer: [employer]   Tobacco Use    Smoking status: Never    Smokeless tobacco: Never   Substance and Sexual Activity    Alcohol use: Yes     Alcohol/week: 3.0 standard drinks of alcohol     Types: 3 Glasses of wine per week     Comment: rarely    Drug use: No    Sexual activity: Not Currently     Partners: Female   Social History Narrative    ** Merged History Encounter **           [employer]. 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     Social Drivers of Health     Financial Resource Strain: Low Risk  (2/27/2025)    Overall Financial Resource Strain (CARDIA)     Difficulty of Paying Living Expenses: Not hard at all   Food Insecurity: No Food Insecurity (2/27/2025)    Hunger Vital Sign     Worried About Running Out of Food in the Last Year: Never true     Ran Out of Food in the Last Year: Never true   Transportation Needs: No Transportation Needs (2/27/2025)    PRAPARE - Transportation     Lack of Transportation (Medical): No     Lack of Transportation (Non-Medical): No   Physical Activity: Inactive (2/27/2025)    Exercise Vital Sign     Days of Exercise per Week: 0 days     Minutes of Exercise per Session: 0 min   Stress: No Stress Concern Present (2/27/2025)    [place] [employer] - Occupational Stress Questionnaire     Feeling of Stress : Not at all   Housing Stability: Low Risk  (2/27/2025)    Housing Stability Vital Sign     Unable to Pay for Housing in the Last Year: No     Number of Times Moved in the Last Year: 0     Homeless in the Last Year: No

## 2025-03-31 DIAGNOSIS — M54.16 LUMBAR RADICULOPATHY: ICD-10-CM

## 2025-03-31 DIAGNOSIS — M62.830 BACK SPASM: ICD-10-CM

## 2025-03-31 DIAGNOSIS — E78.5 HYPERLIPIDEMIA, UNSPECIFIED HYPERLIPIDEMIA TYPE: ICD-10-CM

## 2025-03-31 DIAGNOSIS — I70.0 ATHEROSCLEROSIS OF AORTA: ICD-10-CM

## 2025-03-31 DIAGNOSIS — Z82.49 FAMILY HISTORY OF CORONARY ARTERY DISEASE: ICD-10-CM

## 2025-03-31 RX ORDER — METHOCARBAMOL 750 MG/1
TABLET, FILM COATED ORAL
Qty: 90 TABLET | Refills: 0 | Status: SHIPPED | OUTPATIENT
Start: 2025-03-31

## 2025-03-31 NOTE — TELEPHONE ENCOUNTER
Care Due:                  Date            Visit Type   Department     Provider  --------------------------------------------------------------------------------                                EP                               PRIMARY      [facility] INTERNAL  Last Visit: 11-      CARE ([facility])   MEDICINE       RAMBO MARTINEZ                              [facility]                              PRIMARY      [facility] INTERNAL  Next Visit: 05-      CARE ([facility])   MEDICINE       RAMBO MARTINEZ                                                            Last  Test          Frequency    Reason                     Performed    Due Date  --------------------------------------------------------------------------------    Lipid Panel.  12 months..  atorvastatin, icosapent..  02- 02-    [facility] Embedded Care Due Messages. Reference number: [id number].   3/31/2025 11:29:31 AM CDT

## 2025-04-01 ENCOUNTER — TELEPHONE (OUTPATIENT)
Dept: PODIATRY | Facility: CLINIC | Age: 75
End: 2025-04-01
Payer: MEDICARE

## 2025-04-01 NOTE — TELEPHONE ENCOUNTER
Call pt in regards to appointment on 04/02/2025 with Dr. Adams due to him being out of the clinic. patient answer and was made aware and I was able to rescheduled her for the next availability. Patient verbally confirmed new appointment date and time.

## 2025-04-01 NOTE — TELEPHONE ENCOUNTER
Refill Routing Note   Medication(s) are not appropriate for processing by [facility] for the following reason(s):        Required labs outdated    [facility] action(s):  Defer   Requires labs : Yes             Appointments  past 12m or future 3m with PCP    Date Provider   Last Visit   11/18/2024 Papi Gallardo MD   Next Visit   5/19/2025 Papi Gallardo MD   ED visits in past 90 days: 0        Note composed:9:33 PM 03/31/2025

## 2025-04-02 RX ORDER — ICOSAPENT ETHYL 1 G/1
1 CAPSULE ORAL 2 TIMES DAILY
Qty: 180 CAPSULE | Refills: 0 | Status: SHIPPED | OUTPATIENT
Start: 2025-04-02

## 2025-04-02 RX ORDER — ATORVASTATIN CALCIUM 20 MG/1
20 TABLET, FILM COATED ORAL
Qty: 90 TABLET | Refills: 0 | Status: SHIPPED | OUTPATIENT
Start: 2025-04-02

## 2025-04-17 ENCOUNTER — PATIENT MESSAGE (OUTPATIENT)
Dept: PAIN MEDICINE | Facility: CLINIC | Age: 75
End: 2025-04-17
Payer: MEDICARE

## 2025-04-22 ENCOUNTER — HOSPITAL ENCOUNTER (OUTPATIENT)
Dept: RADIOLOGY | Facility: HOSPITAL | Age: 75
Discharge: HOME | End: 2025-04-22
Attending: ORTHOPAEDIC SURGERY
Payer: MEDICARE

## 2025-04-22 ENCOUNTER — OFFICE VISIT (OUTPATIENT)
Dept: ORTHOPEDICS | Facility: CLINIC | Age: 75
End: 2025-04-22
Payer: MEDICARE

## 2025-04-22 VITALS — HEIGHT: 71 IN | WEIGHT: 188.25 LBS | BODY MASS INDEX: 26.35 KG/M2

## 2025-04-22 DIAGNOSIS — S92.355D CLOSED NONDISPLACED FRACTURE OF FIFTH METATARSAL BONE OF LEFT FOOT WITH ROUTINE HEALING, SUBSEQUENT ENCOUNTER: ICD-10-CM

## 2025-04-22 DIAGNOSIS — S92.355D CLOSED NONDISPLACED FRACTURE OF FIFTH METATARSAL BONE OF LEFT FOOT WITH ROUTINE HEALING, SUBSEQUENT ENCOUNTER: Primary | ICD-10-CM

## 2025-04-22 PROCEDURE — 99212 OFFICE O/P EST SF 10 MIN: CPT | Mod: S$GLB,,, | Performed by: ORTHOPAEDIC SURGERY

## 2025-04-22 PROCEDURE — 1101F PT FALLS ASSESS-DOCD LE1/YR: CPT | Mod: CPTII,S$GLB,, | Performed by: ORTHOPAEDIC SURGERY

## 2025-04-22 PROCEDURE — 73630 X-RAY EXAM OF FOOT: CPT | Mod: TC,LT

## 2025-04-22 PROCEDURE — 1159F MED LIST DOCD IN RCRD: CPT | Mod: CPTII,S$GLB,, | Performed by: ORTHOPAEDIC SURGERY

## 2025-04-22 PROCEDURE — 3288F FALL RISK ASSESSMENT DOCD: CPT | Mod: CPTII,S$GLB,, | Performed by: ORTHOPAEDIC SURGERY

## 2025-04-22 PROCEDURE — 99999 PR PBB SHADOW E&M-EST. PATIENT-LVL III: CPT | Mod: PBBFAC,,, | Performed by: ORTHOPAEDIC SURGERY

## 2025-04-22 PROCEDURE — 3008F BODY MASS INDEX DOCD: CPT | Mod: CPTII,S$GLB,, | Performed by: ORTHOPAEDIC SURGERY

## 2025-04-22 PROCEDURE — 1126F AMNT PAIN NOTED NONE PRSNT: CPT | Mod: CPTII,S$GLB,, | Performed by: ORTHOPAEDIC SURGERY

## 2025-04-22 PROCEDURE — 73630 X-RAY EXAM OF FOOT: CPT | Mod: 26,LT,, | Performed by: INTERNAL MEDICINE

## 2025-04-22 NOTE — PROGRESS NOTES
Jean Carlson  Returns today for follow-up.  This is a 74-year-old male who presented to me on 11/21/2024 with a one-month history of lateral left foot pain with no injury.  Subsequent MRI revealed a nondisplaced fracture of the base of the 5th metatarsal consistent with a Wisdom fracture.  We have been managing his fracture conservatively.  His last visit was on 03/11/2025 at which time he reported improvement in pain and he was going to physical therapy and reported improvement in range of motion and function.  X-rays revealed some continued radiolucency but it appeared to be filling in with new calcified bone.  I encouraged him to progress his activities and to wean out of his boot.  He returns today and reports that he is doing much better.  He occasionally has some soreness but is pleased with his current status.  He has been progressing his low-impact activities.    Examination:  The left foot reveals no swelling today.  There is minimal tenderness to palpation.    Imaging: I ordered and reviewed an x-ray of the left foot.  There is further improvement but still some lucency at the fracture site.    Impression:   1. Closed nondisplaced fracture of fifth metatarsal bone of left foot with routine healing, subsequent encounter  X-Ray Foot Complete Left            Recommendation:  Clinically he is healed at this point and I would allow him to continue to progress his activity.  He understands that he should not try to push through any pain with activity.      Follow-up in three months if necessary

## 2025-04-25 ENCOUNTER — OFFICE VISIT (OUTPATIENT)
Dept: PAIN MEDICINE | Facility: CLINIC | Age: 75
End: 2025-04-25
Payer: MEDICARE

## 2025-04-25 VITALS
SYSTOLIC BLOOD PRESSURE: 129 MMHG | BODY MASS INDEX: 26.67 KG/M2 | DIASTOLIC BLOOD PRESSURE: 76 MMHG | HEIGHT: 71 IN | HEART RATE: 65 BPM | WEIGHT: 190.5 LBS

## 2025-04-25 DIAGNOSIS — G60.9 IDIOPATHIC PERIPHERAL NEUROPATHY: Primary | ICD-10-CM

## 2025-04-25 DIAGNOSIS — R20.0 NUMBNESS: ICD-10-CM

## 2025-04-25 DIAGNOSIS — G57.53 TARSAL TUNNEL SYNDROME OF BOTH LOWER EXTREMITIES: ICD-10-CM

## 2025-04-25 PROCEDURE — 99999 PR PBB SHADOW E&M-EST. PATIENT-LVL III: CPT | Mod: PBBFAC,,,

## 2025-04-29 ENCOUNTER — OFFICE VISIT (OUTPATIENT)
Dept: ENDOCRINOLOGY | Facility: CLINIC | Age: 75
End: 2025-04-29
Payer: MEDICARE

## 2025-04-29 VITALS
HEART RATE: 73 BPM | SYSTOLIC BLOOD PRESSURE: 116 MMHG | HEIGHT: 71 IN | BODY MASS INDEX: 26.55 KG/M2 | WEIGHT: 189.63 LBS | DIASTOLIC BLOOD PRESSURE: 73 MMHG

## 2025-04-29 DIAGNOSIS — M81.0 OSTEOPOROSIS, UNSPECIFIED OSTEOPOROSIS TYPE, UNSPECIFIED PATHOLOGICAL FRACTURE PRESENCE: Primary | ICD-10-CM

## 2025-04-29 DIAGNOSIS — R82.994 HYPERCALCIURIA: ICD-10-CM

## 2025-04-29 PROCEDURE — 99999 PR PBB SHADOW E&M-EST. PATIENT-LVL IV: CPT | Mod: PBBFAC,,, | Performed by: INTERNAL MEDICINE

## 2025-04-29 PROCEDURE — G2211 COMPLEX E/M VISIT ADD ON: HCPCS | Mod: S$GLB,,, | Performed by: INTERNAL MEDICINE

## 2025-04-29 PROCEDURE — 3288F FALL RISK ASSESSMENT DOCD: CPT | Mod: CPTII,S$GLB,, | Performed by: INTERNAL MEDICINE

## 2025-04-29 PROCEDURE — 3008F BODY MASS INDEX DOCD: CPT | Mod: CPTII,S$GLB,, | Performed by: INTERNAL MEDICINE

## 2025-04-29 PROCEDURE — 1159F MED LIST DOCD IN RCRD: CPT | Mod: CPTII,S$GLB,, | Performed by: INTERNAL MEDICINE

## 2025-04-29 PROCEDURE — 3074F SYST BP LT 130 MM HG: CPT | Mod: CPTII,S$GLB,, | Performed by: INTERNAL MEDICINE

## 2025-04-29 PROCEDURE — 99214 OFFICE O/P EST MOD 30 MIN: CPT | Mod: S$GLB,,, | Performed by: INTERNAL MEDICINE

## 2025-04-29 PROCEDURE — 3078F DIAST BP <80 MM HG: CPT | Mod: CPTII,S$GLB,, | Performed by: INTERNAL MEDICINE

## 2025-04-29 PROCEDURE — 1126F AMNT PAIN NOTED NONE PRSNT: CPT | Mod: CPTII,S$GLB,, | Performed by: INTERNAL MEDICINE

## 2025-04-29 PROCEDURE — 1101F PT FALLS ASSESS-DOCD LE1/YR: CPT | Mod: CPTII,S$GLB,, | Performed by: INTERNAL MEDICINE

## 2025-04-29 NOTE — PROGRESS NOTES
Subjective:      Patient ID: Jean Carlson is a 75 y.o. male.    Chief Complaint:  Osteoporosis      History of Present Illness  Jean Carlson is a 75 y.o. male with dyslipidemia, prostate cancer, hypercalciuria and osteoporosis thought to be due to idiopathic hypercalciuria, and history of prostate cancer who presents for follow up of osteoporosis.  Last visit in 12/2024.     After his last visit in 12/2024 he was noted to have a very high 24 hr urine calcium. We started HCTZ 6.25 mg once daily and he has been able to tolerate this without issue. In the past he had significant polyuria on full dose thiazide. Still with slowly healing 5th metatarsal fracture, but he does report it's healing on x-ray but has been taking longer than is typically seen.      He was previously followed by Dr. Mejia.  Per chart review, he was noted to have hypercalciuria with normal serum calcium and normal PTH.  He was treated with Fosamax from 2005 through 2012 with significant improvement in bone density and stable alkaline phosphatase levels.       Fracture:  Compression fracture L2 10/2021  Left arm distal radial fracture (fall from a chair)  Boxer fracture in 2014 after a fall from ladder     Supplements:    Currently taking OTC Ca 540 mg w/ 50 mcg vitamin D     Previous Treatment: alendronate (FOSAMAX) 3571-9198     Was started on Prolia in 6/2022 and last injection in 2/2025 (has been getting on time without delays).      History of previous fracture: had fracture of hand after trauma, but no history of fragility fracture  Parent with fractured hip: No  Smoking status: no  Glucocorticoid use: No  History of RA: No  Alcohol 3 or more/day: No  Nephrolithiasis: Yes stone seen on imaging in the past, patient was asymptomatic  Dental up to date: yes, wife is dentist  Height loss:  Feels that he has gradually decreased about 1 in     Not on PPI.     Latest Reference Range & Units 12/05/24 13:21   Calcium, Urine 0.0 - 15.0 mg/dL  13.7   Calcium, Timed Urine 4 - 12 mg/Hr 23 (H)   Urine Calcium Absolute  mg/Spec 548       Review of Systems   Constitutional:  Negative for chills and fever.   Gastrointestinal:  Negative for nausea and vomiting.       Objective:   Physical Exam  Vitals and nursing note reviewed.       BP Readings from Last 3 Encounters:   04/29/25 116/73   04/25/25 129/76   03/27/25 109/70     Wt Readings from Last 1 Encounters:   04/29/25 0832 86 kg (189 lb 9.5 oz)       Body mass index is 26.44 kg/m².    Lab Review:   Lab Results   Component Value Date    HGBA1C 5.6 05/16/2022     Lab Results   Component Value Date    CHOL 170 02/19/2024    HDL 56 02/19/2024    LDLCALC 87.8 02/19/2024    TRIG 131 02/19/2024    CHOLHDL 32.9 02/19/2024     Lab Results   Component Value Date     02/19/2024    K 4.6 02/19/2024     02/19/2024    CO2 26 02/19/2024     02/19/2024    BUN 18 02/19/2024    CREATININE 0.8 02/19/2024    CALCIUM 9.8 02/19/2024    PROT 6.5 04/11/2024    ALBUMIN 4.0 04/11/2024    BILITOT 0.7 04/11/2024    ALKPHOS 55 04/11/2024    AST 21 04/11/2024    ALT 36 04/11/2024    ANIONGAP 7 (L) 02/19/2024    ESTGFRAFRICA >60.0 05/09/2022    EGFRNONAA >60.0 05/09/2022    TSH 2.717 10/31/2023         Assessment and Plan     Osteoporosis  Reviewed recent BMD from 7/2024 which showed improvement at the spine and hip  Recent fracture of 5th metatarsal but would not classify this as a fragility fracture  Studies to date have not shown a clear correlation between BMD and metatarsal fractures so would not start an anabolic in this case  Will continue to optimize calcium and vitamin D status and try and address hypercalciuria (see below)  Continue q6 month Prolia for now and if deemed a candidate for a holiday in the future will give Reclast x 1 at that time  Have discussed rebound vertebral fractures   Repeat BMD in 7/2026    Hypercalciuria  --Repeat 24 hr urine in 12/2024 with elevated urine calcium  --Started on low dose  HCTZ at 6.25 mg daily and tolerating well without side effects  --Will repeat 24 hr urine now and if still significantly elevated will increase HCTZ to 12.5 mg once daily    Visit today included increased complexity associated with the care of the episodic problem osteoporosis, hypercalciuria addressed and managing the longitudinal care of the patient due to the serious and/or complex managed problem(s).     Raymond Slade M.D. Staff Endocrinology

## 2025-04-29 NOTE — ASSESSMENT & PLAN NOTE
--Repeat 24 hr urine in 12/2024 with elevated urine calcium  --Started on low dose HCTZ at 6.25 mg daily and tolerating well without side effects  --Will repeat 24 hr urine now and if still significantly elevated will increase HCTZ to 12.5 mg once daily

## 2025-04-29 NOTE — ASSESSMENT & PLAN NOTE
Reviewed recent BMD from 7/2024 which showed improvement at the spine and hip  Recent fracture of 5th metatarsal but would not classify this as a fragility fracture  Studies to date have not shown a clear correlation between BMD and metatarsal fractures so would not start an anabolic in this case  Will continue to optimize calcium and vitamin D status and try and address hypercalciuria (see below)  Continue q6 month Prolia for now and if deemed a candidate for a holiday in the future will give Reclast x 1 at that time  Have discussed rebound vertebral fractures   Repeat BMD in 7/2026

## 2025-05-01 ENCOUNTER — APPOINTMENT (OUTPATIENT)
Dept: LAB | Facility: HOSPITAL | Age: 75
End: 2025-05-01
Payer: MEDICARE

## 2025-05-01 LAB
CALCIUM 24H UR-MRATE: 24 MG/HR (ref 4–12)
CALCIUM UR-MCNC: 16.8 MG/DL
CREAT 24H UR-MRATE: 124.7 MG/HR (ref 40–75)
CREAT UR-MCNC: 88 MG/DL (ref 23–375)
TOTAL HOURS OF COLLECTION (OHS): 24 HR
TOTAL HOURS OF COLLECTION (OHS): 24 HR
TOTAL VOLUME  (OHS): 3400 ML
TOTAL VOLUME  (OHS): 3400 ML
URINE CALCIUM ABSOLUTE (OHS): 571 MG/SPEC
URINE CREATININE ABSOLUTE (OHS): 2992 MG/SPEC

## 2025-05-01 PROCEDURE — 82570 ASSAY OF URINE CREATININE: CPT | Performed by: INTERNAL MEDICINE

## 2025-05-01 PROCEDURE — 82340 ASSAY OF CALCIUM IN URINE: CPT | Performed by: INTERNAL MEDICINE

## 2025-05-02 ENCOUNTER — PATIENT MESSAGE (OUTPATIENT)
Dept: ENDOCRINOLOGY | Facility: CLINIC | Age: 75
End: 2025-05-02
Payer: MEDICARE

## 2025-05-02 DIAGNOSIS — R82.994 HYPERCALCIURIA: Primary | ICD-10-CM

## 2025-05-02 RX ORDER — HYDROCHLOROTHIAZIDE 12.5 MG/1
12.5 TABLET ORAL DAILY
Qty: 90 TABLET | Refills: 3 | Status: SHIPPED | OUTPATIENT
Start: 2025-05-02

## 2025-05-16 ENCOUNTER — TELEPHONE (OUTPATIENT)
Dept: INTERNAL MEDICINE | Facility: CLINIC | Age: 75
End: 2025-05-16
Payer: MEDICARE

## 2025-05-19 ENCOUNTER — OFFICE VISIT (OUTPATIENT)
Dept: INTERNAL MEDICINE | Facility: CLINIC | Age: 75
End: 2025-05-19
Attending: FAMILY MEDICINE
Payer: MEDICARE

## 2025-05-19 ENCOUNTER — RESULTS FOLLOW-UP (OUTPATIENT)
Dept: INTERNAL MEDICINE | Facility: CLINIC | Age: 75
End: 2025-05-19

## 2025-05-19 ENCOUNTER — LAB VISIT (OUTPATIENT)
Dept: LAB | Facility: HOSPITAL | Age: 75
End: 2025-05-19
Attending: FAMILY MEDICINE
Payer: MEDICARE

## 2025-05-19 VITALS
HEIGHT: 71 IN | DIASTOLIC BLOOD PRESSURE: 60 MMHG | HEART RATE: 70 BPM | SYSTOLIC BLOOD PRESSURE: 104 MMHG | BODY MASS INDEX: 26.61 KG/M2 | OXYGEN SATURATION: 96 % | WEIGHT: 190.06 LBS

## 2025-05-19 DIAGNOSIS — C61 PROSTATE CANCER: ICD-10-CM

## 2025-05-19 DIAGNOSIS — E78.5 HYPERLIPIDEMIA, UNSPECIFIED HYPERLIPIDEMIA TYPE: ICD-10-CM

## 2025-05-19 DIAGNOSIS — I70.0 ATHEROSCLEROSIS OF AORTA: Chronic | ICD-10-CM

## 2025-05-19 DIAGNOSIS — H91.93 BILATERAL HEARING LOSS, UNSPECIFIED HEARING LOSS TYPE: ICD-10-CM

## 2025-05-19 DIAGNOSIS — M81.0 OSTEOPOROSIS, UNSPECIFIED OSTEOPOROSIS TYPE, UNSPECIFIED PATHOLOGICAL FRACTURE PRESENCE: ICD-10-CM

## 2025-05-19 DIAGNOSIS — E78.49 OTHER HYPERLIPIDEMIA: Chronic | ICD-10-CM

## 2025-05-19 DIAGNOSIS — Z00.00 ANNUAL PHYSICAL EXAM: ICD-10-CM

## 2025-05-19 DIAGNOSIS — K59.04 CHRONIC IDIOPATHIC CONSTIPATION: ICD-10-CM

## 2025-05-19 DIAGNOSIS — K29.50 OTHER CHRONIC GASTRITIS WITHOUT HEMORRHAGE: ICD-10-CM

## 2025-05-19 DIAGNOSIS — S99.192G CLOSED FRACTURE OF BASE OF FIFTH METATARSAL BONE OF LEFT FOOT AT METAPHYSEAL-DIAPHYSEAL JUNCTION WITH DELAYED HEALING, SUBSEQUENT ENCOUNTER: ICD-10-CM

## 2025-05-19 DIAGNOSIS — Z00.00 ANNUAL PHYSICAL EXAM: Primary | ICD-10-CM

## 2025-05-19 DIAGNOSIS — R41.3 MEMORY CHANGES: ICD-10-CM

## 2025-05-19 PROBLEM — M25.672 DECREASED RANGE OF MOTION OF LEFT ANKLE: Status: RESOLVED | Noted: 2025-02-17 | Resolved: 2025-05-19

## 2025-05-19 PROBLEM — Z74.09 IMPAIRED MOBILITY AND ADLS: Status: RESOLVED | Noted: 2021-12-15 | Resolved: 2025-05-19

## 2025-05-19 PROBLEM — R29.898 DECREASED STRENGTH OF LOWER EXTREMITY: Status: RESOLVED | Noted: 2025-02-17 | Resolved: 2025-05-19

## 2025-05-19 PROBLEM — M25.661 DECREASED RANGE OF MOTION (ROM) OF RIGHT KNEE: Status: RESOLVED | Noted: 2023-05-31 | Resolved: 2025-05-19

## 2025-05-19 PROBLEM — Z78.9 IMPAIRED MOBILITY AND ADLS: Status: RESOLVED | Noted: 2021-12-15 | Resolved: 2025-05-19

## 2025-05-19 LAB
ALBUMIN SERPL BCP-MCNC: 4 G/DL (ref 3.5–5.2)
ALP SERPL-CCNC: 75 UNIT/L (ref 40–150)
ALT SERPL W/O P-5'-P-CCNC: 34 UNIT/L (ref 10–44)
ANION GAP (OHS): 8 MMOL/L (ref 8–16)
AST SERPL-CCNC: 27 UNIT/L (ref 11–45)
BILIRUB SERPL-MCNC: 0.6 MG/DL (ref 0.1–1)
BUN SERPL-MCNC: 23 MG/DL (ref 8–23)
CALCIUM SERPL-MCNC: 10.3 MG/DL (ref 8.7–10.5)
CHLORIDE SERPL-SCNC: 106 MMOL/L (ref 95–110)
CHOLEST SERPL-MCNC: 159 MG/DL (ref 120–199)
CHOLEST/HDLC SERPL: 2.9 {RATIO} (ref 2–5)
CO2 SERPL-SCNC: 27 MMOL/L (ref 23–29)
CREAT SERPL-MCNC: 1 MG/DL (ref 0.5–1.4)
GFR SERPLBLD CREATININE-BSD FMLA CKD-EPI: >60 ML/MIN/1.73/M2
GLUCOSE SERPL-MCNC: 102 MG/DL (ref 70–110)
HDLC SERPL-MCNC: 54 MG/DL (ref 40–75)
HDLC SERPL: 34 % (ref 20–50)
LDLC SERPL CALC-MCNC: 70.4 MG/DL (ref 63–159)
NONHDLC SERPL-MCNC: 105 MG/DL
POTASSIUM SERPL-SCNC: 4.9 MMOL/L (ref 3.5–5.1)
PROT SERPL-MCNC: 7 GM/DL (ref 6–8.4)
SODIUM SERPL-SCNC: 141 MMOL/L (ref 136–145)
TRIGL SERPL-MCNC: 173 MG/DL (ref 30–150)

## 2025-05-19 PROCEDURE — 3008F BODY MASS INDEX DOCD: CPT | Mod: CPTII,S$GLB,, | Performed by: FAMILY MEDICINE

## 2025-05-19 PROCEDURE — 1159F MED LIST DOCD IN RCRD: CPT | Mod: CPTII,S$GLB,, | Performed by: FAMILY MEDICINE

## 2025-05-19 PROCEDURE — 1101F PT FALLS ASSESS-DOCD LE1/YR: CPT | Mod: CPTII,S$GLB,, | Performed by: FAMILY MEDICINE

## 2025-05-19 PROCEDURE — 99999 PR PBB SHADOW E&M-EST. PATIENT-LVL V: CPT | Mod: PBBFAC,,, | Performed by: FAMILY MEDICINE

## 2025-05-19 PROCEDURE — 1160F RVW MEDS BY RX/DR IN RCRD: CPT | Mod: CPTII,S$GLB,, | Performed by: FAMILY MEDICINE

## 2025-05-19 PROCEDURE — 36415 COLL VENOUS BLD VENIPUNCTURE: CPT

## 2025-05-19 PROCEDURE — 1126F AMNT PAIN NOTED NONE PRSNT: CPT | Mod: CPTII,S$GLB,, | Performed by: FAMILY MEDICINE

## 2025-05-19 PROCEDURE — 80051 ELECTROLYTE PANEL: CPT

## 2025-05-19 PROCEDURE — 3074F SYST BP LT 130 MM HG: CPT | Mod: CPTII,S$GLB,, | Performed by: FAMILY MEDICINE

## 2025-05-19 PROCEDURE — 3078F DIAST BP <80 MM HG: CPT | Mod: CPTII,S$GLB,, | Performed by: FAMILY MEDICINE

## 2025-05-19 PROCEDURE — 82465 ASSAY BLD/SERUM CHOLESTEROL: CPT

## 2025-05-19 PROCEDURE — 99397 PER PM REEVAL EST PAT 65+ YR: CPT | Mod: S$GLB,,, | Performed by: FAMILY MEDICINE

## 2025-05-19 PROCEDURE — 3288F FALL RISK ASSESSMENT DOCD: CPT | Mod: CPTII,S$GLB,, | Performed by: FAMILY MEDICINE

## 2025-05-19 RX ORDER — ATORVASTATIN CALCIUM 20 MG/1
20 TABLET, FILM COATED ORAL DAILY
Qty: 90 TABLET | Refills: 3 | Status: SHIPPED | OUTPATIENT
Start: 2025-05-19

## 2025-05-19 RX ORDER — ICOSAPENT ETHYL 1 G/1
1 CAPSULE ORAL 2 TIMES DAILY
Qty: 180 CAPSULE | Refills: 3 | Status: SHIPPED | OUTPATIENT
Start: 2025-05-19

## 2025-05-19 NOTE — PROGRESS NOTES
Subjective:       Patient ID: Jean Carlson is a 75 y.o. male.    Chief Complaint: Annual Exam    Established patient for an annual wellness check/physical exam and also chronic disease management. Specific complaints - see dictation, M*model entries and please see ROS.  Past, Surgical, Family, Social Histories; Medications, Allergies reviewed and reconciled.  Health maintenance file reviewed and addressed items due. Recent applicable lab, imaging and cardiovascular results reviewed.  Problem list items reviewed and modified or added entries (in the overview section) may not be transcribed into this encounter note due to note writer format.      Interval history of L foot fracture.  Has not been exercising and slow recovery.    COVID circa 2 months ago.    Continued problems with alternating diarrhea and constipation, abdominal bloating and discomfort.  Followed by Gastroenterology.    Difficulty with memory at times finding words.  We had a long discussion concerning what is normal and what is beyond normal.  Based on his discussion, he seems to be within the realm of normal aging.    Concerned about elevated creatinine, this was on a urine test and I do not think represents kidney dysfunction at this time.  This was from endocrinology concerning osteoporosis urged him to follow-up.  We will check a CMP today.    PSA followed by Urology.  Slow rise noted.  He is followed up every 6 months.    Concerns for hearing loss, chronic.        Review of Systems   Constitutional:  Negative for appetite change, chills, diaphoresis, fatigue and fever.   HENT:  Negative for congestion, postnasal drip, rhinorrhea, sore throat and trouble swallowing.    Eyes:  Negative for visual disturbance.   Respiratory:  Negative for cough, choking, chest tightness, shortness of breath and wheezing.    Cardiovascular:  Negative for chest pain and leg swelling.   Gastrointestinal:  Positive for abdominal distention, abdominal pain,  constipation and diarrhea. Negative for nausea and vomiting.   Genitourinary:  Negative for difficulty urinating and hematuria.   Musculoskeletal:  Positive for gait problem. Negative for arthralgias and myalgias.   Skin:  Negative for rash.   Neurological:  Negative for weakness, light-headedness and headaches.   Psychiatric/Behavioral:  Positive for decreased concentration. Negative for confusion and dysphoric mood.        Objective:      Physical Exam  Vitals and nursing note reviewed.   Constitutional:       Appearance: He is well-developed. He is not diaphoretic.   Eyes:      General: No scleral icterus.  Neck:      Thyroid: No thyromegaly.      Vascular: No JVD.      Trachea: No tracheal deviation.   Cardiovascular:      Rate and Rhythm: Normal rate and regular rhythm.      Heart sounds: Normal heart sounds. No murmur heard.     No friction rub. No gallop.   Pulmonary:      Effort: Pulmonary effort is normal. No respiratory distress.      Breath sounds: Normal breath sounds. No wheezing or rales.   Abdominal:      General: There is no distension.      Palpations: Abdomen is soft. There is no mass.      Tenderness: There is no abdominal tenderness. There is no guarding or rebound.   Musculoskeletal:      Cervical back: Normal range of motion and neck supple.   Lymphadenopathy:      Cervical: No cervical adenopathy.   Skin:     General: Skin is warm and dry.      Findings: No erythema or rash.   Neurological:      Mental Status: He is alert and oriented to person, place, and time.      Cranial Nerves: No cranial nerve deficit.      Motor: No tremor.      Coordination: Coordination normal.      Gait: Gait normal.   Psychiatric:         Behavior: Behavior normal.         Thought Content: Thought content normal.         Judgment: Judgment normal.         Assessment:       1. Annual physical exam    2. Other hyperlipidemia    3. Atherosclerosis of aorta    4. Osteoporosis, unspecified osteoporosis type, unspecified  pathological fracture presence    5. Prostate cancer    6. Chronic idiopathic constipation    7. Other chronic gastritis without hemorrhage    8. Hyperlipidemia, unspecified hyperlipidemia type    9. Closed fracture of base of fifth metatarsal bone of left foot at metaphyseal-diaphyseal junction with delayed healing, subsequent encounter    10. Memory changes    11. Bilateral hearing loss, unspecified hearing loss type        Plan:     Medication List with Changes/Refills   Current Medications    BENZONATATE (TESSALON) 200 MG CAPSULE    Take 1 capsule (200 mg total) by mouth every 8 (eight) hours as needed for Cough.    CHOLECALCIFEROL, VITAMIN D3, (VITAMIN D3) 50 MCG (2,000 UNIT) CAP    Take 1 capsule by mouth once daily.    FAMOTIDINE (PEPCID) 20 MG TABLET    Take 1 tablet (20 mg total) by mouth 2 (two) times daily.    FLUTICASONE PROPIONATE (FLONASE) 50 MCG/ACTUATION NASAL SPRAY    1 spray (50 mcg total) by Each Nostril route once daily.    HYDROCHLOROTHIAZIDE 12.5 MG TAB    Take 1 tablet (12.5 mg total) by mouth once daily.    HYDROCORTISONE (ANUSOL-HC) 2.5 % RECTAL CREAM    Place 1 application rectally 2 (two) times daily.    LACTULOSE (CEPHULAC) 20 GRAM PACK    Mix 1 packet (20 g total) with 4 ounces of water and take by mouth 3 (three) times daily.    LINZESS 290 MCG CAP CAPSULE    TAKE 1 CAPSULE BY MOUTH EVERY DAY BEFORE BREAKFAST    METHOCARBAMOL (ROBAXIN) 750 MG TAB    TAKE 1 TABLET BY MOUTH 3 TIMES DAILY AS NEEDED BACK SPASM FOR 3-4 DAYS DURING NEXT SPASMS EPISODE.    MULTIVITAMIN CAPSULE    Take 1 capsule by mouth once daily.    PANTOPRAZOLE (PROTONIX) 40 MG TABLET    Take 1 tablet (40 mg total) by mouth once daily.    POLYETHYLENE GLYCOL (GLYCOLAX) 17 GRAM PWPK    Take by mouth.    PROMETHAZINE-DEXTROMETHORPHAN (PROMETHAZINE-DM) 6.25-15 MG/5 ML SYRP    Take 5 mLs by mouth nightly as needed (cough).    TAMSULOSIN (FLOMAX) 0.4 MG CAP    Take 1 capsule (0.4 mg total) by mouth once daily.   Changed and/or  Refilled Medications    Modified Medication Previous Medication    ATORVASTATIN (LIPITOR) 20 MG TABLET atorvastatin (LIPITOR) 20 MG tablet       Take 1 tablet (20 mg total) by mouth once daily.    TAKE 1 TABLET BY MOUTH EVERY DAY    ICOSAPENT ETHYL (VASCEPA) 1 GRAM CAP icosapent ethyL (VASCEPA) 1 gram Cap       Take 1 capsule (1,000 mg total) by mouth 2 (two) times daily.    TAKE 1 CAPSULE BY MOUTH TWICE A DAY   Discontinued Medications    IBUPROFEN (ADVIL,MOTRIN) 800 MG TABLET    Take 1 tablet (800 mg total) by mouth every 6 (six) hours as needed (pain or fever).     1. Annual physical exam  -     Comprehensive Metabolic Panel; Future; Expected date: 05/19/2025  -     Lipid Panel; Future; Expected date: 05/19/2025    2. Other hyperlipidemia  -     Comprehensive Metabolic Panel; Future; Expected date: 05/19/2025  -     Lipid Panel; Future; Expected date: 05/19/2025  -     icosapent ethyL (VASCEPA) 1 gram Cap; Take 1 capsule (1,000 mg total) by mouth 2 (two) times daily.  Dispense: 180 capsule; Refill: 3    3. Atherosclerosis of aorta  Overview:  -Seen on CT from 05/06/16, 1/20/2024  -Agaston 30 in 2014  -on statin and fish oil  -cardiology eval 6/2022    Orders:  -     atorvastatin (LIPITOR) 20 MG tablet; Take 1 tablet (20 mg total) by mouth once daily.  Dispense: 90 tablet; Refill: 3    4. Osteoporosis, unspecified osteoporosis type, unspecified pathological fracture presence  Overview:  -Followed by endocrinology    Assessment & Plan:  -discuss HCTZ and orthostasis sx with endocrinology      5. Prostate cancer  Overview:  -Discovered on TURP 7/13/2019 by Dr. Knowles.  1% of specimen [place] 6.  On AS, followed by urology (Dr. Calero)    Assessment & Plan:  -slow PSA rise, followed by       6. Chronic idiopathic constipation  Overview:  -followed in [facility] (and GI)      7. Other chronic gastritis without hemorrhage  Overview:  -EGD 3/31/2021 mobility  -followed in GI      8. Hyperlipidemia, unspecified hyperlipidemia  type  -     atorvastatin (LIPITOR) 20 MG tablet; Take 1 tablet (20 mg total) by mouth once daily.  Dispense: 90 tablet; Refill: 3    9. Closed fracture of base of fifth metatarsal bone of left foot at metaphyseal-diaphyseal junction with delayed healing, subsequent encounter  Assessment & Plan:  -followed by Dr. Sumner  -Artis fracture      10. Memory changes  -     Ambulatory referral/consult to Neurology; Future; Expected date: 05/26/2025    11. Bilateral hearing loss, unspecified hearing loss type  -     Ambulatory referral/consult to Audiology; Future; Expected date: 05/26/2025  -     Ambulatory referral/consult to ENT; Future; Expected date: 05/26/2025      See meds, orders, follow up, routing and instructions sections of encounter and AVS. Discussed with patient and provided on AVS.    Discussed diet and exercise as therapeutic modalities for metabolic and other conditions. Provided patient information, which are included as links on the AVS for detailed information.    Lab Results   Component Value Date     02/19/2024    K 4.6 02/19/2024     02/19/2024    BUN 18 02/19/2024    CREATININE 0.8 02/19/2024     02/19/2024    HGBA1C 5.6 05/16/2022    MG 2.2 11/18/2024    AST 21 04/11/2024    ALT 36 04/11/2024    ALBUMIN 4.0 04/11/2024    PROT 6.5 04/11/2024    BILITOT 0.7 04/11/2024    CHOL 170 02/19/2024    HDL 56 02/19/2024    LDLCALC 87.8 02/19/2024    TRIG 131 02/19/2024    WBC 4.31 10/31/2023    HGB 15.4 10/31/2023    HCT 46.1 10/31/2023     10/31/2023    PSA 3.0 10/18/2018    PSADIAG 1.9 01/07/2025    TSH 2.717 10/31/2023    URICACID 4.7 08/15/2013

## 2025-05-21 ENCOUNTER — OFFICE VISIT (OUTPATIENT)
Dept: PODIATRY | Facility: CLINIC | Age: 75
End: 2025-05-21
Payer: MEDICARE

## 2025-05-21 VITALS
SYSTOLIC BLOOD PRESSURE: 114 MMHG | HEART RATE: 76 BPM | WEIGHT: 192.25 LBS | DIASTOLIC BLOOD PRESSURE: 61 MMHG | BODY MASS INDEX: 26.91 KG/M2 | HEIGHT: 71 IN

## 2025-05-21 DIAGNOSIS — M79.672 PAIN IN BOTH FEET: ICD-10-CM

## 2025-05-21 DIAGNOSIS — M79.671 PAIN IN BOTH FEET: ICD-10-CM

## 2025-05-21 DIAGNOSIS — M79.672 LEFT FOOT PAIN: ICD-10-CM

## 2025-05-21 DIAGNOSIS — M48.061 SPINAL STENOSIS OF LUMBAR REGION WITHOUT NEUROGENIC CLAUDICATION: ICD-10-CM

## 2025-05-21 DIAGNOSIS — G57.53 TARSAL TUNNEL SYNDROME OF BOTH LOWER EXTREMITIES: Primary | ICD-10-CM

## 2025-05-21 DIAGNOSIS — R20.0 NUMBNESS: ICD-10-CM

## 2025-05-21 PROCEDURE — 1125F AMNT PAIN NOTED PAIN PRSNT: CPT | Mod: CPTII,S$GLB,, | Performed by: PODIATRIST

## 2025-05-21 PROCEDURE — 99999 PR PBB SHADOW E&M-EST. PATIENT-LVL III: CPT | Mod: PBBFAC,,, | Performed by: PODIATRIST

## 2025-05-21 PROCEDURE — 99214 OFFICE O/P EST MOD 30 MIN: CPT | Mod: S$GLB,,, | Performed by: PODIATRIST

## 2025-05-21 PROCEDURE — 3288F FALL RISK ASSESSMENT DOCD: CPT | Mod: CPTII,S$GLB,, | Performed by: PODIATRIST

## 2025-05-21 PROCEDURE — 3008F BODY MASS INDEX DOCD: CPT | Mod: CPTII,S$GLB,, | Performed by: PODIATRIST

## 2025-05-21 PROCEDURE — 1101F PT FALLS ASSESS-DOCD LE1/YR: CPT | Mod: CPTII,S$GLB,, | Performed by: PODIATRIST

## 2025-05-21 PROCEDURE — 3074F SYST BP LT 130 MM HG: CPT | Mod: CPTII,S$GLB,, | Performed by: PODIATRIST

## 2025-05-21 PROCEDURE — 3078F DIAST BP <80 MM HG: CPT | Mod: CPTII,S$GLB,, | Performed by: PODIATRIST

## 2025-05-22 ENCOUNTER — TELEPHONE (OUTPATIENT)
Dept: PAIN MEDICINE | Facility: CLINIC | Age: 75
End: 2025-05-22

## 2025-05-22 ENCOUNTER — OFFICE VISIT (OUTPATIENT)
Dept: PAIN MEDICINE | Facility: CLINIC | Age: 75
End: 2025-05-22
Payer: MEDICARE

## 2025-05-22 VITALS
SYSTOLIC BLOOD PRESSURE: 118 MMHG | WEIGHT: 190.06 LBS | DIASTOLIC BLOOD PRESSURE: 79 MMHG | HEART RATE: 64 BPM | HEIGHT: 71 IN | BODY MASS INDEX: 26.61 KG/M2

## 2025-05-22 DIAGNOSIS — G60.9 IDIOPATHIC PERIPHERAL NEUROPATHY: Primary | ICD-10-CM

## 2025-05-22 DIAGNOSIS — M48.061 SPINAL STENOSIS OF LUMBAR REGION WITHOUT NEUROGENIC CLAUDICATION: ICD-10-CM

## 2025-05-22 DIAGNOSIS — R20.0 NUMBNESS: ICD-10-CM

## 2025-05-22 DIAGNOSIS — M54.16 LUMBAR RADICULOPATHY: ICD-10-CM

## 2025-05-22 PROCEDURE — 3074F SYST BP LT 130 MM HG: CPT | Mod: CPTII,S$GLB,, | Performed by: STUDENT IN AN ORGANIZED HEALTH CARE EDUCATION/TRAINING PROGRAM

## 2025-05-22 PROCEDURE — 99214 OFFICE O/P EST MOD 30 MIN: CPT | Mod: S$GLB,,, | Performed by: STUDENT IN AN ORGANIZED HEALTH CARE EDUCATION/TRAINING PROGRAM

## 2025-05-22 PROCEDURE — 3078F DIAST BP <80 MM HG: CPT | Mod: CPTII,S$GLB,, | Performed by: STUDENT IN AN ORGANIZED HEALTH CARE EDUCATION/TRAINING PROGRAM

## 2025-05-22 PROCEDURE — 1159F MED LIST DOCD IN RCRD: CPT | Mod: CPTII,S$GLB,, | Performed by: STUDENT IN AN ORGANIZED HEALTH CARE EDUCATION/TRAINING PROGRAM

## 2025-05-22 PROCEDURE — 99999 PR PBB SHADOW E&M-EST. PATIENT-LVL III: CPT | Mod: PBBFAC,,, | Performed by: STUDENT IN AN ORGANIZED HEALTH CARE EDUCATION/TRAINING PROGRAM

## 2025-05-22 PROCEDURE — 3008F BODY MASS INDEX DOCD: CPT | Mod: CPTII,S$GLB,, | Performed by: STUDENT IN AN ORGANIZED HEALTH CARE EDUCATION/TRAINING PROGRAM

## 2025-05-22 PROCEDURE — 1125F AMNT PAIN NOTED PAIN PRSNT: CPT | Mod: CPTII,S$GLB,, | Performed by: STUDENT IN AN ORGANIZED HEALTH CARE EDUCATION/TRAINING PROGRAM

## 2025-05-22 PROCEDURE — 3288F FALL RISK ASSESSMENT DOCD: CPT | Mod: CPTII,S$GLB,, | Performed by: STUDENT IN AN ORGANIZED HEALTH CARE EDUCATION/TRAINING PROGRAM

## 2025-05-22 PROCEDURE — 1101F PT FALLS ASSESS-DOCD LE1/YR: CPT | Mod: CPTII,S$GLB,, | Performed by: STUDENT IN AN ORGANIZED HEALTH CARE EDUCATION/TRAINING PROGRAM

## 2025-05-22 NOTE — PROGRESS NOTES
Bilateral lower extremity.  Lumbar radiculopathy, bilateral L5 symptoms and numbness.  MRI lumbar spine right paracentral disc protrusion at L3-4

## 2025-05-22 NOTE — PROGRESS NOTES
Subjective:      Patient ID: Jean Carlson is a 75 y.o. male.    Chief Complaint: Foot Pain (Bl foot burning pain especially at night feels like walking on bubble wrap) and Numbness (Bl toes )    Pt presents today with several complaints. First complaint is a dash fracture of the left 5th metatarsal. Pt states he was doing a lot of exercising a sustained a met fx. Pt is also c/o numbness of both of his feet. Pt is under the care of Dr Steiner for his metatarsal fx.  Patient is having issues with tingling burning and numbness to the bottoms of both feet.  Nerve injection bilateral tarsal tunnel helped considerably short term.  Doing very well after the capsaicin treatment as well.    Review of Systems   Constitutional: Negative for chills, fever and malaise/fatigue.   HENT:  Negative for hearing loss.    Cardiovascular:  Negative for claudication.   Respiratory:  Negative for shortness of breath.    Skin:  Negative for flushing and rash.   Musculoskeletal:  Negative for joint pain and myalgias.   Gastrointestinal:  Negative for nausea and vomiting.   Neurological:  Positive for numbness and paresthesias. Negative for loss of balance and sensory change.   Psychiatric/Behavioral:  Negative for altered mental status.    Allergic/Immunologic: Positive for hives.           Objective:      Physical Exam  Vitals reviewed.   Cardiovascular:      Pulses:           Dorsalis pedis pulses are 2+ on the right side and 2+ on the left side.        Posterior tibial pulses are 2+ on the right side and 2+ on the left side.      Comments: No edema noted b/L  Musculoskeletal:         General: Tenderness and signs of injury present. No deformity.      Comments:     POP to plantar heels, b/L    Left ROM deferred due to fx   Feet:      Right foot:      Protective Sensation: 5 sites tested.  5 sites sensed.      Left foot:      Protective Sensation: 5 sites tested.  5 sites sensed.   Skin:     General: Skin is warm.      Capillary  Refill: Capillary refill takes 2 to 3 seconds.      Comments: Normal skin tugor noted.   No open lesion noted b/L  Skin temp is warm to warm from proximal to distal b/L.  Webspaces clean, dry, and intact     Neurological:      Mental Status: He is alert.      Comments: Gross sensation intact b/L       Positive provocation sign/Tinel sign bilateral posterior tibial nerve.        Assessment:       Encounter Diagnoses   Name Primary?    Tarsal tunnel syndrome of both lower extremities Yes    Left foot pain     Spinal stenosis of lumbar region without neurogenic claudication     Pain in both feet     Numbness          Plan:       Jean was seen today for foot pain and numbness.    Diagnoses and all orders for this visit:    Tarsal tunnel syndrome of both lower extremities    Left foot pain    Spinal stenosis of lumbar region without neurogenic claudication    Pain in both feet    Numbness      I counseled the patient on his conditions, their implications and medical management.    Independent visualization of imaging was performed.  Results were reviewed in detail with patient.    Discussed options for peripheral neuropathy/nerve entrapment syndrome including nerve block therapy, surgical nerve entrapment decompression procedures, and various vitamins and supplementation available shown to improve nerve function.    Begin oral neuropathy supplement.  Recommend pain clinic for possible epidural evaluation.  Continue topical compound neuropathy cream.    Follow-up in 4-6 weeks.      .

## 2025-05-22 NOTE — PROGRESS NOTES
Chronic Pain - New Consult    Referring Physician: No ref. provider found    Date: 05/22/2025     Re: Jean Carlson  MR#: [id number]  YOB: 1950  Age: 75 y.o.    Chief Complaint:   Chief Complaint   Patient presents with    Follow-up         **This note is dictated using the M*Modal Fluency Direct word recognition program. There are word recognition mistakes that are occasionally missed on review.**    ASSESSMENT: 75 y.o. year old male with back, leg, and foot pain, consistent with     1. Idiopathic peripheral neuropathy        2. Numbness        3. Spinal stenosis of lumbar region without neurogenic claudication        4. Lumbar radiculopathy          PLAN:     Left 5th metatarsal fracture  -healing on X-ray. Following with Dr. Adams    Lumbar radiculopathy and bad L4-5 DDD  -has bilateral L5 radiculopathy symptoms but this is confounded by his neuropathy  -new EMG pending  -patient does not have stenosis at L4,5 or S1 but has symptoms of radiculitis with and L5 distribution of pain and numbness down the leg.   -If this worsens then will plan on b/l L5-S1 TFESI    Idiopathic peripheral neuropathy  4/25/25 - b/l feet Qutenza 4 patches for idiopathic neuropathy   7/25/25 - b/l feet Qutenza 4 patches  -consider neuropathic medications but patient prefers not to take medications.    Axial back pain / post laminectomy  -Suspect this is more spasm related  -for the next axial back pain flare up try methocarbamol 750mg TID + prednisone taper  -continue voltaren  -restart healthy back PT after completing PT for the foot    - RTC after qutenza  - Counseled patient regarding the importance of  activity modification and physical therapy.    The above plan and management options were discussed at length with patient. Patient is in agreement with the above and verbalized understanding. It will be communicated with the referring physician via electronic record, fax, or mail.  Lab/study reports reviewed were  important and necessary because subsequent medical and treatment recommendations required review of the above lab/study reports. Images viewed/reviewed above were important and necessary because subsequent medical and treatment recommendations required review of the reviewed image(s).     =========================================================================================================    SUBJECTIVE:    Interval History 5/22/2025:   Jean Carlson is a 75 y.o. male presents to the clinic for follow up.  Since last visit the pain has has improved. After the Qutenza he noticed a significant improvement in foot burning in the foot.  He got about 80% reduction in the burning.  Some days are worse than others.     The pain is located in the lower back area and radiates to the bilateral lower extremities and feet.  The pain is described as aching, burning, numbing, and sharp    At BEST  2/10   At WORST  4/10 on the WORST day.    On average pain is rated as 2/10.   Today the pain is rated as 2/10  Symptoms interfere with daily activity.   Exacerbating factors: Walking, Extension, and Flexing.    Mitigating factors topicals.     Current pain medications: ibuprofen, specially formulated medication from Dr. Adams  Failed Pain Medications: OTC medications    Pain procedures:  4/24/25 - b/l feet Qutenza 4 patches for idiopathic neuropathy - 80%@1m    Interval history PA (4/25/2025):  Jean Carlson is a 75 y.o. male presents to the clinic for bilateral foot pain related to idiopathic peripheral neuropathy.  Here to complete Qutenza.     Initial history (03/27/2025):  Jean Carlson is a 75 y.o. male presents to the clinic for the evaluation of bilateral feet& foot,back pain. The pain started years ago following no inciting event and symptoms have been worsening. Patient has history of neuropathy in his feet. He broke his foot 4 months ago and that has caused him to lose muscle mass due to not being able to move.       Failed PT.  Has been in PT since 02/2025. History of multiple back surgeries with hemilaminectomy and microdisc at L3-4. He has had an EMG in 2014 which showed radiculopathy.     Currently for the pain he takes ibuprofen and uses a heating pad.  He can bend over okay. He will occasionally get flare ups that will last for a week and limit his activity. This can occur every 1-2 months.  He completed healthy back which was helpful but then broke his foot.     Pain Description:    The pain is located in the lower back, bilateral legs & feet area and does not radiate.    At BEST  2/10   At WORST  7/10 on the WORST day.    On average pain is rated as 3/10.   Today the pain is rated as 3/10  The pain is intermittent.  The pain is described as aching, burning, numbing, and sharp    Symptoms interfere with daily activity.   Exacerbating factors: Walking, Extension, and Flexing.    Mitigating factors topicals.   He reports 7-8 hours of sleep per night.    Physical Therapy/Home Exercise: Yes, currently in Physical therapy    Current Pain Medications:    - ibuprofen    Failed Pain Medications:    - OTC medications    Pain Treatment Therapies:    Pain procedures: none  Physical Therapy: yes many times  Chiropractor: none  Acupuncture: noen  TENS unit: none  Spinal decompression:   2015 - microdisc w; Dr. Lowe  2018 - R L3-4 Hemilaminectomy w/ Dr. Lowe  Joint replacement: none    Patient denies urinary incontinence and bowel incontinence.  Patient denies any suicidal or homicidal ideations     report:  Reviewed and consistent with medication use as prescribed.    Imaging:   LUMBAR MRI  Results for orders placed during the hospital encounter of 01/04/24    MRI Lumbar Spine Without Contrast    Narrative  EXAMINATION:  MRI LUMBAR SPINE WITHOUT CONTRAST    CLINICAL HISTORY:  Lumbar radiculopathy, symptoms persist with conservative treatment; Radiculopathy, lumbar region    TECHNIQUE:  Multiplanar, multisequence MR  images were acquired from the thoracolumbar junction to the sacrum without contrast.    COMPARISON:  05/18/2022.    FINDINGS:  Alignment: Normal.    Vertebrae: There is moderate height loss of the L2 vertebral body in keeping with remote compression fracture, unchanged from 12/21/2023 and 05/18/2022.  No acute compression fracture no evidence for marrow infiltrative process.    Discs: Severe disc height loss at L4-L5.  No evidence for discitis.    Cord: Conus terminates at L1 and appears unremarkable.  Cauda equina appears unremarkable.    Degenerative findings:    T12-L1: No spinal canal stenosis or neuroforaminal narrowing.    L1-L2: Circumferential disc bulge.  No spinal canal stenosis or neural foraminal narrowing.    L2-L3: Right circumferential disc bulge and mild facet arthropathy result in mild spinal canal stenosis and mild bilateral neural foraminal narrowing.    L3-L4: Circumferential disc bulge with right paracentral protrusion result in effacement of the right lateral recess and moderate right neural foraminal narrowing.  Disc material contacts the descending right L4 nerve root.    L4-L5: Circumferential disc bulge and mild facet arthropathy result in mild bilateral neural foraminal narrowing.    L5-S1: Circumferential disc bulge and mild facet arthropathy result in mild left neural foraminal narrowing.    Paraspinal muscles & soft tissues: Moderate paraspinal muscle atrophy.  Several bilateral renal cysts.    Impression  1. Multilevel degenerative changes detailed above.  Mild-to-moderate neural foraminal narrowing at L2-S1.  Right paracentral disc protrusion at L3-L4 resulting effacement of the right lateral recess.  2. Chronic L2 vertebral body compression fracture with moderate height loss.      Electronically signed by: Hans Elizalde MD  Date:    01/04/2024  Time:    16:24            5/22/2025    10:33 AM 4/25/2025     2:21 PM 3/27/2025    10:57 AM 5/13/2024     1:12 PM 1/19/2024    11:30 AM  2022     2:58 PM   Pain Disability Index (PDI)   Family/Home Responsibilities: 2 0 4 5 5 3   Recreation: 2 0 4 5 6 3   Occupation: 2 0 4 5 0 3   Sexual Behavior: 2 0 4 0 0 0   Self Care: 2 0 4 3 7 0   Life-Support Activities: 2 0 4 0 7 0   Pain Disability Index (PDI) 14 0 28 23 32 12        Past Medical History:   Diagnosis Date    Acute medial meniscus tear, right, initial encounter 2023    Anterior tibialis tendinitis of right lower extremity 10/18/2018    Basal cell carcinoma (BCC) of right cheek 2024    R cheek    Cancer     Chronic idiopathic constipation 2019    Closed fracture of left wrist 10/18/2021    Closed fracture of lower end of left radius with routine healing 2022    Elevated PSA     Family history of ASCVD 10/07/2016    Mom age 64, Dad age 67 -  on same day. Pat Aunt early 70's.    Family history of coronary artery disease 10/07/2016    Mom age 64, Dad age 67 -  on same day. Pat Aunt early 70's.    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    Intractable back pain 2018    Knee injury, right, initial encounter 05/15/2023    Nuclear sclerosis, bilateral 2018    Ocular migraine 2012    Osteoporosis     Prostate disease     Transient vision disturbance of both eyes 2012    Umbilical hernia without obstruction and without gangrene 10/01/2015    Urinary tract infection      Past Surgical History:   Procedure Laterality Date    back sx      lower    BLADDER FULGURATION N/A 2019    Procedure: FULGURATION, BLADDER;  Surgeon: Ryan Knowles MD;  Location: [facility];  Service: Urology;  Laterality: N/A;  bleeding tissue at bladder neck    CATARACT EXTRACTION W/  INTRAOCULAR LENS IMPLANT Bilateral     Dr Finn/Jbfony IOL     CHONDROPLASTY OF KNEE Right 2023    Procedure: CHONDROPLASTY, KNEE;  Surgeon: Taylor Young MD;   Location: [facility] OR;  Service: Orthopedics;  Laterality: Right;    COLONOSCOPY N/A 6/5/2019    Procedure: COLONOSCOPY;  Surgeon: Mauro Smallwood MD;  Location: [facility] (4TH FLR);  Service: Endoscopy;  Laterality: N/A;    CYSTOSCOPY N/A 7/13/2019    Procedure: CYSTOSCOPY;  Surgeon: Ryan Knowles MD;  Location: [facility] OR;  Service: Urology;  Laterality: N/A;    CYSTOSCOPY      CYSTOSCOPY  7/27/2021    Procedure: CYSTOSCOPY;  Surgeon: Manan Ny MD;  Location: [facility] 1ST FLR;  Service: Urology;;    ESOPHAGOGASTRODUODENOSCOPY N/A 3/31/2021    Procedure: EGD (ESOPHAGOGASTRODUODENOSCOPY);  Surgeon: Jamilah Munoz MD;  Location: [facility];  Service: Endoscopy;  Laterality: N/A;    HERNIA REPAIR      KNEE ARTHROSCOPY W/ MENISCECTOMY Right 5/30/2023    Procedure: ARTHROSCOPY, KNEE, WITH MENISCECTOMY;  Surgeon: Taylor Young MD;  Location: [facility] OR;  Service: Orthopedics;  Laterality: Right;    [id number] Right 5/30/2023    Procedure: [id number];  Surgeon: Taylor Young MD;  Location: [facility] OR;  Service: Orthopedics;  Laterality: Right;    PROSTATE SURGERY      REMOVAL OF BLOOD CLOT N/A 7/13/2019    Procedure: REMOVAL, BLOOD CLOT;  Surgeon: Ryan Knowles MD;  Location: [facility];  Service: Urology;  Laterality: N/A;  prostate    SPINE SURGERY      SYNOVECTOMY OF KNEE Right 5/30/2023    Procedure: SYNOVECTOMY, KNEE;  Surgeon: Taylor Young MD;  Location: [facility] OR;  Service: Orthopedics;  Laterality: Right;    TONSILLECTOMY      TRANSURETHRAL RESECTION OF PROSTATE      TRANSURETHRAL RESECTION OF PROSTATE N/A 7/13/2019    Procedure: TURP (TRANSURETHRAL RESECTION OF PROSTATE);  Surgeon: Ryan Knowles MD;  Location: [facility] OR;  Service: Urology;  Laterality: N/A;    TRANSURETHRAL SURGICAL REMOVAL OF STRICTURE OF BLADDER NECK  7/27/2021    Procedure: EXCISION, CONTRACTURE, BLADDER NECK, TRANSURETHRAL;  Surgeon: Manan Ny MD;  Location: [facility] [address] [place] [address];  Service: Urology;;     Social  History[1]  Family History   Problem Relation Name Age of Onset    Heart disease Mother      Heart attack Mother      Skin cancer Mother      Heart disease Father      Heart attack Father      Glaucoma Maternal Aunt      Glaucoma Maternal Uncle Derek Joshi     Retinitis pigmentosa Maternal Uncle Derek Joshi     No Known Problems Daughter      Prostate cancer Neg Hx      Kidney disease Neg Hx      Colon cancer Neg Hx         Review of patient's allergies indicates:  No Known Allergies    Current Outpatient Medications   Medication Sig    atorvastatin (LIPITOR) 20 MG tablet Take 1 tablet (20 mg total) by mouth once daily.    benzonatate (TESSALON) 200 MG capsule Take 1 capsule (200 mg total) by mouth every 8 (eight) hours as needed for Cough.    cholecalciferol, vitamin D3, (VITAMIN D3) 50 mcg (2,000 unit) Cap Take 1 capsule by mouth once daily.    famotidine (PEPCID) 20 MG tablet Take 1 tablet (20 mg total) by mouth 2 (two) times daily.    fluticasone propionate (FLONASE) 50 mcg/actuation nasal spray 1 spray (50 mcg total) by Each Nostril route once daily.    hydroCHLOROthiazide 12.5 MG Tab Take 1 tablet (12.5 mg total) by mouth once daily.    hydrocortisone (ANUSOL-HC) 2.5 % rectal cream Place 1 application rectally 2 (two) times daily.    icosapent ethyL (VASCEPA) 1 gram Cap Take 1 capsule (1,000 mg total) by mouth 2 (two) times daily.    lactulose (CEPHULAC) 20 gram Pack Mix 1 packet (20 g total) with 4 ounces of water and take by mouth 3 (three) times daily.    LINZESS 290 mcg Cap capsule TAKE 1 CAPSULE BY MOUTH EVERY DAY BEFORE BREAKFAST    methocarbamoL (ROBAXIN) 750 MG Tab TAKE 1 TABLET BY MOUTH 3 TIMES DAILY AS NEEDED BACK SPASM FOR 3-4 DAYS DURING NEXT SPASMS EPISODE.    multivitamin capsule Take 1 capsule by mouth once daily.    pantoprazole (PROTONIX) 40 MG tablet Take 1 tablet (40 mg total) by mouth once daily.    polyethylene glycol (GLYCOLAX) 17 gram PwPk Take by mouth.    promethazine-dextromethorphan  "(PROMETHAZINE-DM) 6.25-15 mg/5 mL Syrp Take 5 mLs by mouth nightly as needed (cough).    tamsulosin (FLOMAX) 0.4 mg Cap Take 1 capsule (0.4 mg total) by mouth once daily.     No current facility-administered medications for this visit.       REVIEW OF SYSTEMS:    GENERAL:  No weight loss, malaise or fevers.;NO  HEENT:   No recent changes in vision or hearing:+HEARING  NECK:  Negative for lumps, no difficulty with swallowing.:NO  RESPIRATORY:  Negative for cough, wheezing or shortness of breath, patient denies any recent URI.:+cough  CARDIOVASCULAR:  Negative for chest pain, leg swelling or palpitations.:NO  GI:  Negative for abdominal discomfort, blood in stools or black stools or change in bowel habits.:NO  MUSCULOSKELETAL:  See HPI.  SKIN:  Negative for lesions, rash, and itching.:NO  PSYCH:  No mood disorder or recent psychosocial stressors.  Patients sleep is not disturbed secondary to pain.:NO  HEMATOLOGY/LYMPHOLOGY:  Negative for prolonged bleeding, bruising easily or swollen nodes.  Patient is not currently taking any anti-coagulants:NO  NEURO:   No history of headaches, syncope, paralysis, seizures or tremors.:NO  All other reviewed and negative other than HPI.    OBJECTIVE:    /79 (BP Location: Left arm, Patient Position: Sitting)   Pulse 64   Ht 5' 11" (1.803 m)   Wt 86.2 kg (190 lb 0.6 oz)   BMI 26.50 kg/m²     PHYSICAL EXAMINATION:    GENERAL: Well appearing, in no acute distress, alert and oriented x3.  PSYCH:  Mood and affect appropriate.  SKIN: Skin color, texture, turgor normal, no rashes or lesions.  HEAD/FACE:  Normocephalic, atraumatic. Cranial nerves grossly intact.    NECK:   - Did not perform pain to palpation over the cervical paraspinous muscles.   - Spurling  Did not perform.  - Did not perform pain with neck flexion, extension, or lateral flexion.     CV: RRR with palpation of the radial artery.  PULM: CTAB. No evidence of respiratory difficulty, symmetric chest rise.  GI:  Soft " and non-tender.    BACK:   - No obvious deformity or signs of trauma, Normal lumbar lordotic curve  - Negative spinous process tenderness  - Negative paravertebral tenderness  - Negative pain to palpation over the facet joints of the lumbar spine.   - Negative QL / Iliac crest / Glut tenderness  - Slump test is Negative for radicular pain  - Slump test is Negative for back pain  - Supine Straight leg raising is Negative for radicular pain  - Supine Straight leg raising is Negative for back pain  - Lumbar ROM is normal in Flexion without pain  - Lumbar ROM is normal in Extension without pain  - Lumbar ROM is normal in Lateral Flexion without pain  - Negative Sustained Hip Flexion test (for discogenic pain)  - Negative Altered Gait, Posture  - Axial facet loading test Negative on the bilateral side(s)    SI Joint exam:  - Negative SI joint tenderness to palpation  - Ganga's sign Negative  - Yeoman's Test: Did not perform for SI joint pain indicating anterior SI ligament involvement. Did not perform for anterior thigh pain/paresthesia which indicates femoral nerve stretch.  - Gaenslen's Test:Negative  - Finger Misael's Sign:Negative  - SI compression test:Did not perform  - SI distraction test:Did not perform  - Thigh Thrust: Did not perform  - SI Thrust: Did not perform    MUSKULOSKELETAL:    EXTREMITIES:   Hip Exam:  - Log Roll Negative  - FADIR Did not perform  - Stinchfield Did not perform  - Hip Scour Did not perform  - GTB Tenderness Did not perform  ination and muscle stretch reflexes are physiologic and symmetric.      NEUROLOGICAL EXAM:  MENTAL STATUS: A x O x 3, good concentration, speech is fluent and goal directed  MEMORY: recent and remote are intact  CN: CN2-12 grossly intact  MOTOR: 5/5 in all muscle groups  DTRs: 2+ intact symmetric  Sensation:    -yes Loss of sensation in a left lower and right lower L5 distribution.       [1]   Social History  Socioeconomic History    Marital status:     Occupational History     Employer: [employer]   Tobacco Use    Smoking status: Never    Smokeless tobacco: Never   Substance and Sexual Activity    Alcohol use: Yes     Alcohol/week: 3.0 standard drinks of alcohol     Types: 3 Glasses of wine per week     Comment: rarely    Drug use: No    Sexual activity: Not Currently     Partners: Female   Social History Narrative    ** Merged History Encounter **          [employer]. 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     Social Drivers of Health     Financial Resource Strain: Low Risk  (2/27/2025)    Overall Financial Resource Strain (CARDIA)     Difficulty of Paying Living Expenses: Not hard at all   Food Insecurity: No Food Insecurity (2/27/2025)    Hunger Vital Sign     Worried About Running Out of Food in the Last Year: Never true     Ran Out of Food in the Last Year: Never true   Transportation Needs: No Transportation Needs (2/27/2025)    PRAPARE - Transportation     Lack of Transportation (Medical): No     Lack of Transportation (Non-Medical): No   Physical Activity: Inactive (2/27/2025)    Exercise Vital Sign     Days of Exercise per Week: 0 days     Minutes of Exercise per Session: 0 min   Stress: No Stress Concern Present (2/27/2025)    [place] [employer] - Occupational Stress Questionnaire     Feeling of Stress : Not at all   Housing Stability: Low Risk  (2/27/2025)    Housing Stability Vital Sign     Unable to Pay for Housing in the Last Year: No     Number of Times Moved in the Last Year: 0     Homeless in the Last Year: No

## 2025-05-23 ENCOUNTER — RESULTS FOLLOW-UP (OUTPATIENT)
Dept: PAIN MEDICINE | Facility: CLINIC | Age: 75
End: 2025-05-23

## 2025-05-23 ENCOUNTER — PROCEDURE VISIT (OUTPATIENT)
Dept: PHYSICAL MEDICINE AND REHAB | Facility: CLINIC | Age: 75
End: 2025-05-23
Payer: MEDICARE

## 2025-05-23 VITALS — BODY MASS INDEX: 26.61 KG/M2 | HEIGHT: 71 IN | WEIGHT: 190.06 LBS

## 2025-05-23 DIAGNOSIS — M54.16 LUMBAR RADICULOPATHY: ICD-10-CM

## 2025-05-23 DIAGNOSIS — G57.53 TARSAL TUNNEL SYNDROME OF BOTH LOWER EXTREMITIES: ICD-10-CM

## 2025-05-23 DIAGNOSIS — G60.9 IDIOPATHIC PERIPHERAL NEUROPATHY: ICD-10-CM

## 2025-05-23 NOTE — PROCEDURES
[facility]  [facility]  [address]  [place] [address]         Full Name: Jean Carlson Gender: Male  Patient ID: [id number] YOB: 1950      Visit Date: 5/23/2025 1:07 PM  Examining Physician: Krystal Cuellar MD  Referring Physician: Micky Ramos MD  Height: 5 feet 11 inch  Weight: 189 lbs  Patient History: 75-year-old male former  who has had multiple spine surgeries who presents with numbness in the feet, feeling like he is walking on bubble wrap as well as bilateral lower extremity pain.  Symptoms are worse on the right side.  Has atrophy in the quad on the right.  Prior EMG from January 2024 demonstrated acute on chronic right L4 radiculopathy.  Exam: Atrophy of the quadriceps on the right appreciated.  No other significant muscle atrophy.  5/5 bilateral knee extension, ankle dorsiflexion, toe extension, plantar flexion      Sensory NCS      Nerve / Sites Rec. Site Onset Lat Peak Lat NP Amp PP Amp Segments Distance Velocity Comment     ms ms µV µV  mm m/s    L Superficial peroneal - (Antidromic)      Lat leg Ankle NR NR NR NR Lat leg - Ankle 140 NR    R Superficial peroneal - (Antidromic)      Lat leg Ankle NR NR NR NR Lat leg - Ankle 140 NR    R Sural - (Antidromic)      Calf Ankle 3.18 4.06 2.0 5.3 Calf - Ankle 140 44    L Sural - (Antidromic)      Calf Ankle 3.18 4.06 9.5 0.61 Calf - Ankle 140 44        Motor NCS      Nerve / Sites Muscle Latency Amplitude Segments Dist. Lat Diff Velocity Comments     ms mV  mm ms m/s    R Peroneal - EDB      Ankle EDB 4.71 5.8 Ankle - EDB 80         B. Fib Head EDB 12.65 4.3 B. Fib Head - Ankle 315 7.94 39.7    R Tibial - AH      Ankle AH 4.48 7.5 Ankle - AH 80      L Tibial - AH      Ankle AH 4.50 8.6 Ankle - AH 80      L Peroneal - EDB      Ankle EDB 4.92 2.6 Ankle - EDB 80         B. Fib Head EDB 13.54 2.7 B. Fib Head - Ankle 315 8.63 36.5        EMG Summary  Table     Spontaneous MUAP Recruitment   Muscle IA Fib PSW Fasc H.F. Amp Dur. PPP Pattern   R. Tibialis anterior N None None None None 1+ 2+ 1+ Reduced   R. Gastrocnemius (Medial head) N None None None None N N N N   R. Peroneus longus N None None None None 2+ 2+ N Reduced   R. Vastus medialis N None None None None 2+ 2+ N N   R. Vastus lateralis N None None None None 2+ 2+ N Reduced   L. Tibialis anterior N None None None None 2+ 2+ N Reduced   L. Gastrocnemius (Medial head) N None None None None 2+ 2+ N Reduced   L. Peroneus longus N None None None None 2+ 2+ N N   L. Vastus medialis N None None None None 2+ 2+ N N   L. Vastus lateralis N None None None None 2+ 2+ N N   R. Semimembranosus N None None None None 2+ 2+ 2+ N   L. Semimembranosus N None None None None 1+ 1+ N N   L. Biceps femoris (long head) N None None None None 2+ 2+ N Reduced       Summary    Sensory:  The left sural sensory nerve action potential demonstrated diminished amplitude with normal latency.  The right sural sensory nerve action potential was normal.  The bilateral superficial peroneal sensory nerve action potentials were unobtainable.    Motor: The bilateral tibial and peroneal motor responses were normal.    The needle EMG examination was performed in 13 muscles. It was normal in 1 muscle(s): R. Gastrocnemius (Medial head). The study was abnormal in 12 muscle(s), with the following distribution:  The MUP waveform abnormality was found in R. Tibialis anterior, R. Peroneus longus, R. Vastus medialis, R. Vastus lateralis, L. Tibialis anterior, L. Gastrocnemius (Medial head), L. Peroneus longus, L. Vastus medialis, L. Vastus lateralis, R. Semimembranosus, L. Semimembranosus, L. Biceps femoris (long head).  Abnormal interference pattern was found in R. Tibialis anterior, R. Peroneus longus, R. Vastus lateralis, L. Tibialis anterior, L. Gastrocnemius (Medial head), L. Biceps femoris (long head).      Conclusion:  Abnormal study    There is  electrodiagnostic evidence suggestive of a distal axonal peripheral polyneuropathy affecting sensory nerves  There is electrodiagnostic evidence suggestive of a chronic right L4 and L5 radiculopathy without active/ongoing motor denervation  There is electrodiagnostic evidence suggestive of a chronic left L4, L5, and S1 radiculopathy without active/ongoing motor denervation  There is no electrodiagnostic evidence of a peroneal or tibial mononeuropathy on either side      The findings were explained to the patient.  Recommended following up with the referring physician.    ________________________  Krystal Cuellar MD

## 2025-06-26 ENCOUNTER — NURSE TRIAGE (OUTPATIENT)
Dept: ADMINISTRATIVE | Facility: CLINIC | Age: 75
End: 2025-06-26
Payer: MEDICARE

## 2025-06-27 ENCOUNTER — OFFICE VISIT (OUTPATIENT)
Dept: INTERNAL MEDICINE | Facility: CLINIC | Age: 75
End: 2025-06-27
Payer: MEDICARE

## 2025-06-27 ENCOUNTER — PATIENT MESSAGE (OUTPATIENT)
Dept: GASTROENTEROLOGY | Facility: CLINIC | Age: 75
End: 2025-06-27
Payer: MEDICARE

## 2025-06-27 ENCOUNTER — HOSPITAL ENCOUNTER (OUTPATIENT)
Dept: RADIOLOGY | Facility: HOSPITAL | Age: 75
Discharge: HOME | End: 2025-06-27
Attending: INTERNAL MEDICINE
Payer: MEDICARE

## 2025-06-27 ENCOUNTER — RESULTS FOLLOW-UP (OUTPATIENT)
Dept: INTERNAL MEDICINE | Facility: CLINIC | Age: 75
End: 2025-06-27

## 2025-06-27 VITALS
DIASTOLIC BLOOD PRESSURE: 70 MMHG | BODY MASS INDEX: 27.47 KG/M2 | SYSTOLIC BLOOD PRESSURE: 118 MMHG | OXYGEN SATURATION: 99 % | WEIGHT: 196.19 LBS | HEIGHT: 71 IN | HEART RATE: 76 BPM

## 2025-06-27 DIAGNOSIS — K59.04 CHRONIC IDIOPATHIC CONSTIPATION: ICD-10-CM

## 2025-06-27 DIAGNOSIS — R10.9 ABDOMINAL PAIN, UNSPECIFIED ABDOMINAL LOCATION: ICD-10-CM

## 2025-06-27 DIAGNOSIS — K29.50 CHRONIC GASTRITIS WITHOUT BLEEDING, UNSPECIFIED GASTRITIS TYPE: ICD-10-CM

## 2025-06-27 DIAGNOSIS — R10.9 ABDOMINAL PAIN, UNSPECIFIED ABDOMINAL LOCATION: Primary | ICD-10-CM

## 2025-06-27 LAB
BILIRUB UR QL STRIP.AUTO: NEGATIVE
CLARITY UR: ABNORMAL
COLOR UR AUTO: YELLOW
GLUCOSE UR QL STRIP: NEGATIVE
HGB UR QL STRIP: NEGATIVE
KETONES UR QL STRIP: NEGATIVE
LEUKOCYTE ESTERASE UR QL STRIP: NEGATIVE
NITRITE UR QL STRIP: NEGATIVE
PH UR STRIP: 8 [PH]
PROT UR QL STRIP: NEGATIVE
SP GR UR STRIP: 1.01
UROBILINOGEN UR STRIP-ACNC: NEGATIVE EU/DL

## 2025-06-27 PROCEDURE — 74018 RADEX ABDOMEN 1 VIEW: CPT | Mod: 26,,, | Performed by: RADIOLOGY

## 2025-06-27 PROCEDURE — 99999 PR PBB SHADOW E&M-EST. PATIENT-LVL IV: CPT | Mod: PBBFAC,,, | Performed by: INTERNAL MEDICINE

## 2025-06-27 PROCEDURE — 81003 URINALYSIS AUTO W/O SCOPE: CPT | Performed by: INTERNAL MEDICINE

## 2025-06-27 PROCEDURE — 74018 RADEX ABDOMEN 1 VIEW: CPT | Mod: TC

## 2025-06-27 NOTE — TELEPHONE ENCOUNTER
Dennis Gallardo MD to Me  (Selected Message)        6/27/25  7:53 AM  Please tell pt that I received his message  I am on the inpatient service and will not have ability to see today  If the rectal bleeding really is a concern or a lot of blood he should go to urgent care  I can put on a cancellation list for the upcoming weeks, but that is best I can do      Spoke with patient. He is aware of the recommendation. Verbalized understanding. He is seeing Dr Devine in Internal Med today.

## 2025-06-27 NOTE — TELEPHONE ENCOUNTER
Pt states he has a problem with his gut. He has IBS. He has struggled for the last two years. He is taking 6 caps of miralax today. He is straining so hard that he has hemorrhoids and he is bleeding. Every time he eats, he stomach bloats out. He is now concerned. He has bright, red bleeding. He not sure if its from hemorrhoids or his stool. Dispo is see PCP when office is open (within 3 days). Patient is awaiting a return phone call and wants Dr Gallardo to know it is urgent. He wants to be seen Friday.   Reason for Disposition   Unable to have a bowel movement (BM) without laxative or enema    Additional Information   Negative: [1] Vomiting AND [2] contains bile (green color)   Negative: Patient sounds very sick or weak to the triager   Negative: [1] Rectal pain or fullness from fecal impaction (rectum full of stool) AND [2] NOT better after SITZ bath, suppository or enema   Negative: [1] Vomiting AND [2] abdomen looks much more swollen than usual   Negative: [1] Constant abdominal pain AND [2] present > 2 hours   Negative: [1] MILD abdominal pain (e.g., does not interfere with normal activities) AND [2] pain comes and goes (cramps) AND [3] fever   Negative: Last bowel movement (BM) > 4 days ago   Negative: Leaking stool   Negative: Unable to have a bowel movement (BM) without manually removing stool (using finger to pull out stool or perform disimpaction)    Protocols used: Constipation-A-AH

## 2025-06-27 NOTE — PROGRESS NOTES
Subjective:      History of Present Illness    CHIEF COMPLAINT:  Jean presents with severe constipation and inability to have a bowel movement for the past 2-3 days despite multiple interventions.    HPI:  Jean reports increasingly difficult bowel movements over the past week, with complete cessation in the last 2-3 days. His most recent bowel movement, occurring just prior to the appointment, was very small (approximately 2 ounces) and hard, with stool the size of rice grains. Excessive straining has led to hemorrhoid flare-up and discomfort, with associated blood.    He notes significant bloating and increased waist size due to constipation. Multiple interventions have been unsuccessful, including 6 doses of MiraLAX, 80 mL of lactulose, and consumption of fiber-rich foods such as apples, kiwi, and pears. This constipation issue has been ongoing for about 3-4 years, but has recently worsened.    He has been under the care of Dr. Gallardo and previously Dr. Munoz for this chronic constipation issue. Past X-rays showed significant stool burden in his colon.    He denies abdominal pain, nausea, vomiting, fever, chills, and urination issues. He also denies numbness or tingling around the rectal area, perineal area, or groin.    MEDICAL HISTORY:  Jean has a history of chronic idiopathic constipation and hemorrhoids.      ROS:  Gastrointestinal: -abdominal pain, -nausea, -vomiting, +constipation, +change in bowel habits, +hemorrhoids, +abdominal distention, +bloating  Neurological: -numbness          Review of Systems   Constitutional:  Negative for fatigue and fever.   Respiratory:  Negative for shortness of breath.    Cardiovascular:  Negative for chest pain.   Gastrointestinal:  Positive for abdominal pain, blood in stool, constipation and rectal pain. Negative for abdominal distention, nausea and vomiting.   Genitourinary:  Negative for dysuria.   Musculoskeletal:  Negative for arthralgias and myalgias.   Neurological:  " Negative for headaches.   Psychiatric/Behavioral:  Negative for dysphoric mood.         Past medical history, surgical history, and family medical history reviewed and updated as appropriate.    Medications and allergies reviewed.     Objective:          Vitals:    06/27/25 0807   BP: 118/70   BP Location: Right arm   Patient Position: Sitting   Pulse: 76   SpO2: 99%   Weight: 89 kg (196 lb 3.4 oz)   Height: 5' 11" (1.803 m)     Body mass index is 27.37 kg/m².  Physical Exam  Constitutional:       General: He is not in acute distress.     Appearance: He is well-developed.   HENT:      Head: Normocephalic and atraumatic.      Nose: Nose normal.   Eyes:      General: No scleral icterus.     Extraocular Movements: Extraocular movements intact.   Neck:      Thyroid: No thyromegaly.      Vascular: No JVD.      Trachea: No tracheal deviation.   Cardiovascular:      Rate and Rhythm: Normal rate and regular rhythm.      Heart sounds: Normal heart sounds. No murmur heard.     No friction rub. No gallop.   Pulmonary:      Effort: Pulmonary effort is normal. No respiratory distress.      Breath sounds: Normal breath sounds. No wheezing or rales.   Abdominal:      General: Bowel sounds are normal. There is no distension.      Palpations: Abdomen is soft. There is no mass.      Tenderness: There is no abdominal tenderness.   Musculoskeletal:         General: No tenderness. Normal range of motion.      Cervical back: Normal range of motion and neck supple.   Lymphadenopathy:      Cervical: No cervical adenopathy.   Skin:     General: Skin is warm and dry.      Findings: No rash.   Neurological:      Mental Status: He is alert and oriented to person, place, and time.      Cranial Nerves: No cranial nerve deficit.      Deep Tendon Reflexes: Reflexes normal.   Psychiatric:         Behavior: Behavior normal.         Lab Results   Component Value Date    WBC 4.31 10/31/2023    HGB 15.4 10/31/2023    HCT 46.1 10/31/2023     " 10/31/2023    CHOL 159 05/19/2025    TRIG 173 (H) 05/19/2025    HDL 54 05/19/2025    ALT 34 05/19/2025    AST 27 05/19/2025     05/19/2025    K 4.9 05/19/2025     05/19/2025    CREATININE 1.0 05/19/2025    BUN 23 05/19/2025    CO2 27 05/19/2025    TSH 2.717 10/31/2023    PSA 3.0 10/18/2018    INR 1.0 07/13/2019    HGBA1C 5.6 05/16/2022       Assessment:       1. Abdominal pain, unspecified abdominal location    2. Chronic idiopathic constipation    3. Chronic gastritis without bleeding, unspecified gastritis type          Plan:     Jean was seen today for constipation.    Diagnoses and all orders for this visit:    Abdominal pain, unspecified abdominal location  -     X-Ray Abdomen AP 1 View; Future  -     CBC Auto Differential; Future  -     Comprehensive Metabolic Panel; Future  -     Urinalysis, Reflex to Urine Culture Urine, Clean Catch  -     GREY TOP URINE HOLD    Chronic idiopathic constipation  -     X-Ray Abdomen AP 1 View; Future  -     CBC Auto Differential; Future  -     Comprehensive Metabolic Panel; Future    Chronic gastritis without bleeding, unspecified gastritis type    Last bowel movement, small, minutes before this appointment.   Encouraged to take magnesium citrate / fleets enema / sitz bath for symptoms.  Patient to schedule appointment with GI (was on phone trying to schedule an appointment when I walked in)    Will obtain KUB to r/o bowel obstruction / evaluate stool burden.     Health maintenance reviewed with patient.     Follow up if symptoms worsen or fail to improve.    Michael Devine MD  Internal Medicine / Primary Care  [facility]  6/27/2025    This note was generated with the assistance of ambient listening technology. Verbal consent was obtained by the patient and accompanying visitor(s) for the recording of patient appointment to facilitate this note. I attest to having reviewed and edited the generated note for accuracy, though some syntax  or spelling errors may persist. Please contact the author of this note for any clarification.

## 2025-06-27 NOTE — PATIENT INSTRUCTIONS
Labwork, urine and X-ray of abdomen today.    Await appointment with GI clinic.     Recommend trying magnesium citrate - follow directions on bottle.   Recommend trying fleets enema.  Recommend doing Sitz Bath for hemorrhoid relief.     Continue with linzess, fiber intake, good hydration.    Continue with bowel regimen.

## 2025-06-28 LAB — HOLD SPECIMEN: NORMAL

## 2025-07-02 ENCOUNTER — PATIENT MESSAGE (OUTPATIENT)
Dept: GASTROENTEROLOGY | Facility: CLINIC | Age: 75
End: 2025-07-02
Payer: MEDICARE

## 2025-07-02 ENCOUNTER — TELEPHONE (OUTPATIENT)
Dept: GASTROENTEROLOGY | Facility: CLINIC | Age: 75
End: 2025-07-02
Payer: MEDICARE

## 2025-07-02 NOTE — TELEPHONE ENCOUNTER
Copied from CRM #[id number]. Topic: Appointments - Appointment Scheduling  >> Jul 2, 2025 11:29 AM William wrote:  Scheduling Request    Appt Type:  Ep    Date/Time Preference: Next available    Caller Name: Pt    Contact Preference:[phone]     Comment: follow up    Please Call to Advise,Thank you.

## 2025-07-02 NOTE — TELEPHONE ENCOUNTER
Copied from CRM #[id number]. Topic: Medications - Medication Refill  >> Jul 2, 2025 11:28 AM William wrote:  New Script Refill Request    Is this a new Rx :YES    Name and Strength:LINZESS 290 mcg Cap capsule      Preferred Pharmacy with phone number:     [facility]/pharmacy #[id number]  [place], [place] - [address] [facility]  [address]  [place] [address]  Phone: [phone] Fax: [phone]         Communication Preference:[phone]      Additional Information:  pt only has 2 tablets left    Please Call to Advise,Thank you.

## 2025-07-07 ENCOUNTER — LAB VISIT (OUTPATIENT)
Dept: LAB | Facility: HOSPITAL | Age: 75
End: 2025-07-07
Payer: MEDICARE

## 2025-07-07 DIAGNOSIS — Z08 ENCOUNTER FOR FOLLOW-UP SURVEILLANCE OF PROSTATE CANCER: ICD-10-CM

## 2025-07-07 DIAGNOSIS — N40.1 BPH WITH URINARY OBSTRUCTION: ICD-10-CM

## 2025-07-07 DIAGNOSIS — C61 PROSTATE CANCER: ICD-10-CM

## 2025-07-07 DIAGNOSIS — Z85.46 ENCOUNTER FOR FOLLOW-UP SURVEILLANCE OF PROSTATE CANCER: ICD-10-CM

## 2025-07-07 DIAGNOSIS — N13.8 BPH WITH URINARY OBSTRUCTION: ICD-10-CM

## 2025-07-07 LAB — PSA SERPL-MCNC: 1.82 NG/ML

## 2025-07-07 PROCEDURE — 84153 ASSAY OF PSA TOTAL: CPT

## 2025-07-07 PROCEDURE — 36415 COLL VENOUS BLD VENIPUNCTURE: CPT

## 2025-07-10 ENCOUNTER — OFFICE VISIT (OUTPATIENT)
Dept: UROLOGY | Facility: CLINIC | Age: 75
End: 2025-07-10
Payer: MEDICARE

## 2025-07-10 VITALS
HEIGHT: 71 IN | HEART RATE: 79 BPM | SYSTOLIC BLOOD PRESSURE: 100 MMHG | BODY MASS INDEX: 26.67 KG/M2 | WEIGHT: 190.5 LBS | DIASTOLIC BLOOD PRESSURE: 56 MMHG

## 2025-07-10 DIAGNOSIS — C61 PROSTATE CANCER: Primary | ICD-10-CM

## 2025-07-10 DIAGNOSIS — Z90.79 S/P TURP: ICD-10-CM

## 2025-07-10 DIAGNOSIS — R19.8 ALTERNATING CONSTIPATION AND DIARRHEA: ICD-10-CM

## 2025-07-10 DIAGNOSIS — Z85.46 ENCOUNTER FOR FOLLOW-UP SURVEILLANCE OF PROSTATE CANCER: ICD-10-CM

## 2025-07-10 DIAGNOSIS — N40.1 BPH WITH URINARY OBSTRUCTION: ICD-10-CM

## 2025-07-10 DIAGNOSIS — N13.8 BPH WITH URINARY OBSTRUCTION: ICD-10-CM

## 2025-07-10 DIAGNOSIS — Z08 ENCOUNTER FOR FOLLOW-UP SURVEILLANCE OF PROSTATE CANCER: ICD-10-CM

## 2025-07-10 PROCEDURE — 3074F SYST BP LT 130 MM HG: CPT | Mod: CPTII,S$GLB,, | Performed by: NURSE PRACTITIONER

## 2025-07-10 PROCEDURE — 1159F MED LIST DOCD IN RCRD: CPT | Mod: CPTII,S$GLB,, | Performed by: NURSE PRACTITIONER

## 2025-07-10 PROCEDURE — 99999 PR PBB SHADOW E&M-EST. PATIENT-LVL IV: CPT | Mod: PBBFAC,,, | Performed by: NURSE PRACTITIONER

## 2025-07-10 PROCEDURE — 1101F PT FALLS ASSESS-DOCD LE1/YR: CPT | Mod: CPTII,S$GLB,, | Performed by: NURSE PRACTITIONER

## 2025-07-10 PROCEDURE — 3078F DIAST BP <80 MM HG: CPT | Mod: CPTII,S$GLB,, | Performed by: NURSE PRACTITIONER

## 2025-07-10 PROCEDURE — 3008F BODY MASS INDEX DOCD: CPT | Mod: CPTII,S$GLB,, | Performed by: NURSE PRACTITIONER

## 2025-07-10 PROCEDURE — 99215 OFFICE O/P EST HI 40 MIN: CPT | Mod: S$GLB,,, | Performed by: NURSE PRACTITIONER

## 2025-07-10 PROCEDURE — 1160F RVW MEDS BY RX/DR IN RCRD: CPT | Mod: CPTII,S$GLB,, | Performed by: NURSE PRACTITIONER

## 2025-07-10 PROCEDURE — 3288F FALL RISK ASSESSMENT DOCD: CPT | Mod: CPTII,S$GLB,, | Performed by: NURSE PRACTITIONER

## 2025-07-10 PROCEDURE — 1126F AMNT PAIN NOTED NONE PRSNT: CPT | Mod: CPTII,S$GLB,, | Performed by: NURSE PRACTITIONER

## 2025-07-10 PROCEDURE — G2211 COMPLEX E/M VISIT ADD ON: HCPCS | Mod: S$GLB,,, | Performed by: NURSE PRACTITIONER

## 2025-07-10 NOTE — PATIENT INSTRUCTIONS
Lab Results   Component Value Date    PSA 1.82 07/07/2025    PSA 3.0 10/18/2018    PSA 2.1 10/27/2017    PSA 2.2 09/23/2016    PSA 2.0 09/03/2015    PSA 1.9 09/19/2014    PSA 1.81 08/15/2013    PSA 2.2 04/01/2011    PSA 1.4 08/25/2009    PSA 1.0 09/07/2007    PSADIAG 1.9 01/07/2025    PSADIAG 1.5 07/08/2024    PSADIAG 1.5 01/08/2024    PSADIAG 1.2 07/18/2023    PSADIAG 1.3 12/28/2022    PSADIAG 1.2 05/09/2022    PSADIAG 0.90 05/25/2021    PSADIAG 0.65 11/10/2020    PSADIAG 0.62 06/11/2020    PSADIAG 0.24 12/09/2019

## 2025-07-10 NOTE — PROGRESS NOTES
CHIEF COMPLAINT:    Jean Carlson is a 75 y.o. male presents today for Prostate Cancer    HISTORY OF PRESENTING ILLINESS:    Jean Carlson is a 74 y.o. male who had been diagnosed with Prostate Cancer during a transurethral prostatectomy 07/13/2019 by Dr. Knowles.  Was on flomax and avodart.  1% of specimen [place] 6. Had 22 grams resected.   Retired [employer] .  08/27/20219 meet with Dr. Calero for 2nd Opinion and decided on AS.      Stage- T1a  PSA- 2.9  PSAD-  <.138  Path- Julius 6 1%.  CANDY score- 1 low risk disease  NCCN- very low risk disease  12/2019 MRI- PI-RADS 1, 2.6 ccs!     11/10/2020 Prostate MRI:   - PSA was 0.62  - no significant lesions noted; PI-RADS 1      07/27/2021 had incision of BNC with Dr. Ny. Has been urinating well since.   10/21/2022 had a normal cysto with Dr. Ny done due to changes in urine flow at night.   He was given an Rx for Flomax that has helped since.     Last clinic visit was 01/10/2025 with PSA of 1.9.     He is here today for 6 month f/u visit.   Very concerned with Prostate Cancer;   PSA is now 1.9.  FOS is good during the day day; can vary at night; nocturia 1-2x.  He is still taking Flomax daily.  Overall pleased with urination.   Reports ED and decreased libido. Testosterone normal at 579  Not interested in ED meds;   Weight today is 86.4 kg; 84.4 kg 6 months ago.     Osteoporosis; now taking Prolia.   History of having night sweats/hot flashes.   PCP tried Zoloft to help with nightsweats; he was unable to tolerate.   TSH was normal         REVIEW OF SYSTEMS:  Review of Systems   Constitutional: Negative.  Negative for chills and fever.   Eyes:  Negative for double vision.   Respiratory:  Negative for cough and shortness of breath.    Cardiovascular:  Negative for chest pain and palpitations.   Gastrointestinal:  Positive for constipation and diarrhea. Negative for abdominal pain, nausea and vomiting.        Bloating with severe constipation. Taking  multiple medications.   Hinders his activities due to concern with uncontrolled diarrhea.   Sees GI specialists.      Genitourinary:         See HPI   Musculoskeletal:  Negative for falls.   Neurological:  Negative for dizziness and seizures.   Endo/Heme/Allergies:  Negative for polydipsia.   Psychiatric/Behavioral:          Concern with prostate cancer.          PATIENT HISTORY:    Past Medical History:   Diagnosis Date    Acute medial meniscus tear, right, initial encounter 2023    Anterior tibialis tendinitis of right lower extremity 10/18/2018    Basal cell carcinoma (BCC) of right cheek 2024    R cheek    Cancer     Chronic idiopathic constipation 2019    Closed fracture of left wrist 10/18/2021    Closed fracture of lower end of left radius with routine healing 2022    Elevated PSA     Family history of ASCVD 10/07/2016    Mom age 64, Dad age 67 -  on same day. Pat Aunt early 70's.    Family history of coronary artery disease 10/07/2016    Mom age 64, Dad age 67 -  on same day. Pat Aunt early 70's.    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    Intractable back pain 2018    Knee injury, right, initial encounter 05/15/2023    Nuclear sclerosis, bilateral 2018    Ocular migraine 2012    Osteoporosis     Prostate disease     Transient vision disturbance of both eyes 2012    Umbilical hernia without obstruction and without gangrene 10/01/2015    Urinary tract infection        Past Surgical History:   Procedure Laterality Date    back sx      lower    BLADDER FULGURATION N/A 2019    Procedure: FULGURATION, BLADDER;  Surgeon: Ryan Knowles MD;  Location: [facility];  Service: Urology;  Laterality: N/A;  bleeding tissue at bladder neck    CATARACT EXTRACTION W/  INTRAOCULAR LENS IMPLANT Bilateral     Dr Finn/Ana IOL     CHONDROPLASTY  OF KNEE Right 5/30/2023    Procedure: CHONDROPLASTY, KNEE;  Surgeon: Taylor Young MD;  Location: [facility] OR;  Service: Orthopedics;  Laterality: Right;    COLONOSCOPY N/A 6/5/2019    Procedure: COLONOSCOPY;  Surgeon: Mauro Smallwood MD;  Location: [facility] ENDO (4TH FLR);  Service: Endoscopy;  Laterality: N/A;    CYSTOSCOPY N/A 7/13/2019    Procedure: CYSTOSCOPY;  Surgeon: Ryan Knowles MD;  Location: [facility] OR;  Service: Urology;  Laterality: N/A;    CYSTOSCOPY      CYSTOSCOPY  7/27/2021    Procedure: CYSTOSCOPY;  Surgeon: Manan Ny MD;  Location: [facility] OR 1ST FLR;  Service: Urology;;    ESOPHAGOGASTRODUODENOSCOPY N/A 3/31/2021    Procedure: EGD (ESOPHAGOGASTRODUODENOSCOPY);  Surgeon: Jamilah Munoz MD;  Location: [facility];  Service: Endoscopy;  Laterality: N/A;    HERNIA REPAIR      KNEE ARTHROSCOPY W/ MENISCECTOMY Right 5/30/2023    Procedure: ARTHROSCOPY, KNEE, WITH MENISCECTOMY;  Surgeon: Taylor Young MD;  Location: [facility] OR;  Service: Orthopedics;  Laterality: Right;    WOZZO-[id number]-[id number] Right 5/30/2023    Procedure: ASKFL-CFOFCEAS-[id number];  Surgeon: Taylor Young MD;  Location: [facility] OR;  Service: Orthopedics;  Laterality: Right;    PROSTATE SURGERY      REMOVAL OF BLOOD CLOT N/A 7/13/2019    Procedure: REMOVAL, BLOOD CLOT;  Surgeon: Ryan Knowles MD;  Location: [facility] OR;  Service: Urology;  Laterality: N/A;  prostate    SPINE SURGERY      SYNOVECTOMY OF KNEE Right 5/30/2023    Procedure: SYNOVECTOMY, KNEE;  Surgeon: Taylor Young MD;  Location: [facility] OR;  Service: Orthopedics;  Laterality: Right;    TONSILLECTOMY      TRANSURETHRAL RESECTION OF PROSTATE      TRANSURETHRAL RESECTION OF PROSTATE N/A 7/13/2019    Procedure: TURP (TRANSURETHRAL RESECTION OF PROSTATE);  Surgeon: Ryan Knowles MD;  Location: [facility] OR;  Service: Urology;  Laterality: N/A;    TRANSURETHRAL SURGICAL REMOVAL OF STRICTURE OF BLADDER NECK  7/27/2021    Procedure: EXCISION, CONTRACTURE, BLADDER NECK, TRANSURETHRAL;   Surgeon: Manan Ny MD;  Location: [facility] OR [address] [place] [address];  Service: Urology;;       Family History   Problem Relation Name Age of Onset    Heart disease Mother      Heart attack Mother      Skin cancer Mother      Heart disease Father      Heart attack Father      Glaucoma Maternal Aunt      Glaucoma Maternal Uncle Derek Joshi     Retinitis pigmentosa Maternal Uncle Derek Joshi     No Known Problems Daughter      Prostate cancer Neg Hx      Kidney disease Neg Hx      Colon cancer Neg Hx         Social History[1]    Allergies:  Patient has no known allergies.    Medications:  Current Medications[2]    PHYSICAL EXAMINATION:  Physical Exam  Vitals and nursing note reviewed.   Constitutional:       General: He is awake.      Appearance: Normal appearance.   HENT:      Head: Normocephalic.      Right Ear: External ear normal.      Left Ear: External ear normal.      Nose: Nose normal.   Cardiovascular:      Rate and Rhythm: Normal rate.   Pulmonary:      Effort: Pulmonary effort is normal. No respiratory distress.   Abdominal:      Tenderness: There is no abdominal tenderness. There is no right CVA tenderness or left CVA tenderness.   Musculoskeletal:         General: Normal range of motion.      Cervical back: Normal range of motion.   Skin:     General: Skin is warm and dry.   Neurological:      General: No focal deficit present.      Mental Status: He is alert and oriented to person, place, and time.   Psychiatric:         Mood and Affect: Mood normal.         Behavior: Behavior is cooperative.           LABS:      06/27/2025 UA was clear of infection and blood and protein.       Lab Results   Component Value Date    PSA 1.82 07/07/2025    PSA 3.0 10/18/2018    PSA 2.1 10/27/2017    PSADIAG 1.9 01/07/2025    PSADIAG 1.5 07/08/2024    PSADIAG 1.5 01/08/2024       Lab Results   Component Value Date    CREATININE 1.1 06/27/2025    EGFRNORACEVR >60 06/27/2025               IMPRESSION:    Encounter Diagnoses   Name Primary?     Prostate cancer Yes    BPH with urinary obstruction     Encounter for follow-up surveillance of prostate cancer     S/P TURP     Alternating constipation and diarrhea          Assessment:       1. Prostate cancer    2. BPH with urinary obstruction    3. Encounter for follow-up surveillance of prostate cancer    4. S/P TURP    5. Alternating constipation and diarrhea        Plan:         I spent a total of 40 minutes on the day of the visit. This includes face to face time and non-face to face time preparing to see the patient (eg, review of tests), obtaining and/or reviewing separately obtained history, documenting clinical information in the electronic or other health record, independently interpreting results and communicating results to the patient/family/caregiver, or care coordinator.  Reviewed the factors for todays visit. His specific Prostate Cancer Monitoring  Reassurance with last recorded PSA. Discussed the expectations with PSA levels.   Discussed his PSA's and how they have always been normal.   -Age adjusted PSA normals for him:    40-49    0-2.5   50-59    0-3.5  60-69    0-4.5  70-79    0-6.5    Reassurance with previous UA; feeling of emptying bladder well.   Continuing Flomax and expectations.   Reviewed any previous and possible management if LUTS worsen.  Recommendations for continued follow with PCP; any need to go to the ER.    Recommended lifestyle modifications with a proper, healthy diet, good hydration but during the day. Reducing bladder irritants.   Benefits of regular exercise and keeping a healthy weight.   Repeat PSA in 6 months and can do a virtual visit afterwards.   RTC sooner than annual visit for any worsening/changing LUTS.       The visit today is associated with current or anticipated ongoing medical care related to this patient's single serious condition/complex condition.            [1]   Social History  Socioeconomic History    Marital status:    Occupational  History     Employer: [employer]   Tobacco Use    Smoking status: Never    Smokeless tobacco: Never   Substance and Sexual Activity    Alcohol use: Yes     Alcohol/week: 3.0 standard drinks of alcohol     Types: 3 Glasses of wine per week     Comment: rarely    Drug use: No    Sexual activity: Not Currently     Partners: Female   Social History Narrative    ** Merged History Encounter **          [employer]. 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     Social Drivers of Health     Financial Resource Strain: Low Risk  (2/27/2025)    Overall Financial Resource Strain (CARDIA)     Difficulty of Paying Living Expenses: Not hard at all   Food Insecurity: No Food Insecurity (2/27/2025)    Hunger Vital Sign     Worried About Running Out of Food in the Last Year: Never true     Ran Out of Food in the Last Year: Never true   Transportation Needs: No Transportation Needs (2/27/2025)    PRAPARE - Transportation     Lack of Transportation (Medical): No     Lack of Transportation (Non-Medical): No   Physical Activity: Inactive (2/27/2025)    Exercise Vital Sign     Days of Exercise per Week: 0 days     Minutes of Exercise per Session: 0 min   Stress: No Stress Concern Present (2/27/2025)    [employer] [place] [employer] - Occupational Stress Questionnaire     Feeling of Stress : Not at all   Housing Stability: Low Risk  (2/27/2025)    Housing Stability Vital Sign     Unable to Pay for Housing in the Last Year: No     Number of Times Moved in the Last Year: 0     Homeless in the Last Year: No   [2]   Current Outpatient Medications:     atorvastatin (LIPITOR) 20 MG tablet, Take 1 tablet (20 mg total) by mouth once daily., Disp: 90 tablet, Rfl: 3    benzonatate (TESSALON) 200 MG capsule, Take 1 capsule (200 mg total) by mouth every 8 (eight) hours as needed for Cough., Disp: 30 capsule, Rfl: 0    cholecalciferol, vitamin D3, (VITAMIN D3) 50 mcg (2,000 unit) Cap, Take 1 capsule by mouth once daily., Disp: , Rfl:     famotidine  (PEPCID) 20 MG tablet, Take 1 tablet (20 mg total) by mouth 2 (two) times daily., Disp: 60 tablet, Rfl: 11    fluticasone propionate (FLONASE) 50 mcg/actuation nasal spray, 1 spray (50 mcg total) by Each Nostril route once daily., Disp: 9.9 mL, Rfl: 0    hydroCHLOROthiazide 12.5 MG Tab, Take 1 tablet (12.5 mg total) by mouth once daily., Disp: 90 tablet, Rfl: 3    hydrocortisone (ANUSOL-HC) 2.5 % rectal cream, Place 1 application rectally 2 (two) times daily., Disp: 28 g, Rfl: 1    icosapent ethyL (VASCEPA) 1 gram Cap, Take 1 capsule (1,000 mg total) by mouth 2 (two) times daily., Disp: 180 capsule, Rfl: 3    lactulose (CEPHULAC) 20 gram Pack, Mix 1 packet (20 g total) with 4 ounces of water and take by mouth 3 (three) times daily., Disp: 90 packet, Rfl: 5    linaCLOtide (LINZESS) 290 mcg Cap capsule, Take 1 capsule (290 mcg total) by mouth before breakfast., Disp: 90 capsule, Rfl: 3    methocarbamoL (ROBAXIN) 750 MG Tab, TAKE 1 TABLET BY MOUTH 3 TIMES DAILY AS NEEDED BACK SPASM FOR 3-4 DAYS DURING NEXT SPASMS EPISODE., Disp: 90 tablet, Rfl: 0    multivitamin capsule, Take 1 capsule by mouth once daily., Disp: , Rfl:     pantoprazole (PROTONIX) 40 MG tablet, Take 1 tablet (40 mg total) by mouth once daily., Disp: 30 tablet, Rfl: 12    polyethylene glycol (GLYCOLAX) 17 gram PwPk, Take by mouth., Disp: , Rfl:     promethazine-dextromethorphan (PROMETHAZINE-DM) 6.25-15 mg/5 mL Syrp, Take 5 mLs by mouth nightly as needed (cough)., Disp: 118 mL, Rfl: 0    tamsulosin (FLOMAX) 0.4 mg Cap, Take 1 capsule (0.4 mg total) by mouth once daily., Disp: 90 capsule, Rfl: 3

## 2025-07-22 ENCOUNTER — OFFICE VISIT (OUTPATIENT)
Dept: ORTHOPEDICS | Facility: CLINIC | Age: 75
End: 2025-07-22
Payer: MEDICARE

## 2025-07-22 ENCOUNTER — HOSPITAL ENCOUNTER (OUTPATIENT)
Dept: RADIOLOGY | Facility: HOSPITAL | Age: 75
Discharge: HOME | End: 2025-07-22
Attending: ORTHOPAEDIC SURGERY
Payer: MEDICARE

## 2025-07-22 ENCOUNTER — CLINICAL SUPPORT (OUTPATIENT)
Dept: AUDIOLOGY | Facility: CLINIC | Age: 75
End: 2025-07-22
Attending: FAMILY MEDICINE
Payer: MEDICARE

## 2025-07-22 VITALS — BODY MASS INDEX: 26.67 KG/M2 | HEIGHT: 71 IN | WEIGHT: 190.5 LBS

## 2025-07-22 DIAGNOSIS — S92.355D CLOSED NONDISPLACED FRACTURE OF FIFTH METATARSAL BONE OF LEFT FOOT WITH ROUTINE HEALING, SUBSEQUENT ENCOUNTER: ICD-10-CM

## 2025-07-22 DIAGNOSIS — H90.3 SENSORINEURAL HEARING LOSS (SNHL) OF BOTH EARS: Primary | ICD-10-CM

## 2025-07-22 DIAGNOSIS — H91.93 BILATERAL HEARING LOSS, UNSPECIFIED HEARING LOSS TYPE: ICD-10-CM

## 2025-07-22 DIAGNOSIS — S92.355D CLOSED NONDISPLACED FRACTURE OF FIFTH METATARSAL BONE OF LEFT FOOT WITH ROUTINE HEALING, SUBSEQUENT ENCOUNTER: Primary | ICD-10-CM

## 2025-07-22 PROCEDURE — 1159F MED LIST DOCD IN RCRD: CPT | Mod: CPTII,S$GLB,, | Performed by: ORTHOPAEDIC SURGERY

## 2025-07-22 PROCEDURE — 73630 X-RAY EXAM OF FOOT: CPT | Mod: TC,LT

## 2025-07-22 PROCEDURE — 1101F PT FALLS ASSESS-DOCD LE1/YR: CPT | Mod: CPTII,S$GLB,, | Performed by: ORTHOPAEDIC SURGERY

## 2025-07-22 PROCEDURE — 99999 PR PBB SHADOW E&M-EST. PATIENT-LVL III: CPT | Mod: PBBFAC,,, | Performed by: ORTHOPAEDIC SURGERY

## 2025-07-22 PROCEDURE — 99999 PR PBB SHADOW E&M-EST. PATIENT-LVL II: CPT | Mod: PBBFAC,,,

## 2025-07-22 PROCEDURE — 1125F AMNT PAIN NOTED PAIN PRSNT: CPT | Mod: CPTII,S$GLB,, | Performed by: ORTHOPAEDIC SURGERY

## 2025-07-22 PROCEDURE — 99212 OFFICE O/P EST SF 10 MIN: CPT | Mod: S$GLB,,, | Performed by: ORTHOPAEDIC SURGERY

## 2025-07-22 PROCEDURE — 92567 TYMPANOMETRY: CPT | Mod: S$GLB,,,

## 2025-07-22 PROCEDURE — 92557 COMPREHENSIVE HEARING TEST: CPT | Mod: S$GLB,,,

## 2025-07-22 PROCEDURE — 73630 X-RAY EXAM OF FOOT: CPT | Mod: 26,LT,, | Performed by: RADIOLOGY

## 2025-07-22 PROCEDURE — 3008F BODY MASS INDEX DOCD: CPT | Mod: CPTII,S$GLB,, | Performed by: ORTHOPAEDIC SURGERY

## 2025-07-22 PROCEDURE — 3288F FALL RISK ASSESSMENT DOCD: CPT | Mod: CPTII,S$GLB,, | Performed by: ORTHOPAEDIC SURGERY

## 2025-07-22 PROCEDURE — 1160F RVW MEDS BY RX/DR IN RCRD: CPT | Mod: CPTII,S$GLB,, | Performed by: ORTHOPAEDIC SURGERY

## 2025-07-22 NOTE — PROGRESS NOTES
Jean Carlson, a 75 y.o. male, was seen today in the clinic for an audiologic evaluation. Mr. Carlson reported decreased hearing in both ears. He reported bilateral tinnitus. Mr. Carlson's most recent audiogram on 3/12/2021 revealed normal hearing sensitivity with a moderately severe high frequency sensorineural hearing loss, bilaterally. Patient denied otalgia, aural fullness, and dizziness.    Tympanometry revealed Type A tympanogram in the right ear and Type A tympanogram in the left ear. Audiogram results revealed normal hearing sensitivity sloping to moderately severe sensorineural hearing loss (SNHL) in the right ear and normal hearing sensitivity sloping to moderately severe SNHL in the left ear.  Speech reception thresholds were noted at 15 dBHL in the right ear and 20 dBHL in the left ear.  Speech discrimination scores were 92% in the right ear and 100% in the left ear.    Recommendations:  Otologic evaluation  Hearing aid consultation  Annual audiogram or sooner if change is perceived  Hearing protection in noise

## 2025-07-22 NOTE — PROGRESS NOTES
Jean Carlson  Returns today for follow-up.  This is a 75-year-old male who I last saw on 04/22/2025 for follow-up of a left 5th metatarsal Wisdom fracture.  When I last saw him he reported that he was doing much better with some occasional soreness but was satisfied with his current status.  I told him he could start to progress his activity as long as there is no increasing pain.  He returns today and reports that he continues to have some occasional pain with certain activities.  He has increased his activities since I have seen him in his pain has not gotten worse.    Examination: He walks in today with a normal gait.  There is no obvious swelling about his left foot.  He is tender tarsal to the base of the 5th metatarsal and does not have real significant tenderness over the metatarsal itself.    Imaging: I ordered and reviewed an x-ray of the left foot today.  There is still some lucency which appears unchanged from his last x-rays in April.    Impression:   1. Closed nondisplaced fracture of fifth metatarsal bone of left foot with routine healing, subsequent encounter  X-Ray Foot Complete Left            Recommendation:  It is encouraging that he has been able to increase his activity with no significant increase in pain.  He does not have constant pain with every step he takes and he is able to do treadmill and elliptical machine without any pain.  I really would still not recommend any surgical intervention at this time, but he should continue to avoid any activities that cause significant pain during the activity.  I informed him that I will be retiring at the end of September and other providers would be available to follow him if necessary.

## 2025-07-29 ENCOUNTER — TELEPHONE (OUTPATIENT)
Dept: GASTROENTEROLOGY | Facility: CLINIC | Age: 75
End: 2025-07-29
Payer: MEDICARE

## 2025-07-29 NOTE — TELEPHONE ENCOUNTER
Copied from CRM #[id number]. Topic: General Inquiry - Return Call  >> Jul 29, 2025 11:43 AM Jerrica wrote:    Returning a Missed Call     Caller: patient         Returning call to: Marco Antonio      Caller can be reached @: [phone]       Nature of the call: Unknown no message left

## 2025-07-29 NOTE — TELEPHONE ENCOUNTER
Spoke with patient.   Confirmed his appointment with Dr.James Gallardo for this Thursday 7/31.  Flakita

## 2025-07-30 ENCOUNTER — CLINICAL SUPPORT (OUTPATIENT)
Dept: AUDIOLOGY | Facility: CLINIC | Age: 75
End: 2025-07-30
Payer: MEDICARE

## 2025-07-30 DIAGNOSIS — H90.3 SENSORINEURAL HEARING LOSS (SNHL) OF BOTH EARS: Primary | ICD-10-CM

## 2025-07-30 PROCEDURE — 99499 UNLISTED E&M SERVICE: CPT | Mod: S$GLB,,,

## 2025-07-30 NOTE — PROGRESS NOTES
7/30/2025    Hearing Aid Consultation    Jean Carlson was seen today for a hearing aid consultation.  Mr. Carlson has normal hearing sloping to a severe sensorineural hearing loss (SNHL) in both ears. We discussed the patient's hearing loss and how it impacts his daily life in detail. Patient stated that he has difficulty hearing at [employer], in background noise, and when his wife talks to him across a room. Hearing aid options and recommendations were presented and discussed. Mr. Carlson would like more time to consider his amplification options before ordering.  In the event that Mr. Carlson decides to order hearing aids, he has pre-selected a pair of Phonak Infinio Sphere 70 hearing aids in graphite with size 1M receivers and M vented. He opted for the itemized care model.  Patient acknowledged understanding of the 30 day trial period, $250 non-refundable restocking fee from the time of order, and the fact that we do not file insurance on behalf of the patient.  Mr. Carlson was instructed to call the clinic at [phone] should he choose to pursue amplification to confirm the device selection and proceed with ordering the devices.    Recommendations:  Otologic evaluation  Annual audiogram or sooner if change perceived  Hearing protection in noise

## 2025-07-31 ENCOUNTER — TELEPHONE (OUTPATIENT)
Dept: ENDOSCOPY | Facility: HOSPITAL | Age: 75
End: 2025-07-31
Payer: MEDICARE

## 2025-07-31 ENCOUNTER — OFFICE VISIT (OUTPATIENT)
Dept: GASTROENTEROLOGY | Facility: CLINIC | Age: 75
End: 2025-07-31
Payer: MEDICARE

## 2025-07-31 ENCOUNTER — DOCUMENTATION ONLY (OUTPATIENT)
Dept: AUDIOLOGY | Facility: CLINIC | Age: 75
End: 2025-07-31
Payer: MEDICARE

## 2025-07-31 VITALS
BODY MASS INDEX: 27.06 KG/M2 | HEART RATE: 76 BPM | WEIGHT: 193.31 LBS | HEIGHT: 71 IN | SYSTOLIC BLOOD PRESSURE: 111 MMHG | DIASTOLIC BLOOD PRESSURE: 64 MMHG

## 2025-07-31 DIAGNOSIS — K59.00 CONSTIPATION, UNSPECIFIED CONSTIPATION TYPE: Primary | ICD-10-CM

## 2025-07-31 DIAGNOSIS — K59.04 CHRONIC IDIOPATHIC CONSTIPATION: ICD-10-CM

## 2025-07-31 DIAGNOSIS — Z12.11 SPECIAL SCREENING FOR MALIGNANT NEOPLASMS, COLON: ICD-10-CM

## 2025-07-31 DIAGNOSIS — Z86.0100 HISTORY OF COLON POLYPS: ICD-10-CM

## 2025-07-31 DIAGNOSIS — K21.9 GASTROESOPHAGEAL REFLUX DISEASE WITHOUT ESOPHAGITIS: ICD-10-CM

## 2025-07-31 DIAGNOSIS — K58.1 IRRITABLE BOWEL SYNDROME WITH CONSTIPATION: Primary | ICD-10-CM

## 2025-07-31 PROCEDURE — 99999 PR PBB SHADOW E&M-EST. PATIENT-LVL IV: CPT | Mod: PBBFAC,,, | Performed by: INTERNAL MEDICINE

## 2025-07-31 PROCEDURE — 99214 OFFICE O/P EST MOD 30 MIN: CPT | Mod: S$GLB,,, | Performed by: INTERNAL MEDICINE

## 2025-07-31 PROCEDURE — 1159F MED LIST DOCD IN RCRD: CPT | Mod: CPTII,S$GLB,, | Performed by: INTERNAL MEDICINE

## 2025-07-31 PROCEDURE — 3288F FALL RISK ASSESSMENT DOCD: CPT | Mod: CPTII,S$GLB,, | Performed by: INTERNAL MEDICINE

## 2025-07-31 PROCEDURE — 3008F BODY MASS INDEX DOCD: CPT | Mod: CPTII,S$GLB,, | Performed by: INTERNAL MEDICINE

## 2025-07-31 PROCEDURE — 1160F RVW MEDS BY RX/DR IN RCRD: CPT | Mod: CPTII,S$GLB,, | Performed by: INTERNAL MEDICINE

## 2025-07-31 PROCEDURE — 1101F PT FALLS ASSESS-DOCD LE1/YR: CPT | Mod: CPTII,S$GLB,, | Performed by: INTERNAL MEDICINE

## 2025-07-31 PROCEDURE — 1126F AMNT PAIN NOTED NONE PRSNT: CPT | Mod: CPTII,S$GLB,, | Performed by: INTERNAL MEDICINE

## 2025-07-31 PROCEDURE — 3078F DIAST BP <80 MM HG: CPT | Mod: CPTII,S$GLB,, | Performed by: INTERNAL MEDICINE

## 2025-07-31 PROCEDURE — 3074F SYST BP LT 130 MM HG: CPT | Mod: CPTII,S$GLB,, | Performed by: INTERNAL MEDICINE

## 2025-07-31 RX ORDER — SODIUM, POTASSIUM,MAG SULFATES 17.5-3.13G
2 SOLUTION, RECONSTITUTED, ORAL ORAL DAILY
Qty: 2 KIT | Refills: 0 | Status: SHIPPED | OUTPATIENT
Start: 2025-07-31 | End: 2025-08-02

## 2025-07-31 NOTE — TELEPHONE ENCOUNTER
"Dennis Gallardo MD  [facility]  Caller: Unspecified (Today,  3:05 PM)  Procedure: Colonoscopy    Diagnosis: Constipation    Procedure Timing: Within 12 weeks    *If within 4 weeks selected, please trey as high priority*    *If greater than 12 weeks, please select "5-12 weeks" and delay sending until 3 months prior to requested date*    Location: Any Site    Additional Scheduling Information: No scheduling concerns    Prep Specifications:Extended/Constipation prep    Is the patient taking a GLP-1 Agonist:no    Have you attached a patient to this message: no          Patient is scheduled for a Colonoscopy on 10/09/25 with Dr. YANI Gallardo  Referral for procedure from [facility]  "

## 2025-07-31 NOTE — PROGRESS NOTES
Mr. Carlson called the clinic as he decided to move forward with purchasing the hearing aids we discussed in his hearing aid consultation.  I called him back and left a message to come to the hearing aid window and sign his order form.  Patient stopped by and paid the $250 non-refundable deposit for his hearing aids to be ordered.

## 2025-07-31 NOTE — PROGRESS NOTES
Subjective:       Patient ID: Jean Carlson is a 75 y.o. male.    Chief Complaint: Follow-up    HPI    Follow-up visit.  75-year-old gentleman.  He had originally been seeing Dr. Munoz, but had also seen Dr. Juan Brennan.  I saw him as a 2nd opinion in August of 2023.  The problem is constipation which has been a significant problem for the past 6 years ever since he had multiple surgeries.  There was approved of time where the Linzess   290 was working with satisfactory bowel movements.  But that is no longer the case.  Right now he is doing MiraLax 2 caps in the morning and 2 caps in the evening along with the MiraLax and still having a difficult time with his bowel movements. .  There was a time last week where he did not have any bowel movements for several days and was very distended and uncomfortable.  He took magnesium citrate and fleets enema and that provided some evacuation and relief. .  His current status quo is that he has bowel movements every day but there is quite a bit of variability and difficult evacuation.  He might have a single hard stool that is difficult to pass with straining.  And then he might have small volume stool again difficult to pass.  Ultimately may have some loose stools.  Because of the unpredictability, ties him to the bathroom.  No time does he feel like he has a complete evacuation in a solid stool.  And during this time there is abdominal discomfort and abdominal distention.    As I have covered in the past he has habits are good.  He has a good diet.  Lots of water intake.  He is doing Metamucil in the morning. Upcoming long motor trip to [place]    Past workup reveals a defecography in October of 2023 which was normal    Last colonoscopy was in 2019 and that was normal    Past Medical History:   Diagnosis Date    Acute medial meniscus tear, right, initial encounter 05/30/2023    Anterior tibialis tendinitis of right lower extremity 10/18/2018    Basal cell carcinoma  (BCC) of right cheek 2024    R cheek    Cancer     Chronic idiopathic constipation 2019    Closed fracture of left wrist 10/18/2021    Closed fracture of lower end of left radius with routine healing 2022    Elevated PSA     Family history of ASCVD 10/07/2016    Mom age 64, Dad age 67 -  on same day. Pat Aunt early 70's.    Family history of coronary artery disease 10/07/2016    Mom age 64, Dad age 67 -  on same day. Pat Aunt early 70's.    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    Intractable back pain 2018    Knee injury, right, initial encounter 05/15/2023    Nuclear sclerosis, bilateral 2018    Ocular migraine 2012    Osteoporosis     Prostate disease     Transient vision disturbance of both eyes 2012    Umbilical hernia without obstruction and without gangrene 10/01/2015    Urinary tract infection        Review of patient's allergies indicates:  No Known Allergies     Family History   Problem Relation Name Age of Onset    Heart disease Mother      Heart attack Mother      Skin cancer Mother      Heart disease Father      Heart attack Father      Glaucoma Maternal Aunt      Glaucoma Maternal Uncle Dayton Madelyn     Retinitis pigmentosa Maternal Uncle Derek Madelyn     No Known Problems Daughter      Prostate cancer Neg Hx      Kidney disease Neg Hx      Colon cancer Neg Hx         Social History[1]     Review of Systems    CMP   Lab Results   Component Value Date     2025     2025    K 4.3 2025    K 4.9 2025    CL 99 2025     2025    CO2 30 (H) 2025    CO2 27 2025     (H) 2025     2025    BUN 12 2025    BUN 23 2025    CREATININE 1.1 2025    CREATININE 1.0 2025    CALCIUM 10.0 2025    CALCIUM 10.3 2025    PROT 6.9  06/27/2025    PROT 7.0 05/19/2025    ALBUMIN 4.3 06/27/2025    ALBUMIN 4.0 05/19/2025    BILITOT 1.3 (H) 06/27/2025    BILITOT 0.6 05/19/2025    ALKPHOS 70 06/27/2025    ALKPHOS 75 05/19/2025    AST 28 06/27/2025    AST 27 05/19/2025    ALT 34 06/27/2025    ALT 34 05/19/2025    ANIONGAP 7 (L) 06/27/2025    ANIONGAP 8 05/19/2025    and CBC   Lab Results   Component Value Date    WBC 5.08 06/27/2025    HGB 14.7 06/27/2025    HCT 44.6 06/27/2025     06/27/2025       No results found for this or any previous visit from the past 365 days.             Objective:      Physical Exam    Assessment & Plan:       Irritable bowel syndrome with constipation    Chronic idiopathic constipation    Gastroesophageal reflux disease without esophagitis      Assessment.  It is my impression before that this was more CIC, but I think he probably meets the criteria for more accurate diagnosis of IBS with C.  He continues with difficult symptoms it is very frustrated by this.  He would like to talk to a dietitian and I can arrange that.  I do wonder if there is any component of pelvic floor dysfunction and although he had a normal defecography I am going to ask Dr. Baldwin what he thinks about the utility of anorectal manometry.  It has been 6 years since his last colonoscopy so I do not think it is unreasonable to repeat that.  And finally it maybe very reasonable just to try different medicine like Amitiza or Motegrity I do not want to start that now because of upcoming trip but as soon as he gets home I would like to at least give those trials to see if they work maybe as effectively as Linzess did last year.    Recommendation  Talked to dietitian   2. Continue Linzess and MiraLax for now but likely change in the future  3.  Possible anorectal manometry  4.  Schedule colonoscopy  5. Stay in touch reports symptoms and we will adjust based on that  This note was created with voice recognition dictation technology.  There may be  errors that I did not see, detect or correct.      Dennis Gallardo MD          [1]   Social History  Tobacco Use    Smoking status: Never    Smokeless tobacco: Never   Substance Use Topics    Alcohol use: Yes     Alcohol/week: 3.0 standard drinks of alcohol     Types: 3 Glasses of wine per week     Comment: rarely    Drug use: No

## 2025-08-04 ENCOUNTER — PATIENT MESSAGE (OUTPATIENT)
Dept: GASTROENTEROLOGY | Facility: CLINIC | Age: 75
End: 2025-08-04
Payer: MEDICARE

## 2025-08-05 ENCOUNTER — DOCUMENTATION ONLY (OUTPATIENT)
Dept: AUDIOLOGY | Facility: CLINIC | Age: 75
End: 2025-08-05
Payer: MEDICARE

## 2025-08-05 NOTE — PROGRESS NOTES
Hearing Aid Information  Service Type: Itemized  Date of Purchase: 08/01/25   and Model: Peonut [id number]  Color: Graphite Gray  Right SN: [id number]  Left SN: [id number]  Battery: Li-Ion Rechargeable 312  Right  and Dome: M  6.0: 1 w/ Medium Vented Dome  Left  and Dome: M  6.0:1 w/ Medium Vented Dome  Repair Warranty Expiration Date: 08/30/28  L&D Warranty Expiration Date: 08/30/28   SN: [id number]

## 2025-08-08 ENCOUNTER — INFUSION (OUTPATIENT)
Dept: INFECTIOUS DISEASES | Facility: HOSPITAL | Age: 75
End: 2025-08-08
Payer: MEDICARE

## 2025-08-08 VITALS
HEART RATE: 69 BPM | OXYGEN SATURATION: 99 % | BODY MASS INDEX: 26.33 KG/M2 | SYSTOLIC BLOOD PRESSURE: 134 MMHG | WEIGHT: 188.06 LBS | TEMPERATURE: 99 F | RESPIRATION RATE: 20 BRPM | DIASTOLIC BLOOD PRESSURE: 62 MMHG | HEIGHT: 71 IN

## 2025-08-08 DIAGNOSIS — K21.9 GASTROESOPHAGEAL REFLUX DISEASE WITHOUT ESOPHAGITIS: ICD-10-CM

## 2025-08-08 DIAGNOSIS — M81.0 OSTEOPOROSIS, UNSPECIFIED OSTEOPOROSIS TYPE, UNSPECIFIED PATHOLOGICAL FRACTURE PRESENCE: Primary | ICD-10-CM

## 2025-08-08 PROCEDURE — 63600175 PHARM REV CODE 636 W HCPCS: Mod: JZ,TB | Performed by: INTERNAL MEDICINE

## 2025-08-08 PROCEDURE — 96372 THER/PROPH/DIAG INJ SC/IM: CPT

## 2025-08-08 RX ADMIN — DENOSUMAB 60 MG: 60 INJECTION SUBCUTANEOUS at 10:08

## 2025-08-25 ENCOUNTER — OFFICE VISIT (OUTPATIENT)
Dept: PAIN MEDICINE | Facility: CLINIC | Age: 75
End: 2025-08-25
Payer: MEDICARE

## 2025-08-25 VITALS
SYSTOLIC BLOOD PRESSURE: 131 MMHG | HEART RATE: 67 BPM | BODY MASS INDEX: 26.33 KG/M2 | HEIGHT: 71 IN | DIASTOLIC BLOOD PRESSURE: 77 MMHG | WEIGHT: 188.06 LBS

## 2025-08-25 DIAGNOSIS — G60.9 IDIOPATHIC PERIPHERAL NEUROPATHY: Primary | ICD-10-CM

## 2025-08-25 DIAGNOSIS — R20.0 NUMBNESS: ICD-10-CM

## 2025-08-25 DIAGNOSIS — M48.061 SPINAL STENOSIS OF LUMBAR REGION WITHOUT NEUROGENIC CLAUDICATION: ICD-10-CM

## 2025-08-25 PROCEDURE — 99999 PR PBB SHADOW E&M-EST. PATIENT-LVL III: CPT | Mod: PBBFAC,,, | Performed by: STUDENT IN AN ORGANIZED HEALTH CARE EDUCATION/TRAINING PROGRAM

## 2025-08-27 ENCOUNTER — CLINICAL SUPPORT (OUTPATIENT)
Dept: AUDIOLOGY | Facility: CLINIC | Age: 75
End: 2025-08-27
Payer: MEDICARE

## 2025-08-27 DIAGNOSIS — H90.3 SENSORINEURAL HEARING LOSS (SNHL) OF BOTH EARS: Primary | ICD-10-CM

## 2025-08-28 ENCOUNTER — OFFICE VISIT (OUTPATIENT)
Dept: SURGERY | Facility: CLINIC | Age: 75
End: 2025-08-28
Payer: MEDICARE

## 2025-08-28 VITALS
BODY MASS INDEX: 26.55 KG/M2 | HEART RATE: 70 BPM | HEIGHT: 71 IN | SYSTOLIC BLOOD PRESSURE: 130 MMHG | DIASTOLIC BLOOD PRESSURE: 74 MMHG | WEIGHT: 189.63 LBS

## 2025-08-28 DIAGNOSIS — K64.0 GRADE I HEMORRHOIDS: Primary | ICD-10-CM

## 2025-08-28 DIAGNOSIS — K59.04 CHRONIC IDIOPATHIC CONSTIPATION: ICD-10-CM

## 2025-08-28 PROCEDURE — 99999 PR PBB SHADOW E&M-EST. PATIENT-LVL III: CPT | Mod: PBBFAC,,, | Performed by: COLON & RECTAL SURGERY

## 2025-09-02 ENCOUNTER — TELEPHONE (OUTPATIENT)
Dept: SURGERY | Facility: CLINIC | Age: 75
End: 2025-09-02
Payer: MEDICARE

## 2025-09-04 ENCOUNTER — OFFICE VISIT (OUTPATIENT)
Dept: SURGERY | Facility: CLINIC | Age: 75
End: 2025-09-04
Payer: MEDICARE

## 2025-09-04 VITALS
HEIGHT: 71 IN | HEART RATE: 75 BPM | SYSTOLIC BLOOD PRESSURE: 132 MMHG | DIASTOLIC BLOOD PRESSURE: 74 MMHG | WEIGHT: 192 LBS | BODY MASS INDEX: 26.88 KG/M2

## 2025-09-04 DIAGNOSIS — K64.5 PERIANAL VENOUS THROMBOSIS: Primary | ICD-10-CM

## 2025-09-04 PROCEDURE — 99999 PR PBB SHADOW E&M-EST. PATIENT-LVL III: CPT | Mod: PBBFAC,,, | Performed by: COLON & RECTAL SURGERY

## (undated) DEVICE — TRAY CYSTO BASIN

## (undated) DEVICE — SYR B-D DISP CONTROL 10CC100/C

## (undated) DEVICE — GLOVE BIOGEL SKINSENSE PI 6.5

## (undated) DEVICE — SYR 10CC LUER LOCK

## (undated) DEVICE — ELECTRODE REM PLYHSV RETURN 9

## (undated) DEVICE — COVER MAYO STAND REINFRCD 30

## (undated) DEVICE — WRAP KNEE ACCU THERM GEL PACK

## (undated) DEVICE — STRIP MEDI WND CLSR 1/2X4IN

## (undated) DEVICE — GLOVE BIOGEL SKINSENSE PI 7.5

## (undated) DEVICE — GOWN ECLIPSE REINF LVL4 TWL XL

## (undated) DEVICE — DRESSING XEROFORM NONADH 1X8IN

## (undated) DEVICE — TUBE SET INFLOW/OUTFLOW

## (undated) DEVICE — GOWN SMART IMP BREATHABLE XXLG

## (undated) DEVICE — GLOVE SURG BIOGEL LATEX SZ 7.5

## (undated) DEVICE — KNIFE COLLINS 24FR

## (undated) DEVICE — SOL NACL IRR 3000ML

## (undated) DEVICE — SUT 0 VICRYL / UR6 (J603)

## (undated) DEVICE — CASSETTE INFINITI

## (undated) DEVICE — SOL IRR WATER STRL 3000 ML

## (undated) DEVICE — TRAY MINOR GEN SURG

## (undated) DEVICE — SEE MEDLINE ITEM 157148

## (undated) DEVICE — SEE MEDLINE ITEM 157117

## (undated) DEVICE — GLOVE ORTHO PF SZ 8.5

## (undated) DEVICE — SOL BETADINE 5%

## (undated) DEVICE — TRAY FOLEY 16FR INFECTION CONT

## (undated) DEVICE — Device

## (undated) DEVICE — SOL NS 1000CC

## (undated) DEVICE — SOL NACL IRR 1000ML BTL

## (undated) DEVICE — WARMER DRAPE STERILE LF

## (undated) DEVICE — GUIDEWIRE PTFE .038INX145CM

## (undated) DEVICE — GOWN SMARTGOWN LVL4 X-LONG XL

## (undated) DEVICE — GLOVE SURGEON SYN PF SZ 9

## (undated) DEVICE — ADAPTER HOSE 10FT 8MM

## (undated) DEVICE — BLADE 4.2MM PREBENT ULTRACUT

## (undated) DEVICE — UNDERGLOVES BIOGEL PI SIZE 7.5

## (undated) DEVICE — SET IRR URLGY 2LINE UNIV SPIKE

## (undated) DEVICE — SOL WATER STRL IRR 1000ML

## (undated) DEVICE — SUT MCRYL PLUS 4-0 PS2 27IN

## (undated) DEVICE — NDL HYPO REG 25G X 1 1/2

## (undated) DEVICE — DISSECTOR SPACEMAKER + 10-12MM

## (undated) DEVICE — TUBING HF INSUFFLATION W/ FLTR

## (undated) DEVICE — PAD ABDOMINAL STERILE 8X10IN

## (undated) DEVICE — NDL HYPO STD REG BVL 25GX5/8IN

## (undated) DEVICE — APPLICATOR CHLORAPREP ORN 26ML

## (undated) DEVICE — ADHESIVE MASTISOL VIAL 48/BX

## (undated) DEVICE — NDL 18GA X1 1/2 REG BEVEL

## (undated) DEVICE — GAUZE SPONGE 4X4 12PLY

## (undated) DEVICE — GOWN ECLIPSE REINF LV4 XLNG XL

## (undated) DEVICE — BAG URINARY DRAINAGE 2000ML

## (undated) DEVICE — STRAP SECURE 5MM

## (undated) DEVICE — PAD ELECTRODE STER 1.5X3

## (undated) DEVICE — BNDG COFLEX FOAM LF2 ST 6X5YD

## (undated) DEVICE — DRAPE ARTHSCP FLD CTRL POUCH

## (undated) DEVICE — CATH STATLOCK MULTI-PURPOS

## (undated) DEVICE — BLADE SURG CARBON STEEL SZ11

## (undated) DEVICE — PACK CYSTO

## (undated) DEVICE — SLEEVE PROTECTIVE 6X9 NON STER

## (undated) DEVICE — GLASSES EYE PROTECTIVE

## (undated) DEVICE — PAD GROUNDING CONMED ADULT

## (undated) DEVICE — GLOVE BIOGEL SKINSENSE PI 7.0